# Patient Record
Sex: FEMALE | Race: ASIAN | Employment: OTHER | URBAN - METROPOLITAN AREA
[De-identification: names, ages, dates, MRNs, and addresses within clinical notes are randomized per-mention and may not be internally consistent; named-entity substitution may affect disease eponyms.]

---

## 2017-01-09 ENCOUNTER — RESULTS ONLY (OUTPATIENT)
Dept: OTHER | Facility: CLINIC | Age: 42
End: 2017-01-09

## 2017-01-09 DIAGNOSIS — Z76.82 ORGAN TRANSPLANT CANDIDATE: ICD-10-CM

## 2017-01-09 PROCEDURE — 86833 HLA CLASS II HIGH DEFIN QUAL: CPT | Performed by: TRANSPLANT SURGERY

## 2017-01-09 PROCEDURE — 86832 HLA CLASS I HIGH DEFIN QUAL: CPT | Performed by: TRANSPLANT SURGERY

## 2017-01-11 LAB — PRA SINGLE ANTIGEN IGG ANTIBODY: NORMAL

## 2017-01-13 LAB
SA1 CELL: NORMAL
SA1 COMMENTS: NORMAL
SA1 HI RISK ABY: NORMAL
SA1 MOD RISK ABY: NORMAL
SA1 TEST METHOD: NORMAL
SA2 CELL: NORMAL
SA2 COMMENTS: NORMAL
SA2 HI RISK ABY UA: NORMAL
SA2 MOD RISK ABY: NORMAL
SA2 TEST METHOD: NORMAL

## 2017-04-03 ENCOUNTER — RESULTS ONLY (OUTPATIENT)
Dept: OTHER | Facility: CLINIC | Age: 42
End: 2017-04-03

## 2017-04-03 DIAGNOSIS — Z76.82 ORGAN TRANSPLANT CANDIDATE: ICD-10-CM

## 2017-04-03 PROCEDURE — 86832 HLA CLASS I HIGH DEFIN QUAL: CPT | Performed by: TRANSPLANT SURGERY

## 2017-04-03 PROCEDURE — 86833 HLA CLASS II HIGH DEFIN QUAL: CPT | Performed by: TRANSPLANT SURGERY

## 2017-04-06 LAB — PRA SINGLE ANTIGEN IGG ANTIBODY: NORMAL

## 2017-06-05 ENCOUNTER — RESULTS ONLY (OUTPATIENT)
Dept: OTHER | Facility: CLINIC | Age: 42
End: 2017-06-05

## 2017-06-05 DIAGNOSIS — Z76.82 ORGAN TRANSPLANT CANDIDATE: ICD-10-CM

## 2017-06-05 PROCEDURE — 86832 HLA CLASS I HIGH DEFIN QUAL: CPT | Performed by: TRANSPLANT SURGERY

## 2017-06-05 PROCEDURE — 86833 HLA CLASS II HIGH DEFIN QUAL: CPT | Performed by: TRANSPLANT SURGERY

## 2017-06-09 LAB — PRA SINGLE ANTIGEN IGG ANTIBODY: NORMAL

## 2017-06-13 ENCOUNTER — TELEPHONE (OUTPATIENT)
Dept: TRANSPLANT | Facility: CLINIC | Age: 42
End: 2017-06-13

## 2017-06-13 NOTE — TELEPHONE ENCOUNTER
Coordinator called pt, using  services, and left msg stating her status was changed to inactive on kidney wait list d/t insurance reasons.  Explained she will not be eligible for kidney offers while inactive, but will still accumulate wait time. Left pt her PFR's contact information. Coordinator contact information also provided.    UNOS updated, immunology & PFR notified, change in status letter routed to admin team to send out.

## 2017-06-14 ENCOUNTER — TEAM CONFERENCE (OUTPATIENT)
Dept: TRANSPLANT | Facility: CLINIC | Age: 42
End: 2017-06-14

## 2017-06-14 NOTE — TELEPHONE ENCOUNTER
Team Conference:      Attendees:Dr. Roberts, Dr. Lopez, Dr. Jacob, Dr. Mcnamara, CHASE Allan PA-C, Coordinator, , Dietician, Pharmacy    Discussion and Outcome: Patient status changed to inactive approved due to insufficient insurance.

## 2017-07-14 ENCOUNTER — TELEPHONE (OUTPATIENT)
Dept: TRANSPLANT | Facility: CLINIC | Age: 42
End: 2017-07-14

## 2017-07-14 NOTE — TELEPHONE ENCOUNTER
Attempted to contact patient at new phone number provided to us by dialysis center; phone rang and then hung up - no VM.

## 2017-07-19 ENCOUNTER — TEAM CONFERENCE (OUTPATIENT)
Dept: TRANSPLANT | Facility: CLINIC | Age: 42
End: 2017-07-19

## 2017-07-19 NOTE — TELEPHONE ENCOUNTER
Team Conference:      Attendees:Dr. Jacob, Dr. Lopez, Donal, Dr. Mccall, Coordinator, , Dietician, Pharmacy    Discussion and Outcome: Patient status changed to active approved. Insurance issues resolved.

## 2017-07-24 ENCOUNTER — RESULTS ONLY (OUTPATIENT)
Dept: OTHER | Facility: CLINIC | Age: 42
End: 2017-07-24

## 2017-07-24 DIAGNOSIS — Z76.82 ORGAN TRANSPLANT CANDIDATE: ICD-10-CM

## 2017-07-24 PROCEDURE — 86833 HLA CLASS II HIGH DEFIN QUAL: CPT | Performed by: TRANSPLANT SURGERY

## 2017-07-24 PROCEDURE — 86832 HLA CLASS I HIGH DEFIN QUAL: CPT | Performed by: TRANSPLANT SURGERY

## 2017-07-28 LAB — PRA SINGLE ANTIGEN IGG ANTIBODY: NORMAL

## 2017-08-30 DIAGNOSIS — N18.6 END STAGE RENAL DISEASE (H): ICD-10-CM

## 2017-08-30 DIAGNOSIS — Z76.82 ORGAN TRANSPLANT CANDIDATE: Primary | ICD-10-CM

## 2017-09-18 ENCOUNTER — TELEPHONE (OUTPATIENT)
Dept: TRANSPLANT | Facility: CLINIC | Age: 42
End: 2017-09-18

## 2017-09-18 NOTE — TELEPHONE ENCOUNTER
Call from dialysis, requesting order for mammogram. KULWANT Bloom will help pt schedule mammogram once receive order.    (Order routed to admin team to send out)

## 2017-09-18 NOTE — LETTER
PHYSICIAN ORDERS      DATE & TIME ISSUED: 2017 9:23 AM  PATIENT NAME: Carlton Larkin   : 1975     [facility] MR# [if applicable]: [id number]     DIAGNOSIS:  ESRD  ICD-10 CODE: N 18.6    Please complete mammogram. Ok to schedule once you turn 42 years old.     Please forward results to your kidney transplant coordinator at number listed below.    Any questions please call: Jocelyn Brooks [phone]    Please fax these results to [phone].    .

## 2017-09-26 NOTE — TELEPHONE ENCOUNTER
The person at this phone number is not Carlton and doesn't know who Carlton is.  Sending 'trying to reach you letter'.

## 2017-10-02 ENCOUNTER — RESULTS ONLY (OUTPATIENT)
Dept: OTHER | Facility: CLINIC | Age: 42
End: 2017-10-02

## 2017-10-02 DIAGNOSIS — Z76.82 ORGAN TRANSPLANT CANDIDATE: ICD-10-CM

## 2017-10-02 DIAGNOSIS — N18.6 END STAGE RENAL DISEASE (H): ICD-10-CM

## 2017-10-02 PROCEDURE — 86832 HLA CLASS I HIGH DEFIN QUAL: CPT | Performed by: TRANSPLANT SURGERY

## 2017-10-02 PROCEDURE — 86833 HLA CLASS II HIGH DEFIN QUAL: CPT | Performed by: TRANSPLANT SURGERY

## 2017-10-04 LAB — PRA SINGLE ANTIGEN IGG ANTIBODY: NORMAL

## 2017-10-17 ENCOUNTER — TELEPHONE (OUTPATIENT)
Dept: TRANSPLANT | Facility: CLINIC | Age: 42
End: 2017-10-17

## 2017-10-17 NOTE — LETTER
October 25, 2017    Carlton Larkin  [address] [address]      Dear Ms. Larkin,    Enclosed you will find a copy of your transplant waitlist appointment schedule and a map to our location.    If you are unable to come in for your appointments for any reason, please contact Liza at [phone].      Sincerely,       [facility]    CC: KULWANT Lyle, GILDA                                        [facility]  [address] [place] [address]  [id number]    WAITLIST CLINIC APPOINTMENTS    Patient   Carlton Larkin        MR#:    [id number]  :  Liza    [phone]  Coordinator:  Jocelyn RODRIGUEZ    [phone]  LPN:    Radha LINDSEY    [phone]  Location:   Transplant Center  Dates:   November 21, 2017    This is your schedule, please follow dates and times.  You will receive reminder phone calls for other tests, but please follow this schedule only!  If you have any questions about dates and times, please call us on number listed above.  Thank you, Transplant Clinic.     Day/Date:   Tuesday, November 21, 2017  Time Location Activity   12:30 p.m. [facility]  Imaging and Lab Testing  1st floor Blood tests: CBC    1:00 p.m. [facility]  Transplant Services  3rd floor; Clinic 3B Appointment with RAFFAELE Milton,   Transplant Nephrologist   2:00 p.m. [facility]  Transplant Services  3rd floor; Clinic 3A; Consult Room Appointment with either Aranza Lucio,  Transplant

## 2017-10-17 NOTE — TELEPHONE ENCOUNTER
I received a call from this patient's dialysis unit giving me an updated phone number for her and when I called with Patricia  she didn't answer and doesn't have a voicemail set up.  I will try her again tomorrow.

## 2017-10-25 NOTE — TELEPHONE ENCOUNTER
Spoke to  at dialysis center and scheduled her waitlist appointments for 11/21.  I sent a copy of schedule to patient's home address and faxed it to dialysis center as well.

## 2017-11-17 ENCOUNTER — TRANSFERRED RECORDS (OUTPATIENT)
Dept: HEALTH INFORMATION MANAGEMENT | Facility: CLINIC | Age: 42
End: 2017-11-17

## 2017-11-21 ENCOUNTER — ALLIED HEALTH/NURSE VISIT (OUTPATIENT)
Dept: TRANSPLANT | Facility: CLINIC | Age: 42
End: 2017-11-21
Attending: PHYSICIAN ASSISTANT
Payer: MEDICARE

## 2017-11-21 ENCOUNTER — OFFICE VISIT (OUTPATIENT)
Dept: NEPHROLOGY | Facility: CLINIC | Age: 42
End: 2017-11-21
Attending: PHYSICIAN ASSISTANT
Payer: MEDICARE

## 2017-11-21 VITALS
OXYGEN SATURATION: 100 % | HEART RATE: 79 BPM | SYSTOLIC BLOOD PRESSURE: 157 MMHG | TEMPERATURE: 98 F | DIASTOLIC BLOOD PRESSURE: 98 MMHG | BODY MASS INDEX: 22.13 KG/M2 | WEIGHT: 112.7 LBS | HEIGHT: 60 IN

## 2017-11-21 DIAGNOSIS — N18.6 END STAGE KIDNEY DISEASE (H): Primary | ICD-10-CM

## 2017-11-21 DIAGNOSIS — Z76.82 AWAITING ORGAN TRANSPLANT STATUS: Primary | ICD-10-CM

## 2017-11-21 DIAGNOSIS — N18.6 END STAGE RENAL DISEASE (H): ICD-10-CM

## 2017-11-21 DIAGNOSIS — Z76.82 ORGAN TRANSPLANT CANDIDATE: ICD-10-CM

## 2017-11-21 LAB
ERYTHROCYTE [DISTWIDTH] IN BLOOD BY AUTOMATED COUNT: 14.4 % (ref 10–15)
HCT VFR BLD AUTO: 33.9 % (ref 35–47)
HGB BLD-MCNC: 10.7 G/DL (ref 11.7–15.7)
MCH RBC QN AUTO: 30 PG (ref 26.5–33)
MCHC RBC AUTO-ENTMCNC: 31.6 G/DL (ref 31.5–36.5)
MCV RBC AUTO: 95 FL (ref 78–100)
PLATELET # BLD AUTO: 221 10E9/L (ref 150–450)
RBC # BLD AUTO: 3.57 10E12/L (ref 3.8–5.2)
WBC # BLD AUTO: 5 10E9/L (ref 4–11)

## 2017-11-21 PROCEDURE — 85027 COMPLETE CBC AUTOMATED: CPT | Performed by: INTERNAL MEDICINE

## 2017-11-21 PROCEDURE — 36415 COLL VENOUS BLD VENIPUNCTURE: CPT | Performed by: INTERNAL MEDICINE

## 2017-11-21 PROCEDURE — 99212 OFFICE O/P EST SF 10 MIN: CPT | Mod: ZF

## 2017-11-21 ASSESSMENT — PAIN SCALES - GENERAL: PAINLEVEL: NO PAIN (0)

## 2017-11-21 NOTE — MR AVS SNAPSHOT
"              After Visit Summary   2017    Carlton Larkin    MRN: [id number]           Patient Information     Date Of Birth          1975        Visit Information        Provider Department      2017 12:30 PM Felecia Allan PA-C; [place] [employer] [facility] Nephrology        Today's Diagnoses     End stage kidney disease (H)    -  1       Follow-ups after your visit        Who to contact     If you have questions or need follow up information about today's clinic visit or your schedule please contact [facility] NEPHROLOGY directly at [phone].  Normal or non-critical lab and imaging results will be communicated to you by PopSealhart, letter or phone within 4 business days after the clinic has received the results. If you do not hear from us within 7 days, please contact the clinic through Eventpigt or phone. If you have a critical or abnormal lab result, we will notify you by phone as soon as possible.  Submit refill requests through [employer] or call your pharmacy and they will forward the refill request to us. Please allow 3 business days for your refill to be completed.          Additional Information About Your Visit        MyChart Information     [employer] lets you send messages to your doctor, view your test results, renew your prescriptions, schedule appointments and more. To sign up, go to [email or web address] . Click on \"Log in\" on the left side of the screen, which will take you to the Welcome page. Then click on \"Sign up Now\" on the right side of the page.     You will be asked to enter the access code listed below, as well as some personal information. Please follow the directions to create your username and password.     Your access code is: [email or web address]  Expires: 2018  6:30 AM     Your access code will  in 90 days. If you need help or a new code, please call your [place] clinic or [phone].        Care EveryWhere ID     This is your Care EveryWhere ID. This " could be used by other organizations to access your [place] medical records  [id number]        Your Vitals Were     Pulse Temperature Height Pulse Oximetry BMI (Body Mass Index)       79 98  F (36.7  C) (Oral) 1.524 m (5') 100% 22.01 kg/m2        Blood Pressure from Last 3 Encounters:   11/21/17 (!) 157/98   11/29/16 129/85   09/22/15 124/86    Weight from Last 3 Encounters:   11/21/17 51.1 kg (112 lb 11.2 oz)   11/29/16 48.2 kg (106 lb 4.2 oz)   09/22/15 49.9 kg (110 lb)              Today, you had the following     No orders found for display       Primary Care Provider    None Specified       No primary provider on file.        Equal Access to Services     CASSANDRA MENDOZA : Fernando Regalado, wajuan fontaineqadaha, qaybta kaalmada nancy, angle dan . So [facility] [phone].    ATENCIÓN: Si habla español, tiene a billings disposición servicios gratuitos de asistencia lingüística. Llame al [phone].    We comply with applicable federal civil rights laws and [place] laws. We do not discriminate on the basis of race, color, national origin, age, disability, sex, sexual orientation, or gender identity.            Thank you!     Thank you for choosing [facility] NEPHROLOGY  for your care. Our goal is always to provide you with excellent care. Hearing back from our patients is one way we can continue to improve our services. Please take a few minutes to complete the written survey that you may receive in the mail after your visit with us. Thank you!             Your Updated Medication List - Protect others around you: Learn how to safely use, store and throw away your medicines at www.disposemymeds.org.          This list is accurate as of: 11/21/17 11:59 PM.  Always use your most recent med list.                   Brand Name Dispense Instructions for use Diagnosis    AMLODIPINE BESYLATE PO      Take 5 mg by mouth daily        LOSARTAN POTASSIUM PO      Take 100 mg by mouth daily         TUMS 500 MG chewable tablet   Generic drug:  calcium carbonate      Take 2 chew tab by mouth 3 times daily.

## 2017-11-21 NOTE — LETTER
2017       RE: Carlton Larkin  [address]  [address]  [place] [address]     Dear Colleague,    Thank you for referring your patient, Carlton Larkin, to the [facility] NEPHROLOGY at [facility]. Please see a copy of my visit note below.    Assessment and Plan:  1. Kidney transplant wait list evaluation - Ms. Larkin is a good candidate overall. She should remain active on the wait list pending further breast imaging as below.  2. ESKD from unknown etiology - doing well on dialysis since 2012 but would benefit from a kidney transplant. She has no potential living donors.  3. Cardiac risk - she had cardiac risk assessment with a normal DSE in 2016 and a coronary angiogram in  that did not demonstrate significant CAD. She is asymptomatic with exertion and should have repeat cardiac risk assessment in 2018, per cardiology recommendations, or sooner as needed.  4. Heavy menstrual bleeding requiring blood transfusion 2016 - gynecology records are being obtained. She reports that this was a one time event. Symptoms resolved and have not recurred.  5. Health maintenance - 2016 pap smear up to date. Mammogram this past Friday was abnormal with 3 circumscribed masses in the left upper outer quadrant. Additional imaging recommended but not yet scheduled. These records will need to be obtained once imaging is complete.      Discussed the risks and benefits of a transplant, including the risk of surgery and immunosuppression medications.    KDPI: We discussed approximate remaining wait time and how that is influenced by issues such as blood type and sensitization (PRA) and access to a living donor. I contrasted potential waiting time for living vs  donor kidneys from  normal (0-85%) or higher (%) kidney donor profile index (KDPI) donors and their associated outcomes. I would not recommend Ms. Larkin to consider kidneys from high KDPI donors. The reason for this  decision is best summarized as: improved long term graft survival.    Patient s overall re-evaluation may require further discussion in the Transplant Program s multidisciplinary selection committee for a final recommendation on the patient s suitability for transplant.     Reason for Visit:  Carlton Larkin is a 42-year-old female with ESKD from unknown etiology, who presents for kidney transplant wait list evaluation.     Last Evaluation Clinic Visit Date:  August 2015        Wait List Date: 1/2/2012  Current phase/status: Waitlist: Active as of 7/14/2017  Transplant coordinator: Jocelyn Brooks Transplant Office phone number [phone]       HPI: Visit conducted with the assistance of a Patricia . Ms. Larkin is a 42-year-old female with ESKD from unknown etiology on dialysis since January 2012 who presents for wait list follow up. Since she was seen in August 2015 she has been doing well. No recent admits. She did have a blood transfusion in 2016 in the setting of heavy menstrual bleeding, which has since resolved and not recurred. She reports having gynecology follow up but records are not currently available. She continues to dialyze Mondays, Wednesdays, and Fridays via a LUE AVF without difficulty. She still urinates several times per day without dysuria, hematuria, or trouble emptying her bladder. Overall, she has been doing well. She notes that her energy level and appetite have improved. No nausea, vomiting, or diarrhea. She doesn't do anything in particular for exercise but notes she can walk at least 10 blocks without chest pain, SOB, or claudication symptoms with exertion. No fevers, sweats, chills, or recent illness.         Kidney Disease Hx       Kidney Disease Dx: unknown       Biopsy Proven: No        On Dialysis: Yes, Date initiated: January 2012 and Dialysis Type: [facility];       Primary Nephrologist: Dr. Margareth Treadwell          Uremic Symptoms: Fatigue: No; Cold: No; Nausea: No; Poor Appetite:  No; Metallic Taste: No; Edema: No;          Potential Donor(s): No          Cardiac history: possible MI secondary to coronary artery spasm in 2012       Last cardiac risk assessment: 2016       Last stress test/coronary angiogram: DSE negative 2016, coronary angiogram  without significant disease        Exertional symptoms: none       Recent cardiac events: none     Pertinent issues:  Blood transfusion: Yes;   Jehovah s Witness: No  Pregnancies: Yes  Previous transplant: No  Urine output: Yes  Bladder dysfunction: No  Claudication: No  Previous amputation: No  Cancer: No  Recurrent infection: No  Chronic anticoagulation: No      ROS:  A comprehensive review of systems was obtained and negative, except as noted in the HPI or PMH.     PMH:  Medical records were obtained and reviewed.  Past Medical History:   Diagnosis Date     Anemia in chronic kidney disease      End stage kidney disease (H)      HELLP syndrome      Hepatitis B      HTN (hypertension)      MI (myocardial infarction)      Proteinuria      Secondary hyperparathyroidism (H)      Thrombocytopaenia         PSH:  Past Surgical History:   Procedure Laterality Date      SECTION  2012     CREATE FISTULA ARTERIOVENOUS UPPER EXTREMITY          Personal Hx:  Social History     Social History     Marital status:      Spouse name: N/A     Number of children: N/A     Years of education: N/A     Occupational History     Not on file.     Social History Main Topics     Smoking status: Never Smoker     Smokeless tobacco: Never Used     Alcohol use No     Drug use: No     Sexual activity: Not on file     Other Topics Concern     Not on file     Social History Narrative        Allergies:  Allergies   Allergen Reactions     Heparin Flush      Vancomycin         Medications:  Prior to Admission medications    Medication Sig Start Date End Date Taking? Authorizing Provider   calcium carbonate (TUMS) 500 MG chewable  tablet Take 2 chew tab by mouth 3 times daily.   Yes Reported, Patient   LOSARTAN POTASSIUM PO Take 100 mg by mouth daily    Reported, Patient   AMLODIPINE BESYLATE PO Take 5 mg by mouth daily    Reported, Patient        Vitals:  BP (!) 157/98  Pulse 79  Temp 98  F (36.7  C) (Oral)  Ht 1.524 m (5')  Wt 51.1 kg (112 lb 11.2 oz)  SpO2 100%  BMI 22.01 kg/m2     Exam:  GENERAL APPEARANCE: alert and no distress  HENT: mouth without ulcers or lesions. Fair dentition  LYMPHATICS: no cervical or supraclavicular nodes  RESP: lungs clear to auscultation - no rales, rhonchi or wheezes  CV: regular rhythm, normal rate, no rub, no murmur  EDEMA: no LE edema bilaterally  ABDOMEN: soft, nondistended, nontender  MS: extremities normal - no gross deformities noted, no evidence of inflammation in joints, no muscle tenderness  SKIN: no rash  Femoral pulses 2+ equal bilaterally        Again, thank you for allowing me to participate in the care of your patient.      Sincerely,    Felecia Allan PA-C

## 2017-11-21 NOTE — MR AVS SNAPSHOT
"              After Visit Summary   2017    Carlton Larkin    MRN: [id number]           Patient Information     Date Of Birth          1975        Visit Information        Provider Department      2017 2:00 PM Khadijah Rowell MSW; [place] [employer] [facility] Solid Organ Transplant        Today's Diagnoses     Awaiting organ transplant status    -  1       Follow-ups after your visit        Who to contact     If you have questions or need follow up information about today's clinic visit or your schedule please contact [facility] SOLID ORGAN TRANSPLANT directly at [phone].  Normal or non-critical lab and imaging results will be communicated to you by [email or web address].comhart, letter or phone within 4 business days after the clinic has received the results. If you do not hear from us within 7 days, please contact the clinic through SystemsNett or phone. If you have a critical or abnormal lab result, we will notify you by phone as soon as possible.  Submit refill requests through [employer] or call your pharmacy and they will forward the refill request to us. Please allow 3 business days for your refill to be completed.          Additional Information About Your Visit        MyChart Information     [employer] lets you send messages to your doctor, view your test results, renew your prescriptions, schedule appointments and more. To sign up, go to [email or web address] . Click on \"Log in\" on the left side of the screen, which will take you to the Welcome page. Then click on \"Sign up Now\" on the right side of the page.     You will be asked to enter the access code listed below, as well as some personal information. Please follow the directions to create your username and password.     Your access code is: [email or web address]  Expires: 2018  6:30 AM     Your access code will  in 90 days. If you need help or a new code, please call your [place] clinic or [phone].        Care EveryWhere ID     This is your Care " EveryWhere ID. This could be used by other organizations to access your [place] medical records  [id number]         Blood Pressure from Last 3 Encounters:   11/21/17 (!) 157/98   11/29/16 129/85   09/22/15 124/86    Weight from Last 3 Encounters:   11/21/17 51.1 kg (112 lb 11.2 oz)   11/29/16 48.2 kg (106 lb 4.2 oz)   09/22/15 49.9 kg (110 lb)              Today, you had the following     No orders found for display       Primary Care Provider Office Phone # Fax #    Marianne Gupta [phone]       [facility] [address]  [place] [address]        Equal Access to Services     CASSANDRA MENDOZA : Hadii aad ku hadasho Soomaali, waaxda luqadaha, qaybta kaalmada adeegyada, angle hernandezin emmett dan . So [facility] [phone].    ATENCIÓN: Si habla español, tiene a billings disposición servicios gratuitos de asistencia lingüística. Llame al [phone].    We comply with applicable federal civil rights laws and [place] laws. We do not discriminate on the basis of race, color, national origin, age, disability, sex, sexual orientation, or gender identity.            Thank you!     Thank you for choosing [facility] SOLID ORGAN TRANSPLANT  for your care. Our goal is always to provide you with excellent care. Hearing back from our patients is one way we can continue to improve our services. Please take a few minutes to complete the written survey that you may receive in the mail after your visit with us. Thank you!             Your Updated Medication List - Protect others around you: Learn how to safely use, store and throw away your medicines at www.disposemymeds.org.          This list is accurate as of: 11/21/17  3:03 PM.  Always use your most recent med list.                   Brand Name Dispense Instructions for use Diagnosis    AMLODIPINE BESYLATE PO      Take 5 mg by mouth daily        LOSARTAN POTASSIUM PO      Take 100 mg by mouth daily        TUMS 500 MG  chewable tablet   Generic drug:  calcium carbonate      Take 2 chew tab by mouth 3 times daily.

## 2017-11-21 NOTE — PROGRESS NOTES
Patient Name: Carlton Larkin  : 1975  Age: 42 year old  MRN: [id number]  Date of Initial Social Work Evaluation: 2013    Patient on kidney transplant wait list.  Saw today to update psychosocial assessment along with Patricia interpretor Chi.      Presenting Information   Living Situation: Patient resides in a home with her  Irene and daughter (5 years old) in [place], [place] which is 3 hours away. Patient reported her 20 year old son does not live with them anymore but lives in [place].  If not local, plans for short term stay: Patient reported she has aunts and uncles who live in [place] she has already talked to about staying with and she can stay there as long as she needs.   Previous Functional Status: Ambulatory and independent with ADL's.   Cultural/Language/Spiritual Considerations: Patient is from [place] and speaks Patricia.    Support System  Primary Support Person  Jaw  Other support:  Son, family (brother, sister, extended family)  Plan for support in immediate post-transplant period: Son, aunts/uncles    Health Care Directive  Decision Maker: Self  Alternate Decision Maker:   Health Care Directive: Declined completing    Mental Health/Coping:   History of Mental Health: Patient denied any history of anxiety, depression, or other mental health concerns.   History of Chemical Health: Patient denied any history of tobacco, alcohol, or substance use.   Current status: Patient denied any current anxiety, depression or other mental health concerns. Patient denied any current tobacco, alcohol, or substance use.  Coping: Patient appears to be coping well.   Services Needed/Recommended: None identified at this time.     Financial   Income: Patient does not work and does not have any income. Patient's  works full time.   Impact of transplant on income: None identified at this time  Insurance and medication coverage: Patient currently has Medicare A & B. There was no  secondary listed in patient's chart. Patient informed this writer she has a secondary insurance through her 's employer ([employer]). This writer sent an in-basket message to transplant financial counselors with updated insurance information.   Financial concerns: None identified at this time  Resources needed: None identified at this time    Assessment and recommendations and plan:  Reviewed transplant education (Medicare, rehabilitation, donor issues, community/financial resources, and psych/family adjustment) as well as psychosocial risks of transplant. Patient seemed to process information well via interpretor. Patient reported when she did not fully understand information and asked for clarification. Appeared informed, motivated, and able to follow post transplant requirements. Behavior was appropriate during interview. Has adequate income and insurance coverage. Adequate social support. No major contraindications noted for transplant. At this time, patient appears to understand the risks and benefits of transplant.     Khadijah Rowell, [facility]    Kidney/Pancreas/Auto Islet Transplant Programs

## 2017-11-21 NOTE — PROGRESS NOTES
Assessment and Plan:  1. Kidney transplant wait list evaluation - Ms. Larkin is a good candidate overall. She should remain active on the wait list pending further breast imaging as below.  2. ESKD from unknown etiology - doing well on dialysis since 2012 but would benefit from a kidney transplant. She has no potential living donors.  3. Cardiac risk - she had cardiac risk assessment with a normal DSE in 2016 and a coronary angiogram in  that did not demonstrate significant CAD. She is asymptomatic with exertion and should have repeat cardiac risk assessment in 2018, per cardiology recommendations, or sooner as needed.  4. Heavy menstrual bleeding requiring blood transfusion 2016 - gynecology records are being obtained. She reports that this was a one time event. Symptoms resolved and have not recurred.  5. Health maintenance - 2016 pap smear up to date. Mammogram this past Friday was abnormal with 3 circumscribed masses in the left upper outer quadrant. Additional imaging recommended but not yet scheduled. These records will need to be obtained once imaging is complete.      Discussed the risks and benefits of a transplant, including the risk of surgery and immunosuppression medications.    KDPI: We discussed approximate remaining wait time and how that is influenced by issues such as blood type and sensitization (PRA) and access to a living donor. I contrasted potential waiting time for living vs  donor kidneys from  normal (0-85%) or higher (%) kidney donor profile index (KDPI) donors and their associated outcomes. I would not recommend Ms. Larkin to consider kidneys from high KDPI donors. The reason for this decision is best summarized as: improved long term graft survival.    Patient s overall re-evaluation may require further discussion in the Transplant Program s multidisciplinary selection committee for a final recommendation on the patient s suitability for transplant.      Reason for Visit:  Carlton Larkin is a 42-year-old female with ESKD from unknown etiology, who presents for kidney transplant wait list evaluation.     Last Evaluation Clinic Visit Date:  August 2015        Wait List Date: 1/2/2012  Current phase/status: Waitlist: Active as of 7/14/2017  Transplant coordinator: Joc[facility] Office phone number [phone]       HPI: Visit conducted with the assistance of a Patricia . Ms. Larkin is a 42-year-old female with ESKD from unknown etiology on dialysis since January 2012 who presents for wait list follow up. Since she was seen in August 2015 she has been doing well. No recent admits. She did have a blood transfusion in 2016 in the setting of heavy menstrual bleeding, which has since resolved and not recurred. She reports having gynecology follow up but records are not currently available. She continues to dialyze Mondays, Wednesdays, and Fridays via a LUE AVF without difficulty. She still urinates several times per day without dysuria, hematuria, or trouble emptying her bladder. Overall, she has been doing well. She notes that her energy level and appetite have improved. No nausea, vomiting, or diarrhea. She doesn't do anything in particular for exercise but notes she can walk at least 10 blocks without chest pain, SOB, or claudication symptoms with exertion. No fevers, sweats, chills, or recent illness.         Kidney Disease Hx       Kidney Disease Dx: unknown       Biopsy Proven: No        On Dialysis: Yes, Date initiated: January 2012 and Dialysis Type: [facility];       Primary Nephrologist: Dr. Margareth Treadwell          Uremic Symptoms: Fatigue: No; Cold: No; Nausea: No; Poor Appetite: No; Metallic Taste: No; Edema: No;          Potential Donor(s): No          Cardiac history: possible MI secondary to coronary artery spasm in August 2012       Last cardiac risk assessment: November 2016       Last stress test/coronary angiogram: DSE negative November  2016, coronary angiogram  without significant disease        Exertional symptoms: none       Recent cardiac events: none     Pertinent issues:  Blood transfusion: Yes; 2016  Jehovah s Witness: No  Pregnancies: Yes  Previous transplant: No  Urine output: Yes  Bladder dysfunction: No  Claudication: No  Previous amputation: No  Cancer: No  Recurrent infection: No  Chronic anticoagulation: No      ROS:  A comprehensive review of systems was obtained and negative, except as noted in the HPI or PMH.     PMH:  Medical records were obtained and reviewed.  Past Medical History:   Diagnosis Date     Anemia in chronic kidney disease      End stage kidney disease (H)      HELLP syndrome      Hepatitis B      HTN (hypertension)      MI (myocardial infarction)      Proteinuria      Secondary hyperparathyroidism (H)      Thrombocytopaenia         PSH:  Past Surgical History:   Procedure Laterality Date      SECTION  2012     CREATE FISTULA ARTERIOVENOUS UPPER EXTREMITY          Personal Hx:  Social History     Social History     Marital status:      Spouse name: N/A     Number of children: N/A     Years of education: N/A     Occupational History     Not on file.     Social History Main Topics     Smoking status: Never Smoker     Smokeless tobacco: Never Used     Alcohol use No     Drug use: No     Sexual activity: Not on file     Other Topics Concern     Not on file     Social History Narrative        Allergies:  Allergies   Allergen Reactions     Heparin Flush      Vancomycin         Medications:  Prior to Admission medications    Medication Sig Start Date End Date Taking? Authorizing Provider   calcium carbonate (TUMS) 500 MG chewable tablet Take 2 chew tab by mouth 3 times daily.   Yes Reported, Patient   LOSARTAN POTASSIUM PO Take 100 mg by mouth daily    Reported, Patient   AMLODIPINE BESYLATE PO Take 5 mg by mouth daily    Reported, Patient        Vitals:  BP (!) 157/98  Pulse 79  Temp 98  F (36.7   C) (Oral)  Ht 1.524 m (5')  Wt 51.1 kg (112 lb 11.2 oz)  SpO2 100%  BMI 22.01 kg/m2     Exam:  GENERAL APPEARANCE: alert and no distress  HENT: mouth without ulcers or lesions. Fair dentition  LYMPHATICS: no cervical or supraclavicular nodes  RESP: lungs clear to auscultation - no rales, rhonchi or wheezes  CV: regular rhythm, normal rate, no rub, no murmur  EDEMA: no LE edema bilaterally  ABDOMEN: soft, nondistended, nontender  MS: extremities normal - no gross deformities noted, no evidence of inflammation in joints, no muscle tenderness  SKIN: no rash  Femoral pulses 2+ equal bilaterally

## 2017-11-21 NOTE — NURSING NOTE
Chief Complaint   Patient presents with     RECHECK     Wait List   Pt roomed, vitals, meds, and allergies reviewed with pt. Pt ready for provider.  Nam Soriano, CMA

## 2017-11-27 ENCOUNTER — TELEPHONE (OUTPATIENT)
Dept: TRANSPLANT | Facility: CLINIC | Age: 42
End: 2017-11-27

## 2017-11-27 NOTE — TELEPHONE ENCOUNTER
Coordinator called Dialysis Unit requesting information if F/U Diagnostic Breast Ultrasound ordered/scheduled at [facility] as noted in 11/17/17.  Staff stated no further orders noted. Dr. Jacob from [facility] listed as ordering physician so unsure if patient's PCP at [facility] aware of results.  Dialysis staff will contact local PCP with result and return call to Coordinator if PCP will order further follow up tests.  Contact information given.

## 2017-11-28 ENCOUNTER — TELEPHONE (OUTPATIENT)
Dept: TRANSPLANT | Facility: CLINIC | Age: 42
End: 2017-11-28

## 2017-11-28 NOTE — TELEPHONE ENCOUNTER
Monserrat RN from patient's dialysis center called and asked who was going to inform patient about her abnormal mammogram and the need for her to have an US. Dr. Jacob was the ordering provider for mammogram; new orders for breast US and possible new mammogram have been faxed to dialysis center.   Because the Michel was the ordering provider in this case, nobody has discussed results with patient to date. Coordinator will call patient at dialysis tomorrow and discuss next steps required.

## 2017-11-29 ENCOUNTER — MEDICAL CORRESPONDENCE (OUTPATIENT)
Dept: TRANSPLANT | Facility: CLINIC | Age: 42
End: 2017-11-29

## 2017-11-29 ENCOUNTER — TELEPHONE (OUTPATIENT)
Dept: TRANSPLANT | Facility: CLINIC | Age: 42
End: 2017-11-29

## 2017-11-29 NOTE — TELEPHONE ENCOUNTER
Coordinator called pt using  services at dialysis. Discussed mammogram results and that a breast u/s is recommended. Monserrat from dialysis will help pt set up this test. Pt states she does not have a PCP and understands if further testing is needed after breast u/s that she would need to come to our center to be followed. Pt told  she understood and has no further questions.

## 2017-12-14 ENCOUNTER — MEDICAL CORRESPONDENCE (OUTPATIENT)
Dept: TRANSPLANT | Facility: CLINIC | Age: 42
End: 2017-12-14

## 2017-12-15 ENCOUNTER — TRANSFERRED RECORDS (OUTPATIENT)
Dept: HEALTH INFORMATION MANAGEMENT | Facility: CLINIC | Age: 42
End: 2017-12-15

## 2018-01-08 ENCOUNTER — RESULTS ONLY (OUTPATIENT)
Dept: OTHER | Facility: CLINIC | Age: 43
End: 2018-01-08

## 2018-01-08 DIAGNOSIS — N18.6 END STAGE RENAL DISEASE (H): ICD-10-CM

## 2018-01-08 DIAGNOSIS — Z76.82 ORGAN TRANSPLANT CANDIDATE: ICD-10-CM

## 2018-01-11 LAB — PRA SINGLE ANTIGEN IGG ANTIBODY: NORMAL

## 2018-04-02 ENCOUNTER — RESULTS ONLY (OUTPATIENT)
Dept: OTHER | Facility: CLINIC | Age: 43
End: 2018-04-02

## 2018-04-02 DIAGNOSIS — N18.6 END STAGE RENAL DISEASE (H): ICD-10-CM

## 2018-04-02 DIAGNOSIS — Z76.82 ORGAN TRANSPLANT CANDIDATE: ICD-10-CM

## 2018-04-04 LAB — PRA SINGLE ANTIGEN IGG ANTIBODY: NORMAL

## 2018-04-05 LAB
SA1 CELL: NORMAL
SA1 COMMENTS: NORMAL
SA1 HI RISK ABY: NORMAL
SA1 MOD RISK ABY: NORMAL
SA1 TEST METHOD: NORMAL
SA2 CELL: NORMAL
SA2 COMMENTS: NORMAL
SA2 HI RISK ABY UA: NORMAL
SA2 MOD RISK ABY: NORMAL
SA2 TEST METHOD: NORMAL
UNOS CPRA: 33

## 2018-07-02 ENCOUNTER — RESULTS ONLY (OUTPATIENT)
Dept: OTHER | Facility: CLINIC | Age: 43
End: 2018-07-02

## 2018-07-02 DIAGNOSIS — Z76.82 ORGAN TRANSPLANT CANDIDATE: ICD-10-CM

## 2018-07-02 DIAGNOSIS — N18.6 END STAGE RENAL DISEASE (H): ICD-10-CM

## 2018-07-05 ENCOUNTER — TELEPHONE (OUTPATIENT)
Dept: TRANSPLANT | Facility: CLINIC | Age: 43
End: 2018-07-05

## 2018-07-05 DIAGNOSIS — Z76.82 AWAITING ORGAN TRANSPLANT: Primary | ICD-10-CM

## 2018-07-05 DIAGNOSIS — N18.6 ESRD (END STAGE RENAL DISEASE) (H): ICD-10-CM

## 2018-07-05 LAB — PRA SINGLE ANTIGEN IGG ANTIBODY: NORMAL

## 2018-07-05 NOTE — TELEPHONE ENCOUNTER
Spoke with patient via  services and let her know that she is the back up on a LDKT scheduled for 7/19 and that a final crossmatch will be drawn at dialysis on Monday 7/9/2018.  Patient verbalized understanding and has no further questions at this time.

## 2018-07-09 ENCOUNTER — TELEPHONE (OUTPATIENT)
Dept: TRANSPLANT | Facility: CLINIC | Age: 43
End: 2018-07-09

## 2018-07-09 NOTE — TELEPHONE ENCOUNTER
Coordinator called pt, using  services, stating she is no longer backup for potential LKDT chain on 7/19/18. Reviewed pt is active on the kidney transplant wait list.  Pt verbalizes understanding and has no further questions for coordinator at this time.

## 2018-07-16 LAB — HLA FINAL CROSSMATCH RECIPIENT: NORMAL

## 2018-07-31 ENCOUNTER — DOCUMENTATION ONLY (OUTPATIENT)
Dept: TRANSPLANT | Facility: CLINIC | Age: 43
End: 2018-07-31

## 2018-07-31 DIAGNOSIS — Z76.82 AWAITING ORGAN TRANSPLANT: Primary | ICD-10-CM

## 2018-08-02 NOTE — PROGRESS NOTES
PATHOLOGY HLA CROSSMATCH CONSULTATION: DONOR/RECIPIENT  VIRTUAL CROSSMATCH - Kidney  Consultation Date: 2018  Consultation Requested by: Dr. Cameron  Regarding: Compatibility of  donor organ UNOS #[id number] from OPO/Hospital: [facility]  with Carlton Trae    Findings: Regarding a virtual crossmatch between Lei Trae and  donor listed above (match ID [id number]):  The most recent (18) and 11 additional patient serum/sera  were analyzed.  The patient has no antibodies listed with HLA specificity against the donor organ.      Record Review Indicates: I personally reviewed the most recent serum, the historic peak sera, and all other sera with solid-phase HLA Single Antigen test results:  The patient has no HLA antibodies against the donor organ.     The results of this virtual XM are:   -most recent serum: compatible   -peak #1: compatible  -peak #2: compatible    Disclaimer: Clinical judgement must take into account other factors, such as non-HLA antibodies not detected in the assay. The VXM gives probabilities only.  The probability does not account for the potential for auto-antibodies that may be present in the patient's serum.  These autoantibodies may render the physical crossmatch falsely positive, and would be detected by an autologous crossmatch.  When possible, confirm findings with prospective allogeneic and autologous flow crossmatches before going to transplant as clinically indicated.     Salas Wooten, PhD,[facility]  Medical Director, Immunology/Histocompatibility Laboratory  Pager [phone]

## 2018-08-16 ENCOUNTER — DOCUMENTATION ONLY (OUTPATIENT)
Dept: TRANSPLANT | Facility: CLINIC | Age: 43
End: 2018-08-16

## 2018-08-16 DIAGNOSIS — Z76.82 AWAITING ORGAN TRANSPLANT: Primary | ICD-10-CM

## 2018-08-16 NOTE — PROGRESS NOTES
PATHOLOGY HLA CROSSMATCH CONSULTATION: DONOR/RECIPIENT VIRTUAL CROSSMATCH - Kidney  Consultation Date: 2018  Consultation Requested by: Dr. Cameron  Regarding: Compatibility of  donor organ UNOS #[id number] from OPO/Hospital: [facility]/[place], [place] with patient Carlton Larkin       Findings: Regarding a virtual crossmatch between Carlton Trae and  donor listed above (match ID [id number]):  The most recent and peak patient sera were analyzed.  The patient has 1 donor-specific antibody(ies)  (DSA) as listed in table below. No other antibodies listed with specificity against donor organ.      ANTIBODY MOST RECENT SERUM (mfi) 7.2.18 Peak Serum #1 (mfi)  4.317     DR13  - 1109       Record Review Indicates: I personally reviewed the most recent serum and the  peak serum/sera.  In addition, I analyzed 10  more sera:  The patient has antibodies against the donor organ.   Based on historical data from this hospital's histocompatibility lab, using the sum mfi of the patient's DSA with specificity against this donor organ, the probability of a positive B cell crossmatch is  8% for the peak serum.  DSA to C-locus antigens were not considered in deriving the probability of positive crossmatch because DSA to C-locus antigens rarely contribute to a positive lymphocyte crossmatch test.    The results of this virtual XM are:   -most recent serum: Compatible  -peak #1:  Likely compatible      Disclaimer: Clinical judgement must take into account other factors, such as non-HLA antibodies not detected in the assay.   The VXM gives probabilities only.  The probability does not account for the potential for auto-antibodies that may be present in the patient's serum.  These autoantibodies may render the physical crossmatch falsely positive, and would be detected by an autologous crossmatch.  When possible, confirm findings with a prospective allogeneic and autologous flow crossmatches before going to transplant.    Glenda  Carmine PARSON  Medical Director, Immunology/Histocompatibility Laboratory

## 2018-09-27 DIAGNOSIS — I10 HYPERTENSION: ICD-10-CM

## 2018-09-27 DIAGNOSIS — N18.6 ESRD (END STAGE RENAL DISEASE) (H): ICD-10-CM

## 2018-09-27 DIAGNOSIS — Z76.82 ORGAN TRANSPLANT CANDIDATE: Primary | ICD-10-CM

## 2018-09-27 DIAGNOSIS — Z01.810 PRE-OPERATIVE CARDIOVASCULAR EXAMINATION: ICD-10-CM

## 2018-10-01 ENCOUNTER — RESULTS ONLY (OUTPATIENT)
Dept: OTHER | Facility: CLINIC | Age: 43
End: 2018-10-01

## 2018-10-01 DIAGNOSIS — Z76.82 ORGAN TRANSPLANT CANDIDATE: ICD-10-CM

## 2018-10-01 DIAGNOSIS — I10 HYPERTENSION: ICD-10-CM

## 2018-10-01 DIAGNOSIS — Z01.810 PRE-OPERATIVE CARDIOVASCULAR EXAMINATION: ICD-10-CM

## 2018-10-01 DIAGNOSIS — N18.6 ESRD (END STAGE RENAL DISEASE) (H): ICD-10-CM

## 2018-10-03 LAB — PRA SINGLE ANTIGEN IGG ANTIBODY: NORMAL

## 2018-11-08 ENCOUNTER — OFFICE VISIT (OUTPATIENT)
Dept: CARDIOLOGY | Facility: CLINIC | Age: 43
End: 2018-11-08
Attending: INTERNAL MEDICINE
Payer: MEDICARE

## 2018-11-08 ENCOUNTER — HOSPITAL ENCOUNTER (OUTPATIENT)
Dept: CARDIOLOGY | Facility: CLINIC | Age: 43
Discharge: HOME | End: 2018-11-08
Attending: INTERNAL MEDICINE | Admitting: INTERNAL MEDICINE
Payer: MEDICARE

## 2018-11-08 ENCOUNTER — ALLIED HEALTH/NURSE VISIT (OUTPATIENT)
Dept: TRANSPLANT | Facility: CLINIC | Age: 43
End: 2018-11-08
Attending: INTERNAL MEDICINE
Payer: MEDICARE

## 2018-11-08 ENCOUNTER — OFFICE VISIT (OUTPATIENT)
Dept: NEPHROLOGY | Facility: CLINIC | Age: 43
End: 2018-11-08
Attending: INTERNAL MEDICINE
Payer: MEDICARE

## 2018-11-08 VITALS
HEIGHT: 64 IN | OXYGEN SATURATION: 100 % | WEIGHT: 116.3 LBS | DIASTOLIC BLOOD PRESSURE: 107 MMHG | SYSTOLIC BLOOD PRESSURE: 198 MMHG | BODY MASS INDEX: 19.85 KG/M2 | HEART RATE: 75 BPM

## 2018-11-08 VITALS
DIASTOLIC BLOOD PRESSURE: 100 MMHG | RESPIRATION RATE: 16 BRPM | WEIGHT: 116.1 LBS | HEART RATE: 63 BPM | BODY MASS INDEX: 20.57 KG/M2 | SYSTOLIC BLOOD PRESSURE: 169 MMHG | HEIGHT: 63 IN

## 2018-11-08 DIAGNOSIS — Z76.82 ORGAN TRANSPLANT CANDIDATE: ICD-10-CM

## 2018-11-08 DIAGNOSIS — N18.6 ESRD (END STAGE RENAL DISEASE) (H): Primary | ICD-10-CM

## 2018-11-08 DIAGNOSIS — Z01.810 PRE-OPERATIVE CARDIOVASCULAR EXAMINATION: ICD-10-CM

## 2018-11-08 DIAGNOSIS — I10 ESSENTIAL HYPERTENSION: Primary | ICD-10-CM

## 2018-11-08 DIAGNOSIS — I15.0 RENOVASCULAR HYPERTENSION: ICD-10-CM

## 2018-11-08 DIAGNOSIS — N18.6 ESRD (END STAGE RENAL DISEASE) (H): ICD-10-CM

## 2018-11-08 DIAGNOSIS — I10 HYPERTENSION: ICD-10-CM

## 2018-11-08 DIAGNOSIS — Z76.82 AWAITING ORGAN TRANSPLANT STATUS: Primary | ICD-10-CM

## 2018-11-08 PROCEDURE — 93016 CV STRESS TEST SUPVJ ONLY: CPT | Performed by: INTERNAL MEDICINE

## 2018-11-08 PROCEDURE — 25000128 H RX IP 250 OP 636: Performed by: INTERNAL MEDICINE

## 2018-11-08 PROCEDURE — G0463 HOSPITAL OUTPT CLINIC VISIT: HCPCS | Mod: 25,ZF

## 2018-11-08 PROCEDURE — G0463 HOSPITAL OUTPT CLINIC VISIT: HCPCS | Mod: 25,27

## 2018-11-08 PROCEDURE — 25500064 ZZH RX 255 OP 636: Performed by: INTERNAL MEDICINE

## 2018-11-08 PROCEDURE — 93321 DOPPLER ECHO F-UP/LMTD STD: CPT | Mod: 26 | Performed by: INTERNAL MEDICINE

## 2018-11-08 PROCEDURE — 93018 CV STRESS TEST I&R ONLY: CPT | Performed by: INTERNAL MEDICINE

## 2018-11-08 PROCEDURE — 36415 COLL VENOUS BLD VENIPUNCTURE: CPT | Performed by: SURGERY

## 2018-11-08 PROCEDURE — 93005 ELECTROCARDIOGRAM TRACING: CPT | Mod: ZF

## 2018-11-08 PROCEDURE — 93010 ELECTROCARDIOGRAM REPORT: CPT | Mod: ZP | Performed by: INTERNAL MEDICINE

## 2018-11-08 PROCEDURE — 93350 STRESS TTE ONLY: CPT | Mod: 26 | Performed by: INTERNAL MEDICINE

## 2018-11-08 PROCEDURE — 25000125 ZZHC RX 250: Performed by: INTERNAL MEDICINE

## 2018-11-08 PROCEDURE — 93325 DOPPLER ECHO COLOR FLOW MAPG: CPT | Mod: 26 | Performed by: INTERNAL MEDICINE

## 2018-11-08 PROCEDURE — 99214 OFFICE O/P EST MOD 30 MIN: CPT | Mod: ZP | Performed by: NURSE PRACTITIONER

## 2018-11-08 PROCEDURE — 93350 STRESS TTE ONLY: CPT | Mod: TC

## 2018-11-08 RX ORDER — LOSARTAN POTASSIUM 100 MG/1
100 TABLET ORAL EVERY MORNING
Status: ON HOLD | COMMUNITY
Start: 2018-08-24 | End: 2020-01-27

## 2018-11-08 RX ORDER — METOPROLOL TARTRATE 1 MG/ML
1-30 INJECTION, SOLUTION INTRAVENOUS
Status: DISPENSED | OUTPATIENT
Start: 2018-11-08 | End: 2018-11-08

## 2018-11-08 RX ORDER — AMLODIPINE BESYLATE 10 MG/1
10 TABLET ORAL EVERY MORNING
Status: ON HOLD | COMMUNITY
End: 2020-11-19

## 2018-11-08 RX ORDER — AMLODIPINE BESYLATE 10 MG/1
10 TABLET ORAL DAILY
Qty: 90 TABLET | Refills: 3 | Status: SHIPPED | OUTPATIENT
Start: 2018-11-08 | End: 2018-11-08 | Stop reason: ALTCHOICE

## 2018-11-08 RX ORDER — CALCIUM CARBONATE 500 MG/1
2 TABLET, CHEWABLE ORAL 3 TIMES DAILY
Status: ON HOLD | COMMUNITY
Start: 2018-06-15 | End: 2020-11-19

## 2018-11-08 RX ORDER — METOPROLOL TARTRATE 100 MG
200 TABLET ORAL 2 TIMES DAILY
COMMUNITY
Start: 2018-10-23 | End: 2018-11-08

## 2018-11-08 RX ORDER — SODIUM CHLORIDE 9 MG/ML
INJECTION, SOLUTION INTRAVENOUS CONTINUOUS
Status: ACTIVE | OUTPATIENT
Start: 2018-11-08 | End: 2018-11-08

## 2018-11-08 RX ORDER — DOBUTAMINE HYDROCHLORIDE 200 MG/100ML
10-50 INJECTION INTRAVENOUS CONTINUOUS
Status: ACTIVE | OUTPATIENT
Start: 2018-11-08 | End: 2018-11-08

## 2018-11-08 RX ADMIN — DOBUTAMINE IN DEXTROSE 10 MCG/KG/MIN: 200 INJECTION, SOLUTION INTRAVENOUS at 10:38

## 2018-11-08 RX ADMIN — METOPROLOL TARTRATE 5 MG: 1 INJECTION, SOLUTION INTRAVENOUS at 10:45

## 2018-11-08 RX ADMIN — PERFLUTREN 8 ML: 6.52 INJECTION, SUSPENSION INTRAVENOUS at 10:50

## 2018-11-08 RX ADMIN — ATROPINE SULFATE 0.4 MG: 0.4 INJECTION, SOLUTION INTRAMUSCULAR; INTRAVENOUS; SUBCUTANEOUS at 10:41

## 2018-11-08 ASSESSMENT — PAIN SCALES - GENERAL
PAINLEVEL: NO PAIN (0)
PAINLEVEL: NO PAIN (0)

## 2018-11-08 NOTE — MR AVS SNAPSHOT
After Visit Summary   11/8/2018    Carlton Larkin    MRN: [id number]           Patient Information     Date Of Birth          1975        Visit Information        Provider Department      11/8/2018 8:45 AM Daron Lowe APRN CNP; [facility]        Today's Diagnoses     Essential hypertension    -  1    Pre-operative cardiovascular examination          Care Instructions    Patient Instructions:    It was a pleasure to see you in the cardiology clinic today.    If you have any questions you can reach our nurse triage line at [phone].  Press Option #1 for the [facility] [place], then press Option #3 for nursing or Option #1 for scheduling. We also encourage the use of [email or web address] to communicate with your HealthCare Provider    Note new medications: resume Norvasc (amlodipine) at 10mg by mouth daily   Stop the following medications: none    The results from today include: EKG is unchanged from prior  Please follow up with Cardiology as needed. Follow-up with you kidney doctor for blood pressure.    Control your risk of coronary artery disease with these four lifestyle changes:  - Eating a heart healthy diet by following the American Heart Association Recommendations: Reduce saturated fat and trans fat to 5-6 percent of daily calories and minimizing the amount of trans fat you eat by limiting your intake of red meat and dairy products made with whole milk. It also means choosing skim milk, low-fat or fat-free dairy products, limiting fried food, and cooking with healthy oils such as vegetable oil. A healthy diet should include emphasis on fruits, vegetables, whole grains, poultry, fish and nuts, and limiting sugary foods and beverages. We recommend following the DASH (Dietary Approaches to Stop Hypertension) or Mediterranean Diet.  - Regular Exercise: Just 40 minutes of aerobic exercise of moderate to vigorous intensity done 3-4 times  per week is enough to lower both cholesterol and high blood pressure. Brisk walking, swimming, bicycling or a dance class are examples.  - Avoiding Tobacco Smoking: Smoking compounds the risk from other risk factors for heart disease including high cholesterol, high blood pressure, and diabetes. Smokers can lower cholesterol, blood pressure, and protect their arteries by quitting. Ask to learn more about quitting smoking.  - Losing Weight: Being overweight or obese raises your risk of high cholesterol, high blood pressure, and diabetes which are all risk factors for heart disease. Losing excess weight can improve cholesterol levels, blood pressure, and reduce incidence of diabetes and potentially reverse these disease processes.     Get help if you experience any of these heart attack warning signs: Although some heart attacks are sudden and intense, most start slowly, with mild pain or discomfort. Pay attention to your body -- and call 911 if you feel:  - Chest discomfort: Most heart attacks involve discomfort in the center of the chest that lasts more than a few minutes, or that goes away and comes back. It can feel like uncomfortable pressure, squeezing, fullness or pain.  - Discomfort in other areas of the upper body: Symptoms can include pain or discomfort in one or both arms, the back, neck, jaw or stomach.   - Shortness of breath with or without chest discomfort   - Other signs may include breaking out in a cold sweat, nausea or lightheadedness      Sincerely,    Daron Lowe CNP            Follow-ups after your visit        Your next 10 appointments already scheduled     Nov 08, 2018 10:00 AM CST   Ech Dobutamine Stress Test with [id number]   [facility], [place],  Echocardiography ([facility])    [address] [facility] [address] [phone]           1.  Please bring or wear a comfortable two-piece outfit and walking shoes. 2.  Stop eating 3 hours before the  test. You may drink water or juice. 3.  Stop all caffeine 12 hours before the test. This includes coffee, tea, soda pop, chocolate and certain medicines (such as Anacin and Excederin). Also avoid decaf coffee and tea, as these contain small amounts of caffeine. 4.  No alcohol, smoking or use of other tobacco products for 12 hours before the test. 5.  Refer to your provider instructions to see if you need to stop any medications (such as beta-blockers or nitrates) for this test. 6.  For patients with diabetes: -   If you take insulin, call your diabetes care team. Ask if you should take a   dose the morning of your test. -   If you take diabetes medicine by mouth, don't take it on the morning of your test. Bring it with you to take after the test.  (If you have questions, call your diabetes care team) 7.  When you arrive, please tell us if: -   You have diabetes. -   You have taken Viagra, Cialis or Levitra in the past 48 hours. 8.  For any questions that cannot be answered, please contact the ordering physician 9.  Please do not wear perfumes or scented lotions on the day of your exam.            Nov 08, 2018 12:00 PM CST   Lab with  LAB   [facility] ([facility])    [address] [place] [address] [phone]            Nov 08, 2018 12:45 PM CST   SOT SOCIAL WORK EVAL with CHEN Presley   [facility] Solid Organ Transplant ([facility])    [address] [place] [address]  [address] [place] [id number] [phone]            Nov 08, 2018  3:45 PM CST   (Arrive by 3:30 PM)   RETURN WAIT LIST with NOEL Goff CNP   [facility] Nephrology ([facility])    [address]  [address]  [facility] [address] [phone]              Who to contact     If you have questions or need follow up information about today's clinic visit or your schedule please contact [facility]  "directly at [phone].  Normal or non-critical lab and imaging results will be communicated to you by MyChart, letter or phone within 4 business days after the clinic has received the results. If you do not hear from us within 7 days, please contact the clinic through [employer]ank[employer] or phone. If you have a critical or abnormal lab result, we will notify you by phone as soon as possible.  Submit refill requests through Transerv or call your pharmacy and they will forward the refill request to us. Please allow 3 business days for your refill to be completed.          Additional Information About Your Visit        F[employer]ubankhar[employer]xtox Information     Transerv lets you send messages to your doctor, view your test results, renew your prescriptions, schedule appointments and more. To sign up, go to [email or web address] . Click on \"Log in\" on the left side of the screen, which will take you to the Welcome page. Then click on \"Sign up Now\" on the right side of the page.     You will be asked to enter the access code listed below, as well as some personal information. Please follow the directions to create your username and password.     Your access code is: [email or web address]  Expires: 2019  9:36 AM     Your access code will  in 90 days. If you need help or a new code, please call your [facility] clinic or [phone].        Care EveryWhere ID     This is your Care EveryWhere ID. This could be used by other organizations to access your [facility] medical records  [id number]        Your Vitals Were     Pulse Height Last Period Pulse Oximetry BMI (Body Mass Index)       75 1.626 m (5' 4\") (LMP Unknown) 100% 19.96 kg/m2        Blood Pressure from Last 3 Encounters:   18 (!) 198/107   17 (!) 157/98   16 129/85    Weight from Last 3 Encounters:   18 52.8 kg (116 lb 4.8 oz)   17 51.1 kg (112 lb 11.2 oz)   16 48.2 kg (106 lb 4.2 oz)              We Performed the Following     EKG 12-lead, tracing only (Same " Day)          Today's Medication Changes          These changes are accurate as of 11/8/18  9:36 AM.  If you have any questions, ask your nurse or doctor.               These medicines have changed or have updated prescriptions.        Dose/Directions    amLODIPine 10 MG tablet   Commonly known as:  NORVASC   This may have changed:    - medication strength  - how much to take   Used for:  Essential hypertension   Changed by:  Daron Lowe APRN CNP        Dose:  10 mg   Take 1 tablet (10 mg) by mouth daily   Quantity:  90 tablet   Refills:  3            Where to get your medicines      These medications were sent to [facility] - [place], [place] - [address] [place] [address]     Phone:  [phone]     amLODIPine 10 MG tablet                Primary Care Provider    Irene Beltran       No address on file        Equal Access to Services     CASSANDRA MENDOZA : Fernando argueta Sociera, waaxda luqadaha, qaybta kaalmada adeegyada, angle dan . So [facility] [phone].    ATENCIÓN: Si habla español, tiene a billings disposición servicios gratuitos de asistencia lingüística. Llame al [phone].    We comply with applicable federal civil rights laws and [place] laws. We do not discriminate on the basis of race, color, national origin, age, disability, sex, sexual orientation, or gender identity.            Thank you!     Thank you for choosing [facility]  for your care. Our goal is always to provide you with excellent care. Hearing back from our patients is one way we can continue to improve our services. Please take a few minutes to complete the written survey that you may receive in the mail after your visit with us. Thank you!             Your Updated Medication List - Protect others around you: Learn how to safely use, store and throw away your medicines at www.disposemymeds.org.          This list is accurate as of  11/8/18  9:36 AM.  Always use your most recent med list.                   Brand Name Dispense Instructions for use Diagnosis    amLODIPine 10 MG tablet    NORVASC    90 tablet    Take 1 tablet (10 mg) by mouth daily    Essential hypertension       LOSARTAN POTASSIUM PO      Take 100 mg by mouth daily        TUMS 500 MG chewable tablet   Generic drug:  calcium carbonate      Take 2 chew tab by mouth 3 times daily.

## 2018-11-08 NOTE — PATIENT INSTRUCTIONS
Patient Instructions:    It was a pleasure to see you in the cardiology clinic today.    If you have any questions you can reach our nurse triage line at [phone].  Press Option #1 for the [facility] [place], then press Option #3 for nursing or Option #1 for scheduling. We also encourage the use of Cyprotex to communicate with your HealthCare Provider    Note new medications: resume Norvasc (amlodipine) at 10mg by mouth daily   Stop the following medications: none    The results from today include: EKG is unchanged from prior  Please follow up with Cardiology as needed. Follow-up with you kidney doctor for blood pressure.    Control your risk of coronary artery disease with these four lifestyle changes:  - Eating a heart healthy diet by following the American Heart Association Recommendations: Reduce saturated fat and trans fat to 5-6 percent of daily calories and minimizing the amount of trans fat you eat by limiting your intake of red meat and dairy products made with whole milk. It also means choosing skim milk, low-fat or fat-free dairy products, limiting fried food, and cooking with healthy oils such as vegetable oil. A healthy diet should include emphasis on fruits, vegetables, whole grains, poultry, fish and nuts, and limiting sugary foods and beverages. We recommend following the DASH (Dietary Approaches to Stop Hypertension) or Mediterranean Diet.  - Regular Exercise: Just 40 minutes of aerobic exercise of moderate to vigorous intensity done 3-4 times per week is enough to lower both cholesterol and high blood pressure. Brisk walking, swimming, bicycling or a dance class are examples.  - Avoiding Tobacco Smoking: Smoking compounds the risk from other risk factors for heart disease including high cholesterol, high blood pressure, and diabetes. Smokers can lower cholesterol, blood pressure, and protect their arteries by quitting. Ask to learn more about quitting smoking.  - Losing  Weight: Being overweight or obese raises your risk of high cholesterol, high blood pressure, and diabetes which are all risk factors for heart disease. Losing excess weight can improve cholesterol levels, blood pressure, and reduce incidence of diabetes and potentially reverse these disease processes.     Get help if you experience any of these heart attack warning signs: Although some heart attacks are sudden and intense, most start slowly, with mild pain or discomfort. Pay attention to your body -- and call 911 if you feel:  - Chest discomfort: Most heart attacks involve discomfort in the center of the chest that lasts more than a few minutes, or that goes away and comes back. It can feel like uncomfortable pressure, squeezing, fullness or pain.  - Discomfort in other areas of the upper body: Symptoms can include pain or discomfort in one or both arms, the back, neck, jaw or stomach.   - Shortness of breath with or without chest discomfort   - Other signs may include breaking out in a cold sweat, nausea or lightheadedness      Sincerely,    Daron Lowe, CNP

## 2018-11-08 NOTE — PROGRESS NOTES
Chief Complaint:   Chief Complaint   Patient presents with     Follow Up For     return       HPI:  Carlton Larkin is a 42 year old female with a history of CKD5, ESRD, AV fistula, on HD, kidney transplant listed since , eclampsia with HELLP syndrome, vasospastic MI 2012, and hepatitis B. She is here today for her annual cardiology evaluation to remain listed for kidney transplant. She denies chest pain, SOB, dizziness, lightheadedness, edema, and recent illness. She has no family history of heart disease and is a non-smoker. Her BP was extremely elevated 198/107      PAST MEDICAL HISTORY:  Past Medical History:   Diagnosis Date     Anemia in chronic kidney disease      End stage kidney disease (H)      HELLP syndrome      Hepatitis B      HTN (hypertension)      MI (myocardial infarction)      Proteinuria      Secondary hyperparathyroidism (H)      Thrombocytopaenia        CURRENT MEDICATIONS:  Current Outpatient Prescriptions   Medication Sig Dispense Refill     calcium carbonate (TUMS) 500 MG chewable tablet Take 2 chew tab by mouth 3 times daily.       LOSARTAN POTASSIUM PO Take 100 mg by mouth daily       AMLODIPINE BESYLATE PO Take 5 mg by mouth daily         PAST SURGICAL HISTORY:  Past Surgical History:   Procedure Laterality Date      SECTION  2012     CREATE FISTULA ARTERIOVENOUS UPPER EXTREMITY         ALLERGIES     Allergies   Allergen Reactions     Heparin Flush      Vancomycin        FAMILY HISTORY:  No family history on file.    SOCIAL HISTORY:  Social History     Social History     Marital status:      Spouse name: N/A     Number of children: N/A     Years of education: N/A     Social History Main Topics     Smoking status: Never Smoker     Smokeless tobacco: Never Used     Alcohol use No     Drug use: No     Sexual activity: Not on file     Other Topics Concern     Not on file     Social History Narrative       ROS:    ROS:all other systems negative, other than the symptoms  "noted above in the HPI.    EXAM:  BP (!) 205/113 (BP Location: Right arm, Patient Position: Chair, Cuff Size: Child)  Pulse 75  Ht 1.626 m (5' 4\")  Wt 52.8 kg (116 lb 4.8 oz)  LMP  (LMP Unknown)  SpO2 100%  BMI 19.96 kg/m2  GEN:patient is in no apparent distress.    HEENT: NC/AT.  Sclerae white, no incertus  Neck: No adenopathy.Carotids brisk bilaterally .  No jugular venous distension.   Heart:S1S2, mumur associated with AV fistula bruit, no rubs, clicks, or gallops. No peripheral edema. Peripheral pulses 2+ in all 4 extremities  Lungs: Lungs clear to ausculation bilaterally.  No ronchi, wheezes, rales.  Abdomen: Soft, nontender, nondistended.  Extremities: LUE AV fistula bruit & thrill+ with radiation of the bruit to the L upper chest/neck.  Neurologic: Awake and alert, normal speech, gait and affect  Skin: No petechiae, purpura or rash noted    Labs:  LIPID RESULTS:  Lab Results   Component Value Date    CHOL 146 06/20/2013    HDL 41 (L) 06/20/2013    LDL 79 06/20/2013    TRIG 132 06/20/2013    CHOLHDLRATIO 3.6 06/20/2013       LIVER ENZYME RESULTS:  Lab Results   Component Value Date    AST 23 06/20/2013    ALT 21 06/20/2013       CBC RESULTS:  Lab Results   Component Value Date    WBC 5.0 11/21/2017    RBC 3.57 (L) 11/21/2017    HGB 10.7 (L) 11/21/2017    HCT 33.9 (L) 11/21/2017    MCV 95 11/21/2017    MCH 30.0 11/21/2017    MCHC 31.6 11/21/2017    RDW 14.4 11/21/2017     11/21/2017       BMP RESULTS:  Lab Results   Component Value Date     06/20/2013    POTASSIUM 4.1 06/20/2013    CHLORIDE 98 06/20/2013    CO2 32 06/20/2013    ANIONGAP 11 06/20/2013    GLC 79 06/20/2013    BUN 21 06/20/2013    CR 6.23 (H) 06/20/2013    GFRESTIMATED 8 (L) 06/20/2013    GFRESTBLACK 9 (L) 06/20/2013    HOLLIS 7.4 (L) 06/20/2013        A1C RESULTS:  No results found for: A1C    INR RESULTS:  Lab Results   Component Value Date    INR 0.94 06/20/2013       Procedures/Diagnostics:  EKG 11/8/2018: Normal sinus rhythm " at 69 bpm with normal axis and intervals, anteroseptal Q waves.  EKG is unchanged when compared to 2012    Dobutamine ECHO stress test planned for today    11/29/2016 Dobutamine ECHO Stress Test  Interpretation Summary  Negative low risk dobutamine stress echocardiogram.  No wall motion abnormalities at rest and with infusion of dobutamine.  Normal LV size and function. The LVEF is 55-60% at rest and increases  appropriately with dobutamine infusion to approximately 70-75%.  Normal blood pressure response to dobutamine infusion.  No ECG evidence of ischemia at rest and with infusion of dobutamine.  No chest pain at rest and with infusion of dobutamine.  No significant valvular disease noted on routine screening color flow Doppler  and pulsed Doppler examination except for mild AI (ERO 0.06 cm2, Rvol 17 mL.)  The ascending aortic segment is normal.    Assessment and Plan:   1. ESRD on HD kidney transplant listed - The patient is feeling well. Denies chest pain, SOB, dizziness, or syncope. She is supposed to have a dobutamine stress ECHO today, but this may be rescheduled due to her HTN. RCRI 11% for perioperative MACE.  Assuming her stress test is within acceptable limits patient is medically acceptable for transplant. Will see her back as needed if she remains on the transplant list.      2. Hypertension - /107 today, the patient states she stopped taking her amlodipine about 2 months ago because she had good blood pressure while taking this medication. She denies discontinuing this medication due to side effects. She states her normal BP is ~ 160 systolic. She states she is still taking her losartan. Increased her amlodipine to 10 mg daily and instructed the patient to restart this medication.     Ria Prince  11/08/18  9:10 AM    Patient seen and examined. Denies CP, dyspnea, HA, visual changes. Lungs CTA, RRR without murmur.  She self-discontinued amlodipine because she believes her elevated BP is  from the food she eats. Discussed etiology of her HTN and will resume amlodipine at 10mg daily.  For dobutamine echo today--if normal she may be listed for kidney transplant.    NOEL Potter CNP  11/08/18  10:26 AM    Addendum on 11/13/18 @ 2:02 PM    Dobutamine echocardiogram November 8, 2018:  Interpretation Summary  Abnormal stress echo. There is distal septal hypokinesis that worsens with  dobutamine.  Target heart rate was achieved. Heart rate and blood pressure response to  dobutamine were normal. Normal LV function at rest. With stress, the left  ventricular ejection fraction increased from 55-60% to greater than 65% and  the left ventricular size decreased appropriately.  No subjective symptoms to suggest ischemia.  There was no ECG evidence of ischemia.  The aortic root and visualized ascending aorta are normal.    Prior echocardiograms have reported septal and anterior septal hypokinesis; cardiac catheterization in 2012 demonstrated small vessel diagonal branch versus subtotal occlusions as well as 20% proximal LAD.  No concerning anterior wall abnormalities on stress test now.  Case reviewed with Dr. Smith at this point no reason to proceed with further cardiovascular workup.  She may continue to be listed for kidney transplant.      NOEL Potter CNP  11/13/18  2:02 PM      CC  Patient Care Team:  Irene Beltran as PCP - General  [facility] MD (Nephrology)  Jocelyn Brooks RN as Registered Nurse  Radha Phelan LPN as HAYLIE MONZON

## 2018-11-08 NOTE — MR AVS SNAPSHOT
"              After Visit Summary   2018    Carlton Larkin    MRN: [id number]           Patient Information     Date Of Birth          1975        Visit Information        Provider Department      2018 12:45 PM Amanda Winston LICSW; [place] [employer] Transplant        Today's Diagnoses     Awaiting organ transplant status    -  1       Follow-ups after your visit        Future tests that were ordered for you today     Open Future Orders        Priority Expected Expires Ordered    Echo  stress test with definity Routine 2018            Who to contact     If you have questions or need follow up information about today's clinic visit or your schedule please contact [facility] directly at [phone].  Normal or non-critical lab and imaging results will be communicated to you by Tilsonhart, letter or phone within 4 business days after the clinic has received the results. If you do not hear from us within 7 days, please contact the clinic through Tilsonhart or phone. If you have a critical or abnormal lab result, we will notify you by phone as soon as possible.  Submit refill requests through FreakOut or call your pharmacy and they will forward the refill request to us. Please allow 3 business days for your refill to be completed.          Additional Information About Your Visit        TilsonharInfochimps Information     FreakOut lets you send messages to your doctor, view your test results, renew your prescriptions, schedule appointments and more. To sign up, go to [email or web address] . Click on \"Log in\" on the left side of the screen, which will take you to the Welcome page. Then click on \"Sign up Now\" on the right side of the page.     You will be asked to enter the access code listed below, as well as some personal information. Please follow the directions to create your username and password.     Your access code is: [email or web address]  Expires: " 2019  9:36 AM     Your access code will  in 90 days. If you need help or a new code, please call your [facility] clinic or [phone].        Care EveryWhere ID     This is your Care EveryWhere ID. This could be used by other organizations to access your [facility] medical records  [id number]        Your Vitals Were     Last Period                   (LMP Unknown)            Blood Pressure from Last 3 Encounters:   18 (!) 169/100   18 (!) 198/107   17 (!) 157/98    Weight from Last 3 Encounters:   18 52.7 kg (116 lb 1.6 oz)   18 52.8 kg (116 lb 4.8 oz)   17 51.1 kg (112 lb 11.2 oz)              Today, you had the following     No orders found for display         Today's Medication Changes          These changes are accurate as of 18 11:59 PM.  If you have any questions, ask your nurse or doctor.               These medicines have changed or have updated prescriptions.        Dose/Directions    amLODIPine 10 MG tablet   Commonly known as:  NORVASC   This may have changed:  Another medication with the same name was removed. Continue taking this medication, and follow the directions you see here.   Changed by:  Daron Lowe APRN CNP        Dose:  10 mg   Take 10 mg by mouth daily   Refills:  0       calcium carbonate 500 MG chewable tablet   Commonly known as:  TUMS   This may have changed:  Another medication with the same name was removed. Continue taking this medication, and follow the directions you see here.   Changed by:  Pasha Yoon APRN CNP        Dose:  1500 mg   Take 1,500 mg by mouth 3 times daily   Refills:  0       losartan 100 MG tablet   Commonly known as:  COZAAR   This may have changed:  Another medication with the same name was removed. Continue taking this medication, and follow the directions you see here.   Changed by:  Pasha Yoon APRN CNP        Dose:  100 mg   Take 100 mg by mouth daily   Refills:  0                 Primary Care Provider    Irene Beltran       No address on file        Equal Access to Services     CASSANDRA MENDOZA : Hadii cristiana alvarez kendall Sociera, waadrianada luqadaha, qamikhailta kajamesnicolette ashleyadriannenicolette, angle caceres davontecarolyne williamsonbartolonirav kovacs. So [facility] [phone].    ATENCIÓN: Si habla español, tiene a billings disposición servicios gratuitos de asistencia lingüística. Llame al [phone].    We comply with applicable federal civil rights laws and [place] laws. We do not discriminate on the basis of race, color, national origin, age, disability, sex, sexual orientation, or gender identity.            Thank you!     Thank you for choosing [facility]  for your care. Our goal is always to provide you with excellent care. Hearing back from our patients is one way we can continue to improve our services. Please take a few minutes to complete the written survey that you may receive in the mail after your visit with us. Thank you!             Your Updated Medication List - Protect others around you: Learn how to safely use, store and throw away your medicines at www.disposemymeds.org.          This list is accurate as of 11/8/18 11:59 PM.  Always use your most recent med list.                   Brand Name Dispense Instructions for use Diagnosis    amLODIPine 10 MG tablet    NORVASC     Take 10 mg by mouth daily        calcium carbonate 500 MG chewable tablet    TUMS     Take 1,500 mg by mouth 3 times daily        losartan 100 MG tablet    COZAAR     Take 100 mg by mouth daily

## 2018-11-08 NOTE — NURSING NOTE
"Chief Complaint   Patient presents with     RECHECK     Kidney waitlist clearance       BP (!) 169/100 (BP Location: Right arm, Patient Position: Sitting, Cuff Size: Adult Small)  Pulse 63  Resp 16  Ht 1.596 m (5' 2.84\")  Wt 52.7 kg (116 lb 1.6 oz)  LMP  (LMP Unknown)  BMI 20.67 kg/m2    Lilly Cannon [facility]  11/8/2018 3:40 PM      "

## 2018-11-08 NOTE — PROGRESS NOTES
Assessment and Plan:  1. Kidney transplant wait list evaluation - Ms. Larkin is a good candidate overall. She should be active on the wait list pending completion of stress test and dental cleaning.   2. ESKD from unknown etiology (no kidney biopsy) -  from unknown etiology on dialysis since 2012,  3. Cardiac risk - no history of cardiac disease or events. ECHO in  showed EF 60-65%. DSE in 2016 unremarkable. Stress test held today due to hypertension. Will need to have this completed in the near future.   4. Hypertension - not controlled on Losartan. Amlodipine restarted today by cardiology.   5. Health maintenance - 2016 pap smear up to date. Due for mammogram this month. Needs dental cleaning, very poor dentition.      Discussed the risks and benefits of a transplant, including the risk of surgery and immunosuppression medications.    KDPI: We discussed approximate remaining wait time and how that is influenced by issues such as blood type and sensitization (PRA) and access to a living donor. I contrasted potential waiting time for living vs  donor kidneys from  normal (0-85%) or higher (%) kidney donor profile index (KDPI) donors and their associated outcomes. I would not recommend Ms. Larkin to consider kidneys from high KDPI donors. The reason for this decision is best summarized as: improved long term graft survival.     Patient s overall re-evaluation may require further discussion in the Transplant Program s multidisciplinary selection committee for a final recommendation on the patient s suitability for transplant.     Reason for Visit:  Carlton aLrkin is a 42 year old female with ESKD from unknown etiology (no kidney biopsy), who presents for Kidney transplant wait list evaluation.     Last Evaluation Clinic Visit Date:  2017 ([place])        Wait List Date: 2012  Current phase/status: Waitlist: Active as of 2017  Transplant coordinator: Jocelyn Brooks Transplant Office  phone number [phone]     Previous Medical Issues:  Abnormal mammogram - follow up US completed and benign.      HPI: Visit conducted with the assistance of a Patricia . Ms. Larkin is a 42-year-old female that presents for wait list evaluation with PM of ESKD from unknown etiology on hemodialysis since January 2012, vasospastic MI 8/2012, and hepatitis B.          Interim events/issues/events  She denies any blood transfusions, admits, or infections in the last year. She lives with her daughter and  in an apartment. She is not working or on disability. She stays active with house [employer]/grocery shopping. Reportedly enjoys going for 30 minute walks in her local park without chest pain, shortness of breath or claudication symptoms.     ESKD  She reports he AVF is working well. She makes urine every day without dysuria, hematuria, trouble emptying her bladder or starting her stream. BP elevated today (198/107), reportedly stopped taking her Amlodipine 2 months ago, that was discussed with her nephrologist. This was restarted today by cardiology. Normally her SBPs are in the 160 range. She remains on Losartan as well. No new donors at this time. She complains of some fatigue, but denies nausea/vomting or poor appetite.      Cardiac   8/2015 -  ECHO, EF 60-65%  11/2016- DSE unremarkable   11/2018- stress test held today due to hypertension.         Kidney Disease Hx       Kidney Disease Dx: unclear etiology        Biopsy Proven: No        On Dialysis: Yes, Date initiated: 1/2012 and Dialysis Type: [facility];       Primary Nephrologist: Dr. Treadwell           Uremic Symptoms: Fatigue: Yes; Nausea: No; Poor Appetite: No;          Potential Donor(s): No          Cardiac history:       Last cardiac risk assessment: today        Last stress test/coronary angiogram: 11/2016       Exertional symptoms: none        Recent cardiac events: none      Pertinent issues:   Blood transfusion: No  Yu oneill  "Witness: No  Pregnancies: No  Previous transplant: No  Urine output: Yes  Bladder dysfunction: No  Claudication: No  Previous amputation: No  Cancer: No  Recurrent infection: No  Chronic anticoagulation: No      ROS:  A comprehensive review of systems was obtained and negative, except as noted in the HPI or PMH.     PMH:  Medical records were obtained and reviewed.  Past Medical History:   Diagnosis Date     Anemia in chronic kidney disease      End stage kidney disease (H)      HELLP syndrome      Hepatitis B      HTN (hypertension)      MI (myocardial infarction) (H)      Proteinuria      Secondary hyperparathyroidism (H)      Thrombocytopaenia         PSH:  Past Surgical History:   Procedure Laterality Date      SECTION  2012     CREATE FISTULA ARTERIOVENOUS UPPER EXTREMITY          Personal Hx:  Social History     Social History     Marital status:      Spouse name: N/A     Number of children: N/A     Years of education: N/A     Occupational History     Not on file.     Social History Main Topics     Smoking status: Never Smoker     Smokeless tobacco: Never Used     Alcohol use No     Drug use: No     Sexual activity: Not on file     Other Topics Concern     Not on file     Social History Narrative        Allergies:  Allergies   Allergen Reactions     Heparin Flush      Vancomycin         Medications:  Prior to Admission medications    Medication Sig Start Date End Date Taking? Authorizing Provider   amLODIPine (NORVASC) 10 MG tablet Take 1 tablet (10 mg) by mouth daily 18   Daron Lowe APRN CNP   calcium carbonate (TUMS) 500 MG chewable tablet Take 2 chew tab by mouth 3 times daily.    Reported, Patient   LOSARTAN POTASSIUM PO Take 100 mg by mouth daily    Reported, Patient        Vitals:  BP (!) 169/100 (BP Location: Right arm, Patient Position: Sitting, Cuff Size: Adult Small)  Pulse 63  Resp 16  Ht 1.596 m (5' 2.84\")  Wt 52.7 kg (116 lb 1.6 oz)  LMP  (LMP Unknown) "  BMI 20.67 kg/m2     Exam:  GENERAL APPEARANCE: alert and no distress  HENT: mouth without ulcers or lesions  LYMPHATICS: no cervical or supraclavicular nodes  RESP: lungs clear to auscultation - no rales, rhonchi or wheezes  CV: regular rhythm, normal rate, no rub, no murmur  FEMORAL PULSES: 2+equal bilaterally.   EDEMA: no LE edema bilaterally  ABDOMEN: soft, nondistended, nontender, bowel sounds normal  MS: extremities normal - no gross deformities noted, no evidence of inflammation in joints, no muscle tenderness  SKIN: no rash     Results:  Orders Only on 11/08/2018   Component Date Value Ref Range Status     SA1 Test Method 11/08/2018 SA FCS   Final     SA1 Cell 11/08/2018 Class I   Final     SA1 Hi Risk Gricelda 11/08/2018 None   Final     SA1 Mod Risk Gricelda 11/08/2018 B:44 45   Final     SA1 Comments 11/08/2018    Final                    Value: Test performed by modified procedure. Serum heat inactivated and tested   by a modified ([place]) protocol including fetal calf serum addition.   High-risk, mfi >3,000. Mod-risk, mfi 500-3,000.       2 Test Method 11/08/2018 SA FCS   Final     SA2 Cell 11/08/2018 Class II   Final     SA2 Hi Risk Gricelda 11/08/2018 None   Final     SA2 Mod Risk Gricelda 11/08/2018 None   Final     SA2 Comments 11/08/2018    Final                    Value: Test performed by modified procedure. Serum heat inactivated and tested   by a modified ([place]) protocol including fetal calf serum addition.   High-risk, mfi >3,000. Mod-risk, mfi 500-3,000.       Protocol Cutoff 11/08/2018 Plan A, 500 mfi/cumulative    Final     UNOS cPRA 11/08/2018 33   Final     Unacceptable Antigen 11/08/2018 A:33 B:37 44 45    Final

## 2018-11-08 NOTE — LETTER
2018      RE: Carlton Larkin  [address]  [address]  [place] [facility] [facility] [address]       Dear Colleague,    Thank you for the opportunity to participate in the care of your patient, Carlton Larkin, at the [facility]. Please see a copy of my visit note below.    Chief Complaint:   Chief Complaint   Patient presents with     Follow Up For     return       HPI:  Carlton Larkin is a 42 year old female with a history of CKD5, ESRD, AV fistula, on HD, kidney transplant listed since , eclampsia with HELLP syndrome, vasospastic MI 2012, and hepatitis B. She is here today for her annual cardiology evaluation to remain listed for kidney transplant. She denies chest pain, SOB, dizziness, lightheadedness, edema, and recent illness. She has no family history of heart disease and is a non-smoker. Her BP was extremely elevated 198/107      PAST MEDICAL HISTORY:  Past Medical History:   Diagnosis Date     Anemia in chronic kidney disease      End stage kidney disease (H)      HELLP syndrome      Hepatitis B      HTN (hypertension)      MI (myocardial infarction)      Proteinuria      Secondary hyperparathyroidism (H)      Thrombocytopaenia        CURRENT MEDICATIONS:  Current Outpatient Prescriptions   Medication Sig Dispense Refill     calcium carbonate (TUMS) 500 MG chewable tablet Take 2 chew tab by mouth 3 times daily.       LOSARTAN POTASSIUM PO Take 100 mg by mouth daily       AMLODIPINE BESYLATE PO Take 5 mg by mouth daily         PAST SURGICAL HISTORY:  Past Surgical History:   Procedure Laterality Date      SECTION  2012     CREATE FISTULA ARTERIOVENOUS UPPER EXTREMITY         ALLERGIES     Allergies   Allergen Reactions     Heparin Flush      Vancomycin        FAMILY HISTORY:  No family history on file.    SOCIAL HISTORY:  Social History     Social History     Marital status:      Spouse name: N/A     Number of children: N/A     Years of education: N/A  "    Social History Main Topics     Smoking status: Never Smoker     Smokeless tobacco: Never Used     Alcohol use No     Drug use: No     Sexual activity: Not on file     Other Topics Concern     Not on file     Social History Narrative       ROS:    ROS:all other systems negative, other than the symptoms noted above in the HPI.    EXAM:  BP (!) 205/113 (BP Location: Right arm, Patient Position: Chair, Cuff Size: Child)  Pulse 75  Ht 1.626 m (5' 4\")  Wt 52.8 kg (116 lb 4.8 oz)  LMP  (LMP Unknown)  SpO2 100%  BMI 19.96 kg/m2  GEN:patient is in no apparent distress.    HEENT: NC/AT.  Sclerae white, no incertus  Neck: No adenopathy.Carotids brisk bilaterally .  No jugular venous distension.   Heart:S1S2, mumur associated with AV fistula bruit, no rubs, clicks, or gallops. No peripheral edema. Peripheral pulses 2+ in all 4 extremities  Lungs: Lungs clear to ausculation bilaterally.  No ronchi, wheezes, rales.  Abdomen: Soft, nontender, nondistended.  Extremities: LUE AV fistula bruit & thrill+ with radiation of the bruit to the L upper chest/neck.  Neurologic: Awake and alert, normal speech, gait and affect  Skin: No petechiae, purpura or rash noted    Labs:  LIPID RESULTS:  Lab Results   Component Value Date    CHOL 146 06/20/2013    HDL 41 (L) 06/20/2013    LDL 79 06/20/2013    TRIG 132 06/20/2013    CHOLHDLRATIO 3.6 06/20/2013       LIVER ENZYME RESULTS:  Lab Results   Component Value Date    AST 23 06/20/2013    ALT 21 06/20/2013       CBC RESULTS:  Lab Results   Component Value Date    WBC 5.0 11/21/2017    RBC 3.57 (L) 11/21/2017    HGB 10.7 (L) 11/21/2017    HCT 33.9 (L) 11/21/2017    MCV 95 11/21/2017    MCH 30.0 11/21/2017    MCHC 31.6 11/21/2017    RDW 14.4 11/21/2017     11/21/2017       BMP RESULTS:  Lab Results   Component Value Date     06/20/2013    POTASSIUM 4.1 06/20/2013    CHLORIDE 98 06/20/2013    CO2 32 06/20/2013    ANIONGAP 11 06/20/2013    GLC 79 06/20/2013    BUN 21 06/20/2013 "    CR 6.23 (H) 06/20/2013    GFRESTIMATED 8 (L) 06/20/2013    GFRESTBLACK 9 (L) 06/20/2013    HOLLIS 7.4 (L) 06/20/2013        A1C RESULTS:  No results found for: A1C    INR RESULTS:  Lab Results   Component Value Date    INR 0.94 06/20/2013       Procedures/Diagnostics:  EKG 11/8/2018: Normal sinus rhythm at 69 bpm with normal axis and intervals, anteroseptal Q waves.  EKG is unchanged when compared to 2012    Dobutamine ECHO stress test planned for today    11/29/2016 Dobutamine ECHO Stress Test  Interpretation Summary  Negative low risk dobutamine stress echocardiogram.  No wall motion abnormalities at rest and with infusion of dobutamine.  Normal LV size and function. The LVEF is 55-60% at rest and increases  appropriately with dobutamine infusion to approximately 70-75%.  Normal blood pressure response to dobutamine infusion.  No ECG evidence of ischemia at rest and with infusion of dobutamine.  No chest pain at rest and with infusion of dobutamine.  No significant valvular disease noted on routine screening color flow Doppler  and pulsed Doppler examination except for mild AI (ERO 0.06 cm2, Rvol 17 mL.)  The ascending aortic segment is normal.    Assessment and Plan:   1. ESRD on HD kidney transplant listed - The patient is feeling well. Denies chest pain, SOB, dizziness, or syncope. She is supposed to have a dobutamine stress ECHO today, but this may be rescheduled due to her HTN. RCRI 11% for perioperative MACE.  Assuming her stress test is within acceptable limits patient is medically acceptable for transplant. Will see her back as needed if she remains on the transplant list.      2. Hypertension - /107 today, the patient states she stopped taking her amlodipine about 2 months ago because she had good blood pressure while taking this medication. She denies discontinuing this medication due to side effects. She states her normal BP is ~ 160 systolic. She states she is still taking her losartan. Increased  her amlodipine to 10 mg daily and instructed the patient to restart this medication.     Ria Prince  11/08/18  9:10 AM    Patient seen and examined. Denies CP, dyspnea, HA, visual changes. Lungs CTA, RRR without murmur.  She self-discontinued amlodipine because she believes her elevated BP is from the food she eats. Discussed etiology of her HTN and will resume amlodipine at 10mg daily.  For dobutamine echo today--if normal she may be listed for kidney transplant.    NOEL Potter CNP  11/08/18  10:26 AM      CC  Patient Care Team:  Irene Beltran as PCP - General  [facility], Margareth Akhtar MD as MD (Nephrology)  Jocelyn Brooks RN as Registered Nurse  Radha Phelan LPN as HAYLIE MONZON      Please do not hesitate to contact me if you have any questions/concerns.     Sincerely,     NOEL oPtter CNP

## 2018-11-08 NOTE — MR AVS SNAPSHOT
"              After Visit Summary   2018    Carlton Larkin    MRN: [id number]           Patient Information     Date Of Birth          1975        Visit Information        Provider Department      2018 3:45 PM Pasha Yoon APRN CNP; [place] [facility] Nephrology        Today's Diagnoses     ESRD (end stage renal disease) (H)    -  1    Renovascular hypertension        Pre-operative cardiovascular examination        Organ transplant candidate           Follow-ups after your visit        Who to contact     If you have questions or need follow up information about today's clinic visit or your schedule please contact [facility] NEPHROLOGY directly at [phone].  Normal or non-critical lab and imaging results will be communicated to you by Aginovahart, letter or phone within 4 business days after the clinic has received the results. If you do not hear from us within 7 days, please contact the clinic through Aginovahart or phone. If you have a critical or abnormal lab result, we will notify you by phone as soon as possible.  Submit refill requests through Qubit or call your pharmacy and they will forward the refill request to us. Please allow 3 business days for your refill to be completed.          Additional Information About Your Visit        MyChart Information     Qubit lets you send messages to your doctor, view your test results, renew your prescriptions, schedule appointments and more. To sign up, go to [email or web address] . Click on \"Log in\" on the left side of the screen, which will take you to the Welcome page. Then click on \"Sign up Now\" on the right side of the page.     You will be asked to enter the access code listed below, as well as some personal information. Please follow the directions to create your username and password.     Your access code is: [email or web address]  Expires: 2019  9:36 AM     Your access code will  in 90 days. If you need help or a new code, " "please call your [place] clinic or [phone].        Care EveryWhere ID     This is your Care EveryWhere ID. This could be used by other organizations to access your [place] medical records  [id number]        Your Vitals Were     Pulse Respirations Height Last Period BMI (Body Mass Index)       63 16 1.596 m (5' 2.84\") (LMP Unknown) 20.67 kg/m2        Blood Pressure from Last 3 Encounters:   11/08/18 (!) 169/100   11/08/18 (!) 198/107   11/21/17 (!) 157/98    Weight from Last 3 Encounters:   11/08/18 52.7 kg (116 lb 1.6 oz)   11/08/18 52.8 kg (116 lb 4.8 oz)   11/21/17 51.1 kg (112 lb 11.2 oz)              Today, you had the following     No orders found for display         Today's Medication Changes          These changes are accurate as of 11/8/18 11:59 PM.  If you have any questions, ask your nurse or doctor.               These medicines have changed or have updated prescriptions.        Dose/Directions    amLODIPine 10 MG tablet   Commonly known as:  NORVASC   This may have changed:  Another medication with the same name was removed. Continue taking this medication, and follow the directions you see here.   Changed by:  Daron Lowe APRN CNP        Dose:  10 mg   Take 10 mg by mouth daily   Refills:  0       calcium carbonate 500 MG chewable tablet   Commonly known as:  TUMS   This may have changed:  Another medication with the same name was removed. Continue taking this medication, and follow the directions you see here.   Changed by:  Pasha Yoon APRN CNP        Dose:  1500 mg   Take 1,500 mg by mouth 3 times daily   Refills:  0       losartan 100 MG tablet   Commonly known as:  COZAAR   This may have changed:  Another medication with the same name was removed. Continue taking this medication, and follow the directions you see here.   Changed by:  Pasha Yoon APRN CNP        Dose:  100 mg   Take 100 mg by mouth daily   Refills:  0                Primary Care Provider    " Irene Beltran       No address on file        Equal Access to Services     CASSANDRA MENDOZA : Hadii aad ku hadbethanyjulita Tyreeali, waadrianada lindsayjamiha, chelita nicholasjamesnicolette ashleyardiannenicolette, waxsantana davidin hayaacarolyne marshchuyita tidwellnirav dan . So [facility] [phone].    ATENCIÓN: Si habla español, tiene a billings disposición servicios gratuitos de asistencia lingüística. Llame al [phone].    We comply with applicable federal civil rights laws and [place] laws. We do not discriminate on the basis of race, color, national origin, age, disability, sex, sexual orientation, or gender identity.            Thank you!     Thank you for choosing [facility] NEPHROLOG[facility]  for your care. Our goal is always to provide you with excellent care. Hearing back from our patients is one way we can continue to improve our services. Please take a few minutes to complete the written survey that you may receive in the mail after your visit with us. Thank you!             Your Updated Medication List - Protect others around you: Learn how to safely use, store and throw away your medicines at www.disposemymeds.org.          This list is accurate as of 11/8/18 11:59 PM.  Always use your most recent med list.                   Brand Name Dispense Instructions for use Diagnosis    amLODIPine 10 MG tablet    NORVASC     Take 10 mg by mouth daily        calcium carbonate 500 MG chewable tablet    TUMS     Take 1,500 mg by mouth 3 times daily        losartan 100 MG tablet    COZAAR     Take 100 mg by mouth daily

## 2018-11-08 NOTE — PROGRESS NOTES
Psychosocial Assessment: Kidney Transplant Waitlist Evaluation    Patient Name: Carlton Larkin (FYI: Pt recently changed her name to Paolo Larkin. This writer sent a copy of pt's updated 's license and Medicare card with her new name to Dede Ferguson in [facility] to make the updates)  : 1975  Age: 42 year old  MRN: [id number]  Date of Initial Social Work Evaluation: 2013, last SW assessment on 2017    Patient on kidney transplant wait list. Saw pt, along with Patricia , today to update psychosocial assessment.     Presenting Information   Living Situation: Pt lives in an apartment with her , Jaw, and 6-year-old daughter in [place], [place]. Pt has a 21-year-old son who does not live with them, but he lives in [place], too.   If not local, plans for short term stay:  Pt has an aunt and uncle who live in [place]. She plans to stay with them post-transplant and is able to stay for a couple months, if needed.   Previous Functional Status: Pt is independent with ADLs. Pt goes to dialysis at [facility] and drives herself to/from dialysis.   Cultural/Language/Spiritual Considerations: Pt is Patricia-speaking.     Support System  Primary Support Person . Pt's  drove with pt to [facility], but was not present.  Other support:  Pt's son, pt's aunt and uncle.   Plan for support in immediate post-transplant period: Pt plans to stay at her aunt and uncle's house in [place] post-transplant. She said that she is able to stay there for a couple of months if needed. Since her  will need to continue working and take care of their daughter, pt's son will come up from [place] to help assist pt at her aunt and uncle's post transplant.     Health Care Directive  Decision Maker: Patient   Alternate Decision Maker:  per [facility] policy.   Health Care Directive: None filed. Pt declined completing during last SW assessment.     Mental Health/Coping:   History of Mental  "Health: Pt denied any mental health concerns or history of concerns. Pt stated, \"I just feel happy.\"  History of Chemical Health: Pt denied alcohol/drug use.   Current status: Pt has no mental health or substance use concerns.   Coping: Pt appears to be coping well, but reported being anxious to get a transplant as she has been listed for a long time (6+ years).   Services Needed/Recommended: None at this time.     Financial   Income: Pt does not work and is not on disability. She is reliant on her 's income.   Impact of transplant on income: None identified at this time.   Insurance and medication coverage: Pt has Medicare A&B, as well as BCBS/[employer]. Pt was not showing active insurance at time of assessment, so contacted [facility] and provided them with pt's current insurance information.   Financial concerns: None identified.   Resources needed: None at this time.     Assessment and recommendations and plan:  Reviewed transplant education (Medicare, rehabilitation, donor issues, community/financial resources, and psych/family adjustment) as well as psychosocial risks of transplant. Provided patient with a copy of post-transplant informational sheet that includes information on potential costs of medications, Medicare ESRD, post-transplant lodging, etc. Patient seemed to process information well. Appeared well informed, motivated, and able to follow post transplant requirements. Behavior was appropriate during interview. Has adequate income and insurance coverage. Adequate social support. No major contraindications noted for transplant. At this time, patient appears to understand the risks and benefits of transplant.     Amanda Winston, CHEN, [facility]  - Coverage for Khadijah Sorin   Outpatient Kidney/Pancreas/Auto Islet Transplant   Ph. [phone]  [email or web address]     NO LETTER  "

## 2018-11-08 NOTE — NURSING NOTE
Vitals completed successfully and medication reconciled. EKG done. Gladys Simmons MA  Chief Complaint   Patient presents with     Follow Up For     return

## 2018-11-10 LAB — INTERPRETATION ECG - MUSE: NORMAL

## 2018-11-12 LAB
PROTOCOL CUTOFF: NORMAL
SA1 CELL: NORMAL
SA1 COMMENTS: NORMAL
SA1 HI RISK ABY: NORMAL
SA1 MOD RISK ABY: NORMAL
SA1 TEST METHOD: NORMAL
SA2 CELL: NORMAL
SA2 COMMENTS: NORMAL
SA2 HI RISK ABY UA: NORMAL
SA2 MOD RISK ABY: NORMAL
SA2 TEST METHOD: NORMAL
UNACCEPTABLE ANTIGEN: NORMAL
UNOS CPRA: 33

## 2018-11-19 ENCOUNTER — TRANSFERRED RECORDS (OUTPATIENT)
Dept: HEALTH INFORMATION MANAGEMENT | Facility: CLINIC | Age: 43
End: 2018-11-19

## 2018-11-20 ENCOUNTER — DOCUMENTATION ONLY (OUTPATIENT)
Dept: TRANSPLANT | Facility: CLINIC | Age: 43
End: 2018-11-20

## 2019-01-07 DIAGNOSIS — Z76.82 ORGAN TRANSPLANT CANDIDATE: ICD-10-CM

## 2019-01-07 DIAGNOSIS — I10 HYPERTENSION: ICD-10-CM

## 2019-01-07 DIAGNOSIS — N18.6 ESRD (END STAGE RENAL DISEASE) (H): ICD-10-CM

## 2019-01-07 DIAGNOSIS — Z01.810 PRE-OPERATIVE CARDIOVASCULAR EXAMINATION: ICD-10-CM

## 2019-01-10 LAB
PROTOCOL CUTOFF: NORMAL
SA1 CELL: NORMAL
SA1 COMMENTS: NORMAL
SA1 HI RISK ABY: NORMAL
SA1 MOD RISK ABY: NORMAL
SA1 TEST METHOD: NORMAL
SA2 CELL: NORMAL
SA2 COMMENTS: NORMAL
SA2 HI RISK ABY UA: NORMAL
SA2 MOD RISK ABY: NORMAL
SA2 TEST METHOD: NORMAL
UNACCEPTABLE ANTIGEN: NORMAL
UNOS CPRA: 39

## 2019-04-01 DIAGNOSIS — I10 HYPERTENSION: ICD-10-CM

## 2019-04-01 DIAGNOSIS — Z01.810 PRE-OPERATIVE CARDIOVASCULAR EXAMINATION: ICD-10-CM

## 2019-04-01 DIAGNOSIS — Z76.82 ORGAN TRANSPLANT CANDIDATE: ICD-10-CM

## 2019-04-01 DIAGNOSIS — N18.6 ESRD (END STAGE RENAL DISEASE) (H): ICD-10-CM

## 2019-04-22 ENCOUNTER — HOSPITAL ENCOUNTER (OUTPATIENT)
Facility: CLINIC | Age: 44
Setting detail: OBSERVATION
LOS: 1 days | Discharge: HOME | End: 2019-04-24
Attending: SURGERY | Admitting: SURGERY
Payer: MEDICARE

## 2019-04-22 ENCOUNTER — ORGAN (OUTPATIENT)
Dept: TRANSPLANT | Facility: CLINIC | Age: 44
End: 2019-04-22

## 2019-04-22 ENCOUNTER — DOCUMENTATION ONLY (OUTPATIENT)
Dept: TRANSPLANT | Facility: CLINIC | Age: 44
End: 2019-04-22

## 2019-04-22 ENCOUNTER — APPOINTMENT (OUTPATIENT)
Dept: GENERAL RADIOLOGY | Facility: CLINIC | Age: 44
End: 2019-04-22
Attending: SURGERY
Payer: MEDICARE

## 2019-04-22 ENCOUNTER — RESULTS ONLY (OUTPATIENT)
Dept: OTHER | Facility: CLINIC | Age: 44
End: 2019-04-22

## 2019-04-22 DIAGNOSIS — Z76.82 AWAITING ORGAN TRANSPLANT: Primary | ICD-10-CM

## 2019-04-22 PROBLEM — Z94.0 KIDNEY TRANSPLANT RECIPIENT: Status: ACTIVE | Noted: 2019-04-22

## 2019-04-22 LAB
ALBUMIN SERPL-MCNC: 3.6 G/DL (ref 3.4–5)
ALP SERPL-CCNC: 65 U/L (ref 40–150)
ALT SERPL W P-5'-P-CCNC: 21 U/L (ref 0–50)
ANION GAP SERPL CALCULATED.3IONS-SCNC: 8 MMOL/L (ref 3–14)
APTT PPP: 33 SEC (ref 22–37)
AST SERPL W P-5'-P-CCNC: 30 U/L (ref 0–45)
BASOPHILS # BLD AUTO: 0.1 10E9/L (ref 0–0.2)
BASOPHILS NFR BLD AUTO: 1.3 %
BILIRUB SERPL-MCNC: 0.9 MG/DL (ref 0.2–1.3)
BUN SERPL-MCNC: 39 MG/DL (ref 7–30)
CALCIUM SERPL-MCNC: 8.6 MG/DL (ref 8.5–10.1)
CHLORIDE SERPL-SCNC: 97 MMOL/L (ref 94–109)
CHOLEST SERPL-MCNC: 218 MG/DL
CO2 SERPL-SCNC: 32 MMOL/L (ref 20–32)
CREAT SERPL-MCNC: 7.11 MG/DL (ref 0.52–1.04)
DIFFERENTIAL METHOD BLD: ABNORMAL
EOSINOPHIL # BLD AUTO: 0.4 10E9/L (ref 0–0.7)
EOSINOPHIL NFR BLD AUTO: 9.3 %
ERYTHROCYTE [DISTWIDTH] IN BLOOD BY AUTOMATED COUNT: 15.8 % (ref 10–15)
GFR SERPL CREATININE-BSD FRML MDRD: 7 ML/MIN/{1.73_M2}
GLUCOSE SERPL-MCNC: 90 MG/DL (ref 70–99)
HCT VFR BLD AUTO: 32.3 % (ref 35–47)
HDLC SERPL-MCNC: 59 MG/DL
HGB BLD-MCNC: 10.1 G/DL (ref 11.7–15.7)
IMM GRANULOCYTES # BLD: 0 10E9/L (ref 0–0.4)
IMM GRANULOCYTES NFR BLD: 0.2 %
INR PPP: 0.92 (ref 0.86–1.14)
LDLC SERPL CALC-MCNC: 138 MG/DL
LYMPHOCYTES # BLD AUTO: 1.2 10E9/L (ref 0.8–5.3)
LYMPHOCYTES NFR BLD AUTO: 26.2 %
MCH RBC QN AUTO: 29.7 PG (ref 26.5–33)
MCHC RBC AUTO-ENTMCNC: 31.3 G/DL (ref 31.5–36.5)
MCV RBC AUTO: 95 FL (ref 78–100)
MONOCYTES # BLD AUTO: 0.5 10E9/L (ref 0–1.3)
MONOCYTES NFR BLD AUTO: 10 %
NEUTROPHILS # BLD AUTO: 2.4 10E9/L (ref 1.6–8.3)
NEUTROPHILS NFR BLD AUTO: 53 %
NONHDLC SERPL-MCNC: 159 MG/DL
NRBC # BLD AUTO: 0 10*3/UL
NRBC BLD AUTO-RTO: 0 /100
PLATELET # BLD AUTO: 246 10E9/L (ref 150–450)
POTASSIUM SERPL-SCNC: 3.7 MMOL/L (ref 3.4–5.3)
PROT SERPL-MCNC: 7.8 G/DL (ref 6.8–8.8)
RBC # BLD AUTO: 3.4 10E12/L (ref 3.8–5.2)
SODIUM SERPL-SCNC: 137 MMOL/L (ref 133–144)
TRIGL SERPL-MCNC: 104 MG/DL
WBC # BLD AUTO: 4.5 10E9/L (ref 4–11)

## 2019-04-22 PROCEDURE — 86850 RBC ANTIBODY SCREEN: CPT | Performed by: STUDENT IN AN ORGANIZED HEALTH CARE EDUCATION/TRAINING PROGRAM

## 2019-04-22 PROCEDURE — 12000001 ZZH R&B MED SURG/OB UMMC

## 2019-04-22 PROCEDURE — 86705 HEP B CORE ANTIBODY IGM: CPT | Performed by: STUDENT IN AN ORGANIZED HEALTH CARE EDUCATION/TRAINING PROGRAM

## 2019-04-22 PROCEDURE — 86665 EPSTEIN-BARR CAPSID VCA: CPT | Performed by: STUDENT IN AN ORGANIZED HEALTH CARE EDUCATION/TRAINING PROGRAM

## 2019-04-22 PROCEDURE — 86803 HEPATITIS C AB TEST: CPT | Performed by: STUDENT IN AN ORGANIZED HEALTH CARE EDUCATION/TRAINING PROGRAM

## 2019-04-22 PROCEDURE — 85730 THROMBOPLASTIN TIME PARTIAL: CPT | Performed by: STUDENT IN AN ORGANIZED HEALTH CARE EDUCATION/TRAINING PROGRAM

## 2019-04-22 PROCEDURE — 84156 ASSAY OF PROTEIN URINE: CPT | Performed by: STUDENT IN AN ORGANIZED HEALTH CARE EDUCATION/TRAINING PROGRAM

## 2019-04-22 PROCEDURE — 87389 HIV-1 AG W/HIV-1&-2 AB AG IA: CPT | Performed by: STUDENT IN AN ORGANIZED HEALTH CARE EDUCATION/TRAINING PROGRAM

## 2019-04-22 PROCEDURE — 86901 BLOOD TYPING SEROLOGIC RH(D): CPT | Performed by: STUDENT IN AN ORGANIZED HEALTH CARE EDUCATION/TRAINING PROGRAM

## 2019-04-22 PROCEDURE — 71046 X-RAY EXAM CHEST 2 VIEWS: CPT

## 2019-04-22 PROCEDURE — 93005 ELECTROCARDIOGRAM TRACING: CPT

## 2019-04-22 PROCEDURE — G0499 HEPB SCREEN HIGH RISK INDIV: HCPCS | Performed by: STUDENT IN AN ORGANIZED HEALTH CARE EDUCATION/TRAINING PROGRAM

## 2019-04-22 PROCEDURE — 86905 BLOOD TYPING RBC ANTIGENS: CPT | Performed by: STUDENT IN AN ORGANIZED HEALTH CARE EDUCATION/TRAINING PROGRAM

## 2019-04-22 PROCEDURE — 86645 CMV ANTIBODY IGM: CPT | Performed by: STUDENT IN AN ORGANIZED HEALTH CARE EDUCATION/TRAINING PROGRAM

## 2019-04-22 PROCEDURE — 84702 CHORIONIC GONADOTROPIN TEST: CPT | Performed by: STUDENT IN AN ORGANIZED HEALTH CARE EDUCATION/TRAINING PROGRAM

## 2019-04-22 PROCEDURE — 85610 PROTHROMBIN TIME: CPT | Performed by: STUDENT IN AN ORGANIZED HEALTH CARE EDUCATION/TRAINING PROGRAM

## 2019-04-22 PROCEDURE — 81001 URINALYSIS AUTO W/SCOPE: CPT | Performed by: STUDENT IN AN ORGANIZED HEALTH CARE EDUCATION/TRAINING PROGRAM

## 2019-04-22 PROCEDURE — 86900 BLOOD TYPING SEROLOGIC ABO: CPT | Performed by: STUDENT IN AN ORGANIZED HEALTH CARE EDUCATION/TRAINING PROGRAM

## 2019-04-22 PROCEDURE — 93010 ELECTROCARDIOGRAM REPORT: CPT | Performed by: INTERNAL MEDICINE

## 2019-04-22 PROCEDURE — 86644 CMV ANTIBODY: CPT | Performed by: STUDENT IN AN ORGANIZED HEALTH CARE EDUCATION/TRAINING PROGRAM

## 2019-04-22 PROCEDURE — 36415 COLL VENOUS BLD VENIPUNCTURE: CPT | Performed by: STUDENT IN AN ORGANIZED HEALTH CARE EDUCATION/TRAINING PROGRAM

## 2019-04-22 PROCEDURE — 80053 COMPREHEN METABOLIC PANEL: CPT | Performed by: STUDENT IN AN ORGANIZED HEALTH CARE EDUCATION/TRAINING PROGRAM

## 2019-04-22 PROCEDURE — 86902 BLOOD TYPE ANTIGEN DONOR EA: CPT | Performed by: STUDENT IN AN ORGANIZED HEALTH CARE EDUCATION/TRAINING PROGRAM

## 2019-04-22 PROCEDURE — 80061 LIPID PANEL: CPT | Performed by: STUDENT IN AN ORGANIZED HEALTH CARE EDUCATION/TRAINING PROGRAM

## 2019-04-22 PROCEDURE — 85025 COMPLETE CBC W/AUTO DIFF WBC: CPT | Performed by: STUDENT IN AN ORGANIZED HEALTH CARE EDUCATION/TRAINING PROGRAM

## 2019-04-22 PROCEDURE — 83036 HEMOGLOBIN GLYCOSYLATED A1C: CPT | Performed by: STUDENT IN AN ORGANIZED HEALTH CARE EDUCATION/TRAINING PROGRAM

## 2019-04-22 PROCEDURE — 86665 EPSTEIN-BARR CAPSID VCA: CPT | Mod: 91 | Performed by: STUDENT IN AN ORGANIZED HEALTH CARE EDUCATION/TRAINING PROGRAM

## 2019-04-22 RX ORDER — CEFUROXIME SODIUM 1.5 G/16ML
1.5 INJECTION, POWDER, FOR SOLUTION INTRAVENOUS ONCE
Status: DISCONTINUED | OUTPATIENT
Start: 2019-04-22 | End: 2019-04-23 | Stop reason: HOSPADM

## 2019-04-22 ASSESSMENT — MIFFLIN-ST. JEOR: SCORE: 1181.16

## 2019-04-22 NOTE — TELEPHONE ENCOUNTER
TRANSPLANT OR REPORT    Organ: Kidneys  Laterality (if known): Enbloc  Organ Location: Local    UNOS ID: [id number]  Donor OR Time: 0400  Expected/Actual Cross Clamp Time: 0600  Expected Organ Arrival Time: 0900    Surgeon: Joanna  Time in OR: 0900  Time in 3C (N/A for LI): 0800    Recipient Details  Admission ETA: 2200  Unit: 7B  Isolation: No  Latex Allergy: No  : Patricia  Diagnosis: ESKD    Liver Transplants  Bypass: No   Hemodialysis: No    Kidney/Panc Transplants  XM Status (Need to wait for XM?): No-will be back by then    Liver or KP/PA Recipients:  Can Vessels be Banked: N/A      Transplant Coordinator Contact Info: Kaylee       Vessel Bank Information  Transplant hospitals must not store a donor s extra vessels if the donor has tested positive for any of the following:   - HIV by antibody, antigen, or nucleic acid test (LILLIE)   - Hepatitis B surface antigen (HBsAg)   - Hepatitis B (HBV) by LILLIE   - Hepatitis C (HCV) by antibody or LILLIE     Extra vessels from donors that do not test positive for HIV, HBV, or HCV as above may be stored

## 2019-04-22 NOTE — PROGRESS NOTES
PATHOLOGY HLA CROSSMATCH CONSULTATION: DONOR/RECIPIENT  VIRTUAL CROSSMATCH - Kidney  Consultation Date: 2019  Consultation Requested by: Dr. Hyatt  Regarding: Compatibility of  donor organ UNOS #[id number] from OPO: [place]  with Paolo Larkin      Findings: Regarding a virtual crossmatch between Paolo Larkin and  donor listed above (match ID [id number]):  The most recent (4..19) and 10 additional patient serum/sera  were analyzed.  The patient has no antibodies listed with HLA specificity against the donor organ.      Record Review Indicates: I personally reviewed the most recent serum, the historic peak sera, and all other sera with solid-phase HLA Single Antigen test results:  The patient has no HLA antibodies against the donor organ.     The results of this virtual XM are:   -most recent serum: compatible   -peak #1: compatible  -peak #2: compatible    Disclaimer: Clinical judgement must take into account other factors, such as non-HLA antibodies not detected in the assay. The VXM gives probabilities only.  The probability does not account for the potential for auto-antibodies that may be present in the patient's serum.  These autoantibodies may render the physical crossmatch falsely positive, and would be detected by an autologous crossmatch.  When possible, confirm findings with prospective allogeneic and autologous flow crossmatches before going to transplant as clinically indicated.     Glenda Lou      Medical Director, Immunology/Histocompatibility Laboratory

## 2019-04-22 NOTE — LETTER
Transition Communication Hand-off for Care Transitions to Next Level of Care Provider    Name: Paolo Vincent Trae  : 1975  MRN #: [id number]  Primary Care Provider: Irene Beltran     Primary Clinic: No address on file     Reason for Hospitalization:  end stage kidney disease  Kidney transplant recipient  Kidney transplant candidate  Admit Date/Time: 2019  9:28 PM  Discharge Date: 19  Payor Source: Payor: MEDICARE / Plan: MEDICARE / Product Type: Medicare /              Reason for Communication Hand-off Referral: Other KT canceled    Discharge Plan: Pt to have allergy testing as she had a sever hypotesion episode after Rocurium for induction       Concern for non-adherence with plan of care:   Y/N N  Discharge Needs Assessment:        Follow-up specialty is recommended: Yes    Follow-up plan:    Future Appointments   Date Time Provider Department Center   2019  9:00 AM [place] [facility]   2019  3:00 PM [place] [facility]   2019  9:00 AM [place] [facility]   2019  3:00 PM [place] [facility]   2019  9:00 AM [place] [facility]   2019  3:00 PM [place] [facility]   2019  9:00 AM [place] [facility]   2019  3:00 PM [place] [facility]   2019  9:00 AM [place] [facility]   2019  3:00 PM [place] [facility]       Any outstanding tests or procedures:  Pt had hemodialysis  and then discharged            [facility] Recommendations:      Dede Wang    AVS/Discharge Summary is the source of truth; this is a helpful guide for improved communication of patient story

## 2019-04-23 ENCOUNTER — ANESTHESIA (OUTPATIENT)
Dept: SURGERY | Facility: CLINIC | Age: 44
End: 2019-04-23
Payer: MEDICARE

## 2019-04-23 ENCOUNTER — ANESTHESIA EVENT (OUTPATIENT)
Dept: SURGERY | Facility: CLINIC | Age: 44
End: 2019-04-23
Payer: MEDICARE

## 2019-04-23 ENCOUNTER — DOCUMENTATION ONLY (OUTPATIENT)
Dept: TRANSPLANT | Facility: CLINIC | Age: 44
End: 2019-04-23

## 2019-04-23 ENCOUNTER — TELEPHONE (OUTPATIENT)
Dept: TRANSPLANT | Facility: CLINIC | Age: 44
End: 2019-04-23

## 2019-04-23 ENCOUNTER — APPOINTMENT (OUTPATIENT)
Dept: GENERAL RADIOLOGY | Facility: CLINIC | Age: 44
End: 2019-04-23
Attending: SURGERY
Payer: MEDICARE

## 2019-04-23 DIAGNOSIS — I10 HYPERTENSION: Primary | ICD-10-CM

## 2019-04-23 DIAGNOSIS — N18.6 ESRD (END STAGE RENAL DISEASE) (H): ICD-10-CM

## 2019-04-23 DIAGNOSIS — Z76.82 ORGAN TRANSPLANT CANDIDATE: ICD-10-CM

## 2019-04-23 LAB
ABO + RH BLD: NORMAL
ABO + RH BLD: NORMAL
ALBUMIN UR-MCNC: 30 MG/DL
APPEARANCE UR: CLEAR
B-HCG SERPL-ACNC: 2 IU/L (ref 0–5)
BACTERIA #/AREA URNS HPF: ABNORMAL /HPF
BASE DEFICIT BLDA-SCNC: 0.2 MMOL/L
BASE DEFICIT BLDA-SCNC: 2.1 MMOL/L
BASE DEFICIT BLDA-SCNC: 2.6 MMOL/L
BILIRUB UR QL STRIP: NEGATIVE
BLD GP AB SCN SERPL QL: NORMAL
BLD PROD TYP BPU: NORMAL
BLD PROD TYP BPU: NORMAL
BLD UNIT ID BPU: 0
BLD UNIT ID BPU: 0
BLOOD BANK CMNT PATIENT-IMP: NORMAL
BLOOD BANK CMNT PATIENT-IMP: NORMAL
BLOOD PRODUCT CODE: NORMAL
BLOOD PRODUCT CODE: NORMAL
BPU ID: NORMAL
BPU ID: NORMAL
CA-I BLD-MCNC: 3.7 MG/DL (ref 4.4–5.2)
CA-I BLD-MCNC: 4.1 MG/DL (ref 4.4–5.2)
CA-I BLD-MCNC: 4.3 MG/DL (ref 4.4–5.2)
CMV IGG SERPL QL IA: >8 AI (ref 0–0.8)
CMV IGM SERPL QL IA: <0.2 AI (ref 0–0.8)
COLOR UR AUTO: ABNORMAL
CREAT UR-MCNC: 13 MG/DL
EBV VCA IGG SER QL IA: 1 AI (ref 0–0.8)
EBV VCA IGM SER QL IA: <0.2 AI (ref 0–0.8)
GLUCOSE BLD-MCNC: 120 MG/DL (ref 70–99)
GLUCOSE BLD-MCNC: 129 MG/DL (ref 70–99)
GLUCOSE BLD-MCNC: 144 MG/DL (ref 70–99)
GLUCOSE BLDC GLUCOMTR-MCNC: 79 MG/DL (ref 70–99)
GLUCOSE UR STRIP-MCNC: 30 MG/DL
HBA1C MFR BLD: 4.4 % (ref 0–5.6)
HBV CORE IGM SERPL QL IA: NONREACTIVE
HBV SURFACE AG SERPL QL IA: NONREACTIVE
HCO3 BLD-SCNC: 23 MMOL/L (ref 21–28)
HCO3 BLD-SCNC: 25 MMOL/L (ref 21–28)
HCO3 BLD-SCNC: 26 MMOL/L (ref 21–28)
HCV AB SERPL QL IA: NONREACTIVE
HGB BLD-MCNC: 11.4 G/DL (ref 11.7–15.7)
HGB BLD-MCNC: 11.6 G/DL (ref 11.7–15.7)
HGB BLD-MCNC: 12.4 G/DL (ref 11.7–15.7)
HGB UR QL STRIP: ABNORMAL
HIV 1+2 AB+HIV1 P24 AG SERPL QL IA: NONREACTIVE
INTERPRETATION ECG - MUSE: NORMAL
KETONES UR STRIP-MCNC: NEGATIVE MG/DL
LACTATE BLD-SCNC: 2.3 MMOL/L (ref 0.7–2)
LACTATE BLD-SCNC: 2.7 MMOL/L (ref 0.7–2)
LEUKOCYTE ESTERASE UR QL STRIP: ABNORMAL
NITRATE UR QL: NEGATIVE
O2/TOTAL GAS SETTING VFR VENT: 100 %
PCO2 BLD: 39 MM HG (ref 35–45)
PCO2 BLD: 40 MM HG (ref 35–45)
PCO2 BLD: 55 MM HG (ref 35–45)
PH BLD: 7.27 PH (ref 7.35–7.45)
PH BLD: 7.36 PH (ref 7.35–7.45)
PH BLD: 7.4 PH (ref 7.35–7.45)
PH UR STRIP: 8.5 PH (ref 5–7)
PO2 BLD: 150 MM HG (ref 80–105)
PO2 BLD: 244 MM HG (ref 80–105)
PO2 BLD: 72 MM HG (ref 80–105)
POTASSIUM BLD-SCNC: 3 MMOL/L (ref 3.4–5.3)
POTASSIUM BLD-SCNC: 3.1 MMOL/L (ref 3.4–5.3)
POTASSIUM BLD-SCNC: 3.1 MMOL/L (ref 3.4–5.3)
PROT UR-MCNC: 0.87 G/L
PROT/CREAT 24H UR: 6.67 G/G CR (ref 0–0.2)
RADIOLOGIST FLAGS: ABNORMAL
RBC #/AREA URNS AUTO: <1 /HPF (ref 0–2)
SODIUM BLD-SCNC: 139 MMOL/L (ref 133–144)
SOURCE: ABNORMAL
SP GR UR STRIP: 1 (ref 1–1.03)
SPECIMEN EXP DATE BLD: NORMAL
SQUAMOUS #/AREA URNS AUTO: <1 /HPF (ref 0–1)
TRANSFUSION STATUS PATIENT QL: NORMAL
UROBILINOGEN UR STRIP-MCNC: NORMAL MG/DL (ref 0–2)
WBC #/AREA URNS AUTO: 3 /HPF (ref 0–5)

## 2019-04-23 PROCEDURE — 25000132 ZZH RX MED GY IP 250 OP 250 PS 637: Performed by: STUDENT IN AN ORGANIZED HEALTH CARE EDUCATION/TRAINING PROGRAM

## 2019-04-23 PROCEDURE — A9270 NON-COVERED ITEM OR SERVICE: HCPCS | Mod: GY | Performed by: NURSE ANESTHETIST, CERTIFIED REGISTERED

## 2019-04-23 PROCEDURE — 36000064 ZZH SURGERY LEVEL 4 EA 15 ADDTL MIN - UMMC: Performed by: SURGERY

## 2019-04-23 PROCEDURE — 99203 OFFICE O/P NEW LOW 30 MIN: CPT | Performed by: PHYSICIAN ASSISTANT

## 2019-04-23 PROCEDURE — 25000125 ZZHC RX 250: Performed by: NURSE ANESTHETIST, CERTIFIED REGISTERED

## 2019-04-23 PROCEDURE — 40000170 ZZH STATISTIC PRE-PROCEDURE ASSESSMENT II: Performed by: SURGERY

## 2019-04-23 PROCEDURE — 40000141 ZZH STATISTIC PERIPHERAL IV START W/O US GUIDANCE

## 2019-04-23 PROCEDURE — 84295 ASSAY OF SERUM SODIUM: CPT | Mod: 91 | Performed by: ANESTHESIOLOGY

## 2019-04-23 PROCEDURE — 83605 ASSAY OF LACTIC ACID: CPT | Mod: 91 | Performed by: ANESTHESIOLOGY

## 2019-04-23 PROCEDURE — P9041 ALBUMIN (HUMAN),5%, 50ML: HCPCS | Performed by: NURSE ANESTHETIST, CERTIFIED REGISTERED

## 2019-04-23 PROCEDURE — P9016 RBC LEUKOCYTES REDUCED: HCPCS | Performed by: STUDENT IN AN ORGANIZED HEALTH CARE EDUCATION/TRAINING PROGRAM

## 2019-04-23 PROCEDURE — A9270 NON-COVERED ITEM OR SERVICE: HCPCS | Performed by: STUDENT IN AN ORGANIZED HEALTH CARE EDUCATION/TRAINING PROGRAM

## 2019-04-23 PROCEDURE — 25800030 ZZH RX IP 258 OP 636: Performed by: NURSE ANESTHETIST, CERTIFIED REGISTERED

## 2019-04-23 PROCEDURE — G0378 HOSPITAL OBSERVATION PER HR: HCPCS

## 2019-04-23 PROCEDURE — 93010 ELECTROCARDIOGRAM REPORT: CPT | Performed by: INTERNAL MEDICINE

## 2019-04-23 PROCEDURE — A9270 NON-COVERED ITEM OR SERVICE: HCPCS | Performed by: ANESTHESIOLOGY

## 2019-04-23 PROCEDURE — 40000985 XR CHEST PORT 1 VW

## 2019-04-23 PROCEDURE — 36000062 ZZH SURGERY LEVEL 4 1ST 30 MIN - UMMC: Performed by: SURGERY

## 2019-04-23 PROCEDURE — 25800030 ZZH RX IP 258 OP 636: Performed by: STUDENT IN AN ORGANIZED HEALTH CARE EDUCATION/TRAINING PROGRAM

## 2019-04-23 PROCEDURE — 37000008 ZZH ANESTHESIA TECHNICAL FEE, 1ST 30 MIN: Performed by: SURGERY

## 2019-04-23 PROCEDURE — 84132 ASSAY OF SERUM POTASSIUM: CPT | Performed by: ANESTHESIOLOGY

## 2019-04-23 PROCEDURE — 25000128 H RX IP 250 OP 636: Performed by: STUDENT IN AN ORGANIZED HEALTH CARE EDUCATION/TRAINING PROGRAM

## 2019-04-23 PROCEDURE — 25000132 ZZH RX MED GY IP 250 OP 250 PS 637: Performed by: ANESTHESIOLOGY

## 2019-04-23 PROCEDURE — 82947 ASSAY GLUCOSE BLOOD QUANT: CPT | Mod: 91 | Performed by: ANESTHESIOLOGY

## 2019-04-23 PROCEDURE — 82330 ASSAY OF CALCIUM: CPT | Performed by: ANESTHESIOLOGY

## 2019-04-23 PROCEDURE — 25000132 ZZH RX MED GY IP 250 OP 250 PS 637: Mod: GY | Performed by: NURSE ANESTHETIST, CERTIFIED REGISTERED

## 2019-04-23 PROCEDURE — 37000009 ZZH ANESTHESIA TECHNICAL FEE, EACH ADDTL 15 MIN: Performed by: SURGERY

## 2019-04-23 PROCEDURE — 82803 BLOOD GASES ANY COMBINATION: CPT | Performed by: ANESTHESIOLOGY

## 2019-04-23 PROCEDURE — 40000014 ZZH STATISTIC ARTERIAL MONITORING DAILY

## 2019-04-23 PROCEDURE — 25000128 H RX IP 250 OP 636: Performed by: NURSE ANESTHETIST, CERTIFIED REGISTERED

## 2019-04-23 PROCEDURE — 93005 ELECTROCARDIOGRAM TRACING: CPT

## 2019-04-23 PROCEDURE — 83520 IMMUNOASSAY QUANT NOS NONAB: CPT | Performed by: SURGERY

## 2019-04-23 PROCEDURE — 00000146 ZZHCL STATISTIC GLUCOSE BY METER IP

## 2019-04-23 PROCEDURE — 40000275 ZZH STATISTIC RCP TIME EA 10 MIN

## 2019-04-23 RX ORDER — AMLODIPINE BESYLATE 10 MG/1
10 TABLET ORAL DAILY
Status: DISCONTINUED | OUTPATIENT
Start: 2019-04-23 | End: 2019-04-23

## 2019-04-23 RX ORDER — SODIUM CHLORIDE 9 MG/ML
INJECTION, SOLUTION INTRAVENOUS CONTINUOUS
Status: DISCONTINUED | OUTPATIENT
Start: 2019-04-23 | End: 2019-04-23 | Stop reason: HOSPADM

## 2019-04-23 RX ORDER — PROPOFOL 10 MG/ML
INJECTION, EMULSION INTRAVENOUS PRN
Status: DISCONTINUED | OUTPATIENT
Start: 2019-04-23 | End: 2019-04-23

## 2019-04-23 RX ORDER — ONDANSETRON 2 MG/ML
INJECTION INTRAMUSCULAR; INTRAVENOUS PRN
Status: DISCONTINUED | OUTPATIENT
Start: 2019-04-23 | End: 2019-04-23

## 2019-04-23 RX ORDER — HYDRALAZINE HYDROCHLORIDE 25 MG/1
25 TABLET, FILM COATED ORAL 2 TIMES DAILY
COMMUNITY
End: 2019-05-30

## 2019-04-23 RX ORDER — FENTANYL CITRATE 50 UG/ML
25-50 INJECTION, SOLUTION INTRAMUSCULAR; INTRAVENOUS
Status: DISCONTINUED | OUTPATIENT
Start: 2019-04-23 | End: 2019-04-23 | Stop reason: HOSPADM

## 2019-04-23 RX ORDER — ONDANSETRON 2 MG/ML
4 INJECTION INTRAMUSCULAR; INTRAVENOUS EVERY 30 MIN PRN
Status: DISCONTINUED | OUTPATIENT
Start: 2019-04-23 | End: 2019-04-23 | Stop reason: HOSPADM

## 2019-04-23 RX ORDER — ALBUTEROL SULFATE 90 UG/1
AEROSOL, METERED RESPIRATORY (INHALATION) PRN
Status: DISCONTINUED | OUTPATIENT
Start: 2019-04-23 | End: 2019-04-23

## 2019-04-23 RX ORDER — HYDROMORPHONE HYDROCHLORIDE 1 MG/ML
.3-.5 INJECTION, SOLUTION INTRAMUSCULAR; INTRAVENOUS; SUBCUTANEOUS EVERY 5 MIN PRN
Status: DISCONTINUED | OUTPATIENT
Start: 2019-04-23 | End: 2019-04-23 | Stop reason: HOSPADM

## 2019-04-23 RX ORDER — EPHEDRINE SULFATE 50 MG/ML
INJECTION, SOLUTION INTRAVENOUS PRN
Status: DISCONTINUED | OUTPATIENT
Start: 2019-04-23 | End: 2019-04-23

## 2019-04-23 RX ORDER — LIDOCAINE HYDROCHLORIDE 20 MG/ML
INJECTION, SOLUTION INFILTRATION; PERINEURAL PRN
Status: DISCONTINUED | OUTPATIENT
Start: 2019-04-23 | End: 2019-04-23

## 2019-04-23 RX ORDER — FENTANYL CITRATE 50 UG/ML
INJECTION, SOLUTION INTRAMUSCULAR; INTRAVENOUS PRN
Status: DISCONTINUED | OUTPATIENT
Start: 2019-04-23 | End: 2019-04-23

## 2019-04-23 RX ORDER — LIDOCAINE 40 MG/G
CREAM TOPICAL
Status: DISCONTINUED | OUTPATIENT
Start: 2019-04-23 | End: 2019-04-24 | Stop reason: HOSPADM

## 2019-04-23 RX ORDER — MEDROXYPROGESTERONE ACETATE 10 MG
10 TABLET ORAL 2 TIMES DAILY
Status: DISCONTINUED | OUTPATIENT
Start: 2019-04-23 | End: 2019-04-24 | Stop reason: HOSPADM

## 2019-04-23 RX ORDER — ALBUMIN, HUMAN INJ 5% 5 %
SOLUTION INTRAVENOUS CONTINUOUS PRN
Status: DISCONTINUED | OUTPATIENT
Start: 2019-04-23 | End: 2019-04-23

## 2019-04-23 RX ORDER — NALOXONE HYDROCHLORIDE 0.4 MG/ML
.1-.4 INJECTION, SOLUTION INTRAMUSCULAR; INTRAVENOUS; SUBCUTANEOUS
Status: ACTIVE | OUTPATIENT
Start: 2019-04-23 | End: 2019-04-24

## 2019-04-23 RX ORDER — MEDROXYPROGESTERONE ACETATE 10 MG
10 TABLET ORAL 2 TIMES DAILY
COMMUNITY
End: 2019-06-06

## 2019-04-23 RX ORDER — CALCIUM CARBONATE 500 MG/1
1500 TABLET, CHEWABLE ORAL 3 TIMES DAILY
Status: DISCONTINUED | OUTPATIENT
Start: 2019-04-23 | End: 2019-04-24 | Stop reason: HOSPADM

## 2019-04-23 RX ORDER — DIPHENHYDRAMINE HYDROCHLORIDE 50 MG/ML
INJECTION INTRAMUSCULAR; INTRAVENOUS PRN
Status: DISCONTINUED | OUTPATIENT
Start: 2019-04-23 | End: 2019-04-23

## 2019-04-23 RX ORDER — SODIUM CHLORIDE, SODIUM GLUCONATE, SODIUM ACETATE, POTASSIUM CHLORIDE AND MAGNESIUM CHLORIDE 526; 502; 368; 37; 30 MG/100ML; MG/100ML; MG/100ML; MG/100ML; MG/100ML
INJECTION, SOLUTION INTRAVENOUS CONTINUOUS PRN
Status: DISCONTINUED | OUTPATIENT
Start: 2019-04-23 | End: 2019-04-23

## 2019-04-23 RX ORDER — SODIUM CHLORIDE, SODIUM LACTATE, POTASSIUM CHLORIDE, CALCIUM CHLORIDE 600; 310; 30; 20 MG/100ML; MG/100ML; MG/100ML; MG/100ML
INJECTION, SOLUTION INTRAVENOUS CONTINUOUS
Status: DISCONTINUED | OUTPATIENT
Start: 2019-04-23 | End: 2019-04-23 | Stop reason: HOSPADM

## 2019-04-23 RX ORDER — AMLODIPINE BESYLATE 10 MG/1
10 TABLET ORAL
Status: DISCONTINUED | OUTPATIENT
Start: 2019-04-23 | End: 2019-04-24

## 2019-04-23 RX ORDER — CALCIUM CHLORIDE 100 MG/ML
INJECTION INTRAVENOUS; INTRAVENTRICULAR PRN
Status: DISCONTINUED | OUTPATIENT
Start: 2019-04-23 | End: 2019-04-23

## 2019-04-23 RX ORDER — NALOXONE HYDROCHLORIDE 0.4 MG/ML
.1-.4 INJECTION, SOLUTION INTRAMUSCULAR; INTRAVENOUS; SUBCUTANEOUS
Status: DISCONTINUED | OUTPATIENT
Start: 2019-04-23 | End: 2019-04-24 | Stop reason: HOSPADM

## 2019-04-23 RX ORDER — ONDANSETRON 4 MG/1
4 TABLET, ORALLY DISINTEGRATING ORAL EVERY 30 MIN PRN
Status: DISCONTINUED | OUTPATIENT
Start: 2019-04-23 | End: 2019-04-23 | Stop reason: HOSPADM

## 2019-04-23 RX ADMIN — POLYETHYLENE GLYCOL 400 AND PROPYLENE GLYCOL 3 DROP: 4; 3 SOLUTION/ DROPS OPHTHALMIC at 10:55

## 2019-04-23 RX ADMIN — NOREPINEPHRINE BITARTRATE 12.8 MCG: 1 INJECTION INTRAVENOUS at 12:20

## 2019-04-23 RX ADMIN — EPINEPHRINE 20 MCG: 1 INJECTION PARENTERAL at 11:33

## 2019-04-23 RX ADMIN — NOREPINEPHRINE BITARTRATE 12.8 MCG: 1 INJECTION INTRAVENOUS at 11:29

## 2019-04-23 RX ADMIN — PHENYLEPHRINE HYDROCHLORIDE 300 MCG: 10 INJECTION, SOLUTION INTRAMUSCULAR; INTRAVENOUS; SUBCUTANEOUS at 11:24

## 2019-04-23 RX ADMIN — NOREPINEPHRINE BITARTRATE 12.8 MCG: 1 INJECTION INTRAVENOUS at 11:30

## 2019-04-23 RX ADMIN — SODIUM CHLORIDE 500 MG: 9 INJECTION, SOLUTION INTRAVENOUS at 11:37

## 2019-04-23 RX ADMIN — NOREPINEPHRINE BITARTRATE 12.8 MCG: 1 INJECTION INTRAVENOUS at 11:41

## 2019-04-23 RX ADMIN — VASOPRESSIN 2 UNITS: 20 INJECTION, SOLUTION INTRAMUSCULAR; SUBCUTANEOUS at 11:24

## 2019-04-23 RX ADMIN — EPHEDRINE SULFATE 10 MG: 50 INJECTION, SOLUTION INTRAVENOUS at 11:12

## 2019-04-23 RX ADMIN — NOREPINEPHRINE BITARTRATE 12.8 MCG: 1 INJECTION INTRAVENOUS at 11:32

## 2019-04-23 RX ADMIN — PHENYLEPHRINE HYDROCHLORIDE 200 MCG: 10 INJECTION, SOLUTION INTRAMUSCULAR; INTRAVENOUS; SUBCUTANEOUS at 11:12

## 2019-04-23 RX ADMIN — FENTANYL CITRATE 50 MCG: 50 INJECTION, SOLUTION INTRAMUSCULAR; INTRAVENOUS at 11:07

## 2019-04-23 RX ADMIN — EPINEPHRINE 10 MCG: 1 INJECTION PARENTERAL at 11:30

## 2019-04-23 RX ADMIN — VASOPRESSIN 2 UNITS: 20 INJECTION, SOLUTION INTRAMUSCULAR; SUBCUTANEOUS at 11:30

## 2019-04-23 RX ADMIN — PHENYLEPHRINE HYDROCHLORIDE 200 MCG: 10 INJECTION, SOLUTION INTRAMUSCULAR; INTRAVENOUS; SUBCUTANEOUS at 11:22

## 2019-04-23 RX ADMIN — FENTANYL CITRATE 50 MCG: 50 INJECTION, SOLUTION INTRAMUSCULAR; INTRAVENOUS at 11:03

## 2019-04-23 RX ADMIN — ALBUMIN HUMAN: 0.05 INJECTION, SOLUTION INTRAVENOUS at 11:32

## 2019-04-23 RX ADMIN — AMLODIPINE BESYLATE 10 MG: 10 TABLET ORAL at 10:26

## 2019-04-23 RX ADMIN — ONDANSETRON 4 MG: 2 INJECTION INTRAMUSCULAR; INTRAVENOUS at 10:54

## 2019-04-23 RX ADMIN — ALBUTEROL SULFATE 6 PUFF: 90 AEROSOL, METERED RESPIRATORY (INHALATION) at 12:00

## 2019-04-23 RX ADMIN — VASOPRESSIN 2 UNITS: 20 INJECTION, SOLUTION INTRAMUSCULAR; SUBCUTANEOUS at 12:25

## 2019-04-23 RX ADMIN — EPINEPHRINE 10 MCG: 1 INJECTION PARENTERAL at 11:32

## 2019-04-23 RX ADMIN — NOREPINEPHRINE BITARTRATE 12.8 MCG: 1 INJECTION INTRAVENOUS at 11:40

## 2019-04-23 RX ADMIN — LIDOCAINE HYDROCHLORIDE 100 MG: 20 INJECTION, SOLUTION INFILTRATION; PERINEURAL at 10:53

## 2019-04-23 RX ADMIN — PHENYLEPHRINE HYDROCHLORIDE 100 MCG: 10 INJECTION, SOLUTION INTRAMUSCULAR; INTRAVENOUS; SUBCUTANEOUS at 11:57

## 2019-04-23 RX ADMIN — MEDROXYPROGESTERONE ACETATE 10 MG: 10 TABLET ORAL at 19:36

## 2019-04-23 RX ADMIN — FAMOTIDINE 20 MG: 10 INJECTION, SOLUTION INTRAVENOUS at 11:40

## 2019-04-23 RX ADMIN — VASOPRESSIN 1 UNITS: 20 INJECTION, SOLUTION INTRAMUSCULAR; SUBCUTANEOUS at 11:22

## 2019-04-23 RX ADMIN — EPINEPHRINE 10 MCG: 1 INJECTION PARENTERAL at 11:37

## 2019-04-23 RX ADMIN — ROCURONIUM BROMIDE 60 MG: 10 INJECTION INTRAVENOUS at 10:54

## 2019-04-23 RX ADMIN — NOREPINEPHRINE BITARTRATE 6.4 MCG: 1 INJECTION INTRAVENOUS at 11:42

## 2019-04-23 RX ADMIN — PROPOFOL 110 MG: 10 INJECTION, EMULSION INTRAVENOUS at 10:54

## 2019-04-23 RX ADMIN — NOREPINEPHRINE BITARTRATE 12.8 MCG: 1 INJECTION INTRAVENOUS at 11:37

## 2019-04-23 RX ADMIN — SODIUM CHLORIDE, SODIUM GLUCONATE, SODIUM ACETATE, POTASSIUM CHLORIDE AND MAGNESIUM CHLORIDE: 526; 502; 368; 37; 30 INJECTION, SOLUTION INTRAVENOUS at 11:05

## 2019-04-23 RX ADMIN — SUGAMMADEX 150 MG: 100 INJECTION, SOLUTION INTRAVENOUS at 12:32

## 2019-04-23 RX ADMIN — NOREPINEPHRINE BITARTRATE 12.8 MCG: 1 INJECTION INTRAVENOUS at 11:35

## 2019-04-23 RX ADMIN — SODIUM CHLORIDE, SODIUM GLUCONATE, SODIUM ACETATE, POTASSIUM CHLORIDE AND MAGNESIUM CHLORIDE: 526; 502; 368; 37; 30 INJECTION, SOLUTION INTRAVENOUS at 10:35

## 2019-04-23 RX ADMIN — CALCIUM CARBONATE (ANTACID) CHEW TAB 500 MG 1500 MG: 500 CHEW TAB at 19:38

## 2019-04-23 RX ADMIN — NOREPINEPHRINE BITARTRATE 12.8 MCG: 1 INJECTION INTRAVENOUS at 11:33

## 2019-04-23 RX ADMIN — DIPHENHYDRAMINE HYDROCHLORIDE 50 MG: 50 INJECTION, SOLUTION INTRAMUSCULAR; INTRAVENOUS at 11:32

## 2019-04-23 RX ADMIN — MIDAZOLAM 2 MG: 1 INJECTION INTRAMUSCULAR; INTRAVENOUS at 11:42

## 2019-04-23 RX ADMIN — CALCIUM CHLORIDE 600 MG: 100 INJECTION, SOLUTION INTRAVENOUS at 12:04

## 2019-04-23 RX ADMIN — PHENYLEPHRINE HYDROCHLORIDE 200 MCG: 10 INJECTION, SOLUTION INTRAMUSCULAR; INTRAVENOUS; SUBCUTANEOUS at 11:19

## 2019-04-23 ASSESSMENT — ACTIVITIES OF DAILY LIVING (ADL)
ADLS_ACUITY_SCORE: 12
ADLS_ACUITY_SCORE: 10
ADLS_ACUITY_SCORE: 10

## 2019-04-23 NOTE — PLAN OF CARE
[id number]:  Patricia-speaking,  used at start of shift to complete admission profile, pre-op checklist, and discuss shift routine with patient and son.  Patient denied any questions.  Belongings with patient in room.  Patient states she will send home medications with family member, encouraged her to send valuables (purse/wallet) home with family as well.  Declined security.      Son at bedside throughout the night, supportive of patient.  Up ad clarice in room.  Denies pain.  Voided once so far tonight, sent to lab.  Last BM yesterday.  PIV placed in right lower arm, saline locked.  AV fistula in left upper arm.  NPO throughout the night.    Patient plans to take second pre-op shower around 6:30 am.  She will go to pre-op this morning around 7 am, for OR start time at 9 am.     services contacted [facility] overnight to request an  for pre-op, with no success as all agencies have declined so far.    4/23/2019 6:31 AM  Patient taking second pre-op shower/shampoo now.

## 2019-04-23 NOTE — OR NURSING
Re: donor organ in room.  Organ verification done by Dr Marshall and LIT Alarcon RN. After decision to abort case, Dr Ramon took organ out of room @ 1229. I was told [facility] was contacted.

## 2019-04-23 NOTE — OR NURSING
After induction and before final positioning anesthesia and transplant surgeons assessed and evaluated patient. Dr Marshall cancelled/aborted case as patient was hemodynamically unstable.

## 2019-04-23 NOTE — PROVIDER NOTIFICATION
Dr. Oliver notified of elevated lactic this afternoon at 1227 with no recheck. Placed order for lactic recheck at midnight. Will continue to monitor.

## 2019-04-23 NOTE — PLAN OF CARE
UAL in room.  iPad  #[id number] used for surgical consent by chaplain EB visit and writer interactions.  Voided at 0800.  PIV SL, LUE AV fistula with thrill and bruit.  Denies pain and nausea.  Has been NPO since 2300.  Report called to latoya Schultz transport to pre-op via cart at 0830.  All belongings sent with pt to pre-op.  Family directed to OR waiting lounge.

## 2019-04-23 NOTE — PROGRESS NOTES
"Transplant Surgery  Inpatient Daily Progress Note  2019    Assessment & Plan: 43 year old female with end-stage kidney disease who has been on dialysis since 2012 listed for kidney transplant on 2012 presented for  donor kidney transplant. PMH significant for HTN, vasospastic MI () and hepatitis B. Transplant cancelled due to severe hypotension post induction. Concern for allergic reaction to rocuronium. Patient to be admitted for observation.       Cardiorespiratory: Hypotension suspect due to allergic reaction to rocuronium. Trypase sent. Hold PTA blood pressure medications. Monitor.  Stable on 2L NC.   Hematology: Anemia of chronic kidney disease. Hgb 11.6  Access: AVF  GI/Nutrition: DUSTIN  Fluid/Electrolytes: ESRD on HD. HD MWF. Renal consulted. Stop MIV  Endocrine: No issues  : No soni  Infectious disease: afebrile, no leukocytosis  Prophylaxis: PCDs  Activity: Up with assist if HDS  Disposition: Transfer to     Medical Decision Making: Medium  Subsequent visit 20947 (moderate level decision making)    MERVAT/Fellow/Resident Provider: Serina Hill PA-C    Faculty: Talisha Marshall MD    __________________________________________________________________  Transplant History: Admitted 2019 for Kidney transplant, cancelled due to severe hypotension.    Interval History: History is obtained from the patient  Overnight events: Denies SOB, chest pain or chest pressure. +Fatigue.     ROS:   A 10-point review of systems was negative except as noted above.    Meds:    amLODIPine  10 mg Oral Daily     [Auto Hold] sodium chloride (PF)  3 mL Intracatheter Q8H       Physical Exam:     Admit Weight: 53.1 kg (117 lb 1.6 oz)    Current vitals:   /76   Pulse 73   Temp 97.4  F (36.3  C) (Temporal)   Resp 16   Ht 1.626 m (5' 4\")   Wt 53.1 kg (117 lb 1.6 oz)   SpO2 99%   BMI 20.10 kg/m          Vital sign ranges:    Temp:  [96.6  F (35.9  C)-98.6  F (37  C)] 97.4  F (36.3 " " C)  Pulse:  [73-88] 73  Heart Rate:  [77-95] 84  Resp:  [15-20] 16  BP: (105-190)/() 115/76  MAP:  [81 mmHg-90 mmHg] 87 mmHg  Arterial Line BP: (106-122)/(64-72) 118/66  SpO2:  [97 %-100 %] 99 %  Patient Vitals for the past 24 hrs:   BP Temp Temp src Pulse Heart Rate Resp SpO2 Height Weight   04/23/19 1515 115/76 -- -- -- 84 16 99 % -- --   04/23/19 1500 124/71 -- -- -- 83 17 97 % -- --   04/23/19 1445 -- -- -- -- 82 20 100 % -- --   04/23/19 1430 114/76 97.4  F (36.3  C) Temporal -- 80 18 98 % -- --   04/23/19 1415 -- -- -- -- 79 18 99 % -- --   04/23/19 1400 106/75 98.6  F (37  C) Temporal -- 80 18 99 % -- --   04/23/19 1345 -- -- -- -- 79 20 98 % -- --   04/23/19 1330 -- 96.8  F (36  C) Temporal -- 77 20 100 % -- --   04/23/19 1315 105/70 -- -- 73 79 18 99 % -- --   04/23/19 1300 -- 96.6  F (35.9  C) Temporal 78 -- 20 99 % -- --   04/23/19 0848 (!) 177/102 98.5  F (36.9  C) Oral 88 -- 18 100 % -- --   04/23/19 0645 (!) 159/94 98  F (36.7  C) Oral -- 91 15 100 % -- --   04/23/19 0015 152/89 97.8  F (36.6  C) Oral 85 -- 18 100 % -- --   04/22/19 2140 (!) 190/105 97.2  F (36.2  C) Oral -- 95 16 100 % -- --   04/22/19 2100 -- -- -- -- -- -- -- 1.626 m (5' 4\") 53.1 kg (117 lb 1.6 oz)     General Appearance: slightly sedated   Skin: normal, dry  Heart: NSR, well perfused  Lungs: BCTA, NLB on 2L NC   Abdomen: soft, non distended.  : no soni   Extremities: edema: absent.  Neurologic: awake, oriented.   RIJ, PIV  AVF    Data:   CMP  Recent Labs   Lab 04/23/19  1227 04/23/19  1158  04/22/19  2235    139   < > 137   POTASSIUM 3.1* 3.0*   < > 3.7   CHLORIDE  --   --   --  97   CO2  --   --   --  32   * 129*   < > 90   BUN  --   --   --  39*   CR  --   --   --  7.11*   GFRESTIMATED  --   --   --  7*   GFRESTBLACK  --   --   --  8*   HOLLIS  --   --   --  8.6   ICAW 4.3* 3.7*   < >  --    ALBUMIN  --   --   --  3.6   BILITOTAL  --   --   --  0.9   ALKPHOS  --   --   --  65   AST  --   --   --  30   ALT  --  "  --   --  21    < > = values in this interval not displayed.     CBC  Recent Labs   Lab 04/23/19  1227 04/23/19  1158  04/22/19  2235   HGB 11.6* 11.4*   < > 10.1*   WBC  --   --   --  4.5   PLT  --   --   --  246   A1C  --   --   --  4.4    < > = values in this interval not displayed.     COAGS  Recent Labs   Lab 04/22/19  2235   INR 0.92   PTT 33      Urinalysis  Recent Labs   Lab Test 04/22/19  2340 06/20/13  0834   COLOR Straw Straw   APPEARANCE Clear Clear   URINEGLC 30* 70*   URINEBILI Negative Negative   URINEKETONE Negative Negative   SG 1.004 1.005   UBLD Trace* Negative   URINEPH 8.5* 8.5*   PROTEIN 30* 30*   NITRITE Negative Negative   LEUKEST Trace* Moderate*   RBCU <1 <1   WBCU 3 15*   UTPG 6.67*  --

## 2019-04-23 NOTE — Clinical Note
To be active patient will need follow up with cardiology as well as allergy to eval for possible rocuronium allergy.  Needs blood test Tryptase to look for allergic reaction.

## 2019-04-23 NOTE — TELEPHONE ENCOUNTER
Coordinator attempted to call both pt and  using  services but their voice mails are not set up.     Coordinator called dialysis and asked pt to call coordinator next time she is at dialysis to review her status was changed to inactive on the wait list. Pt will need following items for active status on the [facility] d/t her reaction to anesthesia today:    1. Cardiology & DSE  2. Allergist: eval for possible rocuronium allergy  3. Lab: tryptase to look for allergic reaction    Contact information provided to dialysis center.

## 2019-04-23 NOTE — PLAN OF CARE
Writer assumed care of pt at 2145. Alondra speaking,  on site as of 2300.     BP high 190/105. Pt UAL. NPO. Pre-op shower completed. X-ray completed. Labs drawn. Urine sample needed. Continue with POC.

## 2019-04-23 NOTE — ANESTHESIA POSTPROCEDURE EVALUATION
Anesthesia POST Procedure Evaluation    Patient: Paolo Vincent Trae   MRN:     [id number] Gender:   female   Age:    43 year old :      1975        Preoperative Diagnosis: end stage kidney disease   Procedure(s):   donor kidney transplant procedure aborted  [facility]   Postop Comments: No value filed.       Anesthesia Type:  General  No value filed.    Reportable Event: YES     PAIN: Uncomplicated   Sign Out status: Comfortable, Well controlled pain     PONV: No PONV   Sign Out status:  No Nausea or Vomiting     Neuro/Psych: Uneventful perioperative course   Sign Out Status: Preoperative baseline; Age appropriate mentation     Airway/Resp.: Uneventful perioperative course   Sign Out Status: Non labored breathing, age appropriate RR; Resp. Status within EXPECTED Parameters     CV: Reportable or Non-Standard event     Events: Refractory HypOtension     Interventions: Fluid resuscitation; Pressors; Inotropes   Sign Out status: Appropriate BP and perfusion indices; Appropriate HR/Rhythm     Disposition:   Sign Out in:  PACU  Disposition:  Floor  Recovery Course: Uneventful  Follow-Up: Not required     Comments/Narrative:  Patient experienced severe hypotension approximately 15 minutes post induction. Event concerning for possible allergic reaction (possibly rocuronium). Serum trypase sent in or. Patient treated as if she was having allergic reaction. Patient stabilized in OR, case had to be canceled secondary to donor kidney being denervated and pressors would cause graft rejection. Patient extubated in OR. Patient moving all extremities. Serum trypase pending would consider using alternate paralytic in interim. Patient responsive talking and moving extremities in pacu.            Last Anesthesia Record Vitals:  CRNA VITALS  2019 1218 - 2019 1318      2019             NIBP:  102/63    Ht Rate:  77          Last PACU Vitals:  Vitals Value Taken Time   /76 2019  3:10 PM   Temp 36.3   C (97.4  F) 4/23/2019  2:30 PM   Pulse 86 4/23/2019  3:10 PM   Resp 17 4/23/2019  3:00 PM   SpO2 99 % 4/23/2019  3:13 PM   Temp src Available 4/23/2019  1:00 PM   NIBP 102/63 4/23/2019  1:05 PM   Pulse     SpO2     Resp     Temp     Ht Rate 77 4/23/2019  1:05 PM   Temp 2     Vitals shown include unvalidated device data.      Electronically Signed By: Vincent Magana MD, April 23, 2019, 3:14 PM

## 2019-04-23 NOTE — OR NURSING
Dr. Magana of anesthesia notified that patient meets PACU discharge criteria. Per Dr. Magana patient can proceed to the floor and he will place sign out when able.

## 2019-04-23 NOTE — ANESTHESIA PROCEDURE NOTES
Central Line Procedure Note  Staff:     Anesthesiologist:  Vincent Magana MD  Location: In OR after induction  Procedure Start/Stop Times:     patient identified, IV checked, site marked, risks and benefits discussed, informed consent, monitors and equipment checked, pre-op evaluation and at physician/surgeon's request      Correct Patient: Yes      Correct Position: Yes      Correct Site: Yes      Correct Procedure: Yes      Correct Laterality:  Yes    Site Marked:  Yes  Line Placement:     Procedure:  Central Line    Insertion laterality:  Right    Insertion site:  Internal Jugular    Position:  Trendelenburg      Maximal Sterile Barriers: All elements of maximal sterile barrier technique followed      (Maximal sterile barriers include:   Sterile gown, Sterile Gloves, Mask, Cap, Whole body draped, hand hygiene and acceptable skin prep).Skin Prep: Chloraprep         Injection Technique:  Ultrasound guided    Sterile Ultrasound Technique:  Sterile probe cover and Sterile gel    Vein evaluated via U/S for patency/adequacy of catheter insertion and is adequate.  Using realtime U/S imaging the vein was punctured, and needle was observed entering vein on U/S      A permanent image is NOT entered into the patient's record.      Local skin infiltration:  None    Catheter size:  7 Fr, 3 lumen, 20 cm    Catheter length at skin (cm):  15    Cath secured with: suture      Dressing:  Tegaderm and Biopatch    Complications:  None obvious    Blood aspirated all lumens: Yes      All Lumens Flushed: Yes      Verification method:  X-ray

## 2019-04-23 NOTE — PROGRESS NOTES
SPIRITUAL HEALTH SERVICES  [facility] [address]   PRE-SURGERY VISIT    Had pre-surgery visit with Paolo.  Provided spiritual support and prayer.     Jakub Collazo  Chaplain Resident  Pager [phone]

## 2019-04-23 NOTE — ANESTHESIA CARE TRANSFER NOTE
Patient: Paolo Larkin    Procedure(s):   donor kidney transplant procedure aborted  [facility]    Diagnosis: end stage kidney disease  Diagnosis Additional Information: No value filed.    Anesthesia Type:   No value filed.     Note:  Airway :Nasal Cannula  Patient transferred to:PACU  Comments: Pt resting comfortably, arounsable, VSS. Handoff Report: Identifed the Patient, Identified the Reponsible Provider, Reviewed the pertinent medical history, Discussed the surgical course, Reviewed Intra-OP anesthesia mangement and issues during anesthesia, Set expectations for post-procedure period and Allowed opportunity for questions and acknowledgement of understanding      Vitals: (Last set prior to Anesthesia Care Transfer)    CRNA VITALS  2019 1218 - 2019 1305      2019             Resp Rate (observed):  16                Electronically Signed By: NOEL Crowder CRNA  2019  1:05 PM

## 2019-04-23 NOTE — ANESTHESIA PROCEDURE NOTES
Arterial Line Procedure Note  Staff:     Anesthesiologist:  Vincent Magana MD  Location: In OR After Induction  Procedure Start/Stop Times:     patient identified, IV checked, site marked, risks and benefits discussed, informed consent, monitors and equipment checked, pre-op evaluation and at physician/surgeon's request      Correct Patient: Yes      Correct Position: Yes      Correct Site: Yes      Correct Procedure: Yes      Correct Laterality:  Yes    Site Marked:  Yes  Line Placement:     Procedure:  Arterial Line    Insertion Site:  Brachial    Insertion laterality:  Right    Skin Prep: Chloraprep      Patient Prep: patient draped, mask, sterile gloves, hat and hand hygiene      Local skin infiltration:  None    Ultrasound Guided?: Yes      Artery evaluated via ultrasound confirming patency.   Using realtime imaging, the artery was punctured and the needle was observed entering the artery.      A permanent image is NOT entered into the patient's record.      Catheter size:  20 gauge, 12 cm    Cath secured with: anchor securement device      Dressing:  Tegaderm    Complications:  None obvious    Arterial waveform: Yes      IBP within 10% of NIBP: Yes

## 2019-04-23 NOTE — ANESTHESIA PREPROCEDURE EVALUATION
Anesthesia Pre-Procedure Evaluation    Patient: Paolo Vincent Trae   MRN:     [id number] Gender:   female   Age:    43 year old :      1975        Preoperative Diagnosis: end stage kidney disease   Procedure(s):  Kidney Transplant  [facility]     Past Medical History:   Diagnosis Date     Anemia in chronic kidney disease      End stage kidney disease (H)      HELLP syndrome      Hepatitis B      HTN (hypertension)      MI (myocardial infarction) (H)      Proteinuria      Secondary hyperparathyroidism (H)      Thrombocytopaenia       Past Surgical History:   Procedure Laterality Date      SECTION  2012     CREATE FISTULA ARTERIOVENOUS UPPER EXTREMITY            Anesthesia Evaluation     .             ROS/MED HX    ENT/Pulmonary:       Neurologic:       Cardiovascular:     (+) hypertension----. : . . . :. .       METS/Exercise Tolerance:     Hematologic:         Musculoskeletal:         GI/Hepatic:         Renal/Genitourinary:     (+) chronic renal disease, type: ESRD, Pt requires dialysis, type: Hemodialysis,       Endo:         Psychiatric:         Infectious Disease:         Malignancy:         Other:                         PHYSICAL EXAM:   Mental Status/Neuro:    Airway: Facies: Feasible  Mallampati: Not Assessed  Mouth/Opening: Not Assessed  TM distance: Not Assessed  Neck ROM: Not Assessed   Respiratory:    CV:    Comments:      Dental:  Dental Comments: Poor dentition                Lab Results   Component Value Date    WBC 4.5 2019    HGB 10.1 (L) 2019    HCT 32.3 (L) 2019     2019     2019    POTASSIUM 3.7 2019    CHLORIDE 97 2019    CO2 32 2019    BUN 39 (H) 2019    CR 7.11 (H) 2019    GLC 90 2019    HOLLIS 8.6 2019    ALBUMIN 3.6 2019    PROTTOTAL 7.8 2019    ALT 21 2019    AST 30 2019    ALKPHOS 65 2019    BILITOTAL 0.9 2019    PTT 33 2019    INR 0.92 2019     "HCGS Negative 06/20/2013       Preop Vitals  BP Readings from Last 3 Encounters:   04/23/19 (!) 177/102   11/08/18 (!) 169/100   11/08/18 (!) 198/107    Pulse Readings from Last 3 Encounters:   04/23/19 88   11/08/18 63   11/08/18 75      Resp Readings from Last 3 Encounters:   04/23/19 18   11/08/18 16   06/20/13 16    SpO2 Readings from Last 3 Encounters:   04/23/19 100%   11/08/18 100%   11/21/17 100%      Temp Readings from Last 1 Encounters:   04/23/19 36.9  C (98.5  F) (Oral)    Ht Readings from Last 1 Encounters:   04/22/19 1.626 m (5' 4\")      Wt Readings from Last 1 Encounters:   04/22/19 53.1 kg (117 lb 1.6 oz)    Estimated body mass index is 20.1 kg/m  as calculated from the following:    Height as of this encounter: 1.626 m (5' 4\").    Weight as of this encounter: 53.1 kg (117 lb 1.6 oz).     LDA:  Peripheral IV 04/23/19 Right;Anterior Upper forearm (Active)   Site Assessment WDL 4/23/2019  8:00 AM   Line Status Saline locked 4/23/2019  8:00 AM   Phlebitis Scale 0-->no symptoms 4/23/2019  8:00 AM   Infiltration Scale 0 4/23/2019  8:00 AM   Extravasation? No 4/23/2019  3:00 AM   Number of days: 0       ETT (adult) (Active)   Number of days: 0       Hemodialysis Vascular Access Arteriovenous fistula Left Forearm (Active)   Site Assessment Thrill present;Bruit present 4/23/2019  8:00 AM   Number of days:             Assessment:   ASA SCORE: 3 emergent     NPO Status: > 6 hours since completed [employer]   Documentation: H&P complete; Consents complete   Proceeding: Proceed without further delay     Plan:   Anes. Type:  General   Pre-Induction: Midazolam IV; Acetaminophen PO   Induction:  IV (Standard)   Airway: Oral ETT   Access/Monitoring: PIV     Advanced Access: CVL by anesthesia   Maintenance: Balanced   Emergence: Procedure Site   Logistics: Same Day Surgery     Postop Pain/Sedation Strategy:  Standard-Options: Opioids PRN     PONV Management:  Adult Risk Factors: Female, Non-Smoker, Postop " Opioids  Prevention: Ondansetron     CONSENT:  in Room   Plan and risks discussed with: Patient   Blood Products: Consented (ALL Blood Products)                         Vincent Magana MD

## 2019-04-23 NOTE — CONSULTS
Cardiology Consult  April 23, 2019    Paolo Larkin  MRN# [id number]            Reason for consult: Hypotension       Requesting physician: Dr. Vincent Ramon       Level of consult: One-time consult to assist in determining a diagnosis and to recommend an appropriate treatment plan              Assessment and Impression:   Carlton Larkin is a 43 year old female with a history of CKD5, ESRD on HD since 2012 (av fistula), kidney transplant listed since 2012, eclampsia with HELLP syndrome, vasospastic MI 8/2012 (coronary angiogram with clean cores per chart review), and hepatitis B who was admitted for planned kidney transplant. At induction of anesthesia, patient became significantly hypotensive. Her hypotension responded well to minimal pressor support and she was weaned off pressors in the PACU without ongoing needs. Cardiology is consulted for hypotension with induction of anesthesia and concern for cardiac etiology. Our suspicion is highest for hypotension due to induction of anesthesia and less for cardiac event.     In review of her cardiac history, she does not have a significant history of heart failure or coronary artery disease. She had a solitary event in 2012 when she presented with STEMI and was found to have clean coronary arteries; this was due to presumed vasospasm and she is treated with amlodipine. She has had septal hypokinesis called intermittently on echos dating back to 2013 which is also seen on her last DSE which was aborted due to this, but otherwise she has preserved LV EF and normal LV augmentation to dobutamine infusion without WMA or EKG changes nor anginal symptoms.     Interval: No acute events overnight. Significant leukocytosis on AM labs (WBC 27.7) Hemodynamically stable. Lactic acid elevated, likely needing dialysis. No tachycardia or hypoxia to suggest PE.     Plan:    -There is no recording of arrhythmia or malignant heart block to suggest she had a cardiac event. There is no ongoing  hypotension and tachycardia with hypoxia to suggest PE.      -Suspect her hypotension at induction of anesthesia was likely a reaction to anesthesia itself, as she may have had skin mottling or urticaria peresent at the time per reports (no longer present). Possible aspiration as well, given new opacity on CXR and leukocytosis on lab work which is new.    -From our perspective, patient is at an appropriate level of risk to continue with transplant listing and surgery without further invasive cardiology workup     -If a repeat stress test is desired, a repeat DSE or NM lexiscan seems adequate. This can be done outpatient.     -Continue to manage her BP with hydralazine 25 mg BID and Lozartan 100 mg daily. If needed, can increase frequency or dose of hydralazine for better BP control.     -Continue noravasc 10 mg daily for vasospasm and BP control               Chief Complaint:   Hypotension, resolved            History of Present Illness:   Carlton Larkin is a 43 year old female with a history of CKD5, ESRD on HD since 2012 (av fistula), kidney transplant listed since 2012, eclampsia with HELLP syndrome, vasospastic MI 8/2012 (coronary angiogram with clean cores per chart review), and hepatitis B who was admitted for planned kidney transplant that was unfortunately aborted when she had hypotension with induction of anesthesia.     HPI limited due to patient sedated in PACU and no .           Past Medical History:   I have reviewed this patient's past medical history          Past Surgical History:   I have reviewed this patient's past surgical history and commented on sigificant events within the HPI          Social History:   This patient has no significant social history          Family History:   This patient has no significant family history          Allergies:     Allergies   Allergen Reactions     Blood Transfusion Related (Informational Only)      Patient has a history of a clinically significant antibody  against RBC antigens.  A delay in compatible RBCs may occur.     Heparin Flush      Vancomycin              Medications:     Current Facility-Administered Medications   Medication     amLODIPine (NORVASC) tablet 10 mg     fentaNYL (PF) (SUBLIMAZE) injection 25-50 mcg     HYDROmorphone (PF) (DILAUDID) injection 0.3-0.5 mg     lactated ringers infusion     [Auto Hold] lidocaine (LMX4) cream     [Auto Hold] lidocaine 1 % 0.1-1 mL     naloxone (NARCAN) injection 0.1-0.4 mg     ondansetron (ZOFRAN-ODT) ODT tab 4 mg    Or     ondansetron (ZOFRAN) injection 4 mg     prochlorperazine (COMPAZINE) injection 10 mg     [Auto Hold] sodium chloride (PF) 0.9% PF flush 3 mL     [Auto Hold] sodium chloride (PF) 0.9% PF flush 3 mL             Review of Systems:    ROS: 10 point ROS neg other than the symptoms noted above in the HPI.          Physical Exam:   Vitals were reviewed  Temp: 97.4  F (36.3  C) Temp src: Temporal BP: 121/85 Pulse: 93 Heart Rate: 91 Resp: 20 SpO2: 96 % O2 Device: Nasal cannula Oxygen Delivery: 2 LPM  Neck:   Supple, symmetrical, trachea midline, no adenopathy, thyroid symmetric, not enlarged and no tenderness, skin normal     Lungs:   CTAB, no increased WOB     Cardiovascular:   Normal apical impulse, regular rate and rhythm, normal S1 and S2, no S3 or S4, and no murmur noted     Abdomen:   No scars, normal bowel sounds, soft, non-distended, non-tender, no masses palpated, no hepatosplenomegally             Data:   All laboratory data reviewed     EKG results:        DSE 11/8/18  Abnormal stress echo. There is distal septal hypokinesis that worsens with  dobutamine.  Target heart rate was achieved. Heart rate and blood pressure response to  dobutamine were normal. Normal LV function at rest. With stress, the left  ventricular ejection fraction increased from 55-60% to greater than 65% and  the left ventricular size decreased appropriately.  No subjective symptoms to suggest ischemia.  There was no ECG  evidence of ischemia.  The aortic root and visualized ascending aorta are normal.    TTE 2013  Interpretation Summary  Normal LV systolic function. The LVEF is in khris 55% range. Septal wall   hypokinesis is present. Global right ventricular function is normal. Mild   aortic insufficiency is present. Pulmonary artery systolic pressure is   normal. The inferior vena cava  is normal. No pericardial effusion is present.    Left Ventricle  Left ventricular size is normal.  Left ventricular wall thickness is normal.  Septal wall hypokinesis is present.     Patient discussed with Dr. Smith who agrees with above assessment and recommendations.     RAFFAELE Parrish  Cardiology  Pager: [phone]

## 2019-04-23 NOTE — PHARMACY-ADMISSION MEDICATION HISTORY
Admission medication history interview status for the 4/22/2019 admission is complete. See Epic admission navigator for allergy information, pharmacy, prior to admission medications and immunization status.     Medication history interview sources:  Patient interview using Patricia ipad , [facility], and dispense report from EMR.    Changes made to PTA medication list (reason)  Added:   -hydralazine 25 mg PO BID  -medroxyprogesterone 10 mg PO BID  Deleted: none  Changed: none    Additional medication history information:  -Patient was a moderately reliable historian.  -Confirmed with [facility] that they filled medroxyprogesterone 10mg PO BID on 3/28/19 #30 tablets for 15 day supply. Patient should technically be out of it now. Patient seemed not as familiar with this medication as the others on her list. Unclear if patient is still taking.      Prior to Admission medications    Medication Sig Last Dose Taking? Auth Provider   amLODIPine (NORVASC) 10 MG tablet Take 10 mg by mouth daily 4/22/2019 at 0600 Yes Reported, Patient   calcium carbonate (TUMS) 500 MG chewable tablet Take 1,500 mg by mouth 3 times daily 4/22/2019 at 1200 Yes Reported, Patient   hydrALAZINE (APRESOLINE) 25 MG tablet Take 25 mg by mouth 2 times daily 4/22/2019 at 0600 Yes Unknown, Entered By History   losartan (COZAAR) 100 MG tablet Take 100 mg by mouth daily 4/22/2019 at 0600 Yes Reported, Patient   medroxyPROGESTERone (PROVERA) 10 MG tablet Take 10 mg by mouth 2 times daily  Unknown  Unknown, Entered By History         Medication history completed by: Carmencita Pedro, PGY-1 Pharmacy Resident

## 2019-04-23 NOTE — OR NURSING
Dr. Magana of anesthesia reviewed chest xray stating it looked okay and central line placement is okay and good to use.

## 2019-04-23 NOTE — H&P
[facility] [place]    Transplant Surgery  History and Physical    Paolo Larkin  : 1975  MRN # [id number]    ADMIT DATE: 2019    PCP: Irene Beltran    CHIEF COMPLAINT: Pre-op Kidney    HPI: Paolo Larkin is a 43 year old female with end-stage kidney disease who has been on dialysis since 2012 listed for kidney transplant on 2012 presents today for  donor kidney transplant.  She does make urine every day and states that she has not had any dysuria or hematuria. States that there have been no recent changes to her health and she is otherwise been doing well.  She denies any nausea, chest pain, shortness breath, fever, chills, abdominal pain, or changes to her bowel habits.    History was obtained with assistance of a Patricia     ROS:   CONSTITUTIONAL: Denies fever, chills, fatigue, or weight fluctuations  HEAD: Denies headache.  EENT: Denies vision changes, hearing changes, dysphagia, or sore throat.   NECK: Denies lymphadenopathy.   CV:Denies chest pain, palpitations, or shortness of breath.   PULMONARY:Denies shortness of breath, cough, or previous TB exposure.   GI:Denies nausea, vomiting, diarrhea, and abdominal pain. Bowel movements are regular.   :Denies urinary alterations, dysuria, urinary frequency, hematuria, and abnormal drainage.   EXT:Denies lower extremity edema.   SKIN:Denies abnormal rashes or lesions.   MUSCULOSKELETAL:Denies upper or lower extremity weakness and pain.   NEUROLOGIC:Denies lightheadedness, dizziness, seizures, or upper or lower extremity paresthesia.   HEMATOLOGICAL:No abnormal bruising or bleeding.   PSYCHIATRIC:Denies any mood alterations.     PMH:  Past Medical History:   Diagnosis Date     Anemia in chronic kidney disease      End stage kidney disease (H)      HELLP syndrome      Hepatitis B      HTN (hypertension)      MI (myocardial infarction) (H)      Proteinuria      Secondary hyperparathyroidism  (H)      Thrombocytopaenia        PSH:  Past Surgical History:   Procedure Laterality Date      SECTION  2012     CREATE FISTULA ARTERIOVENOUS UPPER EXTREMITY         MEDICATIONS:  Prior to Admission Medications   Prescriptions Last Dose Informant Patient Reported? Taking?   amLODIPine (NORVASC) 10 MG tablet   Yes No   Sig: Take 10 mg by mouth daily   calcium carbonate (TUMS) 500 MG chewable tablet   Yes No   Sig: Take 1,500 mg by mouth 3 times daily   losartan (COZAAR) 100 MG tablet   Yes No   Sig: Take 100 mg by mouth daily      Facility-Administered Medications: None        ALLERGIES:     Allergies   Allergen Reactions     Heparin Flush      Vancomycin        FAMILY HISTORY:  No family history on file.    SOCIAL HISTORY:  Social History     Socioeconomic History     Marital status:      Spouse name: Not on file     Number of children: Not on file     Years of education: Not on file     Highest education level: Not on file   Occupational History     Not on file   Social Needs     Financial resource strain: Not on file     Food insecurity:     Worry: Not on file     Inability: Not on file     Transportation needs:     Medical: Not on file     Non-medical: Not on file   Tobacco Use     Smoking status: Never Smoker     Smokeless tobacco: Never Used   Substance and Sexual Activity     Alcohol use: No     Drug use: No     Sexual activity: Not on file   Lifestyle     Physical activity:     Days per week: Not on file     Minutes per session: Not on file     Stress: Not on file   Relationships     Social connections:     Talks on phone: Not on file     Gets together: Not on file     Attends [employer] service: Not on file     Active member of club or organization: Not on file     Attends meetings of clubs or organizations: Not on file     Relationship status: Not on file     Intimate partner violence:     Fear of current or ex partner: Not on file     Emotionally abused: Not on file     Physically  "abused: Not on file     Forced sexual activity: Not on file   Other Topics Concern     Parent/sibling w/ CABG, MI or angioplasty before 65F 55M? Not Asked   Social History Narrative     Not on file       PHYSICAL EXAM:  Blood pressure (!) 190/105, temperature 97.2  F (36.2  C), temperature source Oral, resp. rate 16, height 1.626 m (5' 4\"), weight 53.1 kg (117 lb 1.6 oz), SpO2 100 %.  GENERAL: Appears alert and oriented x3, laying in bed with son at bedside  HEENT: Eye symmetrical and free of discharge bilaterally. Mucous membranes moist and without lesions.  NECK: Supple and without lymphadenopathy.  CV: RRR, S1S2 present without murmur, rub, or gallop.   RESPIRATORY: Respirations regular, even, and unlabored. Lungs CTA throughout.   GI: Soft and non distended with normoactive bowel sounds present in all quadrants. No tenderness, rebound, guarding. No organomegaly.   EXTREMITIES: No peripheral edema. 2+ bilateral pedal pulses.   NEUROLOGIC: Alert and orientated x 3. CN II-XII grossly intact. No focal deficits.   MUSCULOSKELETAL: No joint swelling or tenderness.   SKIN: No jaundice. No rashes or lesions.     LABS:  Results for orders placed or performed during the hospital encounter of 04/22/19 (from the past 24 hour(s))   EKG 12-lead, tracing only   Result Value Ref Range    Interpretation ECG Click View Image link to view waveform and result    CBC with platelets differential   Result Value Ref Range    WBC 4.5 4.0 - 11.0 10e9/L    RBC Count 3.40 (L) 3.8 - 5.2 10e12/L    Hemoglobin 10.1 (L) 11.7 - 15.7 g/dL    Hematocrit 32.3 (L) 35.0 - 47.0 %    MCV 95 78 - 100 fl    MCH 29.7 26.5 - 33.0 pg    MCHC 31.3 (L) 31.5 - 36.5 g/dL    RDW 15.8 (H) 10.0 - 15.0 %    Platelet Count 246 150 - 450 10e9/L    Diff Method Automated Method     % Neutrophils 53.0 %    % Lymphocytes 26.2 %    % Monocytes 10.0 %    % Eosinophils 9.3 %    % Basophils 1.3 %    % Immature Granulocytes 0.2 %    Nucleated RBCs 0 0 /100    Absolute " Neutrophil 2.4 1.6 - 8.3 10e9/L    Absolute Lymphocytes 1.2 0.8 - 5.3 10e9/L    Absolute Monocytes 0.5 0.0 - 1.3 10e9/L    Absolute Eosinophils 0.4 0.0 - 0.7 10e9/L    Absolute Basophils 0.1 0.0 - 0.2 10e9/L    Abs Immature Granulocytes 0.0 0 - 0.4 10e9/L    Absolute Nucleated RBC 0.0    INR   Result Value Ref Range    INR 0.92 0.86 - 1.14   Partial thromboplastin time   Result Value Ref Range    PTT 33 22 - 37 sec   Comprehensive metabolic panel   Result Value Ref Range    Sodium 137 133 - 144 mmol/L    Potassium 3.7 3.4 - 5.3 mmol/L    Chloride 97 94 - 109 mmol/L    Carbon Dioxide 32 20 - 32 mmol/L    Anion Gap 8 3 - 14 mmol/L    Glucose 90 70 - 99 mg/dL    Urea Nitrogen 39 (H) 7 - 30 mg/dL    Creatinine 7.11 (H) 0.52 - 1.04 mg/dL    GFR Estimate 7 (L) >60 mL/min/[1.73_m2]    GFR Estimate If Black 8 (L) >60 mL/min/[1.73_m2]    Calcium 8.6 8.5 - 10.1 mg/dL    Bilirubin Total 0.9 0.2 - 1.3 mg/dL    Albumin 3.6 3.4 - 5.0 g/dL    Protein Total 7.8 6.8 - 8.8 g/dL    Alkaline Phosphatase 65 40 - 150 U/L    ALT 21 0 - 50 U/L    AST 30 0 - 45 U/L   Lipid Profile   Result Value Ref Range    Cholesterol 218 (H) <200 mg/dL    Triglycerides 104 <150 mg/dL    HDL Cholesterol 59 >49 mg/dL    LDL Cholesterol Calculated 138 (H) <100 mg/dL    Non HDL Cholesterol 159 (H) <130 mg/dL   ABO/Rh type and screen   Result Value Ref Range    ABO PENDING     Antibody Screen PENDING     Test Valid Only At          [facility]    Specimen Expires 2019    XR Chest 2 Views    Impression    Impression:   No acute airspace opacities.     IMAGING:  CXR: Reviewed     ASSESSMENT & PLAN:  Paolo Larkin is a 43 year old female with history of end-stage kidney disease who presents today for  donor kidney transplant.  No contraindications to proceed transplant as planned.     - Admit to   - Preop orders placed  - Prior to admission medications reviewed    FEN: NPO  LINES: PIV  DISPO:  Admit  post op  CODE STATUS:  Full    Kevan Pat MD  Surgery, PGY1

## 2019-04-23 NOTE — PLAN OF CARE
Goal: Remains normotensive - MET; pt just arrived from PACU. Will continue to monitor on frequent VS and CAPNO

## 2019-04-23 NOTE — OR NURSING
Writer contacted [facility], Dr Magana and informed him of increased BP. He would like pt to have 10 mg of amlodipine PO. Medication not available in pyxis, will wait for OR pharmacy to deliver

## 2019-04-24 ENCOUNTER — DOCUMENTATION ONLY (OUTPATIENT)
Dept: TRANSPLANT | Facility: CLINIC | Age: 44
End: 2019-04-24

## 2019-04-24 ENCOUNTER — TEAM CONFERENCE (OUTPATIENT)
Dept: TRANSPLANT | Facility: CLINIC | Age: 44
End: 2019-04-24

## 2019-04-24 VITALS
SYSTOLIC BLOOD PRESSURE: 146 MMHG | DIASTOLIC BLOOD PRESSURE: 90 MMHG | TEMPERATURE: 98.5 F | BODY MASS INDEX: 20.29 KG/M2 | RESPIRATION RATE: 16 BRPM | WEIGHT: 118.83 LBS | OXYGEN SATURATION: 99 % | HEIGHT: 64 IN | HEART RATE: 92 BPM

## 2019-04-24 LAB
ANION GAP SERPL CALCULATED.3IONS-SCNC: 14 MMOL/L (ref 3–14)
BASOPHILS # BLD AUTO: 0 10E9/L (ref 0–0.2)
BASOPHILS NFR BLD AUTO: 0.1 %
BUN SERPL-MCNC: 69 MG/DL (ref 7–30)
CALCIUM SERPL-MCNC: 8.1 MG/DL (ref 8.5–10.1)
CHLORIDE SERPL-SCNC: 97 MMOL/L (ref 94–109)
CO2 SERPL-SCNC: 24 MMOL/L (ref 20–32)
CREAT SERPL-MCNC: 9.72 MG/DL (ref 0.52–1.04)
DIFFERENTIAL METHOD BLD: ABNORMAL
EOSINOPHIL # BLD AUTO: 0 10E9/L (ref 0–0.7)
EOSINOPHIL NFR BLD AUTO: 0 %
ERYTHROCYTE [DISTWIDTH] IN BLOOD BY AUTOMATED COUNT: 15.3 % (ref 10–15)
GFR SERPL CREATININE-BSD FRML MDRD: 4 ML/MIN/{1.73_M2}
GLUCOSE SERPL-MCNC: 127 MG/DL (ref 70–99)
HCT VFR BLD AUTO: 29.9 % (ref 35–47)
HGB BLD-MCNC: 9.4 G/DL (ref 11.7–15.7)
IMM GRANULOCYTES # BLD: 0.3 10E9/L (ref 0–0.4)
IMM GRANULOCYTES NFR BLD: 1.2 %
INTERPRETATION ECG - MUSE: NORMAL
LACTATE BLD-SCNC: 2.5 MMOL/L (ref 0.7–2)
LYMPHOCYTES # BLD AUTO: 0.7 10E9/L (ref 0.8–5.3)
LYMPHOCYTES NFR BLD AUTO: 2.3 %
MAGNESIUM SERPL-MCNC: 2.4 MG/DL (ref 1.6–2.3)
MCH RBC QN AUTO: 29.8 PG (ref 26.5–33)
MCHC RBC AUTO-ENTMCNC: 31.4 G/DL (ref 31.5–36.5)
MCV RBC AUTO: 95 FL (ref 78–100)
MONOCYTES # BLD AUTO: 0.6 10E9/L (ref 0–1.3)
MONOCYTES NFR BLD AUTO: 2 %
NEUTROPHILS # BLD AUTO: 26.1 10E9/L (ref 1.6–8.3)
NEUTROPHILS NFR BLD AUTO: 94.4 %
NRBC # BLD AUTO: 0 10*3/UL
NRBC BLD AUTO-RTO: 0 /100
PHOSPHATE SERPL-MCNC: 4 MG/DL (ref 2.5–4.5)
PLATELET # BLD AUTO: 222 10E9/L (ref 150–450)
POTASSIUM SERPL-SCNC: 5.2 MMOL/L (ref 3.4–5.3)
POTASSIUM SERPL-SCNC: 5.2 MMOL/L (ref 3.4–5.3)
RBC # BLD AUTO: 3.15 10E12/L (ref 3.8–5.2)
SA1 CELL: NORMAL
SA1 COMMENTS: NORMAL
SA1 HI RISK ABY: NORMAL
SA1 MOD RISK ABY: NORMAL
SA1 TEST METHOD: NORMAL
SA2 CELL: NORMAL
SA2 COMMENTS: NORMAL
SA2 HI RISK ABY UA: NORMAL
SA2 MOD RISK ABY: NORMAL
SA2 TEST METHOD: NORMAL
SODIUM SERPL-SCNC: 135 MMOL/L (ref 133–144)
WBC # BLD AUTO: 27.7 10E9/L (ref 4–11)

## 2019-04-24 PROCEDURE — 25000132 ZZH RX MED GY IP 250 OP 250 PS 637: Performed by: STUDENT IN AN ORGANIZED HEALTH CARE EDUCATION/TRAINING PROGRAM

## 2019-04-24 PROCEDURE — 80048 BASIC METABOLIC PNL TOTAL CA: CPT | Performed by: STUDENT IN AN ORGANIZED HEALTH CARE EDUCATION/TRAINING PROGRAM

## 2019-04-24 PROCEDURE — 93010 ELECTROCARDIOGRAM REPORT: CPT | Performed by: INTERNAL MEDICINE

## 2019-04-24 PROCEDURE — 90937 HEMODIALYSIS REPEATED EVAL: CPT

## 2019-04-24 PROCEDURE — 84100 ASSAY OF PHOSPHORUS: CPT | Performed by: STUDENT IN AN ORGANIZED HEALTH CARE EDUCATION/TRAINING PROGRAM

## 2019-04-24 PROCEDURE — 36592 COLLECT BLOOD FROM PICC: CPT | Performed by: STUDENT IN AN ORGANIZED HEALTH CARE EDUCATION/TRAINING PROGRAM

## 2019-04-24 PROCEDURE — 85025 COMPLETE CBC W/AUTO DIFF WBC: CPT | Performed by: STUDENT IN AN ORGANIZED HEALTH CARE EDUCATION/TRAINING PROGRAM

## 2019-04-24 PROCEDURE — 83735 ASSAY OF MAGNESIUM: CPT | Performed by: STUDENT IN AN ORGANIZED HEALTH CARE EDUCATION/TRAINING PROGRAM

## 2019-04-24 PROCEDURE — 84132 ASSAY OF SERUM POTASSIUM: CPT | Performed by: STUDENT IN AN ORGANIZED HEALTH CARE EDUCATION/TRAINING PROGRAM

## 2019-04-24 PROCEDURE — G0378 HOSPITAL OBSERVATION PER HR: HCPCS

## 2019-04-24 PROCEDURE — 25000132 ZZH RX MED GY IP 250 OP 250 PS 637: Performed by: PHYSICIAN ASSISTANT

## 2019-04-24 PROCEDURE — A9270 NON-COVERED ITEM OR SERVICE: HCPCS | Performed by: STUDENT IN AN ORGANIZED HEALTH CARE EDUCATION/TRAINING PROGRAM

## 2019-04-24 PROCEDURE — 93005 ELECTROCARDIOGRAM TRACING: CPT

## 2019-04-24 PROCEDURE — 25000128 H RX IP 250 OP 636: Performed by: INTERNAL MEDICINE

## 2019-04-24 PROCEDURE — G0257 UNSCHED DIALYSIS ESRD PT HOS: HCPCS

## 2019-04-24 PROCEDURE — 83605 ASSAY OF LACTIC ACID: CPT | Performed by: STUDENT IN AN ORGANIZED HEALTH CARE EDUCATION/TRAINING PROGRAM

## 2019-04-24 PROCEDURE — A9270 NON-COVERED ITEM OR SERVICE: HCPCS | Performed by: PHYSICIAN ASSISTANT

## 2019-04-24 RX ORDER — IBUPROFEN 200 MG
200 TABLET ORAL EVERY 6 HOURS PRN
Status: DISCONTINUED | OUTPATIENT
Start: 2019-04-24 | End: 2019-04-24 | Stop reason: HOSPADM

## 2019-04-24 RX ORDER — AMLODIPINE BESYLATE 10 MG/1
10 TABLET ORAL DAILY
Status: DISCONTINUED | OUTPATIENT
Start: 2019-04-24 | End: 2019-04-24 | Stop reason: HOSPADM

## 2019-04-24 RX ADMIN — CALCIUM CARBONATE (ANTACID) CHEW TAB 500 MG 1500 MG: 500 CHEW TAB at 08:16

## 2019-04-24 RX ADMIN — IBUPROFEN 200 MG: 200 TABLET, FILM COATED ORAL at 16:01

## 2019-04-24 RX ADMIN — Medication: at 11:34

## 2019-04-24 RX ADMIN — SODIUM CHLORIDE 250 ML: 9 INJECTION, SOLUTION INTRAVENOUS at 11:33

## 2019-04-24 RX ADMIN — MEDROXYPROGESTERONE ACETATE 10 MG: 10 TABLET ORAL at 08:16

## 2019-04-24 RX ADMIN — IBUPROFEN 200 MG: 200 TABLET, FILM COATED ORAL at 10:01

## 2019-04-24 RX ADMIN — Medication: at 13:00

## 2019-04-24 RX ADMIN — SODIUM CHLORIDE 300 ML: 9 INJECTION, SOLUTION INTRAVENOUS at 11:33

## 2019-04-24 ASSESSMENT — MIFFLIN-ST. JEOR
SCORE: 1198.85
SCORE: 1189

## 2019-04-24 ASSESSMENT — PAIN DESCRIPTION - DESCRIPTORS
DESCRIPTORS: CRAMPING
DESCRIPTORS: CRAMPING

## 2019-04-24 NOTE — PLAN OF CARE
[id number]  VS - A&O, VSS, RA, afebrile - lana speaking, understands very little english   Labs - K+ and lactic acid rechecked during shift, K+ = 5.2, lactic acid = 2.5 - team notified and no new orders received   Pain/Nausea - denies  Diet - regular diet  LDA - RIJ CDI SL, 2 X PIVs CDI, SL. L arm fistula wdl   Respiratory - LS clear, equal bilat   GI/ - voids, pt states had BM during shift   Skin - intact   Mobility - SBA, steady gait   Plan - Cardio consulted due to hypotension occurence in OR, plan to continue to monitor and follow poc

## 2019-04-24 NOTE — PLAN OF CARE
VS:  Afebrile, HR 80s, 136/84, O2 sats 99% with RA.  Capnography in place until prior to leaving unit for hemodialysis: IPI 8-10, EtCO2 37.  M/S tele discontinued.    LABS:  Creatinine 9.72, K+ 5.2, lactic 2.5, WBC 27.7 - PA for Kidney service aware.    NEURO:  Alert, oriented x4.  Blue  phone placed at bedside and  services contacted for later this afternoon.  PAIN:  Pt reports having menstrual cramps this AM, provided with heat packs for comfort and started on po ibuprofen - dose given x1.  DIET:  Regular  GI:  Endorsed passing flatus, no BM thus far today.  :  Reported voiding x1 this AM, not saved.  Currently having menses, provided with hygiene supplies.  SKIN:  No deficits noted.  ACTIVITY:  Initially pt provided with SBA for in room activity; gait steady and pt has been up ad clarice rest of morning.  Sitting in chair for short time.  LINE:  PIV x2 in R-forearm saline locked, flushed and patent.  R-TLC internal jugular saline locked.  Both lines to be removed after HD in preparation for discharge.  EDUCATION:  Pt will need  for review of discharge paperwork and instructions.  PLAN: Resume plan of care after return from hemodialysis.  Update Kidney team prn any change in pt status or other concerns.  To discharge to home later today.      Update:    Pt returned from hemodialysis at change of shift.  Bedside report given to next shift RN.  Spoke to pt via  phone:  Family should be here at ~1800, will come to room to review discharge instructions.  Pt ordered meal to be delivered soon.  Continue current plan of care until discharge.

## 2019-04-24 NOTE — PROGRESS NOTES
Pt has orders completed for discharge to home this afternoon after hemodialysis run.  Call placed to request Patricia  for 2 hours this afternoon/early evening to facilitate discharge process and review discharge paperwork.  I.S. staff stated this language is difficult to find  for but she is working on it and will call back when  is secured.  Will utilize iPad or blue phone in meantime.

## 2019-04-24 NOTE — PROGRESS NOTES
HEMODIALYSIS TREATMENT NOTE    Date: 4/24/2019  Time: 2:35 PM    Data:  Pre Wt:  54.9 kg   Desired Wt: 53.89 kg   Post Wt:  53.9 kg  Weight gain: -1.0  kg   Weight change: 1.0  kg  Ultrafiltration - Post Run Net Total Removed (mL): 1000 mL  Ultrafiltration - Post Run Net Total Gain (mL): 0 mL  Vascular Access Status: Yes, secured and visible  Dialyzer Rinse: Streaked, Light  Total Blood Volume Processed: 77.5 L  Total Dialysis (Treatment) Time:  3 hrs    Lab:   NO    Assessment:  Positive bruit and thrill on LUE AVF. Pre run K:5.2/ Ca:8.1. Edema on face and eyelids and Rt hand (+)     Interventions:  Initiated HD via LUE AVF with 15 G fistula needles. Reached BFR to 450 ml/mins. Kept K 2 Ca 3 bath on. Stable V/S and tolerable for 1 kg off. Finished HD with rinse back and cannulated site held for 5 mins.    Plan:    Next run per renal team.

## 2019-04-24 NOTE — PLAN OF CARE
"/85   Pulse 91   Temp 98.1  F (36.7  C) (Oral)   Resp 19   Ht 1.626 m (5' 4\")   Wt 53.1 kg (117 lb 1.6 oz)   SpO2 98%   BMI 20.10 kg/m       VS: stable on room air. Pt on 2L when arriving from PACU. CAPNO in place with numbers WNL.   BG: n/a.   LABS: see chart.   Pain: denied.  Nausea: denied.   Diet: regular but did not eat tonight.   LDA: PIV x 2 saline locked and Right internal jugular saline locked. Left fistula.   GI/: voiding and had menses start this afternoon. No BM.   Skin: intact.   Mobility: up SBA.   Tests/procedures: 12 lead EKG done and cardiology consulted but did not come today. TELE on and sinus rhythm with BBB.   Plan: cardiology consult tomorrow and continue POC.     All care explained and questions answered. Will continue to monitor and notify team of changes.     "

## 2019-04-24 NOTE — TELEPHONE ENCOUNTER
TEAM CONFERENCE:     ATTENDEES: Cecil Marcelino Riad, Keys, Schumaker, Schenk, Coordinators, Social Work, Pharmacy, Finance, and Dietary    DISCUSSION: Pt had allergic reaction to anesthesia in OR    OUTCOME: Pt's status changed to inactive

## 2019-04-24 NOTE — DISCHARGE SUMMARY
[facility], [place]    Discharge Summary  Solid Organ Transplant    Date of Admission:  2019  Date of Discharge:  2019  Discharging Provider: Serina Hill PA-C, PA-C    Discharge Diagnoses   Active Problems:    Kidney transplant candidate    Hypotension, secondary to allergic reaction      History of Present Illness   Paolo Larkin is a 43 year old female with end-stage kidney disease who has been on dialysis since 2012 listed for kidney transplant on 2012 presented for  donor kidney transplant.  PMH significant for HTN, vasospastic MI () and hepatitis B.       Hospital Course   Paolo Larkin was admitted on 2019.  The following problems were addressed during her hospitalization:    Transplant cancelled due to severe hypotension post induction. Concern for allergic reaction to rocuronium. Patient stayed overnight for observation. Vitals stable. Patient was discharged on PTA antihypertensive medications. Patient to follow up with Allergist and Cardiologist. She will be inactive on the kidney list until these evaluations completed.     Serina Hill PA-C      Pending Results   These results will be followed up by Transplant pre coordinator  Unresulted Labs Ordered in the Past 30 Days of this Admission     Date and Time Order Name Status Description    2019 1145 Tryptase In process           Code Status   Full Code    Primary Care Physician   Irene Beltran    General Appearance: NAD   Skin: normal, dry  Heart: RRR, well perfused  Lungs: BCTA, NLB on RA   Abdomen: soft, non distended.  : no soni   Extremities: edema: absent.  Neurologic: awake, alert, oriented. CN crossly intact. No focal deficits.   AVF    Time Spent on this Encounter   I, Serina Hill PA-C, personally saw the patient today and spent greater than 30 minutes discharging this patient.    Discharge Disposition   Discharged to home  Condition at discharge:  Stable    Consultations This Hospital Stay   VASCULAR ACCESS CARE ADULT IP CONSULT  MEDICATION HISTORY IP PHARMACY CONSULT  CARDIOLOGY GENERAL ADULT IP CONSULT    Discharge Orders      Reason for your hospital stay    Transplant cancelled due to severe hypotension  Hypotension on induction, possible allergic reaction. Trypase lab in process.     Activity    Your activity upon discharge: Resume previous activities, increase as tolerated. Exercise daily     When to contact your care team    Follow up with Transplant Center with any changes in health.     Adult [facility] Follow-up and recommended labs and tests    Follow up with Cardiology. Evaluation for clearance kidney transplant. Schedule next available appointment. (this was already requested).   Follow up with Allergist. Evaluation for allergy to Rocuronium, given severe hypotension with induction. Schedule next available appointment. (this was already requested).     Appointments on [place] and/or [facility] (with [facility] or [facility] provider or service). Call [phone] if you haven't heard regarding these appointments within 7 days of discharge.     Diet    Follow this diet upon discharge: Resume previous diet.     Discharge Medications   Current Discharge Medication List      CONTINUE these medications which have NOT CHANGED    Details   amLODIPine (NORVASC) 10 MG tablet Take 10 mg by mouth daily      calcium carbonate (TUMS) 500 MG chewable tablet Take 1,500 mg by mouth 3 times daily      hydrALAZINE (APRESOLINE) 25 MG tablet Take 25 mg by mouth 2 times daily      losartan (COZAAR) 100 MG tablet Take 100 mg by mouth daily      medroxyPROGESTERone (PROVERA) 10 MG tablet Take 10 mg by mouth 2 times daily            Allergies   Allergies   Allergen Reactions     Blood Transfusion Related (Informational Only)      Patient has a history of a clinically significant antibody against RBC antigens.  A delay in compatible RBCs may occur.     Heparin Flush       Vancomycin      Data   Most Recent 3 CBC's:  Recent Labs   Lab Test 04/24/19  0629 04/23/19  1227 04/23/19  1158  04/22/19 2235 11/21/17  1150   WBC 27.7*  --   --   --  4.5 5.0   HGB 9.4* 11.6* 11.4*   < > 10.1* 10.7*   MCV 95  --   --   --  95 95     --   --   --  246 221    < > = values in this interval not displayed.      Most Recent 3 BMP's:  Recent Labs   Lab Test 04/24/19  0629 04/24/19  0101 04/23/19  1227 04/23/19  1158  04/22/19 2235 06/20/13  0708     --  139 139   < > 137 142   POTASSIUM 5.2 5.2 3.1* 3.0*   < > 3.7 4.1   CHLORIDE 97  --   --   --   --  97 98   CO2 24  --   --   --   --  32 32   BUN 69*  --   --   --   --  39* 21   CR 9.72*  --   --   --   --  7.11* 6.23*   ANIONGAP 14  --   --   --   --  8 11   HOLLIS 8.1*  --   --   --   --  8.6 7.4*   *  --  120* 129*   < > 90 79    < > = values in this interval not displayed.     Most Recent 2 LFT's:  Recent Labs   Lab Test 04/22/19 2235 06/20/13  0708   AST 30 23   ALT 21 21   ALKPHOS 65 81   BILITOTAL 0.9 0.3     Most Recent INR's and Anticoagulation Dosing History:  Anticoagulation Dose History     Recent Dosing and Labs Latest Ref Rng & Units 6/20/2013 4/22/2019    INR 0.86 - 1.14 0.94 0.92        Most Recent 3 Troponin's:No lab results found.  Most Recent Cholesterol Panel:  Recent Labs   Lab Test 04/22/19 2235   CHOL 218*   *   HDL 59   TRIG 104     Most Recent 6 Bacteria Isolates From Any Culture (See EPIC Reports for Culture Details):No lab results found.  Most Recent TSH, T4 and A1c Labs:  Recent Labs   Lab Test 04/22/19 2235   A1C 4.4

## 2019-04-24 NOTE — PROGRESS NOTES
Observation:    Goal: remains normotensive - goal met, /84 with Am VS check.  Ordered to re-start home amlodipine, med held as pt was scheduled for hemodialysis run this shift.

## 2019-04-24 NOTE — CONSULTS
[facility], [place]  Transplant Nephrology Consult  Date of Admission:  4/22/2019  Today's Date: 04/24/2019  Requesting physician: Talisha Marshall MD    Assessment & Plan    # ESRD - patient is on in-center dialysis with [facility] in [place], [place].  She is admitted for DDKT pediatric en bloc kidneys which was cancelled due to hypotension with anesthesia.  The patient normally dialyzes on MWF and she last dialyzed Monday.   - Will plan for HD today    # Hypertension: Controlled; Goal BP: < 130/80   - Volume status: Euvolemic   - Changes: No    - Serum tryptase level in process to evaluate for mast cell activation    # Anemia in Chronic Renal Disease: Hgb: Stable   - Iron studies: Not checked recently    # Mineral Bone Disorder:   - Secondary renal hyperparathyroidism; PTH level is: Not checked recently  - Vitamin D; level is: Not checked recently  - Calcium; level is: Low  - Phosphorus; level is: Normal   - Patient has mildly low calcium level while hypotensive.   - Consider checking lipase to evaluate for pancreatitis   - Consider checking PTH to evaluate for lack of production vs. resistance    # Electrolytes:   - Potassium; level: Normal  - Magnesium; level: Normal  - Bicarbonate; level: Normal  - Sodium; level: Normal    # Transplant History: patient with surgery planned yesterday which was cancelled for hypotension. This is now resolved.    Recommendations were communicated to the primary team verbally.    Jesse Shields MD  Pager: [phone]    REASON FOR CONSULT   ESRD    History of Present Illness   Paolo Larkin is a 43 year old female with ESKD secondary to unknown etiology that is on hemodialysis that presented for DDKT (pediatric en-bloc) that was cancelled intraoperatively due to profound hypotension after induction anesthesia.  The patient was previously evaluated for kidney transplant 7/2017 and was last seen in clinic 11/2018.  Her medical history is additionally  significant for vasospastic MI 2012 and hepatitis B.  The patient denies issues with dialysis including hypotension.  She does have a history of hypertension and is on amlodipine and losartan.  The patient was evaluated by Cardiology and hypotension thought to be due to anesthesia related allergy that is now resolved.  The patient does note some abdominal pain today that is improving.  No incision was made.  Per anesthesia records, there is concern for possible allergic reaction (?rocuronium).  Serum tryptase in process.    Review of Systems    The 10 point Review of Systems is negative other than noted in the HPI or here.     Past Medical History    I have reviewed this patient's medical history and updated it with pertinent information if needed.   Past Medical History:   Diagnosis Date     Anemia in chronic kidney disease      End stage kidney disease (H)      HELLP syndrome      Hepatitis B      HTN (hypertension)      MI (myocardial infarction) (H)      Proteinuria      Secondary hyperparathyroidism (H)      Thrombocytopaenia        Past Surgical History   I have reviewed this patient's surgical history and updated it with pertinent information if needed.  Past Surgical History:   Procedure Laterality Date      SECTION  2012     CREATE FISTULA ARTERIOVENOUS UPPER EXTREMITY         Social History   I have reviewed this patient's social history and updated it with pertinent information if needed. Paolo Vincent Trae  reports that she has never smoked. She has never used smokeless tobacco. She reports that she does not drink alcohol or use drugs.    Family History   I have reviewed this patient's family history and updated it with pertinent information if needed.   History reviewed. No pertinent family history.    Prior to Admission Medications   Prior to Admission Medications   Prescriptions Last Dose Informant Patient Reported? Taking?   amLODIPine (NORVASC) 10 MG tablet 2019 at 0600  Yes Yes  "  Sig: Take 10 mg by mouth daily   calcium carbonate (TUMS) 500 MG chewable tablet 2019 at 1200  Yes Yes   Sig: Take 1,500 mg by mouth 3 times daily   hydrALAZINE (APRESOLINE) 25 MG tablet 2019 at 0600  Yes Yes   Sig: Take 25 mg by mouth 2 times daily   losartan (COZAAR) 100 MG tablet 2019 at 0600  Yes Yes   Sig: Take 100 mg by mouth daily   medroxyPROGESTERone (PROVERA) 10 MG tablet Unknown  Yes No   Sig: Take 10 mg by mouth 2 times daily       Facility-Administered Medications: None     Allergies   Allergies   Allergen Reactions     Blood Transfusion Related (Informational Only)      Patient has a history of a clinically significant antibody against RBC antigens.  A delay in compatible RBCs may occur.     Heparin Flush      Vancomycin        Physical Exam   Temp  Av.9  F (36.6  C)  Min: 96.6  F (35.9  C)  Max: 99.3  F (37.4  C)  Arterial Line BP  Min: 106/64  Max: 122/70  Arterial Line MAP (mmHg)  Av.7 mmHg  Min: 81 mmHg  Max: 90 mmHg      Pulse  Av.9  Min: 73  Max: 93 Resp  Av.7  Min: 14  Max: 20  SpO2  Av.8 %  Min: 96 %  Max: 100 %    CVP (mmHg): 4 mmHgBP 133/85 (BP Location: Right arm)   Pulse 91   Temp 99.3  F (37.4  C) (Oral)   Resp 14   Ht 1.626 m (5' 4\")   Wt 53.1 kg (117 lb 1.6 oz)   SpO2 98%   BMI 20.10 kg/m      Admit Weight: 53.1 kg (117 lb 1.6 oz)     GENERAL APPEARANCE: alert and no distress  HENT: mouth without ulcers or lesions  LYMPHATICS: no cervical or supraclavicular nodes  RESP: lungs clear to auscultation - no rales, rhonchi or wheezes  CV: regular rhythm, normal rate, no rub, no murmur  EDEMA: no LE edema bilaterally  ABDOMEN: soft, nondistended, nontender, bowel sounds normal  MS: extremities normal - no gross deformities noted, no evidence of inflammation in joints, no muscle tenderness  SKIN: no rash    Data   CMP  Recent Labs   Lab 19  0101 19  1227 19  1158 19  1149 19  3795   NA  --  139 139 139 137   POTASSIUM " 5.2 3.1* 3.0* 3.1* 3.7   CHLORIDE  --   --   --   --  97   CO2  --   --   --   --  32   ANIONGAP  --   --   --   --  8   GLC  --  120* 129* 144* 90   BUN  --   --   --   --  39*   CR  --   --   --   --  7.11*   GFRESTIMATED  --   --   --   --  7*   GFRESTBLACK  --   --   --   --  8*   HOLLIS  --   --   --   --  8.6   PROTTOTAL  --   --   --   --  7.8   ALBUMIN  --   --   --   --  3.6   BILITOTAL  --   --   --   --  0.9   ALKPHOS  --   --   --   --  65   AST  --   --   --   --  30   ALT  --   --   --   --  21     CBC  Recent Labs   Lab 04/24/19  0629 04/23/19  1227 04/23/19  1158 04/23/19  1149 04/22/19  2235   HGB 9.4* 11.6* 11.4* 12.4 10.1*   WBC 27.7*  --   --   --  4.5   RBC 3.15*  --   --   --  3.40*   HCT 29.9*  --   --   --  32.3*   MCV 95  --   --   --  95   MCH 29.8  --   --   --  29.7   MCHC 31.4*  --   --   --  31.3*   RDW 15.3*  --   --   --  15.8*     --   --   --  246     INR  Recent Labs   Lab 04/22/19  2235   INR 0.92   PTT 33     ABG  Recent Labs   Lab 04/23/19  1227 04/23/19  1158 04/23/19  1149   PH 7.40 7.36 7.27*   PCO2 39 40 55*   PO2 244* 150* 72*   HCO3 25 23 26   O2PER 100.0 100.0 100.0      Urine Studies  Recent Labs   Lab Test 04/22/19  2340 06/20/13  0834   COLOR Straw Straw   APPEARANCE Clear Clear   URINEGLC 30* 70*   URINEBILI Negative Negative   URINEKETONE Negative Negative   SG 1.004 1.005   UBLD Trace* Negative   URINEPH 8.5* 8.5*   PROTEIN 30* 30*   NITRITE Negative Negative   LEUKEST Trace* Moderate*   RBCU <1 <1   WBCU 3 15*     Recent Labs   Lab Test 04/22/19  2340   UTPG 6.67*     PTH  No lab results found.  Iron Studies  No lab results found.    IMAGING:  All imaging studies reviewed by me.

## 2019-04-24 NOTE — PROVIDER NOTIFICATION
Notified team that Lactic acid 2.5, K+ 5.2. No new orders given at this time, team thinks pt will be needing HD. Will continue to monitor

## 2019-04-25 LAB
CELL TYPE ALLO: NORMAL
CELL TYPE AUTO: NORMAL
CHANNELSHIFTALLOB1: -58
CHANNELSHIFTALLOB2: -54
CHANNELSHIFTALLOT1: -65
CHANNELSHIFTALLOT2: -67
CHANNELSHIFTAUTOB1: -33
CHANNELSHIFTAUTOB2: -32
CHANNELSHIFTAUTOT1: -45
CHANNELSHIFTAUTOT2: -46
COMMENT ALLOB2: NORMAL
CROSSMATCHDATEALLO: NORMAL
CROSSMATCHDATEAUTO: NORMAL
DONOR ALLO: NORMAL
DONOR AUTO: NORMAL
DONORCELLDATE ALLO: NORMAL
DONORCELLDATE AUTO: NORMAL
INTERPRETATION ECG - MUSE: NORMAL
POS CUT OFF ALLO B: >101
POS CUT OFF ALLO T: >67
POS CUT OFF AUTO B: >101
POS CUT OFF AUTO T: >67
PROTOCOL CUTOFF: NORMAL
RESULT ALLO B1: NORMAL
RESULT ALLO B2: NORMAL
RESULT ALLO T1: NORMAL
RESULT ALLO T2: NORMAL
RESULT AUTO B1: NORMAL
RESULT AUTO B2: NORMAL
RESULT AUTO T1: NORMAL
RESULT AUTO T2: NORMAL
SA1 CELL: NORMAL
SA1 COMMENTS: NORMAL
SA1 HI RISK ABY: NORMAL
SA1 MOD RISK ABY: NORMAL
SA1 TEST METHOD: NORMAL
SA2 CELL: NORMAL
SA2 COMMENTS: NORMAL
SA2 HI RISK ABY UA: NORMAL
SA2 MOD RISK ABY: NORMAL
SA2 TEST METHOD: NORMAL
SERUM DATE ALLO B1: NORMAL
SERUM DATE ALLO B2: NORMAL
SERUM DATE ALLO T1: NORMAL
SERUM DATE ALLO T2: NORMAL
SERUM DATE AUTO B1: NORMAL
SERUM DATE AUTO B2: NORMAL
SERUM DATE AUTO T1: NORMAL
SERUM DATE AUTO T2: NORMAL
TESTMETHODALLO: NORMAL
TESTMETHODAUTO: NORMAL
TREATMENT ALLO B1: NORMAL
TREATMENT ALLO B2: NORMAL
TREATMENT ALLO T1: NORMAL
TREATMENT ALLO T2: NORMAL
TREATMENT AUTO B1: NORMAL
TREATMENT AUTO B2: NORMAL
TREATMENT AUTO T1: NORMAL
TREATMENT AUTO T2: NORMAL
UNACCEPTABLE ANTIGEN: NORMAL
UNOS CPRA: 39

## 2019-04-25 NOTE — PLAN OF CARE
"/90 (BP Location: Right arm)   Pulse 92   Temp 98.5  F (36.9  C) (Oral)   Resp 16   Ht 1.626 m (5' 4\")   Wt 53.9 kg (118 lb 13.3 oz)   SpO2 99%   BMI 20.40 kg/m       VS: slightly hypertensive; OVSS on room air.   BG: n/a.   LABS: see chart.   Pain: denied.  Nausea: denied.   Diet: regular with fair appetite.   LDA: internal jugular and PIV x 2 (all removed for discharge).   GI/: voiding and had a BM this morning but none this shift.   Skin: intact.   Mobility: up independently in room.   Education: discharge papers reviewed with patient using  phone. Pt asked multiple times whether she had questions regarding her discharge or home instructions. Pt declined; pt taken to lobby via wheel chair with belongings and paperwork. Pt informed to leave internal jugular dressing in place for 24 hours.     All care explained and questions answered. Will continue to monitor and notify team of changes.     "

## 2019-04-30 LAB — TRYPTASE SERPL-MCNC: 188 UG/L

## 2019-05-01 DIAGNOSIS — N18.6 ESRD (END STAGE RENAL DISEASE) (H): Primary | ICD-10-CM

## 2019-05-01 DIAGNOSIS — Z76.82 AWAITING ORGAN TRANSPLANT: ICD-10-CM

## 2019-05-29 NOTE — PROGRESS NOTES
"[facility]   Lorna LOZA Warrenclaudine, APRN CNP  05/30/2019      Chief Complaint:   Establish Care and Recheck Medication       History of Present Illness:   Paolo Larkin is a 43 year old female with a history of end stage renal disease on dialysis, hepatitis B, and hypertension who presents alone for evaluation of anesthesia reaction follow-up. Due to language barrier, an  was present during the history-taking and subsequent discussion (and for part of the physical exam) with this patient.     Anesthesia:  She was recently admitted for a kidney transplant and was hypertensive at start of case. Shortly after induction, she became hypotensive with MAPs in 50s, requiring 3 pressors to stabilize. The procedure was stopped due to this reaction. It was suspected she had experienced a reaction to rocuronium. She was referred here to establish care for work-up for possible anesthesia reaction.  She has previously had 3 surgeries, per chart review. She believes these were all done with general anesthesia as she was completely \"put under\" for her previous surgeries. She denies having had any previous reactions to anesthesia. She cannot remember exactly where she had those done. Most recently she had a hysteroscopy and D&C through [facility] on 4/9/19, for which she had general anesthesia (see [facility] anesthesia note from 4/9/19).     Nephrology:  Her nephrologist increased her Hydralazine yesterday to 50 mg three times per day. Her blood pressure in clinic today is high, but she notes she has not yet taken her medication today. She is still taking Losartan 100 mg and Amlodipine 10 mg. She denies any chest pain, vision changes, or headaches. She is still doing dialysis three days a week MWF at [facility]. She endorses her blood pressures fluctuating with dialysis. She thinks the lowest her systolic blood pressure gets is 145-155. She does not have any symptoms " "associated with high or low blood pressure. She states that she was diagnosed with kidney disease when she was pregnant. Per nephrology notes through [facility], goal dry weight is 51 kg.    Other concerns discussed:  1. Cardiology appt this morning to evaluate for associated cardiac etiology for reaction during induction  2. No problems with AV fistula      Review of Systems:   Pertinent items are noted in HPI, remainder of complete ROS is negative.      Active Medications:      amLODIPine (NORVASC) 10 MG tablet, Take 10 mg by mouth daily, Disp: , Rfl:      calcium carbonate (TUMS) 500 MG chewable tablet, Take 1,500 mg by mouth 3 times daily, Disp: , Rfl:      hydrALAZINE (APRESOLINE) 25 MG tablet, Take 25 mg by mouth 2 times daily, Disp: , Rfl:      losartan (COZAAR) 100 MG tablet, Take 100 mg by mouth daily, Disp: , Rfl:      medroxyPROGESTERone (PROVERA) 10 MG tablet, Take 10 mg by mouth 2 times daily , Disp: , Rfl:       Allergies:   Blood transfusion related (informational only); Heparin flush; and Vancomycin      Past Medical History:  Anemia in chronic kidney disease   ESRD  HELLP syndrome  Hepatitis B  Hypertension   Myocardial infarction   Proteinuria   Secondary hyperparathyroidism  Thrombocytopenia     Past Surgical History:   section - 4/3/2012  Create fistula arteriovenous upper extremity    Family History:   History reviewed. No pertinent family history.       Social History:   The patient is  with two children (23 year old son, 7 year old daughter), a nonsmoker, and does not consume alcohol. She was born in [facility] and later moved to the [place].      Physical Exam:   BP (!) 197/110 (BP Location: Right arm, Patient Position: Sitting, Cuff Size: Adult Small)   Pulse 86   Temp 98.7  F (37.1  C) (Oral)   Ht 1.626 m (5' 4\")   Wt 53.5 kg (117 lb 14.4 oz)   SpO2 100%   BMI 20.24 kg/m     Constitutional: Alert, oriented, pleasant, no acute distress  Head: Normocephalic, " atraumatic  Eyes: Extra-ocular movements intact, pupils equally round and reactive bilaterally, no scleral icterus  ENT: Oropharynx clear, moist mucus membranes, dental caries present  Neck: Supple, no lymphadenopathy, no thyromegaly  Cardiovascular: Regular rate and rhythm, no murmurs, rubs or gallops, peripheral pulses full/symmetric, AV fistula with palpable thrill and bruit  Respiratory: Good air movement bilaterally, lungs clear, no wheezes/rales/rhonchi  GI: Abdomen soft, bowel sounds present, nondistended, nontender, no organomegaly or masses, no rebound/guarding  Musculoskeletal: No edema, normal muscle tone, normal gait  Neurologic: Alert and oriented, cranial nerves grossly intact   Psychiatric: normal mentation, affect and mood        Assessment and Plan:  1. Hypertension secondary to other renal disorders  Managed by nephrology. Hydralazine dose increased yesterday to 50 mg TID. She has not yet taken her anti-hypertensives today. Encouraged her to take these once she goes home. Dialysis MWF in [place], [place].    2. Allergy history, anesthetic  Concern for possible reaction to anesthesia before her planned transplant surgery. She will follow-up with an allergist to evaluate whether this was a true allergy, as well as refer to anesthesia team to review her case for possible medications to avoid for future surgeries. She is currently on the kidney transplant list but will need evaluation completed prior to any future surgeries.  - ALLERGY/ASTHMA ADULT REFERRAL    3. Dental caries  I advised her to see a dentist. She will schedule a future appointment.  - DENTAL REFERRAL       Follow-up: as needed for any changes or concerns, otherwise in 3-6 months for follow-up health maintenance        Scribe Disclosure:  We, Melania Gardner and Andra Julian, are serving as scribes to document services personally performed by NOEL Allen CNP at this visit, based upon the provider's statements to us. All  documentation has been reviewed by the aforementioned provider prior to being entered into the official medical record.     Portions of this medical record were completed by a scribe. UPON MY REVIEW AND AUTHENTICATION BY ELECTRONIC SIGNATURE, this confirms (a) I performed the applicable clinical services, and (b) the record is accurate.     NOEL Allen CNP    .

## 2019-05-30 ENCOUNTER — PRE VISIT (OUTPATIENT)
Dept: SURGERY | Facility: CLINIC | Age: 44
End: 2019-05-30

## 2019-05-30 ENCOUNTER — TELEPHONE (OUTPATIENT)
Dept: CARDIOLOGY | Facility: CLINIC | Age: 44
End: 2019-05-30

## 2019-05-30 ENCOUNTER — OFFICE VISIT (OUTPATIENT)
Dept: INTERNAL MEDICINE | Facility: CLINIC | Age: 44
End: 2019-05-30
Payer: MEDICARE

## 2019-05-30 ENCOUNTER — OFFICE VISIT (OUTPATIENT)
Dept: CARDIOLOGY | Facility: CLINIC | Age: 44
End: 2019-05-30
Attending: NURSE PRACTITIONER
Payer: MEDICARE

## 2019-05-30 VITALS
TEMPERATURE: 98.7 F | SYSTOLIC BLOOD PRESSURE: 197 MMHG | DIASTOLIC BLOOD PRESSURE: 110 MMHG | OXYGEN SATURATION: 100 % | WEIGHT: 117.9 LBS | HEIGHT: 64 IN | BODY MASS INDEX: 20.13 KG/M2 | HEART RATE: 86 BPM

## 2019-05-30 VITALS
WEIGHT: 118.5 LBS | SYSTOLIC BLOOD PRESSURE: 192 MMHG | BODY MASS INDEX: 20.23 KG/M2 | OXYGEN SATURATION: 99 % | HEART RATE: 91 BPM | DIASTOLIC BLOOD PRESSURE: 105 MMHG | HEIGHT: 64 IN

## 2019-05-30 DIAGNOSIS — K02.9 DENTAL CARIES: ICD-10-CM

## 2019-05-30 DIAGNOSIS — N18.6 ESRD (END STAGE RENAL DISEASE) (H): ICD-10-CM

## 2019-05-30 DIAGNOSIS — I25.10 CORONARY ARTERY DISEASE INVOLVING NATIVE CORONARY ARTERY OF NATIVE HEART WITHOUT ANGINA PECTORIS: Primary | ICD-10-CM

## 2019-05-30 DIAGNOSIS — Z76.82 ORGAN TRANSPLANT CANDIDATE: ICD-10-CM

## 2019-05-30 DIAGNOSIS — I10 HYPERTENSION: ICD-10-CM

## 2019-05-30 DIAGNOSIS — R94.39 ABNORMAL CARDIOVASCULAR STRESS TEST: ICD-10-CM

## 2019-05-30 DIAGNOSIS — I15.1 HYPERTENSION SECONDARY TO OTHER RENAL DISORDERS: Primary | ICD-10-CM

## 2019-05-30 DIAGNOSIS — Z88.4 ALLERGY HISTORY, ANESTHETIC: ICD-10-CM

## 2019-05-30 PROCEDURE — 99214 OFFICE O/P EST MOD 30 MIN: CPT | Mod: ZP | Performed by: NURSE PRACTITIONER

## 2019-05-30 PROCEDURE — 36415 COLL VENOUS BLD VENIPUNCTURE: CPT | Performed by: INTERNAL MEDICINE

## 2019-05-30 PROCEDURE — 83520 IMMUNOASSAY QUANT NOS NONAB: CPT | Performed by: INTERNAL MEDICINE

## 2019-05-30 PROCEDURE — G0463 HOSPITAL OUTPT CLINIC VISIT: HCPCS | Mod: ZF

## 2019-05-30 RX ORDER — SODIUM CHLORIDE 9 MG/ML
INJECTION, SOLUTION INTRAVENOUS CONTINUOUS
Status: CANCELLED | OUTPATIENT
Start: 2019-05-30

## 2019-05-30 RX ORDER — ASPIRIN 81 MG/1
81 TABLET ORAL DAILY
Status: CANCELLED | OUTPATIENT
Start: 2019-05-30

## 2019-05-30 RX ORDER — POTASSIUM CHLORIDE 1500 MG/1
40 TABLET, EXTENDED RELEASE ORAL
Status: CANCELLED | OUTPATIENT
Start: 2019-05-30

## 2019-05-30 RX ORDER — HYDRALAZINE HYDROCHLORIDE 25 MG/1
50 TABLET, FILM COATED ORAL 3 TIMES DAILY
Status: ON HOLD | COMMUNITY
Start: 2019-05-30 | End: 2020-11-19

## 2019-05-30 RX ORDER — POTASSIUM CHLORIDE 1500 MG/1
20 TABLET, EXTENDED RELEASE ORAL
Status: CANCELLED | OUTPATIENT
Start: 2019-05-30

## 2019-05-30 ASSESSMENT — MIFFLIN-ST. JEOR
SCORE: 1187.51
SCORE: 1184.79

## 2019-05-30 ASSESSMENT — PAIN SCALES - GENERAL
PAINLEVEL: NO PAIN (0)
PAINLEVEL: NO PAIN (0)

## 2019-05-30 NOTE — LETTER
June 4, 2019      TO: Paolo Vincent Trae  [address] [address]         Dear Paolo,    You have been scheduled for a coronary angiogram on Tuesday, June 18.  Please arrive at the [facility] Waiting Room at 12:00 noon.  [facility], [address] [address]     Please do not eat or drink anything after midnight. You should take all your morning medications as prescribed with sips of water. You will need to take a baby aspirin the morning of the procedure.      When you arrive you will be escorted back to the pre procedure area called 2A. Here they will insert an IV, draw labs, and obtain a short medical history. Please bring an updated list of your current medications.    A physician will come and talk with you about the procedure and obtain consent.    A nurse from the Cardiac Catheterization Lab will then escort you to the procedure area. You will be receiving sedation during the procedure so you will need someone to drive you to and from the hospital.    After the procedure you will recover for a short period (2 - 6 hours) on 6B. You will be discharged with instructions for post procedure care.  However, depending on what the angiogram shows you may have to have stents placed and this might require an overnight stay. We ask that you bring a small bag of necessities for your comfort if you would need to stay overnight. DO NOT BRING ANY VALUABLES!  Please do not hesitate to call me if you have any questions or concerns.    Sincerely,      Carline Peng RN  Nurse Coordinator for cardiology  [phone]

## 2019-05-30 NOTE — LETTER
2019      RE: Paolo Larkin  [address]  [place] [address]       Dear Colleague,    Thank you for the opportunity to participate in the care of your patient, Paolo Larkin, at the [facility]. Please see a copy of my visit note below.        Chief Complaint:   Chief Complaint   Patient presents with     Follow Up     Waitlist        HPI: Carlton Larkin is a 42 year old female with a history of CKD5, ESRD, AV fistula, on HD, kidney transplant listed since , eclampsia with HELLP syndrome, vasospastic MI 2012, and hepatitis B. She returns to clinic today for follow-up after becoming hypotensive during anesthesia induction for kidney transplant.  Etiology thought potentially to be an allergic reaction to rocuronium.  She has been sent back to cardiology for reevaluation to rule out a potential cardiac etiology.  She has history of an abnormal dobutamine echo in 2018 with worsening septal hypokinesis.  Her last cardiac catheterization in  demonstrated small vessel diagonal branch versus subtotal occlusions as well as 20% proximal LAD.  Prior echocardiograms have demonstrated septal wall abnormalities therefore at her initial evaluation in 2018 it was not recommended that we pursue further work-up.  She tells me on dialysis she has issues with hyper and hypotension.  Her blood pressure remains poorly controlled and her medications were adjusted yesterday by her nephrologist.        Past Medical History:  Past Medical History:   Diagnosis Date     Anemia in chronic kidney disease      End stage kidney disease (H)      HELLP syndrome      Hepatitis B      HTN (hypertension)      MI (myocardial infarction) (H)      Proteinuria      Secondary hyperparathyroidism (H)      Thrombocytopaenia        Past Surgical History:  Past Surgical History:   Procedure Laterality Date      SECTION  2012     CREATE FISTULA  "ARTERIOVENOUS UPPER EXTREMITY         Social History:  Social History     Tobacco Use     Smoking status: Never Smoker     Smokeless tobacco: Never Used   Substance Use Topics     Alcohol use: No       Family History:  No family history on file.    Medications:  Current Outpatient Medications   Medication Sig     amLODIPine (NORVASC) 10 MG tablet Take 10 mg by mouth daily     calcium carbonate (TUMS) 500 MG chewable tablet Take 1,500 mg by mouth 3 times daily     hydrALAZINE (APRESOLINE) 25 MG tablet Take 25 mg by mouth 2 times daily     losartan (COZAAR) 100 MG tablet Take 100 mg by mouth daily     medroxyPROGESTERone (PROVERA) 10 MG tablet Take 10 mg by mouth 2 times daily      No current facility-administered medications for this visit.        Review of Systems:  As per HPI otherwise all other systems are negative.  She denies any chest pain or discomfort, exertional breathlessness, syncope, lightheadedness, dizziness, orthopnea, PND.    Physical Exam:   BP (!) 192/105 (BP Location: Right arm, Patient Position: Chair, Cuff Size: Adult Small)   Pulse 91   Ht 1.626 m (5' 4\")   Wt 53.8 kg (118 lb 8 oz)   SpO2 99%   BMI 20.34 kg/m     GEN:patient is in no apparent distress.    HEENT: NC/AT.  Sclerae white, no incertus  Neck: No adenopathy.Carotids brisk bilaterally without bruits.  No jugular venous distension.   Heart:RRR. Normal S1, S2. No murmur, rub, click, or gallop.   Lungs: Lungs clear to ausculation bilaterally.  No ronchi, wheezes, rales.  Abdomen: Soft, nontender, nondistended.  Extremities: No clubbing, cyanosis, or edema.  The pulses are 2+ at the bilateral radial, DP, and PT  Neurologic: Awake and alert, normal speech, gait and affect  Skin: No petechiae, purpura or rash noted    Labs:  LIPID RESULTS:  Lab Results   Component Value Date    CHOL 218 (H) 04/22/2019    HDL 59 04/22/2019     (H) 04/22/2019    TRIG 104 04/22/2019    CHOLHDLRATIO 3.6 06/20/2013    NHDL 159 (H) 04/22/2019 "       LIVER ENZYME RESULTS:  Lab Results   Component Value Date    AST 30 04/22/2019    ALT 21 04/22/2019       CBC RESULTS:  Lab Results   Component Value Date    WBC 27.7 (H) 04/24/2019    RBC 3.15 (L) 04/24/2019    HGB 9.4 (L) 04/24/2019    HCT 29.9 (L) 04/24/2019    MCV 95 04/24/2019    MCH 29.8 04/24/2019    MCHC 31.4 (L) 04/24/2019    RDW 15.3 (H) 04/24/2019     04/24/2019       BMP RESULTS:  Lab Results   Component Value Date     04/24/2019    POTASSIUM 5.2 04/24/2019    CHLORIDE 97 04/24/2019    CO2 24 04/24/2019    ANIONGAP 14 04/24/2019     (H) 04/24/2019    BUN 69 (H) 04/24/2019    CR 9.72 (H) 04/24/2019    GFRESTIMATED 4 (L) 04/24/2019    GFRESTBLACK 5 (L) 04/24/2019    HOLLIS 8.1 (L) 04/24/2019        A1C RESULTS:  Lab Results   Component Value Date    A1C 4.4 04/22/2019       INR RESULTS:  Lab Results   Component Value Date    INR 0.92 04/22/2019    INR 0.94 06/20/2013       Diagnostics:  EKG April 24, 2019: Normal sinus rhythm with septal Q waves (unchanged from prior)    Dobutamine echocardiogram November 8, 2018:  Interpretation Summary  Abnormal stress echo. There is distal septal hypokinesis that worsens with  dobutamine.  Target heart rate was achieved. Heart rate and blood pressure response to  dobutamine were normal. Normal LV function at rest. With stress, the left  ventricular ejection fraction increased from 55-60% to greater than 65% and  the left ventricular size decreased appropriately.  No subjective symptoms to suggest ischemia.  There was no ECG evidence of ischemia.  The aortic root and visualized ascending aorta are normal.    Coronary angiography August 31, 2012:      Assessment and Plan:   1.  Preoperative Cardiovascular Evaluation:    Revised Cardiovascular Risk Index: 3 (15% risk of MACE)      She is at elevated cardiovascular risk in the perioperative setting.  She is scheduled for a dobutamine echo today however I have canceled this as I do not think it is can  add much value.  She has a history of prior equivocal dobutamine echo with septal wall abnormalities.  I think this time given her coronary anatomy from 2012 and issues with anesthesia in April we should do a definitive cardiovascular evaluation with coronary angiography.  I discussed this with the patient and she is agreeable to proceeding.    2.  Hypertension:  blood pressure remains poorly controlled.  Her hydralazine was adjusted yesterday by nephrology.  I will defer ongoing management of her blood pressure to this provider.    3.  ESRD on hemodialysis: She reports issues with hyper and hypotension on dialysis.      NOEL Potter CNP  05/30/19  7:19 AM        CC  Patient Care Team:  Irene Beltran as PCP - General  [facility], Margareth Akhtar MD as MD (Nephrology)  Jocelyn Brooks, KULWANT as Registered Nurse  Radha Phelan LPN as JENNI SHI

## 2019-05-30 NOTE — TELEPHONE ENCOUNTER
FUTURE VISIT INFORMATION      SURGERY INFORMATION:    Date: 19    Location:  Heart Cardiac cath lab    Surgeon:  Buster Rankin    Anesthesia Type:  Conscious Sedation    RECORDS REQUESTED FROM:       Primary Care Provider: Lorna Clancy APRN [facility]    Pertinent Medical History: Hypertension, hypotension, coronary artery disease, kidney trasplant    Most recent EKG+ Tracin19    Most recent Cardiac Stress Test: 18    Most recent Coronary Angiogram: 19

## 2019-05-30 NOTE — NURSING NOTE
Chief Complaint   Patient presents with     Follow Up     Waitlist      Vitals were taken and medications were reconciled.  Lucy Morales RMA  6:59 AM

## 2019-05-30 NOTE — PATIENT INSTRUCTIONS
[facility] Medication Refill Request Information:  * Please contact your pharmacy regarding ANY request for medication refills.  ** [facility] Prescription Fax = [phone]  * Please allow 3 business days for routine medication refills.  * Please allow 5 business days for controlled substance medication refills.     [facility] Test Result notification information:  *You will be notified with in 7-10 days of your appointment day regarding the results of your test.  If you are on MyChart you will be notified as soon as the provider has reviewed the results and signed off on them.    [facility]: [phone]     [facility] [phone]  [facility] [phone]

## 2019-05-30 NOTE — NURSING NOTE
Chief Complaint   Patient presents with     Establish Care     pt here to establish care     Recheck Medication     pt here to discuss medications, states someone told her she is allergic to medication       Ursula Marcus CMA at 8:06 AM on 5/30/2019.

## 2019-05-30 NOTE — PATIENT INSTRUCTIONS
Patient Instructions:    It was a pleasure to see you in the cardiology clinic today.    If you have any questions you can reach our nurse triage line at [phone].  Press Option #1 for the [facility], then press Option #3 for nursing or Option #1 for scheduling. We also encourage the use of [facility] to communicate with your HealthCare Provider    Note new medications: none  Stop the following medications: no change    One of the Cardiology nurses will call you to schedule the heart catheterization.    Control your risk of coronary artery disease with these four lifestyle changes:  - Eating a heart healthy diet by following the American Heart Association Recommendations: Reduce saturated fat and trans fat to 5-6 percent of daily calories and minimizing the amount of trans fat you eat by limiting your intake of red meat and dairy products made with whole milk. It also means choosing skim milk, low-fat or fat-free dairy products, limiting fried food, and cooking with healthy oils such as vegetable oil. A healthy diet should include emphasis on fruits, vegetables, whole grains, poultry, fish and nuts, and limiting sugary foods and beverages. We recommend following the DASH (Dietary Approaches to Stop Hypertension) or Mediterranean Diet.  - Regular Exercise: Just 40 minutes of aerobic exercise of moderate to vigorous intensity done 3-4 times per week is enough to lower both cholesterol and high blood pressure. Brisk walking, swimming, bicycling or a dance class are examples.  - Avoiding Tobacco Smoking: Smoking compounds the risk from other risk factors for heart disease including high cholesterol, high blood pressure, and diabetes. Smokers can lower cholesterol, blood pressure, and protect their arteries by quitting. Ask to learn more about quitting smoking.  - Losing Weight: Being overweight or obese raises your risk of high cholesterol, high blood pressure, and diabetes which are all  risk factors for heart disease. Losing excess weight can improve cholesterol levels, blood pressure, and reduce incidence of diabetes and potentially reverse these disease processes.     Get help if you experience any of these heart attack warning signs: Although some heart attacks are sudden and intense, most start slowly, with mild pain or discomfort. Pay attention to your body -- and call 911 if you feel:  - Chest discomfort: Most heart attacks involve discomfort in the center of the chest that lasts more than a few minutes, or that goes away and comes back. It can feel like uncomfortable pressure, squeezing, fullness or pain.  - Discomfort in other areas of the upper body: Symptoms can include pain or discomfort in one or both arms, the back, neck, jaw or stomach.   - Shortness of breath with or without chest discomfort   - Other signs may include breaking out in a cold sweat, nausea or lightheadedness      Sincerely,    Daron Lowe, CNP

## 2019-05-30 NOTE — PROGRESS NOTES
Chief Complaint:   Chief Complaint   Patient presents with     Follow Up     Waitlist        HPI: Carlton Larkin is a 42 year old female with a history of CKD5, ESRD, AV fistula, on HD, kidney transplant listed since , eclampsia with HELLP syndrome, vasospastic MI 2012, and hepatitis B. She returns to clinic today for follow-up after becoming hypotensive during anesthesia induction for kidney transplant.  Etiology thought potentially to be an allergic reaction to rocuronium.  She has been sent back to cardiology for reevaluation to rule out a potential cardiac etiology.  She has history of an abnormal dobutamine echo in 2018 with worsening septal hypokinesis.  Her last cardiac catheterization in  demonstrated small vessel diagonal branch versus subtotal occlusions as well as 20% proximal LAD.  Prior echocardiograms have demonstrated septal wall abnormalities therefore at her initial evaluation in 2018 it was not recommended that we pursue further work-up.  She tells me on dialysis she has issues with hyper and hypotension.  Her blood pressure remains poorly controlled and her medications were adjusted yesterday by her nephrologist.        Past Medical History:  Past Medical History:   Diagnosis Date     Anemia in chronic kidney disease      End stage kidney disease (H)      HELLP syndrome      Hepatitis B      HTN (hypertension)      MI (myocardial infarction) (H)      Proteinuria      Secondary hyperparathyroidism (H)      Thrombocytopaenia        Past Surgical History:  Past Surgical History:   Procedure Laterality Date      SECTION  2012     CREATE FISTULA ARTERIOVENOUS UPPER EXTREMITY         Social History:  Social History     Tobacco Use     Smoking status: Never Smoker     Smokeless tobacco: Never Used   Substance Use Topics     Alcohol use: No       Family History:  No family history on file.    Medications:  Current Outpatient Medications   Medication Sig      "amLODIPine (NORVASC) 10 MG tablet Take 10 mg by mouth daily     calcium carbonate (TUMS) 500 MG chewable tablet Take 1,500 mg by mouth 3 times daily     hydrALAZINE (APRESOLINE) 25 MG tablet Take 25 mg by mouth 2 times daily     losartan (COZAAR) 100 MG tablet Take 100 mg by mouth daily     medroxyPROGESTERone (PROVERA) 10 MG tablet Take 10 mg by mouth 2 times daily      No current facility-administered medications for this visit.        Review of Systems:  As per HPI otherwise all other systems are negative.  She denies any chest pain or discomfort, exertional breathlessness, syncope, lightheadedness, dizziness, orthopnea, PND.    Physical Exam:   BP (!) 192/105 (BP Location: Right arm, Patient Position: Chair, Cuff Size: Adult Small)   Pulse 91   Ht 1.626 m (5' 4\")   Wt 53.8 kg (118 lb 8 oz)   SpO2 99%   BMI 20.34 kg/m    GEN:patient is in no apparent distress.    HEENT: NC/AT.  Sclerae white, no incertus  Neck: No adenopathy.Carotids brisk bilaterally without bruits.  No jugular venous distension.   Heart:RRR. Normal S1, S2. No murmur, rub, click, or gallop.   Lungs: Lungs clear to ausculation bilaterally.  No ronchi, wheezes, rales.  Abdomen: Soft, nontender, nondistended.  Extremities: No clubbing, cyanosis, or edema.  The pulses are 2+ at the bilateral radial, DP, and PT  Neurologic: Awake and alert, normal speech, gait and affect  Skin: No petechiae, purpura or rash noted    Labs:  LIPID RESULTS:  Lab Results   Component Value Date    CHOL 218 (H) 04/22/2019    HDL 59 04/22/2019     (H) 04/22/2019    TRIG 104 04/22/2019    CHOLHDLRATIO 3.6 06/20/2013    NHDL 159 (H) 04/22/2019       LIVER ENZYME RESULTS:  Lab Results   Component Value Date    AST 30 04/22/2019    ALT 21 04/22/2019       CBC RESULTS:  Lab Results   Component Value Date    WBC 27.7 (H) 04/24/2019    RBC 3.15 (L) 04/24/2019    HGB 9.4 (L) 04/24/2019    HCT 29.9 (L) 04/24/2019    MCV 95 04/24/2019    MCH 29.8 04/24/2019    MCHC 31.4 " (L) 04/24/2019    RDW 15.3 (H) 04/24/2019     04/24/2019       BMP RESULTS:  Lab Results   Component Value Date     04/24/2019    POTASSIUM 5.2 04/24/2019    CHLORIDE 97 04/24/2019    CO2 24 04/24/2019    ANIONGAP 14 04/24/2019     (H) 04/24/2019    BUN 69 (H) 04/24/2019    CR 9.72 (H) 04/24/2019    GFRESTIMATED 4 (L) 04/24/2019    GFRESTBLACK 5 (L) 04/24/2019    HOLLIS 8.1 (L) 04/24/2019        A1C RESULTS:  Lab Results   Component Value Date    A1C 4.4 04/22/2019       INR RESULTS:  Lab Results   Component Value Date    INR 0.92 04/22/2019    INR 0.94 06/20/2013       Diagnostics:  EKG April 24, 2019: Normal sinus rhythm with septal Q waves (unchanged from prior)    Dobutamine echocardiogram November 8, 2018:  Interpretation Summary  Abnormal stress echo. There is distal septal hypokinesis that worsens with  dobutamine.  Target heart rate was achieved. Heart rate and blood pressure response to  dobutamine were normal. Normal LV function at rest. With stress, the left  ventricular ejection fraction increased from 55-60% to greater than 65% and  the left ventricular size decreased appropriately.  No subjective symptoms to suggest ischemia.  There was no ECG evidence of ischemia.  The aortic root and visualized ascending aorta are normal.    Coronary angiography August 31, 2012:      Assessment and Plan:   1.  Preoperative Cardiovascular Evaluation:    Revised Cardiovascular Risk Index: 3 (15% risk of MACE)      She is at elevated cardiovascular risk in the perioperative setting.  She is scheduled for a dobutamine echo today however I have canceled this as I do not think it is can add much value.  She has a history of prior equivocal dobutamine echo with septal wall abnormalities.  I think this time given her coronary anatomy from 2012 and issues with anesthesia in April we should do a definitive cardiovascular evaluation with coronary angiography.  I discussed this with the patient and she is  agreeable to proceeding.    2.  Hypertension:  blood pressure remains poorly controlled.  Her hydralazine was adjusted yesterday by nephrology.  I will defer ongoing management of her blood pressure to this provider.    3.  ESRD on hemodialysis: She reports issues with hyper and hypotension on dialysis.      NOEL Potter CNP  05/30/19  7:19 AM    Addendum on 08/08/19 @ 6:57 AM    Coronary angiogram June 16, 2019:  Procedures     CV CORONARY ANGIOGRAM   Indications   Coronary artery disease involving native coronary artery of native heart without angina pectoris [I25.10 (ICD-10-CM)]   Abnormal cardiovascular stress test [R94.39 (ICD-10-CM)]   Comments/Patient Narrative   Carlton Larkin is a 42 year old female with a history of CKD5, ESRD, AV fistula, on HD, kidney transplant listed since 2012, eclampsia with HELLP syndrome, vasospastic MI 8/2012.   Pre-procedure Diagnosis   abnormal stress test       Conclusion   Normal Coronaries       Plan     Follow bedrest per protocol    Continued medical management and lifestyle modifications for cardiovascular risk factor optimizations.     Given the findings of her angiogram she may be listed for kidney transplant surgery.  Unless her significant change in her medical status she will not require cardiac reevaluation for 2 years.    NOEL Potter CNP  08/08/19  6:57 AM      CC  Patient Care Team:  Irene Beltran as PCP - General  Linato, Margareth Akhtar MD as MD (Nephrology)  Jocelyn Brooks RN as Registered Nurse  Radha Phelan LPN as JENNI SHI

## 2019-05-31 LAB — TRYPTASE SERPL-MCNC: 14.2 UG/L

## 2019-06-04 NOTE — TELEPHONE ENCOUNTER
You have been scheduled for a coronary angiogram on Tuesday, June 18.  Please arrive at the [facility] Waiting Room at 12:00.   [facility], [place]  [address] [place] [address]     Please do not eat or drink anything after midnight. You should take all your morning medications as prescribed with sips of water. You will need to take a baby aspirin the morning of the procedure      When you arrive you will be escorted back to the pre procedure area called 2A. Here they will insert an IV, draw labs, and obtain a short medical history. Please bring an updated list of your current medications.    A physician will come and talk with you about the procedure and obtain consent.    A nurse from the Cardiac Catheterization Lab will then escort you to the procedure area. You will be receiving sedation during the procedure so you will need someone to drive you to and from the hospital.    After the procedure you will recover for a short period (2 - 6 hours) on 6B. You will be discharged with instructions for post procedure care.  However, depending on what the angiogram shows you may have to have stents placed and this might require an overnight stay. We ask that you bring a small bag of necessities for your comfort if you would need to stay overnight. DO NOT BRING ANY VALUABLES!

## 2019-06-06 ENCOUNTER — OFFICE VISIT (OUTPATIENT)
Dept: SURGERY | Facility: CLINIC | Age: 44
End: 2019-06-06
Attending: NURSE PRACTITIONER
Payer: MEDICARE

## 2019-06-06 ENCOUNTER — HOSPITAL ENCOUNTER (EMERGENCY)
Facility: CLINIC | Age: 44
Discharge: HOME | End: 2019-06-06
Attending: EMERGENCY MEDICINE | Admitting: EMERGENCY MEDICINE
Payer: MEDICARE

## 2019-06-06 ENCOUNTER — ANESTHESIA EVENT (OUTPATIENT)
Dept: SURGERY | Facility: CLINIC | Age: 44
End: 2019-06-06

## 2019-06-06 VITALS
TEMPERATURE: 98.3 F | SYSTOLIC BLOOD PRESSURE: 195 MMHG | BODY MASS INDEX: 19.63 KG/M2 | WEIGHT: 115 LBS | DIASTOLIC BLOOD PRESSURE: 122 MMHG | HEART RATE: 90 BPM | HEIGHT: 64 IN | OXYGEN SATURATION: 99 %

## 2019-06-06 VITALS
HEART RATE: 83 BPM | SYSTOLIC BLOOD PRESSURE: 189 MMHG | WEIGHT: 115.8 LBS | DIASTOLIC BLOOD PRESSURE: 103 MMHG | OXYGEN SATURATION: 100 % | BODY MASS INDEX: 19.77 KG/M2 | RESPIRATION RATE: 16 BRPM | HEIGHT: 64 IN | TEMPERATURE: 98.6 F

## 2019-06-06 DIAGNOSIS — I10 ASYMPTOMATIC HYPERTENSION: ICD-10-CM

## 2019-06-06 DIAGNOSIS — Z01.818 PREOP EXAMINATION: Primary | ICD-10-CM

## 2019-06-06 PROCEDURE — 25000132 ZZH RX MED GY IP 250 OP 250 PS 637: Mod: GY | Performed by: EMERGENCY MEDICINE

## 2019-06-06 PROCEDURE — 99283 EMERGENCY DEPT VISIT LOW MDM: CPT | Performed by: EMERGENCY MEDICINE

## 2019-06-06 PROCEDURE — A9270 NON-COVERED ITEM OR SERVICE: HCPCS | Mod: GY | Performed by: EMERGENCY MEDICINE

## 2019-06-06 PROCEDURE — 99284 EMERGENCY DEPT VISIT MOD MDM: CPT | Mod: Z6 | Performed by: EMERGENCY MEDICINE

## 2019-06-06 RX ORDER — LOSARTAN POTASSIUM 100 MG/1
100 TABLET ORAL DAILY
Status: COMPLETED | OUTPATIENT
Start: 2019-06-06 | End: 2019-06-06

## 2019-06-06 RX ORDER — AMLODIPINE BESYLATE 5 MG/1
10 TABLET ORAL ONCE
Status: COMPLETED | OUTPATIENT
Start: 2019-06-06 | End: 2019-06-06

## 2019-06-06 RX ORDER — LOSARTAN POTASSIUM 100 MG/1
100 TABLET ORAL DAILY
Qty: 30 TABLET | Refills: 0 | Status: SHIPPED | OUTPATIENT
Start: 2019-06-06 | End: 2019-12-19

## 2019-06-06 RX ORDER — HYDRALAZINE HYDROCHLORIDE 25 MG/1
25 TABLET, FILM COATED ORAL ONCE
Status: COMPLETED | OUTPATIENT
Start: 2019-06-06 | End: 2019-06-06

## 2019-06-06 RX ORDER — AMLODIPINE BESYLATE 10 MG/1
10 TABLET ORAL DAILY
Qty: 30 TABLET | Refills: 0 | Status: SHIPPED | OUTPATIENT
Start: 2019-06-06 | End: 2019-12-19

## 2019-06-06 RX ORDER — HYDRALAZINE HYDROCHLORIDE 20 MG/ML
25 INJECTION INTRAMUSCULAR; INTRAVENOUS ONCE
Status: DISCONTINUED | OUTPATIENT
Start: 2019-06-06 | End: 2019-06-06

## 2019-06-06 RX ORDER — ACETAMINOPHEN 325 MG/1
325-650 TABLET ORAL EVERY 4 HOURS PRN
COMMUNITY
End: 2022-03-22

## 2019-06-06 RX ADMIN — HYDRALAZINE HYDROCHLORIDE 25 MG: 25 TABLET ORAL at 14:24

## 2019-06-06 RX ADMIN — AMLODIPINE BESYLATE 10 MG: 5 TABLET ORAL at 13:56

## 2019-06-06 RX ADMIN — LOSARTAN POTASSIUM 100 MG: 100 TABLET, FILM COATED ORAL at 14:24

## 2019-06-06 ASSESSMENT — MIFFLIN-ST. JEOR
SCORE: 1175.27
SCORE: 1171.64

## 2019-06-06 ASSESSMENT — ENCOUNTER SYMPTOMS
SHORTNESS OF BREATH: 0
HEADACHES: 0
LIGHT-HEADEDNESS: 0

## 2019-06-06 ASSESSMENT — PAIN SCALES - GENERAL: PAINLEVEL: NO PAIN (0)

## 2019-06-06 NOTE — ED PROVIDER NOTES
[place] EMERGENCY DEPARTMENT ([facility])  [address]   History     Chief Complaint   Patient presents with     Hypertension     The history is provided by medical records and the patient. The history is limited by a language barrier. A  was used (jina Gomez ).     Paolo Larkin is a 43 year old female who presents from [facility] for evaluation of elevated blood pressures. She has a history of hypertension and kidney failure on dialysis MWF x past 7 years via AV fistula on a kidney transplant wait list. She was at clinic today for a pre-op evaluation for a coronary angiogram and was noted to have elevated blood pressure of 200/110.  She is asymptomatic and said that she had not taken her amlodipine as she ran out yesterday. She also hasn't been taking the losartan for a month. She needs refills for both amlodipine and losartan.  She has been taking the calcium carbonate and hydralazine, does not need refills on these. No changes in vision, dizziness, chest pain, headache.  She has follow-up with her primary care provider in [facility] next week.  She last dialyzed yesterday. Angiogram has been rescheduled for 19.    I have reviewed the Medications, Allergies, Past Medical and Surgical History, and Social History in the [facility] system.  PAST MEDICAL HISTORY:   Past Medical History:   Diagnosis Date     Anemia in chronic kidney disease      End stage kidney disease (H)      HELLP syndrome      Hepatitis B      HTN (hypertension)      MI (myocardial infarction) (H)      Proteinuria      Secondary hyperparathyroidism (H)      Thrombocytopaenia        PAST SURGICAL HISTORY:   Past Surgical History:   Procedure Laterality Date      SECTION  2012     CREATE FISTULA ARTERIOVENOUS UPPER EXTREMITY       DILATION AND CURETTAGE, HYSTEROSCOPY WITH ULTRASOUND GUIDANCE  2019       FAMILY HISTORY:   Family History   Problem Relation Age of Onset  "    Kidney Disease No family hx of      Heart Disease No family hx of        SOCIAL HISTORY:   Social History     Tobacco Use     Smoking status: Never Smoker     Smokeless tobacco: Never Used   Substance Use Topics     Alcohol use: No       Patient's Medications   New Prescriptions    AMLODIPINE (NORVASC) 10 MG TABLET    Take 1 tablet (10 mg) by mouth daily    LOSARTAN (COZAAR) 100 MG TABLET    Take 1 tablet (100 mg) by mouth daily   Previous Medications    ACETAMINOPHEN (TYLENOL) 325 MG TABLET    Take 325-650 mg by mouth as needed for mild pain    AMLODIPINE (NORVASC) 10 MG TABLET    Take 10 mg by mouth every morning     CALCIUM CARBONATE (TUMS) 500 MG CHEWABLE TABLET    Take 1,500 mg by mouth 3 times daily    HYDRALAZINE (APRESOLINE) 50 MG TABLET    Take 25 mg by mouth 3 times daily     LOSARTAN (COZAAR) 100 MG TABLET    Take 100 mg by mouth daily    Modified Medications    No medications on file   Discontinued Medications    No medications on file          Allergies   Allergen Reactions     Blood Transfusion Related (Informational Only)      Patient has a history of a clinically significant antibody against RBC antigens.  A delay in compatible RBCs may occur.     Heparin Flush      Vancomycin         Review of Systems   Eyes: Negative for visual disturbance.   Respiratory: Negative for shortness of breath.    Cardiovascular: Negative for chest pain.   Neurological: Negative for light-headedness and headaches.   All other systems reviewed and are negative.      Physical Exam   BP: (!) 180/104  Pulse: 79  Heart Rate: 83  Temp: 98.6  F (37  C)  Resp: 17  Height: 162.6 cm (5' 4\")  Weight: 52.5 kg (115 lb 12.8 oz)  SpO2: 100 %      Physical Exam   Constitutional: She is oriented to person, place, and time. She appears well-developed and well-nourished. No distress.   HENT:   Head: Normocephalic and atraumatic.   Eyes: No scleral icterus.   Neck: Neck supple.   Cardiovascular: Normal rate, regular rhythm and normal " heart sounds.   Pulmonary/Chest: Effort normal and breath sounds normal. No respiratory distress. She has no wheezes.   Neurological: She is alert and oriented to person, place, and time. No cranial nerve deficit.   Skin: Skin is warm and dry.   Psychiatric: She has a normal mood and affect. Her behavior is normal.   Nursing note and vitals reviewed.      ED Course        Procedures        Medications   amLODIPine (NORVASC) tablet 10 mg (10 mg Oral Given 6/6/19 1356)   hydrALAZINE (APRESOLINE) tablet 25 mg (25 mg Oral Given 6/6/19 1424)   losartan (COZAAR) tablet 100 mg (100 mg Oral Given 6/6/19 1424)            Labs Ordered and Resulted from Time of ED Arrival Up to the Time of Departure from the ED - No data to display         Assessments & Plan (with Medical Decision Making)  43-year-old female with end-stage kidney disease on dialysis has been compliant with runs. Plan was for her to get an upcoming coronary angiogram however she needed medical clearance prior to this. She was over in the clinic to have this done when she was noted to be hypertensive and has been noncompliant with her antihypertensive regimen.  She is on losartan, hydralazine, and Norvasc.  We have given her oral meds here. She has no concerning symptoms, no chest pain, no headache, no abdominal pain, no blurred vision. I will refill her medications and emphasized the importance of being compliant on these and to follow her dialysis schedule. She does not seem fluid overloaded. She is responding to her oral antihypertensives. Goal is not to normalize her blood pressure as I do not know what she has chronically been running and certainly is likely autoregulated in response to the hypertension.  Patient is eager to be discharged home.  Continue the current plan going forward in terms of your angiogram and dialysis.   This part of the document was transcribed by Justine Cordoba Medical Scribe.      I have reviewed the nursing notes.    I have  reviewed the findings, diagnosis, plan and need for follow up with the patient.       Medication List      Modified    * amLODIPine 10 MG tablet  Commonly known as:  NORVASC  What changed:  Another medication with the same name was added. Make sure you understand how and when to take each.     * amLODIPine 10 MG tablet  Commonly known as:  NORVASC  10 mg, Oral, DAILY  What changed:  You were already taking a medication with the same name, and this prescription was added. Make sure you understand how and when to take each.     * losartan 100 MG tablet  Commonly known as:  COZAAR  What changed:  Another medication with the same name was added. Make sure you understand how and when to take each.     * losartan 100 MG tablet  Commonly known as:  COZAAR  100 mg, Oral, DAILY  What changed:  You were already taking a medication with the same name, and this prescription was added. Make sure you understand how and when to take each.         * This list has 4 medication(s) that are the same as other medications prescribed for you. Read the directions carefully, and ask your doctor or other care provider to review them with you.                Final diagnoses:   Asymptomatic hypertension     I, Justine Cordoba, am serving as a trained medical scribe to document services personally performed by Estiven Wright MD based on the provider's statements to me on June 6, 2019.  This document has been checked and approved by the attending provider.    Estiven SINGH MD, was physically present and have reviewed and verified the accuracy of this note documented by Justine Cordoba medical scribe.      6/6/2019   [facility], [place], EMERGENCY DEPARTMENT     Estiven Wright MD  06/06/19 0064

## 2019-06-06 NOTE — ED AVS SNAPSHOT
[facility], [place], Emergency Department  [address] [address]  Phone:  [phone]                                    Paolo Larkin   MRN: [id number]    Department:  [facility], Emergency Department   Date of Visit:  6/6/2019           After Visit Summary Signature Page    I have received my discharge instructions, and my questions have been answered. I have discussed any challenges I see with this plan with the nurse or doctor.    ..........................................................................................................................................  Patient/Patient Representative Signature      ..........................................................................................................................................  Patient Representative Print Name and Relationship to Patient    ..................................................               ................................................  Date                                   Time    ..........................................................................................................................................  Reviewed by Signature/Title    ...................................................              ..............................................  Date                                               Time          22EPIC Rev 08/18

## 2019-06-06 NOTE — ANESTHESIA PREPROCEDURE EVALUATION
Anesthesia Pre-Procedure Evaluation    Patient: Paolo Vincent Trae   MRN:     [id number] Gender:   female   Age:    43 year old :      1975        Preoperative Diagnosis: * No surgery found *        Past Medical History:   Diagnosis Date     Anemia in chronic kidney disease      End stage kidney disease (H)      HELLP syndrome      Hepatitis B      HTN (hypertension)      MI (myocardial infarction) (H)      Proteinuria      Secondary hyperparathyroidism (H)      Thrombocytopaenia       Past Surgical History:   Procedure Laterality Date      SECTION  2012     CREATE FISTULA ARTERIOVENOUS UPPER EXTREMITY       DILATION AND CURETTAGE, HYSTEROSCOPY WITH ULTRASOUND GUIDANCE  2019          Anesthesia Evaluation     . Pt has had prior anesthetic. Type: General    History of anesthetic complications    kidney transplant aborted 2019 due to drop in BP 15 min after induction.  Thought to be allergic reaction to rocuronium.      ROS/MED HX    ENT/Pulmonary:     (+)EDIS risk factors hypertension, , . .    Neurologic:  - neg neurologic ROS     Cardiovascular: Comment: Vasospastic - ST elevation MI 2012    (+) hypertension--CAD, -past MI,-. : . . . :. . Previous cardiac testing Echodate:2018results:date: results:ECG reviewed date:2019 results: date: results:          METS/Exercise Tolerance:  4 - Raking leaves, gardening   Hematologic:  - neg hematologic  ROS       Musculoskeletal:  - neg musculoskeletal ROS       GI/Hepatic:     (+) hepatitis type B, liver disease,       Renal/Genitourinary:     (+) chronic renal disease, type: ESRD, Pt requires dialysis, type: Hemodialysis, Pt has no history of transplant,       Endo:  - neg endo ROS       Psychiatric:  - neg psychiatric ROS       Infectious Disease:  - neg infectious disease ROS       Malignancy:      - no malignancy   Other:    (+) no H/O Chronic Pain,                       PHYSICAL EXAM:   Mental Status/Neuro: A/A/O; Age Appropriate  "  Airway: Facies: Feasible  Mallampati: I  Mouth/Opening: Full  TM distance: > 6 cm  Neck ROM: Full   Respiratory: Auscultation: CTAB     Resp. Rate: Normal     Resp. Effort: Normal      CV: Rhythm: Regular  Rate: Age appropriate  Heart: Normal Sounds   Comments:      Dental: Normal                  Lab Results   Component Value Date    WBC 27.7 (H) 04/24/2019    HGB 9.4 (L) 04/24/2019    HCT 29.9 (L) 04/24/2019     04/24/2019     04/24/2019    POTASSIUM 5.2 04/24/2019    CHLORIDE 97 04/24/2019    CO2 24 04/24/2019    BUN 69 (H) 04/24/2019    CR 9.72 (H) 04/24/2019     (H) 04/24/2019    HOLLIS 8.1 (L) 04/24/2019    PHOS 4.0 04/24/2019    MAG 2.4 (H) 04/24/2019    ALBUMIN 3.6 04/22/2019    PROTTOTAL 7.8 04/22/2019    ALT 21 04/22/2019    AST 30 04/22/2019    ALKPHOS 65 04/22/2019    BILITOTAL 0.9 04/22/2019    PTT 33 04/22/2019    INR 0.92 04/22/2019    HCGS Negative 06/20/2013       Preop Vitals  BP Readings from Last 3 Encounters:   05/30/19 (!) 197/110   05/30/19 (!) 192/105   04/24/19 146/90    Pulse Readings from Last 3 Encounters:   05/30/19 86   05/30/19 91   04/24/19 92      Resp Readings from Last 3 Encounters:   04/24/19 16   11/08/18 16   06/20/13 16    SpO2 Readings from Last 3 Encounters:   05/30/19 100%   05/30/19 99%   04/24/19 99%      Temp Readings from Last 1 Encounters:   05/30/19 98.7  F (37.1  C) (Oral)    Ht Readings from Last 1 Encounters:   05/30/19 1.626 m (5' 4\")      Wt Readings from Last 1 Encounters:   05/30/19 53.5 kg (117 lb 14.4 oz)    Estimated body mass index is 20.24 kg/m  as calculated from the following:    Height as of 5/30/19: 1.626 m (5' 4\").    Weight as of 5/30/19: 53.5 kg (117 lb 14.4 oz).     LDA:  Hemodialysis Vascular Access Arteriovenous fistula Left Forearm (Active)   Site Assessment WDL;Bruit present;Thrill present 4/24/2019  4:00 PM   Cannulation Needle Size 15 4/24/2019  2:15 PM   Dressing Intervention New dressing 4/24/2019  2:15 PM   Dressing " Status Clean, dry, intact 4/24/2019  2:15 PM   Hand Off Report Yes 4/24/2019  2:15 PM   Number of days:             JZG FV AN PLAN NO PONV RULE         PAC Discussion and Assessment    ASA Classification: 3  Case is suitable for: [place]  Anesthetic techniques and relevant risks discussed: PAC Recommendations anesthetic techniques: concious sedation.  Invasive monitoring and risk discussed:   Types:   Possibility and Risk of blood transfusion discussed:   NPO instructions given:   Additional anesthetic preparation and risks discussed:   Needs early admission to pre-op area:   Other:     PAC Resident/NP Anesthesia Assessment:  Paolo Larkin is a 43 year old female scheduled for coronary angiogram on 6/18/2019 by Dr. Tilley in treatment of CAD without angina, abnormal stress test.  PAC referral for risk assessment and optimization for anesthesia with comorbid conditions of hypertension, h/o vasospastic MI (2008), ESRD on HD (m/w/f), hepatitis B, and recent abort of kidney transplant surgery (4/23/2019) due to drop in BP after induction:    Pre-operative considerations:  1.  Cardiac:  Functional status- METS 4.  Low risk surgery with 0.9% (RCRI #) risk of major adverse cardiac event.   2.  Pulm:  Airway feasible.  EDIS risk: low  3.  GI:  Risk of PONV score = 2.  If > 2, anti-emetic intervention recommended.    VTE risk: 0.26%    #Cardiac - Denies CP, SOB, MARIN, palpitations.  Severe hypertension in clinic today.  Reports that she has been out of losartan for over one month, out of amlodipine recently, and only taking half of her hydralazine dose. Decision was made to have patient go to ED for further treatment and evaluation.  Pt accompanied by Ruth Pang CMA as well as her cousin and .  ED notified.     History of vasospastic MI 2008 with ST elevation.  Abnormal stress echo 11/8/2018 but cardiac catheterization in 2012 demonstrated small vessel diagonal branch versus subtotal occlusions as well as  20% proximal LAD.  No concerning anterior wall abnormalities so no further cardiac work up pursued.  Pt had cardiology visit 5/30/2019 and decision made to pursue coronary angiogram.    Pt BP readings today in clinic:  207/104  194/153  195/122  218/162 (manual)  209/164 (manual)  All measurements taken in right arm as pt has Left arm AV fistula      #Renal - HD m/w/f.  Kidney transplant waitlist.  Recent, 4/23/2019, transplant was aborted due to hypotension after induction.             Patient discussed with Dr. Walker.     **For further details of assessment, testing, and physical exam please see H and P completed on same date.          Sayra Correa PA-C, [facility]        Mid-Level Provider/Resident:   Date:   Time:     Attending Anesthesiologist Anesthesia Assessment:        Anesthesiologist:   Date:   Time:   Pass/Fail:   Disposition:     PAC Pharmacist Assessment:        Pharmacist:   Date:   Time:        Sayra Correa PA-C

## 2019-06-06 NOTE — DISCHARGE INSTRUCTIONS
It is very important that you take your medications as prescribed to control your blood pressure  Go to dialysis as scheduled

## 2019-06-07 NOTE — H&P
Pre-Operative H & P     CC:  Preoperative exam to assess for increased cardiopulmonary risk while undergoing surgery and anesthesia.    Date of Encounter: 6/6/2019  Primary Care Physician:  Lorna Clancy  Associated Diagnosis: CAD without angina, abnormal stress test    HPI  Paolo Larkin is a 43 year old female who presents for pre-operative H & P in preparation for coronary angiogram with Dr. Tilley on 6/18/2019 at [facility] under concious sedation.    This is a 43 year old female patient with hypertension, ESRD on hemodialysis since 2012, history of vasospastic ST elevation MI (2008), and hepatitis B.  Etiology of kidney disease is unknown (no biopsy) and she has been on the kidney transplant list since 2012.  She recently was taken to OR for transplant, 4/23/2019, but had a hypotensive episode 15 minutes after induction and it is thought she had an allergic reaction to rocuronium. Surgery was aborted. She was sent to cardiology for evaluation as she has a history of an abnormal stress echo and the above procedure is planned.    Attending anesthesiologist note from that event: Patient experienced severe hypotension approximately 15 minutes post induction. Event concerning for possible allergic reaction (possibly rocuronium). Serum trypase sent in or. Patient treated as if she was having allergic reaction. Patient stabilized in OR, case had to be canceled secondary to donor kidney being denervated and pressors would cause graft rejection. Patient extubated in OR. Patient moving all extremities. Serum trypase pending would consider using alternate paralytic in interim. Patient responsive talking and moving extremities in pacu.     Pt has upcoming appointment with allergy and immunology July 2019.      History is obtained from the patient and electronic medical record.    Past Medical History  Past Medical History:   Diagnosis Date     Anemia in chronic  kidney disease      End stage kidney disease (H)      HELLP syndrome      Hepatitis B      HTN (hypertension)      MI (myocardial infarction) (H)      Proteinuria      Secondary hyperparathyroidism (H)      Thrombocytopaenia        Past Surgical History  Past Surgical History:   Procedure Laterality Date      SECTION  2012     CREATE FISTULA ARTERIOVENOUS UPPER EXTREMITY       DILATION AND CURETTAGE, HYSTEROSCOPY WITH ULTRASOUND GUIDANCE  2019       Hx of Blood transfusions/reactions: none     Hx of abnormal bleeding or anti-platelet use: none    Menstrual history: Patient's last menstrual period was 2019.:     Steroid use in the last year: none    Personal or FH with difficulty with Anesthesia:  none    Prior to Admission Medications  Current Outpatient Medications   Medication Sig Dispense Refill     acetaminophen (TYLENOL) 325 MG tablet Take 325-650 mg by mouth as needed for mild pain       amLODIPine (NORVASC) 10 MG tablet Take 10 mg by mouth every morning        calcium carbonate (TUMS) 500 MG chewable tablet Take 1,500 mg by mouth 3 times daily       hydrALAZINE (APRESOLINE) 50 MG tablet Take 25 mg by mouth 3 times daily        amLODIPine (NORVASC) 10 MG tablet Take 1 tablet (10 mg) by mouth daily 30 tablet 0     losartan (COZAAR) 100 MG tablet Take 1 tablet (100 mg) by mouth daily 30 tablet 0     losartan (COZAAR) 100 MG tablet Take 100 mg by mouth daily          Allergies  Allergies   Allergen Reactions     Blood Transfusion Related (Informational Only)      Patient has a history of a clinically significant antibody against RBC antigens.  A delay in compatible RBCs may occur.     Heparin Flush      Vancomycin        Social History  Social History     Socioeconomic History     Marital status:      Spouse name: Not on file     Number of children: Not on file     Years of education: Not on file     Highest education level: Not on file   Occupational History     Not on file    Social Needs     Financial resource strain: Not on file     Food insecurity:     Worry: Not on file     Inability: Not on file     Transportation needs:     Medical: Not on file     Non-medical: Not on file   Tobacco Use     Smoking status: Never Smoker     Smokeless tobacco: Never Used   Substance and Sexual Activity     Alcohol use: No     Drug use: No     Sexual activity: Not on file   Lifestyle     Physical activity:     Days per week: Not on file     Minutes per session: Not on file     Stress: Not on file   Relationships     Social connections:     Talks on phone: Not on file     Gets together: Not on file     Attends Zoroastrian service: Not on file     Active member of club or organization: Not on file     Attends meetings of clubs or organizations: Not on file     Relationship status: Not on file     Intimate partner violence:     Fear of current or ex partner: Not on file     Emotionally abused: Not on file     Physically abused: Not on file     Forced sexual activity: Not on file   Other Topics Concern     Parent/sibling w/ CABG, MI or angioplasty before 65F 55M? Not Asked   Social History Narrative    2 children, ages 23--son and 7--daughter.    .       Family History  Family History   Problem Relation Age of Onset     Kidney Disease No family hx of      Heart Disease No family hx of        Anesthesia Evaluation     . Pt has had prior anesthetic. Type: General    History of anesthetic complications    kidney transplant aborted 4/23/2019 due to drop in BP 15 min after induction.  Thought to be allergic reaction to rocuronium.      ROS/MED HX  The complete review of systems is negative other than noted in the HPI or here.   ENT/Pulmonary:     (+)EDIS risk factors hypertension, , . .    Neurologic:  - neg neurologic ROS     Cardiovascular: Comment: Vasospastic - ST elevation MI 8/2012    (+) hypertension--CAD, -past MI,-. : . . . :. . Previous cardiac testing Echodate:11/8/2018results:date:  "results:ECG reviewed date:4/22/2019 results: date: results:          METS/Exercise Tolerance:  4 - Raking leaves, gardening   Hematologic:  - neg hematologic  ROS       Musculoskeletal:  - neg musculoskeletal ROS       GI/Hepatic:     (+) hepatitis type B, liver disease,       Renal/Genitourinary:     (+) chronic renal disease, type: ESRD, Pt requires dialysis, type: Hemodialysis, Pt has no history of transplant,       Endo:  - neg endo ROS       Psychiatric:  - neg psychiatric ROS       Infectious Disease:  - neg infectious disease ROS       Malignancy:      - no malignancy   Other:    (+) no H/O Chronic Pain,         Temp: 98.3  F (36.8  C) Temp src: Oral BP: (!) 195/122 Pulse: 90     SpO2: 99 %         115 lbs 0 oz  5' 4\"   Body mass index is 19.74 kg/m .       Physical Exam  Constitutional: Awake, alert, cooperative, no apparent distress, and appears stated age.  Eyes: Pupils equal, round and reactive to light, extra ocular muscles intact, sclera clear, conjunctiva normal.  HENT: Normocephalic, oral pharynx with moist mucus membranes, good dentition. No goiter appreciated.   Respiratory: Clear to auscultation bilaterally, no crackles or wheezing.  Cardiovascular: Regular rate and rhythm, normal S1 and S2, and no murmur noted.  Carotids +2, no bruits. No edema. Palpable pulses to radial  DP and PT arteries.   GI: Normal bowel sounds, soft, non-distended, non-tender, no masses palpated, no hepatosplenomegaly.  Lymph/Hematologic: No cervical lymphadenopathy and no supraclavicular lymphadenopathy.  Genitourinary:  deferred  Skin: Warm and dry.    Musculoskeletal: Full ROM of neck. There is no redness, warmth, or swelling of the joints. Gross motor strength is normal.    Neurologic: Awake, alert, oriented to name, place and time. Cranial nerves II-XII are grossly intact. Gait is normal.   Neuropsychiatric: Calm, cooperative. Normal affect.     Labs: (personally reviewed)  Lab Results   Component Value Date    WBC " 27.7 04/24/2019     Lab Results   Component Value Date    RBC 3.15 04/24/2019     Lab Results   Component Value Date    HGB 9.4 04/24/2019     Lab Results   Component Value Date    HCT 29.9 04/24/2019     Lab Results   Component Value Date    MCV 95 04/24/2019     Lab Results   Component Value Date    MCH 29.8 04/24/2019     Lab Results   Component Value Date    MCHC 31.4 04/24/2019     Lab Results   Component Value Date    RDW 15.3 04/24/2019     Lab Results   Component Value Date     04/24/2019     Last Comprehensive Metabolic Panel:  Sodium   Date Value Ref Range Status   04/24/2019 135 133 - 144 mmol/L Final     Potassium   Date Value Ref Range Status   04/24/2019 5.2 3.4 - 5.3 mmol/L Final     Chloride   Date Value Ref Range Status   04/24/2019 97 94 - 109 mmol/L Final     Carbon Dioxide   Date Value Ref Range Status   04/24/2019 24 20 - 32 mmol/L Final     Anion Gap   Date Value Ref Range Status   04/24/2019 14 3 - 14 mmol/L Final     Glucose   Date Value Ref Range Status   04/24/2019 127 (H) 70 - 99 mg/dL Final     Urea Nitrogen   Date Value Ref Range Status   04/24/2019 69 (H) 7 - 30 mg/dL Final     Creatinine   Date Value Ref Range Status   04/24/2019 9.72 (H) 0.52 - 1.04 mg/dL Final     GFR Estimate   Date Value Ref Range Status   04/24/2019 4 (L) >60 mL/min/[1.73_m2] Final     Comment:     Non  GFR Calc  Starting 12/18/2018, serum creatinine based estimated GFR (eGFR) will be   calculated using the Chronic Kidney Disease Epidemiology Collaboration   (CKD-EPI) equation.       Calcium   Date Value Ref Range Status   04/24/2019 8.1 (L) 8.5 - 10.1 mg/dL Final     Bilirubin Total   Date Value Ref Range Status   04/22/2019 0.9 0.2 - 1.3 mg/dL Final     Alkaline Phosphatase   Date Value Ref Range Status   04/22/2019 65 40 - 150 U/L Final     ALT   Date Value Ref Range Status   04/22/2019 21 0 - 50 U/L Final     AST   Date Value Ref Range Status   04/22/2019 30 0 - 45 U/L Final            EKG: Personally reviewed but formal cardiology read pendin2019  Sinus rhythm  Low voltage QRS  Septal infarct (cited on or before 2013)  Abnormal ECG  When compared with ECG of 2019 16:24, (unconfirmed)  No significant change was found      Stress test: 2018  Interpretation Summary  Abnormal stress echo. There is distal septal hypokinesis that worsens with  dobutamine.  Target heart rate was achieved. Heart rate and blood pressure response to  dobutamine were normal. Normal LV function at rest. With stress, the left  ventricular ejection fraction increased from 55-60% to greater than 65% and  the left ventricular size decreased appropriately.  No subjective symptoms to suggest ischemia.  There was no ECG evidence of ischemia.  The aortic root and visualized ascending aorta are normal.      Outside records reviewed from: care everywhere    ASSESSMENT and PLAN  Paw Trae is a 43 year old female scheduled for coronary angiogram on 2019 by Dr. Tilley in treatment of CAD without angina, abnormal stress test.  PAC referral for risk assessment and optimization for anesthesia with comorbid conditions of hypertension, h/o vasospastic MI (), ESRD on HD (m/w/), hepatitis B, and recent aborted kidney transplant surgery (2019) due to drop in BP after induction:    Pre-operative considerations:  1.  Cardiac:  Functional status- METS 4.  Low risk surgery with 0.9% (RCRI #) risk of major adverse cardiac event.   2.  Pulm:  Airway feasible.  EDIS risk: low  3.  GI:  Risk of PONV score = 2.  If > 2, anti-emetic intervention recommended.    VTE risk: 0.26%    #Cardiac - Denies CP, SOB, MARIN, palpitations.  Severe hypertension in clinic today.  Reports that she has been out of losartan for over one month, out of amlodipine recently, and only taking half of her hydralazine dose. Decision was made to have patient go to ED for further treatment and evaluation.  Pt accompanied by  Ruth Pang CMA as well as her cousin and .  ED notified.    Pt BP readings today in clinic:  207/104  194/153  195/122  218/162 (manual)  209/164 (manual)  All measurements taken in right arm as pt has Left arm AV fistula     History of vasospastic MI 2008 with ST elevation.  Abnormal stress echo 11/8/2018 but cardiac catheterization in 2012 demonstrated small vessel diagonal branch versus subtotal occlusions as well as 20% proximal LAD.  No concerning anterior wall abnormalities so no further cardiac work up pursued.    Pt had cardiology visit 5/30/2019 and decision made to pursue coronary angiogram.      #Renal - ESRD on HD m/w/f.  Kidney transplant waitlist.  Recent, 4/23/2019, transplant was aborted due to hypotension after induction.  Attending anesthesiologist note from that event: Patient experienced severe hypotension approximately 15 minutes post induction. Event concerning for possible allergic reaction (possibly rocuronium). Serum trypase sent in or. Patient treated as if she was having allergic reaction. Patient stabilized in OR, case had to be canceled secondary to donor kidney being denervated and pressors would cause graft rejection. Patient extubated in OR. Patient moving all extremities. Serum trypase pending would consider using alternate paralytic in interim. Patient responsive talking and moving extremities in pacu.           Patient was discussed with Dr Walker.    Sayra Correa PA-C  Preoperative Assessment Center  [facility]  Clinic and Surgery Center  Phone: [phone]  Fax: [phone]

## 2019-06-12 ENCOUNTER — TELEPHONE (OUTPATIENT)
Dept: UROLOGY | Facility: CLINIC | Age: 44
End: 2019-06-12

## 2019-06-12 NOTE — TELEPHONE ENCOUNTER
Spoke with charge nurse at dialysis who reports patient is very compliant with her dialysis runs. Discussed her recent ED visit d/t not taking her meds as prescribed and/or reducing her prescribed dosage. Writer will report discussion with coordinator and place orders for SW to reach out for telephone consult using .

## 2019-06-13 DIAGNOSIS — Z76.82 ORGAN TRANSPLANT CANDIDATE: ICD-10-CM

## 2019-06-13 DIAGNOSIS — N18.6 ESRD (END STAGE RENAL DISEASE) (H): ICD-10-CM

## 2019-06-18 ENCOUNTER — HOSPITAL ENCOUNTER (OUTPATIENT)
Facility: CLINIC | Age: 44
Discharge: HOME | End: 2019-06-18
Attending: INTERNAL MEDICINE | Admitting: INTERNAL MEDICINE
Payer: MEDICARE

## 2019-06-18 ENCOUNTER — APPOINTMENT (OUTPATIENT)
Dept: MEDSURG UNIT | Facility: CLINIC | Age: 44
End: 2019-06-18
Payer: MEDICARE

## 2019-06-18 ENCOUNTER — APPOINTMENT (OUTPATIENT)
Dept: LAB | Facility: CLINIC | Age: 44
End: 2019-06-18
Attending: INTERNAL MEDICINE
Payer: MEDICARE

## 2019-06-18 VITALS
OXYGEN SATURATION: 99 % | HEART RATE: 83 BPM | DIASTOLIC BLOOD PRESSURE: 91 MMHG | TEMPERATURE: 98.2 F | SYSTOLIC BLOOD PRESSURE: 141 MMHG | RESPIRATION RATE: 16 BRPM

## 2019-06-18 DIAGNOSIS — I25.10 CORONARY ARTERY DISEASE INVOLVING NATIVE CORONARY ARTERY OF NATIVE HEART WITHOUT ANGINA PECTORIS: ICD-10-CM

## 2019-06-18 DIAGNOSIS — R94.39 ABNORMAL CARDIOVASCULAR STRESS TEST: ICD-10-CM

## 2019-06-18 DIAGNOSIS — Z98.890 S/P CORONARY ANGIOGRAM: Primary | ICD-10-CM

## 2019-06-18 LAB
ANION GAP SERPL CALCULATED.3IONS-SCNC: 12 MMOL/L (ref 3–14)
B-HCG SERPL-ACNC: 2 IU/L (ref 0–5)
BUN SERPL-MCNC: 50 MG/DL (ref 7–30)
CALCIUM SERPL-MCNC: 9.4 MG/DL (ref 8.5–10.1)
CHLORIDE SERPL-SCNC: 97 MMOL/L (ref 94–109)
CO2 SERPL-SCNC: 28 MMOL/L (ref 20–32)
CREAT SERPL-MCNC: 9.99 MG/DL (ref 0.52–1.04)
ERYTHROCYTE [DISTWIDTH] IN BLOOD BY AUTOMATED COUNT: 15.1 % (ref 10–15)
GFR SERPL CREATININE-BSD FRML MDRD: 4 ML/MIN/{1.73_M2}
GLUCOSE SERPL-MCNC: 84 MG/DL (ref 70–99)
HCT VFR BLD AUTO: 41.5 % (ref 35–47)
HGB BLD-MCNC: 12.4 G/DL (ref 11.7–15.7)
MCH RBC QN AUTO: 28.1 PG (ref 26.5–33)
MCHC RBC AUTO-ENTMCNC: 29.9 G/DL (ref 31.5–36.5)
MCV RBC AUTO: 94 FL (ref 78–100)
PLATELET # BLD AUTO: 242 10E9/L (ref 150–450)
POTASSIUM SERPL-SCNC: 4.8 MMOL/L (ref 3.4–5.3)
RBC # BLD AUTO: 4.41 10E12/L (ref 3.8–5.2)
SODIUM SERPL-SCNC: 136 MMOL/L (ref 133–144)
WBC # BLD AUTO: 5.4 10E9/L (ref 4–11)

## 2019-06-18 PROCEDURE — 80048 BASIC METABOLIC PNL TOTAL CA: CPT | Performed by: NURSE PRACTITIONER

## 2019-06-18 PROCEDURE — 36415 COLL VENOUS BLD VENIPUNCTURE: CPT | Performed by: NURSE PRACTITIONER

## 2019-06-18 PROCEDURE — 25000125 ZZHC RX 250: Performed by: INTERNAL MEDICINE

## 2019-06-18 PROCEDURE — 85027 COMPLETE CBC AUTOMATED: CPT | Performed by: NURSE PRACTITIONER

## 2019-06-18 PROCEDURE — 27210794 ZZH OR GENERAL SUPPLY STERILE: Performed by: INTERNAL MEDICINE

## 2019-06-18 PROCEDURE — 40000172 ZZH STATISTIC PROCEDURE PREP ONLY

## 2019-06-18 PROCEDURE — 99152 MOD SED SAME PHYS/QHP 5/>YRS: CPT | Performed by: INTERNAL MEDICINE

## 2019-06-18 PROCEDURE — 40000141 ZZH STATISTIC PERIPHERAL IV START W/O US GUIDANCE

## 2019-06-18 PROCEDURE — 25000128 H RX IP 250 OP 636: Performed by: INTERNAL MEDICINE

## 2019-06-18 PROCEDURE — 93005 ELECTROCARDIOGRAM TRACING: CPT

## 2019-06-18 PROCEDURE — 25000132 ZZH RX MED GY IP 250 OP 250 PS 637: Mod: GY | Performed by: PHYSICIAN ASSISTANT

## 2019-06-18 PROCEDURE — 93452 LEFT HRT CATH W/VENTRCLGRPHY: CPT | Mod: 26 | Performed by: INTERNAL MEDICINE

## 2019-06-18 PROCEDURE — 40000065 ZZH STATISTIC EKG NON-CHARGEABLE

## 2019-06-18 PROCEDURE — 84702 CHORIONIC GONADOTROPIN TEST: CPT | Performed by: NURSE PRACTITIONER

## 2019-06-18 PROCEDURE — A9270 NON-COVERED ITEM OR SERVICE: HCPCS | Mod: GY | Performed by: PHYSICIAN ASSISTANT

## 2019-06-18 PROCEDURE — 93458 L HRT ARTERY/VENTRICLE ANGIO: CPT | Performed by: INTERNAL MEDICINE

## 2019-06-18 PROCEDURE — 93010 ELECTROCARDIOGRAM REPORT: CPT | Performed by: INTERNAL MEDICINE

## 2019-06-18 PROCEDURE — C1894 INTRO/SHEATH, NON-LASER: HCPCS | Performed by: INTERNAL MEDICINE

## 2019-06-18 RX ORDER — DOBUTAMINE HYDROCHLORIDE 200 MG/100ML
2-20 INJECTION INTRAVENOUS CONTINUOUS PRN
Status: DISCONTINUED | OUTPATIENT
Start: 2019-06-18 | End: 2019-06-18 | Stop reason: HOSPADM

## 2019-06-18 RX ORDER — ARGATROBAN 1 MG/ML
150 INJECTION, SOLUTION INTRAVENOUS
Status: DISCONTINUED | OUTPATIENT
Start: 2019-06-18 | End: 2019-06-18 | Stop reason: HOSPADM

## 2019-06-18 RX ORDER — DOPAMINE HYDROCHLORIDE 160 MG/100ML
2-20 INJECTION, SOLUTION INTRAVENOUS CONTINUOUS PRN
Status: DISCONTINUED | OUTPATIENT
Start: 2019-06-18 | End: 2019-06-18 | Stop reason: HOSPADM

## 2019-06-18 RX ORDER — ASPIRIN 325 MG
325 TABLET ORAL ONCE
Status: COMPLETED | OUTPATIENT
Start: 2019-06-18 | End: 2019-06-18

## 2019-06-18 RX ORDER — POTASSIUM CHLORIDE 750 MG/1
20 TABLET, EXTENDED RELEASE ORAL
Status: DISCONTINUED | OUTPATIENT
Start: 2019-06-18 | End: 2019-06-18 | Stop reason: HOSPADM

## 2019-06-18 RX ORDER — ONDANSETRON 2 MG/ML
INJECTION INTRAMUSCULAR; INTRAVENOUS
Status: DISCONTINUED | OUTPATIENT
Start: 2019-06-18 | End: 2019-06-18 | Stop reason: HOSPADM

## 2019-06-18 RX ORDER — FENTANYL CITRATE 50 UG/ML
50 INJECTION, SOLUTION INTRAMUSCULAR; INTRAVENOUS
Status: DISCONTINUED | OUTPATIENT
Start: 2019-06-18 | End: 2019-06-18 | Stop reason: HOSPADM

## 2019-06-18 RX ORDER — ASPIRIN 81 MG/1
81 TABLET ORAL DAILY
Status: DISCONTINUED | OUTPATIENT
Start: 2019-06-18 | End: 2019-06-18 | Stop reason: DRUGHIGH

## 2019-06-18 RX ORDER — FENTANYL CITRATE 50 UG/ML
25 INJECTION, SOLUTION INTRAMUSCULAR; INTRAVENOUS EVERY 5 MIN PRN
Status: DISCONTINUED | OUTPATIENT
Start: 2019-06-18 | End: 2019-06-18 | Stop reason: HOSPADM

## 2019-06-18 RX ORDER — FENTANYL CITRATE 50 UG/ML
25-50 INJECTION, SOLUTION INTRAMUSCULAR; INTRAVENOUS
Status: DISCONTINUED | OUTPATIENT
Start: 2019-06-18 | End: 2019-06-18 | Stop reason: HOSPADM

## 2019-06-18 RX ORDER — POTASSIUM CHLORIDE 750 MG/1
40 TABLET, EXTENDED RELEASE ORAL
Status: DISCONTINUED | OUTPATIENT
Start: 2019-06-18 | End: 2019-06-18 | Stop reason: HOSPADM

## 2019-06-18 RX ORDER — LIDOCAINE 40 MG/G
CREAM TOPICAL
Status: DISCONTINUED | OUTPATIENT
Start: 2019-06-18 | End: 2019-06-18 | Stop reason: HOSPADM

## 2019-06-18 RX ORDER — ATROPINE SULFATE 0.1 MG/ML
0.5 INJECTION INTRAVENOUS EVERY 5 MIN PRN
Status: DISCONTINUED | OUTPATIENT
Start: 2019-06-18 | End: 2019-06-18 | Stop reason: HOSPADM

## 2019-06-18 RX ORDER — NOREPINEPHRINE BITARTRATE 0.06 MG/ML
.03-.4 INJECTION, SOLUTION INTRAVENOUS CONTINUOUS PRN
Status: DISCONTINUED | OUTPATIENT
Start: 2019-06-18 | End: 2019-06-18 | Stop reason: HOSPADM

## 2019-06-18 RX ORDER — NALOXONE HYDROCHLORIDE 0.4 MG/ML
.1-.4 INJECTION, SOLUTION INTRAMUSCULAR; INTRAVENOUS; SUBCUTANEOUS
Status: DISCONTINUED | OUTPATIENT
Start: 2019-06-18 | End: 2019-06-18 | Stop reason: HOSPADM

## 2019-06-18 RX ORDER — EPTIFIBATIDE 2 MG/ML
180 INJECTION, SOLUTION INTRAVENOUS EVERY 10 MIN PRN
Status: DISCONTINUED | OUTPATIENT
Start: 2019-06-18 | End: 2019-06-18 | Stop reason: HOSPADM

## 2019-06-18 RX ORDER — FENTANYL CITRATE 50 UG/ML
INJECTION, SOLUTION INTRAMUSCULAR; INTRAVENOUS
Status: DISCONTINUED | OUTPATIENT
Start: 2019-06-18 | End: 2019-06-18 | Stop reason: HOSPADM

## 2019-06-18 RX ORDER — NITROGLYCERIN 20 MG/100ML
.07-2 INJECTION INTRAVENOUS CONTINUOUS PRN
Status: DISCONTINUED | OUTPATIENT
Start: 2019-06-18 | End: 2019-06-18 | Stop reason: HOSPADM

## 2019-06-18 RX ORDER — IOPAMIDOL 755 MG/ML
INJECTION, SOLUTION INTRAVASCULAR
Status: DISCONTINUED | OUTPATIENT
Start: 2019-06-18 | End: 2019-06-18 | Stop reason: HOSPADM

## 2019-06-18 RX ORDER — FLUMAZENIL 0.1 MG/ML
0.2 INJECTION, SOLUTION INTRAVENOUS
Status: DISCONTINUED | OUTPATIENT
Start: 2019-06-18 | End: 2019-06-18 | Stop reason: HOSPADM

## 2019-06-18 RX ORDER — NALOXONE HYDROCHLORIDE 0.4 MG/ML
.2-.4 INJECTION, SOLUTION INTRAMUSCULAR; INTRAVENOUS; SUBCUTANEOUS
Status: DISCONTINUED | OUTPATIENT
Start: 2019-06-18 | End: 2019-06-18 | Stop reason: HOSPADM

## 2019-06-18 RX ORDER — ARGATROBAN 1 MG/ML
350 INJECTION, SOLUTION INTRAVENOUS
Status: DISCONTINUED | OUTPATIENT
Start: 2019-06-18 | End: 2019-06-18 | Stop reason: HOSPADM

## 2019-06-18 RX ORDER — SODIUM CHLORIDE 9 MG/ML
INJECTION, SOLUTION INTRAVENOUS CONTINUOUS
Status: DISCONTINUED | OUTPATIENT
Start: 2019-06-18 | End: 2019-06-18 | Stop reason: HOSPADM

## 2019-06-18 RX ADMIN — ASPIRIN 325 MG ORAL TABLET 325 MG: 325 PILL ORAL at 14:22

## 2019-06-18 NOTE — PROGRESS NOTES
[facility]   Interventional Cardiology        Consenting/Education for Coronary Angiogram      Patient understands during the coronary angiogram a fine tube (catheter) is put into the artery in the groin/arm. The tube is carefully passed into each coronary artery. A series of pictures are taken using x-rays and a contrast medium (x-ray dye). The contrast medium is injected to look for any blockages or narrowing in the arteries of the heart.  At the end of the procedure the artery may be closed with a special plug, Angio Seal, to stop the bleeding.     Patient also understands risks and complications of the procedure which include, but are not limited to bruising/swelling around the incision site, infection, bleeding, allergic reaction to local anesthetic, air embolism, arterial puncture, stroke, heart attack.  Patient also understands this procedure requires moderate sedation.     Patient verbalized understanding of risks and benefits of the coronary angiogram and has elected to proceed with the procedure.         Jeffrey Boone PA-C  [facility] Cardiology Team

## 2019-06-19 LAB — INTERPRETATION ECG - MUSE: NORMAL

## 2019-07-08 ENCOUNTER — TELEPHONE (OUTPATIENT)
Dept: TRANSPLANT | Facility: CLINIC | Age: 44
End: 2019-07-08

## 2019-07-08 NOTE — TELEPHONE ENCOUNTER
Received a call from dialysis wondering if coordinator could set-up dialysis locally on 7/15/19 when pt is here for appointments. Explained this needs to be arranged through dialysis. Dialysis argued this appointment needs to be moved to a non-dialysis day. Reviewed this appointment was made on 5/30/19 and schedulers were aware of dialysis days as they are listed in pt's chart. Dialysis hung up.

## 2019-07-15 ENCOUNTER — ALLIED HEALTH/NURSE VISIT (OUTPATIENT)
Dept: TRANSPLANT | Facility: CLINIC | Age: 44
End: 2019-07-15
Attending: INTERNAL MEDICINE
Payer: MEDICARE

## 2019-07-15 ENCOUNTER — OFFICE VISIT (OUTPATIENT)
Dept: ALLERGY | Facility: CLINIC | Age: 44
End: 2019-07-15
Attending: NURSE PRACTITIONER
Payer: MEDICARE

## 2019-07-15 VITALS
SYSTOLIC BLOOD PRESSURE: 153 MMHG | OXYGEN SATURATION: 99 % | DIASTOLIC BLOOD PRESSURE: 98 MMHG | RESPIRATION RATE: 16 BRPM | HEART RATE: 85 BPM

## 2019-07-15 DIAGNOSIS — Z76.82 AWAITING ORGAN TRANSPLANT: Primary | ICD-10-CM

## 2019-07-15 DIAGNOSIS — Z88.9 HISTORY OF DRUG ALLERGY: Primary | ICD-10-CM

## 2019-07-15 ASSESSMENT — PAIN SCALES - GENERAL: PAINLEVEL: NO PAIN (0)

## 2019-07-15 NOTE — PROGRESS NOTES
"Patient Name: Paolo Larkin  : 1975  Age: 43 year old  MRN: [id number]  Date of Initial Social Work Evaluation: 2013, most recent transplant social work visit on 2018    Patient on transplant wait list.  Saw today to update psychosocial assessment along with Patricia interpretor.       Presenting Information   Living Situation: Patient lives in an apartment with her , Jaw, and 6-year-old daughter in [place], [place]. Patient has a 21-year-old son who does not live with them, but he also lives in [place].  If not local, plans for short term stay:  Patient has an aunt and uncle who live in [place]. She plans to stay with them post-transplant.   Previous Functional Status: Patient is independent with ADLs.   Cultural/Language/Spiritual Considerations: Patient is Patricia-speaking.      Support System  Primary Support Person .   Other support:  Son, aunt and uncle.   Plan for support in immediate post-transplant period: Patient plans to stay at her aunt and uncle's house in [place] post-transplant and her aunt and uncle will care for her. Her  will need to continue working and care for their daughter.     Health Care Directive  Decision Maker: Patient   Alternate Decision Maker:  per NO policy.   Health Care Directive: Declined completing     Mental Health/Coping:   History of Mental Health: Denied. Patient stated, \"I just feel happy.\"  History of Chemical Health: Denied   Current status: Patient denied any current mental health or substance use concerns.   Coping: Pt appears to be coping well, but reported being anxious to get a transplant as she has been listed for a long time (7 years).   Services Needed/Recommended: None at this time.      Financial   Income: Patient does not work and is not on disability. She is reliant on her 's income.   Impact of transplant on income: None identified at this time.   Insurance and medication coverage: Patient has Medicare A&B, " as well as BCBS/[employer]. Patient reported her  pays her Medicare Part B premiums, which are about $410/quarter, per patient.   Financial concerns: None identified.   Resources needed: None at this time.      Assessment and recommendations and plan:  Patient has been on dialysis since 2012. She reported she continues to follow her physician's recommendations, takes her medications as prescribed, and attends her appointments as scheduled. Patient reported she received a call for a transplant in April but the transplant surgery did not go through. Reviewed transplant education (Medicare, rehabilitation, donor issues, community/financial resources, and psych/family adjustment) as well as psychosocial risks of transplant. Patient seemed to process information well via interpretor. Appeared well informed, motivated, and able to follow post transplant requirements. Behavior was appropriate during interview. Has adequate income and insurance coverage. Adequate social support. No major contraindications noted for transplant. At this time, patient appears to understand the risks and benefits of transplant.     Khadijah Rowell, [facility]    Kidney/Pancreas/Auto Islet Transplant Programs

## 2019-07-15 NOTE — LETTER
7/15/2019         RE: Paolo Larkin  [address]  [place] [address]        Dear Colleague,    Thank you for referring your patient, Paolo Larkin, to the [facility]. Please see a copy of my visit note below.    Reason for Visit  Paolo Larkin is a 43 year old female who is referred by Lorna Clancy for drug reaction.      Allergy HPI  In April prior to her kidney transplant she had a significant drop in BP.  She states since then she has been itchy.  She denies any other drug allergies (however some are  Listed in her chart).  In  had  delivery and no reaction.    She denies taking allergy meds.     The patient was seen and examined by Mk Farrell MD   Current Outpatient Medications   Medication     acetaminophen (TYLENOL) 325 MG tablet     amLODIPine (NORVASC) 10 MG tablet     amLODIPine (NORVASC) 10 MG tablet     calcium carbonate (TUMS) 500 MG chewable tablet     hydrALAZINE (APRESOLINE) 50 MG tablet     losartan (COZAAR) 100 MG tablet     losartan (COZAAR) 100 MG tablet     No current facility-administered medications for this visit.      Allergies   Allergen Reactions     Blood Transfusion Related (Informational Only)      Patient has a history of a clinically significant antibody against RBC antigens.  A delay in compatible RBCs may occur.     Heparin Flush      Vancomycin      Social History     Socioeconomic History     Marital status:      Spouse name: Not on file     Number of children: Not on file     Years of education: Not on file     Highest education level: Not on file   Occupational History     Not on file   Social Needs     Financial resource strain: Not on file     Food insecurity:     Worry: Not on file     Inability: Not on file     Transportation needs:     Medical: Not on file     Non-medical: Not on file   Tobacco Use     Smoking status: Never Smoker     Smokeless tobacco: Never Used   Substance and Sexual Activity     Alcohol  use: No     Drug use: No     Sexual activity: Not on file   Lifestyle     Physical activity:     Days per week: Not on file     Minutes per session: Not on file     Stress: Not on file   Relationships     Social connections:     Talks on phone: Not on file     Gets together: Not on file     Attends Lutheran service: Not on file     Active member of club or organization: Not on file     Attends meetings of clubs or organizations: Not on file     Relationship status: Not on file     Intimate partner violence:     Fear of current or ex partner: Not on file     Emotionally abused: Not on file     Physically abused: Not on file     Forced sexual activity: Not on file   Other Topics Concern     Parent/sibling w/ CABG, MI or angioplasty before 65F 55M? Not Asked   Social History Narrative    2 children, ages 23--son and 7--daughter.    .     Past Medical History:   Diagnosis Date     Anemia in chronic kidney disease      End stage kidney disease (H)      HELLP syndrome      Hepatitis B      HTN (hypertension)      MI (myocardial infarction) (H)      Proteinuria      Secondary hyperparathyroidism (H)      Thrombocytopaenia      Past Surgical History:   Procedure Laterality Date      SECTION  2012     CREATE FISTULA ARTERIOVENOUS UPPER EXTREMITY       CV CORONARY ANGIOGRAM N/A 2019    Procedure: CV CORONARY ANGIOGRAM;  Surgeon: Sabas Tilley MD;  Location:  HEART CARDIAC CATH LAB     DILATION AND CURETTAGE, HYSTEROSCOPY WITH ULTRASOUND GUIDANCE  2019     Family History   Problem Relation Age of Onset     Kidney Disease No family hx of      Heart Disease No family hx of          ROS   A complete ROS was otherwise negative except as noted in the HPI and the end of the note.  BP (!) 153/98 (BP Location: Right arm, Patient Position: Chair, Cuff Size: Adult Regular)   Pulse 85   Resp 16   SpO2 99%   Exam:   GENERAL APPEARANCE: Well developed, well nourished, alert, and in no apparent  distress.  EYES: PERRL, EOMI, conjunctiva clear non-injected  HENT:  no discharge.  MOUTH: Oral mucosa is moist  RESP: no cough or increased work of breathing.   SKIN: No rashes noted  NEURO: Speech normal, normal strength and tone, normal gait and stance  PSYCH: Normal mentation, orientation to person, place, and time.  Results:      Assessment and plan: Paolo had a severe hypotensive episode on April 23rd.  Looking at the anesthesia notes it looks like the hypotension was a little after 11 am.  Before that time she received Porpofol, Rocuronium, Ondansatron, Lidocaine, Fentanyl (may have been after the reaction started) and then polyethylene (?glycol?).  Anesthesia suspects it to be Rocuronium.  We will plan testing to these materials.             Again, thank you for allowing me to participate in the care of your patient.        Sincerely,        Mk Farrell MD

## 2019-07-15 NOTE — PROGRESS NOTES
Reason for Visit  Paolo Larkin is a 43 year old female who is referred by Lorna Clancy for drug reaction.      Allergy HPI  In April prior to her kidney transplant she had a significant drop in BP.  She states since then she has been itchy.  She denies any other drug allergies (however some are  Listed in her chart).  In  had  delivery and no reaction.    She denies taking allergy meds.     The patient was seen and examined by Mk Farrell MD   Current Outpatient Medications   Medication     acetaminophen (TYLENOL) 325 MG tablet     amLODIPine (NORVASC) 10 MG tablet     amLODIPine (NORVASC) 10 MG tablet     calcium carbonate (TUMS) 500 MG chewable tablet     hydrALAZINE (APRESOLINE) 50 MG tablet     losartan (COZAAR) 100 MG tablet     losartan (COZAAR) 100 MG tablet     No current facility-administered medications for this visit.      Allergies   Allergen Reactions     Blood Transfusion Related (Informational Only)      Patient has a history of a clinically significant antibody against RBC antigens.  A delay in compatible RBCs may occur.     Heparin Flush      Vancomycin      Social History     Socioeconomic History     Marital status:      Spouse name: Not on file     Number of children: Not on file     Years of education: Not on file     Highest education level: Not on file   Occupational History     Not on file   Social Needs     Financial resource strain: Not on file     Food insecurity:     Worry: Not on file     Inability: Not on file     Transportation needs:     Medical: Not on file     Non-medical: Not on file   Tobacco Use     Smoking status: Never Smoker     Smokeless tobacco: Never Used   Substance and Sexual Activity     Alcohol use: No     Drug use: No     Sexual activity: Not on file   Lifestyle     Physical activity:     Days per week: Not on file     Minutes per session: Not on file     Stress: Not on file   Relationships     Social connections:     Talks on  phone: Not on file     Gets together: Not on file     Attends Evangelical service: Not on file     Active member of club or organization: Not on file     Attends meetings of clubs or organizations: Not on file     Relationship status: Not on file     Intimate partner violence:     Fear of current or ex partner: Not on file     Emotionally abused: Not on file     Physically abused: Not on file     Forced sexual activity: Not on file   Other Topics Concern     Parent/sibling w/ CABG, MI or angioplasty before 65F 55M? Not Asked   Social History Narrative    2 children, ages 23--son and 7--daughter.    .     Past Medical History:   Diagnosis Date     Anemia in chronic kidney disease      End stage kidney disease (H)      HELLP syndrome      Hepatitis B      HTN (hypertension)      MI (myocardial infarction) (H)      Proteinuria      Secondary hyperparathyroidism (H)      Thrombocytopaenia      Past Surgical History:   Procedure Laterality Date      SECTION  2012     CREATE FISTULA ARTERIOVENOUS UPPER EXTREMITY       CV CORONARY ANGIOGRAM N/A 2019    Procedure: CV CORONARY ANGIOGRAM;  Surgeon: Sabas Tilley MD;  Location:  HEART CARDIAC CATH LAB     DILATION AND CURETTAGE, HYSTEROSCOPY WITH ULTRASOUND GUIDANCE  2019     Family History   Problem Relation Age of Onset     Kidney Disease No family hx of      Heart Disease No family hx of          ROS   A complete ROS was otherwise negative except as noted in the HPI and the end of the note.  BP (!) 153/98 (BP Location: Right arm, Patient Position: Chair, Cuff Size: Adult Regular)   Pulse 85   Resp 16   SpO2 99%   Exam:   GENERAL APPEARANCE: Well developed, well nourished, alert, and in no apparent distress.  EYES: PERRL, EOMI, conjunctiva clear non-injected  HENT:  no discharge.  MOUTH: Oral mucosa is moist  RESP: no cough or increased work of breathing.   SKIN: No rashes noted  NEURO: Speech normal, normal strength and tone, normal  gait and stance  PSYCH: Normal mentation, orientation to person, place, and time.  Results:      Assessment and plan: Paolo had a severe hypotensive episode on April 23rd.  Looking at the anesthesia notes it looks like the hypotension was a little after 11 am.  Before that time she received Porpofol, Rocuronium, Ondansatron, Lidocaine, Fentanyl (may have been after the reaction started) and then polyethylene (?glycol?).  Anesthesia suspects it to be Rocuronium.  We will plan testing to these materials.

## 2019-07-23 ENCOUNTER — TELEPHONE (OUTPATIENT)
Dept: DERMATOLOGY | Facility: CLINIC | Age: 44
End: 2019-07-23

## 2019-07-23 NOTE — TELEPHONE ENCOUNTER
----- Message from Pierre Palmer MD sent at 7/20/2019 10:53 AM CDT -----  Regarding: FW: Drug testing  Can you make an appointment for this patient    Yes, Jason    I am very interested in these patients and will be glad to take them over.    Kind regards    Pierre    ----- Message -----  From: Mk Bradley MD  Sent: 7/15/2019  12:32 PM  To: Pierre Palmer MD  Subject: Drug testing                                     Hi Pierre,  I have never had a strong interest in drug testing.  Do you have an interest in seeing these patients?  If not that is fine.      Here is an example of a patient I saw today.  Normally I would get the concentrations of these meds that are non-irritating done by pharmacy and then do skin tests.  Otherwise I am fine sending her to you. Rigo Boone had a severe hypotensive episode on April 23rd.  Looking at the anesthesia notes it looks like the hypotension was a little after 11 am.  Before that time she received Porpofol, Rocuronium, Ondansatron, Lidocaine, Fentanyl (may have been after the reaction started) and then polyethylene (?glycol?).  Anesthesia suspects it to be Rocuronium.  We will plan testing to these materials.

## 2019-07-23 NOTE — TELEPHONE ENCOUNTER
Sent message to clinic coordinators to schedule patient with  for allergy consult.     Yaz Saunders RN

## 2019-07-31 ENCOUNTER — TELEPHONE (OUTPATIENT)
Dept: TRANSPLANT | Facility: CLINIC | Age: 44
End: 2019-07-31

## 2019-07-31 NOTE — TELEPHONE ENCOUNTER
Coordinator spoke with KULWANT Bloom from dialysis. Reviewed outstanding items needed for active status consideration:  1. Cardiology f/u from angio  2. Further allergy testing. (see 7/23/19 note for details)

## 2019-08-06 NOTE — TELEPHONE ENCOUNTER
FUTURE VISIT INFORMATION      FUTURE VISIT INFORMATION:    Date: 8/13/2019    Time: 7:00am     Location: [facility]  REFERRAL INFORMATION:    Referring provider:  Dr. Bradley    Referring providers clinic:  [facility]    Reason for visit/diagnosis:  Allergy consult    RECORDS REQUESTED FROM:       Clinic name Comments Records Status Imaging Status   [facility] 7/15/2019 - Office Visit - Dr. Mk Barrios    [facility] 5/30/2019 - Office Visit - Lorna Clancy CNP [facility]    [facility] 4/23/2019 - Anesthesia Record, Surgery - Dr. Vincent Magana Epic

## 2019-08-07 ENCOUNTER — TELEPHONE (OUTPATIENT)
Dept: TRANSPLANT | Facility: CLINIC | Age: 44
End: 2019-08-07

## 2019-08-07 NOTE — TELEPHONE ENCOUNTER
Coordinator called dialysis to update them pt is scheduled with allergist on 8/13/19. Still waiting on cardiology to schedule angio f/u.

## 2019-08-13 ENCOUNTER — PRE VISIT (OUTPATIENT)
Dept: ALLERGY | Facility: CLINIC | Age: 44
End: 2019-08-13

## 2019-08-19 ENCOUNTER — TELEPHONE (OUTPATIENT)
Dept: TRANSPLANT | Facility: CLINIC | Age: 44
End: 2019-08-19

## 2019-08-19 NOTE — TELEPHONE ENCOUNTER
Coordinator called dialysis and spoke with Merle. Pt no showed to her allergist appoint on 8/13/19. Melre states pt was waiting for a mailing and didn't know where to go for her appointment so she didn't go. Reviewed with Merle where allergy clinic is located and that their scheduling department should be contacting pt to reschedule.

## 2019-08-30 NOTE — PROGRESS NOTES
Cardiology Clinic Note  September 4, 2019      HPI:  Paolo Larkin is a 43 year old with a history of eclampsia with HELLP syndrome and ESRD on HD listed for renal transplant since 2012. Her cardiac history is notable for vasospastic MI in 2012. Her cardiac catheterization at the time demonstrated small vessel diagonal branch subtotal occlusion as well as 20% proximal LAD. Her echocardiogram demonstrated preserved EF with septal wall hypokinesis. She underwent dobutamine stress echo in November 2018 for pre-transplant evaluation and was found to have worsening septal hypokinesis with dobutamine infusion, similar to prior resting echo therefore no further evaluation was recommended. She was set to undergo renal transplant 4/2019 but became hypotensive following induction of anesthesia and the surgery was aborted. She was referred back to cardiology clinic for revaluation and coronary angiogram was recommended. Cardiac cath was performed 6/16/19 demonstrating normal coronary arteries. She presents to clinic for angiogram follow-up and ongoing transplant evaluation.     Today the patient reports feeling well. She does not follow a formal exercise program, but remains active taking walks with her children, cooking and cleaning around the house. She denies chest pain, shortness of breath, orthopnea, PND, leg swelling, weight gain, palpitations, lightheadedness or syncope. Her BP has been well controlled at dialysis. She states her BP is always high when she comes to clinic. She is compliant with her medications including amlodipine 10 mg, losartan 100 and hydralazine 25 mg TID. She denies significant side effects. She is accompanied to clinic today by a professional Roberta .     Past medical history:  Past Medical History:   Diagnosis Date     Anemia in chronic kidney disease      End stage kidney disease (H)      HELLP syndrome      Hepatitis B      HTN (hypertension)      MI (myocardial infarction) (H) 2012      Proteinuria      Secondary hyperparathyroidism (H)      Thrombocytopaenia          Medications:    Current Outpatient Medications on File Prior to Visit:  acetaminophen (TYLENOL) 325 MG tablet Take 325-650 mg by mouth as needed for mild pain   amLODIPine (NORVASC) 10 MG tablet Take 1 tablet (10 mg) by mouth daily   amLODIPine (NORVASC) 10 MG tablet Take 10 mg by mouth every morning    calcium carbonate (TUMS) 500 MG chewable tablet Take 1,500 mg by mouth 3 times daily   hydrALAZINE (APRESOLINE) 50 MG tablet Take 25 mg by mouth 3 times daily    losartan (COZAAR) 100 MG tablet Take 1 tablet (100 mg) by mouth daily   losartan (COZAAR) 100 MG tablet Take 100 mg by mouth daily      No current facility-administered medications on file prior to visit.     Allergies:      Allergies   Allergen Reactions     Blood Transfusion Related (Informational Only)      Patient has a history of a clinically significant antibody against RBC antigens.  A delay in compatible RBCs may occur.     Heparin Flush      Vancomycin        Family and social history:    Family History   Problem Relation Age of Onset     Kidney Disease No family hx of      Heart Disease No family hx of        Social History     Socioeconomic History     Marital status:      Spouse name: Not on file     Number of children: Not on file     Years of education: Not on file     Highest education level: Not on file   Occupational History     Not on file   Social Needs     Financial resource strain: Not on file     Food insecurity:     Worry: Not on file     Inability: Not on file     Transportation needs:     Medical: Not on file     Non-medical: Not on file   Tobacco Use     Smoking status: Never Smoker     Smokeless tobacco: Never Used   Substance and Sexual Activity     Alcohol use: No     Drug use: No     Sexual activity: Not on file   Lifestyle     Physical activity:     Days per week: Not on file     Minutes per session: Not on file     Stress: Not on file  "  Relationships     Social connections:     Talks on phone: Not on file     Gets together: Not on file     Attends Orthodoxy service: Not on file     Active member of club or organization: Not on file     Attends meetings of clubs or organizations: Not on file     Relationship status: Not on file     Intimate partner violence:     Fear of current or ex partner: Not on file     Emotionally abused: Not on file     Physically abused: Not on file     Forced sexual activity: Not on file   Other Topics Concern     Parent/sibling w/ CABG, MI or angioplasty before 65F 55M? Not Asked   Social History Narrative    2 children, ages 23--son and 7--daughter.    .     Review of Systems:  Skin: No skin rash or ulcers.  Eyes: No red eye.  Ears/Nose/Throat: No ear discharge, nasal congestion, sore throat or dysphagia.  Respiratory: No cough or hemoptysis.  Cardiovascular: See HPI.    Gastrointestinal: No abdominal pain, nausea, vomiting, hematemesis or melena.  Genitourinary: No increased frequency or urgency of urine. No dysuria or hematuria.  Musculoskeletal: No polyarthralgia or myalgias.  Neurologic: No headaches, seizure or focal weakness.  Psychiatric: No hallucinations.  Hematologic/Lymphatic/Immunologic: No bleeding tendency.  Endocrine: No heat or cold intolerance, abnormal facial hair or alopecia.    Vital signs:  BP (!) 182/109 (BP Location: Right arm, Patient Position: Chair, Cuff Size: Adult Regular)   Pulse 96   Ht 1.626 m (5' 4\")   Wt 58.1 kg (128 lb)   SpO2 98%   BMI 21.97 kg/m     Wt Readings from Last 2 Encounters:   06/06/19 52.5 kg (115 lb 12.8 oz)   06/06/19 52.2 kg (115 lb)     Physical Exam:  Gen: NAD.    HEENT: No conjunctival pallor or scleral icterus, MMM. Clear oropharynx.    Neck: No JVD. No thyroid enlargement or cervical adenopathy.    Chest: Clear to auscultation bilaterally.    CV: Normal first and second heart sounds. 2/6 systolic murmur LUSB.  Abdomen: Soft, non-tender, non-distended, " BS+.  Ext: No edema. Warm and well perfused with normal capillary refill.    Skin: No skin rash or ulcers.  Neuro: alert, oriented and appropriately conversant.    Psych: Normal affect and speech.    Labs:  Last Basic Metabolic Panel:  Lab Results   Component Value Date     06/18/2019      Lab Results   Component Value Date    POTASSIUM 4.8 06/18/2019     Lab Results   Component Value Date    CHLORIDE 97 06/18/2019     Lab Results   Component Value Date    HOLLIS 9.4 06/18/2019     Lab Results   Component Value Date    CO2 28 06/18/2019     Lab Results   Component Value Date    BUN 50 06/18/2019     Lab Results   Component Value Date    CR 9.99 06/18/2019     Lab Results   Component Value Date    GLC 84 06/18/2019       Lab Results   Component Value Date    WBC 5.4 06/18/2019     Lab Results   Component Value Date    RBC 4.41 06/18/2019     Lab Results   Component Value Date    HGB 12.4 06/18/2019     Lab Results   Component Value Date    HCT 41.5 06/18/2019     Lab Results   Component Value Date    MCV 94 06/18/2019     Lab Results   Component Value Date    MCH 28.1 06/18/2019     Lab Results   Component Value Date    MCHC 29.9 06/18/2019     Lab Results   Component Value Date    RDW 15.1 06/18/2019     Lab Results   Component Value Date     06/18/2019       Lab Results   Component Value Date    INR 0.92 04/22/2019    INR 0.94 06/20/2013     Diagnostics:    Coronary angiogram 6/2019:  Coronary angiogram June 16, 2019:  Procedures      CV CORONARY ANGIOGRAM   Indications   Coronary artery disease involving native coronary artery of native heart without angina pectoris [I25.10 (ICD-10-CM)]   Abnormal cardiovascular stress test [R94.39 (ICD-10-CM)]   Comments/Patient Narrative        Carlton Larkin is a 42 year old female with a history of CKD5, ESRD, AV fistula, on HD, kidney transplant listed since 2012, eclampsia with HELLP syndrome, vasospastic MI 8/2012.   Pre-procedure Diagnosis   abnormal stress test          Conclusion        Normal Coronaries          Plan     Follow bedrest per protocol    Continued medical management and lifestyle modifications for cardiovascular risk factor optimizations.       Assessment and Plan:  Paolo Larkin is a 43 year old with a history of eclampsia with HELLP syndrome, ESRD on HD listed for renal transplant since 2012 and vasospastic MI in 2012 and ischemic cardiomyopathy with preserved EF and septal wall hypokinesis who presents for ongoing renal transplant evaluation. She recently underwent coronary angiogram June 2019 for transplant evaluation and was found to have normal coronay arteries without significant stenosis. Today she reports feeling well and remains asymptomatic from a cardiac standpoint. Her RCRI is 3 (high risk surgery, MI, ESRD) corresponding with a 5.4% risk of perioperative MACE. With recent coronary angiogram, no further testing is needed prior to transplant. Proceed with renal transplant at an acceptable perioperative risk. If she does not receive a transplant within 2 years time, return to cardiology clinic for repeat evaluation. If future stress testing is needed would recommend nuclear MPI rather than dobutamine stress echo due to resting WMA.     Follow-up: RTC in 2 years if still on the transplant list.       NOEL Kraus, CNP  Cardiology Nurse Practitioner  [facility]  Clinic: [phone]  Hospital: [phone]

## 2019-09-04 ENCOUNTER — OFFICE VISIT (OUTPATIENT)
Dept: CARDIOLOGY | Facility: CLINIC | Age: 44
End: 2019-09-04
Attending: NURSE PRACTITIONER
Payer: MEDICARE

## 2019-09-04 ENCOUNTER — TELEPHONE (OUTPATIENT)
Dept: TRANSPLANT | Facility: CLINIC | Age: 44
End: 2019-09-04

## 2019-09-04 VITALS
WEIGHT: 128 LBS | HEIGHT: 64 IN | OXYGEN SATURATION: 98 % | SYSTOLIC BLOOD PRESSURE: 182 MMHG | HEART RATE: 96 BPM | BODY MASS INDEX: 21.85 KG/M2 | DIASTOLIC BLOOD PRESSURE: 109 MMHG

## 2019-09-04 DIAGNOSIS — Z01.810 PRE-OPERATIVE CARDIOVASCULAR EXAMINATION: Primary | ICD-10-CM

## 2019-09-04 DIAGNOSIS — I10 ESSENTIAL HYPERTENSION: ICD-10-CM

## 2019-09-04 PROCEDURE — 99214 OFFICE O/P EST MOD 30 MIN: CPT | Mod: ZP | Performed by: NURSE PRACTITIONER

## 2019-09-04 PROCEDURE — G0463 HOSPITAL OUTPT CLINIC VISIT: HCPCS | Mod: ZF

## 2019-09-04 ASSESSMENT — PAIN SCALES - GENERAL: PAINLEVEL: NO PAIN (0)

## 2019-09-04 ASSESSMENT — MIFFLIN-ST. JEOR: SCORE: 1230.6

## 2019-09-04 NOTE — NURSING NOTE
Chief Complaint   Patient presents with     Follow Up      present for s/p CORS 06/2019     Vitals were taken and medications were reconciled.    Sonia Shaw  12:00 PM

## 2019-09-04 NOTE — LETTER
9/4/2019      RE: Paolo Larkin  [address]  [place] [address]       Dear Colleague,    Thank you for the opportunity to participate in the care of your patient, Paolo Larkin, at the [facility]. Please see a copy of my visit note below.    Cardiology Clinic Note  September 4, 2019      HPI:  Paolo Larkin is a 43 year old with a history of eclampsia with HELLP syndrome and ESRD on HD listed for renal transplant since 2012. Her cardiac history is notable for vasospastic MI in 2012. Her cardiac catheterization at the time demonstrated small vessel diagonal branch subtotal occlusion as well as 20% proximal LAD.  Her echocardiogram demonstrated preserved EF with septal wall hypokinesis. She underwent dobutamine stress echo in November 2018 for pre-transplant evaluation and was found to have worsening septal hypokinesis with dobutamine infusion, similar to prior resting echo therefore no further evaluation was recommended. She was set to undergo renal transplant 4/2019 but became hypotensive following induction of anesthesia and the surgery was aborted. She was referred back to cardiology clinic for revaluation and coronary angiogram was recommended. Cardiac cath was performed 6/16/19 demonstrating normal coronary arteries. She presents to clinic for angiogram follow-up and ongoing transplant evaluation.     Today the patient reports feeling well. She does not follow a formal exercise program, but remains active taking walks with her children, cooking and cleaning around the house. She denies chest pain, shortness of breath, orthopnea, PND, leg swelling, weight gain, palpitations, lightheadedness or syncope. Her BP has been well controlled at dialysis. She states her BP is always high when she comes to clinic. She is compliant with her medications including amlodipine 10 mg, losartan 100 and hydralazine 25 mg TID. She denies significant side  effects. She is accompanied to clinic today by a professional Roberta .     Past medical history:  Past Medical History:   Diagnosis Date     Anemia in chronic kidney disease      End stage kidney disease (H)      HELLP syndrome      Hepatitis B      HTN (hypertension)      MI (myocardial infarction) (H) 2012     Proteinuria      Secondary hyperparathyroidism (H)      Thrombocytopaenia          Medications:    Current Outpatient Medications on File Prior to Visit:  acetaminophen (TYLENOL) 325 MG tablet Take 325-650 mg by mouth as needed for mild pain   amLODIPine (NORVASC) 10 MG tablet Take 1 tablet (10 mg) by mouth daily   amLODIPine (NORVASC) 10 MG tablet Take 10 mg by mouth every morning    calcium carbonate (TUMS) 500 MG chewable tablet Take 1,500 mg by mouth 3 times daily   hydrALAZINE (APRESOLINE) 50 MG tablet Take 25 mg by mouth 3 times daily    losartan (COZAAR) 100 MG tablet Take 1 tablet (100 mg) by mouth daily   losartan (COZAAR) 100 MG tablet Take 100 mg by mouth daily      No current facility-administered medications on file prior to visit.     Allergies:      Allergies   Allergen Reactions     Blood Transfusion Related (Informational Only)      Patient has a history of a clinically significant antibody against RBC antigens.  A delay in compatible RBCs may occur.     Heparin Flush      Vancomycin        Family and social history:    Family History   Problem Relation Age of Onset     Kidney Disease No family hx of      Heart Disease No family hx of        Social History     Socioeconomic History     Marital status:      Spouse name: Not on file     Number of children: Not on file     Years of education: Not on file     Highest education level: Not on file   Occupational History     Not on file   Social Needs     Financial resource strain: Not on file     Food insecurity:     Worry: Not on file     Inability: Not on file     Transportation needs:     Medical: Not on file     Non-medical:  "Not on file   Tobacco Use     Smoking status: Never Smoker     Smokeless tobacco: Never Used   Substance and Sexual Activity     Alcohol use: No     Drug use: No     Sexual activity: Not on file   Lifestyle     Physical activity:     Days per week: Not on file     Minutes per session: Not on file     Stress: Not on file   Relationships     Social connections:     Talks on phone: Not on file     Gets together: Not on file     Attends Yazidism service: Not on file     Active member of club or organization: Not on file     Attends meetings of clubs or organizations: Not on file     Relationship status: Not on file     Intimate partner violence:     Fear of current or ex partner: Not on file     Emotionally abused: Not on file     Physically abused: Not on file     Forced sexual activity: Not on file   Other Topics Concern     Parent/sibling w/ CABG, MI or angioplasty before 65F 55M? Not Asked   Social History Narrative    2 children, ages 23--son and 7--daughter.    .     Review of Systems:  Skin: No skin rash or ulcers.  Eyes: No red eye.  Ears/Nose/Throat: No ear discharge, nasal congestion, sore throat or dysphagia.  Respiratory: No cough or hemoptysis.  Cardiovascular: See HPI.    Gastrointestinal: No abdominal pain, nausea, vomiting, hematemesis or melena.  Genitourinary: No increased frequency or urgency of urine. No dysuria or hematuria.  Musculoskeletal: No polyarthralgia or myalgias.  Neurologic: No headaches, seizure or focal weakness.  Psychiatric: No hallucinations.  Hematologic/Lymphatic/Immunologic: No bleeding tendency.  Endocrine: No heat or cold intolerance, abnormal facial hair or alopecia.    Vital signs:  BP (!) 182/109 (BP Location: Right arm, Patient Position: Chair, Cuff Size: Adult Regular)   Pulse 96   Ht 1.626 m (5' 4\")   Wt 58.1 kg (128 lb)   SpO2 98%   BMI 21.97 kg/m      Wt Readings from Last 2 Encounters:   06/06/19 52.5 kg (115 lb 12.8 oz)   06/06/19 52.2 kg (115 lb) "     Physical Exam:  Gen: NAD.    HEENT: No conjunctival pallor or scleral icterus, MMM. Clear oropharynx.    Neck: No JVD. No thyroid enlargement or cervical adenopathy.    Chest: Clear to auscultation bilaterally.    CV: Normal first and second heart sounds. 2/6 systolic murmur LUSB.  Abdomen: Soft, non-tender, non-distended, BS+.  Ext: No edema. Warm and well perfused with normal capillary refill.    Skin: No skin rash or ulcers.  Neuro: alert, oriented and appropriately conversant.    Psych: Normal affect and speech.    Labs:  Last Basic Metabolic Panel:  Lab Results   Component Value Date     06/18/2019      Lab Results   Component Value Date    POTASSIUM 4.8 06/18/2019     Lab Results   Component Value Date    CHLORIDE 97 06/18/2019     Lab Results   Component Value Date    HOLLIS 9.4 06/18/2019     Lab Results   Component Value Date    CO2 28 06/18/2019     Lab Results   Component Value Date    BUN 50 06/18/2019     Lab Results   Component Value Date    CR 9.99 06/18/2019     Lab Results   Component Value Date    GLC 84 06/18/2019       Lab Results   Component Value Date    WBC 5.4 06/18/2019     Lab Results   Component Value Date    RBC 4.41 06/18/2019     Lab Results   Component Value Date    HGB 12.4 06/18/2019     Lab Results   Component Value Date    HCT 41.5 06/18/2019     Lab Results   Component Value Date    MCV 94 06/18/2019     Lab Results   Component Value Date    MCH 28.1 06/18/2019     Lab Results   Component Value Date    MCHC 29.9 06/18/2019     Lab Results   Component Value Date    RDW 15.1 06/18/2019     Lab Results   Component Value Date     06/18/2019       Lab Results   Component Value Date    INR 0.92 04/22/2019    INR 0.94 06/20/2013     Diagnostics:    Coronary angiogram 6/2019:  Coronary angiogram June 16, 2019:  Procedures      CV CORONARY ANGIOGRAM   Indications   Coronary artery disease involving native coronary artery of native heart without angina pectoris [I25.10  (ICD-10-CM)]   Abnormal cardiovascular stress test [R94.39 (ICD-10-CM)]   Comments/Patient Narrative        Carlton Larkin is a 42 year old female with a history of CKD5, ESRD, AV fistula, on HD, kidney transplant listed since 2012, eclampsia with HELLP syndrome, vasospastic MI 8/2012.   Pre-procedure Diagnosis   abnormal stress test         Conclusion        Normal Coronaries          Plan     Follow bedrest per protocol    Continued medical management and lifestyle modifications for cardiovascular risk factor optimizations.       Assessment and Plan:  Paolo Larkin is a 43 year old with a history of eclampsia with HELLP syndrome, ESRD on HD listed for renal transplant since 2012 and vasospastic MI in 2012 and ischemic cardiomyopathy with preserved EF and septal wall hypokinesis who presents for ongoing renal transplant evaluation. She recently underwent coronary angiogram June 2019 for transplant evaluation and was found to have normal coronay arteries without significant stenosis. Today she reports feeling well and remains asymptomatic from a cardiac standpoint. Her RCRI is 3 (high risk surgery, MI, ESRD) corresponding with a 5.4% risk of perioperative MACE. With recent coronary angiogram, no further testing is needed prior to transplant. Proceed with renal transplant at an acceptable perioperative risk. If she does not receive a transplant within 2 years time, return to cardiology clinic for repeat evaluation. If future stress testing is needed would recommend nuclear MPI rather than dobutamine stress echo due to resting WMA.     Follow-up: RTC in 2 years if still on the transplant list.       NOEL Kraus, CNP  Cardiology Nurse Practitioner  [facility]  Clinic: [phone]  Hospital: [phone]

## 2019-09-04 NOTE — TELEPHONE ENCOUNTER
Coordinator called allergy clinic to make an appointment for pt as no appointments have been made yet. Appointment made for 10/1/19 at 0900. Coordinator called dialysis (spoke with Sayra) to give them appointment information. Coordinator called pt using  services. Gave pt allergist appointment information. Pt verbalized understanding and has no further questions at this time. Encouraged pt to call back with any questions. Contact number provided.

## 2019-09-04 NOTE — PATIENT INSTRUCTIONS
You were seen today in the Cardiovascular Clinic at the [facility].    Cardiology provider you saw during your visit Linda Mathis NP.    1. Your angiogram shows normal heart arteries.  2. No further heart testing prior to transplant.  3. Ongoing managing of BP per your kidney doctor.  4. Follow-up in 1 year if still on the transplant list.     Questions and [phone].   First press #1 for the University and then press #3 for Medical Questions to reach the Cardiology triage nurse.     On Call Cardiologist for after hours or on weekends: [phone], press option #4 and ask to speak to the on-call Cardiologist.

## 2019-10-01 ENCOUNTER — OFFICE VISIT (OUTPATIENT)
Dept: ALLERGY | Facility: CLINIC | Age: 44
End: 2019-10-01
Payer: MEDICARE

## 2019-10-01 DIAGNOSIS — T88.6XXA ANAPHYLACTIC REACTION DUE TO ADVERSE EFFECT OF CORRECT DRUG OR MEDICAMENT PROPERLY ADMINISTERED, INITIAL ENCOUNTER: Primary | ICD-10-CM

## 2019-10-01 RX ORDER — PANCURONIUM BROMIDE 1 MG/ML
INJECTION, SOLUTION INTRAVENOUS
Qty: 0.6 ML | Refills: 0 | Status: ON HOLD | OUTPATIENT
Start: 2019-10-08 | End: 2020-01-24

## 2019-10-01 RX ORDER — ATRACURIUM BESYLATE 10 MG/ML
1 INJECTION, SOLUTION INTRAVENOUS ONCE
Qty: 1 VIAL | Refills: 0 | Status: SHIPPED | OUTPATIENT
Start: 2019-10-08 | End: 2019-10-07

## 2019-10-01 RX ORDER — SUCCINYLCHOLINE CHLORIDE 100 MG/5ML
1 SYRINGE (ML) INTRAVENOUS ONCE
Qty: 1 SYRINGE | Refills: 0 | Status: ON HOLD | OUTPATIENT
Start: 2019-10-08 | End: 2020-01-24

## 2019-10-01 RX ORDER — ONDANSETRON 2 MG/ML
4 INJECTION INTRAMUSCULAR; INTRAVENOUS ONCE
Status: DISCONTINUED | OUTPATIENT
Start: 2019-10-08 | End: 2020-01-24

## 2019-10-01 RX ORDER — CEFUROXIME SODIUM 1.5 G/16ML
INJECTION, POWDER, FOR SOLUTION INTRAVENOUS
Qty: 1 EACH | Refills: 0 | Status: ON HOLD | OUTPATIENT
Start: 2019-10-01 | End: 2020-01-24

## 2019-10-01 RX ORDER — FENTANYL CITRATE 50 UG/ML
1 INJECTION, SOLUTION INTRAMUSCULAR; INTRAVENOUS ONCE
Status: DISCONTINUED | OUTPATIENT
Start: 2019-10-08 | End: 2020-01-24

## 2019-10-01 RX ORDER — VECURONIUM BROMIDE 1 MG/ML
0.1 INJECTION, POWDER, LYOPHILIZED, FOR SOLUTION INTRAVENOUS ONCE
Qty: 5.81 ML | Refills: 0 | Status: ON HOLD | OUTPATIENT
Start: 2019-10-08 | End: 2020-01-24

## 2019-10-01 ASSESSMENT — PAIN SCALES - GENERAL: PAINLEVEL: NO PAIN (0)

## 2019-10-01 NOTE — PROGRESS NOTES
[facility] Dermatology Note      Dermatology Problem List:  1. Suspected anaphylactic reaction to intraoperative medications    Encounter Date: Oct 1, 2019    CC:   Chief Complaint   Patient presents with     Allergy Consult     Paolo is here today for possible medication reaction from her kidney transplant process - possible anethesia          History of Present Illness:  Ms. Paolo Vincent Trae is a 43 year old female who presents for evaluation of suspected anaphylactic reaction to intraoperative medications. She has a history of HELLP complicated by MI and ESRD on the wait list for renal transplant since 2012. She was schduled for transplant in April 2019 but developed hypotension at about 11:10 AM after receiving various medications, including lidocaine (10:53), polyethylene glycol ophthalmic solution (10:53), propofol (10:54), rocuronium (10:54), ondansetron (10:54), and fentanyl (11:05). She my have also received cefuroxime preoperatively, although exact time unknown. She had a serum tryptase level of 188 shortly afterwards. Given her intraoperative hypotension, her renal transplant was cancelled for suspected anaphylactic reaction. Her renal transplant is now awaiting allergy evaluation.    2 weeks prior patient had a similar operation for hysteroscopy without problems.     Past Medical History:   Patient Active Problem List   Diagnosis     End stage kidney disease (H)     Anemia in chronic kidney disease     HTN (hypertension)     Secondary hyperparathyroidism (H)     Kidney transplant recipient     Kidney transplant candidate     Hypotension     Coronary artery disease involving native coronary artery of native heart without angina pectoris     Abnormal cardiovascular stress test     Status post coronary angiogram     Past Medical History:   Diagnosis Date     Anemia in chronic kidney disease      End stage kidney disease (H)      HELLP syndrome      Hepatitis B      HTN (hypertension)      MI  (myocardial infarction) (H)      Proteinuria      Secondary hyperparathyroidism (H)      Thrombocytopaenia      Past Surgical History:   Procedure Laterality Date      SECTION  2012     CREATE FISTULA ARTERIOVENOUS UPPER EXTREMITY       CV CORONARY ANGIOGRAM N/A 2019    Procedure: CV CORONARY ANGIOGRAM;  Surgeon: Sabas Tilley MD;  Location:  [facility]ATH LAB     DILATION AND CURETTAGE, HYSTEROSCOPY WITH ULTRASOUND GUIDANCE  2019       Social History:  Patient reports that she has never smoked. She has never used smokeless tobacco. She reports that she does not drink alcohol or use drugs.    Family History:  Family History   Problem Relation Age of Onset     Kidney Disease No family hx of      Heart Disease No family hx of        Medications:  Current Outpatient Medications   Medication Sig Dispense Refill     acetaminophen (TYLENOL) 325 MG tablet Take 325-650 mg by mouth as needed for mild pain       amLODIPine (NORVASC) 10 MG tablet Take 1 tablet (10 mg) by mouth daily 30 tablet 0     amLODIPine (NORVASC) 10 MG tablet Take 10 mg by mouth every morning        calcium carbonate (TUMS) 500 MG chewable tablet Take 1,500 mg by mouth 3 times daily       hydrALAZINE (APRESOLINE) 50 MG tablet Take 25 mg by mouth 3 times daily        losartan (COZAAR) 100 MG tablet Take 1 tablet (100 mg) by mouth daily 30 tablet 0     losartan (COZAAR) 100 MG tablet Take 100 mg by mouth daily           Allergies   Allergen Reactions     Blood Transfusion Related (Informational Only)      Patient has a history of a clinically significant antibody against RBC antigens.  A delay in compatible RBCs may occur.     Heparin Flush      Vancomycin          Review of Systems:  -Allergy/Immunology: No history of nickel or other skin allergy. No history of asthma or hay fever., Const: Denies fevers, chills or changes in weight.   -Constitutional: Otherwise feeling well today, in usual state of health.  -HEENT:  Patient denies nonhealing oral sores.  -Skin: As above in HPI. No additional skin concerns.    Physical exam:  Vitals: There were no vitals taken for this visit.  GEN: This is a well developed, well-nourished female in no acute distress, in a pleasant mood.    SKIN: Focused examination of the head and neck was performed.  -Phan skin type: IV  -No lesions of concern on areas examined.     Impression/Plan:  >> Suspected anaphylactic reaction to intraoperative medications with drop of blood pressure and increase of Tryptase    Will schedule for allergy testing in one week to fentanyl, propofol, ondansetron, lidocaine, cefuroxime, rocuronium, atracurium, vecuronium, pancuronium, and succinylcholine.    Polyethylene glycol is irrelevant since this was an ophthalmic solution.      CC Mk Bradley MD  [facility]  [address] [phone] on close of this encounter.  Follow-up in 1 week, earlier for new or changing lesions.       Dr. Palmer staffed the patient.    Staff Involved:  Resident(Clifford)/Staff    Staff Physician Comments:  I saw and evaluated the patient with the resident and I agree with the assessment and plan as documented in the resident's note.    Pierre Palmer MD  Professor  Head of Dermato-Allergy Division  Department of Dermatology  [facility]  I spent a total of 38 minutes face to face with Paolo Larkin during today s office visit. Over 50% of this time was spent counseling the patient and/or coordinating care. Please see Assessment and Plan for details. This included study of the anesthesiology charts from the incidence in April 2019

## 2019-10-01 NOTE — LETTER
10/1/2019         RE: Paolo Larkin  [address]  [place] [address]        Dear Colleague,    Thank you for referring your patient, Paolo Larkin, to the [facility]. Please see a copy of my visit note below.    [facility] Dermatology Note      Dermatology Problem List:  1. Suspected anaphylactic reaction to intraoperative medications    Encounter Date: Oct 1, 2019    CC:   Chief Complaint   Patient presents with     Allergy Consult     Paolo is here today for possible medication reaction from her kidney transplant process - possible anethesia          History of Present Illness:  Ms. Paolo Larkin is a 43 year old female who presents for evaluation of suspected anaphylactic reaction to intraoperative medications. She has a history of HELLP complicated by MI and ESRD on the wait list for renal transplant since 2012. She was schduled for transplant in April 2019 but developed hypotension at about 11:10 AM after receiving various medications, including lidocaine (10:53), polyethylene glycol ophthalmic solution (10:53), propofol (10:54), rocuronium (10:54), ondansetron (10:54), and fentanyl (11:05). She my have also received cefuroxime preoperatively, although exact time unknown. She had a serum tryptase level of 188 shortly afterwards. Given her intraoperative hypotension, her renal transplant was cancelled for suspected anaphylactic reaction. Her renal transplant is now awaiting allergy evaluation.    2 weeks prior patient had a similar operation for hysteroscopy without problems.     Past Medical History:   Patient Active Problem List   Diagnosis     End stage kidney disease (H)     Anemia in chronic kidney disease     HTN (hypertension)     Secondary hyperparathyroidism (H)     Kidney transplant recipient     Kidney transplant candidate     Hypotension     Coronary artery disease involving native coronary artery of native heart without angina pectoris     Abnormal  cardiovascular stress test     Status post coronary angiogram     Past Medical History:   Diagnosis Date     Anemia in chronic kidney disease      End stage kidney disease (H)      HELLP syndrome      Hepatitis B      HTN (hypertension)      MI (myocardial infarction) (H)      Proteinuria      Secondary hyperparathyroidism (H)      Thrombocytopaenia      Past Surgical History:   Procedure Laterality Date      SECTION  2012     CREATE FISTULA ARTERIOVENOUS UPPER EXTREMITY       CV CORONARY ANGIOGRAM N/A 2019    Procedure: CV CORONARY ANGIOGRAM;  Surgeon: Sabas Tilley MD;  Location:  [facility] LAB     DILATION AND CURETTAGE, HYSTEROSCOPY WITH ULTRASOUND GUIDANCE  2019       Social History:  Patient reports that she has never smoked. She has never used smokeless tobacco. She reports that she does not drink alcohol or use drugs.    Family History:  Family History   Problem Relation Age of Onset     Kidney Disease No family hx of      Heart Disease No family hx of        Medications:  Current Outpatient Medications   Medication Sig Dispense Refill     acetaminophen (TYLENOL) 325 MG tablet Take 325-650 mg by mouth as needed for mild pain       amLODIPine (NORVASC) 10 MG tablet Take 1 tablet (10 mg) by mouth daily 30 tablet 0     amLODIPine (NORVASC) 10 MG tablet Take 10 mg by mouth every morning        calcium carbonate (TUMS) 500 MG chewable tablet Take 1,500 mg by mouth 3 times daily       hydrALAZINE (APRESOLINE) 50 MG tablet Take 25 mg by mouth 3 times daily        losartan (COZAAR) 100 MG tablet Take 1 tablet (100 mg) by mouth daily 30 tablet 0     losartan (COZAAR) 100 MG tablet Take 100 mg by mouth daily           Allergies   Allergen Reactions     Blood Transfusion Related (Informational Only)      Patient has a history of a clinically significant antibody against RBC antigens.  A delay in compatible RBCs may occur.     Heparin Flush      Vancomycin          Review of  Systems:  -Allergy/Immunology: No history of nickel or other skin allergy. No history of asthma or hay fever., Const: Denies fevers, chills or changes in weight.   -Constitutional: Otherwise feeling well today, in usual state of health.  -HEENT: Patient denies nonhealing oral sores.  -Skin: As above in HPI. No additional skin concerns.    Physical exam:  Vitals: There were no vitals taken for this visit.  GEN: This is a well developed, well-nourished female in no acute distress, in a pleasant mood.    SKIN: Focused examination of the head and neck was performed.  -Phan skin type: IV  -No lesions of concern on areas examined.     Impression/Plan:  >> Suspected anaphylactic reaction to intraoperative medications with drop of blood pressure and increase of Tryptase    Will schedule for allergy testing in one week to fentanyl, propofol, ondansetron, lidocaine, cefuroxime, rocuronium, atracurium, vecuronium, pancuronium, and succinylcholine.    Polyethylene glycol is irrelevant since this was an ophthalmic solution.      CC Mk Bradley MD  [facility]  [address] [place] [address] on close of this encounter.  Follow-up in 1 week, earlier for new or changing lesions.       Dr. Palmer staffed the patient.    Staff Involved:  Resident(Clifford)/Staff    Staff Physician Comments:  I saw and evaluated the patient with the resident and I agree with the assessment and plan as documented in the resident's note.    Pierre Palmer MD  Professor  Head of Dermato-Allergy Division  Department of Dermatology  [facility]  I spent a total of 38 minutes face to face with Paolo Larkin during today s office visit. Over 50% of this time was spent counseling the patient and/or coordinating care. Please see Assessment and Plan for details. This included study of the anesthesiology charts from the incidence in April 2019        Again, thank you for  allowing me to participate in the care of your patient.        Sincerely,        Pierre Palmer MD

## 2019-10-02 ENCOUNTER — TELEPHONE (OUTPATIENT)
Dept: TRANSPLANT | Facility: CLINIC | Age: 44
End: 2019-10-02

## 2019-10-02 DIAGNOSIS — N18.6 ESRD (END STAGE RENAL DISEASE) (H): ICD-10-CM

## 2019-10-02 DIAGNOSIS — Z76.82 ORGAN TRANSPLANT CANDIDATE: ICD-10-CM

## 2019-10-02 NOTE — TELEPHONE ENCOUNTER
Coordinator called pt, using  services. Pt scheduled for additional allergy testing 10/8/19. Reviewed pt will need to be cleared by allergist for active status on the kidney list. Coordinator to order additional items for active status consideration:    1. neph  2. SW (pt last seen July 2019, but would like to meet with our SW again)  3. Updated PRA    Reviewed pt is due to schedule:  4. Mammogram: due 11/19/19  5. Dental: pt states she will scheduled and has not completed in the last year    Coordinator called and spoke with KULWANT Bloom at pt's dialysis center. Updated Merle on pt's plan of care. Dialysis will help pt schedule mammo and dental.

## 2019-10-04 ENCOUNTER — TELEPHONE (OUTPATIENT)
Dept: ALLERGY | Facility: CLINIC | Age: 44
End: 2019-10-04

## 2019-10-04 ENCOUNTER — TELEPHONE (OUTPATIENT)
Dept: TRANSPLANT | Facility: CLINIC | Age: 44
End: 2019-10-04

## 2019-10-04 NOTE — TELEPHONE ENCOUNTER
DRUGS/SUBSTANCES      Prick Tests         Substance/ Allergen Concentration Result (15min) Remarks   + Histamine Hydrochloride (ALK) 0.1 mg/ml     - NaCl 0.9%     1 Cefturoxime Sodium 75 mg/ml     2 Fentanyl 100 mcg/2ml     3 Lidocane HCl 10mg/ml, 1%     4 Ondansterone HCl 4mg/2ml     5 Pancuronium Bromide 2 mg/ml     6 Propofol 100mg/ 10 ml     7 Rocuronium Bromide 10mg/ml     8 Succinylcholine Chloride 50mg/ml     9 Vecuronium Bromide 4mg/ml               Intradermal Tests   immed immed delayed delayed      Substance Conc 1st dil 2nd dil   days  days remarks   + Histamine Hydrochloride (ALK) 0.1 mg/ml        - NaCl 0.9%        1 Cefturoxime Sodium 1:10        2 Fentanyl 1:10        3 Lidocane HCl 1:10        4 Ondansterone HCl 1:10        5 Pancuronium Bromide 1:10        6 Propofol 1:10        7 Rocuronium Bromide 1:200        8 Succinylcholine Chloride 1:500        9 Vecuronium Bromide 1:10                    V A Patch Tests   as is  as is 1:2 paraffin 1:2 paraffin       Substance Conc  days  days  days  days remarks    1 Cefturoxime Sodium 75 mg/ml         2 Fentanyl 100 mcg/2ml         3 Lidocane HCl 10mg/ml         4 Ondansterone HCl 4mg/2ml         5 Pancuronium Bromide 2mg/ml         6 Propofol 100mg/10 ml         7 Rocuronium Bromide 10mg/ml         8 Succinylcholine Chloride 50 mg/ml         9 Vecuronium Bromide 4mg/ml

## 2019-10-07 RX ORDER — ATRACURIUM BESYLATE 10 MG/ML
1 INJECTION, SOLUTION INTRAVENOUS ONCE
Qty: 1 VIAL | Refills: 0 | Status: SHIPPED | OUTPATIENT
Start: 2019-10-08 | End: 2019-10-07 | Stop reason: ALTCHOICE

## 2019-10-07 RX ORDER — SUCCINYLCHOLINE CHLORIDE 20 MG/ML
INJECTION INTRAMUSCULAR; INTRAVENOUS
Qty: 10 ML | Refills: 0 | Status: ON HOLD | OUTPATIENT
Start: 2019-10-07 | End: 2020-01-24

## 2019-10-07 RX ORDER — VECURONIUM BROMIDE 1 MG/ML
INJECTION, POWDER, LYOPHILIZED, FOR SOLUTION INTRAVENOUS
Qty: 5 ML | Refills: 0 | Status: ON HOLD | OUTPATIENT
Start: 2019-10-07 | End: 2020-01-24

## 2019-10-07 RX ORDER — LIDOCAINE HYDROCHLORIDE 10 MG/ML
5 INJECTION, SOLUTION EPIDURAL; INFILTRATION; INTRACAUDAL; PERINEURAL ONCE
Status: DISCONTINUED | OUTPATIENT
Start: 2019-10-07 | End: 2020-01-24

## 2019-10-07 RX ORDER — ROCURONIUM BROMIDE 10 MG/ML
2 INJECTION, SOLUTION INTRAVENOUS ONCE
Qty: 2 ML | Refills: 0 | Status: SHIPPED | OUTPATIENT
Start: 2019-10-07 | End: 2019-10-08

## 2019-10-08 ENCOUNTER — OFFICE VISIT (OUTPATIENT)
Dept: ALLERGY | Facility: CLINIC | Age: 44
End: 2019-10-08
Payer: MEDICARE

## 2019-10-08 DIAGNOSIS — T88.6XXA ANAPHYLACTIC REACTION DUE TO ADVERSE EFFECT OF CORRECT DRUG OR MEDICAMENT PROPERLY ADMINISTERED, INITIAL ENCOUNTER: ICD-10-CM

## 2019-10-08 RX ORDER — ROCURONIUM BROMIDE 10 MG/ML
2 INJECTION, SOLUTION INTRAVENOUS ONCE
Qty: 2 ML | Refills: 0 | Status: SHIPPED | OUTPATIENT
Start: 2019-10-08 | End: 2019-10-08

## 2019-10-08 RX ORDER — CEFTAZIDIME 1 G/1
INJECTION, POWDER, FOR SOLUTION INTRAMUSCULAR; INTRAVENOUS
Qty: 1 EACH | Refills: 0 | Status: ON HOLD | OUTPATIENT
Start: 2019-10-08 | End: 2020-01-24

## 2019-10-08 RX ORDER — PIPERACILLIN SODIUM, TAZOBACTAM SODIUM 3; .375 G/15ML; G/15ML
1 INJECTION, POWDER, LYOPHILIZED, FOR SOLUTION INTRAVENOUS ONCE
Status: COMPLETED | OUTPATIENT
Start: 2019-10-08 | End: 2019-11-19

## 2019-10-08 RX ORDER — CEFTRIAXONE SODIUM 1 G
250 VIAL (EA) INJECTION ONCE
Status: COMPLETED | OUTPATIENT
Start: 2019-10-08 | End: 2019-11-19

## 2019-10-08 RX ORDER — ROCURONIUM BROMIDE 10 MG/ML
2 INJECTION, SOLUTION INTRAVENOUS ONCE
Qty: 2 ML | Refills: 0 | Status: SHIPPED | OUTPATIENT
Start: 2019-10-08 | End: 2019-11-19

## 2019-10-08 RX ORDER — CEFAZOLIN SODIUM 1 G
500 VIAL (EA) INJECTION ONCE
Status: COMPLETED | OUTPATIENT
Start: 2019-10-08 | End: 2019-11-19

## 2019-10-08 RX ORDER — AMPICILLIN 1 G/1
1 INJECTION, POWDER, FOR SOLUTION INTRAMUSCULAR; INTRAVENOUS ONCE
Status: COMPLETED | OUTPATIENT
Start: 2019-10-08 | End: 2019-11-19

## 2019-10-08 RX ORDER — PENICILLIN G POTASSIUM 5000000 [IU]/1
5000000 INJECTION, POWDER, FOR SOLUTION INTRAMUSCULAR; INTRAVENOUS ONCE
Status: COMPLETED | OUTPATIENT
Start: 2019-10-08 | End: 2019-11-19

## 2019-10-08 RX ORDER — MEROPENEM 500 MG/1
1 INJECTION, POWDER, FOR SOLUTION INTRAVENOUS ONCE
Status: COMPLETED | OUTPATIENT
Start: 2019-10-08 | End: 2019-11-19

## 2019-10-08 NOTE — PATIENT INSTRUCTIONS
Impression/Plan:  >> Suspected anaphylactic reaction to intraoperative medications with drop of blood pressure and increase of Tryptase    Will schedule for allergy testing in one week to fentanyl, propofol, ondansetron, lidocaine, cefuroxime, rocuronium, atracurium, vecuronium, pancuronium, and succinylcholine.    Polyethylene glycol is irrelevant since this was an ophthalmic solution.    DRUGS/SUBSTANCES      Prick Tests         Substance/ Allergen Concentration Result (15min) Remarks   + Histamine Hydrochloride (ALK) 0.1 mg/ml ++    - NaCl 0.9% -    1 Cefturoxime Sodium 75 mg/ml (+)    2 Fentanyl 100 mcg/2ml -    3 Lidocane HCl 10mg/ml, 1% -    4 Ondansterone HCl 4mg/2ml -               6 Propofol 100mg/ 10 ml (+)    7 Rocuronium Bromide 10mg/ml (+)    8 Succinylcholine Chloride 50mg/ml -    9 Vecuronium Bromide 4mg/ml -              Intradermal Tests   immed immed delayed delayed      Substance Conc 1st dil 2nd dil   days  days remarks   + Histamine Hydrochloride (ALK) 0.1 mg/ml 10mm P/E       - NaCl 0.9% 2mm       1 Cefturoxime Sodium 1:10 6mmP       2 Fentanyl 1:10 -       3 Lidocane HCl 1:10 -       4 Ondansterone HCl 1:10 -                         6 Propofol 1:10 -       7 Rocuronium Bromide 1:200 3mmP       8 Succinylcholine Chloride 1:500 -       9 Vecuronium Bromide 1:10 -                 Conclusions: based on intradermal tests some indications for allergy to Cefuroxime, but the intradermal test to Rocuronium as well slightly positive. However, no signs for reaction to Vecuronium    Plan prick and intradermal tests with Ceftriaxone, Cefazolin, Ceftazidime, Penicillin G, Ampicillin and Meropenem. Repeat the Rocuronium

## 2019-10-08 NOTE — PROGRESS NOTES
[facility] Dermatology Note      Dermatology Problem List:  1. Suspected anaphylactic reaction to intraoperative medications    Encounter Date: Oct 8, 2019    CC:   No chief complaint on file.        History of Present Illness:  Ms. Paolo Larkin is a 43 year old female who presents for evaluation of suspected anaphylactic reaction to intraoperative medications. She was last seen on 10/1/2019 when she was evaluated by Dr. Palmer who recommended the patient have allergy testing. She returns today to     She has a history of HELLP complicated by MI and ESRD on the wait list for renal transplant since 2012. She was schduled for transplant in April 2019 but developed hypotension at about 11:10 AM after receiving various medications, including lidocaine (10:53), polyethylene glycol ophthalmic solution (10:53), propofol (10:54), rocuronium (10:54), ondansetron (10:54), and fentanyl (11:05). She my have also received cefuroxime preoperatively, although exact time unknown. She had a serum tryptase level of 188 shortly afterwards. Given her intraoperative hypotension, her renal transplant was cancelled for suspected anaphylactic reaction. Her renal transplant is now awaiting allergy evaluation. 2 weeks prior patient had a similar operation for hysteroscopy without problems.     Polyethylene glycol is irrelevant since this was an ophthalmic solution.    Long time ago patient got an antibiotic (2012) and patient passed out then. Unknown which antibiotic.    Past Medical History:   Patient Active Problem List   Diagnosis     End stage kidney disease (H)     Anemia in chronic kidney disease     HTN (hypertension)     Secondary hyperparathyroidism (H)     Kidney transplant recipient     Kidney transplant candidate     Hypotension     Coronary artery disease involving native coronary artery of native heart without angina pectoris     Abnormal cardiovascular stress test     Status post coronary angiogram      Past Medical History:   Diagnosis Date     Anemia in chronic kidney disease      End stage kidney disease (H)      HELLP syndrome      Hepatitis B      HTN (hypertension)      MI (myocardial infarction) (H)      Proteinuria      Secondary hyperparathyroidism (H)      Thrombocytopaenia      Past Surgical History:   Procedure Laterality Date      SECTION  2012     CREATE FISTULA ARTERIOVENOUS UPPER EXTREMITY       CV CORONARY ANGIOGRAM N/A 2019    Procedure: CV CORONARY ANGIOGRAM;  Surgeon: Sabas Tilley MD;  Location:  [facility]ARDI[facility] CATH LAB     DILATION AND CURETTAGE, HYSTEROSCOPY WITH ULTRASOUND GUIDANCE  2019       Social History:  Patient reports that she has never smoked. She has never used smokeless tobacco. She reports that she does not drink alcohol or use drugs.    Family History:  Family History   Problem Relation Age of Onset     Kidney Disease No family hx of      Heart Disease No family hx of        Medications:  Current Outpatient Medications   Medication Sig Dispense Refill     acetaminophen (TYLENOL) 325 MG tablet Take 325-650 mg by mouth as needed for mild pain       amLODIPine (NORVASC) 10 MG tablet Take 1 tablet (10 mg) by mouth daily 30 tablet 0     amLODIPine (NORVASC) 10 MG tablet Take 10 mg by mouth every morning        calcium carbonate (TUMS) 500 MG chewable tablet Take 1,500 mg by mouth 3 times daily       cefuroxime (ZINACEF) 1.5 G vial For allergy tests in allergy clinic only 1 each 0     hydrALAZINE (APRESOLINE) 50 MG tablet Take 25 mg by mouth 3 times daily        losartan (COZAAR) 100 MG tablet Take 1 tablet (100 mg) by mouth daily 30 tablet 0     losartan (COZAAR) 100 MG tablet Take 100 mg by mouth daily        pancuronium (PAVULON) 1 MG/ML injection For allergy tests in allergy clinic only 0.6 mL 0     propofol 100 MG/10ML EMUL injection 10 mLs (100 mg) by Other route once for 1 dose 10 mL 0     rocuronium (ZEMURON) 50 MG/5ML SOLN 2 mLs (20 mg) by  Other route once for 1 dose 2 mL 0     rocuronium 10 MG/ML injection Inject 0.58 mLs (5.8 mg) into the vein once for 1 dose 0.58 mL 0     succinylcholine (ANECTINE) 20 MG/ML injection For allergy testing 10 mL 0     Succinylcholine Chloride 100 MG/5ML SOSY Inject 1 ampule as directed once for 1 dose For allergy tests in allergy clinic only 1 Syringe 0     vecuronium (NORCURON) 10 MG injection For allergy testing 5 mL 0     vecuronium bolus NOT from drip (NORCURON) 1 mg/mL injection Inject 5.81 mLs (5.81 mg) into the vein once for 1 dose 5.81 mL 0        Allergies   Allergen Reactions     Blood Transfusion Related (Informational Only)      Patient has a history of a clinically significant antibody against RBC antigens.  A delay in compatible RBCs may occur.     Heparin Flush      Vancomycin          Review of Systems:  -Allergy/Immunology: No history of nickel or other skin allergy. No history of asthma or hay fever., Const: Denies fevers, chills or changes in weight.   -Constitutional: Otherwise feeling well today, in usual state of health.  -HEENT: Patient denies nonhealing oral sores.  -Skin: As above in HPI. No additional skin concerns.    Physical exam:  Vitals: There were no vitals taken for this visit.  GEN: This is a well developed, well-nourished female in no acute distress, in a pleasant mood.    SKIN: Focused examination of the head and neck was performed.  -Phan skin type: IV  -No lesions of concern on areas examined.       DRUGS/SUBSTANCES (10/09/2019)      Prick Tests         Substance/ Allergen Concentration Result (15min) Remarks   + Histamine Hydrochloride (ALK) 0.1 mg/ml ++    - NaCl 0.9% -    1 Cefturoxime Sodium 75 mg/ml (+)    2 Fentanyl 100 mcg/2ml -    3 Lidocane HCl 10mg/ml, 1% -    4 Ondansterone HCl 4mg/2ml -               6 Propofol 100mg/ 10 ml (+)    7 Rocuronium Bromide 10mg/ml (+)    8 Succinylcholine Chloride 50mg/ml -    9 Vecuronium Bromide 4mg/ml -              Intradermal  Tests   immed immed delayed delayed      Substance Conc 1st dil 2nd dil   days  days remarks   + Histamine Hydrochloride (ALK) 0.1 mg/ml 10mm P/E       - NaCl 0.9% 2mm       1 Cefturoxime Sodium 1:10 6mmP       2 Fentanyl 1:10 -       3 Lidocane HCl 1:10 -       4 Ondansterone HCl 1:10 -                         6 Propofol 1:10 -       7 Rocuronium Bromide 1:200 3mmP       8 Succinylcholine Chloride 1:500 -       9 Vecuronium Bromide 1:10 -                 Conclusions: based on intradermal tests some indications for allergy to Cefuroxime, but the intradermal test to Rocuronium as well slightly positive. However, no signs for reaction to Vecuronium    Plan prick and intradermal tests with Ceftriaxone, Cefazolin, Ceftazidime, Penicillin G, Ampicillin and Meropenem. Repeat the Rocuronium      Diagnosis:  >> Suspected anaphylactic reaction to intraoperative medications with drop of blood pressure and increase of Tryptase    Reaction with wheel to the cephalosporine Cefuroxime --> test other Cephalosporine and Penicillin antibiotics    Slight reaction with some wheal to Rocuronium, but not Vecuronium (use Vecuronium in future)      CC Mk Bradley MD  [facility]  [address]  [place] [address] on close of this encounter.  Follow-up in 1 week, earlier for new or changing lesions.

## 2019-10-08 NOTE — LETTER
10/8/2019         RE: Paolo Larkin  [address]  [place] [address]        Dear Colleague,    Thank you for referring your patient, Paolo Larkin, to the [facility] ALLER[facility]. Please see a copy of my visit note below.    [facility] Dermatology Note      Dermatology Problem List:  1. Suspected anaphylactic reaction to intraoperative medications    Encounter Date: Oct 8, 2019    CC:   No chief complaint on file.        History of Present Illness:  Ms. Paolo Larkin is a 43 year old female who presents for evaluation of suspected anaphylactic reaction to intraoperative medications. She was last seen on 10/1/2019 when she was evaluated by Dr. Palmer who recommended the patient have allergy testing. She returns today to     She has a history of HELLP complicated by MI and ESRD on the wait list for renal transplant since 2012. She was schduled for transplant in April 2019 but developed hypotension at about 11:10 AM after receiving various medications, including lidocaine (10:53), polyethylene glycol ophthalmic solution (10:53), propofol (10:54), rocuronium (10:54), ondansetron (10:54), and fentanyl (11:05). She my have also received cefuroxime preoperatively, although exact time unknown. She had a serum tryptase level of 188 shortly afterwards. Given her intraoperative hypotension, her renal transplant was cancelled for suspected anaphylactic reaction. Her renal transplant is now awaiting allergy evaluation. 2 weeks prior patient had a similar operation for hysteroscopy without problems.     Polyethylene glycol is irrelevant since this was an ophthalmic solution.    Long time ago patient got an antibiotic (2012) and patient passed out then. Unknown which antibiotic.    Past Medical History:   Patient Active Problem List   Diagnosis     End stage kidney disease (H)     Anemia in chronic kidney disease     HTN (hypertension)     Secondary hyperparathyroidism (H)     Kidney transplant  recipient     Kidney transplant candidate     Hypotension     Coronary artery disease involving native coronary artery of native heart without angina pectoris     Abnormal cardiovascular stress test     Status post coronary angiogram     Past Medical History:   Diagnosis Date     Anemia in chronic kidney disease      End stage kidney disease (H)      HELLP syndrome      Hepatitis B      HTN (hypertension)      MI (myocardial infarction) (H)      Proteinuria      Secondary hyperparathyroidism (H)      Thrombocytopaenia      Past Surgical History:   Procedure Laterality Date      SECTION  2012     CREATE FISTULA ARTERIOVENOUS UPPER EXTREMITY       CV CORONARY ANGIOGRAM N/A 2019    Procedure: CV CORONARY ANGIOGRAM;  Surgeon: Sabas Tilley MD;  Location: [facility] CARDIAC CATH LAB     DILATION AND CURETTAGE, HYSTEROSCOPY WITH ULTRASOUND GUIDANCE  2019       Social History:  Patient reports that she has never smoked. She has never used smokeless tobacco. She reports that she does not drink alcohol or use drugs.    Family History:  Family History   Problem Relation Age of Onset     Kidney Disease No family hx of      Heart Disease No family hx of        Medications:  Current Outpatient Medications   Medication Sig Dispense Refill     acetaminophen (TYLENOL) 325 MG tablet Take 325-650 mg by mouth as needed for mild pain       amLODIPine (NORVASC) 10 MG tablet Take 1 tablet (10 mg) by mouth daily 30 tablet 0     amLODIPine (NORVASC) 10 MG tablet Take 10 mg by mouth every morning        calcium carbonate (TUMS) 500 MG chewable tablet Take 1,500 mg by mouth 3 times daily       cefuroxime (ZINACEF) 1.5 G vial For allergy tests in allergy clinic only 1 each 0     hydrALAZINE (APRESOLINE) 50 MG tablet Take 25 mg by mouth 3 times daily        losartan (COZAAR) 100 MG tablet Take 1 tablet (100 mg) by mouth daily 30 tablet 0     losartan (COZAAR) 100 MG tablet Take 100 mg by mouth daily         pancuronium (PAVULON) 1 MG/ML injection For allergy tests in allergy clinic only 0.6 mL 0     propofol 100 MG/10ML EMUL injection 10 mLs (100 mg) by Other route once for 1 dose 10 mL 0     rocuronium (ZEMURON) 50 MG/5ML SOLN 2 mLs (20 mg) by Other route once for 1 dose 2 mL 0     rocuronium 10 MG/ML injection Inject 0.58 mLs (5.8 mg) into the vein once for 1 dose 0.58 mL 0     succinylcholine (ANECTINE) 20 MG/ML injection For allergy testing 10 mL 0     Succinylcholine Chloride 100 MG/5ML SOSY Inject 1 ampule as directed once for 1 dose For allergy tests in allergy clinic only 1 Syringe 0     vecuronium (NORCURON) 10 MG injection For allergy testing 5 mL 0     vecuronium bolus NOT from drip (NORCURON) 1 mg/mL injection Inject 5.81 mLs (5.81 mg) into the vein once for 1 dose 5.81 mL 0        Allergies   Allergen Reactions     Blood Transfusion Related (Informational Only)      Patient has a history of a clinically significant antibody against RBC antigens.  A delay in compatible RBCs may occur.     Heparin Flush      Vancomycin          Review of Systems:  -Allergy/Immunology: No history of nickel or other skin allergy. No history of asthma or hay fever., Const: Denies fevers, chills or changes in weight.   -Constitutional: Otherwise feeling well today, in usual state of health.  -HEENT: Patient denies nonhealing oral sores.  -Skin: As above in HPI. No additional skin concerns.    Physical exam:  Vitals: There were no vitals taken for this visit.  GEN: This is a well developed, well-nourished female in no acute distress, in a pleasant mood.    SKIN: Focused examination of the head and neck was performed.  -Phan skin type: IV  -No lesions of concern on areas examined.       DRUGS/SUBSTANCES (10/09/2019)      Prick Tests         Substance/ Allergen Concentration Result (15min) Remarks   + Histamine Hydrochloride (ALK) 0.1 mg/ml ++    - NaCl 0.9% -    1 Cefturoxime Sodium 75 mg/ml (+)    2 Fentanyl 100 mcg/2ml -     3 Lidocane HCl 10mg/ml, 1% -    4 Ondansterone HCl 4mg/2ml -               6 Propofol 100mg/ 10 ml (+)    7 Rocuronium Bromide 10mg/ml (+)    8 Succinylcholine Chloride 50mg/ml -    9 Vecuronium Bromide 4mg/ml -              Intradermal Tests   immed immed delayed delayed      Substance Conc 1st dil 2nd dil   days  days remarks   + Histamine Hydrochloride (ALK) 0.1 mg/ml 10mm P/E       - NaCl 0.9% 2mm       1 Cefturoxime Sodium 1:10 6mmP       2 Fentanyl 1:10 -       3 Lidocane HCl 1:10 -       4 Ondansterone HCl 1:10 -                         6 Propofol 1:10 -       7 Rocuronium Bromide 1:200 3mmP       8 Succinylcholine Chloride 1:500 -       9 Vecuronium Bromide 1:10 -                 Conclusions: based on intradermal tests some indications for allergy to Cefuroxime, but the intradermal test to Rocuronium as well slightly positive. However, no signs for reaction to Vecuronium    Plan prick and intradermal tests with Ceftriaxone, Cefazolin, Ceftazidime, Penicillin G, Ampicillin and Meropenem. Repeat the Rocuronium      Diagnosis:  >> Suspected anaphylactic reaction to intraoperative medications with drop of blood pressure and increase of Tryptase    Reaction with wheel to the cephalosporine Cefuroxime --> test other Cephalosporine and Penicillin antibiotics    Slight reaction with some wheal to Rocuronium, but not Vecuronium (use Vecuronium in future)      CC Mk Bradley MD  [facility]  [address]  [place] [phone] on close of this encounter.  Follow-up in 1 week, earlier for new or changing lesions.             1) Drug Administration Record  Prior to injection, verified patient identity using patient's name and date of birth.  Due to injection administration, patient instructed to remain in clinic for 15 minutes  afterwards, and to report any adverse reaction to me immediately.  Drug Name: Cefuroxime 1.5g/vial  Dose: 1.5ml  Route administered: Patch, prick,  ID  NDC #: 6774-9387-44  Amount of waste(mL):18.5ml  Reason for waste: Single use vial  LOT #: [id number]  SITE: Arm  : [employer]  EXPIRATION DATE: 1/1/22    2) Drug Administration Record  Prior to injection, verified patient identity using patient's name and date of birth.  Due to injection administration, patient instructed to remain in clinic for 15 minutes  afterwards, and to report any adverse reaction to me immediately.  Drug Name: Fentanyl Citrate 100 mcg  Dose: 1.5ml  Route administered: Patch, prick, ID  NDC #: 5681-4214-85  Amount of waste(mL):1.5 ml  Reason for waste: Single use vial  LOT #: [id number]  SITE: Arm  : [employer]  EXPIRATION DATE: 1/1/21    3) Drug Administration Record  Prior to injection, verified patient identity using patient's name and date of birth.  Due to injection administration, patient instructed to remain in clinic for 15 minutes  afterwards, and to report any adverse reaction to me immediately.  Drug Name: Xylocaine 1%  Dose: 1.5ml  Route administered: Patch, prick, ID  NDC #: 55577-816-15  Amount of waste(mL):3.5ml  Reason for waste: Single use vial  LOT #: [id number]  SITE: [facility]  : [employer]  EXPIRATION DATE: 1/1/23    4) Drug Administration Record  Prior to injection, verified patient identity using patient's name and date of birth.  Due to injection administration, patient instructed to remain in clinic for 15 minutes  afterwards, and to report any adverse reaction to me immediately.  Drug Name: Ondansteron 2ml  Dose: 1.5ml  Route administered: Patch, prick, ID  NDC #: 23436-884-52  Amount of waste(mL):0.5  Reason for waste: Single use vial  LOT #: [id number]  SITE: Arm  : [employer]  EXPIRATION DATE: 10/1/2020    6) Drug Administration Record  Prior to injection, verified patient identity using patient's name and date of birth.  Due to injection administration, patient instructed to remain in clinic for 15 minutes  afterwards,  and to report any adverse reaction to me immediately.  Drug Name: Propfol 100 mg/ 10 ml  Dose: 1.5ml  Route administered: Patch, prick, ID  NDC #: 63323-269-10  Amount of waste(mL):8.5ml  Reason for waste: Single use vial  LOT #: [id number]  SITE: Arm  : [employer]  EXPIRATION DATE: 12/1/2020    7) Drug Administration Record  Prior to injection, verified patient identity using patient's name and date of birth.  Due to injection administration, patient instructed to remain in clinic for 15 minutes  afterwards, and to report any adverse reaction to me immediately.  Drug Name: Rocuronium Bromide  Dose: 1.5ml  Route administered: Patch, prick, ID  NDC #: 06112-946-16  Amount of waste(mL):50 mg/ 5 ml  Reason for waste: Single use vial  LOT #:   SITE: Arm  : [employer]  EXPIRATION DATE: 5/1/21    8) Drug Administration Record  Prior to injection, verified patient identity using patient's name and date of birth.  Due to injection administration, patient instructed to remain in clinic for 15 minutes  afterwards, and to report any adverse reaction to me immediately.  Drug Name: Anectine 200 mg / 10 ml  Dose: 1.5ml  Route administered: Patch, prick, ID  NDC #: 7997-2619-41  Amount of waste(mL):8.5ml  Reason for waste: Single use vial  LOT #: [id number]  SITE: Arm  : [employer]  EXPIRATION DATE: 12/1/20    9) Drug Administration Record  Prior to injection, verified patient identity using patient's name and date of birth.  Due to injection administration, patient instructed to remain in clinic for 15 minutes  afterwards, and to report any adverse reaction to me immediately.  Drug Name: Vecuronium Bromide 10mg  Dose: 1.5ml  Route administered: Patch, prick, ID  NDC #: 07555-739-81  Amount of waste(mL):1.5ml  Reason for waste: Single use vial  LOT #: [id number]  SITE: Arm  : [employer]  EXPIRATION DATE: 11/1/2020      Again, thank you for allowing me to participate in the care of your  patient.        Sincerely,        Pierre Palmer MD

## 2019-10-08 NOTE — PROGRESS NOTES
1) Drug Administration Record  Prior to injection, verified patient identity using patient's name and date of birth.  Due to injection administration, patient instructed to remain in clinic for 15 minutes  afterwards, and to report any adverse reaction to me immediately.  Drug Name: Cefuroxime 1.5g/vial  Dose: 1.5ml  Route administered: Patch, prick, ID  NDC #: 0993-8211-93  Amount of waste(mL):18.5ml  Reason for waste: Single use vial  LOT #: [id number]  SITE: Arm  : [employer]  EXPIRATION DATE: 1/1/22    2) Drug Administration Record  Prior to injection, verified patient identity using patient's name and date of birth.  Due to injection administration, patient instructed to remain in clinic for 15 minutes  afterwards, and to report any adverse reaction to me immediately.  Drug Name: Fentanyl Citrate 100 mcg  Dose: 1.5ml  Route administered: Patch, prick, ID  NDC #: 6717-7173-71  Amount of waste(mL):1.5 ml  Reason for waste: Single use vial  LOT #: [id number]  SITE: Arm  : [employer]  EXPIRATION DATE: 1/1/21    3) Drug Administration Record  Prior to injection, verified patient identity using patient's name and date of birth.  Due to injection administration, patient instructed to remain in clinic for 15 minutes  afterwards, and to report any adverse reaction to me immediately.  Drug Name: Xylocaine 1%  Dose: 1.5ml  Route administered: Patch, prick, ID  NDC #: 13174-388-98  Amount of waste(mL):3.5ml  Reason for waste: Single use vial  LOT #: [id number]  SITE: Arm  : [employer]  EXPIRATION DATE: 1/1/23    4) Drug Administration Record  Prior to injection, verified patient identity using patient's name and date of birth.  Due to injection administration, patient instructed to remain in clinic for 15 minutes  afterwards, and to report any adverse reaction to me immediately.  Drug Name: Ondansteron 2ml  Dose: 1.5ml  Route administered: Patch, prick, ID  NDC #: 38245-691-79  Amount of  waste(mL):0.5  Reason for waste: Single use vial  LOT #: [id number]  SITE: Arm  : [employer]  EXPIRATION DATE: 10/1/2020    6) Drug Administration Record  Prior to injection, verified patient identity using patient's name and date of birth.  Due to injection administration, patient instructed to remain in clinic for 15 minutes  afterwards, and to report any adverse reaction to me immediately.  Drug Name: Propfol 100 mg/ 10 ml  Dose: 1.5ml  Route administered: Patch, prick, ID  NDC #: 63323-269-10  Amount of waste(mL):8.5ml  Reason for waste: Single use vial  LOT #: [id number]  SITE: Arm  : [employer]  EXPIRATION DATE: 12/1/2020    7) Drug Administration Record  Prior to injection, verified patient identity using patient's name and date of birth.  Due to injection administration, patient instructed to remain in clinic for 15 minutes  afterwards, and to report any adverse reaction to me immediately.  Drug Name: Rocuronium Bromide  Dose: 1.5ml  Route administered: Patch, prick, ID  NDC #: 34351-652-11  Amount of waste(mL):50 mg/ 5 ml  Reason for waste: Single use vial  LOT #:   SITE: Arm  : [employer]  EXPIRATION DATE: 5/1/21    8) Drug Administration Record  Prior to injection, verified patient identity using patient's name and date of birth.  Due to injection administration, patient instructed to remain in clinic for 15 minutes  afterwards, and to report any adverse reaction to me immediately.  Drug Name: Anectine 200 mg / 10 ml  Dose: 1.5ml  Route administered: Patch, prick, ID  NDC #: 9633-7161-14  Amount of waste(mL):8.5ml  Reason for waste: Single use vial  LOT #: [id number]  SITE: Arm  : [employer]  EXPIRATION DATE: 12/1/20    9) Drug Administration Record  Prior to injection, verified patient identity using patient's name and date of birth.  Due to injection administration, patient instructed to remain in clinic for 15 minutes  afterwards, and to report any  adverse reaction to me immediately.  Drug Name: Vecuronium Bromide 10mg  Dose: 1.5ml  Route administered: Patch, prick, ID  NDC #: 90580-332-80  Amount of waste(mL):1.5ml  Reason for waste: Single use vial  LOT #: [id number]  SITE: [employer]  : [employer]  EXPIRATION DATE: 11/1/2020

## 2019-10-09 ENCOUNTER — TELEPHONE (OUTPATIENT)
Dept: TRANSPLANT | Facility: CLINIC | Age: 44
End: 2019-10-09

## 2019-10-09 NOTE — TELEPHONE ENCOUNTER
Coordinator called dialysis to update them on pt's upcoming allergy and return waitlist appointments. Dialysis is aware they need to help pt arrange mammo next month and that pt needs updated dental.

## 2019-11-18 ENCOUNTER — TELEPHONE (OUTPATIENT)
Dept: TRANSPLANT | Facility: CLINIC | Age: 44
End: 2019-11-18

## 2019-11-18 ENCOUNTER — TELEPHONE (OUTPATIENT)
Dept: NEPHROLOGY | Facility: CLINIC | Age: 44
End: 2019-11-18

## 2019-11-18 NOTE — TELEPHONE ENCOUNTER
Called dialysis and was told the charge nurse, Erika, was out to lunch and to call back in 15-30 minutes. Spoke with Erika at dialysis who doesn't know anything about the dental exam but patient is scheduled for Mammo on 11/20/19 at [facility].

## 2019-11-18 NOTE — TELEPHONE ENCOUNTER
Patient contacted and reminded of upcoming appointment.  Patient confirmed they will be attending.  Patient instructed to bring updated medications list to appointment.Mackenzie Villareal/WILDER  November 18, 2019 11:40 AM

## 2019-11-19 ENCOUNTER — DOCUMENTATION ONLY (OUTPATIENT)
Dept: TRANSPLANT | Facility: CLINIC | Age: 44
End: 2019-11-19

## 2019-11-19 ENCOUNTER — ALLIED HEALTH/NURSE VISIT (OUTPATIENT)
Dept: TRANSPLANT | Facility: CLINIC | Age: 44
End: 2019-11-19
Attending: PHYSICIAN ASSISTANT
Payer: MEDICARE

## 2019-11-19 ENCOUNTER — OFFICE VISIT (OUTPATIENT)
Dept: NEPHROLOGY | Facility: CLINIC | Age: 44
End: 2019-11-19
Attending: PHYSICIAN ASSISTANT
Payer: MEDICARE

## 2019-11-19 ENCOUNTER — OFFICE VISIT (OUTPATIENT)
Dept: ALLERGY | Facility: CLINIC | Age: 44
End: 2019-11-19
Payer: MEDICARE

## 2019-11-19 ENCOUNTER — TELEPHONE (OUTPATIENT)
Dept: ALLERGY | Facility: CLINIC | Age: 44
End: 2019-11-19

## 2019-11-19 VITALS
DIASTOLIC BLOOD PRESSURE: 91 MMHG | BODY MASS INDEX: 21.63 KG/M2 | SYSTOLIC BLOOD PRESSURE: 149 MMHG | TEMPERATURE: 98 F | OXYGEN SATURATION: 99 % | WEIGHT: 126 LBS | HEART RATE: 96 BPM

## 2019-11-19 DIAGNOSIS — Z76.82 ORGAN TRANSPLANT CANDIDATE: ICD-10-CM

## 2019-11-19 DIAGNOSIS — N18.6 ESRD (END STAGE RENAL DISEASE) (H): ICD-10-CM

## 2019-11-19 DIAGNOSIS — T88.6XXA ANAPHYLACTIC REACTION DUE TO ADVERSE EFFECT OF CORRECT DRUG OR MEDICAMENT PROPERLY ADMINISTERED, INITIAL ENCOUNTER: Primary | ICD-10-CM

## 2019-11-19 DIAGNOSIS — Z88.9 DRUG ALLERGY: ICD-10-CM

## 2019-11-19 PROCEDURE — G0463 HOSPITAL OUTPT CLINIC VISIT: HCPCS | Mod: ZF

## 2019-11-19 PROCEDURE — 36415 COLL VENOUS BLD VENIPUNCTURE: CPT | Performed by: INTERNAL MEDICINE

## 2019-11-19 RX ORDER — ROCURONIUM BROMIDE 10 MG/ML
2 INJECTION, SOLUTION INTRAVENOUS ONCE
Qty: 2 ML | Refills: 0 | Status: ON HOLD | OUTPATIENT
Start: 2019-11-19 | End: 2020-01-24

## 2019-11-19 RX ADMIN — AMPICILLIN 1 G: 1 INJECTION, POWDER, FOR SOLUTION INTRAMUSCULAR; INTRAVENOUS at 14:32

## 2019-11-19 RX ADMIN — Medication 250 MG: at 14:36

## 2019-11-19 RX ADMIN — PIPERACILLIN SODIUM, TAZOBACTAM SODIUM 1 MG: 3; .375 INJECTION, POWDER, LYOPHILIZED, FOR SOLUTION INTRAVENOUS at 14:38

## 2019-11-19 RX ADMIN — PENICILLIN G POTASSIUM 5000000 UNITS: 5000000 INJECTION, POWDER, FOR SOLUTION INTRAMUSCULAR; INTRAVENOUS at 14:38

## 2019-11-19 RX ADMIN — Medication 500 MG: at 14:32

## 2019-11-19 RX ADMIN — MEROPENEM 1 MG: 500 INJECTION, POWDER, FOR SOLUTION INTRAVENOUS at 14:07

## 2019-11-19 ASSESSMENT — PAIN SCALES - GENERAL
PAINLEVEL: NO PAIN (0)
PAINLEVEL: NO PAIN (0)

## 2019-11-19 NOTE — PROGRESS NOTES
Kidney Transplant Waitlist - Qualified: 1/2/2012    Paolo Larkin attended the kidney transplant return wait list clinic today. A transplant nurse coordinator visit was completed.    Content reviewed:    Living Donation and how to access that program    Paired exchange    Waiting list issues (right to decline without penalty, high PHS risk donors, what to expect when called with an offer)    Hospital experience,  length of stay , need to stay locally post-discharge (2-4 weeks)                        Post-transplant routines, frequency of lab work, and clinic visits    Post-transplant immunosuppression medications including the need for total medication adherence    Role of the Transplant Coordinator including the transition to post-transplant coordinator at time of transplant    How to access the [facility] website including the pre-transplant module     The patient was provided with the following document: While Waiting for Your Transplant pamphlet    She was provided Jocelyn Brooks's business card and instructed to call with additional questions or concerns.      Summary    Action/Plan and Team Concerns: Pt to be discussed at kidney selection meeting on 11/27/19 (waiting on allergist's note from today). RAFFAELE Milton would like to present pt to determine needed items for pt for active status:    1. Dental  2. Potential renal ultrasound d/t pt being on dialysis since 2012  3. Have team review if further pre anesthesia clinic consult is needed  4. Coordinator to call dialysis to check on pt's compliance  5. Mammogram: scheduled for tomorrow per pt  6. Review allergist's note from 11/19/19     Coordinator reviewed above items with pt and will call pt after next week's meeting. Pt states she is nervous to see a dentist but will make an appoint and call coordinator back with appoint details. Pt also states she is compliant and takes her medication as prescribed.

## 2019-11-19 NOTE — PATIENT INSTRUCTIONS
Diagnosis:  >> Anaphylactic intraoperative reaction with drop of blood pressure and increase of Tryptase    Positive in skin tests to Cephalosporins (Cefazolin, Ceftazidime)    In skin tests no clear reaction to Penicillins and Aminopenicillins    No reaction to Rocuronium/Vecuronium, Fentanyl, Ondansetron, Propofol and Lidocaine    >> based on History itching to Vancomycin in 2012 (patient says that she fainted)?    Proposition:  I would avoid during the next operation certainly the Cephalosporins.   Not absolutely sure about the Penicillins/Aminopenicillins. We could do before the operation an oral provocation test (on IV) in our allergy clinic to Amoxicillin, just to be certain (however, if operation has to be done fast, then I just would avoid Penicillins and Cephalosporins)    During next operation, keep in exact record medications given to patient and be always ready to treat anaphylactic type reaction. Take tryptase again if reaction occurs and refer patient back to allergy clinic.    Could test in a later phase as well Vancomycin in skin tests    --> patient will meet surgeons this afternoon and surgeons can call me on  to discuss this case.

## 2019-11-19 NOTE — PROGRESS NOTES
Transplant Social Work Services        Data: Per transplant coordinator notes, patient requested to see socia work again (most recent transplant social work visit is 7/15/2019). Patient met with patient along with Patricia interpretor. Inquired if patient had any further questions after last visit. Patient stated she didn't. Informed patient coordinator note reported patient had requested to se social work. Patient reported she is unsure why that is and she doesn't have any further questions or concerns at this time. Confirmed patient continues to stay with her aunt and uncle who live in [place] and reviewed post transplant requirements.   Intervention: Face to face visit  Assessment:  Behavior was appropriate during interview. Has adequate income and insurance coverage. Adequate social support. No major contraindications noted for transplant. At this time, patient appears to understand the risks and benefits of transplant.   Education provided by SW: N/A  Plan: CLAUDIA to follow as needed      MALIHA Khalil    Kidney/Pancreas/Auto Islet Transplant Programs

## 2019-11-19 NOTE — NURSING NOTE
Chief Complaint   Patient presents with     RECHECK     Wait list     Blood pressure (!) 149/91, pulse 96, temperature 98  F (36.7  C), temperature source Oral, weight 57.2 kg (126 lb), SpO2 99 %, not currently breastfeeding.    Mackenzie Villareal/WILDER  November 19, 2019 2:48 PM

## 2019-11-19 NOTE — Clinical Note
2019       RE: Paolo Larkin  [address]  [place] [facility] [address]     Dear Colleague,    Thank you for referring your patient, Paolo Larkin, to the [facility]. Please see a copy of my visit note below.    Assessment and Plan:  #Kidney Transplant Wait List Evaluation: Patient is a good candidate overall. Patient should {Wait List Status:268801} on the wait list.    # ESKD from unknown etiology: doing OK on dialysis since 2012, but would likely benefit from a kidney transplant.    # Cardiac Risk: as part of further evaluation of possible allergic reaction to anesthetic during induction for DDKT in 2019, she underwent a coronary angiogram in ***that showed normal coronary arteries. She is ***with exertion.    # : since the event in 2019, she has had cardiac, derm, and allergy/immunology evaluation. ***. PAC clinic visit not completed in ***due to hypertension ***.    # Poor dentition: she is due for dental evaluation.     # Compliance: required an ER visit in ***following hypertension noted during PAC visit and mention of non-compliance with antihypertensives. Patient reports complete compliance with medications and dialysis, and this should be verified by her local providers.    # Renal imaging: she has now been on dialysis for 7+ years. Will update renal US to look for acquired cystic disease/RCC.    # Health Maintenance: Mammogram: scheduled for tomorrow and PAP: Up to date     Discussed the risks and benefits of a transplant, including the risk of surgery and immunosuppression medications.    KDPI: We discussed approximate remaining wait time and how that is influenced by issues such as blood type and sensitization (PRA) and access to a living donor. I contrasted potential waiting time for living vs  donor kidneys from  normal (0-85%) or higher (%) kidney donor profile index (KDPI) donors and their  associated outcomes. I {Would:927832} recommend Ms. Larkin to consider kidneys from high KDPI donors. The reason for this decision is best summarized as: {KDPI REASON:794964639}.    Patient s overall re-evaluation may require further discussion in the Transplant Program s multidisciplinary selection committee for a final recommendation on the patient s suitability for transplant.     Reason for Visit:  Paolo Larkin is a 44-year-old female with ESKD from unknown etiology, who presents for kidney transplant wait list evaluation.     Date of Initial Transplant Evaluation:  June 2013        Current Transplant Phase: Waitlist: Inactive  Official UNOS Listing Date: 1/2/2012  Blood Type: O        cPRA: 39       Date of cPRA: April 2019  Transplant Coordinator: Jocelyn Brooks Transplant Office phone number [phone]    History of Present Illness:   Patricia  present during today's visit.     Ms. 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         Interim Events:        - Admitted April 2019 for possible DDKT. Transplant cancelled due to severe hypotension post-induction with concerns for allergic reaction to rocuronium vs cardiac etiology. Further outpatient evaluation has included a normal coronary angiogram in May 2019. PAC clinic in June 2019, but was sent to ER for further evaluation of hypertension (had been out of some antihypertensives and taking others incorrectly). In the ER, BP meds re-started and re-filled and she was discharged home. Allergy/immunology and derm testing revealed slight reaction to rocuronium on skin patch testing, but not with vecuronium. Follow up this morning ***.         Kidney Disease: ***       Kidney Disease Dx: {FV Renal Tx Renal failure Cause CAPITALIZED:688390}        On Dialysis: {UMP YES NO NEPH:754471122}       Primary Nephrologist: ***          Cardiac/Vascular Disease History:       Known CAD: {FV Renal Tx Known CAD w/ Asessments:176576}       Known PAD/Claudication Symptoms: {YES WITH WILD CARD/NO:79924220}       Known Heart Failure: {Cardiac Risk Factors:296295}       Arrhythmia:  {YES WITH WILD CARD/NO:31576185}       Pulmonary Hypertension: {YES WITH WILD CARD/NO:10587527}        Valvular Disease: {YES WITH WILD CARD/NO:38005881}       Other: {Other Risk Factors:199200}       New Cardiac/Vascular Events: {YES WITH WILD CARD/NO:05368345}         Functional Capacity/Frailty:        She is not physically active, but is able to do normal day-to-day activities without chest pain, SOB, or claudication.     Fatigue/Decreased Energy: [x] No [] Yes    Chest Pain or SOB with Exertion: [x] No [] Yes    Significant Weight Change: [x] No [] Yes    Nausea, Vomiting or Diarrhea: [x] No [] Yes    Fever, Sweats or Chills:  [x] No [] Yes    Leg Swelling [x] No [] Yes        Other Pertinent Transplant Surgical Issues:  Recent Blood Transfusion: {YES WITH WILD CARD/NO:68990006}  Previous Abdominal Transplant: {YES WITH WILD CARD/NO:74451864}  Bladder Dysfunction: {YES WITH WILD CARD/NO:57416366}  Chronic/Recurrent Infections: {YES WITH WILD CARD/NO:28670609}  Chronic Anticoagulation: {YES WITH WILD CARD/NO:79422835}  Jehovah s Witness: {YES WITH WILD CARD/NO:56825314}    Review Of Systems:  {P TX SUMAN:231650957}     Active Problem List:   Patient Active Problem List   Diagnosis     End stage kidney disease (H)     Anemia in chronic kidney disease     HTN (hypertension)     Secondary hyperparathyroidism (H)     Kidney transplant recipient     Kidney transplant candidate     Hypotension     Coronary artery disease involving native coronary artery of native heart without angina pectoris     Abnormal cardiovascular stress test     Status post coronary angiogram       Personal History:  Social History     Socioeconomic History     Marital status:      Spouse name: Not  on file     Number of children: Not on file     Years of education: Not on file     Highest education level: Not on file   Occupational History     Not on file   Social Needs     Financial resource strain: Not on file     Food insecurity:     Worry: Not on file     Inability: Not on file     Transportation needs:     Medical: Not on file     Non-medical: Not on file   Tobacco Use     Smoking status: Never Smoker     Smokeless tobacco: Never Used   Substance and Sexual Activity     Alcohol use: No     Drug use: No     Sexual activity: Not on file   Lifestyle     Physical activity:     Days per week: Not on file     Minutes per session: Not on file     Stress: Not on file   Relationships     Social connections:     Talks on phone: Not on file     Gets together: Not on file     Attends Cheondoism service: Not on file     Active member of club or organization: Not on file     Attends meetings of clubs or organizations: Not on file     Relationship status: Not on file     Intimate partner violence:     Fear of current or ex partner: Not on file     Emotionally abused: Not on file     Physically abused: Not on file     Forced sexual activity: Not on file   Other Topics Concern     Parent/sibling w/ CABG, MI or angioplasty before 65F 55M? Not Asked   Social History Narrative    2 children, ages 23--son and 7--daughter.    .        Allergies:  Allergies   Allergen Reactions     Blood Transfusion Related (Informational Only)      Patient has a history of a clinically significant antibody against RBC antigens.  A delay in compatible RBCs may occur.     Heparin Flush      Vancomycin         Medications:  Current Outpatient Medications   Medication Sig     acetaminophen (TYLENOL) 325 MG tablet Take 325-650 mg by mouth as needed for mild pain     amLODIPine (NORVASC) 10 MG tablet Take 1 tablet (10 mg) by mouth daily     amLODIPine (NORVASC) 10 MG tablet Take 10 mg by mouth every morning      calcium carbonate (TUMS) 500 MG  "chewable tablet Take 1,500 mg by mouth 3 times daily     cefTAZidime (FORTAZ) 1 GM vial For Allergy Testing in Allergy Clinic Only     cefuroxime (ZINACEF) 1.5 G vial For allergy tests in allergy clinic only     hydrALAZINE (APRESOLINE) 50 MG tablet Take 25 mg by mouth 3 times daily      losartan (COZAAR) 100 MG tablet Take 1 tablet (100 mg) by mouth daily     losartan (COZAAR) 100 MG tablet Take 100 mg by mouth daily      pancuronium (PAVULON) 1 MG/ML injection For allergy tests in allergy clinic only     succinylcholine (ANECTINE) 20 MG/ML injection For allergy testing     vecuronium (NORCURON) 10 MG injection For allergy testing     Current Facility-Administered Medications   Medication     ampicillin (OMNIPEN) 1 g vial INTRADERMAL     ceFAZolin (ANCEF) injection 500 mg     cefTRIAXone (ROCEPHIN) injection 250 mg     fentaNYL (PF) (SUBLIMAZE) injection 58 mcg     lidocaine (PF) (XYLOCAINE) 1 % injection 5 mL     lidocaine 1 % 1 mL     meropenem (MERREM) 500 mg vial INTRADERMAL     ondansetron (ZOFRAN) injection 4 mg     penicillin g potassium [id number] unit vial INTRADERMAL     piperacillin-tazobactam (ZOSYN) 3.375 g vial INTRADERMAL        Vitals:  There were no vitals taken for this visit.     Exam:  {EXAM FAST TX:[id number]::\"GENERAL APPEARANCE: alert and no distress\",\"HENT: mouth without ulcers or lesions\",\"LYMPHATICS: no cervical or supraclavicular nodes\",\"RESP: lungs clear to auscultation - no rales, rhonchi or wheezes\",\"CV: regular rhythm, normal rate, no rub, no murmur\",\"EDEMA: no LE edema bilaterally\",\"ABDOMEN: soft, nondistended, nontender, bowel sounds normal\",\"MS: extremities normal - no gross deformities noted, no evidence of inflammation in joints, no muscle tenderness\",\"SKIN: no rash\"}        Again, thank you for allowing me to participate in the care of your patient.      Sincerely,    Felecia Allan PA-C    "

## 2019-11-19 NOTE — PROGRESS NOTES
Assessment and Plan:  #Kidney Transplant Wait List Evaluation: Patient is a good candidate overall. Patient should remain inactive on the wait list pending the following and committee review.    # ESKD from unknown etiology: doing OK on dialysis since 2012, but would likely benefit from a kidney transplant.    # Cardiac Risk: as part of further evaluation of possible allergic reaction to anesthetic during induction for DDKT in 2019, she underwent a coronary angiogram in May 2019 that showed normal coronary arteries and risk assessment in 2019. She is asymptomatic with exertion.    # Possible anaphylaxis: since the event in 2019, she has had cardiac, derm, and allergy/immunology evaluation. PAC clinic visit not completed in due to hypertension and patient was sent to ER. Will have committee review final allergy/immunology and dermatology evaluations.     # Poor dentition: she is due for dental evaluation.     # Compliance: required an ER visit following hypertension noted during PAC visit and mention of non-compliance with antihypertensives. Patient reports complete compliance with medications and dialysis, and this should be verified by her local providers.    # Renal imaging: she has now been on dialysis for 7+ years. Will update renal US to look for acquired cystic disease/RCC.    # Health Maintenance: Mammogram: scheduled for tomorrow and PAP: Up to date     Discussed the risks and benefits of a transplant, including the risk of surgery and immunosuppression medications.    KDPI: We discussed approximate remaining wait time and how that is influenced by issues such as blood type and sensitization (PRA) and access to a living donor. I contrasted potential waiting time for living vs  donor kidneys from  normal (0-85%) or higher (%) kidney donor profile index (KDPI) donors and their associated outcomes. I would not recommend Ms. Larkin to consider kidneys from high KDPI  donors. The reason for this decision is best summarized as: improved long term graft survival.    Patient s overall re-evaluation may require further discussion in the Transplant Program s multidisciplinary selection committee for a final recommendation on the patient s suitability for transplant.     Reason for Visit:  Paolo Larkin is a 44-year-old female with ESKD from unknown etiology, who presents for kidney transplant wait list evaluation.     Date of Initial Transplant Evaluation:  June 2013        Current Transplant Phase: Waitlist: Inactive  Official UNOS Listing Date: 1/2/2012  Blood Type: O        cPRA: 39       Date of cPRA: April 2019  Transplant Coordinator: Jocelyn Brooks Transplant Office phone number [phone]    History of Present Illness:   Patricia  present during today's visit.     Ms. 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         Interim Events:        - Admitted April 2019 for possible DDKT. Transplant cancelled due to severe hypotension post-induction with concerns for allergic reaction to rocuronium vs cardiac etiology. Further outpatient evaluation has included a normal coronary angiogram in May 2019. PAC clinic in June 2019, but was sent to ER for further evaluation of hypertension (had been out of some antihypertensives and taking others incorrectly). In the ER, BP meds re-started and re-filled and she was discharged home. Has also had allergy/immunology and derm testing and has derm follow up today.         Kidney Disease: Dialysis is going well. No new issues.       Kidney Disease Dx: Unknown etiology        On Dialysis: Yes, Date initiated: January 2012 and Dialysis Type: [facility];         Cardiac/Vascular Disease History:       Known CAD: No       Known PAD/Claudication Symptoms: No       Known Heart Failure: No       Arrhythmia:  No        Pulmonary Hypertension: No        Valvular Disease: No       Other: None       New Cardiac/Vascular Events: No         Functional Capacity/Frailty:        She is not physically active, but is able to do normal day-to-day activities without chest pain, SOB, or claudication.     Fatigue/Decreased Energy: [x] No [] Yes    Chest Pain or SOB with Exertion: [x] No [] Yes    Significant Weight Change: [x] No [] Yes    Nausea, Vomiting or Diarrhea: [x] No [] Yes    Fever, Sweats or Chills:  [x] No [] Yes    Leg Swelling [x] No [] Yes        Other Pertinent Transplant Surgical Issues:  Recent Blood Transfusion: No  Previous Abdominal Transplant: No  Bladder Dysfunction: No  Chronic/Recurrent Infections: No  Chronic Anticoagulation: No  Jehovah s Witness: No    Review Of Systems:  A comprehensive review of systems was obtained and negative, except as noted in the HPI or PMH.     Active Problem List:   Patient Active Problem List   Diagnosis     End stage kidney disease (H)     Anemia in chronic kidney disease     HTN (hypertension)     Secondary hyperparathyroidism (H)     Kidney transplant recipient     Kidney transplant candidate     Hypotension     Coronary artery disease involving native coronary artery of native heart without angina pectoris     Abnormal cardiovascular stress test     Status post coronary angiogram       Personal History:  Social History     Socioeconomic History     Marital status:      Spouse name: Not on file     Number of children: Not on file     Years of education: Not on file     Highest education level: Not on file   Occupational History     Not on file   Social Needs     Financial resource strain: Not on file     Food insecurity:     Worry: Not on file     Inability: Not on file     Transportation needs:     Medical: Not on file     Non-medical: Not on file   Tobacco Use     Smoking status: Never Smoker     Smokeless tobacco: Never Used   Substance and Sexual Activity     Alcohol use: No      Drug use: No     Sexual activity: Not on file   Lifestyle     Physical activity:     Days per week: Not on file     Minutes per session: Not on file     Stress: Not on file   Relationships     Social connections:     Talks on phone: Not on file     Gets together: Not on file     Attends Mandaen service: Not on file     Active member of club or organization: Not on file     Attends meetings of clubs or organizations: Not on file     Relationship status: Not on file     Intimate partner violence:     Fear of current or ex partner: Not on file     Emotionally abused: Not on file     Physically abused: Not on file     Forced sexual activity: Not on file   Other Topics Concern     Parent/sibling w/ CABG, MI or angioplasty before 65F 55M? Not Asked   Social History Narrative    2 children, ages 23--son and 7--daughter.    .        Allergies:  Allergies   Allergen Reactions     Blood Transfusion Related (Informational Only)      Patient has a history of a clinically significant antibody against RBC antigens.  A delay in compatible RBCs may occur.     Heparin Flush      Vancomycin         Medications:  Current Outpatient Medications   Medication Sig     acetaminophen (TYLENOL) 325 MG tablet Take 325-650 mg by mouth as needed for mild pain     amLODIPine (NORVASC) 10 MG tablet Take 1 tablet (10 mg) by mouth daily     amLODIPine (NORVASC) 10 MG tablet Take 10 mg by mouth every morning      calcium carbonate (TUMS) 500 MG chewable tablet Take 1,500 mg by mouth 3 times daily     cefTAZidime (FORTAZ) 1 GM vial For Allergy Testing in Allergy Clinic Only     cefuroxime (ZINACEF) 1.5 G vial For allergy tests in allergy clinic only     hydrALAZINE (APRESOLINE) 50 MG tablet Take 25 mg by mouth 3 times daily      losartan (COZAAR) 100 MG tablet Take 1 tablet (100 mg) by mouth daily     losartan (COZAAR) 100 MG tablet Take 100 mg by mouth daily      pancuronium (PAVULON) 1 MG/ML injection For allergy tests in allergy  clinic only     succinylcholine (ANECTINE) 20 MG/ML injection For allergy testing     vecuronium (NORCURON) 10 MG injection For allergy testing     Current Facility-Administered Medications   Medication     ampicillin (OMNIPEN) 1 g vial INTRADERMAL     ceFAZolin (ANCEF) injection 500 mg     cefTRIAXone (ROCEPHIN) injection 250 mg     fentaNYL (PF) (SUBLIMAZE) injection 58 mcg     lidocaine (PF) (XYLOCAINE) 1 % injection 5 mL     lidocaine 1 % 1 mL     meropenem (MERREM) 500 mg vial INTRADERMAL     ondansetron (ZOFRAN) injection 4 mg     penicillin g potassium [id number] unit vial INTRADERMAL     piperacillin-tazobactam (ZOSYN) 3.375 g vial INTRADERMAL        Vitals:  BP (!) 149/91 (BP Location: Right arm, Patient Position: Chair, Cuff Size: Adult Regular)   Pulse 96   Temp 98  F (36.7  C) (Oral)   Wt 57.2 kg (126 lb)   SpO2 99%   BMI 21.63 kg/m       Exam:  GENERAL APPEARANCE: alert and no distress  HENT: mouth without ulcers or lesions. Poor dentition  LYMPHATICS: no cervical or supraclavicular nodes  RESP: lungs clear to auscultation - no rales, rhonchi or wheezes  CV: regular rhythm, normal rate, no rub, no murmur  EDEMA: no LE edema bilaterally  ABDOMEN: soft, nondistended, nontender  MS: extremities normal - no gross deformities noted, no evidence of inflammation in joints, no muscle tenderness  SKIN: no rash

## 2019-11-19 NOTE — LETTER
11/19/2019         RE: Paolo Larkin  [address]  [place] [address]        Dear Colleague,    Thank you for referring your patient, Paolo Larkin, to the [facility]. Please see a copy of my visit note below.    [facility] Dermatology Note      Dermatology Problem List:  1. Suspected anaphylactic reaction to intraoperative medications    Encounter Date: Nov 19, 2019    CC:   No chief complaint on file.        History of Present Illness:  Ms. Paolo Larkin is a 44 year old female who presents for evaluation of suspected anaphylactic reaction to intraoperative medications. She was last seen on 10/1/2019 when she was evaluated by Dr. Palmer who recommended the patient have allergy testing. She returns today to     She has a history of HELLP complicated by MI and ESRD on the wait list for renal transplant since 2012. She was schduled for transplant in April 2019 but developed hypotension at about 11:10 AM after receiving various medications, including lidocaine (10:53), polyethylene glycol ophthalmic solution (10:53), propofol (10:54), rocuronium (10:54), ondansetron (10:54), and fentanyl (11:05). She my have also received cefuroxime preoperatively, although exact time unknown. She had a serum tryptase level of 188 shortly afterwards. Given her intraoperative hypotension, her renal transplant was cancelled for suspected anaphylactic reaction. Her renal transplant is now awaiting allergy evaluation. 2 weeks prior patient had a similar operation for hysteroscopy without problems.     Polyethylene glycol is irrelevant since this was an ophthalmic solution.    Long time ago patient got an antibiotic (2012) and patient passed out then. Unknown which antibiotic, but records from [facility] in 2012 show Vancomycin was listed.    ----------------  Follow up 11/19/2019:    Patient returns to clinic today for continued drug testing. No additional concerns discussed.      Past  Medical History:   Patient Active Problem List   Diagnosis     End stage kidney disease (H)     Anemia in chronic kidney disease     HTN (hypertension)     Secondary hyperparathyroidism (H)     Kidney transplant recipient     Kidney transplant candidate     Hypotension     Coronary artery disease involving native coronary artery of native heart without angina pectoris     Abnormal cardiovascular stress test     Status post coronary angiogram     Past Medical History:   Diagnosis Date     Anemia in chronic kidney disease      End stage kidney disease (H)      HELLP syndrome      Hepatitis B      HTN (hypertension)      MI (myocardial infarction) (H)      Proteinuria      Secondary hyperparathyroidism (H)      Thrombocytopaenia      Past Surgical History:   Procedure Laterality Date      SECTION  2012     CREATE FISTULA ARTERIOVENOUS UPPER EXTREMITY       CV CORONARY ANGIOGRAM N/A 2019    Procedure: CV CORONARY ANGIOGRAM;  Surgeon: Sabas Tilley MD;  Location:  [facility] CARDI[facility] CATH LAB     DILATION AND CURETTAGE, HYSTEROSCOPY WITH ULTRASOUND GUIDANCE  2019       Social History:  Patient reports that she has never smoked. She has never used smokeless tobacco. She reports that she does not drink alcohol or use drugs.    Family History:  Family History   Problem Relation Age of Onset     Kidney Disease No family hx of      Heart Disease No family hx of        Medications:  Current Outpatient Medications   Medication Sig Dispense Refill     acetaminophen (TYLENOL) 325 MG tablet Take 325-650 mg by mouth as needed for mild pain       amLODIPine (NORVASC) 10 MG tablet Take 1 tablet (10 mg) by mouth daily 30 tablet 0     amLODIPine (NORVASC) 10 MG tablet Take 10 mg by mouth every morning        calcium carbonate (TUMS) 500 MG chewable tablet Take 1,500 mg by mouth 3 times daily       cefTAZidime (FORTAZ) 1 GM vial For Allergy Testing in Allergy Clinic Only 1 each 0     cefuroxime (ZINACEF) 1.5  G vial For allergy tests in allergy clinic only 1 each 0     hydrALAZINE (APRESOLINE) 50 MG tablet Take 25 mg by mouth 3 times daily        losartan (COZAAR) 100 MG tablet Take 1 tablet (100 mg) by mouth daily 30 tablet 0     losartan (COZAAR) 100 MG tablet Take 100 mg by mouth daily        pancuronium (PAVULON) 1 MG/ML injection For allergy tests in allergy clinic only 0.6 mL 0     rocuronium (ZEMURON) 50 MG/5ML SOLN 2 mLs (20 mg) by Other route once for 1 dose 2 mL 0     rocuronium 10 MG/ML injection Inject 0.58 mLs (5.8 mg) into the vein once for 1 dose 0.58 mL 0     succinylcholine (ANECTINE) 20 MG/ML injection For allergy testing 10 mL 0     vecuronium (NORCURON) 10 MG injection For allergy testing 5 mL 0        Allergies   Allergen Reactions     Blood Transfusion Related (Informational Only)      Patient has a history of a clinically significant antibody against RBC antigens.  A delay in compatible RBCs may occur.     Heparin Flush      Vancomycin          Review of Systems:  -Allergy/Immunology: No history of nickel or other skin allergy. No history of asthma or hay fever., Const: Denies fevers, chills or changes in weight.   -Constitutional: Otherwise feeling well today, in usual state of health.  -HEENT: Patient denies nonhealing oral sores.  -Skin: As above in HPI. No additional skin concerns.    Physical exam:  Vitals: There were no vitals taken for this visit.  GEN: This is a well developed, well-nourished female in no acute distress, in a pleasant mood.    SKIN: Focused examination of the head and neck was performed.  -Phan skin type: IV  -No lesions of concern on areas examined.       DRUGS/SUBSTANCES (10/09/2019)      Prick Tests         Substance/ Allergen Concentration Result (15min) Remarks   + Histamine Hydrochloride (ALK) 0.1 mg/ml ++    - NaCl 0.9% -    1 Cefturoxime Sodium 75 mg/ml (+)    2 Fentanyl 100 mcg/2ml -    3 Lidocane HCl 10mg/ml, 1% -    4 Ondansterone HCl 4mg/2ml -                6 Propofol 100mg/ 10 ml (+)    7 Rocuronium Bromide 10mg/ml (+)    8 Succinylcholine Chloride 50mg/ml -    9 Vecuronium Bromide 4mg/ml -              Intradermal Tests   immed immed delayed delayed      Substance Conc 1st dil 2nd dil   days  days remarks   + Histamine Hydrochloride (ALK) 0.1 mg/ml 10mm P/E       - NaCl 0.9% 2mm       1 Cefturoxime Sodium 1:10 6mmP       2 Fentanyl 1:10 -       3 Lidocane HCl 1:10 -       4 Ondansterone HCl 1:10 -                         6 Propofol 1:10 -       7 Rocuronium Bromide 1:200 3mmP       8 Succinylcholine Chloride 1:500 -       9 Vecuronium Bromide 1:10 -                 Conclusions: based on intradermal tests some indications for allergy to Cefuroxime, but the intradermal test to Rocuronium as well slightly positive. However, no signs for reaction to Vecuronium      DRUG ALLERGY TEST SERIES 11/19/19        Prick Tests         Substance/ Allergen Conc Result (20 min) Remarks    Histamine Hydrochloride (ALK) 0.1 mg/ml ++     NaCl 0.9% -    1 Cefazolin[1] 100 mg/ml -    2 Ceftriaxone* [2] 100 mg/ml -    3 Ceftazidime[3] 100 mg/ml -    4 Benzylpenicillin (Penicillin G) 1 [place] IU/ml (600 mg) -    5 Ampicillin 100mg/ml -    6 Piperacillin-Tazobactam 33.75 mg/ml -    7 Meropenem (Carbapenem) 100 mg/ml (+)    8 Rocuronium Bromide 10 mg/ml +        Intraderrmal Testing (Placed 11/19/2019)    No Substance Conc.  Readings (15min) Evaluation   1 NaCl  0.9% -    2 Histamine  0.1mg / ml ++ 10 mm papule, 25 mm erytehma        Intradermal Tests   immed  delay delay      Substance Conc 1st dil   days  days remarks   1 Cefazolin[1] 1:5 7mm E/P +/++      2 Ceftriaxone* [2] 1:5 3mmEP (+)      3 Ceftazidime[3] 1:5 5 mmE/P +      4 Benzylpenicillin (Penicillin G) 1:100 0 -      5 Ampicillin 1:10 0 -      6 Piperacillin-Tazobactam 1:10 0 -      7 Meropenem (Carbapenem) 1:100 0 -      8 Rocuronium Bromide 1:200 0 -            Diagnosis:    >> Anaphylactic intraoperative reaction with drop of  blood pressure and increase of Tryptase    Positive in skin tests to Cephalosporins (Cefazolin, Ceftazidime)    In skin tests NO reaction to Penicillins, Aminopenicillins, Piperacillin and Meropenem    No reaction to Rocuronium/Vecuronium, Fentanyl, Ondansetron, Propofol and Lidocaine    >> based on History itching to Vancomycin in 2012 (patient says that she fainted)?    Proposition:      Avoid during the next operation certainly the Cephalosporins.     Not absolutely sure about the Penicillins/Aminopenicillins. We could do before the operation an oral provocation test (with iv access) in our allergy clinic to Amoxicillin, just to be certain (however, if operation has to be done fast, then I just would avoid Penicillins and Cephalosporins)    During next operation, keep in exact record medications given to patient and be always ready to treat anaphylactic type reaction. Take tryptase again if reaction occurs and refer patient back to allergy clinic.    Could test in a later phase as well Vancomycin in skin tests    --> patient will meet surgeons this afternoon and surgeons can call me on  to discuss this case.      CC Mk Bradley MD  [facility]  [address] [place] [address] on close of this encounter.    Follow-up after discussing transplant surgery with nephrology.    Scribe Disclosure  I, Martha Todd, am serving as a scribe to document services personally performed by Dr. Pierre Palmer MD, based on data collection and the provider's statements to me.    I spent a total of 40 minutes face to face with Paolo Larkin during today s office visit. Over 50% of this time was spent counseling the patient and/or coordinating care.  Please see Assessment and Plan for details.  This excludes any time spent performing prick and intradermal tests            1) Drug Administration Record  Prior to injection, verified patient identity using patient's name and date of  birth.  Due to injection administration, patient instructed to remain in clinic for 15 minutes  afterwards, and to report any adverse reaction to me immediately.  Drug Name: Cefazolin 500 mg  Dose: 1.5ml  Route administered: Patch, prick, ID  NDC #: 22798-348-10  Amount of waste(mL):3.5ml  Reason for waste: Single use vial  LOT #: [id number]  SITE: Arms  : [employer] [facility]  EXPIRATION DATE: 01/2021    2) Drug Administration Record  Prior to injection, verified patient identity using patient's name and date of birth.  Due to injection administration, patient instructed to remain in clinic for 15 minutes  afterwards, and to report any adverse reaction to me immediately.  Drug Name: Ceftriaxone 250 mg  Dose: 1.5ml  Route administered: Patch, prick, ID  NDC #: 1823-6130-46  Amount of waste(mL):1.0ml  Reason for waste: Single use vial  LOT #: [id number]  SITE: [facility]  : [employer]  EXPIRATION DATE: 09/2021     3) Drug Administration Record  Prior to injection, verified patient identity using patient's name and date of birth.  Due to injection administration, patient instructed to remain in clinic for 15 minutes  afterwards, and to report any adverse reaction to me immediately.  Drug Name: Ceftazidime 1 G  Dose: 1.5ml  Route administered: Patch, prick, ID  NDC #: 03267-484-28  Amount of waste(mL):8.5ml  Reason for waste: Single use vial  LOT #: [id number]  SITE: Arms  : [facility]  EXPIRATION DATE: 1/2020    4) Drug Administration Record  Prior to injection, verified patient identity using patient's name and date of birth.  Due to injection administration, patient instructed to remain in clinic for 15 minutes  afterwards, and to report any adverse reaction to me immediately.  Drug Name: Penicillin G 5 million Units  Dose: 1.5ml  Route administered: Patch, prick, ID  NDC #: 4818-4442-82  Amount of waste(mL):3.5ml  Reason for waste: Single use vial  LOT #: [id number]  SITE: Arms  : [facility]  [facility]  EXPIRATION DATE: 12/2021    5) Drug Administration Record  Prior to injection, verified patient identity using patient's name and date of birth.  Due to injection administration, patient instructed to remain in clinic for 15 minutes  afterwards, and to report any adverse reaction to me immediately.  Drug Name: Ampicillin 1 G  Dose: 1.5ml  Route administered: Patch, prick, ID  NDC #: 3664-8146-92  Amount of waste(mL):8.5ml  Reason for waste: Single use vial  LOT #: [id number]  SITE: [employer]  : [facility]  EXPIRATION DATE: 02/2021    6) Drug Administration Record  Prior to injection, verified patient identity using patient's name and date of birth.  Due to injection administration, patient instructed to remain in clinic for 15 minutes  afterwards, and to report any adverse reaction to me immediately.  Drug Name: Piperacillin and Tazobactam 3.375 G  Dose: 1.5ml  Route administered: Patch, prick, ID  NDC #: 30421-897-73  Amount of waste(mL):8.5ml  Reason for waste: Single use vial  LOT #: [id number]  SITE: [employer]  : [facility]  EXPIRATION DATE: 07/2020    7) Drug Administration Record  Prior to injection, verified patient identity using patient's name and date of birth.  Due to injection administration, patient instructed to remain in clinic for 15 minutes  afterwards, and to report any adverse reaction to me immediately.  Drug Name: Meropenem 500 mg  Dose: 1.5ml  Route administered: Patch, prick, ID  NDC #: 39979-897-67  Amount of waste(mL):3.5ml  Reason for waste: Single use vial  LOT #: [id number]  SITE: [employer]  : [employer]  EXPIRATION DATE: 11/2021    8) Drug Administration Record  Prior to injection, verified patient identity using patient's name and date of birth.  Due to injection administration, patient instructed to remain in clinic for 15 minutes  afterwards, and to report any adverse reaction to me immediately.  Drug Name: Rocuronium Bromide 10mg/ml  Dose: 1.5ml  Route  administered: Patch, prick, ID  NDC #: [address]-0  Amount of waste(mL):3.5ml  Reason for waste: Single use vial  LOT #: [id number]  SITE: [facility]  : [employer]  EXPIRATION DATE: 7/2021      Again, thank you for allowing me to participate in the care of your patient.        Sincerely,        Pierre Palmer MD

## 2019-11-19 NOTE — PROGRESS NOTES
[facility] Dermatology Note      Dermatology Problem List:  1. Suspected anaphylactic reaction to intraoperative medications    Encounter Date: Nov 19, 2019    CC:   No chief complaint on file.        History of Present Illness:  Ms. Paolo Larkin is a 44 year old female who presents for evaluation of suspected anaphylactic reaction to intraoperative medications. She was last seen on 10/1/2019 when she was evaluated by Dr. Palmer who recommended the patient have allergy testing. She returns today to     She has a history of HELLP complicated by MI and ESRD on the wait list for renal transplant since 2012. She was schduled for transplant in April 2019 but developed hypotension at about 11:10 AM after receiving various medications, including lidocaine (10:53), polyethylene glycol ophthalmic solution (10:53), propofol (10:54), rocuronium (10:54), ondansetron (10:54), and fentanyl (11:05). She my have also received cefuroxime preoperatively, although exact time unknown. She had a serum tryptase level of 188 shortly afterwards. Given her intraoperative hypotension, her renal transplant was cancelled for suspected anaphylactic reaction. Her renal transplant is now awaiting allergy evaluation. 2 weeks prior patient had a similar operation for hysteroscopy without problems.     Polyethylene glycol is irrelevant since this was an ophthalmic solution.    Long time ago patient got an antibiotic (2012) and patient passed out then. Unknown which antibiotic, but records from [facility] in 2012 show Vancomycin was listed.    ----------------  Follow up 11/19/2019:    Patient returns to clinic today for continued drug testing. No additional concerns discussed.      Past Medical History:   Patient Active Problem List   Diagnosis     End stage kidney disease (H)     Anemia in chronic kidney disease     HTN (hypertension)     Secondary hyperparathyroidism (H)     Kidney transplant recipient     Kidney  transplant candidate     Hypotension     Coronary artery disease involving native coronary artery of native heart without angina pectoris     Abnormal cardiovascular stress test     Status post coronary angiogram     Past Medical History:   Diagnosis Date     Anemia in chronic kidney disease      End stage kidney disease (H)      HELLP syndrome      Hepatitis B      HTN (hypertension)      MI (myocardial infarction) (H)      Proteinuria      Secondary hyperparathyroidism (H)      Thrombocytopaenia      Past Surgical History:   Procedure Laterality Date      SECTION  2012     CREATE FISTULA ARTERIOVENOUS UPPER EXTREMITY       CV CORONARY ANGIOGRAM N/A 2019    Procedure: CV CORONARY ANGIOGRAM;  Surgeon: Sabas Tilley MD;  Location: [facility] CARDIAC CATH LAB     DILATION AND CURETTAGE, HYSTEROSCOPY WITH ULTRASOUND GUIDANCE  2019       Social History:  Patient reports that she has never smoked. She has never used smokeless tobacco. She reports that she does not drink alcohol or use drugs.    Family History:  Family History   Problem Relation Age of Onset     Kidney Disease No family hx of      Heart Disease No family hx of        Medications:  Current Outpatient Medications   Medication Sig Dispense Refill     acetaminophen (TYLENOL) 325 MG tablet Take 325-650 mg by mouth as needed for mild pain       amLODIPine (NORVASC) 10 MG tablet Take 1 tablet (10 mg) by mouth daily 30 tablet 0     amLODIPine (NORVASC) 10 MG tablet Take 10 mg by mouth every morning        calcium carbonate (TUMS) 500 MG chewable tablet Take 1,500 mg by mouth 3 times daily       cefTAZidime (FORTAZ) 1 GM vial For Allergy Testing in Allergy Clinic Only 1 each 0     cefuroxime (ZINACEF) 1.5 G vial For allergy tests in allergy clinic only 1 each 0     hydrALAZINE (APRESOLINE) 50 MG tablet Take 25 mg by mouth 3 times daily        losartan (COZAAR) 100 MG tablet Take 1 tablet (100 mg) by mouth daily 30 tablet 0      losartan (COZAAR) 100 MG tablet Take 100 mg by mouth daily        pancuronium (PAVULON) 1 MG/ML injection For allergy tests in allergy clinic only 0.6 mL 0     rocuronium (ZEMURON) 50 MG/5ML SOLN 2 mLs (20 mg) by Other route once for 1 dose 2 mL 0     rocuronium 10 MG/ML injection Inject 0.58 mLs (5.8 mg) into the vein once for 1 dose 0.58 mL 0     succinylcholine (ANECTINE) 20 MG/ML injection For allergy testing 10 mL 0     vecuronium (NORCURON) 10 MG injection For allergy testing 5 mL 0        Allergies   Allergen Reactions     Blood Transfusion Related (Informational Only)      Patient has a history of a clinically significant antibody against RBC antigens.  A delay in compatible RBCs may occur.     Heparin Flush      Vancomycin          Review of Systems:  -Allergy/Immunology: No history of nickel or other skin allergy. No history of asthma or hay fever., Const: Denies fevers, chills or changes in weight.   -Constitutional: Otherwise feeling well today, in usual state of health.  -HEENT: Patient denies nonhealing oral sores.  -Skin: As above in HPI. No additional skin concerns.    Physical exam:  Vitals: There were no vitals taken for this visit.  GEN: This is a well developed, well-nourished female in no acute distress, in a pleasant mood.    SKIN: Focused examination of the head and neck was performed.  -Phan skin type: IV  -No lesions of concern on areas examined.       DRUGS/SUBSTANCES (10/09/2019)      Prick Tests         Substance/ Allergen Concentration Result (15min) Remarks   + Histamine Hydrochloride (ALK) 0.1 mg/ml ++    - NaCl 0.9% -    1 Cefturoxime Sodium 75 mg/ml (+)    2 Fentanyl 100 mcg/2ml -    3 Lidocane HCl 10mg/ml, 1% -    4 Ondansterone HCl 4mg/2ml -               6 Propofol 100mg/ 10 ml (+)    7 Rocuronium Bromide 10mg/ml (+)    8 Succinylcholine Chloride 50mg/ml -    9 Vecuronium Bromide 4mg/ml -              Intradermal Tests   immed immed delayed delayed      Substance Conc 1st  dil 2nd dil   days  days remarks   + Histamine Hydrochloride (ALK) 0.1 mg/ml 10mm P/E       - NaCl 0.9% 2mm       1 Cefturoxime Sodium 1:10 6mmP       2 Fentanyl 1:10 -       3 Lidocane HCl 1:10 -       4 Ondansterone HCl 1:10 -                         6 Propofol 1:10 -       7 Rocuronium Bromide 1:200 3mmP       8 Succinylcholine Chloride 1:500 -       9 Vecuronium Bromide 1:10 -                 Conclusions: based on intradermal tests some indications for allergy to Cefuroxime, but the intradermal test to Rocuronium as well slightly positive. However, no signs for reaction to Vecuronium      DRUG ALLERGY TEST SERIES 11/19/19        Prick Tests         Substance/ Allergen Conc Result (20 min) Remarks    Histamine Hydrochloride (ALK) 0.1 mg/ml ++     NaCl 0.9% -    1 Cefazolin[1] 100 mg/ml -    2 Ceftriaxone* [2] 100 mg/ml -    3 Ceftazidime[3] 100 mg/ml -    4 Benzylpenicillin (Penicillin G) 1 [place] IU/ml (600 mg) -    5 Ampicillin 100mg/ml -    6 Piperacillin-Tazobactam 33.75 mg/ml -    7 Meropenem (Carbapenem) 100 mg/ml (+)    8 Rocuronium Bromide 10 mg/ml +        Intraderrmal Testing (Placed 11/19/2019)    No Substance Conc.  Readings (15min) Evaluation   1 NaCl  0.9% -    2 Histamine  0.1mg / ml ++ 10 mm papule, 25 mm erytehma        Intradermal Tests   immed  delay delay      Substance Conc 1st dil   days  days remarks   1 Cefazolin[1] 1:5 7mm E/P +/++      2 Ceftriaxone* [2] 1:5 3mmEP (+)      3 Ceftazidime[3] 1:5 5 mmE/P +      4 Benzylpenicillin (Penicillin G) 1:100 0 -      5 Ampicillin 1:10 0 -      6 Piperacillin-Tazobactam 1:10 0 -      7 Meropenem (Carbapenem) 1:100 0 -      8 Rocuronium Bromide 1:200 0 -            Diagnosis:    >> Anaphylactic intraoperative reaction with drop of blood pressure and increase of Tryptase    Positive in skin tests to Cephalosporins (Cefazolin, Ceftazidime)    In skin tests NO reaction to Penicillins, Aminopenicillins, Piperacillin and Meropenem    No reaction to  Rocuronium/Vecuronium, Fentanyl, Ondansetron, Propofol and Lidocaine    >> based on History itching to Vancomycin in 2012 (patient says that she fainted)?    Proposition:      Avoid during the next operation certainly the Cephalosporins.     Not absolutely sure about the Penicillins/Aminopenicillins. We could do before the operation an oral provocation test (with iv access) in our allergy clinic to Amoxicillin, just to be certain (however, if operation has to be done fast, then I just would avoid Penicillins and Cephalosporins)    During next operation, keep in exact record medications given to patient and be always ready to treat anaphylactic type reaction. Take tryptase again if reaction occurs and refer patient back to allergy clinic.    Could test in a later phase as well Vancomycin in skin tests    --> patient will meet surgeons this afternoon and surgeons can call me on  to discuss this case.      CC Mk Bradley MD  [facility]  [address] [place] [address] on close of this encounter.    Follow-up after discussing transplant surgery with nephrology.    Scribe Disclosure  I, Martha Todd, am serving as a scribe to document services personally performed by Dr. Pierre Palmer MD, based on data collection and the provider's statements to me.    I spent a total of 40 minutes face to face with Paolo Larkin during today s office visit. Over 50% of this time was spent counseling the patient and/or coordinating care.  Please see Assessment and Plan for details.  This excludes any time spent performing prick and intradermal tests

## 2019-11-19 NOTE — TELEPHONE ENCOUNTER
DRUG ALLERGY TEST SERIES         Prick Tests         Substance/ Allergen Conc Result (20 min) Remarks    Histamine Hydrochloride (ALK) 0.1 mg/ml      NaCl 0.9%      Cefazolin[1] 100 mg/ml      Ceftriaxone* [2] 100 mg/ml      Ceftazidime[3] 100 mg/ml      Benzylpenicillin (Penicillin G) 1 Gilliam IU/ml (600 mg)      Ampicillin 100mg/ml      Piperacillin-Tazobactam 22.5 mg/ml      Meropenem (Carbapenem) 10 mg/ml      Rocuronium Bromide 10 mg/ml        Intradermal Tests   immed immed delay delay      Substance Conc 1st dil  2nd dil  days  days remarks    Histamine Hydrochloride (ALK) 0.1 mg/ml         NaCl 0.9%         Cefazolin[1] 1:5         Ceftriaxone* [2] 1:5         Ceftazidime[3] 1:5         Benzylpenicillin (Penicillin G) 1:100         Ampicillin 1:10         Piperacillin-Tazobactam 1:10         Meropenem (Carbapenem) 1:10         Rocuronium Bromide 1:200        * Only with limited indications      Patch Tests  as is as is 1:2 1:2     As Is  Vas Substance Conc days days days days remarks     Cefazolin[1] 100 mg/ml          Ceftriaxone* [2] 100 mg/ml          Ceftazidime[3] 100 mg/ml          Benzylpenicillin (Penicillin G) 1 Rafael IU/ml (600 mg)          Ampicillin 100 mg/ml          Piperacillin- Tazobactam 22.5 mg/ml          Meropenem (Carbapenem) 10 mg/ml          Rocuronium Bromide 10 mg/ml        *  Methoxyimino-group (crossreactive IgE)  [1]  Cefalexin/Cefazolin-group        [2]    Cefotaxim-group     [3]     Cefuroxim-group    .drugresults

## 2019-11-19 NOTE — PROGRESS NOTES
1) Drug Administration Record  Prior to injection, verified patient identity using patient's name and date of birth.  Due to injection administration, patient instructed to remain in clinic for 15 minutes  afterwards, and to report any adverse reaction to me immediately.  Drug Name: Cefazolin 500 mg  Dose: 1.5ml  Route administered: Patch, prick, ID  NDC #: 57182-325-63  Amount of waste(mL):3.5ml  Reason for waste: Single use vial  LOT #: [id number]  SITE: [email or web address]  : [facility]  EXPIRATION DATE: 01/2021    2) Drug Administration Record  Prior to injection, verified patient identity using patient's name and date of birth.  Due to injection administration, patient instructed to remain in clinic for 15 minutes  afterwards, and to report any adverse reaction to me immediately.  Drug Name: Ceftriaxone 250 mg  Dose: 1.5ml  Route administered: Patch, prick, ID  NDC #: 4905-0282-09  Amount of waste(mL):1.0ml  Reason for waste: Single use vial  LOT #: [id number]  SITE: [facility] [[email or web address]  : [facility]  EXPIRATION DATE: 09/2021     3) Drug Administration Record  Prior to injection, verified patient identity using patient's name and date of birth.  Due to injection administration, patient instructed to remain in clinic for 15 minutes  afterwards, and to report any adverse reaction to me immediately.  Drug Name: Ceftazidime 1 G  Dose: 1.5ml  Route administered: Patch, prick, ID  NDC #: 03034-517-04  Amount of waste(mL):8.5ml  Reason for waste: Single use vial  LOT #: [id number]  SITE: [facility] [[email or web address]  : [facility]  EXPIRATION DATE: 1/2020    4) Drug Administration Record  Prior to injection, verified patient identity using patient's name and date of birth.  Due to injection administration, patient instructed to remain in clinic for 15 minutes  afterwards, and to report any adverse reaction to me immediately.  Drug Name: Penicillin G 5 million Units  Dose: 1.5ml  Route administered: Patch, prick, ID  NDC #: 2448-1386-07  Amount  of waste(mL):3.5ml  Reason for waste: Single use vial  LOT #: [id number]  SITE: [facility]  : [facility]  EXPIRATION DATE: 12/2021    5) Drug Administration Record  Prior to injection, verified patient identity using patient's name and date of birth.  Due to injection administration, patient instructed to remain in clinic for 15 minutes  afterwards, and to report any adverse reaction to me immediately.  Drug Name: Ampicillin 1 G  Dose: 1.5ml  Route administered: Patch, prick, ID  NDC #: [id number]  Amount of waste(mL):8.5ml  Reason for waste: Single use vial  LOT #: [id number]  SITE: [facility]  : [facility]  EXPIRATION DATE: 02/2021    6) Drug Administration Record  Prior to injection, verified patient identity using patient's name and date of birth.  Due to injection administration, patient instructed to remain in clinic for 15 minutes  afterwards, and to report any adverse reaction to me immediately.  Drug Name: Piperacillin and Tazobactam 3.375 G  Dose: 1.5ml  Route administered: Patch, prick, ID  NDC #: 23278-114-00  Amount of waste(mL):8.5ml  Reason for waste: Single use vial  LOT #: [id number]  SITE: [facility]  : [facility]  EXPIRATION DATE: 07/2020    7) Drug Administration Record  Prior to injection, verified patient identity using patient's name and date of birth.  Due to injection administration, patient instructed to remain in clinic for 15 minutes  afterwards, and to report any adverse reaction to me immediately.  Drug Name: Meropenem 500 mg  Dose: 1.5ml  Route administered: Patch, prick, ID  NDC #: 87601-764-14  Amount of waste(mL):3.5ml  Reason for waste: Single use vial  LOT #: [id number]  SITE: [facility]  : [employer]  EXPIRATION DATE: 11/2021    8) Drug Administration Record  Prior to injection, verified patient identity using patient's name and date of birth.  Due to injection administration, patient instructed to remain in clinic for 15 minutes  afterwards, and to  report any adverse reaction to me immediately.  Drug Name: Rocuronium Bromide 10mg/ml  Dose: 1.5ml  Route administered: Patch, prick, ID  NDC #: [address]-5  Amount of waste(mL):3.5ml  Reason for waste: Single use vial  LOT #: [id number]  SITE: [facility]  : [facility]  EXPIRATION DATE: 7/2021

## 2019-11-19 NOTE — LETTER
2019       RE: Paolo Vincent Trae  [address]  [place] [address]     Assessment and Plan:  #Kidney Transplant Wait List Evaluation: Patient is a good candidate overall. Patient should remain inactive on the wait list pending the following and committee review.    # ESKD from unknown etiology: doing OK on dialysis since 2012, but would likely benefit from a kidney transplant.    # Cardiac Risk: as part of further evaluation of possible allergic reaction to anesthetic during induction for DDKT in 2019, she underwent a coronary angiogram in May 2019 that showed normal coronary arteries and risk assessment in 2019. She is asymptomatic with exertion.    # Possible anaphylaxis: since the event in 2019, she has had cardiac, derm, and allergy/immunology evaluation. PAC clinic visit not completed in due to hypertension and patient was sent to ER. Will have committee review final allergy/immunology and dermatology evaluations.     # Poor dentition: she is due for dental evaluation.     # Compliance: required an ER visit following hypertension noted during PAC visit and mention of non-compliance with antihypertensives. Patient reports complete compliance with medications and dialysis, and this should be verified by her local providers.    # Renal imaging: she has now been on dialysis for 7+ years. Will update renal US to look for acquired cystic disease/RCC.    # Health Maintenance: Mammogram: scheduled for tomorrow and PAP: Up to date     Discussed the risks and benefits of a transplant, including the risk of surgery and immunosuppression medications.    KDPI: We discussed approximate remaining wait time and how that is influenced by issues such as blood type and sensitization (PRA) and access to a living donor. I contrasted potential waiting time for living vs  donor kidneys from  normal (0-85%) or higher (%) kidney donor profile index (KDPI) donors and their  associated outcomes. I would not recommend Ms. Larkin to consider kidneys from high KDPI donors. The reason for this decision is best summarized as: improved long term graft survival.    Patient s overall re-evaluation may require further discussion in the Transplant Program s multidisciplinary selection committee for a final recommendation on the patient s suitability for transplant.     Reason for Visit:  Paolo Larkin is a 44-year-old female with ESKD from unknown etiology, who presents for kidney transplant wait list evaluation.     Date of Initial Transplant Evaluation:  June 2013        Current Transplant Phase: Waitlist: Inactive  Official UNOS Listing Date: 1/2/2012  Blood Type: O        cPRA: 39       Date of cPRA: April 2019  Transplant Coordinator: Jocelyn Brooks Transplant Office phone number [phone]    History of Present Illness:   Patricia  present during today's visit.     Ms. 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         Interim Events:        - Admitted April 2019 for possible DDKT. Transplant cancelled due to severe hypotension post-induction with concerns for allergic reaction to rocuronium vs cardiac etiology. Further outpatient evaluation has included a normal coronary angiogram in May 2019. PAC clinic in June 2019, but was sent to ER for further evaluation of hypertension (had been out of some antihypertensives and taking others incorrectly). In the ER, BP meds re-started and re-filled and she was discharged home. Has also had allergy/immunology and derm testing and has derm follow up today.         Kidney Disease: Dialysis is going well. No new issues.       Kidney Disease Dx: Unknown etiology        On Dialysis: Yes, Date initiated: January 2012 and Dialysis Type: [facility];         Cardiac/Vascular Disease History:       Known CAD: No       Known  PAD/Claudication Symptoms: No       Known Heart Failure: No       Arrhythmia:  No       Pulmonary Hypertension: No        Valvular Disease: No       Other: None       New Cardiac/Vascular Events: No         Functional Capacity/Frailty:        She is not physically active, but is able to do normal day-to-day activities without chest pain, SOB, or claudication.     Fatigue/Decreased Energy: [x] No [] Yes    Chest Pain or SOB with Exertion: [x] No [] Yes    Significant Weight Change: [x] No [] Yes    Nausea, Vomiting or Diarrhea: [x] No [] Yes    Fever, Sweats or Chills:  [x] No [] Yes    Leg Swelling [x] No [] Yes        Other Pertinent Transplant Surgical Issues:  Recent Blood Transfusion: No  Previous Abdominal Transplant: No  Bladder Dysfunction: No  Chronic/Recurrent Infections: No  Chronic Anticoagulation: No  Jehovah s Witness: No    Review Of Systems:  A comprehensive review of systems was obtained and negative, except as noted in the HPI or PMH.     Active Problem List:   Patient Active Problem List   Diagnosis     End stage kidney disease (H)     Anemia in chronic kidney disease     HTN (hypertension)     Secondary hyperparathyroidism (H)     Kidney transplant recipient     Kidney transplant candidate     Hypotension     Coronary artery disease involving native coronary artery of native heart without angina pectoris     Abnormal cardiovascular stress test     Status post coronary angiogram       Personal History:  Social History     Socioeconomic History     Marital status:      Spouse name: Not on file     Number of children: Not on file     Years of education: Not on file     Highest education level: Not on file   Occupational History     Not on file   Social Needs     Financial resource strain: Not on file     Food insecurity:     Worry: Not on file     Inability: Not on file     Transportation needs:     Medical: Not on file     Non-medical: Not on file   Tobacco Use     Smoking status: Never  Smoker     Smokeless tobacco: Never Used   Substance and Sexual Activity     Alcohol use: No     Drug use: No     Sexual activity: Not on file   Lifestyle     Physical activity:     Days per week: Not on file     Minutes per session: Not on file     Stress: Not on file   Relationships     Social connections:     Talks on phone: Not on file     Gets together: Not on file     Attends Advent service: Not on file     Active member of club or organization: Not on file     Attends meetings of clubs or organizations: Not on file     Relationship status: Not on file     Intimate partner violence:     Fear of current or ex partner: Not on file     Emotionally abused: Not on file     Physically abused: Not on file     Forced sexual activity: Not on file   Other Topics Concern     Parent/sibling w/ CABG, MI or angioplasty before 65F 55M? Not Asked   Social History Narrative    2 children, ages 23--son and 7--daughter.    .        Allergies:  Allergies   Allergen Reactions     Blood Transfusion Related (Informational Only)      Patient has a history of a clinically significant antibody against RBC antigens.  A delay in compatible RBCs may occur.     Heparin Flush      Vancomycin         Medications:  Current Outpatient Medications   Medication Sig     acetaminophen (TYLENOL) 325 MG tablet Take 325-650 mg by mouth as needed for mild pain     amLODIPine (NORVASC) 10 MG tablet Take 1 tablet (10 mg) by mouth daily     amLODIPine (NORVASC) 10 MG tablet Take 10 mg by mouth every morning      calcium carbonate (TUMS) 500 MG chewable tablet Take 1,500 mg by mouth 3 times daily     cefTAZidime (FORTAZ) 1 GM vial For Allergy Testing in Allergy Clinic Only     cefuroxime (ZINACEF) 1.5 G vial For allergy tests in allergy clinic only     hydrALAZINE (APRESOLINE) 50 MG tablet Take 25 mg by mouth 3 times daily      losartan (COZAAR) 100 MG tablet Take 1 tablet (100 mg) by mouth daily     losartan (COZAAR) 100 MG tablet Take 100 mg  by mouth daily      pancuronium (PAVULON) 1 MG/ML injection For allergy tests in allergy clinic only     succinylcholine (ANECTINE) 20 MG/ML injection For allergy testing     vecuronium (NORCURON) 10 MG injection For allergy testing     Current Facility-Administered Medications   Medication     ampicillin (OMNIPEN) 1 g vial INTRADERMAL     ceFAZolin (ANCEF) injection 500 mg     cefTRIAXone (ROCEPHIN) injection 250 mg     fentaNYL (PF) (SUBLIMAZE) injection 58 mcg     lidocaine (PF) (XYLOCAINE) 1 % injection 5 mL     lidocaine 1 % 1 mL     meropenem (MERREM) 500 mg vial INTRADERMAL     ondansetron (ZOFRAN) injection 4 mg     penicillin g potassium [id number] unit vial INTRADERMAL     piperacillin-tazobactam (ZOSYN) 3.375 g vial INTRADERMAL        Vitals:  BP (!) 149/91 (BP Location: Right arm, Patient Position: Chair, Cuff Size: Adult Regular)   Pulse 96   Temp 98  F (36.7  C) (Oral)   Wt 57.2 kg (126 lb)   SpO2 99%   BMI 21.63 kg/m       Exam:  GENERAL APPEARANCE: alert and no distress  HENT: mouth without ulcers or lesions. Poor dentition  LYMPHATICS: no cervical or supraclavicular nodes  RESP: lungs clear to auscultation - no rales, rhonchi or wheezes  CV: regular rhythm, normal rate, no rub, no murmur  EDEMA: no LE edema bilaterally  ABDOMEN: soft, nondistended, nontender  MS: extremities normal - no gross deformities noted, no evidence of inflammation in joints, no muscle tenderness  SKIN: no rash              Felecia Allan PA-C

## 2019-11-19 NOTE — NURSING NOTE
Allergy Rooming Note    Paolo Larkin's goals for this visit include:   Chief Complaint   Patient presents with     Allergy Testing Followup     Paolo is here for allergy drug testing      Arline Graham LPN

## 2019-11-20 ENCOUNTER — TRANSFERRED RECORDS (OUTPATIENT)
Dept: HEALTH INFORMATION MANAGEMENT | Facility: CLINIC | Age: 44
End: 2019-11-20

## 2019-12-04 ENCOUNTER — TELEPHONE (OUTPATIENT)
Dept: TRANSPLANT | Facility: CLINIC | Age: 44
End: 2019-12-04

## 2019-12-04 ENCOUNTER — COMMITTEE REVIEW (OUTPATIENT)
Dept: TRANSPLANT | Facility: CLINIC | Age: 44
End: 2019-12-04

## 2019-12-04 NOTE — COMMITTEE REVIEW
Abdominal Committee Review Note     Evaluation Date: 6/20/2013  Committee Review Date: 12/4/2019    Organ being evaluated for: Kidney    Transplant Phase: Waitlist  Transplant Status: Inactive    Transplant Coordinator: Jocelyn Brooks  Transplant Surgeon:       Referring Physician: Marianne Gupta    Primary Diagnosis: Other, Specify - HELLP SYNDROME  Secondary Diagnosis:     Committee Review Members:  Nephrology Pasha Yoon, APRN CNP, Emile Lopez MD   Nurse Rachel Lester, RN, Daniela Seth, RN   Nutrition Lesly Clemente,    Pharmacy Daron Bowens, [facility]    - Clinical [place]hadijah Rowell, [facility] Ivanna Kline, MSW   Transplant Felecia Allan PA-C, Susi Otero, KULWANT, Lou Mario, RN, Jocelyn Brooks, KULWANT, Apple Bowen, NP, Paulina Davison, RN, Kika Gamble, RN   Transplant Surgery Talisha Marshall MD, MD       Transplant Eligibility:     Committee Review Decision: Remain Inactive    Relative Contraindications:     Absolute Contraindications:     Committee Chair Talisha Marshall MD verbally attested to the committee's decision.    Committee Discussion Details: RAFFAELE Milton presented pt to selection committee. Team reviewed Dr. Palmer's notes (11/19/19 & 10/8/2019). Team feels pt now needs to:    1.  see PACU team for OR plan    2. Renal ultrasound d/t 7 year dialysis h/o and to look for acquired cystic disease/RCC    3. Dental: pt needs to schedule but will not hold up activation status    4. Find out how many pregnancies pt has had. Wondering if pt had HELLP with each pregnancy. Dr. Lopez would like panel send out for possible underlying TMA depending upon pt's response.     Coordinator called and spoke w/ pt using  services. Pt has had 2 pregnancies and only had HELLP w/ her 2nd pregnancy. Pt had no issues with first pregnancy and carried to term. Per Dr. Lopez, pt does NOT need to complete further testing for  possible TMA.

## 2019-12-04 NOTE — TELEPHONE ENCOUNTER
Coordinator called dialysis to review kidney selection meeting from today. See note dated 12/4/19 for details. Dialysis states they spoke w/ pt today and gave her different dental clinic options and will f/u with her to make sure she makes an appointments.    Coordinator called pt using  services. Coordinator review kidney selection meeting details from today. Pt is aware our schedulers will be contacting her to schedule PACU and renal US. Pt is working on scheduling dental appointment and will notify coordinator once scheduled.    Pt states she has had two pregnancies. Her first pregnancy she had no complications and did not have pre-eclampsia. Her second pregnancy in 2010 she had pre-eclampsia. Coordinator will notify nephrologist to determine if TMA testing is needed. Pt verbalizes understanding and has no further questions at this time.    12/9/19: Coordinator called pt, using  services. Pt does not need to complete any testing for possible TMA since pt had a normal first pregnancy and carried to term per Dr. Lopez. Pt is aware and has no additional questions at this time.

## 2019-12-04 NOTE — TELEPHONE ENCOUNTER
Coordinator spoke to dialysis. Dialysis reports pt being very compliant and always shows up to runs and takes medications as prescribed. There has been a few incidents were with her language barrier they've needed to re-educate pt on medication changes.    Pt is on our selection agenda and coordinator will call dialysis after meeting. Dialysis center is aware pt needs to schedule an appoint w/ a dentist and needs to complete this prior to activation consideration.

## 2019-12-05 DIAGNOSIS — Z76.82 AWAITING ORGAN TRANSPLANT: ICD-10-CM

## 2019-12-05 DIAGNOSIS — N18.6 ESRD (END STAGE RENAL DISEASE) (H): Primary | ICD-10-CM

## 2019-12-10 ENCOUNTER — TELEPHONE (OUTPATIENT)
Dept: TRANSPLANT | Facility: CLINIC | Age: 44
End: 2019-12-10

## 2019-12-11 NOTE — TELEPHONE ENCOUNTER
FUTURE VISIT INFORMATION      SURGERY INFORMATION:    unknown date - wait list, kidney transplant,  or  or , per magali @ transplant    RECORDS REQUESTED FROM:       Primary Care Provider: Lorna Clancy APRN [facility]    Pertinent Medical History: hypertension, hypotension, CAD    Most recent EKG+ Tracin19    Most recent ECHO: 8/18/15    Most recent Cardiac Stress Test: 18    Most recent Coronary Angiogram: 19

## 2019-12-19 ENCOUNTER — PRE VISIT (OUTPATIENT)
Dept: SURGERY | Facility: CLINIC | Age: 44
End: 2019-12-19

## 2019-12-19 ENCOUNTER — ANESTHESIA EVENT (OUTPATIENT)
Dept: SURGERY | Facility: CLINIC | Age: 44
End: 2019-12-19

## 2019-12-19 ENCOUNTER — OFFICE VISIT (OUTPATIENT)
Dept: SURGERY | Facility: CLINIC | Age: 44
End: 2019-12-19
Attending: PHYSICIAN ASSISTANT
Payer: MEDICARE

## 2019-12-19 ENCOUNTER — ANCILLARY PROCEDURE (OUTPATIENT)
Dept: ULTRASOUND IMAGING | Facility: CLINIC | Age: 44
End: 2019-12-19
Attending: PHYSICIAN ASSISTANT
Payer: MEDICARE

## 2019-12-19 VITALS
WEIGHT: 126 LBS | HEIGHT: 63 IN | DIASTOLIC BLOOD PRESSURE: 86 MMHG | RESPIRATION RATE: 16 BRPM | BODY MASS INDEX: 22.32 KG/M2 | SYSTOLIC BLOOD PRESSURE: 148 MMHG | HEART RATE: 110 BPM | OXYGEN SATURATION: 100 %

## 2019-12-19 DIAGNOSIS — Z01.818 PREOP EXAMINATION: Primary | ICD-10-CM

## 2019-12-19 DIAGNOSIS — N18.6 ESRD (END STAGE RENAL DISEASE) (H): ICD-10-CM

## 2019-12-19 DIAGNOSIS — Z76.82 AWAITING ORGAN TRANSPLANT: ICD-10-CM

## 2019-12-19 RX ORDER — SEVELAMER CARBONATE 800 MG/1
1600 TABLET, FILM COATED ORAL
Status: ON HOLD | COMMUNITY
Start: 2019-11-04 | End: 2020-11-19

## 2019-12-19 ASSESSMENT — LIFESTYLE VARIABLES: TOBACCO_USE: 0

## 2019-12-19 ASSESSMENT — MIFFLIN-ST. JEOR: SCORE: 1197.4

## 2019-12-19 ASSESSMENT — PAIN SCALES - GENERAL: PAINLEVEL: NO PAIN (0)

## 2019-12-19 NOTE — H&P
Pre-Operative H & P     CC:  Preoperative exam to assess for increased cardiopulmonary risk while undergoing surgery and anesthesia.    Date of Encounter: 12/19/2019  Primary Care Physician:  Lorna Clancy  Associated Diagnosis: ESRD    MITCHELL Larkin is a 44 year old female who presents for pre-operative H & P in preparation for kidney transplant with TBD on TBD at [facility].  Visit was completed with the aid of an  via iPad.    This is a 43 year old female patient with hypertension, ESRD on hemodialysis since 2012, history of vasospastic ST elevation MI (2008), and HELLP.  Etiology of kidney disease is unknown (no biopsy) and she has been on the kidney transplant list since 2012.  She was taken to OR for transplant, 4/23/2019, but had a hypotensive episode 15 minutes after induction and it is thought she had an allergic reaction to rocuronium. Surgery was aborted. She was sent to cardiology for evaluation as she has a history of an abnormal stress echo.  Recent coronary angiogram 6/18/2019 revealed normal coronary arteries.     Attending anesthesiologist note from that event: Patient experienced severe hypotension approximately 15 minutes post induction. Event concerning for possible allergic reaction (possibly rocuronium). Serum trypase sent in or. Patient treated as if she was having allergic reaction. Patient stabilized in OR, case had to be canceled secondary to donor kidney being denervated and pressors would cause graft rejection. Patient extubated in OR. Patient moving all extremities. Serum trypase pending would consider using alternate paralytic in interim. Patient responsive talking and moving extremities in pacu.    Pt has seen and been evaluated by allergy/derm, Dr. Carter.  Please refer to his notes in chart review.     History is obtained from the patient.     Past Medical History  Past Medical History:   Diagnosis Date     Anemia  in chronic kidney disease      End stage kidney disease (H)      HELLP syndrome      Hepatitis B      HTN (hypertension)      MI (myocardial infarction) (H)      Proteinuria      Secondary hyperparathyroidism (H)      Thrombocytopaenia        Past Surgical History  Past Surgical History:   Procedure Laterality Date      SECTION  2012     CREATE FISTULA ARTERIOVENOUS UPPER EXTREMITY       CV CORONARY ANGIOGRAM N/A 2019    Procedure: CV CORONARY ANGIOGRAM;  Surgeon: Sabas Tilley MD;  Location:  [facility]ARD[facility]ATH LAB     DILATION AND CURETTAGE, HYSTEROSCOPY WITH ULTRASOUND GUIDANCE  2019       Hx of Blood transfusions/reactions: yes, with RBC antibodies     Hx of abnormal bleeding or anti-platelet use: none    Menstrual history: No LMP recorded. (Menstrual status: Amenorrhea).:     Steroid use in the last year: none    Personal or FH with difficulty with Anesthesia:  Personal history of hypotension after induction 2019.  Elevated tryptase.  Subsequent workup and evaluation with allergy/derm.      Prior to Admission Medications  Current Outpatient Medications   Medication Sig Dispense Refill     acetaminophen (TYLENOL) 325 MG tablet Take 325-650 mg by mouth as needed for mild pain       amLODIPine (NORVASC) 10 MG tablet Take 10 mg by mouth every morning        calcium carbonate (TUMS) 500 MG chewable tablet Take 1,500 mg by mouth 3 times daily       hydrALAZINE (APRESOLINE) 50 MG tablet Take 25 mg by mouth 3 times daily        losartan (COZAAR) 100 MG tablet Take 100 mg by mouth every morning        sevelamer (RENVELA) 800 MG tablet Take 1,600 mg by mouth       cefTAZidime (FORTAZ) 1 GM vial For Allergy Testing in Allergy Clinic Only 1 each 0     cefuroxime (ZINACEF) 1.5 G vial For allergy tests in allergy clinic only 1 each 0     pancuronium (PAVULON) 1 MG/ML injection For allergy tests in allergy clinic only 0.6 mL 0     succinylcholine (ANECTINE) 20 MG/ML injection For allergy  testing 10 mL 0     vecuronium (NORCURON) 10 MG injection For allergy testing (Patient not taking: Reported on 11/19/2019) 5 mL 0       Allergies  Allergies   Allergen Reactions     Blood Transfusion Related (Informational Only)      Patient has a history of a clinically significant antibody against RBC antigens.  A delay in compatible RBCs may occur.     Cephalosporins Anaphylaxis     Per [facility] allergist report      Cefuroxime Other (See Comments)     Reaction with wheel per allergist at [facility] - plans to test other Cephalosporine and Penicillin antibiotics     Suspected anaphylactic reaction - hypotension and increase of Tryptase intraop      Heparin Flush      Penicillin G Unknown     Avoid if at all possible from [facility] allergist report - ? Allergy      Rocuronium Other (See Comments)     Slightly positive intradermal test at [facility]    Suspected anaphylactic reaction - hypotension and increase of Tryptase intraop      Vancomycin        Social History  Social History     Socioeconomic History     Marital status:      Spouse name: Not on file     Number of children: Not on file     Years of education: Not on file     Highest education level: Not on file   Occupational History     Not on file   Social Needs     Financial resource strain: Not on file     Food insecurity:     Worry: Not on file     Inability: Not on file     Transportation needs:     Medical: Not on file     Non-medical: Not on file   Tobacco Use     Smoking status: Never Smoker     Smokeless tobacco: Never Used   Substance and Sexual Activity     Alcohol use: No     Drug use: No     Sexual activity: Not on file   Lifestyle     Physical activity:     Days per week: Not on file     Minutes per session: Not on file     Stress: Not on file   Relationships     Social connections:     Talks on phone: Not on file     Gets together: Not on file     Attends [employer] service: Not on file     Active member of club or organization: Not on file      Attends meetings of clubs or organizations: Not on file     Relationship status: Not on file     Intimate partner violence:     Fear of current or ex partner: Not on file     Emotionally abused: Not on file     Physically abused: Not on file     Forced sexual activity: Not on file   Other Topics Concern     Parent/sibling w/ CABG, MI or angioplasty before 65F 55M? Not Asked   Social History Narrative    2 children, ages 23--son and 7--daughter.    .       Family History  Family History   Problem Relation Age of Onset     Kidney Disease No family hx of      Heart Disease No family hx of            Anesthesia Evaluation     . Pt has had prior anesthetic.     History of anesthetic complications    hypotensive after induction 4/2019, procedure aborted      ROS/MED HX  The complete review of systems is negative other than noted in the HPI or here.   ENT/Pulmonary:     (+)EDIS risk factors hypertension, , . .   (-) tobacco use and asthma   Neurologic:  - neg neurologic ROS     Cardiovascular:     (+) hypertension--CAD, -past MI,-. : . . . :. . Previous cardiac testing date:results:date: results:ECG reviewed date:6/18/2019 results:Cath date: 6/18/2019 results:          METS/Exercise Tolerance:  >4 METS   Hematologic:     (+) History of Transfusion previous transfusion reaction - RBC antibodies, -      Musculoskeletal:  - neg musculoskeletal ROS       GI/Hepatic:     (+) hepatitis type B,       Renal/Genitourinary:     (+) chronic renal disease, type: ESRD, Pt requires dialysis, type: Hemodialysis, Pt has no history of transplant,       Endo:  - neg endo ROS       Psychiatric:  - neg psychiatric ROS       Infectious Disease:  - neg infectious disease ROS       Malignancy:      - no malignancy   Other:    - neg other ROS         PHYSICAL EXAM:   Mental Status/Neuro: A/A/O; Age Appropriate   Airway: Facies: Feasible  Mallampati: I  Mouth/Opening: Full  TM distance: > 6 cm  Neck ROM: Full   Respiratory: Auscultation:  "CTAB     Resp. Rate: Normal     Resp. Effort: Normal      CV: Rhythm: Regular  Rate: Age appropriate  Heart: soft systolic murmur  Edema: None   Comments:            BP: (!) 148/86 Pulse: 110   Resp: 16 SpO2: 100 %         126 lbs 0 oz  5' 2.795\"   Body mass index is 22.47 kg/m .       Physical Exam  Constitutional: Awake, alert, cooperative, no apparent distress, and appears stated age.  Eyes: Pupils equal, round and reactive to light, extra ocular muscles intact, sclera clear, conjunctiva normal.  HENT: Normocephalic, oral pharynx with moist mucus membranes, poor dentition. No goiter appreciated.   Respiratory: Clear to auscultation bilaterally, no crackles or wheezing.  Cardiovascular: Regular rate and rhythm, normal S1 and S2, and soft systolic murmur noted.  Carotids +2, no bruits. No edema. Palpable pulses to radial  DP and PT arteries.   GI: Normal bowel sounds, soft, non-distended, non-tender  Lymph/Hematologic: No cervical lymphadenopathy and no supraclavicular lymphadenopathy.  Genitourinary:  deferred  Skin: Warm and dry.  No rashes at anticipated surgical site.   Musculoskeletal: Full ROM of neck. There is no redness, warmth, or swelling of the joints. Gross motor strength is normal.    Neurologic: Awake, alert, oriented to name, place and time. Cranial nerves II-XII are grossly intact. Gait is normal.   Neuropsychiatric: Calm, cooperative. Normal affect.     Labs: (personally reviewed)  12/18/2019:  Hemoglobin 11.5 - 15.8 g/dL 10.0Low     12/4/2019:  BUN 6 - 22 mg/dL 44High     Creatinine 0.60 - 1.10 mg/dL 11.09High     BUN/Creatinine Ratio 10.0 - 25.0  4.0Low     Albumin 3.5 - 5.2 g/dL 3.8    Calcium 8.5 - 10.5 mg/dL 9.3    Phosphorus 2.3 - 4.7 mg/dL 5.3High     Sodium 136 - 145 meq/L 143    Potassium 3.5 - 5.1 meq/L 3.8    Chloride 98 - 109 meq/L 100    CO2 20 - 29 meq/L 26    Anion Gap with K 6 - 20 meq/L 21High     Corrected Calcium  mg/dL 9.5    Age  Years 44    eGFR Non- >=60 " "mL/min/1.73m2 4Low     eGFR  >=60 mL/min/1.73m2 5Low       EKG: Personally reviewed but formal cardiology read pendin2019  SR with frequent PVC  Possible LAE    Coronary Angiogram: 2019:  Normal coronary arteries      Outside records reviewed from: care everywhere    ASSESSMENT and PLAN  Paolo Larkin is a 44 year old female scheduled for kidney transplant on TBD by TBD in treatment of ESRD.  PAC referral for risk assessment and optimization for anesthesia with comorbid conditions of hypertension, hypotension after anesthesia induction 2019 with elevated tryptase, h/o vasospastic MI, h/o HELLP:    Pre-operative considerations:  1.  Cardiac:  Functional status- METS >4.  Pt does not exercise routinely but does all her own housework and denies cardiopulmonary symptoms.  High risk surgery with 11% (RCRI 3) risk of major adverse cardiac event.  -denies CP, SOB, MARIN, palpitations  -history of vasospastic MI 2012  -hypertension will hold losartan DOS, will take amlodipine and hydralazine DOS  -coronary angiogram 2019: normal coronary arteries  -EKG 2019: SR with frequent PVC, possible LAE  -seen by cardiology 2019 without further workup needed: \"With recent coronary angiogram, no further testing is needed prior to transplant. Proceed with renal transplant at an acceptable perioperative risk. If she does not receive a transplant within 2 years time, return to cardiology clinic for repeat evaluation. If future stress testing is needed would recommend nuclear MPI rather than dobutamine stress echo due to resting WMA.\"   -VTE risk:  0.26%    2.  Pulm:  Airway feasible.  EDIS risk: low  -never smoker  -denies pulmonary history/symptoms    3.  GI:  Risk of PONV score = 3.  If > 2, anti-emetic intervention recommended.    4.  Renal:  -ESRD of unknown etiology, HD since .  -transplant waitlist    5.  taken to OR for transplant in 2019, aborted due to hypotension after induction with " "elevated tryptase. Subsequent allergy testing revealed allergy to cephalosporin. See Dr. Palmer's note from 11/19/2019.    \"Anaphylactic intraoperative reaction with drop of blood pressure and increase of Tryptase    Positive in skin tests to Cephalosporins (Cefazolin, Ceftazidime)    In skin tests NO reaction to Penicillins, Aminopenicillins, Piperacillin and Meropenem    No reaction to Rocuronium/Vecuronium, Fentanyl, Ondansetron, Propofol and Lidocaine     >> based on History itching to Vancomycin in 2012 (patient says that she fainted)?  Proposition:    Avoid during the next operation certainly the Cephalosporins.     Not absolutely sure about the Penicillins/Aminopenicillins. We could do before the operation an oral provocation test (with iv access) in our allergy clinic to Amoxicillin, just to be certain (however, if operation has to be done fast, then I just would avoid Penicillins and Cephalosporins)  During next operation, keep in exact record medications given to patient and be always ready to treat anaphylactic type reaction. Take tryptase again if reaction occurs and refer patient back to allergy clinic.\"    Patient was discussed with Dr Sparks.    Sayra Correa PA-C  Preoperative Assessment Center  [facility]  Phone: [phone]  Fax: [phone]  "

## 2019-12-19 NOTE — ANESTHESIA PREPROCEDURE EVALUATION
Anesthesia Pre-Procedure Evaluation    Patient: Paolo Vincent Trae   MRN:     [id number] Gender:   female   Age:    44 year old :      1975        Preoperative Diagnosis: * No surgery found *        Past Medical History:   Diagnosis Date     Anemia in chronic kidney disease      End stage kidney disease (H)      HELLP syndrome      Hepatitis B      HTN (hypertension)      MI (myocardial infarction) (H)      Proteinuria      Secondary hyperparathyroidism (H)      Thrombocytopaenia       Past Surgical History:   Procedure Laterality Date      SECTION  2012     CREATE FISTULA ARTERIOVENOUS UPPER EXTREMITY       CV CORONARY ANGIOGRAM N/A 2019    Procedure: CV CORONARY ANGIOGRAM;  Surgeon: Sabas Tilley MD;  Location:  HEART CARDIAC CATH LAB     DILATION AND CURETTAGE, HYSTEROSCOPY WITH ULTRASOUND GUIDANCE  2019          Anesthesia Evaluation     . Pt has had prior anesthetic.     History of anesthetic complications    hypotensive after induction 2019, procedure aborted      ROS/MED HX    ENT/Pulmonary:     (+)EDIS risk factors hypertension, , . .   (-) tobacco use and asthma   Neurologic:  - neg neurologic ROS     Cardiovascular:     (+) hypertension--CAD, -past MI,-. : . . . :. . Previous cardiac testing date:results:date: results:ECG reviewed date:2019 results:Cath date: 2019 results:          METS/Exercise Tolerance:  >4 METS   Hematologic:     (+) History of Transfusion previous transfusion reaction - RBC antibodies, -      Musculoskeletal:  - neg musculoskeletal ROS       GI/Hepatic:     (+) hepatitis type B,       Renal/Genitourinary:     (+) chronic renal disease, type: ESRD, Pt requires dialysis, type: Hemodialysis, Pt has no history of transplant,       Endo:  - neg endo ROS       Psychiatric:  - neg psychiatric ROS       Infectious Disease:  - neg infectious disease ROS       Malignancy:      - no malignancy   Other:    - neg other ROS                      PHYSICAL EXAM:   Mental Status/Neuro: A/A/O; Age Appropriate   Airway: Facies: Feasible  Mallampati: I  Mouth/Opening: Full  TM distance: > 6 cm  Neck ROM: Full   Respiratory: Auscultation: CTAB     Resp. Rate: Normal     Resp. Effort: Normal      CV: Rhythm: Regular  Rate: Age appropriate  Heart: Normal Sounds  Edema: None   Comments:                      LABS:  CBC:   Lab Results   Component Value Date    WBC 5.4 06/18/2019    WBC 27.7 (H) 04/24/2019    HGB 12.4 06/18/2019    HGB 9.4 (L) 04/24/2019    HCT 41.5 06/18/2019    HCT 29.9 (L) 04/24/2019     06/18/2019     04/24/2019     BMP:   Lab Results   Component Value Date     06/18/2019     04/24/2019    POTASSIUM 4.8 06/18/2019    POTASSIUM 5.2 04/24/2019    CHLORIDE 97 06/18/2019    CHLORIDE 97 04/24/2019    CO2 28 06/18/2019    CO2 24 04/24/2019    BUN 50 (H) 06/18/2019    BUN 69 (H) 04/24/2019    CR 9.99 (H) 06/18/2019    CR 9.72 (H) 04/24/2019    GLC 84 06/18/2019     (H) 04/24/2019     COAGS:   Lab Results   Component Value Date    PTT 33 04/22/2019    INR 0.92 04/22/2019     POC:   Lab Results   Component Value Date    BGM 79 04/23/2019    HCGS Negative 06/20/2013     OTHER:   Lab Results   Component Value Date    PH 7.40 04/23/2019    LACT 2.5 (H) 04/24/2019    A1C 4.4 04/22/2019    HOLLIS 9.4 06/18/2019    PHOS 4.0 04/24/2019    MAG 2.4 (H) 04/24/2019    ALBUMIN 3.6 04/22/2019    PROTTOTAL 7.8 04/22/2019    ALT 21 04/22/2019    AST 30 04/22/2019    ALKPHOS 65 04/22/2019    BILITOTAL 0.9 04/22/2019        Preop Vitals    BP Readings from Last 3 Encounters:   11/19/19 (!) 149/91   09/04/19 (!) 182/109   07/15/19 (!) 153/98    Pulse Readings from Last 3 Encounters:   11/19/19 96   09/04/19 96   07/15/19 85      Resp Readings from Last 3 Encounters:   07/15/19 16   06/18/19 16   06/06/19 16    SpO2 Readings from Last 3 Encounters:   11/19/19 99%   09/04/19 98%   07/15/19 99%      Temp Readings from Last 1 Encounters:   11/19/19  "98  F (36.7  C) (Oral)    Ht Readings from Last 1 Encounters:   09/04/19 1.626 m (5' 4\")      Wt Readings from Last 1 Encounters:   11/19/19 57.2 kg (126 lb)    Estimated body mass index is 21.63 kg/m  as calculated from the following:    Height as of 9/4/19: 1.626 m (5' 4\").    Weight as of 11/19/19: 57.2 kg (126 lb).     LDA:  Peripheral IV 06/18/19 Right;Anterior Upper forearm (Active)   Site Assessment WDL 6/18/2019  5:10 PM   Line Status Saline locked 6/18/2019  5:10 PM   Phlebitis Scale 0-->no symptoms 6/18/2019  5:10 PM   Infiltration Scale 0 6/18/2019  5:10 PM   Infiltration Site Treatment Method  None 6/18/2019  5:10 PM   Number of days: 184       Hemodialysis Vascular Access Arteriovenous fistula Left Forearm (Active)   Site Assessment WDL;Bruit present;Thrill present 4/24/2019  4:00 PM   Cannulation Needle Size 15 4/24/2019  2:15 PM   Dressing Intervention New dressing 4/24/2019  2:15 PM   Dressing Status Clean, dry, intact 4/24/2019  2:15 PM   Hand Off Report Yes 4/24/2019  2:15 PM   Number of days:        Right Groin Interventional Procedure Access (Active)   Site Assessment [facility] 6/18/2019  5:40 PM   Hemostasis management Unchanged 6/18/2019  5:40 PM   Femoral Bruit not present 6/18/2019  5:40 PM   CMS Right Extremity WDL 6/18/2019  5:40 PM   Dorsalis Pulse - Right Leg Normal 6/18/2019  5:40 PM   Posterior Tibial Pulse - Right Leg Normal 6/18/2019  5:40 PM   Number of days: 184        JZG FV AN PLAN NO PONV RULE       PAC Discussion and Assessment    ASA Classification: 3  Case is suitable for:   Anesthetic techniques and relevant risks discussed:   Invasive monitoring and risk discussed: No  Types:   Possibility and Risk of blood transfusion discussed:   NPO instructions given:   Additional anesthetic preparation and risks discussed:   Needs early admission to pre-op area:   Other:     PAC Resident/NP Anesthesia Assessment:  Paolo Larkin is a 44 year old female scheduled for kidney transplant on TBD by TBD " "in treatment of ESRD.  PAC referral for risk assessment and optimization for anesthesia with comorbid conditions of hypertension, hypotension after anesthesia induction 4/2019 with elevated tryptase, h/o MI, h/o HELLP:    Pre-operative considerations:  1.  Cardiac:  Functional status- METS >4.  Pt does not exercise routinely but does all her own housework and denies cardiopulmonary symptoms.  High risk surgery with 11% (RCRI 3) risk of major adverse cardiac event.  -denies CP, SOB, MARIN, palpitations  -history of vasospastic MI 8/2012  -hypertension will hold losartan DOS, will take amlodipine and hydralazine DOS  -coronary angiogram 6/18/2019: normal coronary arteries  -EKG 6/18/2019: SR with frequent PVC, possible LAE  -seen by cardiology 9/4/2019 without further workup needed: \"With recent coronary angiogram, no further testing is needed prior to transplant. Proceed with renal transplant at an acceptable perioperative risk. If she does not receive a transplant within 2 years time, return to cardiology clinic for repeat evaluation. If future stress testing is needed would recommend nuclear MPI rather than dobutamine stress echo due to resting WMA.\"   -VTE risk:  0.26%    2.  Pulm:  Airway feasible.  EDIS risk: low  -never smoker  -denies pulmonary history/symptoms    3.  GI:  Risk of PONV score = 3.  If > 2, anti-emetic intervention recommended.    4.  Renal:  -ESRD of unknown etiology, HD since 2012.  -transplant waitlist    5.  taken to OR for transplant in 4/2019, aborted due to hypotension after induction with elevated tryptase. Subsequent allergy testing revealed allergy to cephalosporin. See Dr. Palmer's note from 11/19/2019.  \"Anaphylactic intraoperative reaction with drop of blood pressure and increase of Tryptase    Positive in skin tests to Cephalosporins (Cefazolin, Ceftazidime)    In skin tests NO reaction to Penicillins, Aminopenicillins, Piperacillin and Meropenem    No reaction to " "Rocuronium/Vecuronium, Fentanyl, Ondansetron, Propofol and Lidocaine     >> based on History itching to Vancomycin in 2012 (patient says that she fainted)?  Proposition:    Avoid during the next operation certainly the Cephalosporins.     Not absolutely sure about the Penicillins/Aminopenicillins. We could do before the operation an oral provocation test (with iv access) in our allergy clinic to Amoxicillin, just to be certain (however, if operation has to be done fast, then I just would avoid Penicillins and Cephalosporins)  During next operation, keep in exact record medications given to patient and be always ready to treat anaphylactic type reaction. Take tryptase again if reaction occurs and refer patient back to allergy clinic.\"        **For further details of assessment, testing, and physical exam please see H and P completed on same date.          Sayra Correa PA-C, [facility]      Reviewed and Signed by PAC Mid-Level Provider/Resident  Mid-Level Provider/Resident: Sayra Correa  Date: 12/19/2019  Time:     Attending Anesthesiologist Anesthesia Assessment:        Anesthesiologist:   Date:   Time:   Pass/Fail:   Disposition:     PAC Pharmacist Assessment:        Pharmacist:   Date:   Time:    Sayra Correa PA-C  "

## 2020-01-06 ENCOUNTER — TELEPHONE (OUTPATIENT)
Dept: TRANSPLANT | Facility: CLINIC | Age: 45
End: 2020-01-06

## 2020-01-06 NOTE — TELEPHONE ENCOUNTER
Informed Merle that patient would be going to selection committee this week and Jocelyn Brooks would let them know the results of that meeting. Merle also mentioned that thru an  patient refuses to see dentist due to not having dental insurance and the cost that would be involved; patient acknowledges she has bad teeth. Merle will press the issue and see if Paw wants to remain on the wait list knowing she needs to see dentist and potentially get treatments.

## 2020-01-08 ENCOUNTER — DOCUMENTATION ONLY (OUTPATIENT)
Dept: TRANSPLANT | Facility: CLINIC | Age: 45
End: 2020-01-08

## 2020-01-08 ENCOUNTER — COMMITTEE REVIEW (OUTPATIENT)
Dept: TRANSPLANT | Facility: CLINIC | Age: 45
End: 2020-01-08

## 2020-01-08 ENCOUNTER — TELEPHONE (OUTPATIENT)
Dept: TRANSPLANT | Facility: CLINIC | Age: 45
End: 2020-01-08

## 2020-01-08 NOTE — COMMITTEE REVIEW
Abdominal Committee Review Note     Evaluation Date: 6/20/2013  Committee Review Date: 1/8/2020    Organ being evaluated for: Kidney    Transplant Phase: Waitlist  Transplant Status: Inactive    Transplant Coordinator: Jocelyn Brooks  Transplant Surgeon:       Referring Physician: Marianne Gupta    Primary Diagnosis: Other, Specify - HELLP SYNDROME  Secondary Diagnosis:     Committee Review Members:  Genaro Pearson, RD   Nephrology Jeffrey Desai MD, Pasha Yoon, APRN CNP, Emile Lopez MD   Pharmacy Daron Bowens, [facility]    - Clinical Khadijah Rowell, [facility], Ivanna Kline, [facility]   Transplant Felecia Allan PA-C, Carmencita French, RN, Lou Mario, RN, Jocelyn Brooks, KULWANT, Paulina Davison, RN, Kika Gamble, RN, Odette Leblanc, RN   Transplant Surgery Talisha Marshall MD, MD       Transplant Eligibility:     Committee Review Decision: Make Active    Relative Contraindications:     Absolute Contraindications:     Committee Chair Talisha Marshall MD verbally attested to the committee's decision.    Committee Discussion Details: Dr. Marshall reviewed PACU's consult note dated 12/19/19. Team review allergist's notes as well. Dr. Marshall believes pt is now ok to be active on the kidney transplant waitlist as there is an OR plan in place. Pt still needs to see the dentist but is ok to be active at this time.

## 2020-01-08 NOTE — TELEPHONE ENCOUNTER
Coordinator called pt, using  services, to let her know her status was changed to active on kidney wait list.  Reviewed general components of inpt stay and post-transplant routines including frequent appointments here as an outpt x1-2 weeks post-transplant. Instructed pt to call with any questions. Contact number provided.    Coordinator reviewed dental is needed but is not going to hold up active status. Reviewed if this is not completed pt's status could be changed to inactive in the future. Pt states she will call the dentist in her neighborhood and make an appointment right away.    Coordinator spoke with Merle at dialysis to review pt's status was changed to active. Reviewed dental is needed. Merle states she met with pt and PA this past Monday who reviewed this with her as well.    UNOS updated, immunology & PFR notified, change in status letter routed to admin team to send out.

## 2020-01-23 ENCOUNTER — RESULTS ONLY (OUTPATIENT)
Dept: OTHER | Facility: CLINIC | Age: 45
End: 2020-01-23

## 2020-01-23 ENCOUNTER — DOCUMENTATION ONLY (OUTPATIENT)
Dept: TRANSPLANT | Facility: CLINIC | Age: 45
End: 2020-01-23

## 2020-01-23 ENCOUNTER — ORGAN (OUTPATIENT)
Dept: TRANSPLANT | Facility: CLINIC | Age: 45
End: 2020-01-23

## 2020-01-23 ENCOUNTER — ANESTHESIA EVENT (OUTPATIENT)
Dept: SURGERY | Facility: CLINIC | Age: 45
DRG: 682 | End: 2020-01-23
Payer: MEDICARE

## 2020-01-23 ENCOUNTER — APPOINTMENT (OUTPATIENT)
Dept: GENERAL RADIOLOGY | Facility: CLINIC | Age: 45
DRG: 682 | End: 2020-01-23
Attending: TRANSPLANT SURGERY
Payer: MEDICARE

## 2020-01-23 ENCOUNTER — HOSPITAL ENCOUNTER (INPATIENT)
Facility: CLINIC | Age: 45
LOS: 3 days | Discharge: HOME | DRG: 682 | End: 2020-01-27
Attending: TRANSPLANT SURGERY | Admitting: TRANSPLANT SURGERY
Payer: MEDICARE

## 2020-01-23 DIAGNOSIS — Z76.82 AWAITING ORGAN TRANSPLANT: Primary | ICD-10-CM

## 2020-01-23 DIAGNOSIS — I15.0 RENOVASCULAR HYPERTENSION: ICD-10-CM

## 2020-01-23 DIAGNOSIS — Z76.82 KIDNEY TRANSPLANT CANDIDATE: Primary | ICD-10-CM

## 2020-01-23 LAB
ALBUMIN UR-MCNC: 30 MG/DL
APPEARANCE UR: ABNORMAL
APTT PPP: 35 SEC (ref 22–37)
BASOPHILS # BLD AUTO: 0.1 10E9/L (ref 0–0.2)
BASOPHILS NFR BLD AUTO: 1.6 %
BILIRUB UR QL STRIP: NEGATIVE
COLOR UR AUTO: ABNORMAL
DIFFERENTIAL METHOD BLD: ABNORMAL
EOSINOPHIL # BLD AUTO: 0.7 10E9/L (ref 0–0.7)
EOSINOPHIL NFR BLD AUTO: 11.1 %
ERYTHROCYTE [DISTWIDTH] IN BLOOD BY AUTOMATED COUNT: 14.2 % (ref 10–15)
GLUCOSE UR STRIP-MCNC: 30 MG/DL
HCT VFR BLD AUTO: 32.4 % (ref 35–47)
HGB BLD-MCNC: 10.2 G/DL (ref 11.7–15.7)
HGB UR QL STRIP: ABNORMAL
IMM GRANULOCYTES # BLD: 0 10E9/L (ref 0–0.4)
IMM GRANULOCYTES NFR BLD: 0.3 %
INR PPP: 0.89 (ref 0.86–1.14)
KETONES UR STRIP-MCNC: NEGATIVE MG/DL
LEUKOCYTE ESTERASE UR QL STRIP: ABNORMAL
LYMPHOCYTES # BLD AUTO: 1.6 10E9/L (ref 0.8–5.3)
LYMPHOCYTES NFR BLD AUTO: 26 %
MCH RBC QN AUTO: 29.9 PG (ref 26.5–33)
MCHC RBC AUTO-ENTMCNC: 31.5 G/DL (ref 31.5–36.5)
MCV RBC AUTO: 95 FL (ref 78–100)
MONOCYTES # BLD AUTO: 0.7 10E9/L (ref 0–1.3)
MONOCYTES NFR BLD AUTO: 10.8 %
NEUTROPHILS # BLD AUTO: 3.1 10E9/L (ref 1.6–8.3)
NEUTROPHILS NFR BLD AUTO: 50.2 %
NITRATE UR QL: NEGATIVE
NRBC # BLD AUTO: 0 10*3/UL
NRBC BLD AUTO-RTO: 0 /100
PH UR STRIP: 8 PH (ref 5–7)
PLATELET # BLD AUTO: 267 10E9/L (ref 150–450)
RBC # BLD AUTO: 3.41 10E12/L (ref 3.8–5.2)
RBC #/AREA URNS AUTO: 2 /HPF (ref 0–2)
SOURCE: ABNORMAL
SP GR UR STRIP: 1 (ref 1–1.03)
SQUAMOUS #/AREA URNS AUTO: 17 /HPF (ref 0–1)
UROBILINOGEN UR STRIP-MCNC: NORMAL MG/DL (ref 0–2)
WBC # BLD AUTO: 6.1 10E9/L (ref 4–11)
WBC #/AREA URNS AUTO: 48 /HPF (ref 0–5)

## 2020-01-23 PROCEDURE — 86803 HEPATITIS C AB TEST: CPT | Performed by: STUDENT IN AN ORGANIZED HEALTH CARE EDUCATION/TRAINING PROGRAM

## 2020-01-23 PROCEDURE — 85610 PROTHROMBIN TIME: CPT | Performed by: STUDENT IN AN ORGANIZED HEALTH CARE EDUCATION/TRAINING PROGRAM

## 2020-01-23 PROCEDURE — 80061 LIPID PANEL: CPT | Performed by: STUDENT IN AN ORGANIZED HEALTH CARE EDUCATION/TRAINING PROGRAM

## 2020-01-23 PROCEDURE — G0499 HEPB SCREEN HIGH RISK INDIV: HCPCS | Performed by: STUDENT IN AN ORGANIZED HEALTH CARE EDUCATION/TRAINING PROGRAM

## 2020-01-23 PROCEDURE — 85730 THROMBOPLASTIN TIME PARTIAL: CPT | Performed by: STUDENT IN AN ORGANIZED HEALTH CARE EDUCATION/TRAINING PROGRAM

## 2020-01-23 PROCEDURE — 86645 CMV ANTIBODY IGM: CPT | Performed by: STUDENT IN AN ORGANIZED HEALTH CARE EDUCATION/TRAINING PROGRAM

## 2020-01-23 PROCEDURE — 86644 CMV ANTIBODY: CPT | Performed by: STUDENT IN AN ORGANIZED HEALTH CARE EDUCATION/TRAINING PROGRAM

## 2020-01-23 PROCEDURE — 84702 CHORIONIC GONADOTROPIN TEST: CPT | Performed by: STUDENT IN AN ORGANIZED HEALTH CARE EDUCATION/TRAINING PROGRAM

## 2020-01-23 PROCEDURE — 84156 ASSAY OF PROTEIN URINE: CPT | Performed by: STUDENT IN AN ORGANIZED HEALTH CARE EDUCATION/TRAINING PROGRAM

## 2020-01-23 PROCEDURE — 87389 HIV-1 AG W/HIV-1&-2 AB AG IA: CPT | Performed by: STUDENT IN AN ORGANIZED HEALTH CARE EDUCATION/TRAINING PROGRAM

## 2020-01-23 PROCEDURE — 86922 COMPATIBILITY TEST ANTIGLOB: CPT | Performed by: STUDENT IN AN ORGANIZED HEALTH CARE EDUCATION/TRAINING PROGRAM

## 2020-01-23 PROCEDURE — 86665 EPSTEIN-BARR CAPSID VCA: CPT | Performed by: STUDENT IN AN ORGANIZED HEALTH CARE EDUCATION/TRAINING PROGRAM

## 2020-01-23 PROCEDURE — 86900 BLOOD TYPING SEROLOGIC ABO: CPT | Performed by: STUDENT IN AN ORGANIZED HEALTH CARE EDUCATION/TRAINING PROGRAM

## 2020-01-23 PROCEDURE — 86850 RBC ANTIBODY SCREEN: CPT | Performed by: STUDENT IN AN ORGANIZED HEALTH CARE EDUCATION/TRAINING PROGRAM

## 2020-01-23 PROCEDURE — 80053 COMPREHEN METABOLIC PANEL: CPT | Performed by: STUDENT IN AN ORGANIZED HEALTH CARE EDUCATION/TRAINING PROGRAM

## 2020-01-23 PROCEDURE — 85025 COMPLETE CBC W/AUTO DIFF WBC: CPT | Performed by: STUDENT IN AN ORGANIZED HEALTH CARE EDUCATION/TRAINING PROGRAM

## 2020-01-23 PROCEDURE — 81001 URINALYSIS AUTO W/SCOPE: CPT | Performed by: STUDENT IN AN ORGANIZED HEALTH CARE EDUCATION/TRAINING PROGRAM

## 2020-01-23 PROCEDURE — 71046 X-RAY EXAM CHEST 2 VIEWS: CPT

## 2020-01-23 PROCEDURE — 83036 HEMOGLOBIN GLYCOSYLATED A1C: CPT | Performed by: STUDENT IN AN ORGANIZED HEALTH CARE EDUCATION/TRAINING PROGRAM

## 2020-01-23 PROCEDURE — 36415 COLL VENOUS BLD VENIPUNCTURE: CPT | Performed by: STUDENT IN AN ORGANIZED HEALTH CARE EDUCATION/TRAINING PROGRAM

## 2020-01-23 PROCEDURE — 93010 ELECTROCARDIOGRAM REPORT: CPT | Mod: 59 | Performed by: INTERNAL MEDICINE

## 2020-01-23 PROCEDURE — 40000141 ZZH STATISTIC PERIPHERAL IV START W/O US GUIDANCE

## 2020-01-23 PROCEDURE — 86705 HEP B CORE ANTIBODY IGM: CPT | Performed by: STUDENT IN AN ORGANIZED HEALTH CARE EDUCATION/TRAINING PROGRAM

## 2020-01-23 PROCEDURE — 86902 BLOOD TYPE ANTIGEN DONOR EA: CPT | Performed by: STUDENT IN AN ORGANIZED HEALTH CARE EDUCATION/TRAINING PROGRAM

## 2020-01-23 PROCEDURE — 86901 BLOOD TYPING SEROLOGIC RH(D): CPT | Performed by: STUDENT IN AN ORGANIZED HEALTH CARE EDUCATION/TRAINING PROGRAM

## 2020-01-23 RX ORDER — LIDOCAINE 40 MG/G
CREAM TOPICAL
Status: DISCONTINUED | OUTPATIENT
Start: 2020-01-23 | End: 2020-01-23

## 2020-01-23 RX ORDER — LIDOCAINE 40 MG/G
CREAM TOPICAL
Status: DISCONTINUED | OUTPATIENT
Start: 2020-01-23 | End: 2020-01-27 | Stop reason: HOSPADM

## 2020-01-23 RX ORDER — LEVOFLOXACIN 5 MG/ML
500 INJECTION, SOLUTION INTRAVENOUS ONCE
Status: DISCONTINUED | OUTPATIENT
Start: 2020-01-24 | End: 2020-01-24 | Stop reason: HOSPADM

## 2020-01-23 NOTE — PROGRESS NOTES
PATHOLOGY HLA CROSSMATCH CONSULTATION: DONOR/RECIPIENT  VIRTUAL CROSSMATCH - Kidney  Consultation Date: 2020  Consultation Requested by: Dr. Cameron    Regarding: Compatibility of  donor organ UNOS #[id number] from OPO: MNOP  with Paolo Larkin    Findings: Regarding a virtual crossmatch between Paolo Larkin and  donor listed above (match ID [id number]):  The most recent (19) and 10 additional patient serum/sera  were analyzed.  The patient has no antibodies listed with HLA specificity against the donor organ.      Record Review Indicates: I personally reviewed the most recent serum, the historic peak sera, and all other sera with solid-phase HLA Single Antigen test results:  The patient has no HLA antibodies against the donor organ.     The results of this virtual XM are:   -most recent serum: compatible   -peak #1: compatible  -peak #2: compatible    Disclaimer: Clinical judgement must take into account other factors, such as non-HLA antibodies not detected in the assay. The VXM gives probabilities only.  The probability does not account for the potential for auto-antibodies that may be present in the patient's serum.  These autoantibodies may render the physical crossmatch falsely positive, and would be detected by an autologous crossmatch.  When possible, confirm findings with prospective allogeneic and autologous flow crossmatches before going to transplant as clinically indicated.     Glenda Lou MD  Medical Director, Immunology/Histocompatibility Laboratory

## 2020-01-23 NOTE — LETTER
To Whom It May Concern,      Paolo Larkin is currently a patient in my care. This letter is to excuse her , Irene Iqbal, from any work-related absences from 01/24/2020 until 02/09/2020. It is absolutely necessary that he be available to care for his wife during this period.         Regards,            Damien Gutierrez MD, MPP

## 2020-01-23 NOTE — TELEPHONE ENCOUNTER
TRANSPLANT OR REPORT    Organ: Kidney  Laterality (if known): TBD  Organ Location: SD    UNOS ID: [id number]  Donor OR Time: 1/24/2020 at 0001  Expected/Actual Cross Clamp Time: 0200  Expected Organ Arrival Time: 0700    Surgeon: Roxie  Time in OR: 0700  Time in 3C (N/A for LI): 0600    Recipient Details  Admission ETA: 2200  Unit: 7A  Isolation: No  Latex Allergy: No  : Patricia  Diagnosis: ESRD    Liver Transplants  Bypass: N/A  Hemodialysis: N/A  ~ Medicine Renal Staff: N/A  ~ CRRT Resource Nurse: N/A  (Telephone Number for CRRT [phone])    Kidney/Panc Transplants  XM Status (Need to wait for XM?): Yes    Liver or KP/PA Recipients:  Can Vessels be Banked: N/A      Transplant Coordinator Contact Info: Livia Isidro2      Vessel Bank Information  Transplant hospitals must not store a donor s extra vessels if the donor has tested positive for any of the following:   - HIV by antibody, antigen, or nucleic acid test (LILLIE)   - Hepatitis B surface antigen (HBsAg)   - Hepatitis B (HBV) by LILLIE   - Hepatitis C (HCV) by antibody or LILLIE     Extra vessels from donors that do not test positive for HIV, HBV, or HCV as above may be stored

## 2020-01-23 NOTE — TELEPHONE ENCOUNTER
Donors with risk factors or behaviors that increase their chance of having an infection are called a [facility] Increased Risk donors.     This donor meets increased risk guidelines due to:  care home, IVDA, and newly diagnosed STD within the previous 12 months     Even without the  increased risk label; infection or cancer can be transmitted from donors through transplant, but it is rare.  The risk of getting a donor transmitted infection from this donor is low, but higher than a donor without this label, so that is why we discuss this with you.  Donors undergo extensive testing for infections like HIV, Hepatitis B or C.   The test results for HIV and hepatitis in this donor were negative. Even with negative test results, there is still a small chance (less than 1%) that this donor could have an infection that could be transmitted with the organ.    Our doctor has reviewed the information about this donor. She/he recommends that you consider this offer. In his/her opinion, the potential benefits of accepting the offer outweigh the risks of getting an infection from this donor.   Everyone has a different level of how much risk they are willing to accept for themselves.  The decision to accept this organ is yours.  If you decide NOT to accept the organ, you will not lose your place on the waiting list.  (Offer 15 min to think about decision, call a family member, etc.)

## 2020-01-24 ENCOUNTER — APPOINTMENT (OUTPATIENT)
Dept: GENERAL RADIOLOGY | Facility: CLINIC | Age: 45
DRG: 682 | End: 2020-01-24
Attending: TRANSPLANT SURGERY
Payer: MEDICARE

## 2020-01-24 ENCOUNTER — DOCUMENTATION ONLY (OUTPATIENT)
Dept: TRANSPLANT | Facility: CLINIC | Age: 45
End: 2020-01-24

## 2020-01-24 ENCOUNTER — ANESTHESIA (OUTPATIENT)
Dept: SURGERY | Facility: CLINIC | Age: 45
DRG: 682 | End: 2020-01-24
Payer: MEDICARE

## 2020-01-24 ENCOUNTER — SURGERY (OUTPATIENT)
Age: 45
End: 2020-01-24
Payer: MEDICARE

## 2020-01-24 PROBLEM — I95.9 HYPOTENSION: Status: ACTIVE | Noted: 2019-04-24

## 2020-01-24 PROBLEM — Z76.82 KIDNEY TRANSPLANT CANDIDATE: Status: ACTIVE | Noted: 2019-04-23

## 2020-01-24 LAB
ABO + RH BLD: NORMAL
ABO + RH BLD: NORMAL
ALBUMIN SERPL-MCNC: 2.8 G/DL (ref 3.4–5)
ALBUMIN SERPL-MCNC: 3.6 G/DL (ref 3.4–5)
ALP SERPL-CCNC: 125 U/L (ref 40–150)
ALP SERPL-CCNC: 139 U/L (ref 40–150)
ALT SERPL W P-5'-P-CCNC: 18 U/L (ref 0–50)
ALT SERPL W P-5'-P-CCNC: 20 U/L (ref 0–50)
ANION GAP SERPL CALCULATED.3IONS-SCNC: 13 MMOL/L (ref 3–14)
ANION GAP SERPL CALCULATED.3IONS-SCNC: 14 MMOL/L (ref 3–14)
ANION GAP SERPL CALCULATED.3IONS-SCNC: 7 MMOL/L (ref 3–14)
ANION GAP SERPL CALCULATED.3IONS-SCNC: 7 MMOL/L (ref 3–14)
AST SERPL W P-5'-P-CCNC: 16 U/L (ref 0–45)
AST SERPL W P-5'-P-CCNC: 22 U/L (ref 0–45)
B-HCG SERPL-ACNC: 2 IU/L (ref 0–5)
BASE DEFICIT BLDA-SCNC: 0.1 MMOL/L
BASE EXCESS BLDA CALC-SCNC: 3 MMOL/L
BASE EXCESS BLDA CALC-SCNC: 3.9 MMOL/L
BASE EXCESS BLDA CALC-SCNC: 4.8 MMOL/L
BASOPHILS # BLD AUTO: 0 10E9/L (ref 0–0.2)
BASOPHILS NFR BLD AUTO: 0.1 %
BILIRUB DIRECT SERPL-MCNC: <0.1 MG/DL (ref 0–0.2)
BILIRUB SERPL-MCNC: 0.3 MG/DL (ref 0.2–1.3)
BILIRUB SERPL-MCNC: 0.3 MG/DL (ref 0.2–1.3)
BLD GP AB SCN SERPL QL: NORMAL
BLD PROD TYP BPU: NORMAL
BLOOD BANK CMNT PATIENT-IMP: NORMAL
BUN SERPL-MCNC: 10 MG/DL (ref 7–30)
BUN SERPL-MCNC: 20 MG/DL (ref 7–30)
BUN SERPL-MCNC: 24 MG/DL (ref 7–30)
BUN SERPL-MCNC: 8 MG/DL (ref 7–30)
CA-I BLD-MCNC: 4.3 MG/DL (ref 4.4–5.2)
CALCIUM SERPL-MCNC: 7.7 MG/DL (ref 8.5–10.1)
CALCIUM SERPL-MCNC: 8.3 MG/DL (ref 8.5–10.1)
CALCIUM SERPL-MCNC: 9.1 MG/DL (ref 8.5–10.1)
CALCIUM SERPL-MCNC: 9.2 MG/DL (ref 8.5–10.1)
CHLORIDE SERPL-SCNC: 100 MMOL/L (ref 94–109)
CHLORIDE SERPL-SCNC: 100 MMOL/L (ref 94–109)
CHLORIDE SERPL-SCNC: 101 MMOL/L (ref 94–109)
CHLORIDE SERPL-SCNC: 97 MMOL/L (ref 94–109)
CHOLEST SERPL-MCNC: 190 MG/DL
CMV IGG SERPL QL IA: >8 AI (ref 0–0.8)
CMV IGM SERPL QL IA: <0.2 AI (ref 0–0.8)
CO2 SERPL-SCNC: 24 MMOL/L (ref 20–32)
CO2 SERPL-SCNC: 29 MMOL/L (ref 20–32)
CO2 SERPL-SCNC: 31 MMOL/L (ref 20–32)
CO2 SERPL-SCNC: 32 MMOL/L (ref 20–32)
CREAT SERPL-MCNC: 5.07 MG/DL (ref 0.52–1.04)
CREAT SERPL-MCNC: 5.43 MG/DL (ref 0.52–1.04)
CREAT SERPL-MCNC: 8.53 MG/DL (ref 0.52–1.04)
CREAT SERPL-MCNC: 9.88 MG/DL (ref 0.52–1.04)
DIFFERENTIAL METHOD BLD: ABNORMAL
EBV VCA IGG SER QL IA: 1.8 AI (ref 0–0.8)
EBV VCA IGM SER QL IA: <0.2 AI (ref 0–0.8)
EOSINOPHIL # BLD AUTO: 0 10E9/L (ref 0–0.7)
EOSINOPHIL NFR BLD AUTO: 0.1 %
ERYTHROCYTE [DISTWIDTH] IN BLOOD BY AUTOMATED COUNT: 14.1 % (ref 10–15)
ERYTHROCYTE [DISTWIDTH] IN BLOOD BY AUTOMATED COUNT: 14.1 % (ref 10–15)
GFR SERPL CREATININE-BSD FRML MDRD: 10 ML/MIN/{1.73_M2}
GFR SERPL CREATININE-BSD FRML MDRD: 4 ML/MIN/{1.73_M2}
GFR SERPL CREATININE-BSD FRML MDRD: 5 ML/MIN/{1.73_M2}
GFR SERPL CREATININE-BSD FRML MDRD: 9 ML/MIN/{1.73_M2}
GLUCOSE BLD-MCNC: 152 MG/DL (ref 70–99)
GLUCOSE BLDC GLUCOMTR-MCNC: 140 MG/DL (ref 70–99)
GLUCOSE BLDC GLUCOMTR-MCNC: 159 MG/DL (ref 70–99)
GLUCOSE BLDC GLUCOMTR-MCNC: 163 MG/DL (ref 70–99)
GLUCOSE BLDC GLUCOMTR-MCNC: 171 MG/DL (ref 70–99)
GLUCOSE BLDC GLUCOMTR-MCNC: 63 MG/DL (ref 70–99)
GLUCOSE BLDC GLUCOMTR-MCNC: 86 MG/DL (ref 70–99)
GLUCOSE SERPL-MCNC: 161 MG/DL (ref 70–99)
GLUCOSE SERPL-MCNC: 189 MG/DL (ref 70–99)
GLUCOSE SERPL-MCNC: 84 MG/DL (ref 70–99)
GLUCOSE SERPL-MCNC: 91 MG/DL (ref 70–99)
HBA1C MFR BLD: NORMAL % (ref 0–5.6)
HBV CORE IGM SERPL QL IA: NONREACTIVE
HBV SURFACE AG SERPL QL IA: NONREACTIVE
HCO3 BLD-SCNC: 23 MMOL/L (ref 21–28)
HCO3 BLD-SCNC: 24 MMOL/L (ref 21–28)
HCO3 BLD-SCNC: 30 MMOL/L (ref 21–28)
HCO3 BLD-SCNC: 31 MMOL/L (ref 21–28)
HCT VFR BLD AUTO: 31.9 % (ref 35–47)
HCT VFR BLD AUTO: 33.2 % (ref 35–47)
HCV AB SERPL QL IA: NONREACTIVE
HDLC SERPL-MCNC: 48 MG/DL
HGB BLD-MCNC: 10.6 G/DL (ref 11.7–15.7)
HGB BLD-MCNC: 10.7 G/DL (ref 11.7–15.7)
HGB BLD-MCNC: 11.7 G/DL (ref 11.7–15.7)
HIV 1+2 AB+HIV1 P24 AG SERPL QL IA: NONREACTIVE
IMM GRANULOCYTES # BLD: 0.2 10E9/L (ref 0–0.4)
IMM GRANULOCYTES NFR BLD: 0.6 %
INR PPP: 1.05 (ref 0.86–1.14)
INTERPRETATION ECG - MUSE: NORMAL
LACTATE BLD-SCNC: 6.1 MMOL/L (ref 0.7–2)
LACTATE BLD-SCNC: 7 MMOL/L (ref 0.7–2)
LDLC SERPL CALC-MCNC: 108 MG/DL
LYMPHOCYTES # BLD AUTO: 0.7 10E9/L (ref 0.8–5.3)
LYMPHOCYTES NFR BLD AUTO: 3.1 %
MAGNESIUM SERPL-MCNC: 1.7 MG/DL (ref 1.6–2.3)
MCH RBC QN AUTO: 29.9 PG (ref 26.5–33)
MCH RBC QN AUTO: 30.5 PG (ref 26.5–33)
MCHC RBC AUTO-ENTMCNC: 32.2 G/DL (ref 31.5–36.5)
MCHC RBC AUTO-ENTMCNC: 33.2 G/DL (ref 31.5–36.5)
MCV RBC AUTO: 92 FL (ref 78–100)
MCV RBC AUTO: 93 FL (ref 78–100)
MONOCYTES # BLD AUTO: 1.1 10E9/L (ref 0–1.3)
MONOCYTES NFR BLD AUTO: 4.7 %
NEUTROPHILS # BLD AUTO: 21.5 10E9/L (ref 1.6–8.3)
NEUTROPHILS NFR BLD AUTO: 91.4 %
NONHDLC SERPL-MCNC: 141 MG/DL
NRBC # BLD AUTO: 0 10*3/UL
NRBC BLD AUTO-RTO: 0 /100
NUM BPU REQUESTED: 2
O2/TOTAL GAS SETTING VFR VENT: 40 %
O2/TOTAL GAS SETTING VFR VENT: 45 %
O2/TOTAL GAS SETTING VFR VENT: 50 %
O2/TOTAL GAS SETTING VFR VENT: ABNORMAL %
PCO2 BLD: 26 MM HG (ref 35–45)
PCO2 BLD: 32 MM HG (ref 35–45)
PCO2 BLD: 50 MM HG (ref 35–45)
PCO2 BLD: 53 MM HG (ref 35–45)
PH BLD: 7.37 PH (ref 7.35–7.45)
PH BLD: 7.4 PH (ref 7.35–7.45)
PH BLD: 7.47 PH (ref 7.35–7.45)
PH BLD: 7.58 PH (ref 7.35–7.45)
PHOSPHATE SERPL-MCNC: 2.4 MG/DL (ref 2.5–4.5)
PLATELET # BLD AUTO: 311 10E9/L (ref 150–450)
PLATELET # BLD AUTO: 356 10E9/L (ref 150–450)
PO2 BLD: 189 MM HG (ref 80–105)
PO2 BLD: 209 MM HG (ref 80–105)
PO2 BLD: 213 MM HG (ref 80–105)
PO2 BLD: 355 MM HG (ref 80–105)
POTASSIUM BLD-SCNC: 3.2 MMOL/L (ref 3.4–5.3)
POTASSIUM SERPL-SCNC: 2.6 MMOL/L (ref 3.4–5.3)
POTASSIUM SERPL-SCNC: 2.7 MMOL/L (ref 3.4–5.3)
POTASSIUM SERPL-SCNC: 2.8 MMOL/L (ref 3.4–5.3)
POTASSIUM SERPL-SCNC: 3.2 MMOL/L (ref 3.4–5.3)
POTASSIUM SERPL-SCNC: 3.4 MMOL/L (ref 3.4–5.3)
PROT SERPL-MCNC: 6.3 G/DL (ref 6.8–8.8)
PROT SERPL-MCNC: 7.6 G/DL (ref 6.8–8.8)
PROT UR-MCNC: 0.72 G/L
PROT/CREAT 24H UR: 5.08 G/G CR (ref 0–0.2)
RBC # BLD AUTO: 3.48 10E12/L (ref 3.8–5.2)
RBC # BLD AUTO: 3.58 10E12/L (ref 3.8–5.2)
SODIUM BLD-SCNC: 136 MMOL/L (ref 133–144)
SODIUM SERPL-SCNC: 136 MMOL/L (ref 133–144)
SODIUM SERPL-SCNC: 137 MMOL/L (ref 133–144)
SODIUM SERPL-SCNC: 138 MMOL/L (ref 133–144)
SODIUM SERPL-SCNC: 142 MMOL/L (ref 133–144)
SPECIMEN EXP DATE BLD: NORMAL
TRIGL SERPL-MCNC: 166 MG/DL
TROPONIN I SERPL-MCNC: 0.16 UG/L (ref 0–0.04)
WBC # BLD AUTO: 21.6 10E9/L (ref 4–11)
WBC # BLD AUTO: 23.5 10E9/L (ref 4–11)

## 2020-01-24 PROCEDURE — 84100 ASSAY OF PHOSPHORUS: CPT | Performed by: SURGERY

## 2020-01-24 PROCEDURE — 36415 COLL VENOUS BLD VENIPUNCTURE: CPT | Performed by: INTERNAL MEDICINE

## 2020-01-24 PROCEDURE — 82947 ASSAY GLUCOSE BLOOD QUANT: CPT | Performed by: TRANSPLANT SURGERY

## 2020-01-24 PROCEDURE — 82803 BLOOD GASES ANY COMBINATION: CPT | Performed by: TRANSPLANT SURGERY

## 2020-01-24 PROCEDURE — 37000008 ZZH ANESTHESIA TECHNICAL FEE, 1ST 30 MIN: Performed by: TRANSPLANT SURGERY

## 2020-01-24 PROCEDURE — 94002 VENT MGMT INPAT INIT DAY: CPT

## 2020-01-24 PROCEDURE — 37000009 ZZH ANESTHESIA TECHNICAL FEE, EACH ADDTL 15 MIN: Performed by: TRANSPLANT SURGERY

## 2020-01-24 PROCEDURE — 93010 ELECTROCARDIOGRAM REPORT: CPT | Performed by: INTERNAL MEDICINE

## 2020-01-24 PROCEDURE — 85610 PROTHROMBIN TIME: CPT | Performed by: TRANSPLANT SURGERY

## 2020-01-24 PROCEDURE — 5A1D70Z PERFORMANCE OF URINARY FILTRATION, INTERMITTENT, LESS THAN 6 HOURS PER DAY: ICD-10-PCS | Performed by: INTERNAL MEDICINE

## 2020-01-24 PROCEDURE — 40000986 XR CHEST PORT 1 VW

## 2020-01-24 PROCEDURE — 25000128 H RX IP 250 OP 636: Performed by: STUDENT IN AN ORGANIZED HEALTH CARE EDUCATION/TRAINING PROGRAM

## 2020-01-24 PROCEDURE — 40000196 ZZH STATISTIC RAPCV CVP MONITORING

## 2020-01-24 PROCEDURE — 25800025 ZZH RX 258

## 2020-01-24 PROCEDURE — 84484 ASSAY OF TROPONIN QUANT: CPT | Performed by: TRANSPLANT SURGERY

## 2020-01-24 PROCEDURE — 00000146 ZZHCL STATISTIC GLUCOSE BY METER IP

## 2020-01-24 PROCEDURE — 25000125 ZZHC RX 250: Performed by: NURSE ANESTHETIST, CERTIFIED REGISTERED

## 2020-01-24 PROCEDURE — 71000016 ZZH RECOVERY PHASE 1 LEVEL 3 FIRST HR: Performed by: TRANSPLANT SURGERY

## 2020-01-24 PROCEDURE — 40000985 XR CHEST PORT 1 VW

## 2020-01-24 PROCEDURE — 93005 ELECTROCARDIOGRAM TRACING: CPT

## 2020-01-24 PROCEDURE — 20000004 ZZH R&B ICU UMMC

## 2020-01-24 PROCEDURE — 99221 1ST HOSP IP/OBS SF/LOW 40: CPT | Mod: GC | Performed by: SURGERY

## 2020-01-24 PROCEDURE — 25000128 H RX IP 250 OP 636: Performed by: NURSE ANESTHETIST, CERTIFIED REGISTERED

## 2020-01-24 PROCEDURE — 40000281 ZZH STATISTIC TRANSPORT TIME EA 15 MIN

## 2020-01-24 PROCEDURE — 25000132 ZZH RX MED GY IP 250 OP 250 PS 637: Mod: GY | Performed by: NURSE ANESTHETIST, CERTIFIED REGISTERED

## 2020-01-24 PROCEDURE — 0W9900Z DRAINAGE OF RIGHT PLEURAL CAVITY WITH DRAINAGE DEVICE, OPEN APPROACH: ICD-10-PCS | Performed by: TRANSPLANT SURGERY

## 2020-01-24 PROCEDURE — 25000565 ZZH ISOFLURANE, EA 15 MIN: Performed by: TRANSPLANT SURGERY

## 2020-01-24 PROCEDURE — 40000275 ZZH STATISTIC RCP TIME EA 10 MIN

## 2020-01-24 PROCEDURE — 25000125 ZZHC RX 250: Performed by: TRANSPLANT SURGERY

## 2020-01-24 PROCEDURE — 36000064 ZZH SURGERY LEVEL 4 EA 15 ADDTL MIN - UMMC: Performed by: TRANSPLANT SURGERY

## 2020-01-24 PROCEDURE — 40000014 ZZH STATISTIC ARTERIAL MONITORING DAILY

## 2020-01-24 PROCEDURE — 82803 BLOOD GASES ANY COMBINATION: CPT | Performed by: SURGERY

## 2020-01-24 PROCEDURE — 82803 BLOOD GASES ANY COMBINATION: CPT | Performed by: PHYSICIAN ASSISTANT

## 2020-01-24 PROCEDURE — 25000128 H RX IP 250 OP 636: Performed by: TRANSPLANT SURGERY

## 2020-01-24 PROCEDURE — 90937 HEMODIALYSIS REPEATED EVAL: CPT

## 2020-01-24 PROCEDURE — 40000170 ZZH STATISTIC PRE-PROCEDURE ASSESSMENT II: Performed by: TRANSPLANT SURGERY

## 2020-01-24 PROCEDURE — 25800030 ZZH RX IP 258 OP 636: Performed by: SURGERY

## 2020-01-24 PROCEDURE — 27210794 ZZH OR GENERAL SUPPLY STERILE: Performed by: TRANSPLANT SURGERY

## 2020-01-24 PROCEDURE — 83735 ASSAY OF MAGNESIUM: CPT | Performed by: SURGERY

## 2020-01-24 PROCEDURE — 83605 ASSAY OF LACTIC ACID: CPT | Performed by: TRANSPLANT SURGERY

## 2020-01-24 PROCEDURE — 3E033XZ INTRODUCTION OF VASOPRESSOR INTO PERIPHERAL VEIN, PERCUTANEOUS APPROACH: ICD-10-PCS | Performed by: TRANSPLANT SURGERY

## 2020-01-24 PROCEDURE — 25000131 ZZH RX MED GY IP 250 OP 636 PS 637: Mod: GY | Performed by: STUDENT IN AN ORGANIZED HEALTH CARE EDUCATION/TRAINING PROGRAM

## 2020-01-24 PROCEDURE — 25800030 ZZH RX IP 258 OP 636: Performed by: TRANSPLANT SURGERY

## 2020-01-24 PROCEDURE — 80076 HEPATIC FUNCTION PANEL: CPT | Performed by: TRANSPLANT SURGERY

## 2020-01-24 PROCEDURE — 85027 COMPLETE CBC AUTOMATED: CPT | Performed by: TRANSPLANT SURGERY

## 2020-01-24 PROCEDURE — 80048 BASIC METABOLIC PNL TOTAL CA: CPT | Performed by: INTERNAL MEDICINE

## 2020-01-24 PROCEDURE — 84295 ASSAY OF SERUM SODIUM: CPT | Performed by: TRANSPLANT SURGERY

## 2020-01-24 PROCEDURE — 80048 BASIC METABOLIC PNL TOTAL CA: CPT | Performed by: SURGERY

## 2020-01-24 PROCEDURE — 83036 HEMOGLOBIN GLYCOSYLATED A1C: CPT | Performed by: TRANSPLANT SURGERY

## 2020-01-24 PROCEDURE — 25000128 H RX IP 250 OP 636: Performed by: ANESTHESIOLOGY

## 2020-01-24 PROCEDURE — 83605 ASSAY OF LACTIC ACID: CPT | Performed by: STUDENT IN AN ORGANIZED HEALTH CARE EDUCATION/TRAINING PROGRAM

## 2020-01-24 PROCEDURE — 83605 ASSAY OF LACTIC ACID: CPT | Performed by: PHYSICIAN ASSISTANT

## 2020-01-24 PROCEDURE — 84132 ASSAY OF SERUM POTASSIUM: CPT | Performed by: SURGERY

## 2020-01-24 PROCEDURE — 0W9930Z DRAINAGE OF RIGHT PLEURAL CAVITY WITH DRAINAGE DEVICE, PERCUTANEOUS APPROACH: ICD-10-PCS | Performed by: ANESTHESIOLOGY

## 2020-01-24 PROCEDURE — 25800030 ZZH RX IP 258 OP 636: Performed by: INTERNAL MEDICINE

## 2020-01-24 PROCEDURE — 82330 ASSAY OF CALCIUM: CPT | Performed by: TRANSPLANT SURGERY

## 2020-01-24 PROCEDURE — 87081 CULTURE SCREEN ONLY: CPT | Performed by: PHYSICIAN ASSISTANT

## 2020-01-24 PROCEDURE — 25800030 ZZH RX IP 258 OP 636: Performed by: NURSE ANESTHETIST, CERTIFIED REGISTERED

## 2020-01-24 PROCEDURE — 80048 BASIC METABOLIC PNL TOTAL CA: CPT | Performed by: TRANSPLANT SURGERY

## 2020-01-24 PROCEDURE — 85025 COMPLETE CBC W/AUTO DIFF WBC: CPT | Performed by: SURGERY

## 2020-01-24 PROCEDURE — 25800030 ZZH RX IP 258 OP 636: Performed by: STUDENT IN AN ORGANIZED HEALTH CARE EDUCATION/TRAINING PROGRAM

## 2020-01-24 PROCEDURE — 84132 ASSAY OF SERUM POTASSIUM: CPT | Performed by: TRANSPLANT SURGERY

## 2020-01-24 PROCEDURE — 36000062 ZZH SURGERY LEVEL 4 1ST 30 MIN - UMMC: Performed by: TRANSPLANT SURGERY

## 2020-01-24 PROCEDURE — A9270 NON-COVERED ITEM OR SERVICE: HCPCS | Mod: GY | Performed by: NURSE ANESTHETIST, CERTIFIED REGISTERED

## 2020-01-24 RX ORDER — MIDAZOLAM (PF) 1 MG/ML IN 0.9 % SODIUM CHLORIDE INTRAVENOUS SOLUTION
1-8 CONTINUOUS
Status: DISCONTINUED | OUTPATIENT
Start: 2020-01-24 | End: 2020-01-24 | Stop reason: HOSPADM

## 2020-01-24 RX ORDER — ALBUTEROL SULFATE 90 UG/1
AEROSOL, METERED RESPIRATORY (INHALATION) PRN
Status: DISCONTINUED | OUTPATIENT
Start: 2020-01-24 | End: 2020-01-24

## 2020-01-24 RX ORDER — NALOXONE HYDROCHLORIDE 0.4 MG/ML
.1-.4 INJECTION, SOLUTION INTRAMUSCULAR; INTRAVENOUS; SUBCUTANEOUS
Status: DISCONTINUED | OUTPATIENT
Start: 2020-01-24 | End: 2020-01-24

## 2020-01-24 RX ORDER — POTASSIUM CHLORIDE 7.45 MG/ML
10 INJECTION INTRAVENOUS ONCE
Status: COMPLETED | OUTPATIENT
Start: 2020-01-24 | End: 2020-01-24

## 2020-01-24 RX ORDER — ONDANSETRON 4 MG/1
4 TABLET, ORALLY DISINTEGRATING ORAL EVERY 6 HOURS PRN
Status: DISCONTINUED | OUTPATIENT
Start: 2020-01-24 | End: 2020-01-27 | Stop reason: HOSPADM

## 2020-01-24 RX ORDER — LIDOCAINE HYDROCHLORIDE 20 MG/ML
INJECTION, SOLUTION INFILTRATION; PERINEURAL PRN
Status: DISCONTINUED | OUTPATIENT
Start: 2020-01-24 | End: 2020-01-24

## 2020-01-24 RX ORDER — MIDAZOLAM (PF) 1 MG/ML IN 0.9 % SODIUM CHLORIDE INTRAVENOUS SOLUTION
1-8 CONTINUOUS
Status: DISCONTINUED | OUTPATIENT
Start: 2020-01-24 | End: 2020-01-25

## 2020-01-24 RX ORDER — FENTANYL CITRATE 50 UG/ML
INJECTION, SOLUTION INTRAMUSCULAR; INTRAVENOUS PRN
Status: DISCONTINUED | OUTPATIENT
Start: 2020-01-24 | End: 2020-01-24

## 2020-01-24 RX ORDER — SODIUM CHLORIDE, SODIUM GLUCONATE, SODIUM ACETATE, POTASSIUM CHLORIDE AND MAGNESIUM CHLORIDE 526; 502; 368; 37; 30 MG/100ML; MG/100ML; MG/100ML; MG/100ML; MG/100ML
INJECTION, SOLUTION INTRAVENOUS CONTINUOUS PRN
Status: DISCONTINUED | OUTPATIENT
Start: 2020-01-24 | End: 2020-01-24

## 2020-01-24 RX ORDER — ONDANSETRON 2 MG/ML
4 INJECTION INTRAMUSCULAR; INTRAVENOUS EVERY 30 MIN PRN
Status: DISCONTINUED | OUTPATIENT
Start: 2020-01-24 | End: 2020-01-24 | Stop reason: HOSPADM

## 2020-01-24 RX ORDER — PROPOFOL 10 MG/ML
INJECTION, EMULSION INTRAVENOUS PRN
Status: DISCONTINUED | OUTPATIENT
Start: 2020-01-24 | End: 2020-01-24

## 2020-01-24 RX ORDER — DIPHENHYDRAMINE HYDROCHLORIDE 50 MG/ML
INJECTION INTRAMUSCULAR; INTRAVENOUS PRN
Status: DISCONTINUED | OUTPATIENT
Start: 2020-01-24 | End: 2020-01-24

## 2020-01-24 RX ORDER — DEXTROSE MONOHYDRATE 25 G/50ML
25-50 INJECTION, SOLUTION INTRAVENOUS
Status: DISCONTINUED | OUTPATIENT
Start: 2020-01-24 | End: 2020-01-27 | Stop reason: HOSPADM

## 2020-01-24 RX ORDER — DEXTROSE MONOHYDRATE 25 G/50ML
INJECTION, SOLUTION INTRAVENOUS
Status: COMPLETED
Start: 2020-01-24 | End: 2020-01-24

## 2020-01-24 RX ORDER — ONDANSETRON 2 MG/ML
4 INJECTION INTRAMUSCULAR; INTRAVENOUS EVERY 6 HOURS PRN
Status: DISCONTINUED | OUTPATIENT
Start: 2020-01-24 | End: 2020-01-24

## 2020-01-24 RX ORDER — NALOXONE HYDROCHLORIDE 0.4 MG/ML
.1-.4 INJECTION, SOLUTION INTRAMUSCULAR; INTRAVENOUS; SUBCUTANEOUS
Status: DISCONTINUED | OUTPATIENT
Start: 2020-01-24 | End: 2020-01-27 | Stop reason: HOSPADM

## 2020-01-24 RX ORDER — FENTANYL CITRATE 50 UG/ML
25-50 INJECTION, SOLUTION INTRAMUSCULAR; INTRAVENOUS
Status: DISCONTINUED | OUTPATIENT
Start: 2020-01-24 | End: 2020-01-24 | Stop reason: HOSPADM

## 2020-01-24 RX ORDER — NICOTINE POLACRILEX 4 MG
15-30 LOZENGE BUCCAL
Status: DISCONTINUED | OUTPATIENT
Start: 2020-01-24 | End: 2020-01-24

## 2020-01-24 RX ORDER — ONDANSETRON 4 MG/1
4 TABLET, ORALLY DISINTEGRATING ORAL EVERY 6 HOURS PRN
Status: DISCONTINUED | OUTPATIENT
Start: 2020-01-24 | End: 2020-01-24

## 2020-01-24 RX ORDER — DEXTROSE MONOHYDRATE 25 G/50ML
25-50 INJECTION, SOLUTION INTRAVENOUS
Status: DISCONTINUED | OUTPATIENT
Start: 2020-01-24 | End: 2020-01-24

## 2020-01-24 RX ORDER — NICOTINE POLACRILEX 4 MG
15-30 LOZENGE BUCCAL
Status: DISCONTINUED | OUTPATIENT
Start: 2020-01-24 | End: 2020-01-27 | Stop reason: HOSPADM

## 2020-01-24 RX ORDER — ONDANSETRON 2 MG/ML
4 INJECTION INTRAMUSCULAR; INTRAVENOUS EVERY 6 HOURS PRN
Status: DISCONTINUED | OUTPATIENT
Start: 2020-01-24 | End: 2020-01-27 | Stop reason: HOSPADM

## 2020-01-24 RX ORDER — ONDANSETRON 4 MG/1
4 TABLET, ORALLY DISINTEGRATING ORAL EVERY 30 MIN PRN
Status: DISCONTINUED | OUTPATIENT
Start: 2020-01-24 | End: 2020-01-24 | Stop reason: HOSPADM

## 2020-01-24 RX ORDER — DEXAMETHASONE SODIUM PHOSPHATE 10 MG/ML
INJECTION, SOLUTION INTRAMUSCULAR; INTRAVENOUS PRN
Status: DISCONTINUED | OUTPATIENT
Start: 2020-01-24 | End: 2020-01-24

## 2020-01-24 RX ORDER — CARDIOPLEG/ORGAN PRESERV NO.1 9-198-2-1
BOTTLE PERFUSION PRN
Status: DISCONTINUED | OUTPATIENT
Start: 2020-01-24 | End: 2020-01-24 | Stop reason: HOSPADM

## 2020-01-24 RX ADMIN — EPINEPHRINE 30 MCG: 1 INJECTION PARENTERAL at 16:00

## 2020-01-24 RX ADMIN — MIDAZOLAM 1 MG: 1 INJECTION INTRAMUSCULAR; INTRAVENOUS at 18:01

## 2020-01-24 RX ADMIN — SODIUM CHLORIDE, POTASSIUM CHLORIDE, SODIUM LACTATE AND CALCIUM CHLORIDE 500 ML: 600; 310; 30; 20 INJECTION, SOLUTION INTRAVENOUS at 22:18

## 2020-01-24 RX ADMIN — NOREPINEPHRINE BITARTRATE 12.8 MCG: 1 INJECTION INTRAVENOUS at 16:05

## 2020-01-24 RX ADMIN — EPINEPHRINE 30 MCG: 1 INJECTION PARENTERAL at 16:09

## 2020-01-24 RX ADMIN — FENTANYL CITRATE 50 MCG: 50 INJECTION, SOLUTION INTRAMUSCULAR; INTRAVENOUS at 16:43

## 2020-01-24 RX ADMIN — Medication 1 MG/HR: at 17:59

## 2020-01-24 RX ADMIN — DEXTROSE MONOHYDRATE 25 ML: 25 INJECTION, SOLUTION INTRAVENOUS at 13:19

## 2020-01-24 RX ADMIN — DEXTROSE AND SODIUM CHLORIDE: 5; 900 INJECTION, SOLUTION INTRAVENOUS at 19:05

## 2020-01-24 RX ADMIN — EPINEPHRINE 0.03 MCG/KG/MIN: 1 INJECTION PARENTERAL at 16:28

## 2020-01-24 RX ADMIN — MIDAZOLAM 1 MG: 1 INJECTION INTRAMUSCULAR; INTRAVENOUS at 17:12

## 2020-01-24 RX ADMIN — LIDOCAINE HYDROCHLORIDE 100 MG: 20 INJECTION, SOLUTION INFILTRATION; PERINEURAL at 15:23

## 2020-01-24 RX ADMIN — SODIUM CHLORIDE 300 ML: 9 INJECTION, SOLUTION INTRAVENOUS at 10:09

## 2020-01-24 RX ADMIN — FENTANYL CITRATE 50 MCG: 50 INJECTION, SOLUTION INTRAMUSCULAR; INTRAVENOUS at 17:16

## 2020-01-24 RX ADMIN — SODIUM CHLORIDE, SODIUM GLUCONATE, SODIUM ACETATE, POTASSIUM CHLORIDE AND MAGNESIUM CHLORIDE: 526; 502; 368; 37; 30 INJECTION, SOLUTION INTRAVENOUS at 15:13

## 2020-01-24 RX ADMIN — MIDAZOLAM 1 MG: 1 INJECTION INTRAMUSCULAR; INTRAVENOUS at 16:37

## 2020-01-24 RX ADMIN — Medication 25 MCG/HR: at 22:28

## 2020-01-24 RX ADMIN — POTASSIUM CHLORIDE 10 MEQ: 7.46 INJECTION, SOLUTION INTRAVENOUS at 22:08

## 2020-01-24 RX ADMIN — SODIUM CHLORIDE 250 ML: 9 INJECTION, SOLUTION INTRAVENOUS at 10:09

## 2020-01-24 RX ADMIN — EPINEPHRINE 20 MCG: 1 INJECTION PARENTERAL at 16:02

## 2020-01-24 RX ADMIN — FENTANYL CITRATE 50 MCG: 50 INJECTION, SOLUTION INTRAMUSCULAR; INTRAVENOUS at 18:20

## 2020-01-24 RX ADMIN — Medication 1 MG/HR: at 19:28

## 2020-01-24 RX ADMIN — PHENYLEPHRINE HYDROCHLORIDE 300 MCG: 10 INJECTION INTRAVENOUS at 16:04

## 2020-01-24 RX ADMIN — CISATRACURIUM BESYLATE 10 MG: 2 INJECTION INTRAVENOUS at 15:23

## 2020-01-24 RX ADMIN — FENTANYL CITRATE 50 MCG: 50 INJECTION, SOLUTION INTRAMUSCULAR; INTRAVENOUS at 20:25

## 2020-01-24 RX ADMIN — EPINEPHRINE 30 MCG: 1 INJECTION PARENTERAL at 16:15

## 2020-01-24 RX ADMIN — MIDAZOLAM 1 MG: 1 INJECTION INTRAMUSCULAR; INTRAVENOUS at 16:05

## 2020-01-24 RX ADMIN — EPINEPHRINE 30 MCG: 1 INJECTION PARENTERAL at 15:54

## 2020-01-24 RX ADMIN — MIDAZOLAM 1 MG: 1 INJECTION INTRAMUSCULAR; INTRAVENOUS at 15:12

## 2020-01-24 RX ADMIN — FENTANYL CITRATE 50 MCG: 50 INJECTION, SOLUTION INTRAMUSCULAR; INTRAVENOUS at 20:06

## 2020-01-24 RX ADMIN — CISATRACURIUM BESYLATE 4 MG: 2 INJECTION INTRAVENOUS at 15:54

## 2020-01-24 RX ADMIN — PHENYLEPHRINE HYDROCHLORIDE 300 MCG: 10 INJECTION INTRAVENOUS at 15:53

## 2020-01-24 RX ADMIN — Medication 1000 ML: at 15:30

## 2020-01-24 RX ADMIN — DEXTROSE 50 % IN WATER (D50W) INTRAVENOUS SYRINGE 25 ML: at 13:19

## 2020-01-24 RX ADMIN — PHENYLEPHRINE HYDROCHLORIDE 200 MCG: 10 INJECTION INTRAVENOUS at 16:07

## 2020-01-24 RX ADMIN — INSULIN ASPART 1 UNITS: 100 INJECTION, SOLUTION INTRAVENOUS; SUBCUTANEOUS at 23:03

## 2020-01-24 RX ADMIN — DIPHENHYDRAMINE HYDROCHLORIDE 50 MG: 50 INJECTION, SOLUTION INTRAMUSCULAR; INTRAVENOUS at 16:04

## 2020-01-24 RX ADMIN — ALBUTEROL SULFATE 6 PUFF: 90 AEROSOL, METERED RESPIRATORY (INHALATION) at 16:31

## 2020-01-24 RX ADMIN — FENTANYL CITRATE 50 MCG: 50 INJECTION, SOLUTION INTRAMUSCULAR; INTRAVENOUS at 19:33

## 2020-01-24 RX ADMIN — FENTANYL CITRATE 50 MCG: 50 INJECTION, SOLUTION INTRAMUSCULAR; INTRAVENOUS at 15:23

## 2020-01-24 RX ADMIN — DEXAMETHASONE SODIUM PHOSPHATE 10 MG: 10 INJECTION, SOLUTION INTRAMUSCULAR; INTRAVENOUS at 16:10

## 2020-01-24 RX ADMIN — PROPOFOL 100 MG: 10 INJECTION, EMULSION INTRAVENOUS at 15:23

## 2020-01-24 RX ADMIN — FENTANYL CITRATE 50 MCG: 50 INJECTION, SOLUTION INTRAMUSCULAR; INTRAVENOUS at 18:37

## 2020-01-24 ASSESSMENT — LIFESTYLE VARIABLES: TOBACCO_USE: 0

## 2020-01-24 NOTE — PROGRESS NOTES
"HEMODIALYSIS TREATMENT NOTE    Date: 1/24/2020  Time: 2:25 PM    Data:  Pre Wt: 56.7 kg (125 lb)   Desired Wt: 56.7 kg   Post Wt: 56.7 kg (125 lb)  Weight change: 0 kg  Ultrafiltration - Post Run Net Total Removed (mL): 0 mL  Vascular Access Status: Fistula  Dialyzer Rinse: Clear  Total Blood Volume Processed: 65.3 L   Total Dialysis (Treatment) Time: 2.5    Dialysate Bath: K 3, Ca 2.25  Heparin: None    Lab:   No    Interventions/Assessment:  2.5 hour hemodialysis treatment completed for clearance prior to DDKT later today. No fluid removed. Patient dialyzed per E AVF on 3K2.25 dialysate @  & . No meds given during treatment. Patient slept much of treatment; denied pain. Tolerated treatment well.   Hemostasis achieved after needles removed w/ 5\" pressure to sites. (+) bruit/thrill. Site dressing CDI.  Handoff given to 6B RNAmanda.     Plan:    HD per renal team; DDKT later today.  "

## 2020-01-24 NOTE — PROGRESS NOTES
I discussed an organ offer with Ms. Paolo Larkin. The donor is a CDC high risk donor. The nature of the known risk behaviors was disclosed. LILLIE testing is negative for HCV, HIV and other tests were reviewed. The risk of potential donor transmitted infection due to hi-risk status was discussed in detail, estimated overall as low, but higher than a donor without this designation. Ms. Paolo Larkin verbalized an understanding of this risk and wishes to proceed with transplantation with this donor.  Mary Anne Carey MD  Transplant Fellow  Pager# [phone]

## 2020-01-24 NOTE — ANESTHESIA PREPROCEDURE EVALUATION
Anesthesia Pre-Procedure Evaluation    Patient: Paolo Vincent Trae   MRN:     [id number] Gender:   female   Age:    44 year old :      1975        Preoperative Diagnosis: * No surgery found *        Past Medical History:   Diagnosis Date     Anemia in chronic kidney disease      End stage kidney disease (H)      HELLP syndrome      Hepatitis B      HTN (hypertension)      MI (myocardial infarction) (H)      Proteinuria      Secondary hyperparathyroidism (H)      Thrombocytopaenia       Past Surgical History:   Procedure Laterality Date      SECTION  2012     CREATE FISTULA ARTERIOVENOUS UPPER EXTREMITY       CV CORONARY ANGIOGRAM N/A 2019    Procedure: CV CORONARY ANGIOGRAM;  Surgeon: Sabas Tilley MD;  Location:  HEART CARDIAC CATH LAB     DILATION AND CURETTAGE, HYSTEROSCOPY WITH ULTRASOUND GUIDANCE  2019          Anesthesia Evaluation     . Pt has had prior anesthetic.     History of anesthetic complications    hypotensive after induction 2019, procedure aborted      ROS/MED HX    ENT/Pulmonary:     (+)EDIS risk factors hypertension, , . .   (-) tobacco use and asthma   Neurologic:  - neg neurologic ROS     Cardiovascular:     (+) hypertension--CAD, -past MI,-. : . . . :. . Previous cardiac testing date:results:date: results:ECG reviewed date:2019 results:Cath date: 2019 results:          METS/Exercise Tolerance:  >4 METS   Hematologic:     (+) History of Transfusion previous transfusion reaction - RBC antibodies, -      Musculoskeletal:  - neg musculoskeletal ROS       GI/Hepatic:     (+) hepatitis type B,       Renal/Genitourinary:     (+) chronic renal disease, type: ESRD, Pt requires dialysis, type: Hemodialysis, Pt has no history of transplant,       Endo:  - neg endo ROS       Psychiatric:  - neg psychiatric ROS       Infectious Disease:  - neg infectious disease ROS       Malignancy:      - no malignancy   Other:    - neg other ROS                      PHYSICAL EXAM:   Mental Status/Neuro: A/A/O; Age Appropriate   Airway: Facies: Feasible  Mallampati: II  Mouth/Opening: Full  TM distance: > 6 cm  Neck ROM: Full   Respiratory: Auscultation: CTAB     Resp. Rate: Normal     Resp. Effort: Normal      CV: Rhythm: Regular  Rate: Age appropriate  Heart: Normal Sounds  Edema: None   Comments:                      LABS:  CBC:   Lab Results   Component Value Date    WBC 6.1 01/23/2020    WBC 5.4 06/18/2019    HGB 10.2 (L) 01/23/2020    HGB 12.4 06/18/2019    HCT 32.4 (L) 01/23/2020    HCT 41.5 06/18/2019     01/23/2020     06/18/2019     BMP:   Lab Results   Component Value Date     01/24/2020     01/23/2020    POTASSIUM 3.4 01/24/2020    POTASSIUM 3.2 (L) 01/23/2020    CHLORIDE 100 01/24/2020    CHLORIDE 97 01/23/2020    CO2 32 01/24/2020    CO2 31 01/23/2020    BUN 24 01/24/2020    BUN 20 01/23/2020    CR 9.88 (H) 01/24/2020    CR 8.53 (H) 01/23/2020    GLC 84 01/24/2020    GLC 91 01/23/2020     COAGS:   Lab Results   Component Value Date    PTT 35 01/23/2020    INR 0.89 01/23/2020     POC:   Lab Results   Component Value Date    BGM 86 01/24/2020    HCGS Negative 06/20/2013     OTHER:   Lab Results   Component Value Date    PH 7.40 04/23/2019    LACT 2.5 (H) 04/24/2019    A1C Canceled, Test credited 01/23/2020    HOLLIS 9.2 01/24/2020    PHOS 4.0 04/24/2019    MAG 2.4 (H) 04/24/2019    ALBUMIN 3.6 01/23/2020    PROTTOTAL 7.6 01/23/2020    ALT 20 01/23/2020    AST 16 01/23/2020    ALKPHOS 139 01/23/2020    BILITOTAL 0.3 01/23/2020        Preop Vitals    BP Readings from Last 3 Encounters:   01/24/20 (!) 147/91   12/19/19 (!) 148/86   11/19/19 (!) 149/91    Pulse Readings from Last 3 Encounters:   01/24/20 85   12/19/19 110   11/19/19 96      Resp Readings from Last 3 Encounters:   01/24/20 18   12/19/19 16   07/15/19 16    SpO2 Readings from Last 3 Encounters:   01/24/20 100%   12/19/19 100%   11/19/19 99%      Temp Readings from Last 1 Encounters:  "  01/24/20 37  C (98.6  F) (Oral)    Ht Readings from Last 1 Encounters:   12/19/19 1.595 m (5' 2.8\")      Wt Readings from Last 1 Encounters:   01/24/20 56.7 kg (125 lb)    Estimated body mass index is 22.29 kg/m  as calculated from the following:    Height as of 12/19/19: 1.595 m (5' 2.8\").    Weight as of an earlier encounter on 1/24/20: 56.7 kg (125 lb).     LDA:  Peripheral IV 01/23/20 Right;Anterior Lower forearm (Active)   Site Assessment WDL 1/24/2020 12:00 PM   Line Status Saline locked 1/24/2020 12:00 PM   Phlebitis Scale 0-->no symptoms 1/24/2020 12:00 PM   Infiltration Scale 0 1/24/2020 12:00 PM   Extravasation? No 1/24/2020  4:00 AM   Number of days: 1       Hemodialysis Vascular Access Arteriovenous fistula Left Forearm (Active)   Site Assessment WDL;Bruit present;Thrill present 4/24/2019  4:00 PM   Cannulation Needle Size 15 4/24/2019  2:15 PM   Dressing Intervention New dressing 4/24/2019  2:15 PM   Dressing Status Clean, dry, intact 4/24/2019  2:15 PM   Hand Off Report Yes 4/24/2019  2:15 PM   Number of days:        ETT (Active)   Number of days: 0        Assessment:   ASA SCORE: 4    H&P: History and physical reviewed and following examination; no interval change.   Smoking Status:  Non-Smoker/Unknown   NPO Status: NPO Appropriate     Plan:   Anes. Type:  General   Pre-Medication: None   Induction:  IV (Standard)   Airway: ETT; Oral   Access/Monitoring: PIV; 2nd PIV; A-Line; FloTrac; CVL   Maintenance: Balanced     Postop Plan:   Postop Pain: Opioids  Postop Sedation/Airway: Not planned  Disposition: Inpatient/Admit     PONV Management:   Adult Risk Factors: Female, Non-Smoker, Postop Opioids   Prevention: Ondansetron     CONSENT: Direct conversation   Plan and risks discussed with: Patient   Blood Products: Consented (ALL Blood Products)       Comments for Plan/Consent:  44F with ESRD on iHD here for renal transplant.  ASA 4 (dialysis dependent)  Plan: GETA, art line, CVC, PIVx2                PAC " "Discussion and Assessment    ASA Classification: 3  Case is suitable for:   Anesthetic techniques and relevant risks discussed:   Invasive monitoring and risk discussed: No  Types:   Possibility and Risk of blood transfusion discussed:   NPO instructions given:   Additional anesthetic preparation and risks discussed:   Needs early admission to pre-op area:   Other:     PAC Resident/NP Anesthesia Assessment:  Paolo Larkin is a 44 year old female scheduled for kidney transplant on TBD by TBD in treatment of ESRD.  PAC referral for risk assessment and optimization for anesthesia with comorbid conditions of hypertension, hypotension after anesthesia induction 4/2019 with elevated tryptase, h/o MI, h/o HELLP:    Pre-operative considerations:  1.  Cardiac:  Functional status- METS >4.  Pt does not exercise routinely but does all her own housework and denies cardiopulmonary symptoms.  High risk surgery with 11% (RCRI 3) risk of major adverse cardiac event.  -denies CP, SOB, MARIN, palpitations  -history of vasospastic MI 8/2012  -hypertension will hold losartan DOS, will take amlodipine and hydralazine DOS  -coronary angiogram 6/18/2019: normal coronary arteries  -EKG 6/18/2019: SR with frequent PVC, possible LAE  -seen by cardiology 9/4/2019 without further workup needed: \"With recent coronary angiogram, no further testing is needed prior to transplant. Proceed with renal transplant at an acceptable perioperative risk. If she does not receive a transplant within 2 years time, return to cardiology clinic for repeat evaluation. If future stress testing is needed would recommend nuclear MPI rather than dobutamine stress echo due to resting WMA.\"   -VTE risk:  0.26%    2.  Pulm:  Airway feasible.  EDIS risk: low  -never smoker  -denies pulmonary history/symptoms    3.  GI:  Risk of PONV score = 3.  If > 2, anti-emetic intervention recommended.    4.  Renal:  -ESRD of unknown etiology, HD since 2012.  -transplant waitlist    5.  " "taken to OR for transplant in 4/2019, aborted due to hypotension after induction with elevated tryptase. Subsequent allergy testing revealed allergy to cephalosporin. See Dr. Palmer's note from 11/19/2019.  \"Anaphylactic intraoperative reaction with drop of blood pressure and increase of Tryptase    Positive in skin tests to Cephalosporins (Cefazolin, Ceftazidime)    In skin tests NO reaction to Penicillins, Aminopenicillins, Piperacillin and Meropenem    No reaction to Rocuronium/Vecuronium, Fentanyl, Ondansetron, Propofol and Lidocaine     >> based on History itching to Vancomycin in 2012 (patient says that she fainted)?  Proposition:    Avoid during the next operation certainly the Cephalosporins.     Not absolutely sure about the Penicillins/Aminopenicillins. We could do before the operation an oral provocation test (with iv access) in our allergy clinic to Amoxicillin, just to be certain (however, if operation has to be done fast, then I just would avoid Penicillins and Cephalosporins)  During next operation, keep in exact record medications given to patient and be always ready to treat anaphylactic type reaction. Take tryptase again if reaction occurs and refer patient back to allergy clinic.\"        **For further details of assessment, testing, and physical exam please see H and P completed on same date.          Sayra Correa PA-C, [facility]      Reviewed and Signed by PAC Mid-Level Provider/Resident  Mid-Level Provider/Resident: Sayra Correa  Date: 12/19/2019  Time:     Attending Anesthesiologist Anesthesia Assessment:        Anesthesiologist:   Date:   Time:   Pass/Fail:   Disposition:     PAC Pharmacist Assessment:        Pharmacist:   Date:   Time:    Jennifer Ceballos MD  "

## 2020-01-24 NOTE — H&P
[facility], [place]    History and Physical  Solid Organ Transplant     Date of Admission:  2020    Assessment & Plan   Paolo Larkin is a 44 year old female with a past medical history significant for end stage kidney disease from unknown etiology, on HD. Other past medical history includes HTN, STEMI , Hep B, HELLP syndrome.  Patient was notified as an acceptable  donor organ became available and presented for further pre-operative work-up.  Patient was informed of the risks and benefits regarding  donor organ transplantation, and has elected to proceed.    The patient is on dialysis.  Modality: HD  Dialysis days: Monday, Wednesday, Friday.  Last dialysis run: Wednesday, 2020  The patient is making small amounts of urine prior to transplantation.    H/o blood transfusion: yes, +RBC ab  Not on chronic anticoagulation      Plan:  -[place] to outpatient for pre-op work-up  -Pre-op labs, including BMP, CBC, coag panel, viral serologies  -EKG, CXR  -NPO  -PIV  -Operating surgeon to obtain formal consent    Irene Lozada MD  PGY-1 Surgery     Code Status   Full Code    Primary Care Physician   Lorna Clancy    Chief Complaint   Pre-op kidney transplant    History obtained through Patricia .    History of Present Illness   Paolo Larkin is a 44 year old female with PMHx significant for HTN, ESRD on HD since , STEMI in , Hep B and HELLP syndrone, who presents to [facility] pre-kidney transplant The patient has been on the kidney transplant list since , and was previously taken to the OR for transplant in 2019, but at that time had a hypotensive episode at time of induction, ultimately thought to be related to allergy to rocuronium. Surgery was aborted and she was brought to the ICU. Complete workup with Derm and Allergist showing only allergy to cephalosporins, but would recommend avoiding penicillins as well. She has since undergone  complete pre op workup, including cardiology evaluation with a history of an abnormal stress echo, and underwent coronary angio which was normal.     Today, the patient states she has been in her usual state of health. She is very excited. She denies any recent illnesses or hospitalizations. No sick contacts. Denies any fevers, chills, nausea, vomiting, cp, sob, urinary changes, bowel changes or other concerns.  Patient was notified as an acceptable  donor organ became available and presented for further pre-operative work-up.  Patient was informed of the risks and benefits regarding  donor organ transplantation, and has elected to proceed.    The patient is on dialysis.  Modality: HD  Dialysis days: Monday, Wednesday, Friday.  Last dialysis run: Wednesday, 2020  The patient is making small amounts of urine prior to transplantation.    H/o blood transfusion: yes, +RBC ab  Not on chronic anticoagulation    Past Medical History    I have reviewed this patient's medical history and updated it with pertinent information if needed.   Past Medical History:   Diagnosis Date     Anemia in chronic kidney disease      End stage kidney disease (H)      HELLP syndrome      Hepatitis B      HTN (hypertension)      MI (myocardial infarction) (H)      Proteinuria      Secondary hyperparathyroidism (H)      Thrombocytopaenia        Past Surgical History   I have reviewed this patient's surgical history and updated it with pertinent information if needed.  Past Surgical History:   Procedure Laterality Date      SECTION  2012     CREATE FISTULA ARTERIOVENOUS UPPER EXTREMITY       CV CORONARY ANGIOGRAM N/A 2019    Procedure: CV CORONARY ANGIOGRAM;  Surgeon: Sabas Tilley MD;  Location:  HEART CARDIAC CATH LAB     DILATION AND CURETTAGE, HYSTEROSCOPY WITH ULTRASOUND GUIDANCE  2019       Prior to Admission Medications   Prior to Admission Medications   Prescriptions Last Dose  Informant Patient Reported? Taking?   Propofol 50 MG/5ML PRSY   No No   Si ampule by Other route once for 1 dose   Succinylcholine Chloride 100 MG/5ML SOSY   No No   Sig: Inject 1 ampule as directed once for 1 dose For allergy tests in allergy clinic only   acetaminophen (TYLENOL) 325 MG tablet Unknown at Unknown time Self Yes No   Sig: Take 325-650 mg by mouth as needed for mild pain   amLODIPine (NORVASC) 10 MG tablet 2020 at Unknown time Self Yes Yes   Sig: Take 10 mg by mouth every morning    calcium carbonate (TUMS) 500 MG chewable tablet  Self Yes No   Sig: Take 1,500 mg by mouth 3 times daily   cefTAZidime (FORTAZ) 1 GM vial Unknown at Unknown time  No No   Sig: For Allergy Testing in Allergy Clinic Only   cefuroxime (ZINACEF) 1.5 G vial Unknown at Unknown time  No No   Sig: For allergy tests in allergy clinic only   hydrALAZINE (APRESOLINE) 50 MG tablet 2020 at Unknown time Self Yes Yes   Sig: Take 25 mg by mouth 3 times daily    losartan (COZAAR) 100 MG tablet  Self Yes No   Sig: Take 100 mg by mouth every morning    pancuronium (PAVULON) 1 MG/ML injection Unknown at Unknown time  No No   Sig: For allergy tests in allergy clinic only   propofol 100 MG/10ML EMUL injection   No No   Sig: 10 mLs (100 mg) by Other route once for 1 dose   rocuronium (ZEMURON) 50 MG/5ML SOLN   No No   Si mLs (20 mg) by Other route once for 1 dose   rocuronium 10 MG/ML injection   No No   Sig: Inject 0.58 mLs (5.8 mg) into the vein once for 1 dose   sevelamer (RENVELA) 800 MG tablet 2020 at Unknown time  Yes Yes   Sig: Take 1,600 mg by mouth   succinylcholine (ANECTINE) 20 MG/ML injection Unknown at Unknown time  No No   Sig: For allergy testing   vecuronium (NORCURON) 10 MG injection Unknown at Unknown time  No No   Sig: For allergy testing   Patient not taking: Reported on 2019   vecuronium bolus NOT from drip (NORCURON) 1 mg/mL injection   No No   Sig: Inject 5.81 mLs (5.81 mg) into the vein once  for 1 dose      Facility-Administered Medications Last Administration Doses Remaining   fentaNYL (PF) (SUBLIMAZE) injection 58 mcg None recorded 1   lidocaine (PF) (XYLOCAINE) 1 % injection 5 mL None recorded 1   lidocaine 1 % 1 mL None recorded 1   ondansetron (ZOFRAN) injection 4 mg None recorded 1        Allergies   Allergies   Allergen Reactions     Blood Transfusion Related (Informational Only)      Patient has a history of a clinically significant antibody against RBC antigens.  A delay in compatible RBCs may occur.     Cephalosporins Anaphylaxis     Per [facility] allergist report      Cefuroxime Other (See Comments)     Reaction with wheel per allergist at [facility] - plans to test other Cephalosporine and Penicillin antibiotics     Suspected anaphylactic reaction - hypotension and increase of Tryptase intraop      Heparin Flush      Penicillin G Unknown     Avoid if at all possible from [facility] allergist report - ? Allergy      Rocuronium Other (See Comments)     Slightly positive intradermal test at [facility]    Suspected anaphylactic reaction - hypotension and increase of Tryptase intraop      Vancomycin        Social History   I have reviewed this patient's social history and updated it with pertinent information if needed. Paolo Vincent Trae  reports that she has never smoked. She has never used smokeless tobacco. She reports that she does not drink alcohol or use drugs.    Family History   I have reviewed this patient's family history and updated it with pertinent information if needed.   Family History   Problem Relation Age of Onset     Kidney Disease No family hx of      Heart Disease No family hx of        Review of Systems   The 10 point Review of Systems is negative other than noted in the HPI or here.     Physical Exam   Temp: 98.1  F (36.7  C) Temp src: Oral BP: (!) 154/99   Heart Rate: 116 Resp: 16 SpO2: 99 % O2 Device: None (Room air)    Vital Signs with Ranges  Temp:  [98.1  F (36.7  C)] 98.1  F (36.7   C)  Heart Rate:  [116] 116  Resp:  [16] 16  BP: (154)/(99) 154/99  SpO2:  [99 %] 99 %  126 lbs 9.6 oz    Constitutional: Awake, alert, pleasant, NAD  HEENT: EOMI, no scleral icterus  Respiratory: Nonlabored breathing on room air, trachea midline, CTABL  Cardiovascular: RRR, systolic murmur  GI: abdomen soft, nondistended, nontender  Lymph/Hematologic: no cervical lymphanenopathy  Skin: no rash  Extremities: extremities warm and well perfused, moving all extremities spontaneously, no LE edema  Neurologic: AOx3, CN II-XII grossly intact, no focal deficits  Neuropsychiatric: appropriate mood and affect    Data   Results Pending.   Attestation:  Patient has been seen and evaluated by me.   Vital signs, labs, medications and orders were reviewed.   When obtained, diagnostic images were reviewed by me and interpreted as above.    The care plan was discussed with the multidisciplinary team and I agree with the findings and plan in this note, with any differences recorded in blue.    .

## 2020-01-24 NOTE — PROGRESS NOTES
Media Information      Document Information     Photograph   Dialysis Consent   01/24/2020 8:43 AM   Attached To:   Hospital Encounter on 1/23/20   Source Information     Orlin Marks MD  [facility] [id number]

## 2020-01-24 NOTE — PHARMACY-ADMISSION MEDICATION HISTORY
Admission medication history interview status for the 1/23/2020 admission is complete. See Epic admission navigator for allergy information, pharmacy, prior to admission medications and immunization status.     Medication history interview sources:  Patient, medication bottles, and dispense report.    Changes made to PTA medication list (reason)  Added: None  Deleted: Ceftazidime, cefuroxime, fentanyl, lidocaine, zofran, pancuronium, propofol, rocuronium, succinylcholine, and vecuronium (these were used for allergy testing and confirmed with patient she is not taking any of these).  Changed: None    Additional medication history information (including reliability of information, actions taken by pharmacist):    Patient was pleasant and brought all medications that she takes at home with her.     Per chart review, patient had a history of suspected anaphylactic reaction during induction for kidney transplant in 4/2019, which ultimately led to cancellation of the surgery. Allergy/immunology and dermatology evaluation completed outpatient and dermatologist recommended to avoid cephalosporins and penicillins. Please refer to progress note by Dr. Palmer on 11/19/2019 for details.    Prior to Admission medications    Medication Sig Last Dose Taking? Auth Provider   acetaminophen (TYLENOL) 325 MG tablet Take 325-650 mg by mouth as needed for mild pain Past Week at Unknown time Yes Reported, Patient   amLODIPine (NORVASC) 10 MG tablet Take 10 mg by mouth every morning  1/23/2020 at AM Yes Reported, Patient   calcium carbonate (TUMS) 500 MG chewable tablet Take 2 chew tab by mouth 3 times daily as needed for heartburn Past Week at Unknown time Yes Reported, Patient   hydrALAZINE (APRESOLINE) 25 MG tablet Take 50 mg by mouth 3 times daily  1/23/2020 at PM Yes Lorna Clancy APRN CNP   losartan (COZAAR) 100 MG tablet Take 100 mg by mouth every morning  1/23/2020 at AM Yes Reported, Patient   sevelamer (RENVELA) 800  MG tablet Take 1,600 mg by mouth 3 times daily (with meals)  1/23/2020 at PM Yes Reported, Patient     Medication history completed by:   Paola Cordoba  PharmD Candidate 2020 January 24, 2020    ==============================  Agree with medication history and note written by Paola Cordoba, PharmD Candidate  Eva GainesD, BCPS  Pager [phone]

## 2020-01-24 NOTE — CONSULTS
[facility], [place]  Transplant Nephrology Consult  Date of Admission:  1/23/2020  Today's Date: 01/24/2020  Requesting physician: Armando Faustin MD    Assessment & Plan    45 yo woman with ESKD due to unknown etiology admitted for DDKT.    # ESKD on HD - Etiology unknown - no bx  [facility], MWF  Dr. Luna  Access: LUE AVF  EDW: 56 kg  --  Currently, 0.5 kg above EDW, will plan no UF with HD run prior to OR for DDKT today.     # Immunosuppression: Plan for Thymo, Pred, MMF and Tac.     # Cardiac Risk: No symptomatic evidence of CAD, neg Cath in 5/2019.     # Possible anaphylaxis: April 2019 at time of previous attempted DDKT. She has had cardiac, derm, and allergy/immunology evaluation.     # Anemia of CKD,  Mineral Bone Disorder Will follow up Chronic Labs as outpatient. Hgb > 10 at this time.    Recommendations were communicated to the primary team verbally.    Seen and discussed with Dr. Giselle Marks MD  Pager: [phone]    REASON FOR CONSULT   ESKD, plan for DDKT    History of Present Illness   Paolo Larkin is a 44 year old female with ESKD of unknown etiology admitted for DDKT.   She has been on HD ~ 7 years. She reports regular adherence to HD and states no issues while running HD as an outpatient.   At this time, she feels well. No recent illness, sick contacts, fevers, chills, night sweats. She states she can walk up stairs without chest pain or SOB. No problems with appetite or taking PO. No diarrhea or constipation. She makes a very small amount of urine, denies any dysuria.  Of note, was admitted 4/2019 for DDKT, but had an anaphylactic like reaction during induction in the OR, and thus procedure was aborted - possible Rocuronium allergy.     Review of Systems    The 10 point Review of Systems is negative other than noted in the HPI or here.     Past Medical History    I have reviewed this patient's medical history and updated it  with pertinent information if needed.   Past Medical History:   Diagnosis Date     Anemia in chronic kidney disease      End stage kidney disease (H)      HELLP syndrome      Hepatitis B      HTN (hypertension)      MI (myocardial infarction) (H)      Proteinuria      Secondary hyperparathyroidism (H)      Thrombocytopaenia        Past Surgical History   I have reviewed this patient's surgical history and updated it with pertinent information if needed.  Past Surgical History:   Procedure Laterality Date      SECTION  2012     CREATE FISTULA ARTERIOVENOUS UPPER EXTREMITY       CV CORONARY ANGIOGRAM N/A 2019    Procedure: CV CORONARY ANGIOGRAM;  Surgeon: Sabas Tilley MD;  Location:  HEART CARDIAC CATH LAB     DILATION AND CURETTAGE, HYSTEROSCOPY WITH ULTRASOUND GUIDANCE  2019       Family History   I have reviewed this patient's family history and updated it with pertinent information if needed.   Family History   Problem Relation Age of Onset     Kidney Disease No family hx of      Heart Disease No family hx of        Social History   I have reviewed this patient's social history and updated it with pertinent information if needed. Paolo Larkin  reports that she has never smoked. She has never used smokeless tobacco. She reports that she does not drink alcohol or use drugs.    Allergies   Allergies   Allergen Reactions     Blood Transfusion Related (Informational Only)      Patient has a history of a clinically significant antibody against RBC antigens.  A delay in compatible RBCs may occur.     Cephalosporins Anaphylaxis     Per [facility] allergist report      Cefuroxime Other (See Comments)     Reaction with wheel per allergist at [facility] - plans to test other Cephalosporine and Penicillin antibiotics     Suspected anaphylactic reaction - hypotension and increase of Tryptase intraop      Heparin Flush      Penicillin G Unknown     Avoid if at all possible from [facility] allergist report  - ? Allergy      Rocuronium Other (See Comments)     Slightly positive intradermal test at [facility]    Suspected anaphylactic reaction - hypotension and increase of Tryptase intraop      Vancomycin      Prior to Admission Medications     anti-thymocyte globulin  2 mg/kg Intravenous Once     levofloxacin  500 mg Intravenous Once     methylPREDNISolone  500 mg Intravenous Once     mycophenolate mofetil  1,000 mg Intravenous Once     sodium chloride (PF)  3 mL Intracatheter Q8H         Physical Exam   Temp  Av.8  F (36.6  C)  Min: 97.5  F (36.4  C)  Max: 98.1  F (36.7  C)      No data recorded Resp  Av  Min: 16  Max: 18  SpO2  Av %  Min: 99 %  Max: 99 %     /80 (BP Location: Right arm)   Temp 97.5  F (36.4  C) (Oral)   Resp 18   Wt 56.7 kg (125 lb)   SpO2 99%   BMI 22.29 kg/m      Admit Weight: 57.4 kg (126 lb 9.6 oz)     GENERAL APPEARANCE: alert and no distress  HENT: mouth without ulcers or lesions  LYMPHATICS: no cervical nodes  RESP: lungs clear to auscultation - no wheezes  CV: regular rhythm, normal rate  EDEMA: no LE edema bilaterally  ABDOMEN: soft, nondistended, nontender, bowel sounds normal  MS: extremities normal - no gross deformities noted  SKIN: no rash  NEURO: AOX3, neg asterixis  ACCESS: LUE AVF with good thrill    Data   CMP  Recent Labs   Lab 20  2259      POTASSIUM 3.2*   CHLORIDE 97   CO2 31   ANIONGAP 14   GLC 91   BUN 20   CR 8.53*   GFRESTIMATED 5*   GFRESTBLACK 6*   HOLLIS 9.1   PROTTOTAL 7.6   ALBUMIN 3.6   BILITOTAL 0.3   ALKPHOS 139   AST 16   ALT 20     CBC  Recent Labs   Lab 20  2259   HGB 10.2*   WBC 6.1   RBC 3.41*   HCT 32.4*   MCV 95   MCH 29.9   MCHC 31.5   RDW 14.2        INR  Recent Labs   Lab 20  2259   INR 0.89   PTT 35     ABGNo lab results found in last 7 days.   Urine Studies  Recent Labs   Lab Test 20  2300 19  2340 13  0834   COLOR Light Yellow Straw Straw   APPEARANCE Slightly Cloudy Clear Clear    URINEGLC 30* 30* 70*   URINEBILI Negative Negative Negative   URINEKETONE Negative Negative Negative   SG 1.003 1.004 1.005   UBLD Trace* Trace* Negative   URINEPH 8.0* 8.5* 8.5*   PROTEIN 30* 30* 30*   NITRITE Negative Negative Negative   LEUKEST Large* Trace* Moderate*   RBCU 2 <1 <1   WBCU 48* 3 15*     Recent Labs   Lab Test 01/23/20  2300 04/22/19  2340   UTPG 5.08* 6.67*     IMAGING:  All imaging studies reviewed by me.   Attestation:  This patient has been seen and evaluated by me, Jeffrey Deasi MD.  I have reviewed the note and agree with plan of care as documented by the fellow.

## 2020-01-24 NOTE — PLAN OF CARE
Neuro: A&Ox4. Patricia speaking.   Cardiac: No tele. HR 90-110s on pulse ox. VSS. Afebrile.   Respiratory: Sating >97% on RA. No SOB.   GI/: Pt making minimal UOP, on HD. No BM.   Diet/appetite: NPO  Activity:  Up independently.   Pain: At acceptable level on current regimen.   Skin: No new deficits noted.  LDA's: PIV x1    Plan: Pre-op checklist complete. OR time around 12-1pm.  scheduled for 10 this am. Continue with POC. Notify primary team with changes.

## 2020-01-25 ENCOUNTER — APPOINTMENT (OUTPATIENT)
Dept: CARDIOLOGY | Facility: CLINIC | Age: 45
DRG: 682 | End: 2020-01-25
Attending: TRANSPLANT SURGERY
Payer: MEDICARE

## 2020-01-25 ENCOUNTER — APPOINTMENT (OUTPATIENT)
Dept: GENERAL RADIOLOGY | Facility: CLINIC | Age: 45
DRG: 682 | End: 2020-01-25
Attending: TRANSPLANT SURGERY
Payer: MEDICARE

## 2020-01-25 ENCOUNTER — APPOINTMENT (OUTPATIENT)
Dept: CT IMAGING | Facility: CLINIC | Age: 45
DRG: 682 | End: 2020-01-25
Attending: TRANSPLANT SURGERY
Payer: MEDICARE

## 2020-01-25 LAB
ANION GAP SERPL CALCULATED.3IONS-SCNC: 5 MMOL/L (ref 3–14)
BASE EXCESS BLDA CALC-SCNC: 2.9 MMOL/L
BUN SERPL-MCNC: 13 MG/DL (ref 7–30)
CALCIUM SERPL-MCNC: 8.1 MG/DL (ref 8.5–10.1)
CHLORIDE SERPL-SCNC: 102 MMOL/L (ref 94–109)
CO2 SERPL-SCNC: 29 MMOL/L (ref 20–32)
CREAT SERPL-MCNC: 5.84 MG/DL (ref 0.52–1.04)
GFR SERPL CREATININE-BSD FRML MDRD: 8 ML/MIN/{1.73_M2}
GLUCOSE BLDC GLUCOMTR-MCNC: 100 MG/DL (ref 70–99)
GLUCOSE BLDC GLUCOMTR-MCNC: 112 MG/DL (ref 70–99)
GLUCOSE BLDC GLUCOMTR-MCNC: 114 MG/DL (ref 70–99)
GLUCOSE BLDC GLUCOMTR-MCNC: 127 MG/DL (ref 70–99)
GLUCOSE BLDC GLUCOMTR-MCNC: 132 MG/DL (ref 70–99)
GLUCOSE BLDC GLUCOMTR-MCNC: 139 MG/DL (ref 70–99)
GLUCOSE BLDC GLUCOMTR-MCNC: 86 MG/DL (ref 70–99)
GLUCOSE BLDC GLUCOMTR-MCNC: 89 MG/DL (ref 70–99)
GLUCOSE SERPL-MCNC: 153 MG/DL (ref 70–99)
HBA1C MFR BLD: NORMAL % (ref 0–5.6)
HCO3 BLD-SCNC: 27 MMOL/L (ref 21–28)
HGB BLD-MCNC: 9.5 G/DL (ref 11.7–15.7)
HGB BLD-MCNC: 9.9 G/DL (ref 11.7–15.7)
INTERPRETATION ECG - MUSE: NORMAL
LACTATE BLD-SCNC: 1.2 MMOL/L (ref 0.7–2)
LACTATE BLD-SCNC: 1.9 MMOL/L (ref 0.7–2)
MAGNESIUM SERPL-MCNC: 1.6 MG/DL (ref 1.6–2.3)
O2/TOTAL GAS SETTING VFR VENT: 40 %
OXYHGB MFR BLD: 99 % (ref 92–100)
PCO2 BLD: 37 MM HG (ref 35–45)
PH BLD: 7.47 PH (ref 7.35–7.45)
PHOSPHATE SERPL-MCNC: 2.3 MG/DL (ref 2.5–4.5)
PO2 BLD: 176 MM HG (ref 80–105)
POTASSIUM SERPL-SCNC: 3.7 MMOL/L (ref 3.4–5.3)
POTASSIUM SERPL-SCNC: 4.1 MMOL/L (ref 3.4–5.3)
SODIUM SERPL-SCNC: 136 MMOL/L (ref 133–144)
TROPONIN I SERPL-MCNC: 0.11 UG/L (ref 0–0.04)

## 2020-01-25 PROCEDURE — 00000146 ZZHCL STATISTIC GLUCOSE BY METER IP

## 2020-01-25 PROCEDURE — 99291 CRITICAL CARE FIRST HOUR: CPT | Performed by: PHYSICIAN ASSISTANT

## 2020-01-25 PROCEDURE — 85018 HEMOGLOBIN: CPT | Performed by: TRANSPLANT SURGERY

## 2020-01-25 PROCEDURE — 25000128 H RX IP 250 OP 636: Performed by: TRANSPLANT SURGERY

## 2020-01-25 PROCEDURE — 85018 HEMOGLOBIN: CPT | Performed by: SURGERY

## 2020-01-25 PROCEDURE — 83605 ASSAY OF LACTIC ACID: CPT | Performed by: STUDENT IN AN ORGANIZED HEALTH CARE EDUCATION/TRAINING PROGRAM

## 2020-01-25 PROCEDURE — 93306 TTE W/DOPPLER COMPLETE: CPT

## 2020-01-25 PROCEDURE — 12000026 ZZH R&B TRANSPLANT

## 2020-01-25 PROCEDURE — 84100 ASSAY OF PHOSPHORUS: CPT | Performed by: SURGERY

## 2020-01-25 PROCEDURE — 84132 ASSAY OF SERUM POTASSIUM: CPT | Performed by: TRANSPLANT SURGERY

## 2020-01-25 PROCEDURE — 83605 ASSAY OF LACTIC ACID: CPT | Performed by: PHYSICIAN ASSISTANT

## 2020-01-25 PROCEDURE — 40000275 ZZH STATISTIC RCP TIME EA 10 MIN

## 2020-01-25 PROCEDURE — 25800030 ZZH RX IP 258 OP 636: Performed by: STUDENT IN AN ORGANIZED HEALTH CARE EDUCATION/TRAINING PROGRAM

## 2020-01-25 PROCEDURE — 40000014 ZZH STATISTIC ARTERIAL MONITORING DAILY

## 2020-01-25 PROCEDURE — 84484 ASSAY OF TROPONIN QUANT: CPT | Performed by: STUDENT IN AN ORGANIZED HEALTH CARE EDUCATION/TRAINING PROGRAM

## 2020-01-25 PROCEDURE — 83735 ASSAY OF MAGNESIUM: CPT | Performed by: SURGERY

## 2020-01-25 PROCEDURE — 94003 VENT MGMT INPAT SUBQ DAY: CPT

## 2020-01-25 PROCEDURE — 74177 CT ABD & PELVIS W/CONTRAST: CPT

## 2020-01-25 PROCEDURE — 93306 TTE W/DOPPLER COMPLETE: CPT | Mod: 26 | Performed by: INTERNAL MEDICINE

## 2020-01-25 PROCEDURE — 84484 ASSAY OF TROPONIN QUANT: CPT | Performed by: SURGERY

## 2020-01-25 PROCEDURE — 25000128 H RX IP 250 OP 636: Performed by: PHYSICIAN ASSISTANT

## 2020-01-25 PROCEDURE — 80048 BASIC METABOLIC PNL TOTAL CA: CPT | Performed by: SURGERY

## 2020-01-25 PROCEDURE — 71045 X-RAY EXAM CHEST 1 VIEW: CPT

## 2020-01-25 PROCEDURE — 82805 BLOOD GASES W/O2 SATURATION: CPT | Performed by: PHYSICIAN ASSISTANT

## 2020-01-25 PROCEDURE — 25000132 ZZH RX MED GY IP 250 OP 250 PS 637: Mod: GY | Performed by: PHYSICIAN ASSISTANT

## 2020-01-25 RX ORDER — AMLODIPINE BESYLATE 10 MG/1
10 TABLET ORAL EVERY MORNING
Status: DISCONTINUED | OUTPATIENT
Start: 2020-01-25 | End: 2020-01-27 | Stop reason: HOSPADM

## 2020-01-25 RX ORDER — FENTANYL CITRATE 50 UG/ML
50-100 INJECTION, SOLUTION INTRAMUSCULAR; INTRAVENOUS
Status: DISCONTINUED | OUTPATIENT
Start: 2020-01-25 | End: 2020-01-25

## 2020-01-25 RX ORDER — LIDOCAINE 4 G/G
1 PATCH TOPICAL
Status: DISCONTINUED | OUTPATIENT
Start: 2020-01-25 | End: 2020-01-27 | Stop reason: HOSPADM

## 2020-01-25 RX ORDER — HYDROMORPHONE HYDROCHLORIDE 1 MG/ML
.3-.5 INJECTION, SOLUTION INTRAMUSCULAR; INTRAVENOUS; SUBCUTANEOUS
Status: DISCONTINUED | OUTPATIENT
Start: 2020-01-25 | End: 2020-01-27 | Stop reason: HOSPADM

## 2020-01-25 RX ORDER — OXYCODONE HYDROCHLORIDE 5 MG/1
5 TABLET ORAL EVERY 4 HOURS PRN
Status: DISCONTINUED | OUTPATIENT
Start: 2020-01-25 | End: 2020-01-27 | Stop reason: HOSPADM

## 2020-01-25 RX ORDER — ACETAMINOPHEN 325 MG/1
650 TABLET ORAL EVERY 4 HOURS PRN
Status: DISCONTINUED | OUTPATIENT
Start: 2020-01-25 | End: 2020-01-27 | Stop reason: HOSPADM

## 2020-01-25 RX ORDER — SODIUM CHLORIDE 9 MG/ML
INJECTION, SOLUTION INTRAVENOUS CONTINUOUS
Status: DISCONTINUED | OUTPATIENT
Start: 2020-01-25 | End: 2020-01-27 | Stop reason: HOSPADM

## 2020-01-25 RX ORDER — HYDRALAZINE HYDROCHLORIDE 25 MG/1
50 TABLET, FILM COATED ORAL 3 TIMES DAILY
Status: DISCONTINUED | OUTPATIENT
Start: 2020-01-25 | End: 2020-01-27 | Stop reason: HOSPADM

## 2020-01-25 RX ORDER — IOPAMIDOL 755 MG/ML
69 INJECTION, SOLUTION INTRAVASCULAR ONCE
Status: COMPLETED | OUTPATIENT
Start: 2020-01-25 | End: 2020-01-25

## 2020-01-25 RX ORDER — HEPARIN SODIUM 5000 [USP'U]/.5ML
5000 INJECTION, SOLUTION INTRAVENOUS; SUBCUTANEOUS EVERY 8 HOURS
Status: DISCONTINUED | OUTPATIENT
Start: 2020-01-25 | End: 2020-01-27 | Stop reason: HOSPADM

## 2020-01-25 RX ADMIN — HYDRALAZINE HYDROCHLORIDE 50 MG: 25 TABLET ORAL at 13:49

## 2020-01-25 RX ADMIN — OXYCODONE HYDROCHLORIDE 5 MG: 5 TABLET ORAL at 20:04

## 2020-01-25 RX ADMIN — HYDRALAZINE HYDROCHLORIDE 50 MG: 25 TABLET ORAL at 20:04

## 2020-01-25 RX ADMIN — IOPAMIDOL 69 ML: 755 INJECTION, SOLUTION INTRAVENOUS at 05:20

## 2020-01-25 RX ADMIN — AMLODIPINE BESYLATE 10 MG: 10 TABLET ORAL at 13:49

## 2020-01-25 RX ADMIN — HYDROMORPHONE HYDROCHLORIDE 0.3 MG: 1 INJECTION, SOLUTION INTRAMUSCULAR; INTRAVENOUS; SUBCUTANEOUS at 12:20

## 2020-01-25 RX ADMIN — HEPARIN SODIUM 5000 UNITS: 5000 INJECTION, SOLUTION INTRAVENOUS; SUBCUTANEOUS at 23:37

## 2020-01-25 RX ADMIN — OXYCODONE HYDROCHLORIDE 5 MG: 5 TABLET ORAL at 13:49

## 2020-01-25 RX ADMIN — HEPARIN SODIUM 5000 UNITS: 5000 INJECTION, SOLUTION INTRAVENOUS; SUBCUTANEOUS at 15:20

## 2020-01-25 RX ADMIN — SODIUM CHLORIDE, POTASSIUM CHLORIDE, SODIUM LACTATE AND CALCIUM CHLORIDE 500 ML: 600; 310; 30; 20 INJECTION, SOLUTION INTRAVENOUS at 01:39

## 2020-01-25 ASSESSMENT — ACTIVITIES OF DAILY LIVING (ADL)
ADLS_ACUITY_SCORE: 11
ADLS_ACUITY_SCORE: 11
ADLS_ACUITY_SCORE: 10
ADLS_ACUITY_SCORE: 10
ADLS_ACUITY_SCORE: 11
ADLS_ACUITY_SCORE: 11

## 2020-01-25 NOTE — PROGRESS NOTES
SURGICAL ICU PROGRESS NOTE  01/25/2020    Date of Service (when I saw the patient): 01/25/2020    ASSESSMENT: Paolo Larkin is a 43 yo F with PMHx of ESKD (on HD), HTN, STEMI 2008, Hep B, HELLP syndrome admitted to the SICU after aborted kidney transplant on 1/24/20. Patient had hypotension and desaturation shortly after central line placement. Concern for PTX. Chest tube placed intraoperatively and hemodynamics supported.     CHANGES and MAJOR THINGS TODAY:   Wean off sedation-- Versed gtt and Fentanyl   PST and extubate today   Remove soni (as pt make minimal uop)     Addendum: pt placed on PST support, with good VT and RR, extubated to NC. Will discontinue vent orders, continuous medications. Bedside swallow post extubation 1-2 hrs, if pass will allow diet. Possible transfer to floor later today if remains stable post extubation.     PLAN:    Neuro/ pain/ sedation:  # Acute post operative pain   # sedation   -Monitor neurological status. Notify the MD for any acute changes in exam.  - sedation: wean off Versed and  Fentanyl gtt     Pulmonary care:  # Apical PTX-- Right, s/p Chest tube   # Post operative vent support   - AM CXR reviewed. No PNTx  On imaging  - keep CT to suction -20, no air leak on examination   - Wean off sedation and plan for PST later today with goal of extubation   - Supplemental oxygen to keep saturation above 92 %.  - pulmonary toileting, IS/acapella      Cardiovascular:    # History of NSTEMI (2008)   # Renovascular HTN   # Acute Hypotension, now resolved    - Monitor hemodynamic status.   - resume PTA antihypertensives: Norvasc 10, Hydralazine 25,   - Hold Losartan 100   - ECHO done, formal and final read pending     GI:   -NPO except ice chips and medications.  - diet once extubated   -Bowel regimen      Fluids/ Electrolytes/ Nutrition:  # Hypokalemia    # ESRD  - TKO IVF given ESRD   - No indication for parenteral nutrition.  - diet post extubatreion      Renal/ Fluid Balance:    #  ESKD of unknown etiology   # Hemodialysis dependent   -Nephrology consulted, appreciate assistance   - MWF schedule at home, dialyzed am 01/24/20   - Very low UOP at baseline   - Will continue to monitor intake and output.  - Continue Moreno       Endocrine:    # No history of DM   - Sliding scale insulin, low   - no need need for insulin at this time       ID/ Antibiotics:  - No indication for antibiotics at this time.       Heme:     # no active issues   - Hemoglobin stable, no outwards signs of bleeding       MSK:    # weakness and deconditioning   - PT and OT consulted.       General Cares/Prophylaxis:    DVT Prophylaxis: Heparin subcutaneous, PCD's.   GI Prophylaxis: Not indicated  Restraints: Restraints for medical healing needed: NO      Lines/ tubes/ drains:  - R internal jugular central venous catheter   - R radial arterial line   -  Right chest tube   - Moreno   - PIV x2     Disposition:  - Surgical ICU  Vs possible transfer to floor later today     Patient seen, findings and plan discussed with surgical ICU staff, Dr. Carey     Time spent on this Encounter   Billing:  I spent 35 minutes bedside and on the inpatient unit today managing the critical care of Paolo Larkin in relation to the issues listed in this note.      Bony Rodrigues    ====================================  INTERVAL HISTORY:  Course reviewed. Events since OR noted and reviewed. No acute events overnight. This am, pt opens eye to voice, follows commands correctly and consistently  with use of .     OBJECTIVE:   1. VITAL SIGNS:   Temp:  [96.3  F (35.7  C)-99.5  F (37.5  C)] 98  F (36.7  C)  Pulse:  [18-96] 93  Heart Rate:  [] 90  Resp:  [12-20] 14  BP: (109-150)/(75-96) 109/77  Cuff Mean (mmHg):  [122] 122  MAP:  [76 mmHg-117 mmHg] 116 mmHg  Arterial Line BP: (106-159)/(56-90) 154/87  FiO2 (%):  [40 %-50 %] 45 %  SpO2:  [98 %-100 %] 100 %  Ventilation Mode: CMV/AC  (Continuous Mandatory Ventilation/ Assist Control)  FiO2 (%):  45 %  Rate Set (breaths/minute): 14 breaths/min  Tidal Volume Set (mL): 380 mL  PEEP (cm H2O): 5 cmH2O  Oxygen Concentration (%): 40 %  Resp: 14      2. INTAKE/ OUTPUT:   I/O last 3 completed shifts:  In: 931.01 [I.V.:131.01]  Out: 14 [Blood:2; Chest Tube:12]    3. PHYSICAL EXAMINATION:  General: laying in bed,  NAD  HEENT: pupils 4mm and equal.  ETT present and secured. Right internal jugular site without redness or warmth   Neuro: A&O,  HELMS   Pulm/Resp: Clear breath sounds bilaterally without rhonchi, crackles or wheeze, breathing non-labored. Right chest tube to -20 cm. No airleak noted  CV: RRR, S1, S2.   Abdomen: Soft, non-distended, non-tender, no rebound tenderness or guarding, no masses  : soni catheter in place, no urine noted   Incisions/Skin: skin warm and dry, no rashes note   MSK/Extremities:  no peripheral edema, moving all extremities, peripheral pulses intact, calves soft and compressible, extremities well perfused,pulses 2+     4. INVESTIGATIONS:   Arterial Blood Gases   Recent Labs   Lab 01/24/20 2145 01/24/20 1850 01/24/20  1639   PH 7.47*  7.58* 7.40 7.37   PCO2 32*  26* 50* 53*   PO2 209*  213* 189* 355*   HCO3 23  24 31* 30*     Complete Blood Count   Recent Labs   Lab 01/25/20 0408 01/24/20 2145 01/24/20 1850 01/24/20  1639 01/23/20  2259   WBC  --  21.6* 23.5*  --  6.1   HGB 9.5* 10.6* 10.7* 11.7 10.2*   PLT  --  311 356  --  267     Basic Metabolic Panel  Recent Labs   Lab 01/25/20 0408 01/24/20 2145 01/24/20 2000 01/24/20 1850 01/24/20  1639 01/24/20  0844    136  --  137 136 138   POTASSIUM 3.7 2.8* 2.7* 2.6* 3.2* 3.4   CHLORIDE 102 100  --  101  --  100   CO2 29 24  --  29  --  32   BUN 13 10  --  8  --  24   CR 5.84* 5.43*  --  5.07*  --  9.88*   * 189*  --  161* 152* 84     Liver Function Tests  Recent Labs   Lab 01/24/20  2145 01/23/20  2259   AST 22 16   ALT 18 20   ALKPHOS 125 139   BILITOTAL 0.3 0.3   ALBUMIN 2.8* 3.6   INR 1.05 0.89     Pancreatic  Enzymes  No lab results found in last 7 days.  Coagulation Profile  Recent Labs   Lab 01/24/20  2145 01/23/20  2259   INR 1.05 0.89   PTT  --  35         5. RADIOLOGY:   Recent Results (from the past 24 hour(s))   XR Chest Port 1 View    Narrative     Examination:  XR CHEST PORT 1 VW     Date:  1/24/2020 4:44 PM      Clinical Information: chest tube placement in OR     Additional Information: none    Comparison: 1/23/2020, 20 3:22 PM.    Findings:     A right-sided chest tube is present in the upper half of the  hemithorax and there may be a tiny apical pneumothorax. Left-sided  atelectasis is present and there is cardiomegaly. There is  interstitial prominence of the lungs suggesting mild pulmonary edema.  ET tube is in good position. The right IJ line has a tip projected in  the region of the right atrium. The upper abdominal bowel gas pattern  is normal.      Impression    Impression:    1. Right-sided chest tube and upper hemithorax with possible tiny  apical pneumothorax.  2. Mild pulmonary edema.  3. Presumed left lung atelectasis..    OCTAVIO ALVAREZ MD   XR Chest Port 1 View    Narrative    This is a preliminary resident report. Full report will follow.      Impression    IMPRESSION:   1. Stable support devices with endotracheal tube tip projecting over  the midthoracic trachea.  2. Decreased interstitial pulmonary edema.   CT Chest/Abdomen/Pelvis w Contrast    Narrative    This is a preliminary resident report. Full report will follow.      Impression    IMPRESSION:   1. No acute findings in the abdomen or pelvis to explain patient's  symptoms. No evidence of bowel ischemia.  2. Small right pneumothorax with chest tube in place. Linear nodular  and groundglass opacity in the right upper lobe may be sequelae of  needle thoracostomy on 1/24/2020.  3. Cholelithiasis without CT evidence of acute cholecystitis.  4. Trace free fluid in the pelvis.       =========================================

## 2020-01-25 NOTE — ANESTHESIA CARE TRANSFER NOTE
Patient: Paolo Larkin    Procedure(s):  BACKBENCH PREPARATION RIGHT  KIDNEY AND RIGHT CHEST TUBE PLACEMENT.  Insert chest tube    Diagnosis: End stage kidney disease (H) [N18.6]  Diagnosis Additional Information: No value filed.    Anesthesia Type:   General     Note:  Airway :ETT and Ventilator  Patient transferred to:PACU  Comments: Placed on vent,  Report to RN decreased epi 0.01 Handoff Report: Identifed the Patient, Identified the Reponsible Provider, Reviewed the pertinent medical history, Discussed the surgical course, Reviewed Intra-OP anesthesia mangement and issues during anesthesia, Set expectations for post-procedure period and Allowed opportunity for questions and acknowledgement of understanding      Vitals: (Last set prior to Anesthesia Care Transfer)    CRNA VITALS  1/24/2020 1758 - 1/24/2020 1844      1/24/2020             Resp Rate (observed):  14                Electronically Signed By: NOEL Singh CRNA  January 24, 2020  6:44 PM

## 2020-01-25 NOTE — PLAN OF CARE
PT 4A: Cancel- Patient working toward extubation this morning, will reschedule PT evaluation for when patient better able to participate in therapy.

## 2020-01-25 NOTE — ANESTHESIA PROCEDURE NOTES
Arterial Line Procedure Note  Staff:     Anesthesiologist:  Celestina Patel MD  Location: In OR After Induction  Procedure Start/Stop Times:     patient identified, IV checked, site marked, risks and benefits discussed, informed consent, monitors and equipment checked, pre-op evaluation and at physician/surgeon's request      Correct Patient: Yes      Correct Position: Yes      Correct Site: Yes      Correct Procedure: Yes      Correct Laterality:  Yes    Site Marked:  Yes  Line Placement:     Procedure:  Arterial Line    Insertion Site:  Radial    Insertion laterality:  Right    Skin Prep: Chloraprep      Patient Prep: patient draped, mask, sterile gloves, hat and hand hygiene      Local skin infiltration:  None    Ultrasound Guided?: No      Catheter size:  20 gauge, Quick cath    Cath secured with: suture      Dressing:  Tegaderm    Complications:  None obvious    Arterial waveform: Yes

## 2020-01-25 NOTE — PROGRESS NOTES
Transplant Surgery  Inpatient Daily Progress Note  01/25/2020    Assessment & Plan:Paolo Larkin is a 43 yo F with PMHx of ESKD (on HD), HTN, STEMI 2008, Hep B, HELLP syndrome s/p aborted kidney transplant on 1/24/20. Patient had hypotension and desaturation shortly after central line placement. Concern for PTX and/or anaphylactic reaction. Chest tube placed intraoperatively and hemodynamics supported. Patient has not been on pressors today.    Graft function: n/a  Immunosuppression management: n/a  Hematology: HgB stable  Cardiorespiratory: Stable. Had troponin elevation but no EKG changes. Echo obtained, read pending.    GI/Nutrition: NPO, bowel regimen   Endocrine: No hx of DM, low SSI  Fluid/Electrolytes: MIVF:TKO.  Was hypokalemic to 2.8 overnight, replaced. MWF dialysis schedule.  : no concerns  Infectious disease: no concerns  Prophylaxis: DVT, fall, GI  Disposition: 7A    Medical Decision Making: Medium  Subsequent visit 37496 (moderate level decision making)    MERVAT/Fellow/Resident Provider: Damien Gutierrez MD    Faculty: Armando Faustin M.D.  _________________________________________________________________  Transplant History: Admitted 1/23/2020 for kidney transplant, aborted.  N/A, Postoperative day:  n/a    Interval History: History is obtained from the electronic health record  Overnight events: No acute events overnight. This am, pt opens eye to voice, follows commands correctly and consistently  with use of .     ROS:   A 10-point review of systems was negative except as noted above.    Meds:    amLODIPine  10 mg Per Feeding Tube QAM     heparin ANTICOAGULANT  5,000 Units Subcutaneous Q8H     hydrALAZINE  50 mg Per Feeding Tube TID     insulin aspart  1-4 Units Subcutaneous Q4H     lidocaine  1 patch Transdermal Q24H     lidocaine   Transdermal Q8H     sodium chloride (PF)  3 mL Intravenous Q8H     sodium chloride (PF)  3 mL Intracatheter Q8H       Physical Exam:     Admit  Weight: 57.4 kg (126 lb 9.6 oz)    Current vitals:   BP (!) 164/87   Pulse 93   Temp 98.1  F (36.7  C) (Axillary)   Resp 10   Wt 59.6 kg (131 lb 4.8 oz)   SpO2 100%   BMI 23.41 kg/m      CVP (mmHg): 5 mmHg    Vital sign ranges:    Temp:  [96.3  F (35.7  C)-98.6  F (37  C)] 98.1  F (36.7  C)  Pulse:  [85-93] 93  Heart Rate:  [] 104  Resp:  [10-19] 10  BP: (109-164)/(75-91) 164/87  Cuff Mean (mmHg):  [122] 122  MAP:  [76 mmHg-121 mmHg] 121 mmHg  Arterial Line BP: (106-164)/(56-90) 164/90  FiO2 (%):  [40 %-50 %] 40 %  SpO2:  [99 %-100 %] 100 %  Patient Vitals for the past 24 hrs:   BP Temp Temp src Pulse Heart Rate Resp SpO2 Weight   01/25/20 1200 -- -- -- -- 104 10 100 % --   01/25/20 1100 -- -- -- -- 105 19 100 % --   01/25/20 1039 (!) 164/87 -- -- -- 106 -- -- --   01/25/20 1000 -- -- -- -- 99 13 100 % --   01/25/20 0900 -- -- -- -- 95 15 100 % --   01/25/20 0800 -- 98.1  F (36.7  C) Axillary -- 97 16 100 % --   01/25/20 0700 -- -- -- -- 81 -- 100 % --   01/25/20 0600 -- -- -- -- 90 -- 100 % --   01/25/20 0530 -- -- -- -- 92 -- 100 % --   01/25/20 0445 -- -- -- -- 94 -- 100 % --   01/25/20 0415 -- -- -- -- 93 -- 100 % --   01/25/20 0400 -- 98  F (36.7  C) Axillary -- 97 -- 100 % --   01/25/20 0345 -- -- -- -- 92 -- 100 % --   01/25/20 0330 -- -- -- -- 95 -- 100 % --   01/25/20 0315 -- -- -- -- 95 -- 100 % --   01/25/20 0300 -- -- -- -- 95 -- 100 % --   01/25/20 0245 -- -- -- -- 102 -- 100 % --   01/25/20 0230 -- -- -- -- 97 -- 100 % --   01/25/20 0215 -- -- -- -- 100 -- 100 % --   01/25/20 0200 -- -- -- -- 106 -- 100 % --   01/25/20 0145 -- -- -- -- 90 -- 100 % --   01/25/20 0130 -- -- -- -- 90 -- 100 % --   01/25/20 0100 -- -- -- -- 90 -- 100 % --   01/25/20 0045 -- -- -- -- 94 -- 100 % --   01/25/20 0015 -- -- -- -- 93 -- 100 % --   01/25/20 0000 -- 97.8  F (36.6  C) Axillary -- 95 14 100 % --   01/24/20 2345 -- -- -- -- 96 -- 100 % --   01/24/20 2330 -- -- -- -- 97 -- 100 % --   01/24/20 2315 -- -- --  -- 97 -- 100 % --   01/24/20 2300 -- -- -- -- 102 -- 100 % --   01/24/20 2245 -- -- -- -- 102 -- 100 % --   01/24/20 2230 -- -- -- -- 101 -- 100 % --   01/24/20 2215 -- -- -- -- 101 -- 100 % --   01/24/20 2200 -- -- -- -- 115 -- 100 % --   01/24/20 2145 -- -- -- -- 107 -- 100 % --   01/24/20 2130 -- -- -- -- 91 -- 100 % --   01/24/20 2115 -- -- -- -- 94 -- 100 % --   01/24/20 2100 109/77 98  F (36.7  C) Axillary -- 103 13 100 % 59.6 kg (131 lb 4.8 oz)   01/24/20 2000 123/83 -- -- 93 96 -- 100 % --   01/24/20 1945 -- -- -- -- 84 -- 100 % --   01/24/20 1930 137/86 98.1  F (36.7  C) Axillary 91 92 13 100 % --   01/24/20 1915 121/75 -- -- -- 93 16 100 % --   01/24/20 1900 121/79 97.6  F (36.4  C) Axillary 85 84 16 100 % --   01/24/20 1845 122/78 -- -- -- 88 12 100 % --   01/24/20 1832 131/84 96.3  F (35.7  C) Axillary 87 -- 12 100 % --   01/24/20 1454 (!) 147/91 98.6  F (37  C) Oral -- -- 18 100 % --   01/24/20 1311 (!) 147/91 98.6  F (37  C) Oral 85 -- 16 99 % --     General Appearance: in no apparent distress.   Skin: normal  Heart: regular rate and rhythm  Lungs: non-labored breathing. CT with minimal output.  Abdomen: The abdomen is flat, and  non-tender.  : soni is not present.  The genitalia is not edematous. Urine has no gross hematuria.   Extremities: edema: absent.   Neurologic: awake. Tremor absent..     Data:   CMP  Recent Labs   Lab 01/25/20  0807 01/25/20  0408 01/24/20  2145  01/24/20  1850 01/24/20  1639  01/23/20  2259   NA  --  136 136  --  137 136   < > 142   POTASSIUM 4.1 3.7 2.8*   < > 2.6* 3.2*   < > 3.2*   CHLORIDE  --  102 100  --  101  --    < > 97   CO2  --  29 24  --  29  --    < > 31   GLC  --  153* 189*  --  161* 152*   < > 91   BUN  --  13 10  --  8  --    < > 20   CR  --  5.84* 5.43*  --  5.07*  --    < > 8.53*   GFRESTIMATED  --  8* 9*  --  10*  --    < > 5*   GFRESTBLACK  --  10* 10*  --  11*  --    < > 6*   HOLLIS  --  8.1* 8.3*  --  7.7*  --    < > 9.1   ICAW  --   --   --   --   --   4.3*  --   --    MAG  --  1.6  --   --  1.7  --   --   --    PHOS  --  2.3*  --   --  2.4*  --   --   --    ALBUMIN  --   --  2.8*  --   --   --   --  3.6   BILITOTAL  --   --  0.3  --   --   --   --  0.3   ALKPHOS  --   --  125  --   --   --   --  139   AST  --   --  22  --   --   --   --  16   ALT  --   --  18  --   --   --   --  20    < > = values in this interval not displayed.     CBC  Recent Labs   Lab 01/25/20  0807 01/25/20  0408 01/24/20 2145 01/24/20  1850   HGB 9.9* 9.5* 10.6* 10.7*   WBC  --   --  21.6* 23.5*   PLT  --   --  311 356   A1C  --   --  Canceled, Test credited  --      COAGS  Recent Labs   Lab 01/24/20  2145 01/23/20  2259   INR 1.05 0.89   PTT  --  35      Urinalysis  Recent Labs   Lab Test 01/23/20  2300 04/22/19  2340   COLOR Light Yellow Straw   APPEARANCE Slightly Cloudy Clear   URINEGLC 30* 30*   URINEBILI Negative Negative   URINEKETONE Negative Negative   SG 1.003 1.004   UBLD Trace* Trace*   URINEPH 8.0* 8.5*   PROTEIN 30* 30*   NITRITE Negative Negative   LEUKEST Large* Trace*   RBCU 2 <1   WBCU 48* 3   UTPG 5.08* 6.67*     Virology:  CMV IgG Antibody   Date Value Ref Range Status   06/20/2013 5.20 U/mL Final     Comment:     Positive for anti-CMV IgG     EBV VCA IgG Antibody   Date Value Ref Range Status   06/20/2013 32.40 U/mL Final     Comment:     Positive, suggests immunologic exposure.     Hepatitis C Antibody   Date Value Ref Range Status   01/23/2020 Nonreactive NR^Nonreactive Final     Comment:     Assay performance characteristics have not been established for newborns,   infants, and children       Hep B Surface Gricelda   Date Value Ref Range Status   06/20/2013 50.3  Final     Comment:     Positive, Patient is considered to be immune to infection with hepatitis B   when   the value is greater than or equal to 12.0 mlU/mL.

## 2020-01-25 NOTE — H&P
SURGICAL ICU ADMISSION NOTE  1/24/2020    PRIMARY TEAM: Renal Transplant   PRIMARY PHYSICIAN: Dr. Faustin     REASON FOR CRITICAL CARE ADMISSION: Intubated, sedated   ADMITTING PHYSICIAN: Dr. 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 Unfortunately developed hypotension and desaturation after central line placement. Concern for pneumothorax and had CT placed on the right. Is on epinephrine.    PLAN:   Neuro/ pain/ sedation:  -Monitor neurological status. Notify the MD for any acute changes in exam.  -Fentanyl for pain.  -Versed for sedation.     Pulmonary care:  #Apical PTX  #R Chest tube   #Intubated    -Plan to remain intubated overnight   -STAT ABG   -CXR performed with CT and ETT in proper position   -AM CXR  -Supplemental oxygen to keep saturation above 92 %.     Cardiovascular:    #History of NSTEMI (2008)   #Renovascular HTN   #Acute Hypotension   -Monitor hemodynamic status.   -Hold PTA antihypertensives: Norvasc 10, Hydralazine 25, Losartan 100   -Epi for MAP>65, attempt to wean.   -STAT echo on arrival      GI care:   -NPO except ice chips and medications.  -Bowel regimen      Fluids/ Electrolytes/ Nutrition:  #Hypokalemia    #ESRD  -For IV fluid hydration, 60 ml/hr   -Repeat potassium STAT  -No indication for parenteral nutrition.  -Nutrition consulted. Appreciate recs  -Lactate STAT on arrival      Renal/ Fluid Balance:    #ESKD of unknown etiology   #Hemodialysis dependent   -Nephrology consult, appreciate assistance   -MWF schedule at home, dialyzed am 01/24/20   -Very low UOP at baseline   -Will continue to monitor intake and output.  -Continue Moreno      Endocrine:    #No history of DM   -Sliding scale insulin, low      ID/ Antibiotics:  -No  indication for antibiotics.      Heme:     -Hemoglobin stable, 10.7.     Prophylaxis:    -Mechanical prophylaxis for DVT.   -Will consider chemical DVT ppx in am      MSK:    -PT and OT consulted. Appreciate recs.     Lines/ tubes/ drains:  -R internal jugular central venous catheter   -R radial arterial line   -Moreno   -PIV x2     Disposition:  -Surgical ICU.     Patient seen, findings and plan discussed with surgical ICU staff, Dr. Matias Perez   Surgery Resident   [id number]    - - - - - - - - - - - - - - - - - - - - - - - - - - - - - - - - - - - - - - - - - - - - - - - - - - - - - - - - - - - - - - - - - - - - - - - -     HISTORY PRESENTING ILLNESS: Paolo Larkin is a 45 yo F with PMHx of ESKD of unknown etiology (on HD), renovascular HTN, STEMI , Hep B, HELLP syndrome, and aborted kidney transplant in 2019 secondary to anaphylaxis after induction of anesthesia who was planned for kidney transplant 20. Unfortunately developed hypotension and desaturation after central line placement. Concern for pneumothorax and had CT placed on the right. Is on epinephrine.      REVIEW OF SYSTEMS: 10 point ROS neg other than the symptoms noted above in the HPI.    PAST MEDICAL HISTORY:    has a past medical history of Anemia in chronic kidney disease, End stage kidney disease (H), HELLP syndrome, Hepatitis B, HTN (hypertension), MI (myocardial infarction) (H) (), Proteinuria, Secondary hyperparathyroidism (H), and Thrombocytopaenia.    SURGICAL HISTORY:    has a past surgical history that includes  section (2012); Create fistula arteriovenous upper extremity; Dilation and Curettage, Hysteroscopy, with Ultrasound Guidance (2019); and Coronary Angiogram (N/A, 2019).    SOCIAL HISTORY:    reports that she has never smoked. She has never used smokeless tobacco. She reports that she does not drink alcohol or use drugs.    ALLERGIES:      Allergies   Allergen Reactions     Blood  Transfusion Related (Informational Only)      Patient has a history of a clinically significant antibody against RBC antigens.  A delay in compatible RBCs may occur.     Cephalosporins Anaphylaxis     Per [facility] allergist report      Cefuroxime Other (See Comments)     Reaction with wheel per allergist at [facility] - plans to test other Cephalosporine and Penicillin antibiotics     Suspected anaphylactic reaction - hypotension and increase of Tryptase intraop      Heparin Flush      Penicillin G Unknown     Avoid if at all possible from [facility] allergist report - ? Allergy      Rocuronium Other (See Comments)     Slightly positive intradermal test at [facility]    Suspected anaphylactic reaction - hypotension and increase of Tryptase intraop      Vancomycin        MEDICATIONS:  No current facility-administered medications on file prior to encounter.   acetaminophen (TYLENOL) 325 MG tablet, Take 325-650 mg by mouth as needed for mild pain  amLODIPine (NORVASC) 10 MG tablet, Take 10 mg by mouth every morning   calcium carbonate (TUMS) 500 MG chewable tablet, Take 2 chew tab by mouth 3 times daily as needed for heartburn  hydrALAZINE (APRESOLINE) 25 MG tablet, Take 50 mg by mouth 3 times daily   losartan (COZAAR) 100 MG tablet, Take 100 mg by mouth every morning   sevelamer (RENVELA) 800 MG tablet, Take 1,600 mg by mouth 3 times daily (with meals)         PHYSICAL EXAMINATION:  Temp:  [96.3  F (35.7  C)-99.5  F (37.5  C)] 98.1  F (36.7  C)  Pulse:  [18-96] 91  Heart Rate:  [] 84  Resp:  [12-20] 13  BP: (121-154)/(75-99) 137/86  Cuff Mean (mmHg):  [122] 122  MAP:  [86 mmHg-112 mmHg] 86 mmHg  Arterial Line BP: (116-144)/(64-90) 118/64  FiO2 (%):  [40 %-50 %] 40 %  SpO2:  [98 %-100 %] 100 %    GEN: Intubated, sedated   CV: Non cyanotic    RESP: Intubated, saturating well on 50% FiO2  ABD: Soft, NT, ND. Does not withdraw to deep palpation.    EXT: WWP  SKIN: Normal  PSYCH: Cooperative      LABS: Reviewed.   Arterial Blood  Gases   Recent Labs   Lab 01/24/20  1850 01/24/20  1639   PH 7.40 7.37   PCO2 50* 53*   PO2 189* 355*   HCO3 31* 30*     Complete Blood Count   Recent Labs   Lab 01/24/20  1850 01/24/20  1639 01/23/20  2259   WBC 23.5*  --  6.1   HGB 10.7* 11.7 10.2*     --  267     Basic Metabolic Panel  Recent Labs   Lab 01/24/20  1850 01/24/20  1639 01/24/20  0844 01/23/20  2259    136 138 142   POTASSIUM 2.6* 3.2* 3.4 3.2*   CHLORIDE 101  --  100 97   CO2 29  --  32 31   BUN 8  --  24 20   CR 5.07*  --  9.88* 8.53*   * 152* 84 91     Liver Function Tests  Recent Labs   Lab 01/23/20 2259   AST 16   ALT 20   ALKPHOS 139   BILITOTAL 0.3   ALBUMIN 3.6   INR 0.89     Pancreatic Enzymes  No lab results found in last 7 days.  Coagulation Profile  Recent Labs   Lab 01/23/20 2259   INR 0.89   PTT 35     Lactate  Invalid input(s): LACTATE    IMAGING:  Recent Results (from the past 24 hour(s))   XR Chest 2 Views    Narrative    Exam:  Chest X-ray 1/23/2020 11:24 PM    History: preop    Comparison: 4/23/2019    Findings: PA and lateral views of the chest are obtained. Trachea is  midline. Stable cardiomegaly. Pulmonary vascular congestion. No  pleural effusion or pneumothorax. No focal pulmonary opacities. No  acute bony abnormalities. The upper abdomen is unremarkable.      Impression    Impression:   Stable cardiomegaly and pulmonary vascular congestion. No acute  airspace opacity.    I have personally reviewed the examination and initial interpretation  and I agree with the findings.    AMADOU PEÑA MD   XR Chest Port 1 View    Narrative     Examination:  XR CHEST PORT 1 VW     Date:  1/24/2020 4:44 PM      Clinical Information: chest tube placement in OR     Additional Information: none    Comparison: 1/23/2020, 20 3:22 PM.    Findings:     A right-sided chest tube is present in the upper half of the  hemithorax and there may be a tiny apical pneumothorax. Left-sided  atelectasis is present and there is  cardiomegaly. There is  interstitial prominence of the lungs suggesting mild pulmonary edema.  ET tube is in good position. The right IJ line has a tip projected in  the region of the right atrium. The upper abdominal bowel gas pattern  is normal.      Impression    Impression:    1. Right-sided chest tube and upper hemithorax with possible tiny  apical pneumothorax.  2. Mild pulmonary edema.  3. Presumed left lung atelectasis..    OCTAVIO ALVAREZ MD

## 2020-01-25 NOTE — TELEPHONE ENCOUNTER
Organ Offer Encounter Information    Organ Offer Information  Organ offer date & time:  1/23/2020  4:13 PM  Coordinator/Fellow/Attending name:  Esthela Cruz RN   Organ(s):   Organ UNOS ID Match Run ID Comment Organ Laterality   Kidney [id number]  [id number] MNOP       Recent infections?:  No    New medications?:  No Recent pregnancy?:  No   Angicoagulation medications?:  No Recent vaccinations?:  No   Recent blood transfusions?:  No Recent hospitalizations?:  No   Has your insurance changed in the last 6-12 months?:  Neg    Patient last dialyzed:  1/22/2020  4:25 PM  Dialysis type:  Hemo  Discussed organ offer with:  Patient  Patient/Caregiver name:  via Patricia  ID [id number]  Discussed risk category with Patient/Other:  [employer] Risk  Did not understand donor criteria, questions answered, verbalized understanding  Patient/Other asked to speak to a surgeon?:  No  Discussed program-specific outcomes:  Did not have questions regarding SRTR  Right to decline organ offer without penalty, Patient/Other:  Aware of option to decline without penalty  Organ offer decision status Patient/Other:  Accepted Offer  Organ disposition:  Case Cancelled - Recipient medical condition  Additional Comments:  1/23/2020 4:26 PM  Kidney: Local, DCD, Primary (per Taylor)  MD: Cherry (Roxie)  OPO Contact:  Taylor KEATINGADELINE Results: Tumer - Compatible, No DSA  Plan (XM, NPO, Donor OR):  Donor OR at midnight tonight, called patient to discuss offer.  Disclosed donor DCD and PHS increased risk status via Patricia , patient verbalized understanding and acceptance.  Admission instructions given.  Spoke to Klarissa at  Services to request a Patricia  be present at hospital admissions at 2200 to assist with admission process.  Also spoke to Rochelle at  Services to request for appointment at 0600 (2 hrs) for consent/pre-op.  Admissions: 1643 - Vincent, ETA 2200  Unit: [facility] Arely  notified, no beds available; spoke  to Cally on 6B who could take patient if 7A cannot get a bed by 2200; spoke to Gladys BRANDON, who will assign patient to either unit depending on availability; 1730 - Per Vincent, patient to go to 6B, report called to Cally  Update Provider Entering Orders: 1715 - TEODORA BARRERA [ Msg Id [id number] ] - Needs FXM drawn STAT on admission, provided updated  Immunology: 1657 - Quin notified of pending admission and XM   Book OR: 1723 - Elidia, booked for 0700  Vessel Storage Confirmation: N/A  Blood Bank: 1707 - Zyad notified - Need to call back to update with laterality  Research: 1722 - Research coordinator paged; not eligible d/t donor DCD status  TransNet/ABO Verification: TBD - Pending laterality  Add Organ: TBD - Pending laterality  Livia Cruz RN   Transplant Coordinator    January 23, 2020 5:20 PM  Received confirmation from Rochelle at  Services that a [employer]  would be available at 0630 tomorrow, still working on 2200 this evening.  Livia Cruz RN   Transplant Coordinator    January 23, 2020 5:50 PM  Per Golden at  Services, a Patricia  will be available for admission at 2200 this evening.  Livia Cruz RN   Transplant Coordinator    January 24, 2020 4:31 AM  FXM negative, updated Dr. Carey.  ARSENIO 27 min, reviewed anatomy with MD, awaiting pump numbers to accept kidney.  Recipient OR bumped back d/t another case coming in, updated Luna on 6B.  Livia Cruz RN   Transplant Coordinator    January 24, 2020 6:03 PM  Case cancelled d/t recipient medical condition.  Spoke to Pierre stewart [facility] (who reports discussing with AOC) to discuss reallocation.  Patient to be placed on inactive status per Dr. Del Cid.  Livia Cruz RN   Transplant Coordinator                  Attestation I have discussed all of the above with the Patient/Legal Guardian/Caregiver regarding this organ offer.:  Yes  Coordinator/Fellow/Attending name:  Esthela Cruz RN

## 2020-01-25 NOTE — PROGRESS NOTES
Dr. Patel ([facility]) notified of need for PACU orders, who will place when available.

## 2020-01-25 NOTE — PROGRESS NOTES
SICU Progress Note:     Re rising lactate: Discussed with ICU, transplant, and nephrology staff.   -likely not clearing given renal disease  -Will give another 500 ml LR over one hour   -CT CAP to rule out gut ischemia     Re elevated troponin:   -EKG largely unchanged   -Discussed with cardiology, planning Echo ASAP tonight   -Trend trop     Evelyn Perez   Surgery Resident   [id number]

## 2020-01-25 NOTE — PLAN OF CARE
Admitted/transferred from: PACU- POD #0  Reason for admission/transfer: Kidney transplant, aborted  2 RN skin assessment: completed by Tara RN and Brandi RN   Result of skin assessment and interventions/actions:no skin issues, old lower abdomen  incision scar, soni catheter intact, CT on the right lateral side, Right triple lumen internal jugular, ETT intact  Height, weight, drug calc weight: ht- 5'2, wt- 131.3 lbs, drug calc weight- 56.7kg  Patient belongings (see Flowsheet)  MDRO education added to care plan: None   ?

## 2020-01-25 NOTE — PROGRESS NOTES
B/P: 137/86, T: 98.1, P: 91, R: 13    Call from Lab for critical value for K+2.6, pt had hemodiaylsis today.  Notified JAVIER MOYER [ Msg Id [id number] ]  awaiting call back.     Addendum: will draw STAT K+ recheck and send to ICU 4A

## 2020-01-25 NOTE — BRIEF OP NOTE
[facility], [place]    Brief Operative Note    Pre-operative diagnosis: End stage kidney disease (H) [N18.6]  Post-operative diagnosis Same as pre-operative diagnosis    Procedure: Procedure(s):  BACKBENCH PREPARATION RIGHT  KIDNEY AND RIGHT CHEST TUBE PLACEMENT.  Insert chest tube  Surgeon: Surgeon(s) and Role:       * Maureen Del Cid MD      * Mary Anne Carey MD - Assisting     * Damien Gutierrez MD - Resident - Assisting  Anesthesia: General   Estimated blood loss: none  Drains: CT in right chest  Specimens: * No specimens in log *  Findings:   None.  Complications: see op note. Patient became profoundly hypotensive after induction, initially, the worry was that patient had a pneumothorax because of temporal relation of instability and internal jugular central line placement and lack of breath sounds on the right, needle thoracostomy was performed and chest tube was placed without resolution of instability, patient was started on pressors and is gradually improving.   Implants: * No implants in log *

## 2020-01-25 NOTE — ANESTHESIA POSTPROCEDURE EVALUATION
Anesthesia POST Procedure Evaluation    Patient: Paolo Vincent Trae   MRN:     [id number] Gender:   female   Age:    44 year old :      1975        Preoperative Diagnosis: End stage kidney disease (H) [N18.6]   Procedure(s):  BACKBENCH PREPARATION RIGHT  KIDNEY AND RIGHT CHEST TUBE PLACEMENT.  Insert chest tube   Postop Comments: No value filed.       Anesthesia Type:  Not documented  General    Reportable Event: YES     PAIN:        Airway/Resp.:   Sign Out Status: Airway Device present     Disposition:     Disosition:  ICU     Comments/Narrative:  44 years old female taken to the operating room for renal transplant. Uneventful induction and intubation. Subsequently a-line placed- no complications. Right IJV central line placed and as I was suturing the line in place the blood pressure dropped. At the same time a second dose of cisatracurium was also administered. Patient treated with epinephrine and continued to be hypotensive. Patient treated for possible pneumothorax with needle decompression and chest tube placement as she had coarse rhonchi. Patient vital signs did not improve. Pressors were continued. Central line check with aspiration of blood from all ports. Patient was given iv benadryl, and iv dexamethasone. Chest xray was done in the operating room to confirm line placement and check for pneumothorax. Patient transferred to ICU for further workup of allergic reaction.   I personally discussed the case with SICU attending Dr. Carey.            Last Anesthesia Record Vitals:  CRNA VITALS  2020 1758 - 2020 1858      2020             Pulse:  89    ART BP:  144/87    ART Mean:  110          Last PACU Vitals:  Vitals Value Taken Time   /83 2020  8:00 PM   Temp 36.7  C (98.1  F) 2020  7:30 PM   Pulse 93 2020  8:00 PM   Resp 13 2020  7:30 PM   SpO2 100 % 2020  8:08 PM   Temp src     NIBP     Pulse 89 2020  6:45 PM   SpO2     Resp     Temp     Ht Rate      Temp 2     Vitals shown include unvalidated device data.      Electronically Signed By: Celestina Patel MD, January 24, 2020, 8:39 PM

## 2020-01-25 NOTE — PROGRESS NOTES
Nephrology Progress Note  01/25/2020         Assessment & Recommendations:   Paolo Larkin is a 44 year old year old female with h/o ESRD on MWF HD, HTN, STEMI 2008, Hep B, prior HELLP syndrome, and aborted kidney tx 4/2019 due to anaphylaxis with induction anesthesia now with another aborted kidney tx due to hypotension/hypoxia, suspected from pneumothorax after central line placement.     # ESRD on MWD iHD via AVF  HD done pre-now aborted surgery on Friday. Plan for next HD Monday.   -outpt HD at [facility] with Dr. Luna    # volume status  EDW 56kg, currently 60kg. Normotensive and on room air after extubation. Minimize fluids as able.     # electrolytes, acid-base  No acute issues.    # anemia  Hgb at goal at 9.5. Can request records to continue prior JESSI dosing.     # MBD  Corrected calcium WNL, phos mildly low (not to degree needing replacement but CTM).       Recommendations were communicated to primary team via phone call.    Seen and discussed with Dr. Yepez.    Jeffrey Zimmerman MD   [phone]    Interval History :   Nursing and provider notes from last 24 hours reviewed.  In the last 24 hours Paolo Larkin's kidney tx was cancelled after hypotension and hypoxia, suspected from pneumothorax after central line placement. This AM she got extubated this AM but was unable to speak afterward.     Review of Systems:   Unable to answer question     Physical Exam:   I/O last 3 completed shifts:  In: 962.78 [I.V.:162.78]  Out: 22 [Blood:2; Chest Tube:20]   BP (!) 164/87   Pulse 93   Temp 98.4  F (36.9  C) (Axillary)   Resp 10   Wt 59.6 kg (131 lb 4.8 oz)   SpO2 99%   BMI 23.41 kg/m       GENERAL APPEARANCE: NAD  EYES:  no scleral icterus, pupils equal  HENT: mouth without ulcers or lesions  PULM: lungs clear on L, decreased sounds on R  CV: regular rhythm, normal rate, no rub     -edema - none in LEs  GI: soft, NTND  INTEGUMENT: no rash on extremities   NEURO:  Alert and  shaking head yes and no  Access: CORTEZ ANNA    Labs:   All labs reviewed by me  Electrolytes/Renal -   Recent Labs   Lab Test 01/25/20  0807 01/25/20  0408 01/24/20 2145 01/24/20 1850 04/24/19  0629   NA  --  136 136  --  137   < > 135   POTASSIUM 4.1 3.7 2.8*   < > 2.6*   < > 5.2   CHLORIDE  --  102 100  --  101   < > 97   CO2  --  29 24  --  29   < > 24   BUN  --  13 10  --  8   < > 69*   CR  --  5.84* 5.43*  --  5.07*   < > 9.72*   GLC  --  153* 189*  --  161*   < > 127*   HOLLIS  --  8.1* 8.3*  --  7.7*   < > 8.1*   MAG  --  1.6  --   --  1.7  --  2.4*   PHOS  --  2.3*  --   --  2.4*  --  4.0    < > = values in this interval not displayed.       CBC -   Recent Labs   Lab Test 01/25/20  0807 01/25/20  0408 01/24/20 2145 01/24/20 1850 01/23/20  2259   WBC  --   --  21.6* 23.5*  --  6.1   HGB 9.9* 9.5* 10.6* 10.7*   < > 10.2*   PLT  --   --  311 356  --  267    < > = values in this interval not displayed.       LFTs -   Recent Labs   Lab Test 01/24/20 2145 01/23/20 2259 04/22/19  2235   ALKPHOS 125 139 65   BILITOTAL 0.3 0.3 0.9   ALT 18 20 21   AST 22 16 30   PROTTOTAL 6.3* 7.6 7.8   ALBUMIN 2.8* 3.6 3.6       Iron Panel - No lab results found.      Imaging:  All imaging studies reviewed by me.     Current Medications:    amLODIPine  10 mg Per Feeding Tube QAM     heparin ANTICOAGULANT  5,000 Units Subcutaneous Q8H     hydrALAZINE  50 mg Per Feeding Tube TID     insulin aspart  1-4 Units Subcutaneous Q4H     lidocaine  1 patch Transdermal Q24H     lidocaine   Transdermal Q8H     sodium chloride (PF)  3 mL Intravenous Q8H     sodium chloride (PF)  3 mL Intracatheter Q8H       sodium chloride Stopped (01/25/20 0926)     Jeffrey Zimmerman MD

## 2020-01-25 NOTE — PLAN OF CARE
Major Shift Events:  Pt extubated, currently on room air. Passed bedside swallow, diet advanced. Anuric at baseline. Art line and CVC removed, PIV x2 in R arm. Pt declining to get out of bed.     Plan: Pt to transfer to  once bed available.     For vital signs and complete assessments, please see documentation flowsheets.

## 2020-01-25 NOTE — ANESTHESIA PROCEDURE NOTES
Central Line Procedure Note  Staff:     Anesthesiologist:  Celestina Patel MD  Location: In OR after induction  Procedure Start/Stop Times:     patient identified, IV checked, site marked, risks and benefits discussed, informed consent, monitors and equipment checked, pre-op evaluation and at physician/surgeon's request      Correct Patient: Yes      Correct Position: Yes      Correct Site: Yes      Correct Procedure: Yes      Correct Laterality:  Yes    Site Marked:  Yes  Line Placement:     Procedure:  Central Line    Insertion laterality:  Right    Insertion site:  Internal Jugular    Position:  Trendelenburg      Maximal Sterile Barriers: All elements of maximal sterile barrier technique followed      (Maximal sterile barriers include:   Sterile gown, Sterile Gloves, Mask, Cap, Whole body draped, hand hygiene and acceptable skin prep).Skin Prep: Chloraprep         Injection Technique:  Ultrasound guided    Sterile Ultrasound Technique:  Sterile probe cover and Sterile gel    Vein evaluated via U/S for patency/adequacy of catheter insertion and is adequate.  Using realtime U/S imaging the vein was punctured, and needle was observed entering vein on U/S      Permanent Image entered into patient's record      Local skin infiltration:  None    Catheter size:  12 Fr, 3 lumen, 20 cm    Cath secured with: suture      Dressing:  Tegaderm and Biopatch    Complications:  Other    Blood aspirated all lumens: Yes      All Lumens Flushed: Yes      Tip termination: right atrium      Verification method:  X-ray  Assessment/Narrative:      The patient became hypotensive Sys=40s immediately after the Central line placement with brief episode of sat O2= 80%s. Initial working diagnosis right pneumothorax, chest needle decompression attempted and chest tube place by surgical team. Patient continued to be prolonged hypotensive not responding to pressors including epinephrine.

## 2020-01-25 NOTE — PLAN OF CARE
Neuros: arouses to voice, versed at 4mg/hr, fentanyl at 50mcg/hr, sensation is intact on all extremities   Cardiac: sinus tachy, Maintaining MAPs >65  Respiratory: vent setting, Fio2- 45%, TV-380, RR-14, PEEP-5, lungs clear and diminished on the right lower base, CT is to -20 suction, 20cc serousang, no air leak noted   GI/: NPO, HD, anuric   Labs: lactic-7.0, 1L of LR given and recheck 1.9, potassium replaced recheck 3.7  Will continue to monitor & follow POC.

## 2020-01-26 ENCOUNTER — APPOINTMENT (OUTPATIENT)
Dept: PHYSICAL THERAPY | Facility: CLINIC | Age: 45
DRG: 682 | End: 2020-01-26
Attending: TRANSPLANT SURGERY
Payer: MEDICARE

## 2020-01-26 ENCOUNTER — APPOINTMENT (OUTPATIENT)
Dept: GENERAL RADIOLOGY | Facility: CLINIC | Age: 45
DRG: 682 | End: 2020-01-26
Attending: SURGERY
Payer: MEDICARE

## 2020-01-26 ENCOUNTER — APPOINTMENT (OUTPATIENT)
Dept: GENERAL RADIOLOGY | Facility: CLINIC | Age: 45
DRG: 682 | End: 2020-01-26
Attending: STUDENT IN AN ORGANIZED HEALTH CARE EDUCATION/TRAINING PROGRAM
Payer: MEDICARE

## 2020-01-26 ENCOUNTER — APPOINTMENT (OUTPATIENT)
Dept: GENERAL RADIOLOGY | Facility: CLINIC | Age: 45
DRG: 682 | End: 2020-01-26
Attending: TRANSPLANT SURGERY
Payer: MEDICARE

## 2020-01-26 LAB
ANION GAP SERPL CALCULATED.3IONS-SCNC: 8 MMOL/L (ref 3–14)
BACTERIA SPEC CULT: NORMAL
BUN SERPL-MCNC: 30 MG/DL (ref 7–30)
CALCIUM SERPL-MCNC: 8.3 MG/DL (ref 8.5–10.1)
CHLORIDE SERPL-SCNC: 103 MMOL/L (ref 94–109)
CO2 SERPL-SCNC: 26 MMOL/L (ref 20–32)
CREAT SERPL-MCNC: 8.74 MG/DL (ref 0.52–1.04)
ERYTHROCYTE [DISTWIDTH] IN BLOOD BY AUTOMATED COUNT: 14.7 % (ref 10–15)
GFR SERPL CREATININE-BSD FRML MDRD: 5 ML/MIN/{1.73_M2}
GLUCOSE BLDC GLUCOMTR-MCNC: 101 MG/DL (ref 70–99)
GLUCOSE BLDC GLUCOMTR-MCNC: 101 MG/DL (ref 70–99)
GLUCOSE BLDC GLUCOMTR-MCNC: 106 MG/DL (ref 70–99)
GLUCOSE BLDC GLUCOMTR-MCNC: 82 MG/DL (ref 70–99)
GLUCOSE BLDC GLUCOMTR-MCNC: 82 MG/DL (ref 70–99)
GLUCOSE BLDC GLUCOMTR-MCNC: 88 MG/DL (ref 70–99)
GLUCOSE BLDC GLUCOMTR-MCNC: 91 MG/DL (ref 70–99)
GLUCOSE SERPL-MCNC: 87 MG/DL (ref 70–99)
HCT VFR BLD AUTO: 28.4 % (ref 35–47)
HGB BLD-MCNC: 9 G/DL (ref 11.7–15.7)
Lab: NORMAL
MAGNESIUM SERPL-MCNC: 2 MG/DL (ref 1.6–2.3)
MCH RBC QN AUTO: 30.1 PG (ref 26.5–33)
MCHC RBC AUTO-ENTMCNC: 31.7 G/DL (ref 31.5–36.5)
MCV RBC AUTO: 95 FL (ref 78–100)
PHOSPHATE SERPL-MCNC: 4.3 MG/DL (ref 2.5–4.5)
PLATELET # BLD AUTO: 234 10E9/L (ref 150–450)
POTASSIUM SERPL-SCNC: 4.6 MMOL/L (ref 3.4–5.3)
RBC # BLD AUTO: 2.99 10E12/L (ref 3.8–5.2)
SODIUM SERPL-SCNC: 137 MMOL/L (ref 133–144)
SPECIMEN SOURCE: NORMAL
WBC # BLD AUTO: 15.2 10E9/L (ref 4–11)

## 2020-01-26 PROCEDURE — 84100 ASSAY OF PHOSPHORUS: CPT | Performed by: TRANSPLANT SURGERY

## 2020-01-26 PROCEDURE — 71046 X-RAY EXAM CHEST 2 VIEWS: CPT

## 2020-01-26 PROCEDURE — 97116 GAIT TRAINING THERAPY: CPT | Mod: GP

## 2020-01-26 PROCEDURE — 25000132 ZZH RX MED GY IP 250 OP 250 PS 637: Mod: GY | Performed by: TRANSPLANT SURGERY

## 2020-01-26 PROCEDURE — 36415 COLL VENOUS BLD VENIPUNCTURE: CPT | Performed by: TRANSPLANT SURGERY

## 2020-01-26 PROCEDURE — 85027 COMPLETE CBC AUTOMATED: CPT | Performed by: TRANSPLANT SURGERY

## 2020-01-26 PROCEDURE — 25000128 H RX IP 250 OP 636: Performed by: STUDENT IN AN ORGANIZED HEALTH CARE EDUCATION/TRAINING PROGRAM

## 2020-01-26 PROCEDURE — 71046 X-RAY EXAM CHEST 2 VIEWS: CPT | Mod: 77

## 2020-01-26 PROCEDURE — 97530 THERAPEUTIC ACTIVITIES: CPT | Mod: GP

## 2020-01-26 PROCEDURE — 97161 PT EVAL LOW COMPLEX 20 MIN: CPT | Mod: GP

## 2020-01-26 PROCEDURE — 12000026 ZZH R&B TRANSPLANT

## 2020-01-26 PROCEDURE — 25000128 H RX IP 250 OP 636: Performed by: PHYSICIAN ASSISTANT

## 2020-01-26 PROCEDURE — 83735 ASSAY OF MAGNESIUM: CPT | Performed by: TRANSPLANT SURGERY

## 2020-01-26 PROCEDURE — 00000146 ZZHCL STATISTIC GLUCOSE BY METER IP

## 2020-01-26 PROCEDURE — 25000132 ZZH RX MED GY IP 250 OP 250 PS 637: Mod: GY | Performed by: PHYSICIAN ASSISTANT

## 2020-01-26 PROCEDURE — 40000894 ZZH STATISTIC OT IP EVAL DEFER

## 2020-01-26 PROCEDURE — 25000132 ZZH RX MED GY IP 250 OP 250 PS 637: Mod: GY | Performed by: SURGERY

## 2020-01-26 PROCEDURE — 80048 BASIC METABOLIC PNL TOTAL CA: CPT | Performed by: TRANSPLANT SURGERY

## 2020-01-26 RX ORDER — SEVELAMER CARBONATE 800 MG/1
1600 TABLET, FILM COATED ORAL
Status: DISCONTINUED | OUTPATIENT
Start: 2020-01-26 | End: 2020-01-27 | Stop reason: HOSPADM

## 2020-01-26 RX ADMIN — HYDROMORPHONE HYDROCHLORIDE 0.5 MG: 1 INJECTION, SOLUTION INTRAMUSCULAR; INTRAVENOUS; SUBCUTANEOUS at 01:46

## 2020-01-26 RX ADMIN — AMLODIPINE BESYLATE 10 MG: 10 TABLET ORAL at 08:26

## 2020-01-26 RX ADMIN — BENZOCAINE AND MENTHOL 1 LOZENGE: 15; 3.6 LOZENGE ORAL at 23:43

## 2020-01-26 RX ADMIN — SEVELAMER CARBONATE 1600 MG: 800 TABLET, FILM COATED ORAL at 15:28

## 2020-01-26 RX ADMIN — ACETAMINOPHEN 650 MG: 325 TABLET, FILM COATED ORAL at 12:29

## 2020-01-26 RX ADMIN — OXYCODONE HYDROCHLORIDE 5 MG: 5 TABLET ORAL at 08:26

## 2020-01-26 RX ADMIN — ACETAMINOPHEN 650 MG: 325 TABLET, FILM COATED ORAL at 02:15

## 2020-01-26 RX ADMIN — BENZOCAINE AND MENTHOL 1 LOZENGE: 15; 3.6 LOZENGE ORAL at 20:06

## 2020-01-26 RX ADMIN — ACETAMINOPHEN 650 MG: 325 TABLET, FILM COATED ORAL at 08:26

## 2020-01-26 RX ADMIN — HYDRALAZINE HYDROCHLORIDE 50 MG: 25 TABLET ORAL at 08:27

## 2020-01-26 RX ADMIN — LIDOCAINE 1 PATCH: 560 PATCH PERCUTANEOUS; TOPICAL; TRANSDERMAL at 08:27

## 2020-01-26 RX ADMIN — HYDRALAZINE HYDROCHLORIDE 50 MG: 25 TABLET ORAL at 20:04

## 2020-01-26 RX ADMIN — ACETAMINOPHEN 650 MG: 325 TABLET, FILM COATED ORAL at 20:06

## 2020-01-26 RX ADMIN — HEPARIN SODIUM 5000 UNITS: 5000 INJECTION, SOLUTION INTRAVENOUS; SUBCUTANEOUS at 08:28

## 2020-01-26 RX ADMIN — ONDANSETRON 4 MG: 2 INJECTION INTRAMUSCULAR; INTRAVENOUS at 01:40

## 2020-01-26 RX ADMIN — OXYCODONE HYDROCHLORIDE 5 MG: 5 TABLET ORAL at 12:29

## 2020-01-26 RX ADMIN — HEPARIN SODIUM 5000 UNITS: 5000 INJECTION, SOLUTION INTRAVENOUS; SUBCUTANEOUS at 15:29

## 2020-01-26 ASSESSMENT — ACTIVITIES OF DAILY LIVING (ADL)
ADLS_ACUITY_SCORE: 10

## 2020-01-26 NOTE — PROVIDER NOTIFICATION
Paged Dr. Gutierrez regarding /71 to determine if hydralazine should be held. Provider requested to hold this dose.

## 2020-01-26 NOTE — PLAN OF CARE
2200 - 0730   Transferred from     /67 (BP Location: Right arm)   Pulse 93   Temp 98.4  F (36.9  C) (Oral)   Resp 16   Wt 59.6 kg (131 lb 4.8 oz)   SpO2 94%   BMI 23.41 kg/m      VS: Intermittently tachycardic, OVSS on RA  BG: q4h 100, 82  Pain/Nausea: IV dilaudid x 1 and tylenol x 1 for chest tube site pain. IV zofran x 1, good relief noted.  Diet: Dialysis  LDA: 2 R PIVs saline locked, L fistula WDL, chest tube to -20 suction 30 mL bloody output   GI: No BM  : Oliguric on hemodialysis  Skin: intact  Mobility: Assist x 1  Plan:  scheduled at 0800, continue to monitor

## 2020-01-26 NOTE — PLAN OF CARE
7A evaluation  Discharge Planner PT   Patient plan for discharge: home  Current status: pt completes bed mobility IND. Sit <> stand transfers and ambulation with SBA. Tolerates ~400' prior to rest break. Limited overall by pain. Declines stair performance this date 2/2 pain at CT site. VSS on RA.   Barriers to return to prior living situation: medical status  Recommendations for discharge: return to home with assist prn  Rationale for recommendations: pt mobilizing well. Will continue to follow while IP to assess stair mobility and progress activity tolerance.        Entered by: Jeffrey Puente 01/26/2020 9:09 AM

## 2020-01-26 NOTE — PLAN OF CARE
/71 (BP Location: Right arm)   Pulse 93   Temp 98.9  F (37.2  C) (Oral)   Resp 20   Wt 59.6 kg (131 lb 4.8 oz)   SpO2 94%   BMI 23.41 kg/m       0105-6905: Afebrile, BPs soft this afternoon (held scheduled hydralazine per Dr. Gutierrez), RR 20-24, OVSS on RA. Weak cough, encouraged splinting and IS. Patient reporting right thorax pain around chest tube site, PRN oxycodone and tylenol with adequate control. Patient deneis nausea. BG Q4H not requiring correction. Oliguric, on HD. Last BM 1/23. Dialysis diet with fair appetite. PIV SL. R CT removed at 1600 by Dr. Carey, dressing CDI. Follow up CXR at 2000. Plan for possible discharge tomorrow pending respiratory status. Will continue with POC, notify team with changes/concerns.

## 2020-01-26 NOTE — PROGRESS NOTES
Transplant Surgery  Inpatient Daily Progress Note  01/26/2020    Assessment & Plan:Paolo Larkin is a 43 yo F with PMHx of ESKD (on HD), HTN, STEMI 2008, Hep B, HELLP syndrome s/p aborted kidney transplant on 1/24/20. Patient had hypotension and desaturation shortly after central line placement. Concern for PTX and/or anaphylactic reaction. Chest tube placed intraoperatively and hemodynamics supported. Patient has not been on pressors today.    Graft function: n/a  Immunosuppression management: n/a  Hematology: HgB stable  Cardiorespiratory: Stable. CT placed on water seal this AM. CXR this afternoon, may pull CT if CXR benign.   GI/Nutrition: dialysis diet, bowel regimen   Endocrine: No hx of DM, low SSI  Fluid/Electrolytes: MIVF:TKO.  Electrolytes appropriate. MWF dialysis schedule.  : no concerns  Infectious disease: no concerns  Prophylaxis: DVT, fall, GI  Disposition: 7A, potential discharge tomorrow    Medical Decision Making: Medium  Subsequent visit 36349 (moderate level decision making)    MERVAT/Fellow/Resident Provider: Damien Gutierrez MD    Faculty: Armando Faustin M.D.  _________________________________________________________________  Transplant History: Admitted 1/23/2020 for kidney transplant, aborted.  N/A, Postoperative day:  n/a    Interval History: History is obtained from the patient with assistance of an .  Overnight events: No acute events overnight. Patient awake and responsive this am. Denies n/v. No bm.     ROS:   A 10-point review of systems was negative except as noted above.    Meds:    amLODIPine  10 mg Per Feeding Tube QAM     heparin ANTICOAGULANT  5,000 Units Subcutaneous Q8H     hydrALAZINE  50 mg Per Feeding Tube TID     insulin aspart  1-4 Units Subcutaneous Q4H     lidocaine  1 patch Transdermal Q24H     lidocaine   Transdermal Q8H     sodium chloride (PF)  3 mL Intravenous Q8H     sodium chloride (PF)  3 mL Intracatheter Q8H       Physical Exam:     Admit  Weight: 57.4 kg (126 lb 9.6 oz)    Current vitals:   /89 (BP Location: Right arm)   Pulse 93   Temp 99  F (37.2  C) (Oral)   Resp 24   Wt 59.6 kg (131 lb 4.8 oz)   SpO2 95%   BMI 23.41 kg/m      CVP (mmHg): 5 mmHg    Vital sign ranges:    Temp:  [97.8  F (36.6  C)-99  F (37.2  C)] 99  F (37.2  C)  Heart Rate:  [] 89  Resp:  [10-32] 24  BP: (104-150)/(67-92) 139/89  MAP:  [121 mmHg-129 mmHg] 129 mmHg  Arterial Line BP: (164-176)/(90-95) 176/95  SpO2:  [93 %-100 %] 95 %  Patient Vitals for the past 24 hrs:   BP Temp Temp src Heart Rate Resp SpO2   01/26/20 0830 139/89 99  F (37.2  C) Oral 89 24 95 %   01/26/20 0414 104/67 98.4  F (36.9  C) Oral 85 16 94 %   01/26/20 0152 (!) 150/92 -- -- 109 -- 97 %   01/25/20 2334 117/73 98.2  F (36.8  C) Oral 99 18 95 %   01/25/20 2207 127/80 97.9  F (36.6  C) Oral 103 28 96 %   01/25/20 2004 133/81 -- -- -- -- --   01/25/20 2000 -- 97.8  F (36.6  C) Oral 101 10 100 %   01/25/20 1945 -- -- -- 116 (!) 32 96 %   01/25/20 1930 -- -- -- 104 20 100 %   01/25/20 1915 -- -- -- 109 18 99 %   01/25/20 1900 -- -- -- 110 18 99 %   01/25/20 1800 -- -- -- 111 18 93 %   01/25/20 1700 -- -- -- 101 18 98 %   01/25/20 1600 -- -- -- 100 24 96 %   01/25/20 1545 -- -- -- 109 (!) 32 97 %   01/25/20 1530 (!) 148/91 -- -- 100 10 99 %   01/25/20 1500 -- 98.3  F (36.8  C) Axillary 99 11 96 %   01/25/20 1400 -- -- -- 104 10 100 %   01/25/20 1300 -- 98.4  F (36.9  C) Axillary 96 10 99 %   01/25/20 1200 -- -- -- 104 10 100 %     General Appearance: in no apparent distress.   Skin: normal  Heart: regular rate and rhythm  Lungs: non-labored breathing. CT with minimal output, serosanginous, on water seal. No air leak.  Abdomen: The abdomen is flat, and  non-tender.  : soni is not present.  The genitalia is not edematous. Urine has no gross hematuria.   Extremities: edema: absent.   Neurologic: awake. Tremor absent..     Data:   CMP  Recent Labs   Lab 01/26/20  0541 01/25/20  0807  01/25/20  0408 01/24/20  2145  01/24/20  1639  01/23/20  2259     --  136 136   < > 136   < > 142   POTASSIUM 4.6 4.1 3.7 2.8*   < > 3.2*   < > 3.2*   CHLORIDE 103  --  102 100   < >  --    < > 97   CO2 26  --  29 24   < >  --    < > 31   GLC 87  --  153* 189*   < > 152*   < > 91   BUN 30  --  13 10   < >  --    < > 20   CR 8.74*  --  5.84* 5.43*   < >  --    < > 8.53*   GFRESTIMATED 5*  --  8* 9*   < >  --    < > 5*   GFRESTBLACK 6*  --  10* 10*   < >  --    < > 6*   HOLLIS 8.3*  --  8.1* 8.3*   < >  --    < > 9.1   ICAW  --   --   --   --   --  4.3*  --   --    MAG 2.0  --  1.6  --    < >  --   --   --    PHOS 4.3  --  2.3*  --    < >  --   --   --    ALBUMIN  --   --   --  2.8*  --   --   --  3.6   BILITOTAL  --   --   --  0.3  --   --   --  0.3   ALKPHOS  --   --   --  125  --   --   --  139   AST  --   --   --  22  --   --   --  16   ALT  --   --   --  18  --   --   --  20    < > = values in this interval not displayed.     CBC  Recent Labs   Lab 01/26/20  1005 01/25/20  0807  01/24/20  2145   HGB 9.0* 9.9*   < > 10.6*   WBC 15.2*  --   --  21.6*     --   --  311   A1C  --   --   --  Canceled, Test credited    < > = values in this interval not displayed.     COAGS  Recent Labs   Lab 01/24/20  2145 01/23/20  2259   INR 1.05 0.89   PTT  --  35      Urinalysis  Recent Labs   Lab Test 01/23/20  2300 04/22/19  2340   COLOR Light Yellow Straw   APPEARANCE Slightly Cloudy Clear   URINEGLC 30* 30*   URINEBILI Negative Negative   URINEKETONE Negative Negative   SG 1.003 1.004   UBLD Trace* Trace*   URINEPH 8.0* 8.5*   PROTEIN 30* 30*   NITRITE Negative Negative   LEUKEST Large* Trace*   RBCU 2 <1   WBCU 48* 3   UTPG 5.08* 6.67*     Virology:  CMV IgG Antibody   Date Value Ref Range Status   06/20/2013 5.20 U/mL Final     Comment:     Positive for anti-CMV IgG     EBV VCA IgG Antibody   Date Value Ref Range Status   06/20/2013 32.40 U/mL Final     Comment:     Positive, suggests immunologic exposure.      Hepatitis C Antibody   Date Value Ref Range Status   01/23/2020 Nonreactive NR^Nonreactive Final     Comment:     Assay performance characteristics have not been established for newborns,   infants, and children       Hep B Surface Gricelda   Date Value Ref Range Status   06/20/2013 50.3  Final     Comment:     Positive, Patient is considered to be immune to infection with hepatitis B   when   the value is greater than or equal to 12.0 mlU/mL.

## 2020-01-26 NOTE — PROGRESS NOTES
01/26/20 0912   Quick Adds   Type of Visit Initial PT Evaluation       Present yes   Language Patricia   Living Environment   Lives With spouse;child(stephanie), dependent   Living Arrangements apartment   Home Accessibility stairs to enter home;no concerns   Number of Stairs, Main Entrance   (3 flights)   Stair Railings, Main Entrance railings on both sides of stairs   Transportation Anticipated car, drives self;family or friend will provide   Living Environment Comment Pt lives in a 3rd floor apartment with her spouse and children. reports IND at baseline, typically drives self as needed. no concerns of return to home   Self-Care   Usual Activity Tolerance good   Current Activity Tolerance moderate   Regular Exercise No   Equipment Currently Used at Home none   Activity/Exercise/Self-Care Comment currently limited by pain   Functional Level Prior   Ambulation 0-->independent   Transferring 0-->independent   Toileting 0-->independent   Bathing 0-->independent   Communication 0-->understands/communicates without difficulty   Swallowing 0-->swallows foods/liquids without difficulty   Cognition 0 - no cognition issues reported   Fall history within last six months no   Which of the above functional risks had a recent onset or change? none   Prior Functional Level Comment IND at baseline   General Information   Onset of Illness/Injury or Date of Surgery - Date 01/24/20   Referring Physician Monika Perez MD   Patient/Family Goals Statement improve pain   Pertinent History of Current Problem (include personal factors and/or comorbidities that impact the POC) Paolo Larkin is a 45 yo F with PMHx of ESKD of unknown etiology (on HD), renovascular HTN, STEMI 2008, Hep B, HELLP syndrome, and aborted kidney transplant in April 2019 secondary to anaphylaxis after induction of anesthesia who was planned for kidney transplant 1/24/20. Unfortunately developed hypotension and desaturation after central  line placement. Concern for pneumothorax and had CT placed on the right. Is on epinephrine   Precautions/Limitations fall precautions   Weight-Bearing Status - LUE full weight-bearing   Weight-Bearing Status - RUE full weight-bearing   Weight-Bearing Status - LLE full weight-bearing   Weight-Bearing Status - RLE full weight-bearing   General Info Comments activity: up ad clarice   Cognitive Status Examination   Orientation orientation to person, place and time   Level of Consciousness alert   Follows Commands and Answers Questions 100% of the time   Personal Safety and Judgment intact   Memory intact   Pain Assessment   Patient Currently in Pain Yes, see Vital Sign flowsheet  (at CT site)   Integumentary/Edema   Integumentary/Edema no deficits were identifed   Posture    Posture Forward head position   Range of Motion (ROM)   ROM Quick Adds No deficits were identified   Strength   Strength Comments BLE > 3+/5   Bed Mobility   Bed Mobility Comments IND   Transfer Skills   Transfer Comments IND    Gait   Gait Comments SBA x 30', limited by pain, demonstrates flexed posture and holds LUE at CT site   Balance   Balance Comments SBA   Sensory Examination   Sensory Perception Comments denies deficits   General Therapy Interventions   Planned Therapy Interventions balance training;home program guidelines;progressive activity/exercise;gait training   Clinical Impression   Criteria for Skilled Therapeutic Intervention yes, treatment indicated   PT Diagnosis impaired activity tolerance   Influenced by the following impairments pain   Functional limitations due to impairments below baseline tolerance of walking and stiars   Clinical Presentation Stable/Uncomplicated   Clinical Presentation Rationale pt presentation, clinical reasoning   Clinical Decision Making (Complexity) Low complexity   Therapy Frequency   (1-2 additional visits)   Predicted Duration of Therapy Intervention (days/wks) 3 days   Anticipated Discharge Disposition  "Home with Assist   Risk & Benefits of therapy have been explained Yes   Patient, Family & other staff in agreement with plan of care Yes   Clinical Impression Comments evaluation completed, treatment initiated   [facility] TM \"6 Clicks\"   2016, Trustees of [facility], under license to [employer].  All rights reserved.   6 Clicks Short Forms Basic Mobility Inpatient Short Form   [facility] AM-PAC  \"6 Clicks\" V.2 Basic Mobility Inpatient Short Form   1. Turning from your back to your side while in a flat bed without using bedrails? 4 - None   2. Moving from lying on your back to sitting on the side of a flat bed without using bedrails? 4 - None   3. Moving to and from a bed to a chair (including a wheelchair)? 4 - None   4. Standing up from a chair using your arms (e.g., wheelchair, or bedside chair)? 4 - None   5. To walk in hospital room? 3 - A Little   6. Climbing 3-5 steps with a railing? 3 - A Little   Basic Mobility Raw Score (Score out of 24.Lower scores equate to lower levels of function) 22   Total Evaluation Time   Total Evaluation Time (Minutes) 7     "

## 2020-01-26 NOTE — PROGRESS NOTES
Nephrology Progress Note  01/26/2020         Assessment & Recommendations:   Paolo Larkin is a 44 year old year old female with h/o ESRD on MWF HD, HTN, STEMI 2008, Hep B, prior HELLP syndrome, and aborted kidney tx 4/2019 due to anaphylaxis with induction anesthesia now with another aborted kidney tx due to hypotension/hypoxia, suspected from pneumothorax after central line placement.      # ESRD on MWD iHD via AVF  HD done Friday pre-now aborted DDKTx surgery. Plan for next HD Monday.   -outpt HD at [facility], [facility] with Dr. Luna  -HD ordered for Monday, 3.5hrs, K/Ca protocol, no heparin, UF to EDW 56kg     # volume status, blood pressure  EDW 56kg, mildly hypervolemic at this time. Normotensive to mildly hypertensive and on room air after extubation. Minimize fluids as able. Home amlodipine 10mg and hydral 50mg tid continued, losartan 100mg held (can restart if BP climbing).      # electrolytes, acid-base  No acute issues.     # anemia  Hgb at goal at 9s. Can request records to continue prior JESSI dosing.      # MBD  Corrected calcium WNL, phos WNL. Phos binders continued.        Recommendations were communicated to primary team via note.    Seen and discussed with Dr. Yepez.    Jeffrey Zimmerman MD   [phone]    Interval History :   Nursing and provider notes from last 24 hours reviewed.  In the last 24 hours Paolo Larkin having pain from chest tube but denies SOB or LE swelling.      used.     Review of Systems:   I reviewed the following systems:  GI: no nausea or vomiting or diarrhea.   Neuro:  no confusion  Constitutional:  no fever or chills  CV: no dyspnea or edema.  no chest pain aside from focal site pain    Physical Exam:   I/O last 3 completed shifts:  In: 872 [P.O.:120; I.V.:752]  Out: 55 [Chest Tube:55]   /71 (BP Location: Right arm)   Pulse 93   Temp 98.9  F (37.2  C) (Oral)   Resp 20   Wt 59.6 kg (131 lb 4.8 oz)   SpO2 94%   BMI 23.41 kg/m        GENERAL APPEARANCE: nad  EYES:  no scleral icterus, pupils equal  PULM: lungs clear  CV: regular rhythm, normal rate, no rub    -edema - none  GI: soft, NTND  Access: AVF    Labs:   All labs reviewed by me  Electrolytes/Renal -   Recent Labs   Lab Test 01/26/20  0541 01/25/20  0807 01/25/20  0408 01/24/20  2145  01/24/20  1850     --  136 136  --  137   POTASSIUM 4.6 4.1 3.7 2.8*   < > 2.6*   CHLORIDE 103  --  102 100  --  101   CO2 26  --  29 24  --  29   BUN 30  --  13 10  --  8   CR 8.74*  --  5.84* 5.43*  --  5.07*   GLC 87  --  153* 189*  --  161*   HOLLIS 8.3*  --  8.1* 8.3*  --  7.7*   MAG 2.0  --  1.6  --   --  1.7   PHOS 4.3  --  2.3*  --   --  2.4*    < > = values in this interval not displayed.       CBC -   Recent Labs   Lab Test 01/26/20  1005 01/25/20  0807 01/25/20  0408 01/24/20  2145 01/24/20  1850   WBC 15.2*  --   --  21.6* 23.5*   HGB 9.0* 9.9* 9.5* 10.6* 10.7*     --   --  311 356       LFTs -   Recent Labs   Lab Test 01/24/20  2145 01/23/20  2259 04/22/19  2235   ALKPHOS 125 139 65   BILITOTAL 0.3 0.3 0.9   ALT 18 20 21   AST 22 16 30   PROTTOTAL 6.3* 7.6 7.8   ALBUMIN 2.8* 3.6 3.6       Iron Panel - No lab results found.      Imaging:  All imaging studies reviewed by me.     Current Medications:    amLODIPine  10 mg Per Feeding Tube QAM     heparin ANTICOAGULANT  5,000 Units Subcutaneous Q8H     hydrALAZINE  50 mg Per Feeding Tube TID     insulin aspart  1-4 Units Subcutaneous Q4H     lidocaine  1 patch Transdermal Q24H     lidocaine   Transdermal Q8H     sevelamer  1,600 mg Oral TID w/meals     sodium chloride (PF)  3 mL Intravenous Q8H     sodium chloride (PF)  3 mL Intracatheter Q8H       sodium chloride Stopped (01/25/20 0926)     Jeffrey Zimmerman MD

## 2020-01-26 NOTE — PLAN OF CARE
Alert, Ox4, appropriately following commands, maintains MAPs >65, pain is managed with oxycodone, CT to -20 suction, minimal serousang output, voided x1 without saving, stable BGs, transferred care to . Cont with POC

## 2020-01-26 NOTE — PLAN OF CARE
Discharge Planner OT   Patient plan for discharge: home w/A  Current status: Per chart review and interdisciplinary collaboration, pt is up ambulating well, has family support at home, anticipate she will be able to return home. Pt has CT but no post op precautions. Pt does not have acute OT needs. PT will follow for ambulation and activity progression. OT will defer.   Barriers to return to prior living situation: defer to PT  Recommendations for discharge: defer to PT  Rationale for recommendations: defer to PT       Entered by: Dede Rangel 01/26/2020 10:57 AM     OT 7A DEFER

## 2020-01-27 ENCOUNTER — TELEPHONE (OUTPATIENT)
Dept: TRANSPLANT | Facility: CLINIC | Age: 45
End: 2020-01-27

## 2020-01-27 ENCOUNTER — APPOINTMENT (OUTPATIENT)
Dept: GENERAL RADIOLOGY | Facility: CLINIC | Age: 45
DRG: 682 | End: 2020-01-27
Attending: NURSE PRACTITIONER
Payer: MEDICARE

## 2020-01-27 ENCOUNTER — DOCUMENTATION ONLY (OUTPATIENT)
Dept: TRANSPLANT | Facility: CLINIC | Age: 45
End: 2020-01-27

## 2020-01-27 ENCOUNTER — APPOINTMENT (OUTPATIENT)
Dept: PHYSICAL THERAPY | Facility: CLINIC | Age: 45
DRG: 682 | End: 2020-01-27
Attending: TRANSPLANT SURGERY
Payer: MEDICARE

## 2020-01-27 VITALS
HEART RATE: 88 BPM | DIASTOLIC BLOOD PRESSURE: 93 MMHG | RESPIRATION RATE: 14 BRPM | WEIGHT: 131.61 LBS | TEMPERATURE: 98.2 F | SYSTOLIC BLOOD PRESSURE: 140 MMHG | BODY MASS INDEX: 23.47 KG/M2 | OXYGEN SATURATION: 99 %

## 2020-01-27 LAB
ANION GAP SERPL CALCULATED.3IONS-SCNC: 10 MMOL/L (ref 3–14)
BLD PROD TYP BPU: NORMAL
BLD PROD TYP BPU: NORMAL
BLD UNIT ID BPU: 0
BLD UNIT ID BPU: 0
BLOOD PRODUCT CODE: NORMAL
BLOOD PRODUCT CODE: NORMAL
BPU ID: NORMAL
BPU ID: NORMAL
BUN SERPL-MCNC: 42 MG/DL (ref 7–30)
CALCIUM SERPL-MCNC: 8.6 MG/DL (ref 8.5–10.1)
CELL TYPE ALLO: NORMAL
CELL TYPE AUTO: NORMAL
CHANNELSHIFTALLOB1: -35
CHANNELSHIFTALLOB2: -39
CHANNELSHIFTALLOT1: -28
CHANNELSHIFTALLOT2: -32
CHANNELSHIFTAUTOB1: -89
CHANNELSHIFTAUTOB2: -89
CHANNELSHIFTAUTOT1: -105
CHANNELSHIFTAUTOT2: -106
CHLORIDE SERPL-SCNC: 102 MMOL/L (ref 94–109)
CO2 SERPL-SCNC: 26 MMOL/L (ref 20–32)
COMMENT ALLOB2: NORMAL
CREAT SERPL-MCNC: 11.4 MG/DL (ref 0.52–1.04)
CROSSMATCHDATEALLO: NORMAL
CROSSMATCHDATEAUTO: NORMAL
DONOR ALLO: NORMAL
DONOR AUTO: NORMAL
DONORCELLDATE ALLO: NORMAL
DONORCELLDATE AUTO: NORMAL
GFR SERPL CREATININE-BSD FRML MDRD: 4 ML/MIN/{1.73_M2}
GLUCOSE BLDC GLUCOMTR-MCNC: 79 MG/DL (ref 70–99)
GLUCOSE BLDC GLUCOMTR-MCNC: 81 MG/DL (ref 70–99)
GLUCOSE SERPL-MCNC: 80 MG/DL (ref 70–99)
MAGNESIUM SERPL-MCNC: 2.4 MG/DL (ref 1.6–2.3)
PHOSPHATE SERPL-MCNC: 4.5 MG/DL (ref 2.5–4.5)
POS CUT OFF ALLO B: >93
POS CUT OFF ALLO T: >79
POS CUT OFF AUTO B: >93
POS CUT OFF AUTO T: >79
POTASSIUM SERPL-SCNC: 4.1 MMOL/L (ref 3.4–5.3)
PROTOCOL CUTOFF: NORMAL
RESULT ALLO B1: NORMAL
RESULT ALLO B2: NORMAL
RESULT ALLO T1: NORMAL
RESULT ALLO T2: NORMAL
RESULT AUTO B1: NORMAL
RESULT AUTO B2: NORMAL
RESULT AUTO T1: NORMAL
RESULT AUTO T2: NORMAL
SA1 CELL: NORMAL
SA1 COMMENTS: NORMAL
SA1 HI RISK ABY: NORMAL
SA1 MOD RISK ABY: NORMAL
SA1 TEST METHOD: NORMAL
SA2 CELL: NORMAL
SA2 COMMENTS: NORMAL
SA2 HI RISK ABY UA: NORMAL
SA2 MOD RISK ABY: NORMAL
SA2 TEST METHOD: NORMAL
SERUM DATE ALLO B1: NORMAL
SERUM DATE ALLO B2: NORMAL
SERUM DATE ALLO T1: NORMAL
SERUM DATE ALLO T2: NORMAL
SERUM DATE AUTO B1: NORMAL
SERUM DATE AUTO B2: NORMAL
SERUM DATE AUTO T1: NORMAL
SERUM DATE AUTO T2: NORMAL
SODIUM SERPL-SCNC: 137 MMOL/L (ref 133–144)
TESTMETHODALLO: NORMAL
TESTMETHODAUTO: NORMAL
TRANSFUSION STATUS PATIENT QL: NORMAL
TREATMENT ALLO B1: NORMAL
TREATMENT ALLO B2: NORMAL
TREATMENT ALLO T1: NORMAL
TREATMENT ALLO T2: NORMAL
TREATMENT AUTO B1: NORMAL
TREATMENT AUTO B2: NORMAL
TREATMENT AUTO T1: NORMAL
TREATMENT AUTO T2: NORMAL
UNACCEPTABLE ANTIGEN: NORMAL
UNOS CPRA: 39

## 2020-01-27 PROCEDURE — 00000146 ZZHCL STATISTIC GLUCOSE BY METER IP

## 2020-01-27 PROCEDURE — 25000128 H RX IP 250 OP 636: Performed by: PHYSICIAN ASSISTANT

## 2020-01-27 PROCEDURE — 25000132 ZZH RX MED GY IP 250 OP 250 PS 637: Mod: GY | Performed by: PHYSICIAN ASSISTANT

## 2020-01-27 PROCEDURE — 83735 ASSAY OF MAGNESIUM: CPT | Performed by: TRANSPLANT SURGERY

## 2020-01-27 PROCEDURE — 25000132 ZZH RX MED GY IP 250 OP 250 PS 637: Mod: GY | Performed by: SURGERY

## 2020-01-27 PROCEDURE — 97530 THERAPEUTIC ACTIVITIES: CPT | Mod: GP

## 2020-01-27 PROCEDURE — 36415 COLL VENOUS BLD VENIPUNCTURE: CPT | Performed by: TRANSPLANT SURGERY

## 2020-01-27 PROCEDURE — 97116 GAIT TRAINING THERAPY: CPT | Mod: GP

## 2020-01-27 PROCEDURE — 80048 BASIC METABOLIC PNL TOTAL CA: CPT | Performed by: TRANSPLANT SURGERY

## 2020-01-27 PROCEDURE — 84100 ASSAY OF PHOSPHORUS: CPT | Performed by: TRANSPLANT SURGERY

## 2020-01-27 PROCEDURE — 25000132 ZZH RX MED GY IP 250 OP 250 PS 637: Mod: GY | Performed by: TRANSPLANT SURGERY

## 2020-01-27 PROCEDURE — 25800030 ZZH RX IP 258 OP 636: Performed by: STUDENT IN AN ORGANIZED HEALTH CARE EDUCATION/TRAINING PROGRAM

## 2020-01-27 PROCEDURE — 25000128 H RX IP 250 OP 636: Performed by: STUDENT IN AN ORGANIZED HEALTH CARE EDUCATION/TRAINING PROGRAM

## 2020-01-27 PROCEDURE — 90937 HEMODIALYSIS REPEATED EVAL: CPT

## 2020-01-27 PROCEDURE — 71046 X-RAY EXAM CHEST 2 VIEWS: CPT

## 2020-01-27 RX ORDER — OXYCODONE HYDROCHLORIDE 5 MG/1
5 TABLET ORAL EVERY 6 HOURS PRN
Qty: 15 TABLET | Refills: 0 | Status: ON HOLD | OUTPATIENT
Start: 2020-01-27 | End: 2020-11-13

## 2020-01-27 RX ORDER — LOSARTAN POTASSIUM 100 MG/1
100 TABLET ORAL EVERY MORNING
Status: ON HOLD
Start: 2020-01-27 | End: 2020-11-19

## 2020-01-27 RX ORDER — ONDANSETRON 4 MG/1
4 TABLET, ORALLY DISINTEGRATING ORAL EVERY 6 HOURS PRN
Qty: 15 TABLET | Refills: 0 | Status: ON HOLD | OUTPATIENT
Start: 2020-01-27 | End: 2020-11-13

## 2020-01-27 RX ADMIN — SODIUM CHLORIDE 250 ML: 9 INJECTION, SOLUTION INTRAVENOUS at 13:09

## 2020-01-27 RX ADMIN — OXYCODONE HYDROCHLORIDE 5 MG: 5 TABLET ORAL at 07:01

## 2020-01-27 RX ADMIN — AMLODIPINE BESYLATE 10 MG: 10 TABLET ORAL at 09:43

## 2020-01-27 RX ADMIN — SODIUM CHLORIDE 300 ML: 9 INJECTION, SOLUTION INTRAVENOUS at 13:08

## 2020-01-27 RX ADMIN — Medication: at 13:09

## 2020-01-27 RX ADMIN — OXYCODONE HYDROCHLORIDE 5 MG: 5 TABLET ORAL at 17:36

## 2020-01-27 RX ADMIN — ONDANSETRON 4 MG: 4 TABLET, ORALLY DISINTEGRATING ORAL at 17:36

## 2020-01-27 RX ADMIN — HEPARIN SODIUM 5000 UNITS: 5000 INJECTION, SOLUTION INTRAVENOUS; SUBCUTANEOUS at 00:00

## 2020-01-27 RX ADMIN — ACETAMINOPHEN 650 MG: 325 TABLET, FILM COATED ORAL at 07:01

## 2020-01-27 RX ADMIN — SEVELAMER CARBONATE 1600 MG: 800 TABLET, FILM COATED ORAL at 09:42

## 2020-01-27 RX ADMIN — HEPARIN SODIUM 5000 UNITS: 5000 INJECTION, SOLUTION INTRAVENOUS; SUBCUTANEOUS at 09:42

## 2020-01-27 RX ADMIN — BENZOCAINE AND MENTHOL 1 LOZENGE: 15; 3.6 LOZENGE ORAL at 07:01

## 2020-01-27 ASSESSMENT — ACTIVITIES OF DAILY LIVING (ADL)
ADLS_ACUITY_SCORE: 10

## 2020-01-27 NOTE — PLAN OF CARE
/89   Pulse 90   Temp 98.2  F (36.8  C) (Oral)   Resp 16   Wt 59.7 kg (131 lb 9.8 oz)   SpO2 98%   BMI 23.47 kg/m       VS: stable on room air except slightly tachy.   BG: below 100; orders discontinued for BG checks.   LABS: see chart.   Pain/Nausea: denied both and declined lido patch.   Neuro: intact.  Diet: regular diet.   LDA: PIV x 1 saline locked.   GI/: oliguric on HD and last BM 1/24.   Skin: intact except for right chest tube site; covered.   Activity: independent/SBA.   Tests/Procedures: chest xray this morning d/t pt having increased pain at chest tube site at end of night shift. Pt declined pain all day. Chest xray shows slowly improving pneumo.   Plan: continue POC; pt at dialysis then will discharge this evening.     All care explained and questions answered. Will continue to monitor and notify team of changes.

## 2020-01-27 NOTE — PLAN OF CARE
[id number]    /74 (BP Location: Right arm)   Pulse 93   Temp 98.7  F (37.1  C) (Oral)   Resp 18   Wt 59.6 kg (131 lb 4.8 oz)   SpO2 93%   BMI 23.41 kg/m      Reason for admission: Aborted kidney tx due to hypotension/hypoxia, suspected from pneumothorax after central line placement  Activity: SBA OOB  Pain: c/o pain s/p chest tube removal to incision site. Given Tylenol with effect, declined Oxycodone. Also c/o sore throat, given PRN lozenge with effect  Neuro: AxOx4. Neuros intact. Patricia speaking only, utilized ipad for translation services.   Cardiac: WDL  Respiratory: LS clear. O2 sats WDL on RA. Encouraged IS. Weak non productive cough.   GI/: Abdomen soft, nt, nd. BSx4. LBM 1/23. Pt oliguric on HD. Denies n/v.   Diet: Dialysis diet  Lines: R PIVx2. Sites WDL, both SL.   Wounds: CT site dressing CDI.  Labs/imaging: CXR imaging showing stable tiny right apical pneumothorax, trace bilateral pleural effusions. Reviewed labs, notable for 15.2 WBC, 8.74 Creatinine.     Plan: HD, CXR, possible discharge home today.     Continue to monitor and follow POC

## 2020-01-27 NOTE — PROGRESS NOTES
HEMODIALYSIS TREATMENT NOTE    Date: 1/27/2020  Time: 5:02 PM    Data:  Pre Wt: 59.7 kg (131 lb 9.8 oz)   Desired Wt: 56 kg   Post Wt: 56 kg (123 lb 7.3 oz)  Weight change: 3.7 kg  Ultrafiltration - Post Run Net Total Removed (mL): 3700 mL  Vascular Access Status: Fistula  Dialyzer Rinse: Clear  Total Blood Volume Processed: 74.56 L   Total Dialysis (Treatment) Time: 3.5   Dialysate Bath: K 3, Ca 2.25  Heparin: None    Lab:   No    Interventions:  3.5 hours of HD with 3700 mls pulled with no complications . Hand off to PCN    Assessment:  ESRD patient in for regular HD run      Plan:    Per renal

## 2020-01-27 NOTE — PROGRESS NOTES
Nephrology Progress Note  01/27/2020         Assessment & Recommendations:   Paolo Larkin is a 44 year old year old female with h/o ESRD on MWF HD, HTN, STEMI 2008, Hep B, prior HELLP syndrome, and aborted kidney tx 4/2019 due to anaphylaxis with induction anesthesia now with another aborted kidney tx due to hypotension/hypoxia, suspected from pneumothorax after central line placement.      # ESRD: unknown etiology. Dialyzes MWF at [facility] in [facility] with Dr. Luna. EDW 56 kg. Access: AVF  - Dialysis per MWF schedule     # volume status: EDW 56 kg    # BP: 130's. Home meds amlodipine 10mg, hydral 50mg tid, losartan 100mg      # anemia: hgb 9.0  - Will start epogen 2000 units per HD     # MBD: Ca 8.6, phos 4.5, on sevelamer 2 tabs tid WM          Recommendations were communicated to primary team via note.      RAFFAELE RamosC  [phone]    Interval History :   Seen on dialysis, chest tube now removed. Stable run this far with 3-4 kg UF goal. Denies n/v, SOB, chills    Review of Systems:   4 point ROS neg other than as noted above.    Physical Exam:   I/O last 3 completed shifts:  In: 720 [P.O.:720]  Out: 40 [Chest Tube:40]   /83 (BP Location: Right arm)   Pulse 93   Temp 97.8  F (36.6  C) (Oral)   Resp 18   Wt 59.6 kg (131 lb 4.8 oz)   SpO2 94%   BMI 23.41 kg/m       GENERAL APPEARANCE: nad  EYES:  no scleral icterus, pupils equal  PULM: lungs clear  CV: regular rhythm, normal rate    -edema - none  GI: soft   Access: AVF    Labs:   All labs reviewed by me  Electrolytes/Renal -   Recent Labs   Lab Test 01/27/20  0639 01/26/20  0541 01/25/20  0807 01/25/20  0408    137  --  136   POTASSIUM 4.1 4.6 4.1 3.7   CHLORIDE 102 103  --  102   CO2 26 26  --  29   BUN 42* 30  --  13   CR 11.40* 8.74*  --  5.84*   GLC 80 87  --  153*   HOLLIS 8.6 8.3*  --  8.1*   MAG 2.4* 2.0  --  1.6   PHOS 4.5 4.3  --  2.3*       CBC -   Recent Labs   Lab Test 01/26/20  1005 01/25/20  0807 01/25/20  0404  01/24/20 2145 01/24/20  1850   WBC 15.2*  --   --  21.6* 23.5*   HGB 9.0* 9.9* 9.5* 10.6* 10.7*     --   --  311 356       LFTs -   Recent Labs   Lab Test 01/24/20 2145 01/23/20  2259 04/22/19  2235   ALKPHOS 125 139 65   BILITOTAL 0.3 0.3 0.9   ALT 18 20 21   AST 22 16 30   PROTTOTAL 6.3* 7.6 7.8   ALBUMIN 2.8* 3.6 3.6       Iron Panel - No lab results found.      Imaging:  All imaging studies reviewed by me.     Current Medications:    sodium chloride 0.9%  250 mL Intravenous Once in dialysis     sodium chloride 0.9%  300 mL Hemodialysis Machine Once     amLODIPine  10 mg Per Feeding Tube QAM     gelatin absorbable  1 each Topical During Hemodialysis (from stock)     heparin ANTICOAGULANT  5,000 Units Subcutaneous Q8H     hydrALAZINE  50 mg Per Feeding Tube TID     insulin aspart  1-4 Units Subcutaneous Q4H     lidocaine  1 patch Transdermal Q24H     lidocaine   Transdermal Q8H     - MEDICATION INSTRUCTIONS -   Does not apply Once     sevelamer  1,600 mg Oral TID w/meals     sodium chloride (PF)  3 mL Intravenous Q8H     sodium chloride (PF)  3 mL Intracatheter Q8H       sodium chloride Stopped (01/25/20 0926)     - MEDICATION INSTRUCTIONS -       RAFFAELE Ramos

## 2020-01-27 NOTE — PLAN OF CARE
7A  Discharge Planner PT   Patient plan for discharge: home  Current status: pt mobilizing well, completes bed mobility and functional transfers SBA. Ambulates > 500' SBA progressing to IND, reports below baseline speed. Completes x 12 stairs with 1 rail, steady on feet. VSS on RA with SpO2 > 95% throughout.   Barriers to return to prior living situation: pain, medical status  Recommendations for discharge: home with assist prn  Rationale for recommendations: pt will benefit from assist for heavy ADLs.        Entered by: Jeffrey Puente 01/27/2020 9:40 AM

## 2020-01-27 NOTE — PROGRESS NOTES
"Asked by RN to assess patient because she is crying and upset. Patient found in bed, holding right chest. On questioning, she told interpretor that she is having pain in chest, would like a \"shot\" for the pain, denies dyspnea. On exam, found to have subcutaneous emphysema posterior to right chest bandage. Ascultation difficult due to crying, but appears to have airflow throughout. Sat 93% RA. Also has rhinorrhea and cough productive of clear sputum. Fellow notified.    Vital signs:  Temp: 98.7  F (37.1  C) Temp src: Oral BP: 115/74   Heart Rate: 91 Resp: 18 SpO2: 93 % O2 Device: None (Room air)     -CXR now  -RN had just given oxycodone and tylenol. Lidoderm patch is due to be placed. No further medications at this time. Will assess effects of medications that were given.    Destiny Vicente NP         "

## 2020-01-27 NOTE — DISCHARGE SUMMARY
[facility], [place]    Discharge Summary  Transplant Surgery    Date of Admission:  2020  Date of Discharge:  2020  6:29 PM  Discharging Provider: Maria De Jesus Boone PA-C/Dr. Marcelino  Date of Service (when I saw the patient): 20    Discharge Diagnoses   Active Problems:    End stage kidney disease (H)    Kidney transplant candidate    Hypotension    Pre-kidney transplant, listed    Pneumothorax    Procedure/Surgery Information   Procedure: Procedure(s):  BACKBENCH PREPARATION RIGHT  KIDNEY AND RIGHT CHEST TUBE PLACEMENT.  Insert chest tube   Aborted kidney transplant   Surgeon(s): Surgeon(s) and Role:     * Armando Faustin MD - Primary     * Maureen Del Cid MD - Assisting     * Mary Anne Carey MD - Assisting     * Damien Gutierrez MD - Resident - Assisting         History of Present Illness   Paolo Larkin is a 43 yo F with PMHx of ESKD (on HD), HTN, STEMI 2008, Hep B, HELLP syndrome, who was admitted to undergo  donor kidney transplant on 20. Patient had hypotension and desaturation shortly after central line placement. Concern for pneumothorax and/or anaphylactic reaction. Chest tube placed intraoperatively and hemodynamics supported, kidney transplant was aborted.       Hospital Course   Pae Carlton Larkin was admitted after chest tube placement for concern of pneumothorax vs possible anaphylaxis. She was able to be extubated on POD 1. Her chest tube was able to be removed on POD 2, chest XR s/p removal showed a stable tiny right apical pneumothorax. She was kept one additional night for additional monitoring and repeat chest XR showed continued decrease in trace right apical pneumothorax. She underwent hemodialysis per her home schedule, and was discharged after dialysis.    Transplant coordinator: Jocelyn Brooks (pre-coordinator) [phone]    Post-operative pain control: Will be oxycodone on discharge.       Discharge Disposition    Discharged to home   Condition at discharge: Stable    Primary Care Physician   Lorna Clancy    Consultations This Hospital Stay   NEPHROLOGY KIDNEY/PANCREAS TRANSPLANT ADULT IP CONSULT  VASCULAR ACCESS CARE ADULT IP CONSULT  MEDICATION HISTORY IP PHARMACY CONSULT  NEPHROLOGY ESRD ADULT IP CONSULT  PHYSICAL THERAPY ADULT IP CONSULT  OCCUPATIONAL THERAPY ADULT IP CONSULT  NEPHROLOGY ICU IP CONSULT    Time Spent on this Encounter   I have spent less than 30 minutes on this discharge.    Discharge Orders   Discharge Medications   Discharge Medication List as of 1/27/2020  5:26 PM      START taking these medications    Details   ondansetron (ZOFRAN-ODT) 4 MG ODT tab Take 1 tablet (4 mg) by mouth every 6 hours as needed for nausea or vomiting, Disp-15 tablet, R-0, E-Prescribe      oxyCODONE (ROXICODONE) 5 MG tablet Take 1 tablet (5 mg) by mouth every 6 hours as needed for moderate to severe pain, Disp-15 tablet, R-0, E-Prescribe         CONTINUE these medications which have CHANGED    Details   losartan (COZAAR) 100 MG tablet Take 1 tablet (100 mg) by mouth every morning Resume taking once blood pressures start increasing again, No Print Out         CONTINUE these medications which have NOT CHANGED    Details   acetaminophen (TYLENOL) 325 MG tablet Take 325-650 mg by mouth as needed for mild pain, Historical      amLODIPine (NORVASC) 10 MG tablet Take 10 mg by mouth every morning , Historical      calcium carbonate (TUMS) 500 MG chewable tablet Take 2 chew tab by mouth 3 times daily as needed for heartburn, Historical      hydrALAZINE (APRESOLINE) 25 MG tablet Take 50 mg by mouth 3 times daily , Historical      sevelamer (RENVELA) 800 MG tablet Take 1,600 mg by mouth 3 times daily (with meals) , Historical                Reason for your hospital stay    Patient underwent an attempted kidney transplant on 1/24/20, but it was aborted due to an anaphylactic response to anesthesia.     Activity    Your activity upon  discharge: activity as tolerated     Adult [facility] Follow-up and recommended labs and tests    Resume dialysis per home schedule.  Resume all other cares as previously arranged.    Follow up with Apple Bowen NP in 2 weeks for suture removal at chest tube site.    Follow up with an allergist to discuss anaphylactic reaction, rule out mast cell disorder at next available appointment.    Will need to follow up to discuss re-listing for a kidney transplant after committee review.    Appointments on [place] and/or [facility] (with [facility] or [facility] provider or service). Call [phone] if you haven't heard regarding these appointments within 7 days of discharge.     Diet    Follow this diet upon discharge: Dialysis Diet     I have reviewed history, examined patient and discussed plan with the fellow/resident/MERVAT.    I concur with the findings in this note.    Time spent on discharge activities: 45 minutes.

## 2020-01-28 NOTE — PLAN OF CARE
Md's wrote for patient to discharge to home following HD. Patient tolerated HD well. Patient complaining of pain at her old chest tube site and headache and stomach ache after HD. Patient got oxycodone and zofran with relief. Went over discharge orders with the patient and an . Patient with no further questions,  went with patient to the discharge pharmacy, and patient discharged home with her .

## 2020-01-28 NOTE — OP NOTE
Transplant Surgery  Operative Note    Date:  1/24/20  Preop Dx:  End Stage Renal Disease  Postop Dx: End Stage Renal Disease, Pneumothorax  Procedure: Right chest thoracostomy tube placement  Surgeon: Maureen Del Cid M.D.  ASSISTANT:  TAMARA Carey MD fellow. JENNIFER Gutierrez resident.   Anesthesia: General  EBL: 1 ml    Specimen: none.  Complications: Patient became profoundly hypotensive after induction, initially, the worry was that patient had a pneumothorax because of temporal relation of instability and internal jugular central line placement and lack of breath sounds on the right, needle thoracostomy was performed and chest tube was placed without resolution of instability, patient was started on pressors and gradually improved. Due to this episode and ongoing hemodynamic instability, the decision was made to abort the planned kidney transplant.     Indication: The patient had an offer for a kidney transplant.  After discussing the risks and benefits of surgery and potential complications, the patient provided informed consent.     DETAILS OF PROCEDURE:  The patient was brought to the operating room, placed in a supine position.  Perioperative prophylactic IV antibiotics were given.  Anesthesia was adminisitered. While the right internal jugular central line was placed by anesthesia, patient became hemodynamically unstable and there were scant breath sounds on the right, anesthesiologist performed a needle thoracostomy. Surgical team was asked to place an emergency CT. So the right chest was prepped and draped and an incision was made anterior to the midaxillary line over the fifth intercostal space, a hemostat was used to dissect into the pleural space, a 24 Fr chest tube was placed into the chest. The tube was secured with O Ethibond. And the tube was dressed. All needle, sponge, and instrument counts were accurate.  The patient tolerated the procedure well without apparent complications and was trasfered to  the PACU intubated.      Attestation:  I was present for the entire procedure.    Maureen Del Cid MD

## 2020-01-28 NOTE — DISCHARGE SUMMARY
Dialysis Discharge Summary Brief    [facility]  Division of Nephrology  Nephrology Discharge Dialysis Orders  Ph: [phone]  Fax: [phone]    Paolo Larkin  MRN: [id number]  YOB: 1975    Devlinrowan CarrilloDarren  Primary Nephrologist: Dr. Luna    Date of Admission: 1/23/2020  Date of Discharge: 1/27/2020    Transplant was aborted due to patient becoming hemodynamically unstable with O2 desaturation s/p central line placement, required chest tube for possible pneumothorax. Patient was last dialyzed on Monday 1/27. I will fax you the transplant team's discharge summary once completed. Please page me at  with any questions. Thanks much. Yolanda Ruvalcaba PA-c    Discharge Diagnosis:    ICD-10-CM    1. Kidney transplant candidate Z76.82 ondansetron (ZOFRAN-ODT) 4 MG ODT tab     oxyCODONE (ROXICODONE) 5 MG tablet     Glucose by meter     Glucose by meter   2. Renovascular hypertension I15.0 losartan (COZAAR) 100 MG tablet          [x] Resume all previous dialysis orders with exception as noted below    New Orders (if not applicable put NA):  Estimated Dry Weight No change   Dialysis Duration    Dialysis Access    Antibiotics (dose per dialysis, end date)            Labs to be drawn at dialysis    Other major changes to dialysis prescription (e.g. Dialysate bath, heparin, blood flow rate, etc)      Medication changes (also fax the unit a copy of the discharge summary)              Name of physician completing this form: Yolanda Ruvalcaba PA-C

## 2020-01-28 NOTE — PLAN OF CARE
Physical Therapy Discharge Summary    Reason for therapy discharge:    Discharged to home.    Progress towards therapy goal(s). See goals on Care Plan in [facility] electronic health record for goal details.  Goals partially met.  Barriers to achieving goals:   discharge from facility.    Therapy recommendation(s):    No further therapy is recommended.

## 2020-01-31 ENCOUNTER — TELEPHONE (OUTPATIENT)
Dept: TRANSPLANT | Facility: CLINIC | Age: 45
End: 2020-01-31

## 2020-01-31 DIAGNOSIS — Z76.82 KIDNEY TRANSPLANT CANDIDATE: Primary | ICD-10-CM

## 2020-01-31 PROBLEM — J93.9 PNEUMOTHORAX: Status: ACTIVE | Noted: 2020-01-31

## 2020-01-31 NOTE — TELEPHONE ENCOUNTER
Returned Merle's call from dialysis center about when patient's next appts are scheduled for and who should take out her chest suture. Per Apple Bowen, patient can have suture taken out locally and the committee will decide what next steps and/or appts Paw needs going forward and she will be notified at a later date of the plan. Merle indicated she would relay this information to patient.

## 2020-02-05 ENCOUNTER — COMMITTEE REVIEW (OUTPATIENT)
Dept: TRANSPLANT | Facility: CLINIC | Age: 45
End: 2020-02-05

## 2020-02-05 ENCOUNTER — TELEPHONE (OUTPATIENT)
Dept: TRANSPLANT | Facility: CLINIC | Age: 45
End: 2020-02-05

## 2020-02-05 DIAGNOSIS — Z76.82 ORGAN TRANSPLANT CANDIDATE: ICD-10-CM

## 2020-02-05 DIAGNOSIS — N18.6 ESRD (END STAGE RENAL DISEASE) (H): ICD-10-CM

## 2020-02-05 NOTE — COMMITTEE REVIEW
Abdominal Patient Discussion Note Transplant Coordinator: Jocelyn Brooks  Transplant Surgeon:       Referring Physician: Marianne Gupta    Committee Review Members:  Nephrology Emile Lopez MD   Nurse Alexandria Pearson, CHRISTINA, Rachel Lester, KULWANT, Daniela Seth, RN   Pharmacist Daron Bowens, [facility]    - Clinical Khadijah Rowell, [facility]   Transplant Felecia Allan PA-C, Carmencita French, KULWANT, Lou Mario, KULWANT, Rebel Mccall MD, Jocelyn Brooks, KULWANT, Paulina Davison, RN, Kika Gamble, KULWANT, Dayne Marcelino MD, Sergio Jacob MD, Odette Leblanc RN       Additional Discussion Notes and Findings: Reviewed pt's recent aborted transplant notes. Per Dr. Marcelino pt needs to be seen by anesthesia to do a detailed review of last 2 aborted kidney transplant cases. Pt might need to be a spinal case depending upon what anesthesia says. Pt to be brought back to committee for re-discuss after appointment.

## 2020-02-05 NOTE — TELEPHONE ENCOUNTER
Coordinator called pt using  services. Updated pt on kidney selection meeting from today. Pt understands she needs to meet with anesthesia to review her last two aborted kidney transplant surgeries.     Coordinator called dialysis to update them on pt's plan.

## 2020-02-06 ENCOUNTER — TELEPHONE (OUTPATIENT)
Dept: TRANSPLANT | Facility: CLINIC | Age: 45
End: 2020-02-06

## 2020-02-06 NOTE — TELEPHONE ENCOUNTER
Spoke with the patient and confirmed PACU appointments on 2/13/2020.  Informed patient an itinerary can be accessed on [employer], and will be sent via mail.

## 2020-02-06 NOTE — TELEPHONE ENCOUNTER
FUTURE VISIT INFORMATION      SURGERY INFORMATION:    PAC Eval, referral Dr. Dayne Marcelino, Transplant Clinic, surgery date not determined yet, needs eval for anesthesia    RECORDS REQUESTED FROM:       Primary Care Provider: Lorna Clancy APRN [facility]    Pertinent Medical History: hypertension    Most recent EKG+ Tracin20    Most recent ECHO: 20    Most recent Cardiac Stress Test: 18    Most recent Coronary Angiogram: 19

## 2020-02-13 ENCOUNTER — ANESTHESIA EVENT (OUTPATIENT)
Dept: SURGERY | Facility: CLINIC | Age: 45
End: 2020-02-13

## 2020-02-13 ENCOUNTER — OFFICE VISIT (OUTPATIENT)
Dept: SURGERY | Facility: CLINIC | Age: 45
End: 2020-02-13
Attending: SURGERY
Payer: MEDICARE

## 2020-02-13 ENCOUNTER — PRE VISIT (OUTPATIENT)
Dept: SURGERY | Facility: CLINIC | Age: 45
End: 2020-02-13

## 2020-02-13 VITALS
HEIGHT: 63 IN | WEIGHT: 125 LBS | RESPIRATION RATE: 16 BRPM | HEART RATE: 92 BPM | BODY MASS INDEX: 22.15 KG/M2 | SYSTOLIC BLOOD PRESSURE: 176 MMHG | DIASTOLIC BLOOD PRESSURE: 111 MMHG | TEMPERATURE: 97.9 F

## 2020-02-13 DIAGNOSIS — Z01.818 PREOP EXAMINATION: Primary | ICD-10-CM

## 2020-02-13 ASSESSMENT — MIFFLIN-ST. JEOR: SCORE: 1194.44

## 2020-02-13 ASSESSMENT — LIFESTYLE VARIABLES: TOBACCO_USE: 0

## 2020-02-13 NOTE — CONSULTS
Anesthesia Consult Note      Reason for consult: hypotension after central line placement 1/24/2020    Date of Encounter: 2/13/2020  Referring Physician: Dr. Marcelino  Primary Care Physician:  Lorna Clancy  Paolo Larkin is a 44 year old female who presents for anesthesia consult in preparation for renal transplant.      This is a 44 year old female patient with hypertension, ESRD on hemodialysis since 2012, history of vasospastic ST elevation MI (2008), and HELLP.  Etiology of kidney disease is unknown (no biopsy) and she has been on the kidney transplant list since 2012.  She was taken to OR for transplant, 4/23/2019, but had a hypotensive episode 15 minutes after induction and it is thought she had an allergic reaction to rocuronium. Surgery was aborted. She was sent to cardiology for evaluation as she has a history of an abnormal stress echo.  Recent coronary angiogram 6/18/2019 revealed normal coronary arteries.     Attending anesthesiologist note from that event 4/23/2019: Patient experienced severe hypotension approximately 15 minutes post induction. Event concerning for possible allergic reaction (possibly rocuronium). Serum trypase sent in or. Patient treated as if she was having allergic reaction. Patient stabilized in OR, case had to be canceled secondary to donor kidney being denervated and pressors would cause graft rejection. Patient extubated in OR. Patient moving all extremities. Serum trypase pending would consider using alternate paralytic in interim. Patient responsive talking and moving extremities in pacu.     Pt has seen and been evaluated by allergy/derm, Dr. Carter. Please refer to his notes in chart review.  In brief, his recommendation was to avoid cephalosporins and penicillins.    Pt was taken to OR a second time 1/24/2020 and unfortunately had profound hypotension after central line placement. Surgery was again aborted.     Anesthesia note from that event  2020: 44 years old female taken to the operating room for renal transplant. Uneventful induction and intubation. Subsequently a-line placed- no complications. Right IJV central line placed and as I was suturing the line in place the blood pressure dropped. At the same time a second dose of cisatracurium was also administered. Patient treated with epinephrine and continued to be hypotensive. Patient treated for possible pneumothorax with needle decompression and chest tube placement as she had coarse rhonchi. Patient vital signs did not improve. Pressors were continued. Central line check with aspiration of blood from all ports. Patient was given iv benadryl, and iv dexamethasone. Chest xray was done in the operating room to confirm line placement and check for pneumothorax. Patient transferred to ICU for further workup of allergic reaction.     Today patient states she is feeling well, in her usual state of health.  She denies any new symptoms or complaints.  She is tearful during the visit and states she is frustrated due to the fact that they live far away and it is hard for her to make appointments.  She asks that we attempt to coordinate her visits for convenience.      History is obtained from the patient and chart review. Pt is Roberta.  She is accompanied by her  today and iPad  used for communication.    Past Medical History  Past Medical History:   Diagnosis Date     Anemia in chronic kidney disease      End stage kidney disease (H)      HELLP syndrome      Hepatitis B      HTN (hypertension)      MI (myocardial infarction) (H)      Proteinuria      Secondary hyperparathyroidism (H)      Thrombocytopaenia        Past Surgical History  Past Surgical History:   Procedure Laterality Date     BENCH KIDNEY Right 2020    Procedure: BACKBENCH PREPARATION RIGHT  KIDNEY AND RIGHT CHEST TUBE PLACEMENT.;  Surgeon: Armando Faustin MD;  Location: [facility] OR      SECTION   04/03/2012     CREATE FISTULA ARTERIOVENOUS UPPER EXTREMITY       CV CORONARY ANGIOGRAM N/A 6/18/2019    Procedure: CV CORONARY ANGIOGRAM;  Surgeon: Sabas Tilley MD;  Location:  [facility]     DILATION AND CURETTAGE, HYSTEROSCOPY WITH ULTRASOUND GUIDANCE  04/2019     INSERT CHEST TUBE  1/24/2020    Procedure: Insert chest tube;  Surgeon: Armando Faustin MD;  Location:  OR         Prior to Admission Medications  Current Outpatient Medications   Medication Sig Dispense Refill     acetaminophen (TYLENOL) 325 MG tablet Take 325-650 mg by mouth as needed for mild pain       amLODIPine (NORVASC) 10 MG tablet Take 10 mg by mouth every morning        calcium carbonate (TUMS) 500 MG chewable tablet Take 2 chew tab by mouth 3 times daily as needed for heartburn       hydrALAZINE (APRESOLINE) 25 MG tablet Take 50 mg by mouth 3 times daily        losartan (COZAAR) 100 MG tablet Take 1 tablet (100 mg) by mouth every morning Resume taking once blood pressures start increasing again       ondansetron (ZOFRAN-ODT) 4 MG ODT tab Take 1 tablet (4 mg) by mouth every 6 hours as needed for nausea or vomiting 15 tablet 0     oxyCODONE (ROXICODONE) 5 MG tablet Take 1 tablet (5 mg) by mouth every 6 hours as needed for moderate to severe pain 15 tablet 0     sevelamer (RENVELA) 800 MG tablet Take 1,600 mg by mouth 3 times daily (with meals)          Allergies  Allergies   Allergen Reactions     Blood Transfusion Related (Informational Only)      Patient has a history of a clinically significant antibody against RBC antigens.  A delay in compatible RBCs may occur.     Cephalosporins Anaphylaxis     Per [facility] allergist report      Cefuroxime Other (See Comments)     Reaction with wheel per allergist at [facility] - plans to test other Cephalosporine and Penicillin antibiotics     Suspected anaphylactic reaction - hypotension and increase of Tryptase intraop      Heparin Flush      Penicillin G Unknown     Avoid if at  all possible from [facility] allergist report - ? Allergy      Rocuronium Other (See Comments)     Slightly positive intradermal test at [facility]    Suspected anaphylactic reaction - hypotension and increase of Tryptase intraop      Vancomycin        Social History  Social History     Socioeconomic History     Marital status:      Spouse name: Not on file     Number of children: Not on file     Years of education: Not on file     Highest education level: Not on file   Occupational History     Not on file   Social Needs     Financial resource strain: Not on file     Food insecurity:     Worry: Not on file     Inability: Not on file     Transportation needs:     Medical: Not on file     Non-medical: Not on file   Tobacco Use     Smoking status: Never Smoker     Smokeless tobacco: Never Used   Substance and Sexual Activity     Alcohol use: No     Drug use: No     Sexual activity: Not on file   Lifestyle     Physical activity:     Days per week: Not on file     Minutes per session: Not on file     Stress: Not on file   Relationships     Social connections:     Talks on phone: Not on file     Gets together: Not on file     Attends Mu-ism service: Not on file     Active member of club or organization: Not on file     Attends meetings of clubs or organizations: Not on file     Relationship status: Not on file     Intimate partner violence:     Fear of current or ex partner: Not on file     Emotionally abused: Not on file     Physically abused: Not on file     Forced sexual activity: Not on file   Other Topics Concern     Parent/sibling w/ CABG, MI or angioplasty before 65F 55M? Not Asked   Social History Narrative    2 children, ages 23--son and 7--daughter.    .       Family History  Family History   Problem Relation Age of Onset     Kidney Disease No family hx of      Heart Disease No family hx of        Review of Systems  Anesthesia Evaluation     . Pt has had prior anesthetic.     History of anesthetic  complications    hypotensive after induction 4/2019, procedure aborted.  Also had hypotension after central line placement 1/24/20.  transplant aborted      ROS/MED HX  The complete review of systems is negative other than noted in the HPI or here.   ENT/Pulmonary:     (+)EDIS risk factors hypertension, , . .   (-) tobacco use and asthma   Neurologic:  - neg neurologic ROS     Cardiovascular:     (+) hypertension--CAD, -past MI,-. : . . . :. . Previous cardiac testing Echodate:1/23/2020results:   Interpretation Summary  Borderline (EF 50-55%) reduced left ventricular function is present. Distal  septal hypokinesis.  Small circumferential pericardial effusion is present without any hemodynamic  significance.  Right ventricular function, chamber size, wall motion, and thickness are  normal.  No change from prior.date: results:ECG reviewed date:1/23/2020 results:Sinus rhythm  Anteroseptal infarct (cited on or before 11-JUL-2013)  Prolonged QT  Abnormal ECG  When compared with ECG of 23-JAN-2020 22:47,  Questionable change in QRS axis  Inverted T waves have replaced nonspecific T wave abnormality in Anterior leads  Nonspecific T wave abnormality, improved in Lateral leads  QT has lengthenedCath date: 6/18/2019 results:Normal coronary arteries          METS/Exercise Tolerance:  >4 METS   Hematologic:     (+) History of Transfusion previous transfusion reaction - RBC antibodies, -      Musculoskeletal:  - neg musculoskeletal ROS       GI/Hepatic:     (+) hepatitis type B,       Renal/Genitourinary:     (+) chronic renal disease, type: ESRD, Pt requires dialysis, type: Hemodialysis, Pt has no history of transplant,       Endo:  - neg endo ROS       Psychiatric:  - neg psychiatric ROS       Infectious Disease:  - neg infectious disease ROS       Malignancy:      - no malignancy   Other:    - neg other ROS           Physical Exam    Airway   Mallampati: 1  TM distance: >6cm  Neck ROM: Full  Neck:  No goiter, trachea  midline    Dental   Poor dentition    Constitutional: Awake, alert, cooperative, no apparent distress, and appears stated age.  Eyes: Pupils equal, round and reactive to light, extra ocular muscles intact, sclera clear, conjunctiva normal.  HENT: Normocephalic, oral pharynx with moist mucus membranes  Respiratory: non-labored breathing  Cardiovascular: Regular rate and rhythm, normal S1 and S2, and no murmur noted.    GI: Normal bowel sounds, soft, non-distended, non-tender, no masses palpated, no hepatosplenomegaly.    Skin: Warm and dry.    Musculoskeletal: There is no redness, warmth, or swelling of the joints. Gross motor strength is normal.    Neurologic: Awake, alert, oriented to name, place and time. Cranial nerves II-XII are grossly intact. Gait is normal.   Neuropsychiatric: Calm, cooperative. Normal affect.  Tearful at times    Labs / testing: (personally reviewed)  2/5/2020:  WBC 4.0 - 11.0 K/uL 6.3    RBC 3.80 - 5.30 M/uL 3.79Low     Hemoglobin 11.5 - 15.8 g/dL 10.7Low     Hematocrit 35.0 - 45.0 % 35.6    MCV 80.0 - 98.0 fL 93.9    MCH 25.5 - 34.0 pg 28.2    MCHC 31.5 - 36.5 g/dL 30.0Low     RDW-CV 11.5 - 15.5 % 14.6    Platelet Count 140 - 400 K/uL 289    MPV 5.1 - 9.9 fL 6.6      1/27/2020  Component      Latest Ref Rng & Units 1/27/2020   Sodium      133 - 144 mmol/L 137   Potassium      3.4 - 5.3 mmol/L 4.1   Chloride      94 - 109 mmol/L 102   Carbon Dioxide      20 - 32 mmol/L 26   Anion Gap      3 - 14 mmol/L 10   Glucose      70 - 99 mg/dL 80   Urea Nitrogen      7 - 30 mg/dL 42 (H)   Creatinine      0.52 - 1.04 mg/dL 11.40 (H)   GFR Estimate      >60 mL/min/1.73:m2 4 (L)   GFR Estimate If Black      >60 mL/min/1.73:m2 4 (L)   Calcium      8.5 - 10.1 mg/dL 8.6       EXAMINATION:  XR CHEST 2 VW 1/27/2020 8:26 AM.     COMPARISON: 1/26/2020.     HISTORY:  Eval pneumothorax. Increase chest pain.     FINDINGS: Upright PA and lateral radiographs of the chest. Continued  subcutaneous emphysema overlying  the right lateral chest wall. Trachea  is midline. Stable cardiomegaly. Perihilar and bibasilar interstitial  opacities are not significantly changed from prior exam. Continued  decrease in trace right apical pneumothorax following removal of right  chest tube.                                                                      IMPRESSION:   1. Continued decrease in trace right apical pneumothorax status post  removal of right chest tube.  2. Unchanged cardiomegaly with perihilar and bibasilar opacity likely  atelectasis with mild pulmonary venous hypertension..    EXAMINATION: CT CHEST/ABDOMEN/PELVIS W CONTRAST, 1/25/2020 5:25 AM     TECHNIQUE:  Helical CT images from the thoracic inlet through the  symphysis pubis were obtained with contrast. Contrast dose: iopamidol  (ISOVUE-370) solution 69 mL     COMPARISON: None     HISTORY: Patient with history of ESKD, coded today. Persistent lactic  acidosis after episode of hypotension. Concern for bowel ischemia.     FINDINGS:  Lines and tubes: Endotracheal tube tip terminates 3.4 cm above the  yulia. Right IJ central venous catheter tip terminates in the high  right atrium. Right apically directed chest tube courses through the  right major fissure. Moreno catheter bulb inflated in the bladder.     Chest: Linear nodular and groundglass opacity in the right upper lobe,  may represent sequelae of needle thoracostomy. Dependent atelectasis  in the lung bases. No pleural effusion. Small right pneumothorax.  Central tracheobronchial tree is patent.     Visualized thyroid gland is unremarkable. Heart size is normal. Small  pericardial effusion. Normal caliber of the aorta. Mild dilation of  the main pulmonary artery measuring 3.1 cm, as can be seen with  pulmonary hypertension. Normal branching pattern of the great vessels.  No pathologically enlarged thoracic lymph nodes. Normal esophagus.     Abdomen and pelvis: No focal hepatic lesions. No intra or extrahepatic  biliary  ductal dilatation. Cholelithiasis. No pericholecystic fluid to  suggest acute cholecystitis. Normal spleen, pancreas, and adrenal  glands. Atrophic multicystic kidneys. Nonobstructing left renal  calculus. No obstructing urinary tract calculi. The bladder is  decompressed with Moreno catheter in place and small amount of air  within the bladder lumen, consistent with recent catheterization.  Essure devices. No pelvic masses. Normal caliber of the small and  large bowel. There is wall thickening of the ascending and transverse  colon. No small bowel wall thickening.  Postoperative changes of  appendectomy. Trace free fluid in th  e pelvis. No pathologically  enlarged abdominal or pelvic lymph nodes. Mild mesenteric edema.  Normal caliber of the infrarenal aorta. Patent portal vein.      Bones and soft tissues: No acute or suspicious appearing bony  abnormalities. Mild anasarca. Subcutaneous emphysema along the right  chest wall.                                                                      IMPRESSION:   1. Wall thickening involving the ascending and transverse colon which  could be due to ischemia, infection or inflammation.   2. Small right pneumothorax with chest tube in place. Linear nodular  and groundglass opacity in the right upper lobe may be sequelae of  needle thoracostomy on 1/24/2020.  3. Cholelithiasis without CT evidence of acute cholecystitis.  4. Trace free fluid in the pelvis.  5. Atrophic multicystic kidneys and nonobstructing left  nephrolithiasis.     Outside records reviewed from: care everywhere    ASSESSMENT and PLAN  Paolo Larkin is a 44 year old female who presents for anesthesia evaluation for kidney transplant (ESRD) which is not scheduled at this time.  PAC referral for risk assessment and optimization for anesthesia with comorbid conditions of hypertension, hypotension after anesthesia induction 4/2019 with elevated tryptase, hypotension after central line placement 1/24/2020,  h/o  "vasospastic MI, h/o HELLP :    Pre-operative considerations:  1.  Cardiac:  Functional status- METS >4.  Pt does not exercise routinely but does all her own housework and denies cardiopulmonary symptoms.  High risk surgery with 11% (RCRI 3) risk of major adverse cardiac event.  -denies CP, SOB, MARIN, palpitations  -history of vasospastic MI 8/2012  -hypertension will hold losartan DOS, will take amlodipine and hydralazine DOS.  BP is 176/111 in clinic today but BP historically fluctuates with her HD.   -coronary angiogram 6/18/2019: normal coronary arteries  ---EKG 1/23/2020:   Sinus rhythm  Anteroseptal infarct (cited on or before 11-JUL-2013)  Prolonged QT  Abnormal ECG  When compared with ECG of 23-JAN-2020 22:47,  Questionable change in QRS axis  Inverted T waves have replaced nonspecific T wave abnormality in Anterior leads  Nonspecific T wave abnormality, improved in Lateral leads  QT has lengthened  ---echo 1/23/2020:  Interpretation Summary  Borderline (EF 50-55%) reduced left ventricular function is present. Distal  septal hypokinesis.  Small circumferential pericardial effusion is present without any hemodynamic  significance.  Right ventricular function, chamber size, wall motion, and thickness are  normal.  No change from prior.  -seen by cardiology 9/4/2019 without further workup needed: \"With recent coronary angiogram, no further testing is needed prior to transplant. Proceed with renal transplant at an acceptable perioperative risk. If she does not receive a transplant within 2 years time, return to cardiology clinic for repeat evaluation. If future stress testing is needed would recommend nuclear MPI rather than dobutamine stress echo due to resting WMA.\"   -VTE risk:  0.26%     2.  Pulm:  Airway feasible.  EDIS risk: low  -never smoker  -recent      3.  GI:  Risk of PONV score = 3.  If > 2, anti-emetic intervention recommended.     4.  Renal:  -ESRD of unknown etiology, HD since 2012.  -transplant " "waitlist     5.  taken to OR for transplant in 4/2019, aborted due to hypotension after induction with elevated tryptase. Subsequent allergy testing revealed allergy to cephalosporin. See Dr. Palmer's note from 11/19/2019.   \"Anaphylactic intraoperative reaction with drop of blood pressure and increase of Tryptase         Positive in skin tests to Cephalosporins (Cefazolin, Ceftazidime)         In skin tests NO reaction to Penicillins, Aminopenicillins, Piperacillin and Meropenem         No reaction to Rocuronium/Vecuronium, Fentanyl, Ondansetron, Propofol and Lidocaine     >> based on History itching to Vancomycin in 2012 (patient says that she fainted)?  Proposition:          Avoid during the next operation certainly the Cephalosporins.           Not absolutely sure about the Penicillins/Aminopenicillins. We could do before the operation an oral provocation test (with iv access) in our allergy clinic to Amoxicillin, just to be certain (however, if operation has to be done fast, then I just would avoid Penicillins and Cephalosporins)  During next operation, keep in exact record medications given to patient and be always ready to treat anaphylactic type reaction. Take tryptase again if reaction occurs and refer patient back to allergy clinic.\"    6.  Taken to OR for transplant 1/24/2020, aborted due to hypotension after central line placement.   Per anesthesia record \" Right IJV central line placed and as I was suturing the line in place the blood pressure dropped. At the same time a second dose of cisatracurium was also administered. Patient treated with epinephrine and continued to be hypotensive. Patient treated for possible pneumothorax with needle decompression and chest tube placement as she had coarse rhonchi. Patient vital signs did not improve. Pressors were continued. Central line check with aspiration of blood from all ports. Patient was given iv benadryl, and iv dexamethasone. Chest xray was done in " "the operating room to confirm line placement and check for pneumothorax. Patient transferred to ICU for further workup of allergic reaction.\"    -does not appear that tryptase level was drawn after this event  -no cephalosporins given    7.  Recommend patient consult with Dr. Palmer again regarding suspected anaphylactic reaction to intraoperative medications.     -Dr. Walker confirmed that our PICC lines are not impregnated with any antibiotics.  However could consider testing for allergy to central line antibiotic/antiseptic.     Patient discussed with Dr. Walker.             Sayra Correa PA-C      Preoperative Assessment Center  [facility]  Office phone: [phone]  Fax: [phone]      "

## 2020-02-13 NOTE — ANESTHESIA PREPROCEDURE EVALUATION
Anesthesia Pre-Procedure Evaluation    Patient: Paolo Vincent Trae   MRN:     [id number] Gender:   female   Age:    44 year old :      1975        Preoperative Diagnosis: * No surgery found *        Past Medical History:   Diagnosis Date     Anemia in chronic kidney disease      End stage kidney disease (H)      HELLP syndrome      Hepatitis B      HTN (hypertension)      MI (myocardial infarction) (H)      Proteinuria      Secondary hyperparathyroidism (H)      Thrombocytopaenia       Past Surgical History:   Procedure Laterality Date     BENCH KIDNEY Right 2020    Procedure: BACKBENCH PREPARATION RIGHT  KIDNEY AND RIGHT CHEST TUBE PLACEMENT.;  Surgeon: Armando Faustin MD;  Location: [facility] OR      SECTION  2012     CREATE FISTULA ARTERIOVENOUS UPPER EXTREMITY       CV CORONARY ANGIOGRAM N/A 2019    Procedure: CV CORONARY ANGIOGRAM;  Surgeon: Sabas Tilley MD;  Location:  [facility]     DILATION AND CURETTAGE, HYSTEROSCOPY WITH ULTRASOUND GUIDANCE  2019     INSERT CHEST TUBE  2020    Procedure: Insert chest tube;  Surgeon: Armando Faustin MD;  Location: [facility] OR          Anesthesia Evaluation     . Pt has had prior anesthetic.     History of anesthetic complications    hypotensive after induction 2019, procedure aborted.  Also had hypotension after central line placement 20.  transplant aborted      ROS/MED HX    ENT/Pulmonary:     (+)EDIS risk factors hypertension, , . .   (-) tobacco use and asthma   Neurologic:  - neg neurologic ROS     Cardiovascular:     (+) hypertension--CAD, -past MI,-. : . . . :. . Previous cardiac testing Echodate:2020results:   Interpretation Summary  Borderline (EF 50-55%) reduced left ventricular function is present. Distal  septal hypokinesis.  Small circumferential pericardial effusion is present without any hemodynamic  significance.  Right ventricular function, chamber size, wall motion, and thickness  are  normal.  No change from prior.date: results:ECG reviewed date:1/23/2020 results:Sinus rhythm  Anteroseptal infarct (cited on or before 11-JUL-2013)  Prolonged QT  Abnormal ECG  When compared with ECG of 23-JAN-2020 22:47,  Questionable change in QRS axis  Inverted T waves have replaced nonspecific T wave abnormality in Anterior leads  Nonspecific T wave abnormality, improved in Lateral leads  QT has lengthenedCath date: 6/18/2019 results:Normal coronary arteries          METS/Exercise Tolerance:  >4 METS   Hematologic:     (+) History of Transfusion previous transfusion reaction - RBC antibodies, -      Musculoskeletal:  - neg musculoskeletal ROS       GI/Hepatic:     (+) hepatitis type B,       Renal/Genitourinary:     (+) chronic renal disease, type: ESRD, Pt requires dialysis, type: Hemodialysis, Pt has no history of transplant,       Endo:  - neg endo ROS       Psychiatric:  - neg psychiatric ROS       Infectious Disease:  - neg infectious disease ROS       Malignancy:      - no malignancy   Other:    - neg other ROS                 [facility] AN PHYSICAL EXAM    LABS:  CBC:   Lab Results   Component Value Date    WBC 15.2 (H) 01/26/2020    WBC 21.6 (H) 01/24/2020    HGB 9.0 (L) 01/26/2020    HGB 9.9 (L) 01/25/2020    HCT 28.4 (L) 01/26/2020    HCT 31.9 (L) 01/24/2020     01/26/2020     01/24/2020     BMP:   Lab Results   Component Value Date     01/27/2020     01/26/2020    POTASSIUM 4.1 01/27/2020    POTASSIUM 4.6 01/26/2020    CHLORIDE 102 01/27/2020    CHLORIDE 103 01/26/2020    CO2 26 01/27/2020    CO2 26 01/26/2020    BUN 42 (H) 01/27/2020    BUN 30 01/26/2020    CR 11.40 (H) 01/27/2020    CR 8.74 (H) 01/26/2020    GLC 80 01/27/2020    GLC 87 01/26/2020     COAGS:   Lab Results   Component Value Date    PTT 35 01/23/2020    INR 1.05 01/24/2020     POC:   Lab Results   Component Value Date    BGM 79 01/27/2020    HCGS Negative 06/20/2013     OTHER:   Lab Results   Component Value  "Date    PH 7.47 (H) 01/25/2020    LACT 1.2 01/25/2020    A1C Canceled, Test credited 01/24/2020    HOLLIS 8.6 01/27/2020    PHOS 4.5 01/27/2020    MAG 2.4 (H) 01/27/2020    ALBUMIN 2.8 (L) 01/24/2020    PROTTOTAL 6.3 (L) 01/24/2020    ALT 18 01/24/2020    AST 22 01/24/2020    ALKPHOS 125 01/24/2020    BILITOTAL 0.3 01/24/2020        Preop Vitals    BP Readings from Last 3 Encounters:   01/27/20 (!) 140/93   12/19/19 (!) 148/86   11/19/19 (!) 149/91    Pulse Readings from Last 3 Encounters:   01/27/20 88   12/19/19 110   11/19/19 96      Resp Readings from Last 3 Encounters:   01/27/20 14   12/19/19 16   07/15/19 16    SpO2 Readings from Last 3 Encounters:   01/27/20 99%   12/19/19 100%   11/19/19 99%      Temp Readings from Last 1 Encounters:   01/27/20 98.2  F (36.8  C) (Oral)    Ht Readings from Last 1 Encounters:   12/19/19 1.595 m (5' 2.8\")      Wt Readings from Last 1 Encounters:   01/27/20 59.7 kg (131 lb 9.8 oz)    Estimated body mass index is 23.47 kg/m  as calculated from the following:    Height as of 12/19/19: 1.595 m (5' 2.8\").    Weight as of 1/27/20: 59.7 kg (131 lb 9.8 oz).     LDA:  Hemodialysis Vascular Access Arteriovenous fistula Left Forearm (Active)   Site Assessment WDL 1/27/2020  4:35 PM   Cannulation Needle Size 15 1/27/2020  4:35 PM   Dressing Intervention New dressing 1/27/2020  4:35 PM   Dressing Status Clean, dry, intact 1/27/2020  4:35 PM   Hand Off Report No 1/27/2020  4:35 PM   Number of days:         [facility] AN PLAN NO PONV RULE       PAC Discussion and Assessment    ASA Classification: 3  Case is suitable for: [place]  Anesthetic techniques and relevant risks discussed:   Invasive monitoring and risk discussed:   Types:   Possibility and Risk of blood transfusion discussed:   NPO instructions given:   Additional anesthetic preparation and risks discussed:   Needs early admission to pre-op area:   Other:     PAC Resident/NP Anesthesia Assessment:  Paolo Larkin is a 44 year old female " "who presents for anesthesia evaluation for kidney transplant (ESRD) which is not scheduled at this time.  PAC referral for risk assessment and optimization for anesthesia with comorbid conditions of hypertension, hypotension after anesthesia induction 4/2019 with elevated tryptase, hypotension after central line placement 1/24/2020,  h/o vasospastic MI, h/o HELLP :    Pre-operative considerations:  1.  Cardiac:  Functional status- METS >4.  Pt does not exercise routinely but does all her own housework and denies cardiopulmonary symptoms.  High risk surgery with 11% (RCRI 3) risk of major adverse cardiac event.  -denies CP, SOB, MARIN, palpitations  -history of vasospastic MI 8/2012  -hypertension will hold losartan DOS, will take amlodipine and hydralazine DOS.  BP is 176/111 in clinic today but BP historically fluctuates with her HD.   -coronary angiogram 6/18/2019: normal coronary arteries  ---EKG 1/23/2020:   Sinus rhythm  Anteroseptal infarct (cited on or before 11-JUL-2013)  Prolonged QT  Abnormal ECG  When compared with ECG of 23-JAN-2020 22:47,  Questionable change in QRS axis  Inverted T waves have replaced nonspecific T wave abnormality in Anterior leads  Nonspecific T wave abnormality, improved in Lateral leads  QT has lengthened  ---echo 1/23/2020:  Interpretation Summary  Borderline (EF 50-55%) reduced left ventricular function is present. Distal  septal hypokinesis.  Small circumferential pericardial effusion is present without any hemodynamic  significance.  Right ventricular function, chamber size, wall motion, and thickness are  normal.  No change from prior.  -seen by cardiology 9/4/2019 without further workup needed: \"With recent coronary angiogram, no further testing is needed prior to transplant. Proceed with renal transplant at an acceptable perioperative risk. If she does not receive a transplant within 2 years time, return to cardiology clinic for repeat evaluation. If future stress testing is " "needed would recommend nuclear MPI rather than dobutamine stress echo due to resting WMA.\"   -VTE risk:  0.26%     2.  Pulm:  Airway feasible.  EDIS risk: low  -never smoker  -recent      3.  GI:  Risk of PONV score = 3.  If > 2, anti-emetic intervention recommended.     4.  Renal:  -ESRD of unknown etiology, HD since 2012.  -transplant waitlist     5.  taken to OR for transplant in 4/2019, aborted due to hypotension after induction with elevated tryptase. Subsequent allergy testing revealed allergy to cephalosporin. See Dr. Palmer's note from 11/19/2019.   \"Anaphylactic intraoperative reaction with drop of blood pressure and increase of Tryptase         Positive in skin tests to Cephalosporins (Cefazolin, Ceftazidime)         In skin tests NO reaction to Penicillins, Aminopenicillins, Piperacillin and Meropenem         No reaction to Rocuronium/Vecuronium, Fentanyl, Ondansetron, Propofol and Lidocaine     >> based on History itching to Vancomycin in 2012 (patient says that she fainted)?  Proposition:          Avoid during the next operation certainly the Cephalosporins.           Not absolutely sure about the Penicillins/Aminopenicillins. We could do before the operation an oral provocation test (with iv access) in our allergy clinic to Amoxicillin, just to be certain (however, if operation has to be done fast, then I just would avoid Penicillins and Cephalosporins)  During next operation, keep in exact record medications given to patient and be always ready to treat anaphylactic type reaction. Take tryptase again if reaction occurs and refer patient back to allergy clinic.\"    6.  Taken to OR for transplant 1/24/2020, aborted due to hypotension after central line placement.   Per anesthesia record \" Right IJV central line placed and as I was suturing the line in place the blood pressure dropped. At the same time a second dose of cisatracurium was also administered. Patient treated with epinephrine and continued " "to be hypotensive. Patient treated for possible pneumothorax with needle decompression and chest tube placement as she had coarse rhonchi. Patient vital signs did not improve. Pressors were continued. Central line check with aspiration of blood from all ports. Patient was given iv benadryl, and iv dexamethasone. Chest xray was done in the operating room to confirm line placement and check for pneumothorax. Patient transferred to ICU for further workup of allergic reaction.\"  -does not appear that tryptase level was drawn after this event  -no cephalosporins given    7.  Recommend patient consult with Dr. Palmer again regarding suspected anaphylactic reaction to intraoperative medications.  Dr. Walker confirmed that our PICC lines are not impregnated with any antibiotics.  However could consider testing for allergy to central line antibiotic/antiseptic.     Patient discussed with Dr. Walker.     **For further details of assessment, testing, and physical exam please see H and P completed on same date.          Sayra Correa PA-C, [facility]      Reviewed and Signed by PAC Mid-Level Provider/Resident  Mid-Level Provider/Resident: Sayra Correa  Date: 12/13/2020  Time:     Attending Anesthesiologist Anesthesia Assessment:  We saw our patient back in PAC after she had her second hemodynamic collapse shortly after induction of anesthesia for kidney transplant.  In both cases the hypotension was profound and did occur very shortly after the central line placement.  The first case had elevated tryptase, there was no testing with the second event.  The allergy testing after the first event seemed benign except for possible reaction to cephalosporin, this was not given in the second case.  (Of note - she had all similar anesthetic medications 4/2019 elsewhere with no complications).  We are concerned she has an allergy to the antibiotics impregnated within the central line catheter - sulfa or chlorhexidine.    We are sending her " back to Dr Palmer's clinic to see if she can be tested for this.    I have called and spoken with the PICC team.  There catheters are not impregnated with any antibiotics.  I would recommend that we have a PICC placed on the floor before the next transplant and proceed only with peripheral IVs    We found non other cause for her profound hypotension and have ordered no other testing.        PLEASE NOTE:   MARKED ALLERGIC REACTION TO CHLORHEXIDINE ON TESTING.  DO NOT USE SKIN DECONTAMINATION WITH CHLORHEXIDINE OR USE CENTRAL LINE WITH  CHX COATING.    Reviewed and Signed by PAC Anesthesiologist  Anesthesiologist: Jarek Walker MD  Date: 2/13/2020  Time:   Pass/Fail:   Disposition:     PAC Pharmacist Assessment:        Pharmacist:   Date:   Time:    Sayra Correa PA-C

## 2020-03-03 ENCOUNTER — TELEPHONE (OUTPATIENT)
Dept: ALLERGY | Facility: CLINIC | Age: 45
End: 2020-03-03

## 2020-03-03 NOTE — TELEPHONE ENCOUNTER
----- Message from Pierre Palmer MD sent at 2/13/2020 10:19 PM CST -----  Regarding: RE: Anesthetic complication  I will test the patient for Sulphonamides and Chlorhexidine and Latex. Lets see if there is any reaction. Would be very interesting if she really reacts so strongly to the Chlorhexidine in the CVC    Arline, can you contact the patient and we have to order the Chlorhexidine for intradermal tests    Thanks    PB  ----- Message -----  From: Jarek Walker MD  Sent: 2/13/2020  11:16 AM CST  To: Dayne Marcelino MD, Karoline Doe MD, #  Subject: Anesthetic complication                          Dhaval Jay and Dayne,    We saw our patient back in PAC after she had her second hemodynamic collapse shortly after induction of anesthesia for kidney transplant.  In both cases the hypotension was profound and did occur very shortly after the central line placement.  The first case had elevated tryptase, there was no testing with the second event.  The allergy testing after the first event seemed benign except for possible reaction to cephalosporin, this was not given in the second case.  (Of note - she had all similar anesthetic medications 4/2019 elsewhere with no complications).  We are concerned she has an allergy to the antibiotics impregnated within the central line catheter - sulfa or chlorhexidine.    We are ending her back to Dr Palmer's clinic to see if she can be tested for this.    I have called and spoken with the PICC team.  There catheters are not impregnated with any antibiotics.  I would recommend that we have a PICC placed on the floor before the next transplant and proceed only with peripheral IVs    We found non other cause for her profound hypotension and have ordered no other testing.    Thank you for letting us see the patient again    Jarek

## 2020-03-10 ENCOUNTER — OFFICE VISIT (OUTPATIENT)
Dept: ALLERGY | Facility: CLINIC | Age: 45
End: 2020-03-10
Payer: MEDICARE

## 2020-03-10 DIAGNOSIS — T88.6XXA ANAPHYLACTIC REACTION DUE TO ADVERSE EFFECT OF CORRECT DRUG OR MEDICAMENT PROPERLY ADMINISTERED, INITIAL ENCOUNTER: ICD-10-CM

## 2020-03-10 DIAGNOSIS — Z88.9 DRUG ALLERGY: Primary | ICD-10-CM

## 2020-03-10 DIAGNOSIS — D47.09 MASTOCYTOSIS: ICD-10-CM

## 2020-03-10 RX ORDER — SULFAMETHOXAZOLE AND TRIMETHOPRIM 80; 16 MG/ML; MG/ML
80 INJECTION INTRAVENOUS ONCE
Status: COMPLETED | OUTPATIENT
Start: 2020-03-10 | End: 2020-03-10

## 2020-03-10 RX ADMIN — SULFAMETHOXAZOLE AND TRIMETHOPRIM 80 MG: 80; 16 INJECTION INTRAVENOUS at 12:20

## 2020-03-10 ASSESSMENT — PAIN SCALES - GENERAL: PAINLEVEL: NO PAIN (0)

## 2020-03-10 NOTE — PATIENT INSTRUCTIONS
Download the ACDS CAMP hayder from the apple store   Blue and white [place]    Your codes are    [id number]   [id number]    Make sure you check the box after putting in the codes     Everything listed on this hayder is okay for you to use. If you cant find it on the hayder do not use it.

## 2020-03-10 NOTE — PROGRESS NOTES
PATIENTS OWN      DRUGS/SUBSTANCES     Prick Tests        Substance/ Allergen Concentration Result (15min) Remarks   Chlorhexidine         Intradermal Tests   immed immed delayed delayed     Substance Conc 1st dil 2nd dil   days  days remarks   Histamine Hydrochloride (ALK) 0.1 mg/ml        NaCl 0.9%        Chlorhexidine            Patch Tests   as is  as is 1:2 paraffin 1:2 paraffin     Substance Conc  days  days  days  days remarks   Chlorhexidine

## 2020-03-10 NOTE — LETTER
3/10/2020         RE: Paolo Larkin  [address]  [facility] [id number]        Dear Colleague,    Thank you for referring your patient, Paolo Larkin, to the [facility]. Please see a copy of my visit note below.    PATIENTS OWN      DRUGS/SUBSTANCES     Prick Tests        Substance/ Allergen Concentration Result (15min) Remarks   Chlorhexidine         Intradermal Tests   immed immed delayed delayed     Substance Conc 1st dil 2nd dil   days  days remarks   Histamine Hydrochloride (ALK) 0.1 mg/ml        NaCl 0.9%        Chlorhexidine            Patch Tests   as is  as is 1:2 paraffin 1:2 paraffin     Substance Conc  days  days  days  days remarks   Chlorhexidine                  [facility] Dermatology Note      Dermatology Problem List:  1. Suspected anaphylactic reaction to intraoperative medications    Encounter Date: Mar 10, 2020    CC:   No chief complaint on file.        History of Present Illness:  Ms. Paolo aLrkin is a 44 year old female who presents for evaluation of suspected anaphylactic reaction to intraoperative medications. She was last seen on 10/1/2019 when she was evaluated by Dr. Palmer who recommended the patient have allergy testing. She returns today to     She has a history of HELLP complicated by MI and ESRD on the wait list for renal transplant since 2012. She was schduled for transplant in April 2019 but developed hypotension at about 11:10 AM after receiving various medications, including lidocaine (10:53), polyethylene glycol ophthalmic solution (10:53), propofol (10:54), rocuronium (10:54), ondansetron (10:54), and fentanyl (11:05). She my have also received cefuroxime preoperatively, although exact time unknown. She had a serum tryptase level of 188 shortly afterwards. Given her intraoperative hypotension, her renal transplant was cancelled for suspected anaphylactic reaction. Her renal transplant is now awaiting allergy evaluation. 2 weeks prior  "patient had a similar operation for hysteroscopy without problems.     Polyethylene glycol is irrelevant since this was an ophthalmic solution.    Long time ago patient got an antibiotic (2012) and patient passed out then. Unknown which antibiotic, but records from [facility] in 2012 show Vancomycin was listed.    History as of 03/10/20:    Patient returns to clinic today for further drug allergy testing. She had another reaction in the OR on 1/24/2020 with hypotension after central line failure. It was suspected that this was an allergic reaction to central line antibiotic/antiseptic. No additional concerns discussed.    Copied from anesthesia note 2/13/2020:  \"Taken to OR for transplant 1/24/2020, aborted due to hypotension after central line placement.   Per anesthesia record \" Right IJV central line placed and as I was suturing the line in place the blood pressure dropped. At the same time a second dose of cisatracurium was also administered. Patient treated with epinephrine and continued to be hypotensive. Patient treated for possible pneumothorax with needle decompression and chest tube placement as she had coarse rhonchi. Patient vital signs did not improve. Pressors were continued. Central line check with aspiration of blood from all ports. Patient was given iv benadryl, and iv dexamethasone. Chest xray was done in the operating room to confirm line placement and check for pneumothorax. Patient transferred to ICU for further workup of allergic reaction.\"  -does not appear that tryptase level was drawn after this event  -no cephalosporins given\"    Past Medical History:   Patient Active Problem List   Diagnosis     End stage kidney disease (H)     Anemia in chronic kidney disease     HTN (hypertension)     Secondary hyperparathyroidism (H)     Kidney transplant recipient     Kidney transplant candidate     Hypotension     Coronary artery disease involving native coronary artery of native heart without " angina pectoris     Abnormal cardiovascular stress test     Status post coronary angiogram     NSTEMI (non-ST elevated myocardial infarction) (H)     Renovascular hypertension     Vitamin D deficiency     Pre-kidney transplant, listed     Pneumothorax     Past Medical History:   Diagnosis Date     Anemia in chronic kidney disease      End stage kidney disease (H)      HELLP syndrome      Hepatitis B      HTN (hypertension)      MI (myocardial infarction) (H)      Proteinuria      Secondary hyperparathyroidism (H)      Thrombocytopaenia      Past Surgical History:   Procedure Laterality Date     BENCH KIDNEY Right 2020    Procedure: BACKBENCH PREPARATION RIGHT  KIDNEY AND RIGHT CHEST TUBE PLACEMENT.;  Surgeon: Armando Faustin MD;  Location:  OR      SECTION  2012     CREATE FISTULA ARTERIOVENOUS UPPER EXTREMITY       CV CORONARY ANGIOGRAM N/A 2019    Procedure: CV CORONARY ANGIOGRAM;  Surgeon: Sabas Tilley MD;  Location:  HEART CARDIAC CATH LAB     DILATION AND CURETTAGE, HYSTEROSCOPY WITH ULTRASOUND GUIDANCE  2019     INSERT CHEST TUBE  2020    Procedure: Insert chest tube;  Surgeon: Armando Faustin MD;  Location:  OR       Social History:  Patient reports that she has never smoked. She has never used smokeless tobacco. She reports that she does not drink alcohol or use drugs.    Family History:  Family History   Problem Relation Age of Onset     Kidney Disease No family hx of      Heart Disease No family hx of        Medications:  Current Outpatient Medications   Medication Sig Dispense Refill     acetaminophen (TYLENOL) 325 MG tablet Take 325-650 mg by mouth as needed for mild pain       amLODIPine (NORVASC) 10 MG tablet Take 10 mg by mouth every morning        calcium carbonate (TUMS) 500 MG chewable tablet Take 2 chew tab by mouth 3 times daily as needed for heartburn       hydrALAZINE (APRESOLINE) 25 MG tablet Take 50 mg by mouth 3 times daily         losartan (COZAAR) 100 MG tablet Take 1 tablet (100 mg) by mouth every morning Resume taking once blood pressures start increasing again       ondansetron (ZOFRAN-ODT) 4 MG ODT tab Take 1 tablet (4 mg) by mouth every 6 hours as needed for nausea or vomiting 15 tablet 0     oxyCODONE (ROXICODONE) 5 MG tablet Take 1 tablet (5 mg) by mouth every 6 hours as needed for moderate to severe pain 15 tablet 0     sevelamer (RENVELA) 800 MG tablet Take 1,600 mg by mouth 3 times daily (with meals)           Allergies   Allergen Reactions     Blood Transfusion Related (Informational Only)      Patient has a history of a clinically significant antibody against RBC antigens.  A delay in compatible RBCs may occur.     Cephalosporins Anaphylaxis     Per [facility] allergist report      Cefuroxime Other (See Comments)     Reaction with wheel per allergist at [facility] - plans to test other Cephalosporine and Penicillin antibiotics     Suspected anaphylactic reaction - hypotension and increase of Tryptase intraop      Heparin Flush      Penicillin G Unknown     Avoid if at all possible from [facility] allergist report - ? Allergy      Rocuronium Other (See Comments)     Slightly positive intradermal test at [facility]    Suspected anaphylactic reaction - hypotension and increase of Tryptase intraop      Vancomycin          Review of Systems:  -Allergy/Immunology: No history of nickel or other skin allergy. No history of asthma or hay fever., Const: Denies fevers, chills or changes in weight.   -Constitutional: Otherwise feeling well today, in usual state of health.  -HEENT: Patient denies nonhealing oral sores.  -Skin: As above in HPI. No additional skin concerns.    Physical exam:  Vitals: There were no vitals taken for this visit.  GEN: This is a well developed, well-nourished female in no acute distress, in a pleasant mood.    SKIN: Focused examination of the head and neck was performed.  -Phan skin type: IV  -No lesions of concern on areas  examined.       DRUGS/SUBSTANCES (10/09/2019)      Prick Tests         Substance/ Allergen Concentration Result (15min) Remarks   + Histamine Hydrochloride (ALK) 0.1 mg/ml ++    - NaCl 0.9% -    1 Cefturoxime Sodium 75 mg/ml (+)    2 Fentanyl 100 mcg/2ml -    3 Lidocane HCl 10mg/ml, 1% -    4 Ondansterone HCl 4mg/2ml -               6 Propofol 100mg/ 10 ml (+)    7 Rocuronium Bromide 10mg/ml (+)    8 Succinylcholine Chloride 50mg/ml -    9 Vecuronium Bromide 4mg/ml -              Intradermal Tests   immed immed delayed delayed      Substance Conc 1st dil 2nd dil   days  days remarks   + Histamine Hydrochloride (ALK) 0.1 mg/ml 10mm P/E       - NaCl 0.9% 2mm       1 Cefturoxime Sodium 1:10 6mmP       2 Fentanyl 1:10 -       3 Lidocane HCl 1:10 -       4 Ondansterone HCl 1:10 -                         6 Propofol 1:10 -       7 Rocuronium Bromide 1:200 3mmP       8 Succinylcholine Chloride 1:500 -       9 Vecuronium Bromide 1:10 -                 Conclusions: based on intradermal tests some indications for allergy to Cefuroxime, but the intradermal test to Rocuronium as well slightly positive. However, no signs for reaction to Vecuronium      DRUG ALLERGY TEST SERIES 11/19/19        Prick Tests         Substance/ Allergen Conc Result (20 min) Remarks    Histamine Hydrochloride (ALK) 0.1 mg/ml ++     NaCl 0.9% -    1 Cefazolin[1] 100 mg/ml -    2 Ceftriaxone* [2] 100 mg/ml -    3 Ceftazidime[3] 100 mg/ml -    4 Benzylpenicillin (Penicillin G) 1 Rafael IU/ml (600 mg) -    5 Ampicillin 100mg/ml -    6 Piperacillin-Tazobactam 33.75 mg/ml -    7 Meropenem (Carbapenem) 100 mg/ml (+)    8 Rocuronium Bromide 10 mg/ml +        Intraderrmal Testing (Placed 11/19/2019)    No Substance Conc.  Readings (15min) Evaluation   1 NaCl  0.9% -    2 Histamine  0.1mg / ml ++ 10 mm papule, 25 mm erytehma        Intradermal Tests   immed  delay delay      Substance Conc 1st dil   days  days remarks   1 Cefazolin[1] 1:5 7mm E/P +/++      2  Ceftriaxone* [2] 1:5 3mmEP (+)      3 Ceftazidime[3] 1:5 5 mmE/P +      4 Benzylpenicillin (Penicillin G) 1:100 0 -      5 Ampicillin 1:10 0 -      6 Piperacillin-Tazobactam 1:10 0 -      7 Meropenem (Carbapenem) 1:100 0 -      8 Rocuronium Bromide 1:200 0 -        PATIENTS OWN      DRUGS/SUBSTANCES 03/10/20     Prick Tests        Substance/ Allergen Concentration Result (15min) Remarks   Histamine Hydrochloride (ALK) 0.1 mg/ml ++    NaCl 0.9% - dermographism   Chlorhexdine - ChloraPrep with tint (yellow #6) 2% w/v chlorhexidne gluconate and 70% isopropyl alcohol 2% +    Chlorhexidine gluconate 0.2% -    Bactrim 1:100 -    Latex  -      Intradermal Tests   immed immed delayed delayed     Substance Conc 1st dil 2nd dil   days  days remarks   NaCl 0.9% -    3 mm p   Chlorhexidine  0.0002% +    11 mm p, 25 mm e   Bactrim  -    3 mm p       In intradermal tests, clear positive reaction to very low concentrations of chlorhexidine. Together with history and slightly positive prick test to chloraprep used during the insertion of the central line, it seems to be a strong and relevant allergy.    [x] Patient information given   [x] ACDS CAMP information's (# [id number], and [id number]) to following compounds: chlorhexidine digluconate   [] General information's to following compounds: ......      Diagnosis:    >> Anaphylactic intraoperative reaction with drop of blood pressure and increase of Tryptase    Positive in skin tests to Cephalosporins (Cefazolin, Ceftazidime) and Chlorhexidine    In skin tests NO reaction to Penicillins, Aminopenicillins, Piperacillin and Meropenem    No reaction to Rocuronium/Vecuronium, Fentanyl, Ondansetron, Propofol and Lidocaine    >> based on History itching to Vancomycin in 2012 (patient says that she fainted)?    Proposition:      Avoid during the next operation certainly the Cephalosporins.    Avoid all chlorhexidine in hospital and any products containing it at home    Follow suggestions of  the CAMP rogerio.    During next operation, keep in exact record medications given to patient and be always ready to treat anaphylactic type reaction. Take tryptase again if reaction occurs and refer patient back to allergy clinic.          Scribe Disclosure  I, Martha Todd, am serving as a scribe to document services personally performed by Dr. Pierre Palmer MD, based on data collection and the provider's statements to me.    I spent a total of 35 minutes face to face with Paolo Larkin during today s office visit. Over 50% of this time was spent discussing all the individual test results, correlating them to the clinical relevance, counseling the patient and/or coordinating care. Please see Assessment and Plan for details.  This excludes any time spent performing prick and intradermal tests. We installed as well a CAMP Rogerio for Chlorhexidine, re-organised the allergy reporting system in Epic and discussed the results with patient and               Drug Administration Record  Prior to injection, verified patient identity using patient's name and date of birth.  Due to injection administration, patient instructed to remain in clinic for 15 minutes  afterwards, and to report any adverse reaction to me immediately.  Drug Name: Bactrim   Dose: 0.5  Route administered: Patch, prick, ID  NDC #: 6830-5627-36  Amount of waste(mL):4.5ml  Reason for waste: Single use vial  LOT #: [id number]  SITE: Arm  : [employer]  EXPIRATION DATE: 10/2020      Again, thank you for allowing me to participate in the care of your patient.        Sincerely,        Pierre Palmer MD

## 2020-03-10 NOTE — NURSING NOTE
Allergy Rooming Note    Paolo Larkin's goals for this visit include:   Chief Complaint   Patient presents with     Allergy Testing Followup     Paolo is here for allergy medication testing     Arline Graham LPN

## 2020-03-10 NOTE — PROGRESS NOTES
"[facility] Dermatology Note      Dermatology Problem List:  1. Suspected anaphylactic reaction to intraoperative medications    Encounter Date: Mar 10, 2020    CC:   No chief complaint on file.        History of Present Illness:  Ms. Paolo Larkin is a 44 year old female who presents for evaluation of suspected anaphylactic reaction to intraoperative medications. She was last seen on 10/1/2019 when she was evaluated by Dr. Palmer who recommended the patient have allergy testing. She returns today to     She has a history of HELLP complicated by MI and ESRD on the wait list for renal transplant since 2012. She was schduled for transplant in April 2019 but developed hypotension at about 11:10 AM after receiving various medications, including lidocaine (10:53), polyethylene glycol ophthalmic solution (10:53), propofol (10:54), rocuronium (10:54), ondansetron (10:54), and fentanyl (11:05). She my have also received cefuroxime preoperatively, although exact time unknown. She had a serum tryptase level of 188 shortly afterwards. Given her intraoperative hypotension, her renal transplant was cancelled for suspected anaphylactic reaction. Her renal transplant is now awaiting allergy evaluation. 2 weeks prior patient had a similar operation for hysteroscopy without problems.     Polyethylene glycol is irrelevant since this was an ophthalmic solution.    Long time ago patient got an antibiotic (2012) and patient passed out then. Unknown which antibiotic, but records from [facility] in 2012 show Vancomycin was listed.    History as of 03/10/20:    Patient returns to clinic today for further drug allergy testing. She had another reaction in the OR on 1/24/2020 with hypotension after central line failure. It was suspected that this was an allergic reaction to central line antibiotic/antiseptic. No additional concerns discussed.    Copied from anesthesia note 2/13/2020:  \"Taken to OR for transplant " "1/24/2020, aborted due to hypotension after central line placement.   Per anesthesia record \" Right IJV central line placed and as I was suturing the line in place the blood pressure dropped. At the same time a second dose of cisatracurium was also administered. Patient treated with epinephrine and continued to be hypotensive. Patient treated for possible pneumothorax with needle decompression and chest tube placement as she had coarse rhonchi. Patient vital signs did not improve. Pressors were continued. Central line check with aspiration of blood from all ports. Patient was given iv benadryl, and iv dexamethasone. Chest xray was done in the operating room to confirm line placement and check for pneumothorax. Patient transferred to ICU for further workup of allergic reaction.\"  -does not appear that tryptase level was drawn after this event  -no cephalosporins given\"    Past Medical History:   Patient Active Problem List   Diagnosis     End stage kidney disease (H)     Anemia in chronic kidney disease     HTN (hypertension)     Secondary hyperparathyroidism (H)     Kidney transplant recipient     Kidney transplant candidate     Hypotension     Coronary artery disease involving native coronary artery of native heart without angina pectoris     Abnormal cardiovascular stress test     Status post coronary angiogram     NSTEMI (non-ST elevated myocardial infarction) (H)     Renovascular hypertension     Vitamin D deficiency     Pre-kidney transplant, listed     Pneumothorax     Past Medical History:   Diagnosis Date     Anemia in chronic kidney disease      End stage kidney disease (H)      HELLP syndrome      Hepatitis B      HTN (hypertension)      MI (myocardial infarction) (H) 2012     Proteinuria      Secondary hyperparathyroidism (H)      Thrombocytopaenia      Past Surgical History:   Procedure Laterality Date     BENCH KIDNEY Right 1/24/2020    Procedure: BACKBENCH PREPARATION RIGHT  KIDNEY AND RIGHT CHEST TUBE " PLACEMENT.;  Surgeon: Armando Faustin MD;  Location:  OR      SECTION  2012     CREATE FISTULA ARTERIOVENOUS UPPER EXTREMITY       CV CORONARY ANGIOGRAM N/A 2019    Procedure: CV CORONARY ANGIOGRAM;  Surgeon: Sabas Tilley MD;  Location:  HEART CARDIAC CATH LAB     DILATION AND CURETTAGE, HYSTEROSCOPY WITH ULTRASOUND GUIDANCE  2019     INSERT CHEST TUBE  2020    Procedure: Insert chest tube;  Surgeon: Armando Faustin MD;  Location:  OR       Social History:  Patient reports that she has never smoked. She has never used smokeless tobacco. She reports that she does not drink alcohol or use drugs.    Family History:  Family History   Problem Relation Age of Onset     Kidney Disease No family hx of      Heart Disease No family hx of        Medications:  Current Outpatient Medications   Medication Sig Dispense Refill     acetaminophen (TYLENOL) 325 MG tablet Take 325-650 mg by mouth as needed for mild pain       amLODIPine (NORVASC) 10 MG tablet Take 10 mg by mouth every morning        calcium carbonate (TUMS) 500 MG chewable tablet Take 2 chew tab by mouth 3 times daily as needed for heartburn       hydrALAZINE (APRESOLINE) 25 MG tablet Take 50 mg by mouth 3 times daily        losartan (COZAAR) 100 MG tablet Take 1 tablet (100 mg) by mouth every morning Resume taking once blood pressures start increasing again       ondansetron (ZOFRAN-ODT) 4 MG ODT tab Take 1 tablet (4 mg) by mouth every 6 hours as needed for nausea or vomiting 15 tablet 0     oxyCODONE (ROXICODONE) 5 MG tablet Take 1 tablet (5 mg) by mouth every 6 hours as needed for moderate to severe pain 15 tablet 0     sevelamer (RENVELA) 800 MG tablet Take 1,600 mg by mouth 3 times daily (with meals)           Allergies   Allergen Reactions     Blood Transfusion Related (Informational Only)      Patient has a history of a clinically significant antibody against RBC antigens.  A delay in compatible RBCs may occur.      Cephalosporins Anaphylaxis     Per [facility] allergist report      Cefuroxime Other (See Comments)     Reaction with wheel per allergist at [facility] - plans to test other Cephalosporine and Penicillin antibiotics     Suspected anaphylactic reaction - hypotension and increase of Tryptase intraop      Heparin Flush      Penicillin G Unknown     Avoid if at all possible from [facility] allergist report - ? Allergy      Rocuronium Other (See Comments)     Slightly positive intradermal test at [facility]    Suspected anaphylactic reaction - hypotension and increase of Tryptase intraop      Vancomycin          Review of Systems:  -Allergy/Immunology: No history of nickel or other skin allergy. No history of asthma or hay fever., Const: Denies fevers, chills or changes in weight.   -Constitutional: Otherwise feeling well today, in usual state of health.  -HEENT: Patient denies nonhealing oral sores.  -Skin: As above in HPI. No additional skin concerns.    Physical exam:  Vitals: There were no vitals taken for this visit.  GEN: This is a well developed, well-nourished female in no acute distress, in a pleasant mood.    SKIN: Focused examination of the head and neck was performed.  -Phan skin type: IV  -No lesions of concern on areas examined.       DRUGS/SUBSTANCES (10/09/2019)      Prick Tests         Substance/ Allergen Concentration Result (15min) Remarks   + Histamine Hydrochloride (ALK) 0.1 mg/ml ++    - NaCl 0.9% -    1 Cefturoxime Sodium 75 mg/ml (+)    2 Fentanyl 100 mcg/2ml -    3 Lidocane HCl 10mg/ml, 1% -    4 Ondansterone HCl 4mg/2ml -               6 Propofol 100mg/ 10 ml (+)    7 Rocuronium Bromide 10mg/ml (+)    8 Succinylcholine Chloride 50mg/ml -    9 Vecuronium Bromide 4mg/ml -              Intradermal Tests   immed immed delayed delayed      Substance Conc 1st dil 2nd dil   days  days remarks   + Histamine Hydrochloride (ALK) 0.1 mg/ml 10mm P/E       - NaCl 0.9% 2mm       1 Cefturoxime Sodium 1:10 6mmP        2 Fentanyl 1:10 -       3 Lidocane HCl 1:10 -       4 Ondansterone HCl 1:10 -                         6 Propofol 1:10 -       7 Rocuronium Bromide 1:200 3mmP       8 Succinylcholine Chloride 1:500 -       9 Vecuronium Bromide 1:10 -                 Conclusions: based on intradermal tests some indications for allergy to Cefuroxime, but the intradermal test to Rocuronium as well slightly positive. However, no signs for reaction to Vecuronium      DRUG ALLERGY TEST SERIES 11/19/19        Prick Tests         Substance/ Allergen Conc Result (20 min) Remarks    Histamine Hydrochloride (ALK) 0.1 mg/ml ++     NaCl 0.9% -    1 Cefazolin[1] 100 mg/ml -    2 Ceftriaxone* [2] 100 mg/ml -    3 Ceftazidime[3] 100 mg/ml -    4 Benzylpenicillin (Penicillin G) 1 [place] IU/ml (600 mg) -    5 Ampicillin 100mg/ml -    6 Piperacillin-Tazobactam 33.75 mg/ml -    7 Meropenem (Carbapenem) 100 mg/ml (+)    8 Rocuronium Bromide 10 mg/ml +        Intraderrmal Testing (Placed 11/19/2019)    No Substance Conc.  Readings (15min) Evaluation   1 NaCl  0.9% -    2 Histamine  0.1mg / ml ++ 10 mm papule, 25 mm erytehma        Intradermal Tests   immed  delay delay      Substance Conc 1st dil   days  days remarks   1 Cefazolin[1] 1:5 7mm E/P +/++      2 Ceftriaxone* [2] 1:5 3mmEP (+)      3 Ceftazidime[3] 1:5 5 mmE/P +      4 Benzylpenicillin (Penicillin G) 1:100 0 -      5 Ampicillin 1:10 0 -      6 Piperacillin-Tazobactam 1:10 0 -      7 Meropenem (Carbapenem) 1:100 0 -      8 Rocuronium Bromide 1:200 0 -        PATIENTS OWN      DRUGS/SUBSTANCES 03/10/20     Prick Tests        Substance/ Allergen Concentration Result (15min) Remarks   Histamine Hydrochloride (ALK) 0.1 mg/ml ++    NaCl 0.9% - dermographism   Chlorhexdine - ChloraPrep with tint (yellow #6) 2% w/v chlorhexidne gluconate and 70% isopropyl alcohol 2% +    Chlorhexidine gluconate 0.2% -    Bactrim 1:100 -    Latex  -      Intradermal Tests   immed immed delayed delayed     Substance Conc  1st dil 2nd dil   days  days remarks   NaCl 0.9% -    3 mm p   Chlorhexidine  0.0002% +    11 mm p, 25 mm e   Bactrim  -    3 mm p       In intradermal tests, clear positive reaction to very low concentrations of chlorhexidine. Together with history and slightly positive prick test to chloraprep used during the insertion of the central line, it seems to be a strong and relevant allergy.    [x] Patient information given   [x] ACDS CAMP information's (# [id number], and [id number]) to following compounds: chlorhexidine digluconate   [] General information's to following compounds: ......      Diagnosis:    >> Anaphylactic intraoperative reaction with drop of blood pressure and increase of Tryptase    Positive in skin tests to Cephalosporins (Cefazolin, Ceftazidime) and Chlorhexidine    In skin tests NO reaction to Penicillins, Aminopenicillins, Piperacillin and Meropenem    No reaction to Rocuronium/Vecuronium, Fentanyl, Ondansetron, Propofol and Lidocaine      >> Tryptase sligthly elevated without incidence (no clear signs for Mastocytosis)    Could explain hyperreactivities during operations and reaction to Vancomycine in 2012 (non-specific reaction)    Tryptase levels 2x elevated: 5/5/2020 (16 = normal ,11.5ng/ml) and 6/9/2020 16.4ng/ml    Evaluation by Hematologist Prof Brown: slightly elevated Tryptase could be linked to decreased renal function(Ref International Archives of Allergy and Immunology 04/2010, page 28 by Jayjay et al.). Bone marrow not necessary (BiP agrees, because Tryptase not massively increased).           Proposition:      Avoid during the next operation certainly the Cephalosporins.    Avoid all chlorhexidine in hospital and any products containing it at home    Follow suggestions of the CAMP hayder.    Premedicate the patient about 4 days before the operation/ on day of Operation and 2-3 days after Operation with Prednisone 50mg and Antihistamines (e.g. Allegra 180mg morning and evening).      During next operation, keep in exact record medications given to patient and be always ready to treat anaphylactic type reaction. Take tryptase again if reaction occurs and refer patient back to allergy clinic.      Scribe Disclosure  I, Martha Todd, am serving as a scribe to document services personally performed by Dr. Pierre Palmer MD, based on data collection and the provider's statements to me.    I spent a total of 35 minutes face to face with Paolo Larkin during today s office visit. Over 50% of this time was spent discussing all the individual test results, correlating them to the clinical relevance, counseling the patient and/or coordinating care. Please see Assessment and Plan for details.  This excludes any time spent performing prick and intradermal tests. We installed as well a CAMP Rogerio for Chlorhexidine, re-organised the allergy reporting system in Epic and discussed the results with patient and

## 2020-03-10 NOTE — PROGRESS NOTES
Drug Administration Record  Prior to injection, verified patient identity using patient's name and date of birth.  Due to injection administration, patient instructed to remain in clinic for 15 minutes  afterwards, and to report any adverse reaction to me immediately.  Drug Name: Bactrim   Dose: 0.5  Route administered: Patch, prick, ID  NDC #: 6414-0660-14  Amount of waste(mL):4.5ml  Reason for waste: Single use vial  LOT #: [id number]  SITE: [facility]  : [employer]  EXPIRATION DATE: 10/2020

## 2020-04-09 ENCOUNTER — TELEPHONE (OUTPATIENT)
Dept: TRANSPLANT | Facility: CLINIC | Age: 45
End: 2020-04-09

## 2020-04-09 NOTE — TELEPHONE ENCOUNTER
Merle from dialysis center called asking if patient was Active b/c they have seen any new letter or orders for; patient believes she is now Active. Note sent to coordinator to clarify with patient.

## 2020-04-13 ENCOUNTER — TELEPHONE (OUTPATIENT)
Dept: TRANSPLANT | Facility: CLINIC | Age: 45
End: 2020-04-13

## 2020-04-13 NOTE — TELEPHONE ENCOUNTER
Coordinator called pt, using  services. Reviewed with pt that she is still inactive on the kidney transplant waitlist. Reviewed that I am waiting to hear back from one of our transplant surgeons, who will be reviewing pt's most recent appointments with recommendations moving forward. Pt will be presented at our selection meeting after this review. Coordinator will call pt back after selection committee. Pt verbalized understanding to .    Coordinator called and left a msg for KULWANT Bloom at dialysis. Reviewed pt is inactive on the kidney transplant waitlist.

## 2020-04-15 ENCOUNTER — COMMITTEE REVIEW (OUTPATIENT)
Dept: TRANSPLANT | Facility: CLINIC | Age: 45
End: 2020-04-15

## 2020-04-15 NOTE — COMMITTEE REVIEW
Abdominal Committee Review Note     Evaluation Date: 6/20/2013  Committee Review Date: 4/15/2020    Organ being evaluated for: Kidney    Transplant Phase: Waitlist  Transplant Status: Inactive    Transplant Coordinator: Jocelyn Brooks  Transplant Surgeon:       Referring Physician: Marianne Gupta    Primary Diagnosis: Other, Specify - HELLP SYNDROME  Secondary Diagnosis:     Committee Review Members:  Nephrology Jeffrey Desai MD, Pasha Yoon, APRN CNP   Nutrition Lesly Clemente, RD   Transplant Felecia Allan PA-C, Lou Mario, RN, Jocelyn Brooks, RN, Paulina Davison, RN, Kika Gamble, RN, Dayne Marcelino MD, Sergio Jacob MD, Odette Leblanc RN       Transplant Eligibility:     Committee Review Decision: Remain Inactive    Relative Contraindications:     Absolute Contraindications:     Committee Chair Dayne Marcelino MD verbally attested to the committee's decision.    Committee Discussion Details: Committee reviewed pt's PACU note dated 2/13/20 and Allergist note dated 3/10/20. Dr. Marcelino would like a pt team conference set up with PACU and Allergy MD to review pt's case. We need to have a plan going into the OR with what we are going to do differently this time as pt has had two aborted kidney transplants in the OR.

## 2020-04-30 ENCOUNTER — TEAM CONFERENCE (OUTPATIENT)
Dept: TRANSPLANT | Facility: CLINIC | Age: 45
End: 2020-04-30

## 2020-04-30 NOTE — TELEPHONE ENCOUNTER
ATTENDEES:  CHASE Marcelino, (Transplant surgery) YOVANY Del Cid, (Transplant surgery),  PATRICK Palmer (Allergist), JENNIFER Correa (Anesthesia), Mindy (Transplant Coordinator)     DISCUSSION: Dr. Marcelino and Dr. Del Cid presented a review of previous aborted transplant procedures related to patient's reaction/hypotension.  Dr. Palmer presented allergy test result which resulted in recommended plan for next transplant surgery.    PLAN/ACTION ITEMS:      1) Obtain a random tryptase level with next lab draw.  Once result is available, patient's transplant coordinator to communicate result to this meeting's attendees.     2) Consensus that Chlorhexidine from any source, including but not limited to surgical scrub, central line should be AVOIDED as thought to have contributed to patient's suspected anaphylactic reaction.  Betadine preparations are acceptable to use.      3) Avoid Cephalosporins.  Zosyn and Meropenem acceptable to use.    4) Before the planned kidney transplant surgery, recommended patient be admitted to the ICU 3-4 hours before surgery.  The purpose of this action is to initiate anesthesia induction, perform line placement,  and observe response to confirm patient may move forward with the surgery.      5) Per Dr. Palmer, should patient have a future reaction, she should have a tryptase level drawn at that time and then another tryptase level drawn 2-3 months after the reaction.    Sayra Correa will review #4 action item with [facility] Anesthesia Colleagues.   Dr. Marcelino confirmed patient's case will be re-presented at next Multi-disciplinary team transplant committee meeting to review this plan.

## 2020-05-04 ENCOUNTER — TELEPHONE (OUTPATIENT)
Dept: TRANSPLANT | Facility: CLINIC | Age: 45
End: 2020-05-04

## 2020-05-04 DIAGNOSIS — N18.6 ESRD (END STAGE RENAL DISEASE) (H): Primary | ICD-10-CM

## 2020-05-04 DIAGNOSIS — Z76.82 ORGAN TRANSPLANT CANDIDATE: ICD-10-CM

## 2020-05-04 NOTE — TELEPHONE ENCOUNTER
Coordinator called and left msg with dialysis , who will pass msg along to KULWANT Bloom. Coordinator reviewed team conference note from last wk. Dialysis states they can draw lab but to send order to central scheduling at fax [phone]. Dialysis will call and alert us with lab results once completed.

## 2020-05-06 ENCOUNTER — TELEPHONE (OUTPATIENT)
Dept: TRANSPLANT | Facility: CLINIC | Age: 45
End: 2020-05-06

## 2020-05-06 ENCOUNTER — COMMITTEE REVIEW (OUTPATIENT)
Dept: TRANSPLANT | Facility: CLINIC | Age: 45
End: 2020-05-06

## 2020-05-06 ENCOUNTER — DOCUMENTATION ONLY (OUTPATIENT)
Dept: TRANSPLANT | Facility: CLINIC | Age: 45
End: 2020-05-06

## 2020-05-06 DIAGNOSIS — Z76.82 ORGAN TRANSPLANT CANDIDATE: ICD-10-CM

## 2020-05-06 DIAGNOSIS — N18.6 ESRD (END STAGE RENAL DISEASE) (H): ICD-10-CM

## 2020-05-06 NOTE — TELEPHONE ENCOUNTER
Coordinator called pt, using  services, to review that she was discussed at our kidney selection meeting today and was approved for active status. Reviewed team conference note from 20 with pt so she is aware that she will be admitted to the ICU with her next  transplant offer. (Review conference note by Lou Mario 20 for details with admission). Reviewed pt needs to have her tryptase level drawn but this will not hold up her active status. She needs to call when this has been drawn so we can look for results. Instructed pt to call with any questions. Pt verbalized good understanding and had no further questions.      Coordinator called and updated dialysis that pt is now active on the kidney transplant list. Reviewed and faxed over our team's plan for pt's next transplant admission.     UNOS updated, immunology & PFR notified, change in status letter routed to admin team to send out.

## 2020-05-07 DIAGNOSIS — N18.6 ESRD (END STAGE RENAL DISEASE) (H): ICD-10-CM

## 2020-05-07 DIAGNOSIS — Z76.82 ORGAN TRANSPLANT CANDIDATE: ICD-10-CM

## 2020-05-07 NOTE — COMMITTEE REVIEW
Abdominal Committee Review Note     Evaluation Date: 6/20/2013  Committee Review Date: 5/6/2020    Organ being evaluated for: Kidney    Transplant Phase: Waitlist  Transplant Status: Active    Transplant Coordinator: Jocelyn Brooks  Transplant Surgeon:       Referring Physician: Marianne Gupta    Primary Diagnosis: Other, Specify - HELLP SYNDROME  Secondary Diagnosis:     Committee Review Members:  Nephrology Jeffrey Desai MD, Pasha Yoon, APRN CNP   Nutrition Lesly Clemente,    Pharmacy Daron Bowens, [facility]    - Clinical Khadijah Rowell, [facility], Ivanna Kline, [facility]   Transplant Felecia Allan PA-C, Susi Otero, KULWANT, Lou Mario, KULWANT, Rebel Mccall MD, Jocelyn Brooks, KULWANT, Apple Bowen, DEREJE, Paulina Davison, KULWANT, Kika Gamble, KULWANT, Odette Leblanc RN       Transplant Eligibility:     Committee Review Decision: Remain Active    Relative Contraindications:     Absolute Contraindications:     Committee Chair Rebel Mccall MD verbally attested to the committee's decision.    Committee Discussion Details: Dr. Marcelino reviewed with committee team conference note dated 4/30/20 and pt's plan for next transplant admission. Reviewed that pt can be active on the waitlist. Pt should get tryptase level drawn when she can but this should not hold up her active status.

## 2020-05-11 ENCOUNTER — DOCUMENTATION ONLY (OUTPATIENT)
Dept: TRANSPLANT | Facility: CLINIC | Age: 45
End: 2020-05-11

## 2020-05-12 NOTE — PROGRESS NOTES
Coordinator notified Dr. Marcelino, Dr. Del Cid, Dr. Palmer and RAFFAELE Cullen that pt's tryptase level came back at 16 on 5/5/20.    See team conference note dated 4/30/20 for details.

## 2020-05-14 LAB
ALT SERPL-CCNC: 12 U/L (ref 0–55)
CREAT SERPL-MCNC: 9.64 MG/DL (ref 0.6–1.1)
GFR SERPL CREATININE-BSD FRML MDRD: 4 ML/MIN/1.73M2
POTASSIUM SERPL-SCNC: 4.1 MEQ/L (ref 3.5–5.1)

## 2020-05-15 ENCOUNTER — PATIENT OUTREACH (OUTPATIENT)
Dept: ONCOLOGY | Facility: CLINIC | Age: 45
End: 2020-05-15

## 2020-05-15 NOTE — TELEPHONE ENCOUNTER
ONCOLOGY INTAKE: Records Information      APPT INFORMATION:  Referring provider:  Pierre Laughlin  Referring provider s clinic:  Allergy Clinic  Reason for visit/diagnosis:  Drug allergy , Anaphylactic reaction due to adverse effect of correct drug or medicament proper, Mastocytosis  Has patient been notified of appointment date and time?: Yes    RECORDS INFORMATION:  Were the records received with the referral (via Rightfax)? No    Has patient been seen for any external appt for this diagnosis? No    If yes, where? N/A    Has patient had any imaging or procedures outside of [place] for this condition? No      If Yes, where? N/a    ADDITIONAL INFORMATION:  Patient called and confirmed with son and patient on the line.

## 2020-05-15 NOTE — PROGRESS NOTES
"New Patient Nurse Navigator Note     Referring provider:   Pierre Palmer MD  [facility]  [facility]      Referring Clinic/Organization: [facility]     Referred to:  Hematology/oncology    Requested provider (if applicable): First available - did not specify     Referral Received: 05/15/20       Evaluation for :    \" Reason for Referral:   patient with recurrent severe anaphylactoid reactions during induction of operations for transplantation. Has increased Tryptase and want to exclude Mastocytosis and/or Myelodysplasia   Clinical History (per Nurse review of records provided):       kidney transplant candidate, HELLP syndrome.   Allergist is requesting eval for mastocytosis     Clinical Assessment / Barriers to Care (Per Nurse): ready to schedule with Dr Chacon, next available.    Records Location: [facility]     Records Needed:     Additional testing needed prior to consult: none    Yaz Cao, RN, BSN, OCN         "

## 2020-05-26 ENCOUNTER — PRE VISIT (OUTPATIENT)
Dept: TRANSPLANT | Facility: CLINIC | Age: 45
End: 2020-05-26

## 2020-05-26 ENCOUNTER — VIRTUAL VISIT (OUTPATIENT)
Dept: TRANSPLANT | Facility: CLINIC | Age: 45
End: 2020-05-26
Attending: DERMATOLOGY
Payer: MEDICARE

## 2020-05-26 DIAGNOSIS — N18.6 END STAGE KIDNEY DISEASE (H): Primary | ICD-10-CM

## 2020-05-26 DIAGNOSIS — T78.2XXD ANAPHYLAXIS, SUBSEQUENT ENCOUNTER: ICD-10-CM

## 2020-05-26 NOTE — LETTER
"    5/26/2020         RE: Paolo Larkin  [address]  [place] [address]        Dear Colleague,    Thank you for referring your patient, Paolo Larkin, to the [facility]. Please see a copy of my visit note below.    Paolo Larkin is a 44 year old female who is being evaluated via a billable telephone visit.      The patient has been notified of following:     \"This telephone visit will be conducted via a call between you and your physician/provider. We have found that certain health care needs can be provided without the need for a physical exam.  This service lets us provide the care you need with a short phone conversation.  If a prescription is necessary we can send it directly to your pharmacy.  If lab work is needed we can place an order for that and you can then stop by our lab to have the test done at a later time.    Telephone visits are billed at different rates depending on your insurance coverage. During this emergency period, for some insurers they may be billed the same as an in-person visit.  Please reach out to your insurance provider with any questions.    If during the course of the call the physician/provider feels a telephone visit is not appropriate, you will not be charged for this service.\"    Patient has given verbal consent for Telephone visit?  Yes    What phone number would you like to be contacted at? [phone]    How would you like to obtain your AVS? Mail a copy     I have reviewed and updated the patient's allergies and medication list.    Concerns: Patient has no new concerns.      Refills: None         JASWINDER Brice            Phone call duration: 35 minutes     Hematology/Oncology Consult Note    Paolo Larkin MRN# [id number]   Age: 44 year old YOB: 1975          Reason for Consult:   Elevated tryptase, anaphylaxis and renal failure         Assessment and Plan:      ASSESSMENT:   1.  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   2.  End-stage renal disease.   3.  HELLP syndrome.   4.  History of coronary vasospasm.   5.  History of hypertension.   6.  Anemia of chronic renal failure.      I spent about 35 minutes with the , Raul, and Ms. Larkin, reviewing mastocytosis and her symptoms.  I am just not sure if she has mastocytosis.  I did not see the patient.  This was a telephone consult, but there are several features that did not seem to sync with mastocytosis.  She does have an elevated tryptase, but on 05/30/2019, it was 14.2 and in Care Everywhere, she had an elevated tryptase in [place] of 16 on 05/05.  Tryptase is not cleared by the kidneys and it is increased after anaphylaxis and systemic mastocytosis of myeloproliferative syndrome.  She does not have eosinophilia.  There has been an association of increased serum tryptase with decreased renal function reviewed in the International Archives of Allergy and Immunology 04/2010, page 28 by Jayjay et al.        I think that the best approach would be to consider not using chlorhexidine or cephalosporins and premedicating her with, as we do mastocytosis patients, with methylprednisolone, Benadryl, famotidine, etc.  I do not think a bone marrow biopsy is necessary based on the history that I have reviewed.  I would be happy to see her again.  I would be interested in IgE level.  I will discuss this with Dr. Palmer and with her nephrologist and see if we can move her to a kidney transplant.  She is a delightful lady.  I had an excellent conversation with her via the .      Thank you for this referral of this very pleasant woman.     I spent a total of 30 minutes face to face with Paolo Larkin during today's office visit. Over 50% of this time was spent counseling the patient and coordinating the care regarding elevated tryptase and anaphylaxis  Angel Chacon MD   [phone]           History of Present Illness:   History obtained from chart  review and confirmed with patient.TELEPHONE VISIT      HISTORY OF PRESENT ILLNESS:  I had the pleasure of a telephone consultation with Ms. Paolo Larkin, a 44-year-old woman with end-stage renal disease, elevated tryptase and a history of anaphylaxis.        Her history goes back to 2008.  She was born in [facility] and immigrated to the [place] in [place].  In late 2011, she developed HELLP syndrome and chronic kidney disease.  At that time, she was about 6 months' pregnant.  In 2012, she delivered a baby girl, but she ended up having kidney failure.  She had been on multiple dialysis and has anemia of chronic renal failure.  She has also had episodes of vasospasm of the coronary arteries, for which she has undergone an angiogram.  The good news is that she was being prepared for a kidney transplant.    She was taken initially to the operating room in 04/2019, but it was aborted due to hypotension after induction.     She was brought to the operating room on 01/24, and after the right IJ central line was placed and the line was being sutured, her blood pressure dropped.  She was given a cisatracurium and then she was treated with epinephrine and continued to be hypotensive.  There was a question of a possible pneumothorax, but that was not the case.  She was then given IV Benadryl and IV dexamethasone and the patient was transferred to the ICU for further workup.        She was seen by Dr. Palmer who found that she had an elevated tryptase of approximately 16.  He did do skin testing and found that she had intradermal tests that were positive for cefazolin, ceftazidime, but not penicillin G, piperacillin or meropenem.  She again had an episode of hypotension after IV line infection and disinfection with chlorhexidine and a pinprick and intradermal test.  She had a clear positive reaction to chlorhexidine with 1 cm papule and 2.5 cm of erythema.  Because of the elevated tryptase, Dr. Palmer wanted me to see her for  mastocytosis.        Her other allergic reactions have really been minimal.  She had some itching with vancomycin.     Dr. Palmer had seen her in 2019 and did the skin testing, and again she had another hypotension event in 2020.  She has no other symptoms of mastocytosis.  She has no flushing.  She has no diarrhea.  She has no skin rash.  She has had an elevated tryptase of 13-16.  I did not see an IgE.  She is not allergic to bee stings.  She has no GERD.  She has no evidence of other hypotensive episodes.  She is dialyzed 3 times a week.  She does get darbepoetin as she has anemia of chronic renal failure.                 Review of Systems:   A comprehensive ROS was performed with the patient and was found to be negative with the exception of that noted in the HPI above.       Past Medical History:     Past Medical History:   Diagnosis Date     Anemia in chronic kidney disease      End stage kidney disease (H)      HELLP syndrome      Hepatitis B      HTN (hypertension)      MI (myocardial infarction) (H)      Proteinuria      Secondary hyperparathyroidism (H)      Thrombocytopaenia                 Past Surgical History:     Past Surgical History:   Procedure Laterality Date     BENCH KIDNEY Right 2020    Procedure: BACKBENCH PREPARATION RIGHT  KIDNEY AND RIGHT CHEST TUBE PLACEMENT.;  Surgeon: Armando Faustin MD;  [facility]      SECTION  2012     CREATE FISTULA ARTERIOVENOUS UPPER EXTREMITY       CV CORONARY ANGIOGRAM N/A 2019    Procedure: CV CORONARY ANGIOGRAM;  Surgeon: Sabas Tilley MD;  Location:  HEART CARDIAC CATH LAB     DILATION AND CURETTAGE, HYSTEROSCOPY WITH ULTRASOUND GUIDANCE  2019     INSERT CHEST TUBE  2020    Procedure: Insert chest tube;  Surgeon: Armando Faustin MD;  Location:  OR            Social History:     Social History     Socioeconomic History     Marital status:      Spouse name: Not on file     Number of  children: Not on file     Years of education: Not on file     Highest education level: Not on file   Occupational History     Not on file   Social Needs     Financial resource strain: Not on file     Food insecurity     Worry: Not on file     Inability: Not on file     Transportation needs     Medical: Not on file     Non-medical: Not on file   Tobacco Use     Smoking status: Never Smoker     Smokeless tobacco: Never Used   Substance and Sexual Activity     Alcohol use: No     Drug use: No     Sexual activity: Not on file   Lifestyle     Physical activity     Days per week: Not on file     Minutes per session: Not on file     Stress: Not on file   Relationships     Social connections     Talks on phone: Not on file     Gets together: Not on file     Attends Baptism service: Not on file     Active member of club or organization: Not on file     Attends meetings of clubs or organizations: Not on file     Relationship status: Not on file     Intimate partner violence     Fear of current or ex partner: Not on file     Emotionally abused: Not on file     Physically abused: Not on file     Forced sexual activity: Not on file   Other Topics Concern     Parent/sibling w/ CABG, MI or angioplasty before 65F 55M? Not Asked   Social History Narrative    2 children, ages 23--son and 7--daughter.    .            Family History:     Family History   Problem Relation Age of Onset     Kidney Disease No family hx of      Heart Disease No family hx of             Allergies:     Allergies   Allergen Reactions     Blood Transfusion Related (Informational Only)      Patient has a history of a clinically significant antibody against RBC antigens.  A delay in compatible RBCs may occur.     Cephalosporins Anaphylaxis     Per [facility] allergist report: positive skin intradermal tests to Cefazolin and Ceftazidime, but NOT to Penicillin G, Piperacillin and Meropenem      Chlorhexidine Anaphylaxis     Several times during anesthesia at  initial phase during insertion of I.v. line and after disinfection with Chlorhexidine drop of blood pressure.  In Prick and as well intradermal tests clear positive reactions to Chlorhexidine (particularly in intradermal test to 0.0002% Chlorhexidine papule of 1cm and Erythema of 2.5cm). AVOID in future Chlorhexidine     Heparin Flush             Medications:   (Not in a hospital admission)           Physical Exam:   There were no vitals taken for this visit.           Data:   I have personally reviewed the following labs/imaging:  CBC@LABRCNTIPR(wbc:4,rbc:4,hgb:4,hct:4,mcv:4,mch:4,mchc:4,rdw:4,plt:4)@  CMP@LABRCNTIPR(na:4,potassium:4,chloride:4,co2:4,aniongap:4,g,bun:4,cr:4,gfrestimated:4,gfrestblack:4,viky:4,ma,phos:4,prottotal:4,albumin:4,bilitotal:4,alkphos:4,ast:4,alt:4)@  INR@LABRCNTIPR(inr:4)@        Again, thank you for allowing me to participate in the care of your patient.        Sincerely,        Angel Chacon MD

## 2020-05-26 NOTE — PROGRESS NOTES
"Paolo Larkin is a 44 year old female who is being evaluated via a billable telephone visit.      The patient has been notified of following:     \"This telephone visit will be conducted via a call between you and your physician/provider. We have found that certain health care needs can be provided without the need for a physical exam.  This service lets us provide the care you need with a short phone conversation.  If a prescription is necessary we can send it directly to your pharmacy.  If lab work is needed we can place an order for that and you can then stop by our lab to have the test done at a later time.    Telephone visits are billed at different rates depending on your insurance coverage. During this emergency period, for some insurers they may be billed the same as an in-person visit.  Please reach out to your insurance provider with any questions.    If during the course of the call the physician/provider feels a telephone visit is not appropriate, you will not be charged for this service.\"    Patient has given verbal consent for Telephone visit?  Yes    What phone number would you like to be contacted at? [phone]    How would you like to obtain your AVS? Mail a copy     I have reviewed and updated the patient's allergies and medication list.    Concerns: Patient has no new concerns.      Refills: None         JASWINDER Brice            Phone call duration: 35 minutes     Hematology/Oncology Consult Note    Paolo Larkin MRN# [id number]   Age: 44 year old YOB: 1975          Reason for Consult:   Elevated tryptase, anaphylaxis and renal failure         Assessment and Plan:      ASSESSMENT:   1.  Anaphylaxis likely due to chlorhexidine.   2.  End-stage renal disease.   3.  HELLP syndrome.   4.  History of coronary vasospasm.   5.  History of hypertension.   6.  Anemia of chronic renal failure.      I spent about 35 minutes with the , Raul, and Ms. Larkin, reviewing " mastocytosis and her symptoms.  I am just not sure if she has mastocytosis.  I did not see the patient.  This was a telephone consult, but there are several features that did not seem to sync with mastocytosis.  She does have an elevated tryptase, but on 05/30/2019, it was 14.2 and in Care Everywhere, she had an elevated tryptase in [place] of 16 on 05/05.  Tryptase is not cleared by the kidneys and it is increased after anaphylaxis and systemic mastocytosis of myeloproliferative syndrome.  She does not have eosinophilia.  There has been an association of increased serum tryptase with decreased renal function reviewed in the International Archives of Allergy and Immunology 04/2010, page 28 by Jayjay et al.        I think that the best approach would be to consider not using chlorhexidine or cephalosporins and premedicating her with, as we do mastocytosis patients, with methylprednisolone, Benadryl, famotidine, etc.  I do not think a bone marrow biopsy is necessary based on the history that I have reviewed.  I would be happy to see her again.  I would be interested in IgE level.  I will discuss this with Dr. Palmer and with her nephrologist and see if we can move her to a kidney transplant.  She is a delightful lady.  I had an excellent conversation with her via the .      Thank you for this referral of this very pleasant woman.     I spent a total of 30 minutes face to face with Paolo Larkin during today's office visit. Over 50% of this time was spent counseling the patient and coordinating the care regarding elevated tryptase and anaphylaxis  Angel Chacon MD   [phone]           History of Present Illness:   History obtained from chart review and confirmed with patient.TELEPHONE VISIT      HISTORY OF PRESENT ILLNESS:  I had the pleasure of a telephone consultation with Ms. Paolo Larkin, a 44-year-old woman with end-stage renal disease, elevated tryptase and a history of anaphylaxis.        Her  history goes back to 2008.  She was born in [facility] and immig[place] in [place].  In late 2011, she developed HELLP syndrome and chronic kidney disease.  At that time, she was about 6 months' pregnant.  In 2012, she delivered a baby girl, but she ended up having kidney failure.  She had been on multiple dialysis and has anemia of chronic renal failure.  She has also had episodes of vasospasm of the coronary arteries, for which she has undergone an angiogram.  The good news is that she was being prepared for a kidney transplant.    She was taken initially to the operating room in 04/2019, but it was aborted due to hypotension after induction.     She was brought to the operating room on 01/24, and after the right IJ central line was placed and the line was being sutured, her blood pressure dropped.  She was given a cisatracurium and then she was treated with epinephrine and continued to be hypotensive.  There was a question of a possible pneumothorax, but that was not the case.  She was then given IV Benadryl and IV dexamethasone and the patient was transferred to the ICU for further workup.        She was seen by Dr. Palmer who found that she had an elevated tryptase of approximately 16.  He did do skin testing and found that she had intradermal tests that were positive for cefazolin, ceftazidime, but not penicillin G, piperacillin or meropenem.  She again had an episode of hypotension after IV line infection and disinfection with chlorhexidine and a pinprick and intradermal test.  She had a clear positive reaction to chlorhexidine with 1 cm papule and 2.5 cm of erythema.  Because of the elevated tryptase, Dr. Palmer wanted me to see her for mastocytosis.        Her other allergic reactions have really been minimal.  She had some itching with vancomycin.     Dr. Palmer had seen her in 11/2019 and did the skin testing, and again she had another hypotension event in 01/2020.  She has no other symptoms of  mastocytosis.  She has no flushing.  She has no diarrhea.  She has no skin rash.  She has had an elevated tryptase of 13-16.  I did not see an IgE.  She is not allergic to bee stings.  She has no GERD.  She has no evidence of other hypotensive episodes.  She is dialyzed 3 times a week.  She does get darbepoetin as she has anemia of chronic renal failure.                 Review of Systems:   A comprehensive ROS was performed with the patient and was found to be negative with the exception of that noted in the HPI above.       Past Medical History:     Past Medical History:   Diagnosis Date     Anemia in chronic kidney disease      End stage kidney disease (H)      HELLP syndrome      Hepatitis B      HTN (hypertension)      MI (myocardial infarction) (H)      Proteinuria      Secondary hyperparathyroidism (H)      Thrombocytopaenia                 Past Surgical History:     Past Surgical History:   Procedure Laterality Date     BENCH KIDNEY Right 2020    Procedure: BACKBENCH PREPARATION RIGHT  KIDNEY AND RIGHT CHEST TUBE PLACEMENT.;  Surgeon: Armando Faustin MD;  [facility]      SECTION  2012     CREATE FISTULA ARTERIOVENOUS UPPER EXTREMITY       CV CORONARY ANGIOGRAM N/A 2019    Procedure: CV CORONARY ANGIOGRAM;  Surgeon: Sabas Tilley MD;  Location:  HEART CARDIAC CATH LAB     DILATION AND CURETTAGE, HYSTEROSCOPY WITH ULTRASOUND GUIDANCE  2019     INSERT CHEST TUBE  2020    Procedure: Insert chest tube;  Surgeon: Armando Faustin MD;  Location:  OR            Social History:     Social History     Socioeconomic History     Marital status:      Spouse name: Not on file     Number of children: Not on file     Years of education: Not on file     Highest education level: Not on file   Occupational History     Not on file   Social Needs     Financial resource strain: Not on file     Food insecurity     Worry: Not on file     Inability: Not on file      Transportation needs     Medical: Not on file     Non-medical: Not on file   Tobacco Use     Smoking status: Never Smoker     Smokeless tobacco: Never Used   Substance and Sexual Activity     Alcohol use: No     Drug use: No     Sexual activity: Not on file   Lifestyle     Physical activity     Days per week: Not on file     Minutes per session: Not on file     Stress: Not on file   Relationships     Social connections     Talks on phone: Not on file     Gets together: Not on file     Attends Latter-day service: Not on file     Active member of club or organization: Not on file     Attends meetings of clubs or organizations: Not on file     Relationship status: Not on file     Intimate partner violence     Fear of current or ex partner: Not on file     Emotionally abused: Not on file     Physically abused: Not on file     Forced sexual activity: Not on file   Other Topics Concern     Parent/sibling w/ CABG, MI or angioplasty before 65F 55M? Not Asked   Social History Narrative    2 children, ages 23--son and 7--daughter.    .            Family History:     Family History   Problem Relation Age of Onset     Kidney Disease No family hx of      Heart Disease No family hx of             Allergies:     Allergies   Allergen Reactions     Blood Transfusion Related (Informational Only)      Patient has a history of a clinically significant antibody against RBC antigens.  A delay in compatible RBCs may occur.     Cephalosporins Anaphylaxis     Per [facility] allergist report: positive skin intradermal tests to Cefazolin and Ceftazidime, but NOT to Penicillin G, Piperacillin and Meropenem      Chlorhexidine Anaphylaxis     Several times during anesthesia at initial phase during insertion of I.v. line and after disinfection with Chlorhexidine drop of blood pressure.  In [place] and as well intradermal tests clear positive reactions to Chlorhexidine (particularly in intradermal test to 0.0002% Chlorhexidine papule of 1cm and  Erythema of 2.5cm). AVOID in future Chlorhexidine     Heparin Flush             Medications:   (Not in a hospital admission)           Physical Exam:   There were no vitals taken for this visit.           Data:   I have personally reviewed the following labs/imaging:  CBC@LABRCNTIPR(wbc:4,rbc:4,hgb:4,hct:4,mcv:4,mch:4,mchc:4,rdw:4,plt:4)@  CMP@LABRCNTIPR(na:4,potassium:4,chloride:4,co2:4,aniongap:4,g,bun:4,cr:4,gfrestimated:4,gfrestblack:4,viky:4,ma,phos:4,prottotal:4,albumin:4,bilitotal:4,alkphos:4,ast:4,alt:4)@  INR@LABRCNTIPR(inr:4)@

## 2020-05-27 ENCOUNTER — TRANSFERRED RECORDS (OUTPATIENT)
Dept: HEALTH INFORMATION MANAGEMENT | Facility: CLINIC | Age: 45
End: 2020-05-27

## 2020-05-29 ENCOUNTER — TRANSFERRED RECORDS (OUTPATIENT)
Dept: HEALTH INFORMATION MANAGEMENT | Facility: CLINIC | Age: 45
End: 2020-05-29

## 2020-06-01 ENCOUNTER — DOCUMENTATION ONLY (OUTPATIENT)
Dept: TRANSPLANT | Facility: CLINIC | Age: 45
End: 2020-06-01

## 2020-06-02 DIAGNOSIS — N18.6 ESRD (END STAGE RENAL DISEASE) (H): ICD-10-CM

## 2020-06-02 DIAGNOSIS — Z76.82 ORGAN TRANSPLANT CANDIDATE: ICD-10-CM

## 2020-06-03 ENCOUNTER — TRANSFERRED RECORDS (OUTPATIENT)
Dept: HEALTH INFORMATION MANAGEMENT | Facility: CLINIC | Age: 45
End: 2020-06-03

## 2020-07-06 ENCOUNTER — DOCUMENTATION ONLY (OUTPATIENT)
Dept: TRANSPLANT | Facility: CLINIC | Age: 45
End: 2020-07-06

## 2020-07-06 ENCOUNTER — ORGAN (OUTPATIENT)
Dept: TRANSPLANT | Facility: CLINIC | Age: 45
End: 2020-07-06

## 2020-07-06 DIAGNOSIS — Z76.82 AWAITING ORGAN TRANSPLANT: Primary | ICD-10-CM

## 2020-07-07 NOTE — TELEPHONE ENCOUNTER
Organ Offer Encounter Information    Organ Offer Information  Organ offer date & time:  2020  8:42 PM  Coordinator/Fellow/Attending name:  Lilly Alonso, RN   Organ(s):   Organ UNOS ID Match Run ID Comment Organ Laterality   Kidney [id number] MNOP       Recent infections?:  No    New medications?:  No Recent pregnancy?:  No   Angicoagulation medications?:  No Recent vaccinations?:  No   Recent blood transfusions?:  No Recent hospitalizations?:  No   Has your insurance changed in the last 6-12 months?:  Neg    Patient last dialyzed:  2020 11:00 AM  Dialysis type:  Hemo  Discussed organ offer with:  Patient  Discussed risk category with Patient/Other:  DCD  Did not understand donor criteria, questions answered, verbalized understanding  Patient/Other asked to speak to a surgeon?:  No  Discussed program-specific outcomes:  Did not have questions regarding SRTR  Right to decline organ offer without penalty, Patient/Other:  Aware of option to decline without penalty  Organ offer decision status Patient/Other:  Accepted Offer  Organ disposition:  Case Cancelled - Other (specify) (Comment: DONOR DID NOT )  Additional Comments:      2020 8:45 PM  Kidney: Backup  MD: Karson/Chari  OPO Contact: Etelvina DAVILA Results: Compatible- no DSA per Dr. Lou  Plan (XM, NPO, Donor OR): Attempted to call patient and spouse regarding backup local kidney offer with . No answer and unable to leave VM at either number. Will attempt again.     Lilly Alonso  Transplant Coordinator    2020 8:50 PM:  Called patient again with Patricia . Patient answered and discussed DCD-local backup kidney offer. Patient wants to move forward. Donor OR set 0200--plan to have patient be NPO after midnight. Will call with updates as soon as I hear, or give an update in the morning. Provided contact information.    In the past month, have you had:  Any close contact with a suspect or  laboratory-confirmed COVID-19 patient: No  Travel anywhere: No    Fever: No  Cough: No  Short of Breath: No  Rash: No    Lilly Alonso  Transplant Coordinator    2020 6:36 AM:  Called patient via  to inform the donor did not  and she would not be getting a kidney from this donor. Patient understood, will await another organ offer.  Lilly Alonso  Transplant Coordinator    Attestation I have discussed all of the above with the Patient/Legal Guardian/Caregiver regarding this organ offer.:  Yes  Coordinator/Fellow/Attending name:  Lilly Alonso RN

## 2020-07-07 NOTE — PROGRESS NOTES
PATHOLOGY HLA CROSSMATCH CONSULTATION: DONOR/RECIPIENT  VIRTUAL CROSSMATCH - Kidney  Consultation Date: 2020  Consultation Requested by: Dr. Ty    Regarding: Compatibility of  donor organ UNOS #[id number] from OPO: MNOP with Paolo Larkin    Findings: Regarding a virtual crossmatch between Paolo Larkin and  donor listed above (match ID [id number]):  The most recent (2020) and 10 additional patient serum/sera  were analyzed.  The patient has no antibodies listed with HLA specificity against the donor organ.      Record Review Indicates: I personally reviewed the most recent serum, the historic peak sera, and all other sera with solid-phase HLA Single Antigen test results:  The patient has no HLA antibodies against the donor organ.     The results of this virtual XM are:   -most recent serum: compatible   -peak #1: compatible  -peak #2: compatible    Disclaimer: Clinical judgement must take into account other factors, such as non-HLA antibodies not detected in the assay. The VXM gives probabilities only.  The probability does not account for the potential for auto-antibodies that may be present in the patient's serum.  These autoantibodies may render the physical crossmatch falsely positive, and would be detected by an autologous crossmatch.  When possible, confirm findings with prospective allogeneic and autologous flow crossmatches before going to transplant as clinically indicated.     Glenda Lou MD  Medical Director, Immunology/Histocompatibility Laboratory

## 2020-07-14 ENCOUNTER — DOCUMENTATION ONLY (OUTPATIENT)
Dept: TRANSPLANT | Facility: CLINIC | Age: 45
End: 2020-07-14

## 2020-07-14 DIAGNOSIS — Z76.82 AWAITING ORGAN TRANSPLANT: Primary | ICD-10-CM

## 2020-08-03 DIAGNOSIS — Z76.82 ORGAN TRANSPLANT CANDIDATE: ICD-10-CM

## 2020-08-03 DIAGNOSIS — N18.6 ESRD (END STAGE RENAL DISEASE) (H): ICD-10-CM

## 2020-08-21 ENCOUNTER — TRANSFERRED RECORDS (OUTPATIENT)
Dept: HEALTH INFORMATION MANAGEMENT | Facility: CLINIC | Age: 45
End: 2020-08-21

## 2020-08-21 NOTE — PROGRESS NOTES
PATHOLOGY HLA CROSSMATCH CONSULTATION: DONOR/RECIPIENT  VIRTUAL CROSSMATCH - Kidney  Consultation Date: 2020  Consultation Requested by: Dr. Chris Ty    Regarding: Compatibility of  donor organ UNOS #[id number] from OPO: MNOP with Paolo Larikn    Findings: Regarding a virtual crossmatch between Paolo Larkin and  donor listed above (match ID [id number]):  The most recent (20) and seven (7) additional patient serum/sera  were analyzed.  The patient has no antibodies listed with HLA specificity against the donor organ.      Record Review Indicates: I personally reviewed the most recent serum, the historic peak sera, and all other sera with solid-phase HLA Single Antigen test results:  The patient has no HLA antibodies against the donor organ.     The results of this virtual XM are:   -most recent serum: compatible   -peak #1: compatible    Disclaimer: Clinical judgement must take into account other factors, such as non-HLA antibodies not detected in the assay. The VXM gives probabilities only.  The probability does not account for the potential for auto-antibodies that may be present in the patient's serum.  These autoantibodies may render the physical crossmatch falsely positive, and would be detected by an autologous crossmatch.  When possible, confirm findings with prospective allogeneic and autologous flow crossmatches before going to transplant as clinically indicated.     Salas Wooten, PhD    Salas Wooten, PhD,[facility]  Medical Director, Immunology/Histocompatibility Laboratory  Pager [phone]

## 2020-08-24 ENCOUNTER — APPOINTMENT (OUTPATIENT)
Dept: LAB | Facility: CLINIC | Age: 45
End: 2020-08-24
Attending: TRANSPLANT SURGERY
Payer: MEDICARE

## 2020-08-26 ENCOUNTER — DOCUMENTATION ONLY (OUTPATIENT)
Dept: TRANSPLANT | Facility: CLINIC | Age: 45
End: 2020-08-26

## 2020-08-26 DIAGNOSIS — Z76.82 AWAITING ORGAN TRANSPLANT: Primary | ICD-10-CM

## 2020-08-26 NOTE — PROGRESS NOTES
PATHOLOGY HLA CROSSMATCH CONSULTATION: DONOR/RECIPIENT  VIRTUAL CROSSMATCH - Kidney  Consultation Date: 2020  Consultation Requested by: Dr. Chris Ty    Regarding: Compatibility of  donor organ UNOS #[id number] from OPO: MNOP with Paolo Larkin    Findings: Regarding a virtual crossmatch between Paolo Larkin and  donor listed above (match ID [id number]):  The most recent (8/3/20) and 12 additional patient serum/sera  were analyzed.  The patient has no antibodies listed with HLA specificity against the donor organ.      Record Review Indicates: I personally reviewed the most recent serum, the historic peak sera, and all other sera with solid-phase HLA Single Antigen test results:  The patient has no HLA antibodies against the donor organ.     The results of this virtual XM are:   -most recent serum: compatible   -peak #1: compatible  -peak #2: compatible    Disclaimer: Clinical judgement must take into account other factors, such as non-HLA antibodies not detected in the assay. The VXM gives probabilities only.  The probability does not account for the potential for auto-antibodies that may be present in the patient's serum.  These autoantibodies may render the physical crossmatch falsely positive, and would be detected by an autologous crossmatch.  When possible, confirm findings with prospective allogeneic and autologous flow crossmatches before going to transplant as clinically indicated.     Salas Wooten, PhD    Salas Wooten, PhD,[facility]  Medical Director, Immunology/Histocompatibility Laboratory  Pager [phone]

## 2020-09-23 ENCOUNTER — TRANSFERRED RECORDS (OUTPATIENT)
Dept: HEALTH INFORMATION MANAGEMENT | Facility: CLINIC | Age: 45
End: 2020-09-23

## 2020-09-23 ENCOUNTER — DOCUMENTATION ONLY (OUTPATIENT)
Dept: TRANSPLANT | Facility: CLINIC | Age: 45
End: 2020-09-23

## 2020-09-23 DIAGNOSIS — Z76.82 ORGAN TRANSPLANT CANDIDATE: ICD-10-CM

## 2020-09-23 DIAGNOSIS — N18.6 ESRD (END STAGE RENAL DISEASE) (H): ICD-10-CM

## 2020-10-05 ENCOUNTER — APPOINTMENT (OUTPATIENT)
Dept: LAB | Facility: CLINIC | Age: 45
End: 2020-10-05
Attending: TRANSPLANT SURGERY
Payer: MEDICARE

## 2020-10-06 DIAGNOSIS — N18.6 ESRD (END STAGE RENAL DISEASE) (H): ICD-10-CM

## 2020-10-06 DIAGNOSIS — Z76.82 ORGAN TRANSPLANT CANDIDATE: ICD-10-CM

## 2020-10-08 ENCOUNTER — TELEPHONE (OUTPATIENT)
Dept: TRANSPLANT | Facility: CLINIC | Age: 45
End: 2020-10-08

## 2020-10-31 ENCOUNTER — DOCUMENTATION ONLY (OUTPATIENT)
Dept: TRANSPLANT | Facility: CLINIC | Age: 45
End: 2020-10-31

## 2020-10-31 DIAGNOSIS — Z76.82 AWAITING ORGAN TRANSPLANT: Primary | ICD-10-CM

## 2020-10-31 PROCEDURE — 80502 PR CLINICAL PATHOLOGY CONSULTATION COMPREHENSIVE: CPT | Performed by: PATHOLOGY

## 2020-10-31 NOTE — PROGRESS NOTES
PATHOLOGY HLA CROSSMATCH CONSULTATION: DONOR/RECIPIENT  VIRTUAL CROSSMATCH - Kidney  Consultation Date: 10/31/2020  Consultation Requested by: Dr. Saavedra    Regarding: Compatibility of  donor organ UNOS #[id number] from OPO: MNOP  with Paolo Larkin    Findings: Regarding a virtual crossmatch between Paolo Larkin and  donor listed above (match ID [id number]):  The most recent (10.5.2020) and 1 additional patient serum/sera  were analyzed.  The patient has no antibodies listed with HLA specificity against the donor organ.      Record Review Indicates: I personally reviewed the most recent serum, the historic peak sera, and all other sera with solid-phase HLA Single Antigen test results:  The patient has no HLA antibodies against the donor organ.     The results of this virtual XM are:   -most recent serum: compatible   -peak #1: compatible      Disclaimer: Clinical judgement must take into account other factors, such as non-HLA antibodies not detected in the assay. The VXM gives probabilities only.  The probability does not account for the potential for auto-antibodies that may be present in the patient's serum.  These autoantibodies may render the physical crossmatch falsely positive, and would be detected by an autologous crossmatch.  When possible, confirm findings with prospective allogeneic and autologous flow crossmatches before going to transplant as clinically indicated.     Glenda Lou MD  Medical Director, Immunology/Histocompatibility Laboratory

## 2020-11-02 ENCOUNTER — APPOINTMENT (OUTPATIENT)
Dept: LAB | Facility: CLINIC | Age: 45
End: 2020-11-02
Attending: TRANSPLANT SURGERY
Payer: MEDICARE

## 2020-11-02 ENCOUNTER — TELEPHONE (OUTPATIENT)
Dept: TRANSPLANT | Facility: CLINIC | Age: 45
End: 2020-11-02

## 2020-11-11 ENCOUNTER — DOCUMENTATION ONLY (OUTPATIENT)
Dept: TRANSPLANT | Facility: CLINIC | Age: 45
End: 2020-11-11

## 2020-11-11 DIAGNOSIS — Z76.82 AWAITING ORGAN TRANSPLANT: Primary | ICD-10-CM

## 2020-11-11 PROCEDURE — 80502 PR CLINICAL PATHOLOGY CONSULTATION COMPREHENSIVE: CPT | Performed by: PATHOLOGY

## 2020-11-12 ENCOUNTER — RESULTS ONLY (OUTPATIENT)
Dept: OTHER | Facility: CLINIC | Age: 45
End: 2020-11-12

## 2020-11-12 ENCOUNTER — HOSPITAL ENCOUNTER (INPATIENT)
Facility: CLINIC | Age: 45
LOS: 7 days | Discharge: NURSING, REHAB OR LONG-TERM CARE | DRG: 652 | End: 2020-11-19
Attending: SURGERY | Admitting: SURGERY
Payer: MEDICARE

## 2020-11-12 ENCOUNTER — ANESTHESIA (OUTPATIENT)
Dept: SURGERY | Facility: CLINIC | Age: 45
DRG: 652 | End: 2020-11-12
Payer: MEDICARE

## 2020-11-12 ENCOUNTER — APPOINTMENT (OUTPATIENT)
Dept: GENERAL RADIOLOGY | Facility: CLINIC | Age: 45
DRG: 652 | End: 2020-11-12
Attending: NURSE PRACTITIONER
Payer: MEDICARE

## 2020-11-12 ENCOUNTER — ANESTHESIA EVENT (OUTPATIENT)
Dept: SURGERY | Facility: CLINIC | Age: 45
DRG: 652 | End: 2020-11-12
Payer: MEDICARE

## 2020-11-12 ENCOUNTER — APPOINTMENT (OUTPATIENT)
Dept: ULTRASOUND IMAGING | Facility: CLINIC | Age: 45
DRG: 652 | End: 2020-11-12
Attending: SURGERY
Payer: MEDICARE

## 2020-11-12 ENCOUNTER — ORGAN (OUTPATIENT)
Dept: TRANSPLANT | Facility: CLINIC | Age: 45
End: 2020-11-12

## 2020-11-12 ENCOUNTER — APPOINTMENT (OUTPATIENT)
Dept: GENERAL RADIOLOGY | Facility: CLINIC | Age: 45
DRG: 652 | End: 2020-11-12
Attending: SURGERY
Payer: MEDICARE

## 2020-11-12 DIAGNOSIS — G60.9 IDIOPATHIC PERIPHERAL NEUROPATHY: Primary | ICD-10-CM

## 2020-11-12 DIAGNOSIS — Z76.82 AWAITING ORGAN TRANSPLANT: Primary | ICD-10-CM

## 2020-11-12 DIAGNOSIS — I15.0 RENOVASCULAR HYPERTENSION: ICD-10-CM

## 2020-11-12 DIAGNOSIS — Z94.0 KIDNEY TRANSPLANT RECIPIENT: ICD-10-CM

## 2020-11-12 DIAGNOSIS — Z94.0 KIDNEY TRANSPLANTED: ICD-10-CM

## 2020-11-12 DIAGNOSIS — G57.21 FEMORAL NEUROPATHY, RIGHT: ICD-10-CM

## 2020-11-12 LAB
ABO + RH BLD: NORMAL
ABO + RH BLD: NORMAL
ALBUMIN SERPL-MCNC: 3.8 G/DL (ref 3.4–5)
ALP SERPL-CCNC: 142 U/L (ref 40–150)
ALT SERPL W P-5'-P-CCNC: 18 U/L (ref 0–50)
ANION GAP SERPL CALCULATED.3IONS-SCNC: 11 MMOL/L (ref 3–14)
ANION GAP SERPL CALCULATED.3IONS-SCNC: 13 MMOL/L (ref 3–14)
APTT PPP: 33 SEC (ref 22–37)
APTT PPP: 34 SEC (ref 22–37)
AST SERPL W P-5'-P-CCNC: 19 U/L (ref 0–45)
B-HCG SERPL-ACNC: 2 IU/L (ref 0–5)
BASOPHILS # BLD AUTO: 0.1 10E9/L (ref 0–0.2)
BASOPHILS NFR BLD AUTO: 1.4 %
BILIRUB SERPL-MCNC: 0.5 MG/DL (ref 0.2–1.3)
BLD GP AB SCN SERPL QL: NORMAL
BLD PROD TYP BPU: NORMAL
BLOOD BANK CMNT PATIENT-IMP: NORMAL
BUN SERPL-MCNC: 28 MG/DL (ref 7–30)
BUN SERPL-MCNC: 30 MG/DL (ref 7–30)
CALCIUM SERPL-MCNC: 9.2 MG/DL (ref 8.5–10.1)
CALCIUM SERPL-MCNC: 9.5 MG/DL (ref 8.5–10.1)
CHLORIDE SERPL-SCNC: 97 MMOL/L (ref 94–109)
CHLORIDE SERPL-SCNC: 98 MMOL/L (ref 94–109)
CHOLEST SERPL-MCNC: 252 MG/DL
CO2 SERPL-SCNC: 24 MMOL/L (ref 20–32)
CO2 SERPL-SCNC: 29 MMOL/L (ref 20–32)
CREAT SERPL-MCNC: 8.02 MG/DL (ref 0.52–1.04)
CREAT SERPL-MCNC: 8.73 MG/DL (ref 0.52–1.04)
DIFFERENTIAL METHOD BLD: NORMAL
EOSINOPHIL # BLD AUTO: 0.6 10E9/L (ref 0–0.7)
EOSINOPHIL NFR BLD AUTO: 9.5 %
ERYTHROCYTE [DISTWIDTH] IN BLOOD BY AUTOMATED COUNT: 14.6 % (ref 10–15)
ERYTHROCYTE [DISTWIDTH] IN BLOOD BY AUTOMATED COUNT: 14.8 % (ref 10–15)
GFR SERPL CREATININE-BSD FRML MDRD: 5 ML/MIN/{1.73_M2}
GFR SERPL CREATININE-BSD FRML MDRD: 6 ML/MIN/{1.73_M2}
GLUCOSE BLDC GLUCOMTR-MCNC: 161 MG/DL (ref 70–99)
GLUCOSE SERPL-MCNC: 160 MG/DL (ref 70–99)
GLUCOSE SERPL-MCNC: 92 MG/DL (ref 70–99)
HBA1C MFR BLD: 4.9 % (ref 0–5.6)
HBV CORE IGM SERPL QL IA: NONREACTIVE
HBV SURFACE AG SERPL QL IA: NONREACTIVE
HCT VFR BLD AUTO: 31.2 % (ref 35–47)
HCT VFR BLD AUTO: 37.4 % (ref 35–47)
HCV AB SERPL QL IA: NONREACTIVE
HDLC SERPL-MCNC: 58 MG/DL
HGB BLD-MCNC: 11.9 G/DL (ref 11.7–15.7)
HGB BLD-MCNC: 9.9 G/DL (ref 11.7–15.7)
HIV 1+2 AB+HIV1 P24 AG SERPL QL IA: NONREACTIVE
IMM GRANULOCYTES # BLD: 0 10E9/L (ref 0–0.4)
IMM GRANULOCYTES NFR BLD: 0.2 %
INR PPP: 0.92 (ref 0.86–1.14)
INR PPP: 1.01 (ref 0.86–1.14)
LABORATORY COMMENT REPORT: NORMAL
LDLC SERPL CALC-MCNC: 156 MG/DL
LYMPHOCYTES # BLD AUTO: 2.1 10E9/L (ref 0.8–5.3)
LYMPHOCYTES NFR BLD AUTO: 33.7 %
MAGNESIUM SERPL-MCNC: 2.2 MG/DL (ref 1.6–2.3)
MCH RBC QN AUTO: 30.8 PG (ref 26.5–33)
MCH RBC QN AUTO: 30.9 PG (ref 26.5–33)
MCHC RBC AUTO-ENTMCNC: 31.7 G/DL (ref 31.5–36.5)
MCHC RBC AUTO-ENTMCNC: 31.8 G/DL (ref 31.5–36.5)
MCV RBC AUTO: 97 FL (ref 78–100)
MCV RBC AUTO: 97 FL (ref 78–100)
MONOCYTES # BLD AUTO: 0.6 10E9/L (ref 0–1.3)
MONOCYTES NFR BLD AUTO: 9.1 %
NEUTROPHILS # BLD AUTO: 2.9 10E9/L (ref 1.6–8.3)
NEUTROPHILS NFR BLD AUTO: 46.1 %
NONHDLC SERPL-MCNC: 194 MG/DL
NRBC # BLD AUTO: 0 10*3/UL
NRBC BLD AUTO-RTO: 0 /100
NUM BPU REQUESTED: 4
PHOSPHATE SERPL-MCNC: 4.8 MG/DL (ref 2.5–4.5)
PLATELET # BLD AUTO: 234 10E9/L (ref 150–450)
PLATELET # BLD AUTO: 303 10E9/L (ref 150–450)
POTASSIUM SERPL-SCNC: 3.9 MMOL/L (ref 3.4–5.3)
POTASSIUM SERPL-SCNC: 4.4 MMOL/L (ref 3.4–5.3)
PROT SERPL-MCNC: 8.4 G/DL (ref 6.8–8.8)
RBC # BLD AUTO: 3.21 10E12/L (ref 3.8–5.2)
RBC # BLD AUTO: 3.85 10E12/L (ref 3.8–5.2)
SARS-COV-2 RNA SPEC QL NAA+PROBE: NEGATIVE
SARS-COV-2 RNA SPEC QL NAA+PROBE: NORMAL
SODIUM SERPL-SCNC: 134 MMOL/L (ref 133–144)
SODIUM SERPL-SCNC: 136 MMOL/L (ref 133–144)
SPECIMEN EXP DATE BLD: NORMAL
SPECIMEN SOURCE: NORMAL
SPECIMEN SOURCE: NORMAL
TRIGL SERPL-MCNC: 188 MG/DL
WBC # BLD AUTO: 12.8 10E9/L (ref 4–11)
WBC # BLD AUTO: 6.2 10E9/L (ref 4–11)

## 2020-11-12 PROCEDURE — 86922 COMPATIBILITY TEST ANTIGLOB: CPT | Performed by: NURSE PRACTITIONER

## 2020-11-12 PROCEDURE — 250N000009 HC RX 250: Performed by: SURGERY

## 2020-11-12 PROCEDURE — 76776 US EXAM K TRANSPL W/DOPPLER: CPT

## 2020-11-12 PROCEDURE — 86825 HLA X-MATH NON-CYTOTOXIC: CPT | Performed by: SURGERY

## 2020-11-12 PROCEDURE — 71046 X-RAY EXAM CHEST 2 VIEWS: CPT

## 2020-11-12 PROCEDURE — 250N000012 HC RX MED GY IP 250 OP 636 PS 637: Performed by: NURSE PRACTITIONER

## 2020-11-12 PROCEDURE — 85025 COMPLETE CBC W/AUTO DIFF WBC: CPT | Performed by: NURSE PRACTITIONER

## 2020-11-12 PROCEDURE — 272N000201 ZZ HC ADHESIVE SKIN CLOSURE, DERMABOND

## 2020-11-12 PROCEDURE — 86850 RBC ANTIBODY SCREEN: CPT | Performed by: NURSE PRACTITIONER

## 2020-11-12 PROCEDURE — 250N000011 HC RX IP 250 OP 636

## 2020-11-12 PROCEDURE — 250N000011 HC RX IP 250 OP 636: Performed by: STUDENT IN AN ORGANIZED HEALTH CARE EDUCATION/TRAINING PROGRAM

## 2020-11-12 PROCEDURE — 87340 HEPATITIS B SURFACE AG IA: CPT | Performed by: NURSE PRACTITIONER

## 2020-11-12 PROCEDURE — 250N000011 HC RX IP 250 OP 636: Performed by: SURGERY

## 2020-11-12 PROCEDURE — 272N000278 HC DEVICE 5FR SECURACATH

## 2020-11-12 PROCEDURE — 87389 HIV-1 AG W/HIV-1&-2 AB AG IA: CPT | Performed by: NURSE PRACTITIONER

## 2020-11-12 PROCEDURE — 999N000155 HC STATISTIC RAPCV CVP MONITORING

## 2020-11-12 PROCEDURE — 272N000001 HC OR GENERAL SUPPLY STERILE: Performed by: SURGERY

## 2020-11-12 PROCEDURE — 272N000458 ZZ HC KIT, 5 FR DL BIOFLO OPEN ENDED PICC

## 2020-11-12 PROCEDURE — 84100 ASSAY OF PHOSPHORUS: CPT | Performed by: SURGERY

## 2020-11-12 PROCEDURE — 86803 HEPATITIS C AB TEST: CPT | Performed by: NURSE PRACTITIONER

## 2020-11-12 PROCEDURE — 50360 RNL ALTRNSPLJ W/O RCP NFRCT: CPT | Mod: 82 | Performed by: SURGERY

## 2020-11-12 PROCEDURE — 250N000009 HC RX 250: Performed by: NURSE PRACTITIONER

## 2020-11-12 PROCEDURE — 250N000009 HC RX 250: Performed by: STUDENT IN AN ORGANIZED HEALTH CARE EDUCATION/TRAINING PROGRAM

## 2020-11-12 PROCEDURE — 84702 CHORIONIC GONADOTROPIN TEST: CPT | Performed by: NURSE PRACTITIONER

## 2020-11-12 PROCEDURE — U0003 INFECTIOUS AGENT DETECTION BY NUCLEIC ACID (DNA OR RNA); SEVERE ACUTE RESPIRATORY SYNDROME CORONAVIRUS 2 (SARS-COV-2) (CORONAVIRUS DISEASE [COVID-19]), AMPLIFIED PROBE TECHNIQUE, MAKING USE OF HIGH THROUGHPUT TECHNOLOGIES AS DESCRIBED BY CMS-2020-01-R: HCPCS | Performed by: NURSE PRACTITIONER

## 2020-11-12 PROCEDURE — 80053 COMPREHEN METABOLIC PANEL: CPT | Performed by: NURSE PRACTITIONER

## 2020-11-12 PROCEDURE — 370N000002 HC ANESTHESIA TECHNICAL FEE, EACH ADDTL 15 MIN: Performed by: SURGERY

## 2020-11-12 PROCEDURE — 85730 THROMBOPLASTIN TIME PARTIAL: CPT | Performed by: SURGERY

## 2020-11-12 PROCEDURE — 999N000157 HC STATISTIC RCP TIME EA 10 MIN

## 2020-11-12 PROCEDURE — 86902 BLOOD TYPE ANTIGEN DONOR EA: CPT | Performed by: NURSE PRACTITIONER

## 2020-11-12 PROCEDURE — 250N000009 HC RX 250

## 2020-11-12 PROCEDURE — 36415 COLL VENOUS BLD VENIPUNCTURE: CPT | Performed by: NURSE PRACTITIONER

## 2020-11-12 PROCEDURE — 86901 BLOOD TYPING SEROLOGIC RH(D): CPT | Performed by: NURSE PRACTITIONER

## 2020-11-12 PROCEDURE — 120N000003 HC R&B IMCU UMMC

## 2020-11-12 PROCEDURE — 370N000001 HC ANESTHESIA TECHNICAL FEE, 1ST 30 MIN: Performed by: SURGERY

## 2020-11-12 PROCEDURE — 258N000003 HC RX IP 258 OP 636

## 2020-11-12 PROCEDURE — 50360 RNL ALTRNSPLJ W/O RCP NFRCT: CPT | Performed by: SURGERY

## 2020-11-12 PROCEDURE — 30243S1 TRANSFUSION OF NONAUTOLOGOUS GLOBULIN INTO CENTRAL VEIN, PERCUTANEOUS APPROACH: ICD-10-PCS | Performed by: SURGERY

## 2020-11-12 PROCEDURE — 999N000139 HC STATISTIC PRE-PROCEDURE ASSESSMENT II: Performed by: SURGERY

## 2020-11-12 PROCEDURE — 85027 COMPLETE CBC AUTOMATED: CPT | Performed by: SURGERY

## 2020-11-12 PROCEDURE — 999N001017 HC STATISTIC GLUCOSE BY METER IP

## 2020-11-12 PROCEDURE — 71045 X-RAY EXAM CHEST 1 VIEW: CPT | Mod: 26

## 2020-11-12 PROCEDURE — 80061 LIPID PANEL: CPT | Performed by: NURSE PRACTITIONER

## 2020-11-12 PROCEDURE — 258N000003 HC RX IP 258 OP 636: Performed by: STUDENT IN AN ORGANIZED HEALTH CARE EDUCATION/TRAINING PROGRAM

## 2020-11-12 PROCEDURE — 0TY00Z0 TRANSPLANTATION OF RIGHT KIDNEY, ALLOGENEIC, OPEN APPROACH: ICD-10-PCS | Performed by: SURGERY

## 2020-11-12 PROCEDURE — 86900 BLOOD TYPING SEROLOGIC ABO: CPT | Performed by: NURSE PRACTITIONER

## 2020-11-12 PROCEDURE — 999N000015 HC STATISTIC ARTERIAL MONITORING DAILY

## 2020-11-12 PROCEDURE — 258N000003 HC RX IP 258 OP 636: Performed by: SURGERY

## 2020-11-12 PROCEDURE — 258N000003 HC RX IP 258 OP 636: Performed by: NURSE PRACTITIONER

## 2020-11-12 PROCEDURE — 250N000011 HC RX IP 250 OP 636: Performed by: ANESTHESIOLOGY

## 2020-11-12 PROCEDURE — 83036 HEMOGLOBIN GLYCOSYLATED A1C: CPT | Performed by: NURSE PRACTITIONER

## 2020-11-12 PROCEDURE — 250N000013 HC RX MED GY IP 250 OP 250 PS 637: Performed by: NURSE PRACTITIONER

## 2020-11-12 PROCEDURE — 360N000028 HC SURGERY LEVEL 4 1ST 30 MIN - UMMC: Performed by: SURGERY

## 2020-11-12 PROCEDURE — 999N000065 XR CHEST PORT 1 VW

## 2020-11-12 PROCEDURE — 86705 HEP B CORE ANTIBODY IGM: CPT | Performed by: NURSE PRACTITIONER

## 2020-11-12 PROCEDURE — 360N000029 HC SURGERY LEVEL 4 EA 15 ADDTL MIN - UMMC: Performed by: SURGERY

## 2020-11-12 PROCEDURE — 80048 BASIC METABOLIC PNL TOTAL CA: CPT | Performed by: SURGERY

## 2020-11-12 PROCEDURE — 36569 INSJ PICC 5 YR+ W/O IMAGING: CPT

## 2020-11-12 PROCEDURE — 258N000001 HC RX 258: Performed by: SURGERY

## 2020-11-12 PROCEDURE — 85730 THROMBOPLASTIN TIME PARTIAL: CPT | Performed by: NURSE PRACTITIONER

## 2020-11-12 PROCEDURE — 250N000011 HC RX IP 250 OP 636: Performed by: NURSE PRACTITIONER

## 2020-11-12 PROCEDURE — 85610 PROTHROMBIN TIME: CPT | Performed by: NURSE PRACTITIONER

## 2020-11-12 PROCEDURE — 999N000054 HC STATISTIC EKG NON-CHARGEABLE

## 2020-11-12 PROCEDURE — 761N000005 HC RECOVERY PHASE 1 LEVEL 3 FIRST HR: Performed by: SURGERY

## 2020-11-12 PROCEDURE — 812N000003 HC ACQUISITION KIDNEY CADAVER

## 2020-11-12 PROCEDURE — 761N000006 HC RECOVERY PHASE 1 LEVEL 3 EA ADDTL HR: Performed by: SURGERY

## 2020-11-12 PROCEDURE — 71046 X-RAY EXAM CHEST 2 VIEWS: CPT | Mod: 26 | Performed by: RADIOLOGY

## 2020-11-12 PROCEDURE — 76776 US EXAM K TRANSPL W/DOPPLER: CPT | Mod: 26

## 2020-11-12 PROCEDURE — 250N000003 HC SEVOFLURANE, EA 15 MIN: Performed by: SURGERY

## 2020-11-12 PROCEDURE — 85610 PROTHROMBIN TIME: CPT | Performed by: SURGERY

## 2020-11-12 PROCEDURE — 83735 ASSAY OF MAGNESIUM: CPT | Performed by: SURGERY

## 2020-11-12 PROCEDURE — 93010 ELECTROCARDIOGRAM REPORT: CPT | Mod: 59 | Performed by: INTERNAL MEDICINE

## 2020-11-12 PROCEDURE — 86665 EPSTEIN-BARR CAPSID VCA: CPT | Performed by: NURSE PRACTITIONER

## 2020-11-12 PROCEDURE — 36415 COLL VENOUS BLD VENIPUNCTURE: CPT | Performed by: SURGERY

## 2020-11-12 PROCEDURE — 86645 CMV ANTIBODY IGM: CPT | Performed by: NURSE PRACTITIONER

## 2020-11-12 RX ORDER — SODIUM CHLORIDE, SODIUM LACTATE, POTASSIUM CHLORIDE, CALCIUM CHLORIDE 600; 310; 30; 20 MG/100ML; MG/100ML; MG/100ML; MG/100ML
INJECTION, SOLUTION INTRAVENOUS CONTINUOUS PRN
Status: DISCONTINUED | OUTPATIENT
Start: 2020-11-12 | End: 2020-11-12

## 2020-11-12 RX ORDER — ONDANSETRON 2 MG/ML
4 INJECTION INTRAMUSCULAR; INTRAVENOUS EVERY 30 MIN PRN
Status: DISCONTINUED | OUTPATIENT
Start: 2020-11-12 | End: 2020-11-13 | Stop reason: HOSPADM

## 2020-11-12 RX ORDER — LIDOCAINE HYDROCHLORIDE 20 MG/ML
INJECTION, SOLUTION INFILTRATION; PERINEURAL PRN
Status: DISCONTINUED | OUTPATIENT
Start: 2020-11-12 | End: 2020-11-12

## 2020-11-12 RX ORDER — MANNITOL 20 G/100ML
INJECTION, SOLUTION INTRAVENOUS PRN
Status: DISCONTINUED | OUTPATIENT
Start: 2020-11-12 | End: 2020-11-12

## 2020-11-12 RX ORDER — ASPIRIN 325 MG
325 TABLET ORAL DAILY
Status: DISCONTINUED | OUTPATIENT
Start: 2020-11-12 | End: 2020-11-13

## 2020-11-12 RX ORDER — PIPERACILLIN SODIUM, TAZOBACTAM SODIUM 2; .25 G/10ML; G/10ML
2.25 INJECTION, POWDER, LYOPHILIZED, FOR SOLUTION INTRAVENOUS ONCE
Status: COMPLETED | OUTPATIENT
Start: 2020-11-12 | End: 2020-11-12

## 2020-11-12 RX ORDER — MAGNESIUM HYDROXIDE 1200 MG/15ML
LIQUID ORAL PRN
Status: DISCONTINUED | OUTPATIENT
Start: 2020-11-12 | End: 2020-11-13 | Stop reason: HOSPADM

## 2020-11-12 RX ORDER — PROPOFOL 10 MG/ML
INJECTION, EMULSION INTRAVENOUS PRN
Status: DISCONTINUED | OUTPATIENT
Start: 2020-11-12 | End: 2020-11-12

## 2020-11-12 RX ORDER — LIDOCAINE 40 MG/G
CREAM TOPICAL
Status: ACTIVE | OUTPATIENT
Start: 2020-11-12 | End: 2020-11-15

## 2020-11-12 RX ORDER — HYDRALAZINE HYDROCHLORIDE 20 MG/ML
2.5-5 INJECTION INTRAMUSCULAR; INTRAVENOUS EVERY 10 MIN PRN
Status: DISCONTINUED | OUTPATIENT
Start: 2020-11-12 | End: 2020-11-13 | Stop reason: HOSPADM

## 2020-11-12 RX ORDER — METOPROLOL TARTRATE 1 MG/ML
1-2 INJECTION, SOLUTION INTRAVENOUS EVERY 5 MIN PRN
Status: DISCONTINUED | OUTPATIENT
Start: 2020-11-12 | End: 2020-11-13 | Stop reason: HOSPADM

## 2020-11-12 RX ORDER — ONDANSETRON 2 MG/ML
INJECTION INTRAMUSCULAR; INTRAVENOUS PRN
Status: DISCONTINUED | OUTPATIENT
Start: 2020-11-12 | End: 2020-11-12

## 2020-11-12 RX ORDER — FUROSEMIDE 10 MG/ML
INJECTION INTRAMUSCULAR; INTRAVENOUS PRN
Status: DISCONTINUED | OUTPATIENT
Start: 2020-11-12 | End: 2020-11-12

## 2020-11-12 RX ORDER — PROPOFOL 10 MG/ML
INJECTION, EMULSION INTRAVENOUS CONTINUOUS PRN
Status: DISCONTINUED | OUTPATIENT
Start: 2020-11-12 | End: 2020-11-12

## 2020-11-12 RX ORDER — NEOSTIGMINE METHYLSULFATE 1 MG/ML
VIAL (ML) INJECTION PRN
Status: DISCONTINUED | OUTPATIENT
Start: 2020-11-12 | End: 2020-11-12

## 2020-11-12 RX ORDER — FEXOFENADINE HCL 180 MG/1
180 TABLET ORAL ONCE
Status: COMPLETED | OUTPATIENT
Start: 2020-11-12 | End: 2020-11-12

## 2020-11-12 RX ORDER — FENTANYL CITRATE 50 UG/ML
INJECTION, SOLUTION INTRAMUSCULAR; INTRAVENOUS PRN
Status: DISCONTINUED | OUTPATIENT
Start: 2020-11-12 | End: 2020-11-12

## 2020-11-12 RX ORDER — CALCIUM CHLORIDE 100 MG/ML
INJECTION INTRAVENOUS; INTRAVENTRICULAR PRN
Status: DISCONTINUED | OUTPATIENT
Start: 2020-11-12 | End: 2020-11-12

## 2020-11-12 RX ORDER — HYDROMORPHONE HYDROCHLORIDE 1 MG/ML
.3-.5 INJECTION, SOLUTION INTRAMUSCULAR; INTRAVENOUS; SUBCUTANEOUS EVERY 5 MIN PRN
Status: DISCONTINUED | OUTPATIENT
Start: 2020-11-12 | End: 2020-11-13 | Stop reason: HOSPADM

## 2020-11-12 RX ORDER — ONDANSETRON 4 MG/1
4 TABLET, ORALLY DISINTEGRATING ORAL EVERY 30 MIN PRN
Status: DISCONTINUED | OUTPATIENT
Start: 2020-11-12 | End: 2020-11-13 | Stop reason: HOSPADM

## 2020-11-12 RX ORDER — SODIUM CHLORIDE, SODIUM GLUCONATE, SODIUM ACETATE, POTASSIUM CHLORIDE AND MAGNESIUM CHLORIDE 526; 502; 368; 37; 30 MG/100ML; MG/100ML; MG/100ML; MG/100ML; MG/100ML
INJECTION, SOLUTION INTRAVENOUS CONTINUOUS PRN
Status: DISCONTINUED | OUTPATIENT
Start: 2020-11-12 | End: 2020-11-12

## 2020-11-12 RX ORDER — ACETAMINOPHEN 325 MG/1
975 TABLET ORAL ONCE
Status: COMPLETED | OUTPATIENT
Start: 2020-11-12 | End: 2020-11-12

## 2020-11-12 RX ORDER — SODIUM CHLORIDE, SODIUM LACTATE, POTASSIUM CHLORIDE, CALCIUM CHLORIDE 600; 310; 30; 20 MG/100ML; MG/100ML; MG/100ML; MG/100ML
INJECTION, SOLUTION INTRAVENOUS CONTINUOUS
Status: DISCONTINUED | OUTPATIENT
Start: 2020-11-12 | End: 2020-11-13 | Stop reason: HOSPADM

## 2020-11-12 RX ORDER — FENTANYL CITRATE 50 UG/ML
25-50 INJECTION, SOLUTION INTRAMUSCULAR; INTRAVENOUS
Status: DISCONTINUED | OUTPATIENT
Start: 2020-11-12 | End: 2020-11-13 | Stop reason: HOSPADM

## 2020-11-12 RX ORDER — GLYCOPYRROLATE 0.2 MG/ML
INJECTION, SOLUTION INTRAMUSCULAR; INTRAVENOUS PRN
Status: DISCONTINUED | OUTPATIENT
Start: 2020-11-12 | End: 2020-11-12

## 2020-11-12 RX ADMIN — CISATRACURIUM BESYLATE 1 MG: 2 INJECTION INTRAVENOUS at 19:10

## 2020-11-12 RX ADMIN — PIPERACILLIN SODIUM AND TAZOBACTAM SODIUM 2.25 G: 2; .25 INJECTION, POWDER, LYOPHILIZED, FOR SOLUTION INTRAVENOUS at 17:45

## 2020-11-12 RX ADMIN — MIDAZOLAM 2 MG: 1 INJECTION INTRAMUSCULAR; INTRAVENOUS at 16:43

## 2020-11-12 RX ADMIN — CISATRACURIUM BESYLATE 1 MG: 2 INJECTION INTRAVENOUS at 18:26

## 2020-11-12 RX ADMIN — SODIUM CHLORIDE, POTASSIUM CHLORIDE, SODIUM LACTATE AND CALCIUM CHLORIDE: 600; 310; 30; 20 INJECTION, SOLUTION INTRAVENOUS at 16:45

## 2020-11-12 RX ADMIN — PHENYLEPHRINE HYDROCHLORIDE 200 MCG: 10 INJECTION INTRAVENOUS at 19:59

## 2020-11-12 RX ADMIN — PHENYLEPHRINE HYDROCHLORIDE 100 MCG: 10 INJECTION INTRAVENOUS at 18:48

## 2020-11-12 RX ADMIN — GLYCOPYRROLATE 0.4 MG: 0.2 INJECTION, SOLUTION INTRAMUSCULAR; INTRAVENOUS at 21:24

## 2020-11-12 RX ADMIN — HYDROMORPHONE HYDROCHLORIDE 0.5 MG: 1 INJECTION, SOLUTION INTRAMUSCULAR; INTRAVENOUS; SUBCUTANEOUS at 23:22

## 2020-11-12 RX ADMIN — SODIUM CHLORIDE 500 MG: 9 INJECTION, SOLUTION INTRAVENOUS at 17:42

## 2020-11-12 RX ADMIN — CISATRACURIUM BESYLATE 1 MG: 2 INJECTION INTRAVENOUS at 19:43

## 2020-11-12 RX ADMIN — MANNITOL 25 G: 20 INJECTION, SOLUTION INTRAVENOUS at 20:10

## 2020-11-12 RX ADMIN — MYCOPHENOLATE MOFETIL 1000 MG: 500 INJECTION, POWDER, LYOPHILIZED, FOR SOLUTION INTRAVENOUS at 17:42

## 2020-11-12 RX ADMIN — FEXOFENADINE HYDROCHLORIDE 180 MG: 180 TABLET, FILM COATED ORAL at 15:22

## 2020-11-12 RX ADMIN — FENTANYL CITRATE 50 MCG: 50 INJECTION, SOLUTION INTRAMUSCULAR; INTRAVENOUS at 17:53

## 2020-11-12 RX ADMIN — HYDROMORPHONE HYDROCHLORIDE 0.5 MG: 1 INJECTION, SOLUTION INTRAMUSCULAR; INTRAVENOUS; SUBCUTANEOUS at 21:16

## 2020-11-12 RX ADMIN — HYDROMORPHONE HYDROCHLORIDE 0.5 MG: 1 INJECTION, SOLUTION INTRAMUSCULAR; INTRAVENOUS; SUBCUTANEOUS at 22:52

## 2020-11-12 RX ADMIN — CALCIUM CHLORIDE 0.5 G: 100 INJECTION INTRAVENOUS; INTRAVENTRICULAR at 20:34

## 2020-11-12 RX ADMIN — SODIUM CHLORIDE, SODIUM GLUCONATE, SODIUM ACETATE, POTASSIUM CHLORIDE AND MAGNESIUM CHLORIDE: 526; 502; 368; 37; 30 INJECTION, SOLUTION INTRAVENOUS at 16:44

## 2020-11-12 RX ADMIN — ACETAMINOPHEN 975 MG: 325 TABLET, FILM COATED ORAL at 16:06

## 2020-11-12 RX ADMIN — CALCIUM CHLORIDE 0.5 G: 100 INJECTION INTRAVENOUS; INTRAVENTRICULAR at 21:27

## 2020-11-12 RX ADMIN — SODIUM CHLORIDE 500 ML: 9 INJECTION, SOLUTION INTRAVENOUS at 23:41

## 2020-11-12 RX ADMIN — FENTANYL CITRATE 50 MCG: 50 INJECTION, SOLUTION INTRAMUSCULAR; INTRAVENOUS at 16:47

## 2020-11-12 RX ADMIN — PHENYLEPHRINE HYDROCHLORIDE 100 MCG: 10 INJECTION INTRAVENOUS at 19:55

## 2020-11-12 RX ADMIN — NEOSTIGMINE METHYLSULFATE 2.5 MG: 1 INJECTION, SOLUTION INTRAVENOUS at 21:25

## 2020-11-12 RX ADMIN — PROPOFOL 10 MCG/KG/MIN: 10 INJECTION, EMULSION INTRAVENOUS at 19:40

## 2020-11-12 RX ADMIN — CISATRACURIUM BESYLATE 1 MG: 2 INJECTION INTRAVENOUS at 20:48

## 2020-11-12 RX ADMIN — FUROSEMIDE 60 MG: 10 INJECTION, SOLUTION INTRAVENOUS at 20:10

## 2020-11-12 RX ADMIN — CISATRACURIUM BESYLATE 8 MG: 2 INJECTION INTRAVENOUS at 16:51

## 2020-11-12 RX ADMIN — PROPOFOL 100 MG: 10 INJECTION, EMULSION INTRAVENOUS at 16:51

## 2020-11-12 RX ADMIN — DEXTROSE AND SODIUM CHLORIDE: 5; 900 INJECTION, SOLUTION INTRAVENOUS at 22:30

## 2020-11-12 RX ADMIN — ANTI-THYMOCYTE GLOBULIN (RABBIT) 100 MG: 5 INJECTION, POWDER, LYOPHILIZED, FOR SOLUTION INTRAVENOUS at 17:42

## 2020-11-12 RX ADMIN — ONDANSETRON 4 MG: 2 INJECTION INTRAMUSCULAR; INTRAVENOUS at 21:42

## 2020-11-12 RX ADMIN — PHENYLEPHRINE HYDROCHLORIDE 100 MCG: 10 INJECTION INTRAVENOUS at 18:36

## 2020-11-12 RX ADMIN — PREDNISONE 50 MG: 20 TABLET ORAL at 15:22

## 2020-11-12 RX ADMIN — CISATRACURIUM BESYLATE 1 MG: 2 INJECTION INTRAVENOUS at 20:17

## 2020-11-12 RX ADMIN — LIDOCAINE HYDROCHLORIDE 50 MG: 20 INJECTION, SOLUTION INFILTRATION; PERINEURAL at 16:48

## 2020-11-12 RX ADMIN — PHENYLEPHRINE HYDROCHLORIDE 100 MCG: 10 INJECTION INTRAVENOUS at 17:06

## 2020-11-12 RX ADMIN — LIDOCAINE HYDROCHLORIDE 2 ML: 10 INJECTION, SOLUTION EPIDURAL; INFILTRATION; INTRACAUDAL; PERINEURAL at 15:16

## 2020-11-12 ASSESSMENT — PAIN DESCRIPTION - DESCRIPTORS
DESCRIPTORS: ACHING
DESCRIPTORS: ACHING
DESCRIPTORS: SHARP
DESCRIPTORS: ACHING;SORE
DESCRIPTORS: SHARP
DESCRIPTORS: SHARP

## 2020-11-12 ASSESSMENT — LIFESTYLE VARIABLES: TOBACCO_USE: 0

## 2020-11-12 NOTE — PROGRESS NOTES
Spoke with Patient's allergist, Dr. Palmer, re: concern for rifampin/minocycline in central line catheter and the fact that patient wasn't allergy tested for either- Per Dr. Palmer, her allergic reaction is likely 2/2 chlorhexadine, highly unlikely for these antibiotics in the catheter to illicit these reactions, ok to proceed with central line placement, he did add that patient should be premedicated with allegra and prednisone

## 2020-11-12 NOTE — ANESTHESIA PREPROCEDURE EVALUATION
Anesthesia Pre-Procedure Evaluation    Patient: Paolo Vincent Trae   MRN:     [id number] Gender:   female   Age:    44 year old :      1975        Preoperative Diagnosis: * No surgery found *        Past Medical History:   Diagnosis Date     Anaphylactic reaction 2019    Shortly after OR induction     Anaphylactic reaction 2020    Shortly after OR induction     Anemia in chronic kidney disease      End stage kidney disease (H)      HELLP syndrome      Hepatitis B      HTN (hypertension)      Ischemic cardiomyopathy     With preserved EF preserved EF and septal wall hypokinesis     MI (myocardial infarction) (H)     Vasospastic     Proteinuria      Red blood cell antibody positive      Secondary hyperparathyroidism (H)      Thrombocytopaenia       Past Surgical History:   Procedure Laterality Date     BENCH KIDNEY Right 2020    Procedure: BACKBENCH PREPARATION RIGHT  KIDNEY AND RIGHT CHEST TUBE PLACEMENT.;  Surgeon: Armando Faustin MD;  Location: [facility] OR      SECTION  2012     CREATE FISTULA ARTERIOVENOUS UPPER EXTREMITY       CV CORONARY ANGIOGRAM N/A 2019    Procedure: CV CORONARY ANGIOGRAM;  Surgeon: Sabas Tilley MD;  [facility]     DILATION AND CURETTAGE, HYSTEROSCOPY WITH ULTRASOUND GUIDANCE  2019     INSERT CHEST TUBE  2020    Procedure: Insert chest tube;  Surgeon: Armando Faustin MD;  Location: [facility] OR          Anesthesia Evaluation     . Pt has had prior anesthetic.     History of anesthetic complications    hypotensive after induction 2019, procedure aborted.  Also had hypotension after central line placement 20.  transplant aborted      ROS/MED HX    ENT/Pulmonary:     (+)EDIS risk factors hypertension, , . .   (-) tobacco use and asthma   Neurologic:  - neg neurologic ROS     Cardiovascular:     (+) hypertension--CAD, -past MI,-. : . . . :. . Previous cardiac testing Echodate:2020results:   Interpretation  Summary  Borderline (EF 50-55%) reduced left ventricular function is present. Distal  septal hypokinesis.  Small circumferential pericardial effusion is present without any hemodynamic  significance.  Right ventricular function, chamber size, wall motion, and thickness are  normal.  No change from prior.date: results:ECG reviewed date:1/23/2020 results:Sinus rhythm  Anteroseptal infarct (cited on or before 11-JUL-2013)  Prolonged QT  Abnormal ECG  When compared with ECG of 23-JAN-2020 22:47,  Questionable change in QRS axis  Inverted T waves have replaced nonspecific T wave abnormality in Anterior leads  Nonspecific T wave abnormality, improved in Lateral leads  QT has lengthenedCath date: 6/18/2019 results:Normal coronary arteries          METS/Exercise Tolerance:  >4 METS   Hematologic:     (+) History of Transfusion previous transfusion reaction - RBC antibodies, -      Musculoskeletal:  - neg musculoskeletal ROS       GI/Hepatic:     (+) hepatitis type B,       Renal/Genitourinary:     (+) chronic renal disease, type: ESRD, Pt requires dialysis, type: Hemodialysis, Pt has no history of transplant,       Endo:  - neg endo ROS       Psychiatric:  - neg psychiatric ROS       Infectious Disease:  - neg infectious disease ROS       Malignancy:      - no malignancy   Other:    - neg other ROS                     PHYSICAL EXAM:   Mental Status/Neuro:    Airway: Facies: Challenging; Thick Neck  Mallampati: II   Respiratory: Auscultation: CTAB     Resp. Rate: Normal     Resp. Effort: Normal      CV: Rhythm: Regular  Rate: Age appropriate  Heart: Normal Sounds  Edema: None   Comments:      Dental: Normal Dentition                LABS:  CBC:   Lab Results   Component Value Date    WBC 6.2 11/12/2020    WBC 15.2 (H) 01/26/2020    HGB 11.9 11/12/2020    HGB 9.0 (L) 01/26/2020    HCT 37.4 11/12/2020    HCT 28.4 (L) 01/26/2020     11/12/2020     01/26/2020     BMP:   Lab Results   Component Value Date      "01/27/2020     01/26/2020    POTASSIUM 4.1 05/14/2020    POTASSIUM 4.1 01/27/2020    CHLORIDE 102 01/27/2020    CHLORIDE 103 01/26/2020    CO2 26 01/27/2020    CO2 26 01/26/2020    BUN 42 (H) 01/27/2020    BUN 30 01/26/2020    CR 9.64 (H) 05/14/2020    CR 11.40 (H) 01/27/2020    GLC 80 01/27/2020    GLC 87 01/26/2020     COAGS:   Lab Results   Component Value Date    PTT 35 01/23/2020    INR 1.05 01/24/2020     POC:   Lab Results   Component Value Date    BGM 79 01/27/2020    HCGS Negative 06/20/2013     OTHER:   Lab Results   Component Value Date    PH 7.47 (H) 01/25/2020    LACT 1.2 01/25/2020    A1C Canceled, Test credited 01/24/2020    HOLLIS 8.6 01/27/2020    PHOS 4.5 01/27/2020    MAG 2.4 (H) 01/27/2020    ALBUMIN 2.8 (L) 01/24/2020    PROTTOTAL 6.3 (L) 01/24/2020    ALT 12 05/14/2020    AST 22 01/24/2020    ALKPHOS 125 01/24/2020    BILITOTAL 0.3 01/24/2020        Preop Vitals    BP Readings from Last 3 Encounters:   02/13/20 (!) 176/111   01/27/20 (!) 140/93   12/19/19 (!) 148/86    Pulse Readings from Last 3 Encounters:   02/13/20 92   01/27/20 88   12/19/19 110      Resp Readings from Last 3 Encounters:   02/13/20 16   01/27/20 14   12/19/19 16    SpO2 Readings from Last 3 Encounters:   01/27/20 99%   12/19/19 100%   11/19/19 99%      Temp Readings from Last 1 Encounters:   02/13/20 36.6  C (97.9  F) (Oral)    Ht Readings from Last 1 Encounters:   02/13/20 1.597 m (5' 2.89\")      Wt Readings from Last 1 Encounters:   02/13/20 56.7 kg (125 lb)    Estimated body mass index is 22.22 kg/m  as calculated from the following:    Height as of 2/13/20: 1.597 m (5' 2.89\").    Weight as of 2/13/20: 56.7 kg (125 lb).     LDA:  Hemodialysis Vascular Access Arteriovenous fistula Left Forearm (Active)   Site Assessment WDL 01/27/20 1635   Cannulation Needle Size 15 01/27/20 1635   Dressing Intervention New dressing 01/27/20 1635   Dressing Status Clean, dry, intact 01/27/20 1635   Hand Off Report No 01/27/20 1635 "   Number of days:         Assessment:   ASA SCORE: 3    H&P: History and physical reviewed and following examination; no interval change.   Smoking Status:  Non-Smoker/Unknown   NPO Status: NPO Appropriate     Plan:   Anes. Type:  General   Pre-Medication: None   Induction:  IV (Standard)   Airway: ETT; Oral   Access/Monitoring: PIV; CVL   Maintenance: Balanced     Postop Plan:   Postop Pain: Opioids  Postop Sedation/Airway: Not planned     PONV Management:   Adult Risk Factors: Female, Non-Smoker, Postop Opioids   Prevention: Ondansetron     CONSENT: Direct conversation   Plan and risks discussed with: Patient   Blood Products: Consented (ALL Blood Products)       Comments for Plan/Consent:  Plan:  GA with ETT, routine monitors, use PICC line as central line, possible arterial line.  Avoid CHX (benadryl only),  No rocuronium, no cephalosporins    Will discuss with surgery team - I do not feel central line is needed given PICC access.  Patient NOT tested for rifampin on the non CHX central line.  Will only place if they feel it must be placed      Jarek Walker MD    Addendum:    Surgeons informed of increased risk of central line and still request placement.    Jarek Walker MD                    PAC Discussion and Assessment    ASA Classification: 3  Case is suitable for: [place]  Anesthetic techniques and relevant risks discussed:   Invasive monitoring and risk discussed:   Types:   Possibility and Risk of blood transfusion discussed:   NPO instructions given:   Additional anesthetic preparation and risks discussed:   Needs early admission to pre-op area:   Other:     PAC Resident/NP Anesthesia Assessment:  Paolo Larkin is a 44 year old female who presents for anesthesia evaluation for kidney transplant (ESRD) which is not scheduled at this time.  PAC referral for risk assessment and optimization for anesthesia with comorbid conditions of hypertension, hypotension after anesthesia induction 4/2019 with  "elevated tryptase, hypotension after central line placement 1/24/2020,  h/o vasospastic MI, h/o HELLP :    Pre-operative considerations:  1.  Cardiac:  Functional status- METS >4.  Pt does not exercise routinely but does all her own housework and denies cardiopulmonary symptoms.  High risk surgery with 11% (RCRI 3) risk of major adverse cardiac event.  -denies CP, SOB, MARIN, palpitations  -history of vasospastic MI 8/2012  -hypertension will hold losartan DOS, will take amlodipine and hydralazine DOS.  BP is 176/111 in clinic today but BP historically fluctuates with her HD.   -coronary angiogram 6/18/2019: normal coronary arteries  ---EKG 1/23/2020:   Sinus rhythm  Anteroseptal infarct (cited on or before 11-JUL-2013)  Prolonged QT  Abnormal ECG  When compared with ECG of 23-JAN-2020 22:47,  Questionable change in QRS axis  Inverted T waves have replaced nonspecific T wave abnormality in Anterior leads  Nonspecific T wave abnormality, improved in Lateral leads  QT has lengthened  ---echo 1/23/2020:  Interpretation Summary  Borderline (EF 50-55%) reduced left ventricular function is present. Distal  septal hypokinesis.  Small circumferential pericardial effusion is present without any hemodynamic  significance.  Right ventricular function, chamber size, wall motion, and thickness are  normal.  No change from prior.  -seen by cardiology 9/4/2019 without further workup needed: \"With recent coronary angiogram, no further testing is needed prior to transplant. Proceed with renal transplant at an acceptable perioperative risk. If she does not receive a transplant within 2 years time, return to cardiology clinic for repeat evaluation. If future stress testing is needed would recommend nuclear MPI rather than dobutamine stress echo due to resting WMA.\"   -VTE risk:  0.26%     2.  Pulm:  Airway feasible.  EDIS risk: low  -never smoker  -recent      3.  GI:  Risk of PONV score = 3.  If > 2, anti-emetic intervention " "recommended.     4.  Renal:  -ESRD of unknown etiology, HD since 2012.  -transplant waitlist     5.  taken to OR for transplant in 4/2019, aborted due to hypotension after induction with elevated tryptase. Subsequent allergy testing revealed allergy to cephalosporin. See Dr. Palmer's note from 11/19/2019.   \"Anaphylactic intraoperative reaction with drop of blood pressure and increase of Tryptase         Positive in skin tests to Cephalosporins (Cefazolin, Ceftazidime)         In skin tests NO reaction to Penicillins, Aminopenicillins, Piperacillin and Meropenem         No reaction to Rocuronium/Vecuronium, Fentanyl, Ondansetron, Propofol and Lidocaine     >> based on History itching to Vancomycin in 2012 (patient says that she fainted)?  Proposition:          Avoid during the next operation certainly the Cephalosporins.           Not absolutely sure about the Penicillins/Aminopenicillins. We could do before the operation an oral provocation test (with iv access) in our allergy clinic to Amoxicillin, just to be certain (however, if operation has to be done fast, then I just would avoid Penicillins and Cephalosporins)  During next operation, keep in exact record medications given to patient and be always ready to treat anaphylactic type reaction. Take tryptase again if reaction occurs and refer patient back to allergy clinic.\"    6.  Taken to OR for transplant 1/24/2020, aborted due to hypotension after central line placement.   Per anesthesia record \" Right IJV central line placed and as I was suturing the line in place the blood pressure dropped. At the same time a second dose of cisatracurium was also administered. Patient treated with epinephrine and continued to be hypotensive. Patient treated for possible pneumothorax with needle decompression and chest tube placement as she had coarse rhonchi. Patient vital signs did not improve. Pressors were continued. Central line check with aspiration of blood from all " "ports. Patient was given iv benadryl, and iv dexamethasone. Chest xray was done in the operating room to confirm line placement and check for pneumothorax. Patient transferred to ICU for further workup of allergic reaction.\"  -does not appear that tryptase level was drawn after this event  -no cephalosporins given    7.  Recommend patient consult with Dr. Palmer again regarding suspected anaphylactic reaction to intraoperative medications.  Dr. Walker confirmed that our PICC lines are not impregnated with any antibiotics.  However could consider testing for allergy to central line antibiotic/antiseptic.     Patient discussed with Dr. Walker.     **For further details of assessment, testing, and physical exam please see H and P completed on same date.          Sayra Correa PA-C, [facility]      Reviewed and Signed by PAC Mid-Level Provider/Resident  Mid-Level Provider/Resident: Sayra Correa  Date: 12/13/2020  Time:     Attending Anesthesiologist Anesthesia Assessment:  We saw our patient back in PAC after she had her second hemodynamic collapse shortly after induction of anesthesia for kidney transplant.  In both cases the hypotension was profound and did occur very shortly after the central line placement.  The first case had elevated tryptase, there was no testing with the second event.  The allergy testing after the first event seemed benign except for possible reaction to cephalosporin, this was not given in the second case.  (Of note - she had all similar anesthetic medications 4/2019 elsewhere with no complications).  We are concerned she has an allergy to the antibiotics impregnated within the central line catheter - sulfa or chlorhexidine.    We are sending her back to Dr Palmer's clinic to see if she can be tested for this.    I have called and spoken with the PICC team.  There catheters are not impregnated with any antibiotics.  I would recommend that we have a PICC placed on the floor before the next " transplant and proceed only with peripheral IVs    We found non other cause for her profound hypotension and have ordered no other testing.        PLEASE NOTE:   MARKED ALLERGIC REACTION TO CHLORHEXIDINE ON TESTING.  DO NOT USE SKIN DECONTAMINATION WITH CHLORHEXIDINE OR USE CENTRAL LINE WITH  CHX COATING.    Reviewed and Signed by PAC Anesthesiologist  Anesthesiologist: Jarek Walker MD  Date: 2/13/2020  Time:   Pass/Fail:   Disposition:     PAC Pharmacist Assessment:        Pharmacist:   Date:   Time:    Jarek Walker MD

## 2020-11-12 NOTE — PROGRESS NOTES
Patient admitted to unit 7A for pre-op kidney transplant. Pre-op order set initiated. STAT Allegra + Prednisone dose given per Destiny Vicente NP order. Currently down in XRay and to be sent straight to pre-op.

## 2020-11-12 NOTE — H&P
[facility]     History and Physical  Transplant Surgery     Date of Admission:  2020    Assessment & Plan   Paolo Larkin is a 44 year old Patricia-speaking female with history of ESKD of unknown etiology on dialysis since , HTN, vasospastic MI , ischemic cardiomyopathy (preserved EF, normal coronaries, and septal wall hypokinesis), HELLP, HBV, and a known RBC antibody. She presents for a possible kidney transplant. Patient has undergone attempted kidney transplant twice (2019, 2020), and each time the case was aborted due to hypotension and suspected anaphylaxis. A pre-op plan was made with anesthesia and Dr. Palmer (Allergist). Patient presents today for a possible  donor kidney transplant.     -NPO  -Pre-op labs, EKG, CXR, crossmatch, COVID test  -Allegra 180mg and prednisone 50mg STAT  -PICC placement per Anesthesia request  -NO CHLORHEXIDINE  -Consider Allergra BID following transplant    Destiny Vicente NP       Chief Complaint   Organ transplant candidate    History of Present Illness   Paolo Larkin is a 44 year old female with history of ESKD of unknown etiology on dialysis since , HTN, vasospastic MI , ischemic cardiomyopathy (preserved EF, normal coronaries, and septal wall hypokinesis), HELLP, HBV, and a known RBC antibody. She presents for a possible kidney transplant. Patient has undergone attempted kidney transplant twice (2019, 2020), and each time the case was aborted due to hypotension and suspected anaphylaxis. She underwent allergy testing with Dr. Palmer (see note 3/10/20):      Positive in skin tests to Cephalosporins (Cefazolin, Ceftazidime) and Chlorhexidine    In skin tests NO reaction to Penicillins, Aminopenicillins, Piperacillin and Meropenem    No reaction to Rocuronium/Vecuronium, Fentanyl, Ondansetron, Propofol and Lidocaine    Plan for transplant:    -Chlorhexidine from any source, including but not  limited to surgical scrub, central line should be AVOIDED as thought to have contributed to patient's suspected anaphylactic reaction.  Betadine preparations are acceptable to use.    -Avoid Cephalosporins. Zosyn and Meropenem acceptable to use.  -Prednisone 50mg and Allegra 180mg day of operation.    Patient feels well today. No fever, cough, dyspnea, chest pain, myalgia, N/V/D, or any other acute symptom. Last hemodialysis Wednesday via left arm fistula. EDW 50.3kg. Makes a small to moderate amount of urine.    Past Medical History    I have reviewed this patient's medical history and updated it with pertinent information if needed.   Past Medical History:   Diagnosis Date     Anaphylactic reaction 2019    Shortly after OR induction     Anaphylactic reaction 2020    Shortly after OR induction     Anemia in chronic kidney disease      End stage kidney disease (H)      HELLP syndrome      Hepatitis B      HTN (hypertension)      Ischemic cardiomyopathy     With preserved EF preserved EF and septal wall hypokinesis     MI (myocardial infarction) (H)     Vasospastic     Proteinuria      Red blood cell antibody positive      Secondary hyperparathyroidism (H)      Thrombocytopaenia        Past Surgical History   I have reviewed this patient's surgical history and updated it with pertinent information if needed.  Past Surgical History:   Procedure Laterality Date     BENCH KIDNEY Right 2020    Procedure: BACKBENCH PREPARATION RIGHT  KIDNEY AND RIGHT CHEST TUBE PLACEMENT.;  Surgeon: Armando Faustin MD;  Location: [facility] OR      SECTION  2012     CREATE FISTULA ARTERIOVENOUS UPPER EXTREMITY       CV CORONARY ANGIOGRAM N/A 2019    Procedure: CV CORONARY ANGIOGRAM;  Surgeon: aSbas Tilley MD;  Location:  [facility]     DILATION AND CURETTAGE, HYSTEROSCOPY WITH ULTRASOUND GUIDANCE  2019     INSERT CHEST TUBE  2020    Procedure: Insert chest tube;  Surgeon:  Armando Faustin MD;  Location: [facility] OR       Prior to Admission Medications  Pending access to medication list.  Cannot display prior to admission medications because the patient has not been admitted in this contact.     Allergies   Allergies   Allergen Reactions     Blood Transfusion Related (Informational Only)      Patient has a history of a clinically significant antibody against RBC antigens.  A delay in compatible RBCs may occur.     Cephalosporins Anaphylaxis     Per [facility] allergist report: positive skin intradermal tests to Cefazolin and Ceftazidime, but NOT to Penicillin G, Piperacillin and Meropenem      Chlorhexidine Anaphylaxis     Several times during anesthesia at initial phase during insertion of I.v. line and after disinfection with Chlorhexidine drop of blood pressure.  In Prick and as well intradermal tests clear positive reactions to Chlorhexidine (particularly in intradermal test to 0.0002% Chlorhexidine papule of 1cm and Erythema of 2.5cm). AVOID in future Chlorhexidine     Heparin Flush        Social History   I have reviewed this patient's social history and updated it with pertinent information if needed. Paolo Larkin  reports that she has never smoked. She has never used smokeless tobacco. She reports that she does not drink alcohol or use drugs.    Family History   I have reviewed this patient's family history and updated it with pertinent information if needed.   Family History   Problem Relation Age of Onset     Kidney Disease No family hx of      Heart Disease No family hx of        Review of Systems   The 10 point Review of Systems is negative other than noted in the HPI or here.     Physical Exam                      Vital Signs with Ranges  BP: ()/()   Arterial Line BP: ()/()   0 lbs 0 oz    Constitutional: NAD, alert  Eyes: Clear, no icterus, no injection  ENT: Deferred due to COVID testing in process. Denies sores or loose teeth.  Respiratory: Unlabored, CTA  Cardiovascular:  RRR, + murmur  GI: Soft, non-tender, non-distended,  scar  Lymph/Hematologic: No edema  Genitourinary: No catheter  Skin: Warm, no jaundice  Musculoskeletal: Strength 5/5  Neurologic: A&Ox4, coordination intact, strength equal  Neuropsychiatric: Calm, appropriate    Data   Pending    I have reviewed history, examined patient and discussed plan with the fellow/resident/MERVAT.    I concur with the findings in this note.    Time spent on admission activities: 45 minutes.

## 2020-11-12 NOTE — ANESTHESIA PROCEDURE NOTES
Airway    Patient location during procedure: OR    Staff -   Anesthesiologist:  Jarek Walker MD  Resident/Fellow: Esthela Goel  CRNA: Ramon Zee APRN CRNA  Performed By: SRNA    Consent for Airway   Urgency: elective    Indications and Patient Condition  Indications for airway management: dayna-procedural  Induction type:intravenousMask difficulty assessment: 1 - vent by mask    Final Airway Details  Final airway type: endotracheal airway  Successful airway:ETT - single  Endotracheal Airway Details   ETT size (mm): 7.0  Cuffed: yes  Successful intubation technique: direct laryngoscopy  Grade View of Cords: 1  Adjucts: stylet  Measured from: lips  Secured at (cm): 20  Secured with: pink tape  Bite block used: None    Post intubation assessment   Placement verified by: capnometry, equal breath sounds and chest rise   Number of attempts at approach: 1  Secured with:pink tape  Ease of procedure: easy  Dentition: Intact and Unchanged

## 2020-11-12 NOTE — PROCEDURES
[facility]     Double Lumen PICC Placement    Date/Time: 11/12/2020 3:36 PM  Performed by: Nelly Collins RN  Authorized by: Destiny Vicente APRN CNP   Indications: vascular access    UNIVERSAL PROTOCOL   Site Marked: Yes  Prior Images Obtained and Reviewed:  Yes  Required items: Required blood products, implants, devices and special equipment available    Patient identity confirmed:  Verbally with patient and arm band  NA - No sedation, light sedation, or local anesthesia  Confirmation Checklist:  Patient's identity using two indicators, relevant allergies, procedure was appropriate and matched the consent or emergent situation and correct equipment/implants were available  Time out: Immediately prior to the procedure a time out was called    Universal Protocol: the Joint Commission Universal Protocol was followed    Preparation: Patient was prepped and draped in usual sterile fashion           ANESTHESIA    Anesthesia: See MAR for details  Local Anesthetic:  Lidocaine 1% without epinephrine  Anesthetic Total (mL):  3      SEDATION    Patient Sedated: No        Preparation: skin prepped with Betadine  Skin prep agent: skin prep agent completely dried prior to procedure  Sterile barriers: maximum sterile barriers were used: cap, mask, sterile gown, sterile gloves, and large sterile sheet  Hand hygiene: hand hygiene performed prior to central venous catheter insertion  Type of line used: Power PICC  Catheter type: double lumen  Lumen type: non-valved  Catheter size: 5 Fr  Brand: Koofers  Lot number: [id number]  Placement method: venipuncture, MST, ultrasound and tip confirmation system  Number of attempts: 1  Successful placement: yes  Orientation: right  Location: basilic vein (vein diameter- 0.49cm)  Site rationale: AVF left  Arm circumference: adults 10 cm  Extremity circumference: 24.5  Visible catheter length: 3  Total catheter length: 37  Dressing and securement:  blood cleaned with CHG, site cleaned, securement device and sterile dressing applied (securAcath)  Post procedure assessment: blood return through all ports, free fluid flow and placement verified by x-ray  PROCEDURE   Patient Tolerance:  Patient tolerated the procedure well with no immediate complications

## 2020-11-12 NOTE — PROGRESS NOTES
Discussed central line with Dr Mary Anne Carey the transplant fellow who in turn spoke with Dr Marcelino.    Patient has PICC in place so has central access for thyroglobulin,    Surgical team is requesting additional central access.      Patient has had known complication with last central line (pneumothorax) and has also NOT been tested for rifampin or minocycline allergies.  I recommend not placing central line unless absolutely necessary.  The surgeons acknowledge these risks but still wish central line placed after our discussion.    Jarek Walker Md  Staff Anesthesiology

## 2020-11-13 ENCOUNTER — APPOINTMENT (OUTPATIENT)
Dept: ULTRASOUND IMAGING | Facility: CLINIC | Age: 45
DRG: 652 | End: 2020-11-13
Attending: PHYSICIAN ASSISTANT
Payer: MEDICARE

## 2020-11-13 ENCOUNTER — DOCUMENTATION ONLY (OUTPATIENT)
Dept: TRANSPLANT | Facility: CLINIC | Age: 45
End: 2020-11-13

## 2020-11-13 LAB
ALBUMIN UR-MCNC: 100 MG/DL
ANION GAP SERPL CALCULATED.3IONS-SCNC: 10 MMOL/L (ref 3–14)
APPEARANCE UR: CLEAR
BASOPHILS # BLD AUTO: 0 10E9/L (ref 0–0.2)
BASOPHILS NFR BLD AUTO: 0.1 %
BILIRUB UR QL STRIP: NEGATIVE
BUN SERPL-MCNC: 32 MG/DL (ref 7–30)
CALCIUM SERPL-MCNC: 8.7 MG/DL (ref 8.5–10.1)
CELL TYPE ALLO: NORMAL
CELL TYPE AUTO: NORMAL
CHANNELSHIFTALLOB1: -19
CHANNELSHIFTALLOB2: -33
CHANNELSHIFTALLOT1: -18
CHANNELSHIFTALLOT2: -37
CHANNELSHIFTAUTOB1: -102
CHANNELSHIFTAUTOT1: -101
CHLORIDE SERPL-SCNC: 101 MMOL/L (ref 94–109)
CMV IGM SERPL QL IA: <0.2 AI (ref 0–0.8)
CO2 SERPL-SCNC: 23 MMOL/L (ref 20–32)
COLOR UR AUTO: YELLOW
COMMENT ALLOB2: NORMAL
CREAT SERPL-MCNC: 8.26 MG/DL (ref 0.52–1.04)
CROSSMATCHDATEALLO: NORMAL
CROSSMATCHDATEAUTO: NORMAL
DIFFERENTIAL METHOD BLD: ABNORMAL
DONOR ALLO: NORMAL
DONOR AUTO: NORMAL
DONOR IDENTIFICATION: NORMAL
DONORCELLDATE ALLO: NORMAL
DONORCELLDATE AUTO: NORMAL
DSA COMMENTS: NORMAL
DSA PRESENT: NO
DSA TEST METHOD: NORMAL
EBV VCA IGM SER QL IA: <0.2 AI (ref 0–0.8)
EOSINOPHIL # BLD AUTO: 0 10E9/L (ref 0–0.7)
EOSINOPHIL NFR BLD AUTO: 0 %
ERYTHROCYTE [DISTWIDTH] IN BLOOD BY AUTOMATED COUNT: 15 % (ref 10–15)
GFR SERPL CREATININE-BSD FRML MDRD: 5 ML/MIN/{1.73_M2}
GLUCOSE BLDC GLUCOMTR-MCNC: 116 MG/DL (ref 70–99)
GLUCOSE BLDC GLUCOMTR-MCNC: 219 MG/DL (ref 70–99)
GLUCOSE BLDC GLUCOMTR-MCNC: 231 MG/DL (ref 70–99)
GLUCOSE SERPL-MCNC: 164 MG/DL (ref 70–99)
GLUCOSE UR STRIP-MCNC: 30 MG/DL
HCT VFR BLD AUTO: 31.8 % (ref 35–47)
HGB BLD-MCNC: 8.9 G/DL (ref 11.7–15.7)
HGB BLD-MCNC: 8.9 G/DL (ref 11.7–15.7)
HGB BLD-MCNC: 9.2 G/DL (ref 11.7–15.7)
HGB BLD-MCNC: 9.3 G/DL (ref 11.7–15.7)
HGB BLD-MCNC: 9.8 G/DL (ref 11.7–15.7)
HGB UR QL STRIP: ABNORMAL
IMM GRANULOCYTES # BLD: 0.1 10E9/L (ref 0–0.4)
IMM GRANULOCYTES NFR BLD: 0.4 %
INTERPRETATION ECG - MUSE: NORMAL
KETONES UR STRIP-MCNC: NEGATIVE MG/DL
LEUKOCYTE ESTERASE UR QL STRIP: NEGATIVE
LYMPHOCYTES # BLD AUTO: 0.1 10E9/L (ref 0.8–5.3)
LYMPHOCYTES NFR BLD AUTO: 0.6 %
MAGNESIUM SERPL-MCNC: 2.3 MG/DL (ref 1.6–2.3)
MCH RBC QN AUTO: 30.8 PG (ref 26.5–33)
MCHC RBC AUTO-ENTMCNC: 30.8 G/DL (ref 31.5–36.5)
MCV RBC AUTO: 100 FL (ref 78–100)
MONOCYTES # BLD AUTO: 0.5 10E9/L (ref 0–1.3)
MONOCYTES NFR BLD AUTO: 3.7 %
NEUTROPHILS # BLD AUTO: 12.8 10E9/L (ref 1.6–8.3)
NEUTROPHILS NFR BLD AUTO: 95.2 %
NITRATE UR QL: NEGATIVE
NRBC # BLD AUTO: 0 10*3/UL
NRBC BLD AUTO-RTO: 0 /100
ORGAN: NORMAL
PH UR STRIP: 7 PH (ref 5–7)
PHOSPHATE SERPL-MCNC: 4.9 MG/DL (ref 2.5–4.5)
PLATELET # BLD AUTO: 229 10E9/L (ref 150–450)
POS CUT OFF ALLO B: >93
POS CUT OFF ALLO T: >79
POS CUT OFF AUTO B: >93
POS CUT OFF AUTO T: >79
POTASSIUM SERPL-SCNC: 5.4 MMOL/L (ref 3.4–5.3)
POTASSIUM SERPL-SCNC: 5.7 MMOL/L (ref 3.4–5.3)
POTASSIUM SERPL-SCNC: 5.8 MMOL/L (ref 3.4–5.3)
PROT UR-MCNC: 2.27 G/L
PROT/CREAT 24H UR: 7.43 G/G CR (ref 0–0.2)
RBC # BLD AUTO: 3.18 10E12/L (ref 3.8–5.2)
RBC #/AREA URNS AUTO: >182 /HPF (ref 0–2)
RESULT ALLO B1: NORMAL
RESULT ALLO B2: NORMAL
RESULT ALLO T1: NORMAL
RESULT ALLO T2: NORMAL
RESULT AUTO B1: NORMAL
RESULT AUTO T1: NORMAL
SA1 CELL: NORMAL
SA1 COMMENTS: NORMAL
SA1 HI RISK ABY: NORMAL
SA1 MOD RISK ABY: NORMAL
SA1 TEST METHOD: NORMAL
SA2 CELL: NORMAL
SA2 COMMENTS: NORMAL
SA2 HI RISK ABY UA: NORMAL
SA2 MOD RISK ABY: NORMAL
SA2 TEST METHOD: NORMAL
SERUM DATE ALLO B1: NORMAL
SERUM DATE ALLO B2: NORMAL
SERUM DATE ALLO T1: NORMAL
SERUM DATE ALLO T2: NORMAL
SERUM DATE AUTO B1: NORMAL
SERUM DATE AUTO T1: NORMAL
SODIUM SERPL-SCNC: 135 MMOL/L (ref 133–144)
SOURCE: ABNORMAL
SP GR UR STRIP: 1.01 (ref 1–1.03)
TESTMETHODALLO: NORMAL
TESTMETHODAUTO: NORMAL
TREATMENT ALLO B1: NORMAL
TREATMENT ALLO B2: NORMAL
TREATMENT ALLO T1: NORMAL
TREATMENT ALLO T2: NORMAL
TREATMENT AUTO B1: NORMAL
TREATMENT AUTO T1: NORMAL
UNACCEPTABLE ANTIGEN: NORMAL
UNOS CPRA: 39
UROBILINOGEN UR STRIP-MCNC: NORMAL MG/DL (ref 0–2)
WBC # BLD AUTO: 13.5 10E9/L (ref 4–11)
WBC #/AREA URNS AUTO: 69 /HPF (ref 0–5)

## 2020-11-13 PROCEDURE — 36415 COLL VENOUS BLD VENIPUNCTURE: CPT | Performed by: STUDENT IN AN ORGANIZED HEALTH CARE EDUCATION/TRAINING PROGRAM

## 2020-11-13 PROCEDURE — 250N000011 HC RX IP 250 OP 636: Performed by: PHYSICIAN ASSISTANT

## 2020-11-13 PROCEDURE — 84132 ASSAY OF SERUM POTASSIUM: CPT | Performed by: STUDENT IN AN ORGANIZED HEALTH CARE EDUCATION/TRAINING PROGRAM

## 2020-11-13 PROCEDURE — 258N000003 HC RX IP 258 OP 636: Performed by: SURGERY

## 2020-11-13 PROCEDURE — 999N001017 HC STATISTIC GLUCOSE BY METER IP

## 2020-11-13 PROCEDURE — 80048 BASIC METABOLIC PNL TOTAL CA: CPT | Performed by: SURGERY

## 2020-11-13 PROCEDURE — 250N000011 HC RX IP 250 OP 636: Performed by: SURGERY

## 2020-11-13 PROCEDURE — 258N000001 HC RX 258: Performed by: STUDENT IN AN ORGANIZED HEALTH CARE EDUCATION/TRAINING PROGRAM

## 2020-11-13 PROCEDURE — 99222 1ST HOSP IP/OBS MODERATE 55: CPT | Mod: GC | Performed by: INTERNAL MEDICINE

## 2020-11-13 PROCEDURE — 84100 ASSAY OF PHOSPHORUS: CPT | Performed by: SURGERY

## 2020-11-13 PROCEDURE — 250N000012 HC RX MED GY IP 250 OP 636 PS 637: Performed by: SURGERY

## 2020-11-13 PROCEDURE — 250N000012 HC RX MED GY IP 250 OP 636 PS 637: Performed by: PHYSICIAN ASSISTANT

## 2020-11-13 PROCEDURE — 36415 COLL VENOUS BLD VENIPUNCTURE: CPT | Performed by: SURGERY

## 2020-11-13 PROCEDURE — 250N000013 HC RX MED GY IP 250 OP 250 PS 637: Performed by: STUDENT IN AN ORGANIZED HEALTH CARE EDUCATION/TRAINING PROGRAM

## 2020-11-13 PROCEDURE — 258N000003 HC RX IP 258 OP 636: Performed by: PHYSICIAN ASSISTANT

## 2020-11-13 PROCEDURE — 76776 US EXAM K TRANSPL W/DOPPLER: CPT | Mod: 26 | Performed by: RADIOLOGY

## 2020-11-13 PROCEDURE — 250N000013 HC RX MED GY IP 250 OP 250 PS 637: Performed by: SURGERY

## 2020-11-13 PROCEDURE — 250N000013 HC RX MED GY IP 250 OP 250 PS 637: Performed by: INTERNAL MEDICINE

## 2020-11-13 PROCEDURE — 84132 ASSAY OF SERUM POTASSIUM: CPT | Performed by: PHYSICIAN ASSISTANT

## 2020-11-13 PROCEDURE — 250N000013 HC RX MED GY IP 250 OP 250 PS 637: Performed by: PHYSICIAN ASSISTANT

## 2020-11-13 PROCEDURE — 83735 ASSAY OF MAGNESIUM: CPT | Performed by: SURGERY

## 2020-11-13 PROCEDURE — 84156 ASSAY OF PROTEIN URINE: CPT | Performed by: SURGERY

## 2020-11-13 PROCEDURE — 85018 HEMOGLOBIN: CPT | Performed by: PHYSICIAN ASSISTANT

## 2020-11-13 PROCEDURE — 258N000003 HC RX IP 258 OP 636: Performed by: STUDENT IN AN ORGANIZED HEALTH CARE EDUCATION/TRAINING PROGRAM

## 2020-11-13 PROCEDURE — 36592 COLLECT BLOOD FROM PICC: CPT | Performed by: SURGERY

## 2020-11-13 PROCEDURE — 76776 US EXAM K TRANSPL W/DOPPLER: CPT

## 2020-11-13 PROCEDURE — 120N000011 HC R&B TRANSPLANT UMMC

## 2020-11-13 PROCEDURE — 36592 COLLECT BLOOD FROM PICC: CPT | Performed by: PHYSICIAN ASSISTANT

## 2020-11-13 PROCEDURE — 36415 COLL VENOUS BLD VENIPUNCTURE: CPT | Performed by: PHYSICIAN ASSISTANT

## 2020-11-13 PROCEDURE — 84132 ASSAY OF SERUM POTASSIUM: CPT | Performed by: SURGERY

## 2020-11-13 PROCEDURE — 81001 URINALYSIS AUTO W/SCOPE: CPT | Performed by: SURGERY

## 2020-11-13 PROCEDURE — 85018 HEMOGLOBIN: CPT | Performed by: SURGERY

## 2020-11-13 PROCEDURE — 85025 COMPLETE CBC W/AUTO DIFF WBC: CPT | Performed by: SURGERY

## 2020-11-13 RX ORDER — NALOXONE HYDROCHLORIDE 0.4 MG/ML
.1-.4 INJECTION, SOLUTION INTRAMUSCULAR; INTRAVENOUS; SUBCUTANEOUS
Status: DISCONTINUED | OUTPATIENT
Start: 2020-11-13 | End: 2020-11-13

## 2020-11-13 RX ORDER — HYDROMORPHONE HYDROCHLORIDE 1 MG/ML
.3-.5 INJECTION, SOLUTION INTRAMUSCULAR; INTRAVENOUS; SUBCUTANEOUS
Status: DISCONTINUED | OUTPATIENT
Start: 2020-11-13 | End: 2020-11-16

## 2020-11-13 RX ORDER — FEXOFENADINE HCL 180 MG/1
180 TABLET ORAL 2 TIMES DAILY
Status: DISPENSED | OUTPATIENT
Start: 2020-11-13 | End: 2020-11-16

## 2020-11-13 RX ORDER — DEXTROSE MONOHYDRATE 25 G/50ML
25 INJECTION, SOLUTION INTRAVENOUS ONCE
Status: COMPLETED | OUTPATIENT
Start: 2020-11-13 | End: 2020-11-13

## 2020-11-13 RX ORDER — DIPHENHYDRAMINE HCL 12.5MG/5ML
25-50 LIQUID (ML) ORAL ONCE
Status: COMPLETED | OUTPATIENT
Start: 2020-11-13 | End: 2020-11-13

## 2020-11-13 RX ORDER — VALGANCICLOVIR 450 MG/1
450 TABLET, FILM COATED ORAL
Status: DISCONTINUED | OUTPATIENT
Start: 2020-11-16 | End: 2020-11-19 | Stop reason: HOSPADM

## 2020-11-13 RX ORDER — ONDANSETRON 2 MG/ML
4 INJECTION INTRAMUSCULAR; INTRAVENOUS EVERY 6 HOURS PRN
Status: DISCONTINUED | OUTPATIENT
Start: 2020-11-13 | End: 2020-11-19 | Stop reason: HOSPADM

## 2020-11-13 RX ORDER — NICOTINE POLACRILEX 4 MG
15-30 LOZENGE BUCCAL
Status: DISCONTINUED | OUTPATIENT
Start: 2020-11-13 | End: 2020-11-19 | Stop reason: HOSPADM

## 2020-11-13 RX ORDER — ASPIRIN 81 MG/1
81 TABLET, CHEWABLE ORAL DAILY
Status: DISCONTINUED | OUTPATIENT
Start: 2020-11-13 | End: 2020-11-13 | Stop reason: ALTCHOICE

## 2020-11-13 RX ORDER — ACETAMINOPHEN 325 MG/1
975 TABLET ORAL EVERY 8 HOURS
Status: DISPENSED | OUTPATIENT
Start: 2020-11-13 | End: 2020-11-15

## 2020-11-13 RX ORDER — FEXOFENADINE HCL 60 MG/1
180 TABLET, FILM COATED ORAL DAILY
Status: DISCONTINUED | OUTPATIENT
Start: 2020-11-13 | End: 2020-11-13

## 2020-11-13 RX ORDER — SODIUM CHLORIDE 9 MG/ML
1000 INJECTION, SOLUTION INTRAVENOUS CONTINUOUS PRN
Status: DISCONTINUED | OUTPATIENT
Start: 2020-11-13 | End: 2020-11-16

## 2020-11-13 RX ORDER — MYCOPHENOLATE MOFETIL 250 MG/1
750 CAPSULE ORAL
Status: DISCONTINUED | OUTPATIENT
Start: 2020-11-13 | End: 2020-11-19 | Stop reason: HOSPADM

## 2020-11-13 RX ORDER — FUROSEMIDE 10 MG/ML
40 INJECTION INTRAMUSCULAR; INTRAVENOUS ONCE
Status: COMPLETED | OUTPATIENT
Start: 2020-11-13 | End: 2020-11-13

## 2020-11-13 RX ORDER — AMOXICILLIN 250 MG
1 CAPSULE ORAL 2 TIMES DAILY
Status: DISCONTINUED | OUTPATIENT
Start: 2020-11-13 | End: 2020-11-19 | Stop reason: HOSPADM

## 2020-11-13 RX ORDER — ATORVASTATIN CALCIUM 10 MG/1
10 TABLET, FILM COATED ORAL DAILY
Status: DISCONTINUED | OUTPATIENT
Start: 2020-11-13 | End: 2020-11-19 | Stop reason: HOSPADM

## 2020-11-13 RX ORDER — OXYCODONE HYDROCHLORIDE 5 MG/1
5-10 TABLET ORAL EVERY 4 HOURS PRN
Status: DISCONTINUED | OUTPATIENT
Start: 2020-11-13 | End: 2020-11-17

## 2020-11-13 RX ORDER — DIPHENHYDRAMINE HCL 25 MG
25-50 CAPSULE ORAL ONCE
Status: COMPLETED | OUTPATIENT
Start: 2020-11-13 | End: 2020-11-13

## 2020-11-13 RX ORDER — SODIUM CHLORIDE 450 MG/100ML
INJECTION, SOLUTION INTRAVENOUS CONTINUOUS PRN
Status: DISCONTINUED | OUTPATIENT
Start: 2020-11-13 | End: 2020-11-16

## 2020-11-13 RX ORDER — ACETAMINOPHEN 325 MG/1
650 TABLET ORAL ONCE
Status: DISCONTINUED | OUTPATIENT
Start: 2020-11-13 | End: 2020-11-13 | Stop reason: CLARIF

## 2020-11-13 RX ORDER — TACROLIMUS 1 MG/1
1 CAPSULE ORAL
Status: DISCONTINUED | OUTPATIENT
Start: 2020-11-13 | End: 2020-11-16

## 2020-11-13 RX ORDER — SODIUM CHLORIDE 9 MG/ML
INJECTION, SOLUTION INTRAVENOUS CONTINUOUS
Status: DISCONTINUED | OUTPATIENT
Start: 2020-11-13 | End: 2020-11-14

## 2020-11-13 RX ORDER — DEXTROSE MONOHYDRATE 100 MG/ML
INJECTION, SOLUTION INTRAVENOUS CONTINUOUS
Status: DISCONTINUED | OUTPATIENT
Start: 2020-11-13 | End: 2020-11-14

## 2020-11-13 RX ORDER — SULFAMETHOXAZOLE AND TRIMETHOPRIM 400; 80 MG/1; MG/1
1 TABLET ORAL
Status: DISCONTINUED | OUTPATIENT
Start: 2020-11-13 | End: 2020-11-19 | Stop reason: HOSPADM

## 2020-11-13 RX ORDER — NALOXONE HYDROCHLORIDE 0.4 MG/ML
.1-.4 INJECTION, SOLUTION INTRAMUSCULAR; INTRAVENOUS; SUBCUTANEOUS
Status: DISCONTINUED | OUTPATIENT
Start: 2020-11-13 | End: 2020-11-19 | Stop reason: HOSPADM

## 2020-11-13 RX ORDER — MAGNESIUM OXIDE 400 MG/1
400 TABLET ORAL
Status: DISCONTINUED | OUTPATIENT
Start: 2020-11-14 | End: 2020-11-19 | Stop reason: HOSPADM

## 2020-11-13 RX ORDER — FUROSEMIDE 10 MG/ML
40 INJECTION INTRAMUSCULAR; INTRAVENOUS EVERY 6 HOURS
Status: DISCONTINUED | OUTPATIENT
Start: 2020-11-13 | End: 2020-11-16

## 2020-11-13 RX ORDER — ASPIRIN 81 MG/1
81 TABLET, CHEWABLE ORAL DAILY
Status: DISCONTINUED | OUTPATIENT
Start: 2020-11-13 | End: 2020-11-19 | Stop reason: HOSPADM

## 2020-11-13 RX ORDER — ACETAMINOPHEN 325 MG/1
650 TABLET ORAL EVERY 4 HOURS PRN
Status: DISCONTINUED | OUTPATIENT
Start: 2020-11-15 | End: 2020-11-19 | Stop reason: HOSPADM

## 2020-11-13 RX ORDER — AMLODIPINE BESYLATE 10 MG/1
10 TABLET ORAL DAILY
Status: DISCONTINUED | OUTPATIENT
Start: 2020-11-13 | End: 2020-11-18

## 2020-11-13 RX ORDER — ONDANSETRON 4 MG/1
4 TABLET, ORALLY DISINTEGRATING ORAL EVERY 6 HOURS PRN
Status: DISCONTINUED | OUTPATIENT
Start: 2020-11-13 | End: 2020-11-19 | Stop reason: HOSPADM

## 2020-11-13 RX ORDER — DEXTROSE MONOHYDRATE 25 G/50ML
25-50 INJECTION, SOLUTION INTRAVENOUS
Status: DISCONTINUED | OUTPATIENT
Start: 2020-11-13 | End: 2020-11-19 | Stop reason: HOSPADM

## 2020-11-13 RX ORDER — CARVEDILOL 6.25 MG/1
6.25 TABLET ORAL 2 TIMES DAILY WITH MEALS
Status: DISCONTINUED | OUTPATIENT
Start: 2020-11-13 | End: 2020-11-14

## 2020-11-13 RX ORDER — AMOXICILLIN 250 MG
2 CAPSULE ORAL 2 TIMES DAILY
Status: DISCONTINUED | OUTPATIENT
Start: 2020-11-13 | End: 2020-11-19 | Stop reason: HOSPADM

## 2020-11-13 RX ADMIN — ACETAMINOPHEN 975 MG: 325 TABLET, FILM COATED ORAL at 12:05

## 2020-11-13 RX ADMIN — ONDANSETRON 4 MG: 2 INJECTION INTRAMUSCULAR; INTRAVENOUS at 07:50

## 2020-11-13 RX ADMIN — TACROLIMUS 1 MG: 1 CAPSULE ORAL at 17:56

## 2020-11-13 RX ADMIN — FEXOFENADINE HCL 180 MG: 60 TABLET, FILM COATED ORAL at 13:58

## 2020-11-13 RX ADMIN — FUROSEMIDE 40 MG: 10 INJECTION, SOLUTION INTRAMUSCULAR; INTRAVENOUS at 23:47

## 2020-11-13 RX ADMIN — SODIUM CHLORIDE 500 ML: 9 INJECTION, SOLUTION INTRAVENOUS at 03:00

## 2020-11-13 RX ADMIN — OXYCODONE HYDROCHLORIDE 5 MG: 5 TABLET ORAL at 14:04

## 2020-11-13 RX ADMIN — ATORVASTATIN CALCIUM 10 MG: 10 TABLET, FILM COATED ORAL at 09:08

## 2020-11-13 RX ADMIN — OXYCODONE HYDROCHLORIDE 5 MG: 5 TABLET ORAL at 04:30

## 2020-11-13 RX ADMIN — FUROSEMIDE 40 MG: 10 INJECTION, SOLUTION INTRAVENOUS at 06:47

## 2020-11-13 RX ADMIN — METHYLPREDNISOLONE SODIUM SUCCINATE 250 MG: 125 INJECTION, POWDER, LYOPHILIZED, FOR SOLUTION INTRAMUSCULAR; INTRAVENOUS at 12:05

## 2020-11-13 RX ADMIN — SODIUM CHLORIDE: 9 INJECTION, SOLUTION INTRAVENOUS at 19:59

## 2020-11-13 RX ADMIN — SODIUM CHLORIDE 1000 ML: 9 INJECTION, SOLUTION INTRAVENOUS at 06:48

## 2020-11-13 RX ADMIN — FUROSEMIDE 40 MG: 10 INJECTION, SOLUTION INTRAMUSCULAR; INTRAVENOUS at 17:56

## 2020-11-13 RX ADMIN — FUROSEMIDE 40 MG: 10 INJECTION, SOLUTION INTRAMUSCULAR; INTRAVENOUS at 14:04

## 2020-11-13 RX ADMIN — DEXTROSE MONOHYDRATE 25 G: 500 INJECTION PARENTERAL at 21:49

## 2020-11-13 RX ADMIN — HUMAN INSULIN 5.2 UNITS: 100 INJECTION, SOLUTION SUBCUTANEOUS at 21:50

## 2020-11-13 RX ADMIN — SULFAMETHOXAZOLE AND TRIMETHOPRIM 1 TABLET: 400; 80 TABLET ORAL at 09:09

## 2020-11-13 RX ADMIN — HYDROMORPHONE HYDROCHLORIDE 0.5 MG: 1 INJECTION, SOLUTION INTRAMUSCULAR; INTRAVENOUS; SUBCUTANEOUS at 08:05

## 2020-11-13 RX ADMIN — DEXTROSE MONOHYDRATE: 100 INJECTION, SOLUTION INTRAVENOUS at 21:50

## 2020-11-13 RX ADMIN — DEXTROSE AND SODIUM CHLORIDE: 5; 900 INJECTION, SOLUTION INTRAVENOUS at 06:48

## 2020-11-13 RX ADMIN — DIPHENHYDRAMINE HYDROCHLORIDE 25 MG: 25 CAPSULE ORAL at 12:05

## 2020-11-13 RX ADMIN — ACETAMINOPHEN 975 MG: 325 TABLET, FILM COATED ORAL at 19:53

## 2020-11-13 RX ADMIN — CARVEDILOL 6.25 MG: 6.25 TABLET, FILM COATED ORAL at 15:35

## 2020-11-13 RX ADMIN — MYCOPHENOLATE MOFETIL 750 MG: 250 CAPSULE ORAL at 09:09

## 2020-11-13 RX ADMIN — FUROSEMIDE 40 MG: 10 INJECTION, SOLUTION INTRAVENOUS at 15:36

## 2020-11-13 RX ADMIN — ACETAMINOPHEN 975 MG: 325 TABLET, FILM COATED ORAL at 00:24

## 2020-11-13 RX ADMIN — DOCUSATE SODIUM 50 MG AND SENNOSIDES 8.6 MG 2 TABLET: 8.6; 5 TABLET, FILM COATED ORAL at 19:53

## 2020-11-13 RX ADMIN — ANTI-THYMOCYTE GLOBULIN (RABBIT) 100 MG: 5 INJECTION, POWDER, LYOPHILIZED, FOR SOLUTION INTRAVENOUS at 12:38

## 2020-11-13 RX ADMIN — AMLODIPINE BESYLATE 10 MG: 10 TABLET ORAL at 14:03

## 2020-11-13 RX ADMIN — OXYCODONE HYDROCHLORIDE 5 MG: 5 TABLET ORAL at 19:53

## 2020-11-13 RX ADMIN — ASPIRIN 325 MG ORAL TABLET 325 MG: 325 PILL ORAL at 09:10

## 2020-11-13 RX ADMIN — ASPIRIN 325 MG ORAL TABLET 325 MG: 325 PILL ORAL at 00:24

## 2020-11-13 RX ADMIN — SODIUM CHLORIDE: 9 INJECTION, SOLUTION INTRAVENOUS at 12:38

## 2020-11-13 RX ADMIN — MYCOPHENOLATE MOFETIL 750 MG: 250 CAPSULE ORAL at 17:56

## 2020-11-13 RX ADMIN — CARVEDILOL 6.25 MG: 6.25 TABLET, FILM COATED ORAL at 17:56

## 2020-11-13 ASSESSMENT — PAIN DESCRIPTION - DESCRIPTORS
DESCRIPTORS: SORE;TENDER
DESCRIPTORS: ACHING
DESCRIPTORS: ACHING
DESCRIPTORS: SORE
DESCRIPTORS: ACHING
DESCRIPTORS: ACHING
DESCRIPTORS: SORE

## 2020-11-13 ASSESSMENT — ACTIVITIES OF DAILY LIVING (ADL)
WALKING_OR_CLIMBING_STAIRS_DIFFICULTY: NO
ADLS_ACUITY_SCORE: 12
DRESSING/BATHING_DIFFICULTY: NO
TOILETING_ISSUES: NO
ADLS_ACUITY_SCORE: 10
ADLS_ACUITY_SCORE: 12
DOING_ERRANDS_INDEPENDENTLY_DIFFICULTY: NO
ADLS_ACUITY_SCORE: 12
ADLS_ACUITY_SCORE: 12

## 2020-11-13 ASSESSMENT — MIFFLIN-ST. JEOR: SCORE: 1153.13

## 2020-11-13 NOTE — PHARMACY-TRANSPLANT NOTE
Adult Kidney Transplant Post Operative Note    44 year old female s/p  donor kidney transplant on 2020 for ESRD of unknown etiology.      Planned immunosuppression regimen per kidney transplant protocol:  INDUCTION: thymoglobulin to total ~ 6mg/kg and methylprednisolone IV daily x 3 doses used as pre-med for thymoglobulin.  MAINTENANCE:  mycophenolate and tacrolimus with goal trough levels of 8-10 mcg/L for first 6 months post-transplant.    Opportunistic pathogen prophylaxis includes: trimethoprim/sulfamethoxazole and valganciclovir.    Patient is not enrolled in medication study.    Pharmacy will monitor for medication interactions and immunosuppression levels in conjunction with the team. Medication therapy needs for discharge planning will continue to be addressed throughout the current admission via multidisciplinary rounds and order review.  Pharmacy will make recommendations as appropriate.    Yaz Garcia, PharmD  PGY-1 Pharmacy Resident

## 2020-11-13 NOTE — TELEPHONE ENCOUNTER
Organ Offer Encounter Information    Organ Offer Information  Organ offer date & time: 11/12/2020  8:03 AM  Coordinator/Fellow/Attending name: Esthela Cruz RN   Organ(s):  Organ UNOS ID Match Run ID Comment Organ Laterality   Kidney [id number] MNOP       Recent infections?: No    New medications?: No Recent pregnancy?: No   Angicoagulation medications?: No Recent vaccinations?: No   Recent blood transfusions?: No Recent hospitalizations?: No   Has your insurance changed in the last 6-12 months?: Neg    Patient last dialyzed: 11/11/2020  8:09 AM  Dialysis type: Hemo  Discussed organ offer with: Patient  Patient/Caregiver name: Patricia  ID #[id number]  Discussed risk category with Patient/Other: DCD  Understood donor criteria, verbalized understanding  Patient/Other asked to speak to a surgeon?: No  Discussed program-specific outcomes: Did not have questions regarding SRTR  Right to decline organ offer without penalty, Patient/Other: Aware of option to decline without penalty  Organ offer decision status Patient/Other: Accepted Offer  Organ disposition: Transplanted  Additional Comments: 11/12/2020 8:04 AM  Kidney: Local, DCD  MD: Cherry/KESHIA  OPO Contact: Jazzmine OSEGUERA  VXM Results: Je - Compatible, no DSA   Plan (XM, NPO, Donor OR): Called patient with local donor kidney offer, DCD donor but kidney is already out.  Spoke to patient via Patricia , health assessment as above, no sensitizing events.  Last PO intake last evening, instructed to take AM meds with a sip of water but otherwise NPO.  Admission instructions given, ETA 1330.  Will start XM with in lab serum per Dr. Carey, orders placed, spoke to Anya in immunology about plan.  - - -   COVID Screening  In the past month, have you had:  Any close contact with a suspect or laboratory-confirmed COVID-19 patient: No  Travel anywhere: No  Fever: No  Cough: No  Short of Breath: No  Rash: No  - - -   Admissions: 0822 - Felicia, ETA 1330  Unit:  0841 - [facility] 4A notified  Update Provider Entering Orders (XM Plan & COVID Testing): 0835 - DEREJE Gomez notified of admission and need for FXM  Immunology: 0830 - Notified Anya of plan for XM and admission  Inpatient Lab (COVID Testing [phone], Option 2): 0901 - Hipo notified of pre-op testing  Book OR: 0856 - Berna, booked for 1400  Vessel Storage Confirmation (PA/KP/LI): N/A  Blood Bank: [address] [place] [address] notified  Research: ON HOLD  TransNet/ABO Verification: 0850 - Done  Add Organ: 0850 - Done  Livia Cruz RN   Transplant Coordinator    November 12, 2020 1:41 PM  Patient to go to 7A instead of 4A (per Bryon in patient placement, ok with Dr. Marcelino), updated Collene on 7A.  Livia Cruz RN   Transplant Coordinator            Attestation I have discussed all of the above with the Patient/Legal Guardian/Caregiver regarding this organ offer.: Yes  Coordinator/Fellow/Attending name: Esthela Cruz RN

## 2020-11-13 NOTE — PHARMACY-ADMISSION MEDICATION HISTORY
Admission medication history interview status for the 11/12/2020 admission is complete. See Epic admission navigator for allergy information, pharmacy, prior to admission medications and immunization status.     Medication history interview sources:    - Patient dispense report history   - Patient's prescription bottles (reviewd by nurse)    Changes made to PTA medication list (reason)  Added: none  Deleted:   - Ondansetron 4 mg ODT (no recent fill history, patient did not report taking)  - Oxycodone 5 mg (no recent fill history, patient did not report taking)  Changed: none    Additional medication history information (including reliability of information, actions taken by pharmacist):  - Patient had prescription bottles with her that nurse was able to visualize in her room with her and then report to pharmacy   - Patient's report matched recent fill history from pharmacy       Prior to Admission medications    Medication Sig Last Dose Taking? Auth Provider   amLODIPine (NORVASC) 10 MG tablet Take 10 mg by mouth every morning  11/11/2020 at 2000 Yes Reported, Patient   hydrALAZINE (APRESOLINE) 25 MG tablet Take 50 mg by mouth 3 times daily  11/11/2020 at 2000 Yes Lorna Clancy APRN CNP   losartan (COZAAR) 100 MG tablet Take 1 tablet (100 mg) by mouth every morning Resume taking once blood pressures start increasing again  Patient taking differently: Take 100 mg by mouth every evening Resume taking once blood pressures start increasing again 11/11/2020 at 2000 Yes Maria De Jesus Boone PA-C   sevelamer (RENVELA) 800 MG tablet Take 1,600 mg by mouth 3 times daily (with meals)  11/11/2020 at 1800 Yes Reported, Patient   acetaminophen (TYLENOL) 325 MG tablet Take 325-650 mg by mouth every 4 hours as needed for mild pain or fever  Unknown at Unknown time  Reported, Patient   calcium carbonate (TUMS) 500 MG chewable tablet Take 2 chew tab by mouth 3 times daily as needed for heartburn Unknown at Unknown time   Reported, Patient         Medication history completed by: Yaz Garcia, PharmD

## 2020-11-13 NOTE — ANESTHESIA PROCEDURE NOTES
Arterial Line Procedure Note      Staff -   Anesthesiologist:  Jarek Walker MD  Performed By: anesthesiologist    Location: In OR After Induction  Procedure Start/Stop Times:     patient identified, IV checked, site marked, risks and benefits discussed, informed consent, monitors and equipment checked, pre-op evaluation and at physician/surgeon's request      Correct Patient: Yes      Correct Position: Yes      Correct Site: Yes      Correct Procedure: Yes      Correct Laterality:  Yes    Site Marked:  N/A  Line Placement:     Procedure:  Arterial Line    Insertion Site:  Radial    Insertion laterality:  Right    Skin Prep: Betadine      Patient Prep: patient draped, mask, sterile gloves, hat and hand hygiene      Local skin infiltration:  None    Ultrasound Guided?: Yes      Artery evaluated via ultrasound confirming patency.   Using realtime imaging, the artery was punctured and the needle was observed entering the artery.      A permanent image is NOT entered into the patient's record.      Catheter size:  20 gauge, 12 cm    Cath secured with: suture      Dressing:  Tegaderm    Complications:  None obvious    Arterial waveform: Yes      IBP within 10% of NIBP: Yes

## 2020-11-13 NOTE — PROGRESS NOTES
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 Patient has undergone attempted kidney transplant twice (2019, 2020), and each time the case was aborted due to hypotension and suspected anaphylaxis. A pre-op plan was made with anesthesia and Dr. Palmer (Allergist). Patient was admitted for a  donor kidney transplant.        Graft function: DDKT 20 with no ureteral stent. DCD donor with >20 hours CIT. US POD 1 AM with 6.3 cm dayna-nephric hematoma and good blood flow. Cr 8.0->8.3. K increased to 5.7 this AM, decreased to 5.4 with Lasix, continue to check every 4 hours.   Immunosuppression management: high risk protocol due to potential for DGF   intra-op/250 mg POD 1  Thymo 100 intra-op/100 mg POD 1   mg BID  FK 1 mg BID.   Complexity of management: High. Contributing factors: induction, history of hypoetension with operation and anesthesia, and long ishemia time  Hematology: Hgb 9.8, WBC 13.5, plt 229 recheck CBC tomorrow AM  Anemia of CKD  Cardiorespiratory:   HTN: BP 140s-160s overnight. Start Norvasc 10 mg daily per nephrology. CVP 8.   GI/Nutrition: clear liquid, ADAT. No flatus or bowel function at this time, continue senna/colace  Endocrine: no h/o of DM, monitor BS and sliding scale insulin as needed  Fluid/Electrolytes: MIVF: D50.9% @ 125cc/hour, change to NS @ 50 ml/hour  : Moreno to remain due to new surgical anastomosis  Infectious disease: AF.   H/o HEP C core antibody positive: Will discuss Entecavir initiation  Prophylaxis: DVT, fall, GI, viral (Valycte x 3 months), pneumocystis (bactrim indefinitely)  Disposition: 6B, transfer to     Medical Decision Making:  "Medium  Subsequent visit 59116 (moderate level decision making)    MERVAT/Fellow/Resident Provider: Elizabeth Lee PA-C [id number]    Faculty: Dayne Marcelino M.D.  _________________________________________________________________  Transplant History:   11/12/2020 (Kidney), Postoperative day: 1     Interval History: History is obtained from the patient  Overnight events: Nausea this AM. Right leg with weakness and decreased sensation post op.     ROS:   A 10-point review of systems was negative except as noted above.    Meds:    acetaminophen  975 mg Oral Q8H     anti-thymocyte globulin  100 mg Intravenous Central line Once     aspirin  81 mg Oral Daily     atorvastatin  10 mg Oral Daily     fexofenadine  180 mg Oral Daily     furosemide  40 mg Intravenous Q6H     [START ON 11/14/2020] magnesium oxide  400 mg Oral Daily with lunch     mycophenolate  750 mg Oral BID IS     senna-docusate  1 tablet Oral BID    Or     senna-docusate  2 tablet Oral BID     sodium chloride (PF)  10 mL Intracatheter Q8H     sulfamethoxazole-trimethoprim  1 tablet Oral Once per day on Mon Wed Fri     tacrolimus  1 mg Oral BID IS     [START ON 11/16/2020] valGANciclovir  450 mg Oral Twice Weekly       Physical Exam:     Admit Weight: 52 kg (114 lb 9.6 oz)    Current vitals:   BP (!) 169/85 (BP Location: Left leg, Cuff Size: Adult Small)   Pulse 90   Temp 98.6  F (37  C) (Oral)   Resp 16   Ht 1.6 m (5' 3\")   Wt 52.4 kg (115 lb 8.3 oz)   SpO2 99%   BMI 20.46 kg/m      Vital sign ranges:    Temp:  [97.1  F (36.2  C)-98.6  F (37  C)] 98.6  F (37  C)  Pulse:  [] 90  Resp:  [12-20] 16  BP: (117-201)/() 169/85  MAP:  [62 mmHg-77 mmHg] 62 mmHg  Arterial Line BP: ()/(44-55) 83/44  SpO2:  [94 %-100 %] 99 %    General Appearance: in no apparent distress.   Skin: Warm, perfused  Heart: RRR  Lungs: NLB on room air   Abdomen: The abdomen is soft, NT. Incision covered with operative dressing, no saturation.   : soni is present.  Urine " with hematuria.  Extremities: edema: 1+ bilaterally, strength: RLE <LLE  Neurologic: awake, alert and oriented. Tremor absent.    Data:   CMP  Recent Labs   Lab 11/13/20  0936 11/13/20 0441 11/12/20 2217 11/12/20  1500   NA  --  135 134 136   POTASSIUM 5.4* 5.7* 4.4 3.9   CHLORIDE  --  101 98 97   CO2  --  23 24 29   GLC  --  164* 160* 92   BUN  --  32* 30 28   CR  --  8.26* 8.02* 8.73*   GFRESTIMATED  --  5* 6* 5*   GFRESTBLACK  --  6* 6* 6*   HOLLIS  --  8.7 9.2 9.5   MAG  --  2.3 2.2  --    PHOS  --  4.9* 4.8*  --    ALBUMIN  --   --   --  3.8   BILITOTAL  --   --   --  0.5   ALKPHOS  --   --   --  142   AST  --   --   --  19   ALT  --   --   --  18     CBC  Recent Labs   Lab 11/13/20  1137 11/13/20  0802 11/13/20 0441 11/12/20 2217 11/12/20  1500   HGB 9.2* 9.3* 9.8* 9.9* 11.9   WBC  --   --  13.5* 12.8* 6.2   PLT  --   --  229 234 303   A1C  --   --   --   --  4.9

## 2020-11-13 NOTE — OP NOTE
Transplant Surgery  Operative Note     Procedure date:  11/12/20    Preoperative diagnosis:  End Stage renal failure due to unknown etiology (no kidney biopsy)    Postoperative diagnosis:  Same,    Procedure:  1. Right kidney  transplant,  Donation after Circulatory Death , Right  iliac fossa, with venous reconstruction. A J-J ureteral stent was not placed.  2. Kidney allograft preparation on Back Table      Surgeon:  АНДРЕЙ BRUNNER    Fellow/Assistant:  MD Susan, fellow. There was no qualified resident to assist with this procedure.    Anesthesia:  General    Specimen:  None    Drains:  no drain    Urine output:  5 mls    Estimated blood loss:  50    Fluids administered:       Indication: The patient has End Stage renal failure due to unknown etiology and received an organ offer for a Donation after Circulatory Death  kidney allograft. After discussing the risks and benefits of proceeding, the patient agreed to proceed with surgery and provided informed consent.  Findings: Integrity of recipient artery: Normal, single artery, (single renal vein) caval conduit, and ureter    Final ABO/Crossmatch verification: After the donor organ arrived to the operating room and prior to anastomosis, I participated in the transplant pre-verification upon organ receipt timeout by visually verifying the donor ID, organ and laterality, donor blood type, recipient unique identifier, recipient blood type, and that the donor and recipient are blood type compatible. The crossmatch was done prospectively; the T cell flow crossmatch result was negative and B cell flow crossmatch result was negative prior to anastomosis.  The patient received Thymoglobulin, Cellcept and Solumedrol on induction.    Donor Organ Information:   Donor UNOS ID:  [id number]    Donor arterial clamp on:  11/11/2020 11:06 AM    Total ischemic time:  1984 min    Cold ischemic time:  1,945 min    Warm ischemic time:  39 min    Preservation fluid:  UW      Back Table  Details:   Procedure:  Bench preparation of the kidney allograft for transplantation with venous reconstruction    Surgeon:  АНДРЕЙ BRUNNER    Faculty Co-Surgeon:       Fellow/Assistant: MD Susan, fellow. There was no qualified resident to assist with this procedure.    Donor arrival to recipient room:  11/12/2020  5:13 PM    Graft injury:  No    Graft biopsy:  yes    Organ received on:  Ice    Pump resistance:      Pump flow:      Arterial anatomy:  Single    Donor arterial quality:  Normal    Venous anatomy:  Single    Ureteral anatomy:  Single    Any reconstruction:  Yes    Artery:  None    Vein:  Caval conduit     Complications: None.    Findings: Normal see above      None.    Back Table Preparation:  The donor kidney was received and inspected. It had been flushed with UW. The graft was prepared on the back table by removing perinephric fat and ligating venous tributaries and lymphatics. The ureter was also cleaned of excess tissue. If required, reconstruction was performed as detailed above. The kidney was stored in iced cold preservation solution until ready for transplantation. Faculty was present for the critical portions of the procedure.    Operative Procedure:   Arterial anastomosis start:  11/12/2020  7:31 PM    Arterial unclamp:  11/12/2020  8:10 PM    Extra vessels used:   none      The patient was brought to the operating room, placed in a supine position, and a time out was performed. Sequential compression devices were placed on both lower extremities and general endotracheal anesthesia was induced.  The patient was given IV antibiotics and a Moreno catheter. A central line was placed by Anesthesia service. The abdomen was then shaved, prepped, and draped in the usual sterile fashion.  An incision was made in the right lower quadrant and carried down through the subcutaneous tissue and the abdominal wall fascia. If encountered, the epigastric vessels were ligated in continuity, divided and  secured with surgical clips. The right iliac artery and vein were exposed. The retractor system was placed and the lymphatics overlying the vessels were serially ligated and divided.     The patient was not heparinized. We applied atraumatic vascular clamps and the donor kidney was brought to the operative field. We made a venotomy and the caval conduit was anastomosed to the recipient right External Iliac vein in an end-to-side fashion. An arteriotomy was made and the donor renal artery was anastomosed to the recipient right external iliac artery  in an end to side fashion. The patient was simultaneously loaded with IV mannitol, Lasix and volume. The renal artery was protected and the clamps were removed. After several cardiac cycles, we opened the renal artery and the kidney had Fair reperfusion and was soft, pink  and normal.    The transplant ureter was managed by creating a Liche (anterior multistitch) anastomosis with absorbable suture. No The kidney made Yes urine prior to implantation.    Hemostasis was obtained, the anastomoses inspected, and the kidney placed in the iliac fossa. After placement, the vessel lay was inspected and found to be acceptable. The kidney position was Retroperitoneal. The field was irrigated with antibiotic solution. No drain was placed. The retractor was removed and the abdominal wall fascia reapproximated. Subcutaneous tissues were irrigated and hemostasis obtained.  The skin was reapproximated with staples and a dry dressing was applied.   All needle, sponge and instrument counts were correct x 2. The patient was awakened, extubated, and transferred to PACU for post-op monitoring. Faculty was present for key portions of the procedure.    I was present during the key portions of the procedure, and I was immediately available for the entire procedure.

## 2020-11-13 NOTE — DISCHARGE INSTRUCTIONS
Diet recommendations post-transplant: High protein diet x 8 weeks.  Heart healthy dietary habits long term (low saturated/trans fat, low sodium). Practice food safety precautions. See nutrition handout and food safety booklet for more information.

## 2020-11-13 NOTE — CONSULTS
Transplant Admission Psychosocial Assessment    Patient Name: Paolo Nguyenoo  : 1975  Age: 44 year old  MRN: [id number]  Completed assessment with: Pt and Patricia      Patient underwent  donor transplant.  Met with patient to update psychosocial assessment and provide brief education about SW role while inpatient, as well as expectations/requirements and follow up needs post-transplant. SW also provided education about need for compliance with transplant medications, and explained ESRD Medicare benefits and medication coverage under Medicare part B.  Medicare 2728 forms completed and signed by patient.  Presenting Information   Living Situation: Patient lives in an apartment with her , Jaw, and 7-year-old daughter in [place], [place]. Patient has a 22-year-old son who does not live with them, but he also lives in [place].  If not local, plans for short term stay:  Patient plans to stay at her aunt and uncle's home in [place].    Previous Functional Status: Independent with ADL's, drives self  Cultural/Language/Spiritual Considerations: Patricia-speaking female, originally from [place]  Primary Support Person .   Other support:  Son, aunt and uncle.   Plan for support in immediate post-transplant period: Patient plans to stay at her aunt and uncle's house in [place] post-transplant and her aunt and uncle will care for her.     Health Care Directive  Decision Maker: Patient   Alternate Decision Maker:  per [employer] policy.   Health Care Directive: Declined completing    Mental Health/Coping:   History of Mental Health: Denied  History of Chemical Health: Denied   Current status: Patient denied any current mental health or substance use concerns.   Coping: Pt appears to be coping well, stated she is doing well  Services Needed/Recommended: None at this time     Financial   Income: Patient does not work and is not on disability. She is reliant on her 's  income.   Impact of transplant on income: None identified at this time.   Insurance and medication coverage: Patient has Medicare A&B, as well as BCBS through spouse employer    SOT*LIAISON PAW LEI IS A KIDNEY TRANSPLANT PATIENT (DATE OF TX: 11/12/20). PRIMARY HEALTH INSURANCE IS THROUGH MEDICARE ID#[id number] (PART A EFFECTIVE 6/1/14 AND PART B EFFECTIVE 8/1/15). WITH SECONDARY COVERAGE THROUGH BCBS ANTHEM ID#[id number] [facility]#[id number] PERSON CODE#02 SPOUSE (EFFECTIVE 5/1/18). BILL ALL ELIGIBLE PART B MEDICATIONS TO PRIMARY RX PROCESSOR MEDICARE PART B THEN COB TO BX SECONDARY ID#[id number] AND WILL PAY $0 AT TIME OF SERVICES.    PHARMACY BENEFITS ARE THROUGH [employer], PATIENT DOES NOT HAVE MEDICARE PART D INSURANCE. BILL ALL OTHER MEDICATIONS TO RX PROCESSOR [employer] ID#[id number] GRP#[id number] PCN#[id number] BIN#[id number] (EFFECTIVE 5/1/18). PT HAS NO DEDUCTIBLE AND A $5000 MAX OUT-OF-POCKET. PATIENT WILL PAY %20 COINSURANCE FOR GENERIC, %20 COINSURANCE WITH A MINIMUM $20 OR A MAXIMUM $50 COPAY FOR BRAND, & %20 COINSURANCE OR A $75 COPAY FOR NON-FORMULARY BRAND MEDICATIONS.     TESTCLAIMS: MYCOPHENOLATE 250MG=$0 AT TIME OF SERVICES, ENVARSUS XR 4MG=$0 AT TIME OF SERVICES, PROGRAF 1MG=$0 AT TIME OF SERVICES, TACROLIMUS 1MG=$0 AT TIME OF SERVICES, CYCLOSPORINE 100MG=$0 AT TIME OF SERVICES, VALGANCICLOVIR 450MG=$0    Financial concerns: None identified at this time  Resources needed: None identified at this time.     Assessment, recommendations and discharge plan:  Pt had been on dialysis since 2012. Reviewed transplant education (Medicare, rehabilitation, donor issues, community/financial resources, and psych/family adjustment) as well as psychosocial risks of transplant. Patient seemed to process information well via interpretor. Appeared well informed, motivated, and able to follow post transplant requirements. Behavior was appropriate during interview. Has adequate income and insurance  coverage. Adequate social support. Pt had no questions or concerns for this writer at this time.     Khadijah Rowell, [facility]    Kidney/Pancreas/Auto Islet Transplant Programs

## 2020-11-13 NOTE — PROGRESS NOTES
Patient removed from UNOS waitlist after  donor kidney transplant. UNOS ID [id number].   Donor PHS increased risk: No   If Yes, MD documentation N/A

## 2020-11-13 NOTE — OR NURSING
Pt to Pacu from the OR. Vitals are WNL's. Pt is stable. Patricia  on ipad being used. Chest xray done and seen by Dr. Walker who is at bedside.

## 2020-11-13 NOTE — PROGRESS NOTES
Final positive donor sputum culture results have been uploaded to [facility].  Donor ID [id number].  Dr. Carey notified of results.

## 2020-11-13 NOTE — OR NURSING
Ultra sound is done. Labs have been drawn. Vitals stable. Dr. Walker - Anesthesiologist  at bedside and says Pt can go to 6B. Following message sent to Dr. Martin - Transplant Resident:  Patient Paw Trae ([id number]), Ultra sound is done. labs are done, Urine output for 1st hour was 12 cc. CVP is 2-4. Other vitals are WNL's.

## 2020-11-13 NOTE — PROVIDER NOTIFICATION
Transfer    Transferred to: 7A  Via: bed  Reason for transfer: Pt improving and no longer requiring 6B level care.   Family: Aware of transfer - notified friend in chart [facility] [phone]  Belongings: Packed and sent with pt - 3 bags & purse  Chart: Delivered with pt to next unit  Medications: Meds sent to new unit with pt  Report given to: KULWANT Ibarra    Pt status: A&O. Temp: 98.5  F (36.9  C) Temp src: Oral BP: (!) 176/90 Pulse: 94   Resp: 16 SpO2: 99 % O2 Device: None (Room air) See end shift note at 1624; unchanged.

## 2020-11-13 NOTE — PLAN OF CARE
Care Provided: 0700-Transfer 1630    Temp: 98.5  F (36.9  C) Temp src: Oral BP: (!) 176/90 Pulse: 94   Resp: 16 SpO2: 99 % O2 Device: None (Room air) Oxygen Delivery: 1 LPM    Neuro: A&Ox4. Patricia speaking, communication appropriate with IPad   Cardiac: SR/ST 80-100s. BP elevated, restarted Coreg & Norvasc. CVP 7-9 prior to dc'ing. Denies chest pain.   Respiratory: Sating >97% on RA. Lungs clear. Using IS frequently.   GI: BS hypoactive, but picking up throughout shift. Tolerated full liquids for lunch, poor appetite. Nausea in AM, improved with PRN zofran, no emesis.   : Moreno, UOP 40-50cc q1/h most of shift. Improved to 75cc & 90cc in last two hours after lasix. Pink/yellow.    Activity: Minimal activity on shift d/t complaints of numbness/weakness in RLE that has patient concerned; team aware. Did range of motion in bed ~Q3h on lower extremities. Pt repositioning in bed independently.   Pain: Abdominal pain at incisional site. PRN IV dilaudid 0.5mg x1 in AM d/t nausea; PRN 5mg oxy given in afternoon along with tylenol for thymo with reported relief.   Skin: Incision dressing intact; marked previous shift with no extension on this shift; per team leave in place until 11/14. Bruise around left eye is reported from playing with daughter and present at admission. No new deficits noted on shift.   Incision/Surgical Site 11/12/20 Right Abdomen (Active)   Incision Assessment UTV 11/13/20 1530   Vivian-Incision Assessment UTV 11/13/20 0100   Closure SHIV 11/13/20 1530   Incision Drainage Amount None 11/13/20 1530   Drainage Description Serosanguinous 11/13/20 0400   Dressing Intervention Dressing marked - No change 11/13/20 1530     Lab:  K 5.7 in AM, came down to 5.4. In afternoon 5.7 again (lasix being given at the same time lab drawn), next draw at 1800. Team consented for dialysis in case it is needed; consent in the chart.     LDAs:    - Moreno  - PICC: MIVF NS @ 50cc/hr  - Internal Jugular: Nicol lal  infusing    Plan: Transfer to  where they will continue to monitor pt closely and notify primary team with any changes.

## 2020-11-13 NOTE — ANESTHESIA PROCEDURE NOTES
Central Line Procedure Note      Staff -   Anesthesiologist:  Jarek Walker MD  Performed By: anesthesiologist  Location: In OR after induction  Procedure Start/Stop Times:     patient identified, IV checked, site marked, risks and benefits discussed, informed consent, monitors and equipment checked, pre-op evaluation and at physician/surgeon's request      Correct Patient: Yes      Correct Position: Yes      Correct Site: Yes      Correct Procedure: Yes      Correct Laterality:  Yes    Site Marked:  Yes  Line Placement:     Procedure:  Central Line    Insertion laterality:  Right    Insertion site:  Internal Jugular    Position:  Trendelenburg    Sterility preparation included the following: hand hygiene performed prior to central venous catheter insertion, maximum sterile barriers were used: cap, mask, sterile gown, sterile gloves, and large sterile sheet, antiseptic used during central venous catheter insertion and skin prep agent completely dried prior to procedure         Injection Technique:  Ultrasound guided and Seldinger Technique    Sterile Ultrasound Technique:  Sterile probe cover and Sterile gel    Vein evaluated via U/S for patency/adequacy of catheter insertion and is adequate.  Using realtime U/S imaging the vein was punctured, and needle was observed entering vein on U/S      Permanent Image entered into patient's record      Local skin infiltration:  None    Catheter size:  Other (See Comment) (7 [place] 3 lumen 20 cm  Rifampin and minocycline impregnated)    Catheter length at skin (cm):  12    Cath secured with: suture and anchor securement device      Dressing:  Tegaderm    Complications:  None obvious    Blood aspirated all lumens: Yes      All Lumens Flushed: Yes      Verification method:  X-ray    Person verifying:  Aaron  Assessment/Narrative:      CHX free triple lumen and prep  Single pass of TWN and wire{

## 2020-11-13 NOTE — ANESTHESIA POSTPROCEDURE EVALUATION
Anesthesia POST Procedure Evaluation    Patient: Paolo Vincent Trae   MRN:     [id number] Gender:   female   Age:    44 year old :      1975        Preoperative Diagnosis: ESRD (end stage renal disease) (H) [N18.6]   Procedure(s):  TRANSPLANT, KIDNEY, RECIPIENT,  DONOR   Postop Comments: No value filed.     Anesthesia Type: General          Postop Pain Control: Uneventful            Sign Out: Well controlled pain   PONV: No   Neuro/Psych: Uneventful            Sign Out: Acceptable/Baseline neuro status   Airway/Respiratory: Uneventful            Sign Out: Acceptable/Baseline resp. status   CV/Hemodynamics: Uneventful            Sign Out: Acceptable CV status   Other NRE: NONE   DID A NON-ROUTINE EVENT OCCUR? No         Last Anesthesia Record Vitals:  CRNA VITALS  2020 2120 - 2020 2220      2020             Pulse:  100    ART BP:  118/61    ART Mean:  82    Ht Rate:  100    SpO2:  100 %    Resp Rate (observed):  17          Last PACU Vitals:  Vitals Value Taken Time   /75 20 2250   Temp 36.8  C (98.2  F) 20 2200   Pulse 98 20 2259   Resp 18 20 2215   SpO2 100 % 20 2259   Temp src     NIBP     Pulse     SpO2     Resp     Temp     Ht Rate     Temp 2     Vitals shown include unvalidated device data.      Electronically Signed By: Jarek Walker MD, 2020, 11:00 PM

## 2020-11-13 NOTE — PROGRESS NOTES
"CLINICAL NUTRITION SERVICES - ASSESSMENT NOTE     Nutrition Prescription    RECOMMENDATIONS FOR MDs/PROVIDERS TO ORDER:  Liberalize diet as medically able to promote calorie/protien intake to promote healing.     Malnutrition Status:    Patient does not meet two criteria for the diagnosis of malnutrition at this time      Recommendations already ordered by Registered Dietitian (RD):  Discharge diet instruction written     Future/Additional Recommendations:  If suspect eating poorly, recommend starting oral nutrition supplements. If intakes are variable, recommend start calorie count.     Education review on acutely increased energy and protein needs, 6-8 weeks post surgery and long term recommendation for heart healthy (low saturated fat, low sodium) diet and food safety precautions.          REASON FOR ASSESSMENT  Pt is a 44 year old female seen by the dietitian for MD Order- Assess & Educate post-op SOT    CLINICAL HISTORY   history of ESKD of unknown etiology on dialysis since 2012, HTN, vasospastic MI 2012, ischemic cardiomyopathy (preserved EF, normal coronaries, and septal wall hypokinesis), HELLP, HBV, and a known RBC antibody.    NUTRITION HISTORY  Attempted to reach patient multiple times, busy with nursing cares or sleeping. Handouts left at beside.     CURRENT NUTRITION ORDERS  Diet: Clear Liquid--> Regular   Intake/Tolerance:  No intakes documented     LABS  K+ 5.7 (H)  Urea Nitrogen 32 (H)  Creatinine 8.26 (H)  Po4 4.9 (H)  Glucose 164 (H)    MEDICATIONS  Thymoglobulin   Lasix   Mycophenolate  Methylprednisolone  Senokot     ANTHROPOMETRICS  Height: 160 cm (5' 3\")  Most Recent Weight: 52.4kg (115 lb 8.3 oz)    IBW: 52.3 kg  BMI: Normal BMI  Weight History:   Wt Readings from Last 15 Encounters:   11/13/20 52.4 kg (115 lb 8.3 oz)   02/13/20 56.7 kg (125 lb)   01/27/20 59.7 kg (131 lb 9.8 oz)   12/19/19 57.2 kg (126 lb)   11/19/19 57.2 kg (126 lb)   09/04/19 58.1 kg (128 lb)   06/06/19 52.5 kg (115 lb " 12.8 oz)   06/06/19 52.2 kg (115 lb)   05/30/19 53.5 kg (117 lb 14.4 oz)   05/30/19 53.8 kg (118 lb 8 oz)   04/24/19 53.9 kg (118 lb 13.3 oz)   11/08/18 52.7 kg (116 lb 1.6 oz)   11/08/18 52.8 kg (116 lb 4.8 oz)   11/21/17 51.1 kg (112 lb 11.2 oz)   11/29/16 48.2 kg (106 lb 4.2 oz)   12% weight loss in 11 months,does not meet critera. Suspect weight may have been increased due to fluid as prior weights lower    Dosing Weight: 52 kg (actual, lowest weight)     ASSESSED NUTRITION NEEDS:  Estimated Energy Needs:8242-3820 kcals (30-35 Kcal/Kg)  Justification: increased needs post-op SOT  Estimated Protein Needs:  grams protein (1.3-2 gm/Kg)  Justification: increased needs post op SOT  Estimated Fluid Needs: 2993-1224 mL (25-30 mL/Kg)  Justification: maintenance, or per MD pending fluid status and adequate UOP    PHYSICAL FINDINGS  See malnutrition section below.    MALNUTRITION  % Intake: Unable to assess  % Weight Loss: Weight loss does not meet criteria  Subcutaneous Fat Loss: None observed per RD, visual assessment  Muscle Loss: None observed per RD, per visual assessment    Fluid Accumulation/Edema: None noted  Malnutrition Diagnosis: Patient does not meet two criteria for the diagnosis of malnutrition at this time      NUTRITION DIAGNOSIS:  Food and nutrition-related knowledge deficit r/t length of time since previous post-transplant education AEB patient verbal report, review of chart record, and MD consult for nutrition education.     INTERVENTIONS  Implementation  Nutrition Education:  Provided handout: Post-transplant diet guidelines. Patient receptive to information provided.    Goals  1. Patient will verbalize understanding of 3 important aspects of post-transplant diet guidelines.   2. PO intake >50% meals TID.    Monitoring/Evaluation  Progress toward goals will be monitored and evaluated per protocol.    Brooklyn Wiley RD, LD  6B pager: [phone]

## 2020-11-13 NOTE — CONSULTS
[facility]   Transplant Nephrology Consult  Date of Admission:  11/12/2020  Today's Date: 11/13/2020  Requesting physician: Dayne Marcelino MD    Recommendations:  - Agree with lasix PRN for hyperkalemia - UOP improving, however if K rising, may need short run HD  - Resume home dose amlodipine 10mg daily   - Continue MIVF   - Back in 2013, had positive Hep B surface Ag and positive Hep B core antibody. Repeat Hep B core Ab and Hep B DNA.     Assessment & Plan   # DDKT: Trend up - graft had cold ischemia time ~32.4h. UOP slowly increasing.   - Baseline Cr ~ unknown   - Proteinuria: 7.4g/g prior to transplant. Needs to be repeated with FSGS protocol given unknown native disease   - Date DSA Last Checked: Nov/2020      Latest DSA: final XM pending   - BK Viremia: Not checked post transplant   - Kidney Tx Biopsy: No    # Immunosuppression: Tacrolimus immediate release (goal 8-10) and Mycophenolate mofetil (dose 750 mg every 12 hours)   - Changes: No - High risk rATG protocol for induction due to likely DGF    # Infection Prophylaxis:   - PJP: Sulfa/TMP (Bactrim)  - CMV: Valcyte x3m     # Hypertension: Inadequate control;  Goal BP: < 150/90   - Volume status: Mildly hypervolemic  EDW ~ unknown   - Changes: Yes - resume home amlodipine 10mg daily    # Elevated Blood Glucose: Glucose generally running ~ 160   - Management as per primary team.    # Anemia in Chronic Renal Disease: Hgb: Stable      JESSI: No   - Iron studies: Not checked recently    # Mineral Bone Disorder:   - Secondary renal hyperparathyroidism; PTH level: Significantly elevated (601-1200 pg/ml)        On treatment: likely had been on treatment while on HD  - Vitamin D; level: Not checked recently        On supplement: No  - Calcium; level: Normal        On supplement: No  - Phosphorus; level: High        On supplement: No    # Electrolytes:   - Potassium; level: High        On supplement: No  - Magnesium;  level: Normal        On supplement: No  - Bicarbonate; level: Normal        On supplement: No    # Prior Hep B infection: Back in 2013, had positive Hep B surface Ag and positive Hep B core antibody. Repeat Hep B core Ab, Hep B DNA. May need entecavir depending on our protocol with rATG induction    # Transplant History:  Etiology of Kidney Failure: Unknown etiology (no kidney biopsy)  Tx: DDKT  Transplant: 11/12/2020 (Kidney)  Donor Type: Donation after Circulatory Death  Donor Class:   Crossmatch at time of Tx: negative  Significant changes in immunosuppression: None  Significant transplant-related complications: None    Recommendations were communicated to the primary team verbally.    Seen and discussed with Dr. Shavon Denise MD  Pager: [phone]    Physician Attestation   I, Edgardo Sands MD, personally examined and evaluated this patient.  I discussed the patient with the resident/fellow and care team, and agree with the assessment and plan of care as documented in the note on 11/13/20 .      I personally reviewed vital signs, medications, labs and imaging   .  Edgardo Sands MD  Date of Service (when I saw the patient): 11/13/20        REASON FOR CONSULT   DDKT    History of Present Illness   Paolo Larkin is a 44 year old Patricia-speaking female with history of ESKD of unknown etiology on dialysis since 2012, HTN, vasospastic MI 2012, ischemic cardiomyopathy (preserved EF, normal coronaries, and septal wall hypokinesis), HELLP, HBV, and a known RBC antibody. She was admitted for DDKT 11/12.    Of note, she had undergone attempted kidney transplant twice (4/2019, 1/2020), and each time the case was aborted due to hypotension and suspected anaphylaxis. Had seen an allergist and has severe chlorhexidine allergy.     No complications with yesterday's surgery. No drain. 5mL UOP.     Currently feels well. Mild pain at incision, but controlled with pain medication. Denies fever, chills, n/v/d. Has not  passed gas.     Review of Systems    The 10 point Review of Systems is negative other than noted in the HPI or here.     Past Medical History    I have reviewed this patient's medical history and updated it with pertinent information if needed.   Past Medical History:   Diagnosis Date     Anaphylactic reaction 2019    Shortly after OR induction     Anaphylactic reaction 2020    Shortly after OR induction     Anemia in chronic kidney disease      End stage kidney disease (H)      HELLP syndrome      Hepatitis B      HTN (hypertension)      Ischemic cardiomyopathy     With preserved EF preserved EF and septal wall hypokinesis     MI (myocardial infarction) (H)     Vasospastic     Proteinuria      Red blood cell antibody positive      Secondary hyperparathyroidism (H)      Thrombocytopaenia        Past Surgical History   I have reviewed this patient's surgical history and updated it with pertinent information if needed.  Past Surgical History:   Procedure Laterality Date     BENCH KIDNEY Right 2020    Procedure: BACKBENCH PREPARATION RIGHT  KIDNEY AND RIGHT CHEST TUBE PLACEMENT.;  Surgeon: Armando Faustin MD;  Location: [facility] OR      SECTION  2012     CREATE FISTULA ARTERIOVENOUS UPPER EXTREMITY       CV CORONARY ANGIOGRAM N/A 2019    Procedure: CV CORONARY ANGIOGRAM;  Surgeon: Sabas Tilley MD;  Location:  [facility]     DILATION AND CURETTAGE, HYSTEROSCOPY WITH ULTRASOUND GUIDANCE  2019     INSERT CHEST TUBE  2020    Procedure: Insert chest tube;  Surgeon: Armando Faustin MD;  Location: [facility] OR     PICC DOUBLE LUMEN PLACEMENT Right 2020    5FR DL PICC. Length 37cm (3cm out). Tip at low SVC.     TRANSPLANT KIDNEY RECIPIENT  DONOR Right 2020    Procedure: TRANSPLANT, KIDNEY, RECIPIENT,  DONOR;  Surgeon: Dayne Marcelino MD;  Location: [facility] OR       Family History   I have reviewed this patient's family history and updated  it with pertinent information if needed.   Family History   Problem Relation Age of Onset     Kidney Disease No family hx of      Heart Disease No family hx of        Social History   I have reviewed this patient's social history and updated it with pertinent information if needed. Paolo Larkin  reports that she has never smoked. She has never used smokeless tobacco. She reports that she does not drink alcohol or use drugs.    Allergies   Allergies   Allergen Reactions     Blood Transfusion Related (Informational Only)      Patient has a history of a clinically significant antibody against RBC antigens.  A delay in compatible RBCs may occur.     Cephalosporins Anaphylaxis     Per [facility] allergist report: positive skin intradermal tests to Cefazolin and Ceftazidime, but NOT to Penicillin G, Piperacillin and Meropenem      Chlorhexidine Anaphylaxis     Several times during anesthesia at initial phase during insertion of I.v. line and after disinfection with Chlorhexidine drop of blood pressure.  In Prick and as well intradermal tests clear positive reactions to Chlorhexidine (particularly in intradermal test to 0.0002% Chlorhexidine papule of 1cm and Erythema of 2.5cm). AVOID in future Chlorhexidine     Heparin Flush      Prior to Admission Medications     acetaminophen  975 mg Oral Q8H     aspirin  81 mg Oral Daily     aspirin  325 mg Oral Daily     atorvastatin  10 mg Oral Daily     [START ON 11/14/2020] magnesium oxide  400 mg Oral Daily with lunch     mycophenolate  750 mg Oral BID IS     senna-docusate  1 tablet Oral BID    Or     senna-docusate  2 tablet Oral BID     sodium chloride (PF)  10 mL Intracatheter Q8H     sulfamethoxazole-trimethoprim  1 tablet Oral Once per day on Mon Wed Fri     [START ON 11/16/2020] valGANciclovir  450 mg Oral Twice Weekly       dextrose 5% and 0.9% NaCl 125 mL/hr at 11/13/20 0800     Another Antibiotic has been ordered. Stopped (11/12/20 1430)     NaCl       NaCl         Physical  "Exam   Temp  Av.2  F (36.8  C)  Min: 97.1  F (36.2  C)  Max: 98.5  F (36.9  C)  Arterial Line BP  Min: 83/44  Max: 109/54  Arterial Line MAP (mmHg)  Av.4 mmHg  Min: 62 mmHg  Max: 77 mmHg      Pulse  Av.8  Min: 92  Max: 109 Resp  Avg: 15.4  Min: 12  Max: 20  SpO2  Av.4 %  Min: 94 %  Max: 100 %    CVP (mmHg): 9 mmHgBP (!) 166/85 (BP Location: Left leg, Cuff Size: Adult Small)   Pulse 99   Temp 98.5  F (36.9  C) (Oral)   Resp 16   Ht 1.6 m (5' 3\")   Wt 52.4 kg (115 lb 8.3 oz)   SpO2 100%   BMI 20.46 kg/m     Date 20 0700 - 20 0659   Shift 3439-6217 2067-1257 7984-4743 24 Hour Total   INTAKE   P.O. 200   200   I.V. 250   250   IV Piggyback 1000   1000   Shift Total(mL/kg) 1450(27.67)   1450(27.67)   OUTPUT   Urine 125   125   Shift Total(mL/kg) 125(2.39)   125(2.39)   Weight (kg) 52.4 52.4 52.4 52.4      Admit Weight: 52 kg (114 lb 9.6 oz)     GENERAL APPEARANCE: alert and no distress  HENT: mouth without ulcers or lesions  LYMPHATICS: no cervical or supraclavicular nodes  RESP: lungs clear to auscultation - no rales, rhonchi or wheezes  CV: regular rhythm, normal rate, no rub, no murmur  EDEMA: no LE edema bilaterally  ABDOMEN: soft, nondistended  MS: extremities normal in appearance  SKIN: no rash    Data   CMP  Recent Labs   Lab 20  0441 20  2217 20  1500    134 136   POTASSIUM 5.7* 4.4 3.9   CHLORIDE 101 98 97   CO2 23 24 29   ANIONGAP 10 13 11   * 160* 92   BUN 32* 30 28   CR 8.26* 8.02* 8.73*   GFRESTIMATED 5* 6* 5*   GFRESTBLACK 6* 6* 6*   HOLLIS 8.7 9.2 9.5   MAG 2.3 2.2  --    PHOS 4.9* 4.8*  --    PROTTOTAL  --   --  8.4   ALBUMIN  --   --  3.8   BILITOTAL  --   --  0.5   ALKPHOS  --   --  142   AST  --   --  19   ALT  --   --  18     CBC  Recent Labs   Lab 20  0802 20  0441 20  2217 20  1500   HGB 9.3* 9.8* 9.9* 11.9   WBC  --  13.5* 12.8* 6.2   RBC  --  3.18* 3.21* 3.85   HCT  --  31.8* 31.2* 37.4   MCV  --  100 97 97 "   MCH  --  30.8 30.8 30.9   MCHC  --  30.8* 31.7 31.8   RDW  --  15.0 14.6 14.8   PLT  --  229 234 303     INR  Recent Labs   Lab 11/12/20 2217 11/12/20  1500   INR 1.01 0.92   PTT 33 34     ABGNo lab results found in last 7 days.   Urine Studies  Recent Labs   Lab Test 11/13/20  0300 01/23/20 2300 04/22/19  2340 06/20/13  0834   COLOR Yellow Light Yellow Straw Straw   APPEARANCE Clear Slightly Cloudy Clear Clear   URINEGLC 30* 30* 30* 70*   URINEBILI Negative Negative Negative Negative   URINEKETONE Negative Negative Negative Negative   SG 1.010 1.003 1.004 1.005   UBLD Large* Trace* Trace* Negative   URINEPH 7.0 8.0* 8.5* 8.5*   PROTEIN 100* 30* 30* 30*   NITRITE Negative Negative Negative Negative   LEUKEST Negative Large* Trace* Moderate*   RBCU >182* 2 <1 <1   WBCU 69* 48* 3 15*     Recent Labs   Lab Test 11/13/20 0300 01/23/20 2300 04/22/19  2340   UTPG 7.43* 5.08* 6.67*     PTH  No lab results found.  Iron Studies  No lab results found.    IMAGING:  All imaging studies reviewed by me.

## 2020-11-13 NOTE — OR NURSING
"Pt stable and vitals WNL's. Pt reports pain is \"a little.\" CVP has increased to 6-7 as NS bolus is running. Will transfer care to 6B.  "

## 2020-11-13 NOTE — PROVIDER NOTIFICATION
Time of notification: 10:01 AM  Provider notified: Kidney Txp PA/NP *[id number]    Patient status: Pt complaining that RLE numb with weakness and has felt that way since she woke up this morning. Pt reported this through "[employer]" . Assessed and really no strength to move, able wiggle toes but requires RN support to raise off bed and to bend. Got in AM report from RN that patient had difficulty standing for weight and was unbalanced.     Orders received: Team assessed. Per renal US, hematoma present at site. No changes at this time, continue to monitor closely and notify if sensation/motor decreases.

## 2020-11-13 NOTE — PROGRESS NOTES
Transfer  Transferred from: PACU  Via:bed  Reason for transfer: Pt appropriate for 6B post Kidney Transplant  Belongings: Received with pt, at bedside  Chart: Received with pt  Code Status verified on armband: yes  2 RN Skin Assessment Completed By: Meenu DIAZ  Bed surface reassessed with algorithm and charted: yes  New bed surface ordered: no, NA    Report received from: PACU  Pt status: arrived in stable condition with minimal incisional pain

## 2020-11-13 NOTE — OR NURSING
Dr. Martin called and acknowledged message. He will talk to transplant fellow to discuss low urine output.

## 2020-11-13 NOTE — PLAN OF CARE
Neuro: A&Ox4, Patricia speaking but able communicate basic needs in english  Cardiac: SR/ST. BP stable   Respiratory: Sating 96% on RA. Occasional cough with white sputum, IS encouraged  GI/: Moreno in place for close monitoring of urine output. BS active.  Diet/appetite: Tolerating clear liquid diet. Fluids encouraged  Activity:  Assist of 2 for standing at bedside  Pain: Minimal pain, medicated with PRN oxycodone prior to activity   Skin: RLQ surgical dressing in place, scant amount of drainage marked on dressing.  LDA's: RIPABLO for CVP monitoring. RUE PICC    Plan: Continue with POC. Notify primary team with changes.

## 2020-11-13 NOTE — ANESTHESIA CARE TRANSFER NOTE
Patient: Paolo Nguyenoo    Procedure(s):  TRANSPLANT, KIDNEY, RECIPIENT,  DONOR    Diagnosis: ESRD (end stage renal disease) (H) [N18.6]  Diagnosis Additional Information: No value filed.    Anesthesia Type:   General     Note:  Airway :Face Mask  Patient transferred to:PACU  Handoff Report: Identifed the Patient, Identified the Reponsible Provider, Reviewed the pertinent medical history, Discussed the surgical course, Reviewed Intra-OP anesthesia mangement and issues during anesthesia, Set expectations for post-procedure period and Allowed opportunity for questions and acknowledgement of understanding    Signed out to PACU team to avoid all chlorhexidine products w/ patient's history of anaphylaxis.    Vitals: (Last set prior to Anesthesia Care Transfer)    CRNA VITALS  2020 2120 - 2020 2209      2020             Pulse:  100    ART BP:  118/61    ART Mean:  82    Ht Rate:  100    SpO2:  100 %    Resp Rate (observed):  17                Electronically Signed By: Celio Rojas MD  2020  10:09 PM

## 2020-11-14 LAB
ANION GAP SERPL CALCULATED.3IONS-SCNC: 12 MMOL/L (ref 3–14)
BASOPHILS # BLD AUTO: 0 10E9/L (ref 0–0.2)
BASOPHILS NFR BLD AUTO: 0.1 %
BUN SERPL-MCNC: 44 MG/DL (ref 7–30)
CALCIUM SERPL-MCNC: 8.9 MG/DL (ref 8.5–10.1)
CHLORIDE SERPL-SCNC: 105 MMOL/L (ref 94–109)
CO2 SERPL-SCNC: 21 MMOL/L (ref 20–32)
CREAT SERPL-MCNC: 8.27 MG/DL (ref 0.52–1.04)
DIFFERENTIAL METHOD BLD: ABNORMAL
EOSINOPHIL # BLD AUTO: 0 10E9/L (ref 0–0.7)
EOSINOPHIL NFR BLD AUTO: 0 %
ERYTHROCYTE [DISTWIDTH] IN BLOOD BY AUTOMATED COUNT: 15.1 % (ref 10–15)
GFR SERPL CREATININE-BSD FRML MDRD: 5 ML/MIN/{1.73_M2}
GLUCOSE BLDC GLUCOMTR-MCNC: 148 MG/DL (ref 70–99)
GLUCOSE BLDC GLUCOMTR-MCNC: 156 MG/DL (ref 70–99)
GLUCOSE BLDC GLUCOMTR-MCNC: 172 MG/DL (ref 70–99)
GLUCOSE SERPL-MCNC: 112 MG/DL (ref 70–99)
HCT VFR BLD AUTO: 26.9 % (ref 35–47)
HGB BLD-MCNC: 8.4 G/DL (ref 11.7–15.7)
HGB BLD-MCNC: 8.7 G/DL (ref 11.7–15.7)
IMM GRANULOCYTES # BLD: 0.1 10E9/L (ref 0–0.4)
IMM GRANULOCYTES NFR BLD: 0.5 %
LYMPHOCYTES # BLD AUTO: 0.1 10E9/L (ref 0.8–5.3)
LYMPHOCYTES NFR BLD AUTO: 1.4 %
MAGNESIUM SERPL-MCNC: 2.1 MG/DL (ref 1.6–2.3)
MCH RBC QN AUTO: 30.7 PG (ref 26.5–33)
MCHC RBC AUTO-ENTMCNC: 31.2 G/DL (ref 31.5–36.5)
MCV RBC AUTO: 98 FL (ref 78–100)
MONOCYTES # BLD AUTO: 0.6 10E9/L (ref 0–1.3)
MONOCYTES NFR BLD AUTO: 5.8 %
NEUTROPHILS # BLD AUTO: 9 10E9/L (ref 1.6–8.3)
NEUTROPHILS NFR BLD AUTO: 92.2 %
NRBC # BLD AUTO: 0 10*3/UL
NRBC BLD AUTO-RTO: 0 /100
PHOSPHATE SERPL-MCNC: 6.8 MG/DL (ref 2.5–4.5)
PLATELET # BLD AUTO: 219 10E9/L (ref 150–450)
POTASSIUM SERPL-SCNC: 4.8 MMOL/L (ref 3.4–5.3)
POTASSIUM SERPL-SCNC: 4.9 MMOL/L (ref 3.4–5.3)
POTASSIUM SERPL-SCNC: 5.2 MMOL/L (ref 3.4–5.3)
POTASSIUM SERPL-SCNC: 5.2 MMOL/L (ref 3.4–5.3)
RBC # BLD AUTO: 2.74 10E12/L (ref 3.8–5.2)
SODIUM SERPL-SCNC: 137 MMOL/L (ref 133–144)
TACROLIMUS BLD-MCNC: <3 UG/L (ref 5–15)
TME LAST DOSE: ABNORMAL H
WBC # BLD AUTO: 9.8 10E9/L (ref 4–11)

## 2020-11-14 PROCEDURE — 80197 ASSAY OF TACROLIMUS: CPT | Performed by: SURGERY

## 2020-11-14 PROCEDURE — 120N000011 HC R&B TRANSPLANT UMMC

## 2020-11-14 PROCEDURE — 36592 COLLECT BLOOD FROM PICC: CPT | Performed by: SURGERY

## 2020-11-14 PROCEDURE — 250N000013 HC RX MED GY IP 250 OP 250 PS 637: Performed by: SURGERY

## 2020-11-14 PROCEDURE — 250N000013 HC RX MED GY IP 250 OP 250 PS 637: Performed by: PHYSICIAN ASSISTANT

## 2020-11-14 PROCEDURE — 36415 COLL VENOUS BLD VENIPUNCTURE: CPT | Performed by: PHYSICIAN ASSISTANT

## 2020-11-14 PROCEDURE — 84132 ASSAY OF SERUM POTASSIUM: CPT | Performed by: SURGERY

## 2020-11-14 PROCEDURE — 87517 HEPATITIS B DNA QUANT: CPT | Performed by: SURGERY

## 2020-11-14 PROCEDURE — 99233 SBSQ HOSP IP/OBS HIGH 50: CPT | Mod: 24 | Performed by: INTERNAL MEDICINE

## 2020-11-14 PROCEDURE — 83735 ASSAY OF MAGNESIUM: CPT | Performed by: SURGERY

## 2020-11-14 PROCEDURE — 36592 COLLECT BLOOD FROM PICC: CPT | Performed by: PHYSICIAN ASSISTANT

## 2020-11-14 PROCEDURE — 86704 HEP B CORE ANTIBODY TOTAL: CPT | Performed by: SURGERY

## 2020-11-14 PROCEDURE — 86706 HEP B SURFACE ANTIBODY: CPT | Performed by: SURGERY

## 2020-11-14 PROCEDURE — 250N000012 HC RX MED GY IP 250 OP 636 PS 637: Performed by: SURGERY

## 2020-11-14 PROCEDURE — 80048 BASIC METABOLIC PNL TOTAL CA: CPT | Performed by: SURGERY

## 2020-11-14 PROCEDURE — 258N000003 HC RX IP 258 OP 636: Performed by: PHYSICIAN ASSISTANT

## 2020-11-14 PROCEDURE — 258N000003 HC RX IP 258 OP 636: Performed by: STUDENT IN AN ORGANIZED HEALTH CARE EDUCATION/TRAINING PROGRAM

## 2020-11-14 PROCEDURE — 999N001017 HC STATISTIC GLUCOSE BY METER IP

## 2020-11-14 PROCEDURE — 250N000011 HC RX IP 250 OP 636: Performed by: STUDENT IN AN ORGANIZED HEALTH CARE EDUCATION/TRAINING PROGRAM

## 2020-11-14 PROCEDURE — 85018 HEMOGLOBIN: CPT | Performed by: PHYSICIAN ASSISTANT

## 2020-11-14 PROCEDURE — 250N000012 HC RX MED GY IP 250 OP 636 PS 637: Performed by: PHYSICIAN ASSISTANT

## 2020-11-14 PROCEDURE — 250N000011 HC RX IP 250 OP 636: Performed by: PHYSICIAN ASSISTANT

## 2020-11-14 PROCEDURE — 84100 ASSAY OF PHOSPHORUS: CPT | Performed by: SURGERY

## 2020-11-14 PROCEDURE — 84132 ASSAY OF SERUM POTASSIUM: CPT | Performed by: PHYSICIAN ASSISTANT

## 2020-11-14 PROCEDURE — 250N000013 HC RX MED GY IP 250 OP 250 PS 637: Performed by: INTERNAL MEDICINE

## 2020-11-14 PROCEDURE — 85025 COMPLETE CBC W/AUTO DIFF WBC: CPT | Performed by: SURGERY

## 2020-11-14 RX ORDER — METHYLPREDNISOLONE SODIUM SUCCINATE 125 MG/2ML
100 INJECTION, POWDER, LYOPHILIZED, FOR SOLUTION INTRAMUSCULAR; INTRAVENOUS ONCE
Status: COMPLETED | OUTPATIENT
Start: 2020-11-14 | End: 2020-11-14

## 2020-11-14 RX ORDER — SUCRALFATE ORAL 1 G/10ML
1 SUSPENSION ORAL
Status: DISCONTINUED | OUTPATIENT
Start: 2020-11-14 | End: 2020-11-16

## 2020-11-14 RX ORDER — CARVEDILOL 6.25 MG/1
6.25 TABLET ORAL ONCE
Status: DISCONTINUED | OUTPATIENT
Start: 2020-11-14 | End: 2020-11-14

## 2020-11-14 RX ORDER — CARVEDILOL 12.5 MG/1
12.5 TABLET ORAL 2 TIMES DAILY
Status: DISCONTINUED | OUTPATIENT
Start: 2020-11-14 | End: 2020-11-14

## 2020-11-14 RX ORDER — PANTOPRAZOLE SODIUM 40 MG/1
40 TABLET, DELAYED RELEASE ORAL
Status: DISCONTINUED | OUTPATIENT
Start: 2020-11-15 | End: 2020-11-14

## 2020-11-14 RX ORDER — ATROPA BELLADONNA AND OPIUM 16.2; 3 MG/1; MG/1
30 SUPPOSITORY RECTAL EVERY 8 HOURS PRN
Status: DISCONTINUED | OUTPATIENT
Start: 2020-11-14 | End: 2020-11-19 | Stop reason: HOSPADM

## 2020-11-14 RX ORDER — HYDRALAZINE HYDROCHLORIDE 20 MG/ML
10 INJECTION INTRAMUSCULAR; INTRAVENOUS EVERY 4 HOURS PRN
Status: DISCONTINUED | OUTPATIENT
Start: 2020-11-14 | End: 2020-11-16

## 2020-11-14 RX ORDER — DIPHENHYDRAMINE HCL 25 MG
25-50 CAPSULE ORAL ONCE
Status: COMPLETED | OUTPATIENT
Start: 2020-11-14 | End: 2020-11-14

## 2020-11-14 RX ORDER — BISACODYL 10 MG
10 SUPPOSITORY, RECTAL RECTAL DAILY PRN
Status: DISCONTINUED | OUTPATIENT
Start: 2020-11-14 | End: 2020-11-19 | Stop reason: HOSPADM

## 2020-11-14 RX ORDER — PANTOPRAZOLE SODIUM 40 MG/1
40 TABLET, DELAYED RELEASE ORAL
Status: DISCONTINUED | OUTPATIENT
Start: 2020-11-14 | End: 2020-11-19 | Stop reason: HOSPADM

## 2020-11-14 RX ORDER — CALCIUM GLUCONATE 94 MG/ML
1 INJECTION, SOLUTION INTRAVENOUS ONCE
Status: DISCONTINUED | OUTPATIENT
Start: 2020-11-14 | End: 2020-11-14 | Stop reason: CLARIF

## 2020-11-14 RX ORDER — SODIUM CHLORIDE 9 MG/ML
INJECTION, SOLUTION INTRAVENOUS CONTINUOUS
Status: DISCONTINUED | OUTPATIENT
Start: 2020-11-14 | End: 2020-11-16

## 2020-11-14 RX ORDER — ACETAMINOPHEN 325 MG/1
650 TABLET ORAL ONCE
Status: DISCONTINUED | OUTPATIENT
Start: 2020-11-14 | End: 2020-11-15

## 2020-11-14 RX ORDER — DIPHENHYDRAMINE HCL 12.5MG/5ML
25-50 LIQUID (ML) ORAL ONCE
Status: COMPLETED | OUTPATIENT
Start: 2020-11-14 | End: 2020-11-14

## 2020-11-14 RX ORDER — CARVEDILOL 6.25 MG/1
6.25 TABLET ORAL 2 TIMES DAILY
Status: DISCONTINUED | OUTPATIENT
Start: 2020-11-14 | End: 2020-11-16

## 2020-11-14 RX ORDER — POLYETHYLENE GLYCOL 3350 17 G/17G
17 POWDER, FOR SOLUTION ORAL DAILY
Status: DISCONTINUED | OUTPATIENT
Start: 2020-11-14 | End: 2020-11-19 | Stop reason: HOSPADM

## 2020-11-14 RX ADMIN — AMLODIPINE BESYLATE 10 MG: 10 TABLET ORAL at 09:25

## 2020-11-14 RX ADMIN — FUROSEMIDE 40 MG: 10 INJECTION, SOLUTION INTRAMUSCULAR; INTRAVENOUS at 06:47

## 2020-11-14 RX ADMIN — CALCIUM GLUCONATE 1 G: 98 INJECTION, SOLUTION INTRAVENOUS at 05:24

## 2020-11-14 RX ADMIN — FUROSEMIDE 40 MG: 10 INJECTION, SOLUTION INTRAMUSCULAR; INTRAVENOUS at 12:13

## 2020-11-14 RX ADMIN — ACETAMINOPHEN 975 MG: 325 TABLET, FILM COATED ORAL at 12:11

## 2020-11-14 RX ADMIN — CARVEDILOL 6.25 MG: 6.25 TABLET, FILM COATED ORAL at 20:41

## 2020-11-14 RX ADMIN — ACETAMINOPHEN 975 MG: 325 TABLET, FILM COATED ORAL at 20:41

## 2020-11-14 RX ADMIN — TACROLIMUS 1 MG: 1 CAPSULE ORAL at 09:23

## 2020-11-14 RX ADMIN — CARVEDILOL 6.25 MG: 6.25 TABLET, FILM COATED ORAL at 09:33

## 2020-11-14 RX ADMIN — OXYCODONE HYDROCHLORIDE 5 MG: 5 TABLET ORAL at 16:09

## 2020-11-14 RX ADMIN — MYCOPHENOLATE MOFETIL 750 MG: 250 CAPSULE ORAL at 09:22

## 2020-11-14 RX ADMIN — DOCUSATE SODIUM 50 MG AND SENNOSIDES 8.6 MG 2 TABLET: 8.6; 5 TABLET, FILM COATED ORAL at 09:23

## 2020-11-14 RX ADMIN — METHYLPREDNISOLONE SODIUM SUCCINATE 100 MG: 125 INJECTION, POWDER, FOR SOLUTION INTRAMUSCULAR; INTRAVENOUS at 12:13

## 2020-11-14 RX ADMIN — ACETAMINOPHEN 975 MG: 325 TABLET, FILM COATED ORAL at 03:31

## 2020-11-14 RX ADMIN — BISACODYL 10 MG: 10 SUPPOSITORY RECTAL at 16:09

## 2020-11-14 RX ADMIN — ANTI-THYMOCYTE GLOBULIN (RABBIT) 100 MG: 5 INJECTION, POWDER, LYOPHILIZED, FOR SOLUTION INTRAVENOUS at 12:47

## 2020-11-14 RX ADMIN — FUROSEMIDE 40 MG: 10 INJECTION, SOLUTION INTRAMUSCULAR; INTRAVENOUS at 17:56

## 2020-11-14 RX ADMIN — SODIUM CHLORIDE, PRESERVATIVE FREE: 5 INJECTION INTRAVENOUS at 14:52

## 2020-11-14 RX ADMIN — DOCUSATE SODIUM 50 MG AND SENNOSIDES 8.6 MG 2 TABLET: 8.6; 5 TABLET, FILM COATED ORAL at 20:41

## 2020-11-14 RX ADMIN — ASPIRIN 81 MG: 81 TABLET, CHEWABLE ORAL at 09:26

## 2020-11-14 RX ADMIN — POLYETHYLENE GLYCOL 3350 17 G: 17 POWDER, FOR SOLUTION ORAL at 12:47

## 2020-11-14 RX ADMIN — PANTOPRAZOLE SODIUM 40 MG: 40 TABLET, DELAYED RELEASE ORAL at 12:12

## 2020-11-14 RX ADMIN — MYCOPHENOLATE MOFETIL 750 MG: 250 CAPSULE ORAL at 17:56

## 2020-11-14 RX ADMIN — MAGNESIUM OXIDE 400 MG: 400 TABLET ORAL at 12:12

## 2020-11-14 RX ADMIN — SUCRALFATE 1 G: 1 SUSPENSION ORAL at 15:57

## 2020-11-14 RX ADMIN — FEXOFENADINE HYDROCHLORIDE 180 MG: 180 TABLET, FILM COATED ORAL at 09:24

## 2020-11-14 RX ADMIN — ATORVASTATIN CALCIUM 10 MG: 10 TABLET, FILM COATED ORAL at 09:26

## 2020-11-14 RX ADMIN — TACROLIMUS 1 MG: 1 CAPSULE ORAL at 17:56

## 2020-11-14 RX ADMIN — DIPHENHYDRAMINE HYDROCHLORIDE 50 MG: 25 CAPSULE ORAL at 12:12

## 2020-11-14 ASSESSMENT — ACTIVITIES OF DAILY LIVING (ADL)
ADLS_ACUITY_SCORE: 12
ADLS_ACUITY_SCORE: 16
ADLS_ACUITY_SCORE: 12
ADLS_ACUITY_SCORE: 16
ADLS_ACUITY_SCORE: 12
ADLS_ACUITY_SCORE: 12

## 2020-11-14 ASSESSMENT — MIFFLIN-ST. JEOR: SCORE: 1229.13

## 2020-11-14 NOTE — PROVIDER NOTIFICATION
Paged on-call that pt's K recheck was 5.8 continue to trend up (5.7 last check). Provider reached out and is waiting for fellow to call back to make a plan.

## 2020-11-14 NOTE — PROVIDER NOTIFICATION
While doing chart review I noticed HTN for Paw  I paged on call resident #[phone]  /87    Recheck /90

## 2020-11-14 NOTE — PHARMACY-CONSULT NOTE
Patient is being treated for hyperkalemia. A pharmacy consult was initiated to review the patient's medication list for possible causes of hyperkalemia.  The following medications for this patient may cause or exacerbate hyperkalemia: MMF, Bactrim, Tacrolimus, Valcyte (has not taken yet)    Continue current medication regimen. Monitor tacrolimus levels to avoid supratherapeutic blood levels. May consider delaying or holding antimicrobial prophylaxis (Bactrim, Valcyte) while awaiting for hyperkalemia to resolve.       Estiven Cali, PharmD  [phone]

## 2020-11-14 NOTE — PROGRESS NOTES
[facility]    Transplant Nephrology Progress Note  Date of Admission:  11/12/2020  Today's Date: 11/14/2020    Recommendations:     - Lasix 40 IV q8hr and start NS at 100/hr (stop D10). Continue to monitor K+.     Assessment & Plan      # DDKT: baseline TBD. Creatinine stable. Graft had cold ischemia time >20 hr. UOP slowly increasing.   - Proteinuria: 7.4g/g prior to transplant. Needs to be repeated with FSGS protocol given unknown native disease   - Date DSA Last Checked:Nov/2020  Latest DSA: final XM pending   - BK Viremia: Not checked post transplant   - Kidney Tx Biopsy: No    Hep B core positive: DNA quant pending, will need entecavir if positive    # Immunosuppression: Tacrolimus immediate release (goal 8-10) and Mycophenolate mofetil (dose 750 mg every 12 hours)              - Changes: No - High risk rATG protocol for induction due to likely DGF     # Infection Prophylaxis:   - PJP: Sulfa/TMP (Bactrim)  - CMV: Valcyte x3m      # Hypertension: Borderline control. BP has been taken on RLE. Re-check on right wrist 120's/70's            Goal BP: < 150/90              - Volume status: euvolemic             EDW ~ unknown              - Changes: No     # Elevated Blood Glucose: Glucose generally running ~ 140-160              - Management as per primary team.     # Anemia in Chronic Renal Disease: Hgb: Stable      JESSI: No              - Iron studies: Not checked recently     # Mineral Bone Disorder:   - Secondary renal hyperparathyroidism; PTH level: Significantly elevated (601-1200 pg/ml)        On treatment: likely had been on treatment while on HD  - Vitamin D; level: Not checked recently        On supplement: No  - Calcium; level: Normal        On supplement: No  - Phosphorus; level: High        On supplement: No     # Electrolytes:   - Potassium; level: trending down        On supplement: No  - Magnesium; level: Normal        On supplement: No  - Bicarbonate;  level: Normal        On supplement: No        # Transplant History:  Etiology of Kidney Failure: Unknown etiology (no kidney biopsy)  Tx: DDKT  Transplant: 2020 (Kidney)  Donor Type: Donation after Circulatory Death           Donor Class:   Crossmatch at time of Tx: negative  Significant changes in immunosuppression: None  Significant transplant-related complications: None    Recommendations were communicated to the primary team via this note.    Seen and discussed with Dr. John Allan, PADrewC   Pager: [phone]    Interval History   She reports some mild abdominal pain that resolves with bowel movement. No significant change in creatinine. Good urine output. As above, BP have been being taken on the lower extremity. Repeat BP during this exam on right wrist 120's/70's mmHg. No chest pain, SOB, f/s/c, n/v.     Review of Systems   4 point ROS was obtained and negative except as noted in the Interval History.    MEDICATIONS:    acetaminophen  650 mg Oral Once     acetaminophen  975 mg Oral Q8H     amLODIPine  10 mg Oral Daily     anti-thymocyte globulin  100 mg Intravenous Central line Once     aspirin  81 mg Oral Daily     atorvastatin  10 mg Oral Daily     carvedilol  6.25 mg Oral BID w/meals     fexofenadine  180 mg Oral BID     furosemide  40 mg Intravenous Q6H     magnesium oxide  400 mg Oral Daily with lunch     mycophenolate  750 mg Oral BID IS     pantoprazole  40 mg Oral QAM AC     polyethylene glycol  17 g Oral Daily     senna-docusate  1 tablet Oral BID    Or     senna-docusate  2 tablet Oral BID     sodium chloride (PF)  10 mL Intracatheter Q8H     sucralfate  1 g Oral BID AC     sulfamethoxazole-trimethoprim  1 tablet Oral Once per day on      tacrolimus  1 mg Oral BID IS     [START ON 2020] valGANciclovir  450 mg Oral Twice Weekly       dextrose 10% Stopped (20 0246)     NaCl       NaCl         Physical Exam   Temp  Av.4  F (36.9  C)  Min: 97.1  F (36.2  C)   "Max: 99.1  F (37.3  C)  Arterial Line BP  Min: 83/44  Max: 109/54  Arterial Line MAP (mmHg)  Av.4 mmHg  Min: 62 mmHg  Max: 77 mmHg      Pulse  Av.4  Min: 87  Max: 109 Resp  Avg: 15.6  Min: 12  Max: 20  SpO2  Av.1 %  Min: 94 %  Max: 100 %    CVP (mmHg): 8 mmHg(Order to stop CVP monitoring. )BP (!) 160/84 (BP Location: Left leg)   Pulse 89   Temp 98.2  F (36.8  C) (Oral)   Resp 14   Ht 1.6 m (5' 3\")   Wt 52.4 kg (115 lb 8.3 oz)   SpO2 99%   BMI 20.46 kg/m     Date 20 0700 - 11/15/20 0659   Shift 4582-4710 4587-2371 6345-2157 24 Hour Total   INTAKE   P.O. 240   240   Shift Total(mL/kg) 240(4.58)   240(4.58)   OUTPUT   Urine 700   700   Shift Total(mL/kg) 700(13.36)   700(13.36)   Weight (kg) 52.4 52.4 52.4 52.4      Admit Weight: 52 kg (114 lb 9.6 oz)     GENERAL APPEARANCE: alert and no distress  HENT: mouth without ulcers or lesions  RESP: no audible wheeze  CV:  normal rate  EDEMA: no LE edema bilaterally  ABDOMEN: soft, nondistended, nontender  MS: extremities normal - no gross deformities noted, no evidence of inflammation in joints, no muscle tenderness  SKIN: no rash    Data   All labs reviewed by me.  CMP  Recent Labs   Lab 20  0537 20  0344 20  2144 206 20  0441 20  0441 207 20  1500     --   --   --   --  135 134 136   POTASSIUM 5.2 5.2 5.7* 5.8*   < > 5.7* 4.4 3.9   CHLORIDE 105  --   --   --   --  101 98 97   CO2 21  --   --   --   --  23 24 29   ANIONGAP 12  --   --   --   --  10 13 11   *  --   --   --   --  164* 160* 92   BUN 44*  --   --   --   --  32* 30 28   CR 8.27*  --   --   --   --  8.26* 8.02* 8.73*   GFRESTIMATED 5*  --   --   --   --  5* 6* 5*   GFRESTBLACK 6*  --   --   --   --  6* 6* 6*   HOLLIS 8.9  --   --   --   --  8.7 9.2 9.5   MAG 2.1  --   --   --   --  2.3 2.2  --    PHOS 6.8*  --   --   --   --  4.9* 4.8*  --    PROTTOTAL  --   --   --   --   --   --   --  8.4   ALBUMIN  --   --   --   --  "  --   --   --  3.8   BILITOTAL  --   --   --   --   --   --   --  0.5   ALKPHOS  --   --   --   --   --   --   --  142   AST  --   --   --   --   --   --   --  19   ALT  --   --   --   --   --   --   --  18    < > = values in this interval not displayed.     CBC  Recent Labs   Lab 11/14/20  0537 11/14/20  0344 11/13/20 2144 11/13/20 1956 11/13/20 0441 11/13/20 0441 11/12/20 2217 11/12/20  1500   HGB 8.4* 8.7* 8.9* 8.9*   < > 9.8* 9.9* 11.9   WBC 9.8  --   --   --   --  13.5* 12.8* 6.2   RBC 2.74*  --   --   --   --  3.18* 3.21* 3.85   HCT 26.9*  --   --   --   --  31.8* 31.2* 37.4   MCV 98  --   --   --   --  100 97 97   MCH 30.7  --   --   --   --  30.8 30.8 30.9   MCHC 31.2*  --   --   --   --  30.8* 31.7 31.8   RDW 15.1*  --   --   --   --  15.0 14.6 14.8     --   --   --   --  229 234 303    < > = values in this interval not displayed.     INR  Recent Labs   Lab 11/12/20 2217 11/12/20  1500   INR 1.01 0.92   PTT 33 34     ABGNo lab results found in last 7 days.   Urine Studies  Recent Labs   Lab Test 11/13/20  0300 01/23/20  2300 04/22/19  2340 06/20/13  0834   COLOR Yellow Light Yellow Straw Straw   APPEARANCE Clear Slightly Cloudy Clear Clear   URINEGLC 30* 30* 30* 70*   URINEBILI Negative Negative Negative Negative   URINEKETONE Negative Negative Negative Negative   SG 1.010 1.003 1.004 1.005   UBLD Large* Trace* Trace* Negative   URINEPH 7.0 8.0* 8.5* 8.5*   PROTEIN 100* 30* 30* 30*   NITRITE Negative Negative Negative Negative   LEUKEST Negative Large* Trace* Moderate*   RBCU >182* 2 <1 <1   WBCU 69* 48* 3 15*     Recent Labs   Lab Test 11/13/20  0300 01/23/20  2300 04/22/19  2340   UTPG 7.43* 5.08* 6.67*     PTH  No lab results found.  Iron Studies  No lab results found.    IMAGING:  All imaging studies reviewed by me.     Patient was seen by myself, Dr. Emile Lopez, in conjunction with Felecia Allan PA-C as part of a shared visit.    I personally reviewed past medical and surgical history,  vital signs, medications and labs.  Present and past medical history, along with significant physical exam findings were all reviewed with MERVAT.    My key findings:  Mild hyperkalemia will manage medically   Will continue with post engraftment standard management

## 2020-11-14 NOTE — PLAN OF CARE
Admitted/transferred from:   Time of arrival on unit 8143  2 RN  skin assessment completed by Ly CARRION RN and Allan HERBERT RN  Skin assessment finding: intact. Abdominal incision dressing intact and to remain on for another dy per surgeon  Interventions/actions: Continue with POC     Will continue to monitor.

## 2020-11-14 NOTE — PROVIDER NOTIFICATION
Paged provider that pt now cannot put any weight on her R leg. She almost fell when trying to go to the bathroom. This is new but I know she was having numbness and tingling in that leg earlier.

## 2020-11-14 NOTE — PLAN OF CARE
"1900 - 0730    BP (!) 170/90 (BP Location: Left leg)   Pulse 91   Temp 97.8  F (36.6  C) (Oral)   Resp 16   Ht 1.6 m (5' 3\")   Wt 52.4 kg (115 lb 8.3 oz)   SpO2 99%   BMI 20.46 kg/m      VS: HTN 180s/90s provider aware, OVSS on RA  B - 231  Labs: K 5.8, 5.7, 5.2 - pt was given insulin and dextrose and Ca gluconate - recheck in AM. Hgb 8.9, 8.7  Pain/Nausea/PRN: Scheduled tylenol and PRN oxycodone x 1 adequately controlling pain. Denied nausea  Diet: Full liquid  LDA: I. J. Saline locked, L AV fistula WDL, R PICC infusing NS at 50 mL/hr  GI: No BM, pt feeling constipated  : Soni 1425 mL, IV lasix q6h - soni very positional, pt experiencing intense bladder fullness when not draining.   Skin: abdominal incision covered, no new drainage  Mobility: Assist x 2, pt now unable to put weight on R leg - provider up to assess pt, believes it could be a pinched nerve, will pass along to day team  Plan: continue to monitor    "

## 2020-11-14 NOTE — PROGRESS NOTES
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 Patient has undergone attempted kidney transplant twice (2019, 2020), and each time the case was aborted due to hypotension and suspected anaphylaxis. A pre-op plan was made with anesthesia and Dr. Palmer (Allergist). Patient was admitted for a  donor kidney transplant.      Graft function: DDKT 20 with no ureteral stent. DCD donor with >20 hours CIT. US POD 1 AM with 6.3 cm dayna-nephric hematoma and good blood flow. Cr plateaued today, 8.27. K increased to 5.7, now stable at 5.2 with medical management. Will continue to monitor. Good urine output.  Immunosuppression management: high risk protocol due to potential for DGF   intra-op/250 mg/100 mg POD 2  Thymo 100 intra-op/100 mg/100mg POD 2   mg BID  FK 1 mg BID.   Complexity of management: High. Contributing factors: induction, history of hypoetension with operation and anesthesia, and long ishemia time  Hematology: Hgb 8.4, WBC 9.8, plt 219, continue to monitor  Anemia of CKD  Cardiorespiratory:   HTN: BP 140s-190s overnight. Start Norvasc 10 mg daily per nephrology, PRN hydralazine   GI/Nutrition: ADAT-2k diet. No flatus or bowel function at this time, continue senna/colace  Endocrine: no h/o of DM, monitor BS and sliding scale insulin as needed  Fluid/Electrolytes: MIVF: NS @ 75 ml/hour per nephrology  : Moreno to remain due to new surgical anastomosis-plan for removal on POD3  Infectious disease: AF.   H/o HEP C core antibody positive: Will discuss Entecavir initiation  Prophylaxis: DVT, fall, GI, viral (Valycte x 3 months), pneumocystis (bactrim indefinitely)  Disposition: 7A,  "will plan for discharge Monday AM, will need to complete education before that time    Medical Decision Making: Medium  Subsequent visit 25923 (moderate level decision making)    MERVAT/Fellow/Resident Provider: Maria De Jesus Boone PA-C [id number]    Faculty: Dayne Marcelino M.D.  _________________________________________________________________  Transplant History:   11/12/2020 (Kidney), Postoperative day: 2     Interval History: History is obtained from the patient  Overnight events: Right leg with weakness and decreased sensation post op.  Some bladder spasms and heartburn    ROS:   A 10-point review of systems was negative except as noted above.    Meds:    acetaminophen  650 mg Oral Once     acetaminophen  975 mg Oral Q8H     amLODIPine  10 mg Oral Daily     anti-thymocyte globulin  100 mg Intravenous Central line Once     aspirin  81 mg Oral Daily     atorvastatin  10 mg Oral Daily     carvedilol  6.25 mg Oral BID w/meals     diphenhydrAMINE  25-50 mg Oral Once    Or     diphenhydrAMINE  25-50 mg Per NG tube Once     fexofenadine  180 mg Oral BID     furosemide  40 mg Intravenous Q6H     magnesium oxide  400 mg Oral Daily with lunch     methylPREDNISolone  100 mg Intravenous Once     mycophenolate  750 mg Oral BID IS     pantoprazole  40 mg Oral QAM AC     senna-docusate  1 tablet Oral BID    Or     senna-docusate  2 tablet Oral BID     sodium chloride (PF)  10 mL Intracatheter Q8H     sucralfate  1 g Oral BID AC     sulfamethoxazole-trimethoprim  1 tablet Oral Once per day on Mon Wed Fri     tacrolimus  1 mg Oral BID IS     [START ON 11/16/2020] valGANciclovir  450 mg Oral Twice Weekly       Physical Exam:     Admit Weight: 52 kg (114 lb 9.6 oz)    Current vitals:   BP (!) 151/81 (BP Location: Left leg)   Pulse 95   Temp 98  F (36.7  C) (Oral)   Resp 16   Ht 1.6 m (5' 3\")   Wt 52.4 kg (115 lb 8.3 oz)   SpO2 97%   BMI 20.46 kg/m      Vital sign ranges:    Temp:  [97.8  F (36.6  C)-99.1  F (37.3  C)] 98  F (36.7 "  C)  Pulse:  [87-96] 95  Resp:  [16] 16  BP: (151-192)/(81-95) 151/81  SpO2:  [94 %-100 %] 97 %    General Appearance: in no apparent distress.   Skin: Warm, perfused  Heart: RRR  Lungs: NLB on room air   Abdomen: The abdomen is soft, NT. Incision c/d/i   : soni is present.  Urine with mild hematuria.  Extremities: edema: trace bilaterally, strength: RLE <LLE  Neurologic: awake, alert and oriented. Tremor absent.    Data:   CMP  Recent Labs   Lab 11/14/20 0537 11/14/20 0344 11/13/20 0441 11/13/20 0441 11/12/20  1500 11/12/20  1500     --   --  135   < > 136   POTASSIUM 5.2 5.2   < > 5.7*   < > 3.9   CHLORIDE 105  --   --  101   < > 97   CO2 21  --   --  23   < > 29   *  --   --  164*   < > 92   BUN 44*  --   --  32*   < > 28   CR 8.27*  --   --  8.26*   < > 8.73*   GFRESTIMATED 5*  --   --  5*   < > 5*   GFRESTBLACK 6*  --   --  6*   < > 6*   HOLLIS 8.9  --   --  8.7   < > 9.5   MAG 2.1  --   --  2.3   < >  --    PHOS 6.8*  --   --  4.9*   < >  --    ALBUMIN  --   --   --   --   --  3.8   BILITOTAL  --   --   --   --   --  0.5   ALKPHOS  --   --   --   --   --  142   AST  --   --   --   --   --  19   ALT  --   --   --   --   --  18    < > = values in this interval not displayed.     CBC  Recent Labs   Lab 11/14/20 0537 11/14/20 0344 11/13/20 0441 11/13/20 0441 11/12/20  1500 11/12/20  1500   HGB 8.4* 8.7*   < > 9.8*   < > 11.9   WBC 9.8  --   --  13.5*   < > 6.2     --   --  229   < > 303   A1C  --   --   --   --   --  4.9    < > = values in this interval not displayed.

## 2020-11-14 NOTE — PROGRESS NOTES
"BP (!) 175/90 (BP Location: Left leg)   Pulse 94   Temp 99.1  F (37.3  C)   Resp 16   Ht 1.6 m (5' 3\")   Wt 52.4 kg (115 lb 8.3 oz)   SpO2 94%   BMI 20.46 kg/m     Pt transferred to  from  around 1715  VS: VSS on RA except elevated BP (provider notified) and continue to monitor BP  pain c/o incisional pain but declined pain meds  GI: on full liquid diet and tolerating good  : Moreno in place with pink urine 165 ml  Drains: ISAAC MALDONADO. R PICC infusion NS at 50ml/h  Plan of Care - Continue with POC   "

## 2020-11-14 NOTE — PLAN OF CARE
"BP (!) 162/84 (BP Location: Left leg)   Pulse 97   Temp 98.4  F (36.9  C) (Oral)   Resp 18   Ht 1.6 m (5' 3\")   Wt 52.4 kg (115 lb 8.3 oz)   SpO2 98%   BMI 20.46 kg/m     Neuro: A/Ox3, Patricia speaking and  utulized. RLE weakness, numbness/tingling and provider is aware  VS: VSS on RA except elevated BP within parameters  Pain: c/o abdominal/incisional pain, PRN oxycodone given with some relief  GI: on 2gr K no appetite and pt didn't eat today. Denies nausea. Constipated, Senna + miralax and suppository given no effect yet  : Moreno in place with good clear UOP  Skin: Abdominal incision intact/dry TARAH. Skin intact  Drains - RIJ intact infusing Thymo and pt tolerating good. R arm PICC infusing NS 75ml/h  Activity - Extensive assist of 1 to bedside commode. Bed alarm in place  Education - learning difficulties due to language, contacted PLC and pt is scheduled for Monday noon with pt's aunty present. Today this writer was able to teach pt medication, infection/fall prevention and had pt watch some videos on my transplant place (reinforcement is needed)  Plan of Care - Continue with POC    "

## 2020-11-15 ENCOUNTER — APPOINTMENT (OUTPATIENT)
Dept: PHYSICAL THERAPY | Facility: CLINIC | Age: 45
DRG: 652 | End: 2020-11-15
Attending: PHYSICIAN ASSISTANT
Payer: MEDICARE

## 2020-11-15 DIAGNOSIS — Z79.899 ENCOUNTER FOR LONG-TERM CURRENT USE OF MEDICATION: ICD-10-CM

## 2020-11-15 DIAGNOSIS — Z94.0 KIDNEY REPLACED BY TRANSPLANT: Primary | ICD-10-CM

## 2020-11-15 DIAGNOSIS — Z48.298 AFTERCARE FOLLOWING ORGAN TRANSPLANT: ICD-10-CM

## 2020-11-15 LAB
ALBUMIN UR-MCNC: NEGATIVE MG/DL
ANION GAP SERPL CALCULATED.3IONS-SCNC: 12 MMOL/L (ref 3–14)
APPEARANCE UR: CLEAR
BASOPHILS # BLD AUTO: 0 10E9/L (ref 0–0.2)
BASOPHILS NFR BLD AUTO: 0.2 %
BILIRUB UR QL STRIP: NEGATIVE
BUN SERPL-MCNC: 56 MG/DL (ref 7–30)
CALCIUM SERPL-MCNC: 8.8 MG/DL (ref 8.5–10.1)
CHLORIDE SERPL-SCNC: 106 MMOL/L (ref 94–109)
CO2 SERPL-SCNC: 20 MMOL/L (ref 20–32)
COLOR UR AUTO: ABNORMAL
CREAT SERPL-MCNC: 7.68 MG/DL (ref 0.52–1.04)
DIFFERENTIAL METHOD BLD: ABNORMAL
EOSINOPHIL # BLD AUTO: 0 10E9/L (ref 0–0.7)
EOSINOPHIL NFR BLD AUTO: 0 %
ERYTHROCYTE [DISTWIDTH] IN BLOOD BY AUTOMATED COUNT: 15.2 % (ref 10–15)
GFR SERPL CREATININE-BSD FRML MDRD: 6 ML/MIN/{1.73_M2}
GLUCOSE SERPL-MCNC: 88 MG/DL (ref 70–99)
GLUCOSE UR STRIP-MCNC: NEGATIVE MG/DL
HCT VFR BLD AUTO: 26.5 % (ref 35–47)
HGB BLD-MCNC: 8 G/DL (ref 11.7–15.7)
HGB UR QL STRIP: ABNORMAL
IMM GRANULOCYTES # BLD: 0 10E9/L (ref 0–0.4)
IMM GRANULOCYTES NFR BLD: 0.4 %
KETONES UR STRIP-MCNC: NEGATIVE MG/DL
LEUKOCYTE ESTERASE UR QL STRIP: NEGATIVE
LYMPHOCYTES # BLD AUTO: 0.2 10E9/L (ref 0.8–5.3)
LYMPHOCYTES NFR BLD AUTO: 3 %
MAGNESIUM SERPL-MCNC: 2.1 MG/DL (ref 1.6–2.3)
MCH RBC QN AUTO: 30 PG (ref 26.5–33)
MCHC RBC AUTO-ENTMCNC: 30.2 G/DL (ref 31.5–36.5)
MCV RBC AUTO: 99 FL (ref 78–100)
MONOCYTES # BLD AUTO: 0.4 10E9/L (ref 0–1.3)
MONOCYTES NFR BLD AUTO: 7 %
MUCOUS THREADS #/AREA URNS LPF: PRESENT /LPF
NEUTROPHILS # BLD AUTO: 4.7 10E9/L (ref 1.6–8.3)
NEUTROPHILS NFR BLD AUTO: 89.4 %
NITRATE UR QL: NEGATIVE
NRBC # BLD AUTO: 0 10*3/UL
NRBC BLD AUTO-RTO: 0 /100
PH UR STRIP: 6 PH (ref 5–7)
PHOSPHATE SERPL-MCNC: 7.2 MG/DL (ref 2.5–4.5)
PLATELET # BLD AUTO: 193 10E9/L (ref 150–450)
POTASSIUM SERPL-SCNC: 4.5 MMOL/L (ref 3.4–5.3)
RBC # BLD AUTO: 2.67 10E12/L (ref 3.8–5.2)
RBC #/AREA URNS AUTO: 1 /HPF (ref 0–2)
SODIUM SERPL-SCNC: 138 MMOL/L (ref 133–144)
SOURCE: ABNORMAL
SP GR UR STRIP: 1.01 (ref 1–1.03)
SQUAMOUS #/AREA URNS AUTO: <1 /HPF (ref 0–1)
TRANS CELLS #/AREA URNS HPF: <1 /HPF (ref 0–1)
UROBILINOGEN UR STRIP-MCNC: NORMAL MG/DL (ref 0–2)
WBC # BLD AUTO: 5.3 10E9/L (ref 4–11)
WBC #/AREA URNS AUTO: 1 /HPF (ref 0–5)

## 2020-11-15 PROCEDURE — 97162 PT EVAL MOD COMPLEX 30 MIN: CPT | Mod: GP | Performed by: REHABILITATION PRACTITIONER

## 2020-11-15 PROCEDURE — 99233 SBSQ HOSP IP/OBS HIGH 50: CPT | Mod: 24 | Performed by: INTERNAL MEDICINE

## 2020-11-15 PROCEDURE — 87086 URINE CULTURE/COLONY COUNT: CPT | Performed by: PHYSICIAN ASSISTANT

## 2020-11-15 PROCEDURE — 250N000012 HC RX MED GY IP 250 OP 636 PS 637: Performed by: PHYSICIAN ASSISTANT

## 2020-11-15 PROCEDURE — 250N000012 HC RX MED GY IP 250 OP 636 PS 637: Performed by: SURGERY

## 2020-11-15 PROCEDURE — 97530 THERAPEUTIC ACTIVITIES: CPT | Mod: GP | Performed by: REHABILITATION PRACTITIONER

## 2020-11-15 PROCEDURE — 83735 ASSAY OF MAGNESIUM: CPT | Performed by: SURGERY

## 2020-11-15 PROCEDURE — 250N000013 HC RX MED GY IP 250 OP 250 PS 637: Performed by: INTERNAL MEDICINE

## 2020-11-15 PROCEDURE — 258N000003 HC RX IP 258 OP 636: Performed by: PHYSICIAN ASSISTANT

## 2020-11-15 PROCEDURE — 84100 ASSAY OF PHOSPHORUS: CPT | Performed by: SURGERY

## 2020-11-15 PROCEDURE — 250N000013 HC RX MED GY IP 250 OP 250 PS 637: Performed by: PHYSICIAN ASSISTANT

## 2020-11-15 PROCEDURE — 81001 URINALYSIS AUTO W/SCOPE: CPT | Performed by: PHYSICIAN ASSISTANT

## 2020-11-15 PROCEDURE — 85025 COMPLETE CBC W/AUTO DIFF WBC: CPT | Performed by: SURGERY

## 2020-11-15 PROCEDURE — 250N000013 HC RX MED GY IP 250 OP 250 PS 637: Performed by: SURGERY

## 2020-11-15 PROCEDURE — 36592 COLLECT BLOOD FROM PICC: CPT | Performed by: SURGERY

## 2020-11-15 PROCEDURE — 80048 BASIC METABOLIC PNL TOTAL CA: CPT | Performed by: SURGERY

## 2020-11-15 PROCEDURE — 120N000011 HC R&B TRANSPLANT UMMC

## 2020-11-15 PROCEDURE — 250N000011 HC RX IP 250 OP 636: Performed by: PHYSICIAN ASSISTANT

## 2020-11-15 PROCEDURE — 97116 GAIT TRAINING THERAPY: CPT | Mod: GP | Performed by: REHABILITATION PRACTITIONER

## 2020-11-15 RX ADMIN — MYCOPHENOLATE MOFETIL 750 MG: 250 CAPSULE ORAL at 07:31

## 2020-11-15 RX ADMIN — FEXOFENADINE HYDROCHLORIDE 180 MG: 180 TABLET, FILM COATED ORAL at 07:35

## 2020-11-15 RX ADMIN — FUROSEMIDE 40 MG: 10 INJECTION, SOLUTION INTRAMUSCULAR; INTRAVENOUS at 00:11

## 2020-11-15 RX ADMIN — SUCRALFATE 1 G: 1 SUSPENSION ORAL at 10:10

## 2020-11-15 RX ADMIN — ACETAMINOPHEN 975 MG: 325 TABLET, FILM COATED ORAL at 12:13

## 2020-11-15 RX ADMIN — SUCRALFATE 1 G: 1 SUSPENSION ORAL at 16:47

## 2020-11-15 RX ADMIN — FUROSEMIDE 40 MG: 10 INJECTION, SOLUTION INTRAMUSCULAR; INTRAVENOUS at 06:30

## 2020-11-15 RX ADMIN — SODIUM CHLORIDE, PRESERVATIVE FREE: 5 INJECTION INTRAVENOUS at 17:38

## 2020-11-15 RX ADMIN — POLYETHYLENE GLYCOL 3350 17 G: 17 POWDER, FOR SOLUTION ORAL at 07:38

## 2020-11-15 RX ADMIN — ACETAMINOPHEN 650 MG: 325 TABLET, FILM COATED ORAL at 19:57

## 2020-11-15 RX ADMIN — ATORVASTATIN CALCIUM 10 MG: 10 TABLET, FILM COATED ORAL at 07:35

## 2020-11-15 RX ADMIN — FUROSEMIDE 40 MG: 10 INJECTION, SOLUTION INTRAMUSCULAR; INTRAVENOUS at 12:14

## 2020-11-15 RX ADMIN — DOCUSATE SODIUM 50 MG AND SENNOSIDES 8.6 MG 2 TABLET: 8.6; 5 TABLET, FILM COATED ORAL at 07:35

## 2020-11-15 RX ADMIN — PANTOPRAZOLE SODIUM 40 MG: 40 TABLET, DELAYED RELEASE ORAL at 07:35

## 2020-11-15 RX ADMIN — ASPIRIN 81 MG: 81 TABLET, CHEWABLE ORAL at 07:31

## 2020-11-15 RX ADMIN — FUROSEMIDE 40 MG: 10 INJECTION, SOLUTION INTRAMUSCULAR; INTRAVENOUS at 18:36

## 2020-11-15 RX ADMIN — CARVEDILOL 6.25 MG: 6.25 TABLET, FILM COATED ORAL at 19:57

## 2020-11-15 RX ADMIN — ACETAMINOPHEN 975 MG: 325 TABLET, FILM COATED ORAL at 04:02

## 2020-11-15 RX ADMIN — CARVEDILOL 6.25 MG: 6.25 TABLET, FILM COATED ORAL at 07:34

## 2020-11-15 RX ADMIN — TACROLIMUS 1 MG: 1 CAPSULE ORAL at 18:35

## 2020-11-15 RX ADMIN — AMLODIPINE BESYLATE 10 MG: 10 TABLET ORAL at 07:32

## 2020-11-15 RX ADMIN — TACROLIMUS 1 MG: 1 CAPSULE ORAL at 07:31

## 2020-11-15 RX ADMIN — MAGNESIUM OXIDE 400 MG: 400 TABLET ORAL at 12:13

## 2020-11-15 RX ADMIN — MYCOPHENOLATE MOFETIL 750 MG: 250 CAPSULE ORAL at 18:35

## 2020-11-15 RX ADMIN — SODIUM CHLORIDE, PRESERVATIVE FREE: 5 INJECTION INTRAVENOUS at 04:03

## 2020-11-15 ASSESSMENT — ACTIVITIES OF DAILY LIVING (ADL)
ADLS_ACUITY_SCORE: 12
ADLS_ACUITY_SCORE: 14
ADLS_ACUITY_SCORE: 14

## 2020-11-15 ASSESSMENT — MIFFLIN-ST. JEOR: SCORE: 1216.13

## 2020-11-15 NOTE — PROGRESS NOTES
[facility]    Transplant Nephrology Progress Note  Date of Admission:  11/12/2020  Today's Date: 11/15/2020    Recommendations:  - Repeat urinalysis and culture    Assessment & Plan      # DDKT: Graft had cold ischemia time >20 hr and transplant renal US with 6.3 cm perinephric fluid collection, possible hematoma. UOP increasing. Creatinine had plateaued, now with slight trend down.   - Proteinuria: 7.4g/g prior to transplant. Needs to be repeated with FSGS protocol given unknown native disease   - Date DSA Last Checked:Nov/2020  Latest DSA: final XM pending   - BK Viremia: Not checked post transplant   - Kidney Tx Biopsy: No    Hep B core positive: DNA quant pending, will need entecavir if positive    # Immunosuppression: Tacrolimus immediate release (goal 8-10) and Mycophenolate mofetil (dose 750 mg every 12 hours)     # Infection Prophylaxis:   - PJP: Sulfa/TMP (Bactrim)  - CMV: Valcyte x3m      # Hypertension: Borderline control. BP has been taken on RLE. Re-check on right wrist 120's/70's            Goal BP: < 150/90              - Volume status: euvolemic             EDW ~ unknown              - Changes: No      # Elevated Blood Glucose: Glucose generally running ~ 140-160              - Management as per primary team.     # Anemia in Chronic Renal Disease: Hgb: slow trend down, continue to monitor      JESSI: No              - Iron studies: Not checked recently     # Mineral Bone Disorder:   - Secondary renal hyperparathyroidism; PTH level: Significantly elevated (601-1200 pg/ml)        On treatment: likely had been on treatment while on HD  - Vitamin D; level: Not checked recently        On supplement: No  - Calcium; level: Normal        On supplement: No  - Phosphorus; level: High        On supplement: No     # Electrolytes:   - Potassium; level: Now normal      On supplement: No  - Magnesium; level: Normal        On supplement: Yes  - Bicarbonate; level: Normal         On supplement: No    # Abnormal Urinalysis: recommend repeat      # Transplant History:  Etiology of Kidney Failure: Unknown etiology (no kidney biopsy)  Tx: DDKT  Transplant: 2020 (Kidney)  Donor Type: Donation after Circulatory Death           Donor Class:   Crossmatch at time of Tx: negative  Significant changes in immunosuppression: None  Significant transplant-related complications: None    Recommendations were communicated to the primary team via this note.    Seen and discussed with ARTIS BeckC   Pager: [phone]    Interval History   Creatinine decreased from yesterday.     Review of Systems   4 point ROS was obtained and negative except as noted in the Interval History.    MEDICATIONS:    acetaminophen  650 mg Oral Once     acetaminophen  975 mg Oral Q8H     amLODIPine  10 mg Oral Daily     aspirin  81 mg Oral Daily     atorvastatin  10 mg Oral Daily     carvedilol  6.25 mg Oral BID     fexofenadine  180 mg Oral BID     furosemide  40 mg Intravenous Q6H     magnesium oxide  400 mg Oral Daily with lunch     mycophenolate  750 mg Oral BID IS     pantoprazole  40 mg Oral QAM AC     polyethylene glycol  17 g Oral Daily     senna-docusate  1 tablet Oral BID    Or     senna-docusate  2 tablet Oral BID     sodium chloride (PF)  10 mL Intracatheter Q8H     sucralfate  1 g Oral BID AC     sulfamethoxazole-trimethoprim  1 tablet Oral Once per day on      tacrolimus  1 mg Oral BID IS     [START ON 2020] valGANciclovir  450 mg Oral Twice Weekly       NaCl       sodium chloride 75 mL/hr at 11/15/20 0739     NaCl         Physical Exam   Temp  Av.4  F (36.9  C)  Min: 97.1  F (36.2  C)  Max: 99.1  F (37.3  C)  Arterial Line BP  Min: 83/44  Max: 109/54  Arterial Line MAP (mmHg)  Av.4 mmHg  Min: 62 mmHg  Max: 77 mmHg      Pulse  Av.4  Min: 87  Max: 109 Resp  Avg: 15.6  Min: 12  Max: 20  SpO2  Av.1 %  Min: 94 %  Max: 100 %    CVP (mmHg): 8 mmHg(Order to  "stop CVP monitoring. )BP (!) 171/99 (BP Location: Left leg)   Pulse 95   Temp 97.6  F (36.4  C) (Oral)   Resp 16   Ht 1.6 m (5' 3\")   Wt 58.7 kg (129 lb 6.6 oz)   SpO2 100%   BMI 22.92 kg/m     Date 11/14/20 0700 - 11/15/20 0659   Shift 6478-1753 2787-8721 6450-8952 24 Hour Total   INTAKE   P.O. 240   240   Shift Total(mL/kg) 240(4.58)   240(4.58)   OUTPUT   Urine 700   700   Shift Total(mL/kg) 700(13.36)   700(13.36)   Weight (kg) 52.4 52.4 52.4 52.4      Admit Weight: 52 kg (114 lb 9.6 oz)     GENERAL APPEARANCE: alert and no distress  HENT: mouth without ulcers or lesions  RESP: no audible wheeze  CV:  normal rate  EDEMA: no LE edema bilaterally  ABDOMEN: soft, nondistended, nontender  MS: extremities normal - no gross deformities noted, no evidence of inflammation in joints, no muscle tenderness  SKIN: no rash    Data   All labs reviewed by me.  CMP  Recent Labs   Lab 11/15/20  0627 11/14/20  1731 11/14/20  1322 11/14/20  0537 11/13/20  0441 11/13/20  0441 11/12/20  2217 11/12/20  1500     --   --  137  --  135 134 136   POTASSIUM 4.5 4.8 4.9 5.2   < > 5.7* 4.4 3.9   CHLORIDE 106  --   --  105  --  101 98 97   CO2 20  --   --  21  --  23 24 29   ANIONGAP 12  --   --  12  --  10 13 11   GLC 88  --   --  112*  --  164* 160* 92   BUN 56*  --   --  44*  --  32* 30 28   CR 7.68*  --   --  8.27*  --  8.26* 8.02* 8.73*   GFRESTIMATED 6*  --   --  5*  --  5* 6* 5*   GFRESTBLACK 7*  --   --  6*  --  6* 6* 6*   HOLLIS 8.8  --   --  8.9  --  8.7 9.2 9.5   MAG 2.1  --   --  2.1  --  2.3 2.2  --    PHOS 7.2*  --   --  6.8*  --  4.9* 4.8*  --    PROTTOTAL  --   --   --   --   --   --   --  8.4   ALBUMIN  --   --   --   --   --   --   --  3.8   BILITOTAL  --   --   --   --   --   --   --  0.5   ALKPHOS  --   --   --   --   --   --   --  142   AST  --   --   --   --   --   --   --  19   ALT  --   --   --   --   --   --   --  18    < > = values in this interval not displayed.     CBC  Recent Labs   Lab 11/15/20  0627 " 11/14/20  0537 11/14/20  0344 11/13/20  2144 11/13/20  0441 11/13/20 0441 11/12/20 2217   HGB 8.0* 8.4* 8.7* 8.9*   < > 9.8* 9.9*   WBC 5.3 9.8  --   --   --  13.5* 12.8*   RBC 2.67* 2.74*  --   --   --  3.18* 3.21*   HCT 26.5* 26.9*  --   --   --  31.8* 31.2*   MCV 99 98  --   --   --  100 97   MCH 30.0 30.7  --   --   --  30.8 30.8   MCHC 30.2* 31.2*  --   --   --  30.8* 31.7   RDW 15.2* 15.1*  --   --   --  15.0 14.6    219  --   --   --  229 234    < > = values in this interval not displayed.     INR  Recent Labs   Lab 11/12/20 2217 11/12/20  1500   INR 1.01 0.92   PTT 33 34     ABGNo lab results found in last 7 days.   Urine Studies  Recent Labs   Lab Test 11/13/20 0300 01/23/20 2300 04/22/19  2340 06/20/13  0834   COLOR Yellow Light Yellow Straw Straw   APPEARANCE Clear Slightly Cloudy Clear Clear   URINEGLC 30* 30* 30* 70*   URINEBILI Negative Negative Negative Negative   URINEKETONE Negative Negative Negative Negative   SG 1.010 1.003 1.004 1.005   UBLD Large* Trace* Trace* Negative   URINEPH 7.0 8.0* 8.5* 8.5*   PROTEIN 100* 30* 30* 30*   NITRITE Negative Negative Negative Negative   LEUKEST Negative Large* Trace* Moderate*   RBCU >182* 2 <1 <1   WBCU 69* 48* 3 15*     Recent Labs   Lab Test 11/13/20 0300 01/23/20 2300 04/22/19  2340   UTPG 7.43* 5.08* 6.67*     PTH  No lab results found.  Iron Studies  No lab results found.    IMAGING:  All imaging studies reviewed by me.       Patient was seen by myself, Dr. Emile Lopez, in conjunction with Feleica Allan PA-C as part of a shared visit.    I personally reviewed past medical and surgical history, vital signs, medications and labs.  Present and past medical history, along with significant physical exam findings were all reviewed with MERVAT.    My key findings:  Repeat UA as above   Usual post engraftment care otherwise   No immediate dialysis needs

## 2020-11-15 NOTE — PROGRESS NOTES
11/15/20 1400   Quick Adds   Type of Visit Initial PT Evaluation       Present yes   Language Patricia   Living Environment   People in home child(stephanie), dependent;spouse;other (see comments)   Current Living Arrangements apartment   Home Accessibility stairs to enter home   Number of Stairs, Main Entrance 4   Stair Railings, Main Entrance railings safe and in good condition   Transportation Anticipated family or friend will provide   Living Environment Comments Pt lives in family apartment with 4 stairs to enter home. However wants to stay with aunt and her friend. Aunt's friend will be present for family education.    Self-Care   Usual Activity Tolerance good   Current Activity Tolerance fair   Regular Exercise No   Equipment Currently Used at Home none   Activity/Exercise/Self-Care Comment Limited by R LE weakness   Disability/Function   Fall history within last six months no   Change in Functional Status Since Onset of Current Illness/Injury yes   General Information   Onset of Illness/Injury or Date of Surgery 20   Referring Physician Maria De Jesus Boone, CHELA   Patient/Family Therapy Goals Statement (PT) Return to home with d/c   Pertinent History of Current Problem (include personal factors and/or comorbidities that impact the POC) Paolo Larkin is a 44 year old Patricia-speaking female with history of ESKD of unknown etiology on dialysis since , HTN, vasospastic MI , ischemic cardiomyopathy (preserved EF, normal coronaries, and septal wall hypokinesis), HELLP, HBV, and a known RBC antibody. Patient has undergone attempted kidney transplant twice (2019, 2020), and each time the case was aborted due to hypotension and suspected anaphylaxis. A pre-op plan was made with anesthesia and Dr. Palmer (Allergist). Patient was admitted for a  donor kidney transplant.    Existing Precautions/Restrictions abdominal;fall   General Observations Pt is at risk of falls due to  R LE weakness. Pt R LE can't extended and in numb in lower calf since surgery.    Cognition   Orientation Status (Cognition) oriented x 4;oriented to;person;place;situation   Affect/Mental Status (Cognition) WFL   Follows Commands (Cognition) WFL   Pain Assessment   Patient Currently in Pain No   Integumentary/Edema   Integumentary/Edema no deficits were identifed   Integumentary/Edema Comments B LE   Posture    Posture Forward head position;Protracted shoulders   Posture Comments Pt has forward bend posture as she tries to observe R LE during walking   Range of Motion (ROM)   ROM Quick Adds ROM WFL   ROM Comment B LE   Strength   Strength Comments Pt's R LE has weakness   Bed Mobility   Comment (Bed Mobility) NT   Transfers   Transfers toilet transfer;sit-stand transfer   Sit-Stand Transfer   Sit-Stand Mobile (Transfers) moderate assist (50% patient effort)   Assistive Device (Sit-Stand Transfers) walker, standard   Sit/Stand Transfer Comments Guard R LE to prevent buckling   Toilet Transfer   Type (Toilet Transfer) stand pivot/stand step   Mobile Level (Toilet Transfer) moderate assist (50% patient effort)   Assistive Device (Toilet Transfer) commode chair;walker, standard   Toilet Transfer Skill Comments Guard R LE   Gait/Stairs (Locomotion)   Mobile Level (Gait) moderate assist (50% patient effort);2 person assist   Assistive Device (Gait) walker, standard   Pattern (Gait) other (see comments)   Deviations/Abnormal Patterns (Gait) right sided deviations;antalgic;gait speed decreased;stride length decreased   Comment (Gait/Stairs) R Knee celia without guard. Pt requires 2 person mod assist or lift pants for saftey    Transfer Skills   Transfer Comments Mod A   Balance   Balance Comments NT   Sensory Examination   Sensory Perception Comments Numbness in R medial knee and calf   Coordination   Coordination Comments NT   Muscle Tone   Muscle Tone Comments WFL   Clinical Impression   Criteria for  Skilled Therapeutic Intervention yes, treatment indicated   PT Diagnosis (PT) Impaired Mobility    Influenced by the following impairments Weakness and deconditioning    Functional limitations due to impairments walking, bathroom, ADLs, Stairs   Clinical Presentation Evolving/Changing   Clinical Presentation Rationale pt presentation, clinical reasoning   Clinical Decision Making (Complexity) moderate complexity   Therapy Frequency (PT) 5x/week   Predicted Duration of Therapy Intervention (days/wks) 1 week   Planned Therapy Interventions (PT) balance training;bed mobility training;gait training;home exercise program;stair training;strengthening;transfer training   Anticipated Equipment Needs at Discharge (PT) walker, standard;gait belt;reacher;commode chair   Risk & Benefits of therapy have been explained evaluation/treatment results reviewed;care plan/treatment goals reviewed;risks/benefits reviewed;current/potential barriers reviewed;participants included;patient   Clinical Impression Comments Pt experiences no pain post surgery. However she demo severe weakness in R LE along with numbness in calf. Pt requires 2 person Mod A for amb or lift pants with OH lift.    PT Discharge Planning    PT Discharge Recommendation (DC Rec) Transitional Care Facility   PT Rationale for DC Rec Pt is not safe to return home 2/2 R LE weakness and risk of falls. Pt also has stairs to enter home which she has not tried as of yet. Pt can benefit from gait, stair, and balance training.    PT Brief overview of current status  Mod A x 2 with amb.  High fall risk, must block R knee during gait, Mod A with transfers.    Total Evaluation Time   Total Evaluation Time (Minutes) 10

## 2020-11-15 NOTE — PROVIDER NOTIFICATION
Provider was notified about possible fall without injury. MD from surgery team came to see pt and no new order  Around 1900, pt was found on her knees next to bed, bed alarm was alarming. No injury was noted on pt. She was assisted by 3 staff to stand up and get back to bed. Pt's R leg weakness has not changed. Patricia  was used to assess pt after fall and pt said that nothing happened she was trying to reach for toilette paper then she landed on her knees.   Medium BM in bedside commode.

## 2020-11-15 NOTE — PROGRESS NOTES
Transplant Surgery  Inpatient Daily Progress Note  11/15/2020    Assessment & Plan: Paolo Larkin is a 44 year old Patricia-speaking female with history of ESKD of unknown etiology on dialysis since , HTN, vasospastic MI , ischemic cardiomyopathy (preserved EF, normal coronaries, and septal wall hypokinesis), HELLP, HBV, and a known RBC antibody. Patient has undergone attempted kidney transplant twice (2019, 2020), and each time the case was aborted due to hypotension and suspected anaphylaxis. A pre-op plan was made with anesthesia and Dr. Palmer (Allergist). Patient was admitted for a  donor kidney transplant.      Graft function: DDKT 20 with no ureteral stent. DCD donor with >20 hours CIT. US POD 1 AM with 6.3 cm dayna-nephric hematoma and good blood flow. Cr plateaued and starting to decrease, 7.68. K increased to 5.7, now stable at 4.5 with medical management. Will continue to monitor. Good urine output.  Immunosuppression management: high risk protocol due to potential for DGF   intra-op/250 mg/100 mg POD 2  Thymo 100 intra-op/100 mg/100mg POD 2   mg BID  FK 1 mg BID.   Complexity of management: High. Contributing factors: induction, history of hypoetension with operation and anesthesia, and long ishemia time  Hematology: Hgb 8.0, WBC 5.3, plt 193, continue to monitor  Anemia of CKD  Cardiorespiratory:   HTN: BP 130s-160s overnight. Start Norvasc 10 mg daily per nephrology, PRN hydralazine   GI/Nutrition: ADAT-2k diet. No flatus or bowel function at this time, continue senna/colace  Endocrine: no h/o of DM, monitor BS and sliding scale insulin as needed  Fluid/Electrolytes: MIVF: NS @ 100 ml/hour per nephrology  : Moreno removal today (POD3)  Infectious disease: AF.   H/o HEP C core antibody positive: Will discuss Entecavir initiation after HBV quant is back  Prophylaxis: DVT, fall, GI, viral (Valycte x 3 months), pneumocystis (bactrim indefinitely)  Disposition: 7A, will  "plan for discharge Monday AM, will need to complete education before that time. Work with PT for R leg weakness.    Medical Decision Making: Medium  Subsequent visit 22818 (moderate level decision making)    MERVAT/Fellow/Resident Provider: Crystal Elmore MD, PGY-1    Faculty: Dayne Marcelino M.D.  _________________________________________________________________  Transplant History:   11/12/2020 (Kidney), Postoperative day: 3     Interval History: History is obtained from the patient  Overnight events: Right leg with weakness and decreased sensation post op.      ROS:   A 10-point review of systems was negative except as noted above.    Meds:    acetaminophen  650 mg Oral Once     acetaminophen  975 mg Oral Q8H     amLODIPine  10 mg Oral Daily     aspirin  81 mg Oral Daily     atorvastatin  10 mg Oral Daily     carvedilol  6.25 mg Oral BID     fexofenadine  180 mg Oral BID     furosemide  40 mg Intravenous Q6H     magnesium oxide  400 mg Oral Daily with lunch     mycophenolate  750 mg Oral BID IS     pantoprazole  40 mg Oral QAM AC     polyethylene glycol  17 g Oral Daily     senna-docusate  1 tablet Oral BID    Or     senna-docusate  2 tablet Oral BID     sodium chloride (PF)  10 mL Intracatheter Q8H     sucralfate  1 g Oral BID AC     sulfamethoxazole-trimethoprim  1 tablet Oral Once per day on Mon Wed Fri     tacrolimus  1 mg Oral BID IS     [START ON 11/16/2020] valGANciclovir  450 mg Oral Twice Weekly       Physical Exam:     Admit Weight: 52 kg (114 lb 9.6 oz)    Current vitals:   BP (!) 171/99 (BP Location: Left leg)   Pulse 95   Temp 97.6  F (36.4  C) (Oral)   Resp 16   Ht 1.6 m (5' 3\")   Wt 58.7 kg (129 lb 6.6 oz)   SpO2 100%   BMI 22.92 kg/m      Vital sign ranges:    Temp:  [97.6  F (36.4  C)-98.9  F (37.2  C)] 97.6  F (36.4  C)  Pulse:  [89-97] 95  Resp:  [14-24] 16  BP: (136-171)/(78-99) 171/99  SpO2:  [97 %-100 %] 100 %    General Appearance: in no apparent distress.   Skin: Warm, perfused  Heart: " RRR  Lungs: NLB on room air   Abdomen: The abdomen is soft, NT. Incision c/d/i   : soni is present.  Urine with mild hematuria.  Extremities: edema: trace bilaterally, strength: RLE <LLE  Neurologic: awake, alert and oriented. Tremor absent.    Data:   CMP  Recent Labs   Lab 11/15/20  0627 11/14/20  1731 11/14/20  0537 11/14/20  0537 11/12/20  1500 11/12/20  1500     --   --  137   < > 136   POTASSIUM 4.5 4.8   < > 5.2   < > 3.9   CHLORIDE 106  --   --  105   < > 97   CO2 20  --   --  21   < > 29   GLC 88  --   --  112*   < > 92   BUN 56*  --   --  44*   < > 28   CR 7.68*  --   --  8.27*   < > 8.73*   GFRESTIMATED 6*  --   --  5*   < > 5*   GFRESTBLACK 7*  --   --  6*   < > 6*   HOLLIS 8.8  --   --  8.9   < > 9.5   MAG 2.1  --   --  2.1   < >  --    PHOS 7.2*  --   --  6.8*   < >  --    ALBUMIN  --   --   --   --   --  3.8   BILITOTAL  --   --   --   --   --  0.5   ALKPHOS  --   --   --   --   --  142   AST  --   --   --   --   --  19   ALT  --   --   --   --   --  18    < > = values in this interval not displayed.     CBC  Recent Labs   Lab 11/15/20  0627 11/14/20  0537 11/12/20  1500 11/12/20  1500   HGB 8.0* 8.4*   < > 11.9   WBC 5.3 9.8   < > 6.2    219   < > 303   A1C  --   --   --  4.9    < > = values in this interval not displayed.

## 2020-11-15 NOTE — PLAN OF CARE
"1900 - 0730    /82 (BP Location: Left leg)   Pulse 89   Temp 97.6  F (36.4  C) (Oral)   Resp 16   Ht 1.6 m (5' 3\")   Wt 58.7 kg (129 lb 6.6 oz)   SpO2 100%   BMI 22.92 kg/m      VSS on RA  Labs: pending  Pain/Nausea/PRN: Scheduled tylenol adequately controlling pain. Denied nausea  Diet: 2 g K  LDA: I. J. Saline locked, L AV fistula WDL, R PICC infusing NS at 75 mL/hr  GI: BM x 1  : Soni 2050 mL, IV lasix q6h - soni very positional, pt experiencing intense bladder fullness when not draining.   Skin: abdominal incision open to air  Mobility: Assist x 2, pt unable to put weight on R leg   Plan: lab book and med card updated, continue to monitor  "

## 2020-11-16 ENCOUNTER — APPOINTMENT (OUTPATIENT)
Dept: PHYSICAL THERAPY | Facility: CLINIC | Age: 45
DRG: 652 | End: 2020-11-16
Attending: SURGERY
Payer: MEDICARE

## 2020-11-16 ENCOUNTER — TELEPHONE (OUTPATIENT)
Dept: TRANSPLANT | Facility: CLINIC | Age: 45
End: 2020-11-16

## 2020-11-16 ENCOUNTER — APPOINTMENT (OUTPATIENT)
Dept: EDUCATION SERVICES | Facility: CLINIC | Age: 45
End: 2020-11-16
Attending: SURGERY
Payer: MEDICARE

## 2020-11-16 LAB
ALBUMIN UR-MCNC: 10 MG/DL
ANION GAP SERPL CALCULATED.3IONS-SCNC: 9 MMOL/L (ref 3–14)
APPEARANCE UR: CLEAR
BACTERIA #/AREA URNS HPF: ABNORMAL /HPF
BACTERIA SPEC CULT: NO GROWTH
BASOPHILS # BLD AUTO: 0 10E9/L (ref 0–0.2)
BASOPHILS NFR BLD AUTO: 0.2 %
BILIRUB UR QL STRIP: NEGATIVE
BLD PROD TYP BPU: NORMAL
BLD UNIT ID BPU: 0
BLOOD PRODUCT CODE: NORMAL
BPU ID: NORMAL
BUN SERPL-MCNC: 58 MG/DL (ref 7–30)
CALCIUM SERPL-MCNC: 8.3 MG/DL (ref 8.5–10.1)
CHLORIDE SERPL-SCNC: 108 MMOL/L (ref 94–109)
CO2 SERPL-SCNC: 23 MMOL/L (ref 20–32)
COLOR UR AUTO: ABNORMAL
CREAT SERPL-MCNC: 6.66 MG/DL (ref 0.52–1.04)
DIFFERENTIAL METHOD BLD: ABNORMAL
EOSINOPHIL # BLD AUTO: 0.1 10E9/L (ref 0–0.7)
EOSINOPHIL NFR BLD AUTO: 0.9 %
ERYTHROCYTE [DISTWIDTH] IN BLOOD BY AUTOMATED COUNT: 14.7 % (ref 10–15)
GFR SERPL CREATININE-BSD FRML MDRD: 7 ML/MIN/{1.73_M2}
GLUCOSE SERPL-MCNC: 81 MG/DL (ref 70–99)
GLUCOSE UR STRIP-MCNC: NEGATIVE MG/DL
HCT VFR BLD AUTO: 23.6 % (ref 35–47)
HGB BLD-MCNC: 7.5 G/DL (ref 11.7–15.7)
HGB UR QL STRIP: ABNORMAL
IMM GRANULOCYTES # BLD: 0 10E9/L (ref 0–0.4)
IMM GRANULOCYTES NFR BLD: 0.4 %
KETONES UR STRIP-MCNC: NEGATIVE MG/DL
LEUKOCYTE ESTERASE UR QL STRIP: NEGATIVE
LYMPHOCYTES # BLD AUTO: 0.2 10E9/L (ref 0.8–5.3)
LYMPHOCYTES NFR BLD AUTO: 3.2 %
Lab: NORMAL
MAGNESIUM SERPL-MCNC: 1.7 MG/DL (ref 1.6–2.3)
MCH RBC QN AUTO: 31.1 PG (ref 26.5–33)
MCHC RBC AUTO-ENTMCNC: 31.8 G/DL (ref 31.5–36.5)
MCV RBC AUTO: 98 FL (ref 78–100)
MONOCYTES # BLD AUTO: 0.5 10E9/L (ref 0–1.3)
MONOCYTES NFR BLD AUTO: 8.4 %
NEUTROPHILS # BLD AUTO: 4.7 10E9/L (ref 1.6–8.3)
NEUTROPHILS NFR BLD AUTO: 86.9 %
NITRATE UR QL: NEGATIVE
NRBC # BLD AUTO: 0 10*3/UL
NRBC BLD AUTO-RTO: 0 /100
PH UR STRIP: 5 PH (ref 5–7)
PHOSPHATE SERPL-MCNC: 4.7 MG/DL (ref 2.5–4.5)
PLATELET # BLD AUTO: 163 10E9/L (ref 150–450)
POTASSIUM SERPL-SCNC: 3.5 MMOL/L (ref 3.4–5.3)
RBC # BLD AUTO: 2.41 10E12/L (ref 3.8–5.2)
RBC #/AREA URNS AUTO: 25 /HPF (ref 0–2)
SODIUM SERPL-SCNC: 140 MMOL/L (ref 133–144)
SOURCE: ABNORMAL
SP GR UR STRIP: 1.01 (ref 1–1.03)
SPECIMEN SOURCE: NORMAL
SQUAMOUS #/AREA URNS AUTO: 1 /HPF (ref 0–1)
TACROLIMUS BLD-MCNC: <3 UG/L (ref 5–15)
TME LAST DOSE: ABNORMAL H
TRANS CELLS #/AREA URNS HPF: <1 /HPF (ref 0–1)
TRANSFUSION STATUS PATIENT QL: NORMAL
UROBILINOGEN UR STRIP-MCNC: NORMAL MG/DL (ref 0–2)
WBC # BLD AUTO: 5.4 10E9/L (ref 4–11)
WBC #/AREA URNS AUTO: 1 /HPF (ref 0–5)

## 2020-11-16 PROCEDURE — 81001 URINALYSIS AUTO W/SCOPE: CPT | Performed by: PHYSICIAN ASSISTANT

## 2020-11-16 PROCEDURE — 250N000012 HC RX MED GY IP 250 OP 636 PS 637: Performed by: SURGERY

## 2020-11-16 PROCEDURE — 250N000012 HC RX MED GY IP 250 OP 636 PS 637: Performed by: PHYSICIAN ASSISTANT

## 2020-11-16 PROCEDURE — 250N000011 HC RX IP 250 OP 636: Performed by: PHYSICIAN ASSISTANT

## 2020-11-16 PROCEDURE — 250N000013 HC RX MED GY IP 250 OP 250 PS 637: Performed by: PHYSICIAN ASSISTANT

## 2020-11-16 PROCEDURE — 36592 COLLECT BLOOD FROM PICC: CPT | Performed by: SURGERY

## 2020-11-16 PROCEDURE — 97116 GAIT TRAINING THERAPY: CPT | Mod: GP | Performed by: REHABILITATION PRACTITIONER

## 2020-11-16 PROCEDURE — 99233 SBSQ HOSP IP/OBS HIGH 50: CPT | Mod: 24 | Performed by: INTERNAL MEDICINE

## 2020-11-16 PROCEDURE — 250N000013 HC RX MED GY IP 250 OP 250 PS 637: Performed by: SURGERY

## 2020-11-16 PROCEDURE — 83735 ASSAY OF MAGNESIUM: CPT | Performed by: SURGERY

## 2020-11-16 PROCEDURE — 87086 URINE CULTURE/COLONY COUNT: CPT | Performed by: PHYSICIAN ASSISTANT

## 2020-11-16 PROCEDURE — 97530 THERAPEUTIC ACTIVITIES: CPT | Mod: GP | Performed by: REHABILITATION PRACTITIONER

## 2020-11-16 PROCEDURE — 99232 SBSQ HOSP IP/OBS MODERATE 35: CPT | Mod: 24 | Performed by: TRANSPLANT SURGERY

## 2020-11-16 PROCEDURE — 85025 COMPLETE CBC W/AUTO DIFF WBC: CPT | Performed by: SURGERY

## 2020-11-16 PROCEDURE — 84100 ASSAY OF PHOSPHORUS: CPT | Performed by: SURGERY

## 2020-11-16 PROCEDURE — 250N000013 HC RX MED GY IP 250 OP 250 PS 637: Performed by: STUDENT IN AN ORGANIZED HEALTH CARE EDUCATION/TRAINING PROGRAM

## 2020-11-16 PROCEDURE — 250N000013 HC RX MED GY IP 250 OP 250 PS 637: Performed by: INTERNAL MEDICINE

## 2020-11-16 PROCEDURE — 80197 ASSAY OF TACROLIMUS: CPT | Performed by: SURGERY

## 2020-11-16 PROCEDURE — 120N000011 HC R&B TRANSPLANT UMMC

## 2020-11-16 PROCEDURE — 80048 BASIC METABOLIC PNL TOTAL CA: CPT | Performed by: SURGERY

## 2020-11-16 RX ORDER — DIPHENHYDRAMINE HCL 25 MG
25 CAPSULE ORAL ONCE
Status: COMPLETED | OUTPATIENT
Start: 2020-11-16 | End: 2020-11-16

## 2020-11-16 RX ORDER — FUROSEMIDE 10 MG/ML
40 INJECTION INTRAMUSCULAR; INTRAVENOUS DAILY
Status: DISCONTINUED | OUTPATIENT
Start: 2020-11-17 | End: 2020-11-17

## 2020-11-16 RX ORDER — TACROLIMUS 1 MG/1
2 CAPSULE ORAL
Status: DISCONTINUED | OUTPATIENT
Start: 2020-11-16 | End: 2020-11-17

## 2020-11-16 RX ORDER — HYDRALAZINE HYDROCHLORIDE 20 MG/ML
20 INJECTION INTRAMUSCULAR; INTRAVENOUS EVERY 4 HOURS PRN
Status: DISCONTINUED | OUTPATIENT
Start: 2020-11-16 | End: 2020-11-19 | Stop reason: HOSPADM

## 2020-11-16 RX ORDER — CARVEDILOL 6.25 MG/1
6.25 TABLET ORAL ONCE
Status: COMPLETED | OUTPATIENT
Start: 2020-11-16 | End: 2020-11-16

## 2020-11-16 RX ORDER — CARVEDILOL 12.5 MG/1
12.5 TABLET ORAL 2 TIMES DAILY
Status: DISCONTINUED | OUTPATIENT
Start: 2020-11-16 | End: 2020-11-18

## 2020-11-16 RX ADMIN — CARVEDILOL 6.25 MG: 6.25 TABLET, FILM COATED ORAL at 12:30

## 2020-11-16 RX ADMIN — MYCOPHENOLATE MOFETIL 750 MG: 250 CAPSULE ORAL at 17:50

## 2020-11-16 RX ADMIN — CARVEDILOL 6.25 MG: 6.25 TABLET, FILM COATED ORAL at 09:25

## 2020-11-16 RX ADMIN — ATORVASTATIN CALCIUM 10 MG: 10 TABLET, FILM COATED ORAL at 09:15

## 2020-11-16 RX ADMIN — TACROLIMUS 1 MG: 1 CAPSULE ORAL at 09:15

## 2020-11-16 RX ADMIN — MYCOPHENOLATE MOFETIL 750 MG: 250 CAPSULE ORAL at 09:15

## 2020-11-16 RX ADMIN — MAGNESIUM OXIDE 400 MG: 400 TABLET ORAL at 12:30

## 2020-11-16 RX ADMIN — FEXOFENADINE HYDROCHLORIDE 180 MG: 180 TABLET, FILM COATED ORAL at 01:01

## 2020-11-16 RX ADMIN — FUROSEMIDE 40 MG: 10 INJECTION, SOLUTION INTRAMUSCULAR; INTRAVENOUS at 06:27

## 2020-11-16 RX ADMIN — DOCUSATE SODIUM 50 MG AND SENNOSIDES 8.6 MG 2 TABLET: 8.6; 5 TABLET, FILM COATED ORAL at 09:44

## 2020-11-16 RX ADMIN — FEXOFENADINE HYDROCHLORIDE 180 MG: 180 TABLET, FILM COATED ORAL at 19:59

## 2020-11-16 RX ADMIN — ASPIRIN 81 MG: 81 TABLET, CHEWABLE ORAL at 09:25

## 2020-11-16 RX ADMIN — DIPHENHYDRAMINE HYDROCHLORIDE 25 MG: 25 CAPSULE ORAL at 05:19

## 2020-11-16 RX ADMIN — ACETAMINOPHEN 650 MG: 325 TABLET, FILM COATED ORAL at 00:58

## 2020-11-16 RX ADMIN — FEXOFENADINE HYDROCHLORIDE 180 MG: 180 TABLET, FILM COATED ORAL at 12:30

## 2020-11-16 RX ADMIN — VALGANCICLOVIR 450 MG: 450 TABLET, FILM COATED ORAL at 09:15

## 2020-11-16 RX ADMIN — FUROSEMIDE 40 MG: 10 INJECTION, SOLUTION INTRAMUSCULAR; INTRAVENOUS at 00:58

## 2020-11-16 RX ADMIN — CARVEDILOL 12.5 MG: 12.5 TABLET, FILM COATED ORAL at 19:56

## 2020-11-16 RX ADMIN — SUCRALFATE 1 G: 1 SUSPENSION ORAL at 09:18

## 2020-11-16 RX ADMIN — DOCUSATE SODIUM 50 MG AND SENNOSIDES 8.6 MG 1 TABLET: 8.6; 5 TABLET, FILM COATED ORAL at 19:56

## 2020-11-16 RX ADMIN — TACROLIMUS 2 MG: 1 CAPSULE ORAL at 17:51

## 2020-11-16 RX ADMIN — ACETAMINOPHEN 650 MG: 325 TABLET, FILM COATED ORAL at 12:32

## 2020-11-16 RX ADMIN — HYDRALAZINE HYDROCHLORIDE 10 MG: 20 INJECTION, SOLUTION INTRAMUSCULAR; INTRAVENOUS at 00:59

## 2020-11-16 RX ADMIN — POLYETHYLENE GLYCOL 3350 17 G: 17 POWDER, FOR SOLUTION ORAL at 09:44

## 2020-11-16 RX ADMIN — AMLODIPINE BESYLATE 10 MG: 10 TABLET ORAL at 09:15

## 2020-11-16 RX ADMIN — SULFAMETHOXAZOLE AND TRIMETHOPRIM 1 TABLET: 400; 80 TABLET ORAL at 09:15

## 2020-11-16 RX ADMIN — PANTOPRAZOLE SODIUM 40 MG: 40 TABLET, DELAYED RELEASE ORAL at 09:15

## 2020-11-16 ASSESSMENT — ACTIVITIES OF DAILY LIVING (ADL)
ADLS_ACUITY_SCORE: 12

## 2020-11-16 ASSESSMENT — MIFFLIN-ST. JEOR: SCORE: 1199.13

## 2020-11-16 NOTE — PLAN OF CARE
"BP (!) 164/91 (BP Location: Left leg)   Pulse 89   Temp 98  F (36.7  C) (Oral)   Resp 16   Ht 1.6 m (5' 3\")   Wt 58.7 kg (129 lb 6.6 oz)   SpO2 100%   BMI 22.92 kg/m     Neuro: A/Ox4  VS: VSS on RA  Pain: treated by scheduled tylenol  GI: on 2 gr K, no appetite. Denies nausea. Loose stool x3  : Moreno removed, PVR 93, voiding without difficulty clear UOP  Skin/Drain: Abdominal incision intact TARAH. Internal jugular removed. PICC infusing NS at 75ml/h  Activity - RLE weakness, assist of 1 with walker/GB. Bed alarm in place  Education - med card. Reviewed transplant with pt and her cousin. Fall prevention  Plan of Care - Continue with POC    "

## 2020-11-16 NOTE — DISCHARGE SUMMARY
[facility]     Discharge Summary  Transplant Surgery    Date of Admission:  2020  Date of Discharge:  2020  Discharging Provider: Reba Almanzar  Date of Service (when I saw the patient): 20    Discharge Diagnoses   Principal Problem:    Kidney transplanted  Active Problems:    Anemia in chronic kidney disease    Renovascular hypertension    Femoral neuropathy, right      Procedure/Surgery Information   Procedure: Procedure(s):  TRANSPLANT, KIDNEY, RECIPIENT,  DONOR   Surgeon(s): Surgeon(s) and Role:     * Dayne Marcelino MD - Primary     * Mary Anne Carey MD - Fellow - Assisting         History of Present Illness   Paolo Larkin is a 44 year old Patricia-speaking female with history of ESKD of unknown etiology on dialysis since , HTN, vasospastic MI , ischemic cardiomyopathy (preserved EF, normal coronaries, and septal wall hypokinesis), HELLP, HBV, and a known RBC antibody. Patient has undergone attempted kidney transplant twice (2019, 2020), and each time the case was aborted due to hypotension and suspected anaphylaxis. A pre-op plan was made with anesthesia and Dr. Palmer (Allergist). Patient was admitted for a  donor kidney transplant on 20, no ureteral stent placed.     Hospital Course   S/p DDKT 20 with no ureteral stent: DCD donor with >20 hours CIT. US POD 1 AM with 6.3 cm dayna-nephric hematoma and good blood flow. Slow graft function, Creatinine did improve and was downtrending to 3.6 at discharge. Hyperkalemia initially post-op was treated with medical management, now resolved. Good urine output. Moreno removed POD3, voiding without retention.    Immunosuppression management: Completed high risk protocol due to potential for DGF: Solu-Medrol burst, and thymo 100 mg/kg x 3, or 5.8 mg/kg  Maintenance immunosuppression with Cellcept 750 mg BID, tacrolimus goal 8-10. Viral prophylaxis with  (Valycte x 3 months), pneumocystis (bactrim indefinitely)    Anemia of CKD: Hgb 7.3 at discharge    HTN: Controlled, -130 at discharge. She will continue Norvasc 5 mg daily and coreg reduced to 3.125mg BID per nephrology.    Mild steroid induced hyperglycemia: resolved.     Femoral neuropathy with RLE weakness: most likely related to femoral nerve injury/stretch from surgery. Neurology saw and evaluated patient. At discharge she had right LE 1/5 knee extension strength and 3/5 hip flexion strength. She was seen and evaluated by PT for right LE weakness causing leg to buckle with home safety concerns.  She will discharge to a rehab facility to continue to work with physical therapy until she is able to move safely at home.    H/o HEP B core antibody positive: HBV quant negative.    Transplant coordinator: Yaz Villa [phone]  DSA at time of transplant: No  Ureteral stent: No  CMV: Donor -  /Recipient +  EBV: Donor + / Recipient +  Thymoglobulin: 300 mg (5.8 mg/kg)    Post-operative pain control: included Hydromorphone (dilaudid) IV and po oxycodone and will be Tylenol alone on discharge.       Imaging study follow up needs:   -If  No improvement to her RLE weakness after several weeks, Consider an EMG.      Discharge Disposition   Discharged to rehabilitation facility   Condition at discharge: Stable    Pending Results   These results will be followed up by transplant nephrology  Unresulted Labs Ordered in the Past 30 Days of this Admission     Date and Time Order Name Status Description    11/12/2020 0822 HLA FINAL CROSSMATCH RECIPIENT In process         Primary Care Physician   Lorna Clancy    Physical Exam   Temp: 98.1  F (36.7  C) Temp src: Oral BP: 112/68 Pulse: 73   Resp: 16 SpO2: 100 % O2 Device: None (Room air)    Vitals:    11/16/20 0039 11/17/20 0100 11/19/20 0357   Weight: 57.5 kg (126 lb 12.2 oz) 54 kg (119 lb 1.6 oz) 51 kg (112 lb 7 oz)     Vital Signs with Ranges  Temp:  [98  F  (36.7  C)-98.6  F (37  C)] 98.1  F (36.7  C)  Pulse:  [73-85] 73  Resp:  [12-16] 16  BP: (112-131)/(29-88) 112/68  SpO2:  [100 %] 100 %  I/O last 3 completed shifts:  In: 1360 [P.O.:1340; I.V.:20]  Out: 2000 [Urine:2000]      General Appearance: in no apparent distress.   Skin: Warm, dry  Heart: RRR  Lungs: NLB on room air   Abdomen: The abdomen is soft, NT. Incision c/d/i   :  voiding.   Extremities: edema: trace bilaterally, strength: RLE <LLE  Neurologic: awake, alert and oriented. Tremor absent.     Consultations This Hospital Stay   NEPHROLOGY KIDNEY/PANCREAS TRANSPLANT ADULT IP CONSULT  VASCULAR ACCESS FOR PICC PLACEMENT ADULT IP CONSULT  SOT MEDICATION HISTORY IP PHARMACY CONSULT  SOCIAL WORK IP CONSULT  PHARMACY IP CONSULT  NUTRITION SERVICES ADULT IP CONSULT  NEPHROLOGY KIDNEY/PANCREAS TRANSPLANT ADULT IP CONSULT  MEDICATION HISTORY IP PHARMACY CONSULT  PHARMACY IP CONSULT  PHYSICAL THERAPY ADULT IP CONSULT  PATIENT LEARNING CENTER IP CONSULT  NEUROLOGY GENERAL ADULT IP CONSULT  PHYSICAL THERAPY ADULT IP CONSULT  OCCUPATIONAL THERAPY ADULT IP CONSULT    Time Spent on this Encounter   I have spent greater than 30 minutes on this discharge.    Discharge Orders   Discharge Medications   Current Discharge Medication List      START taking these medications    Details   aspirin (ASA) 81 MG chewable tablet Take 1 tablet (81 mg) by mouth daily  Qty:      Associated Diagnoses: Kidney transplanted      atorvastatin (LIPITOR) 10 MG tablet Take 1 tablet (10 mg) by mouth daily  Qty:      Associated Diagnoses: Kidney transplanted      carvedilol (COREG) 3.125 MG tablet Take 1 tablet (3.125 mg) by mouth 2 times daily  Qty:      Associated Diagnoses: Renovascular hypertension      magnesium oxide (MAG-OX) 400 MG tablet Take 1 tablet (400 mg) by mouth daily (with lunch)  Qty:      Associated Diagnoses: Kidney transplanted      mycophenolate (GENERIC EQUIVALENT) 250 MG capsule Take 3 capsules (750 mg) by mouth 2 times  daily    Associated Diagnoses: Kidney transplanted      pantoprazole (PROTONIX) 40 MG EC tablet Take 1 tablet (40 mg) by mouth every morning (before breakfast)  Qty:      Associated Diagnoses: Kidney transplanted      senna-docusate (SENOKOT-S/PERICOLACE) 8.6-50 MG tablet Take 1 tablet by mouth 2 times daily as needed for constipation    Associated Diagnoses: Kidney transplanted      sulfamethoxazole-trimethoprim (BACTRIM) 400-80 MG tablet Take 1 tablet by mouth three times a week  Qty:      Associated Diagnoses: Kidney transplanted      tacrolimus (GENERIC EQUIVALENT) 1 MG capsule Take 4 capsules (4 mg) by mouth 2 times daily    Associated Diagnoses: Kidney transplanted      valGANciclovir (VALCYTE) 450 MG tablet Take 1 tablet (450 mg) by mouth twice a week  Qty:      Associated Diagnoses: Kidney transplanted         CONTINUE these medications which have CHANGED    Details   amLODIPine (NORVASC) 5 MG tablet Take 1 tablet (5 mg) by mouth daily  Qty:      Associated Diagnoses: Renovascular hypertension         CONTINUE these medications which have NOT CHANGED    Details   acetaminophen (TYLENOL) 325 MG tablet Take 325-650 mg by mouth every 4 hours as needed for mild pain or fever          STOP taking these medications       calcium carbonate (TUMS) 500 MG chewable tablet Comments:   Reason for Stopping:         hydrALAZINE (APRESOLINE) 25 MG tablet Comments:   Reason for Stopping:         losartan (COZAAR) 100 MG tablet Comments:   Reason for Stopping:         sevelamer (RENVELA) 800 MG tablet Comments:   Reason for Stopping:                  General info for SNF    Length of Stay Estimate: Short Term Care: Estimated # of Days <30  Condition at Discharge: Improving  Level of care:skilled   Rehabilitation Potential: Good  Admission H&P remains valid and up-to-date: Yes  Use Nursing Home Standing Orders: Yes     Reason for your hospital stay    End Stage renal disease, s/p  donor kidney  transplant  Hyperkalemia  Femoral Neuropathy  Anemia of chronic disease  Hypertension     Daily weights    Call Provider for weight gain of more than 2 pounds per day or 5 pounds per week.     Follow Up and recommended labs and tests    Upon discharge from rehab facility, will need to be seen in the [facility] at 7 am (ph. [phone], option 7) .  Your labs will be drawn at the beginning of your appointment at 7:00 am.  DO NOT take your medications prior to having labs drawn. Please bring all your medications with you from home to take after labs are drawn.    LABS:  CBC, BMP, Mg, Phos, Tacrolimus level to be drawn daily while in rehab and then every Monday, Thursday by home health care nurse if arranged, or at an outpatient lab.     FOLLOW UP APPOINTMENTS:  Remember to always bring an updated medication list to all appointments.     An appointment with your transplant surgeon will be scheduled for approximately 2 weeks following discharge from the hospital.  Your transplant surgeon is: Dr. Dayne Marcelino.  You will follow up with transplant nephrology in clinic as scheduled.   Follow up with primary care provider in 4-8 weeks. (Pt to schedule)  Call scheduling at [phone] if you have not heard about your appointments within 48 hours after discharge.  If you have staples in place, they will be removed in 3 weeks after operation.    WHEN TO CONTACT YOUR  COORDINATOR:  Transplant Coordinator [phone]  Notify your coordinator if you have pain over your kidney, increased redness or drainage from your incision, fever greater than 100.5F, or decreased urine output.  Notify your coordinator immediately if you are ever unable to take your immunosuppressive medications for any reason.  If it is outside of office hours, please call the hospital switchboard at [phone] and ask to have the kidney transplant surgery fellow paged for urgent medical questions, or present to the emergency  department.    OTHER DISCHARGE INSTRUCTIONS:  Monitor blood pressure and weight daily initially post transplant.  Lift no greater than 10 pounds for 6-8 weeks from the time of surgery.  No driving while taking narcotics or 3 weeks after surgery.    Diet recommendations post-transplant: Heart healthy dietary habits long term (low saturated/trans fat, low sodium). High protein diet x 8 weeks. Practice food safety precautions.    Discharged to rehab     Activity - Up with assistive device     Activity - Up with nursing assistance     Physical Therapy Adult Consult    Evaluate and treat as clinically indicated.    Reason:  Femoral neuropathy     Occupational Therapy Adult Consult    Evaluate and treat as clinically indicated.    Reason: Femoral Neuropathy     Fall precautions     Wheelchair DME    I, the undersigned, certify that the above prescribed supplies are medically necessary for this patient and is both reasonable and necessary in reference to accepted standards of medical and necessary in reference to accepted standards of medical practice in the treatment of this patient's condition and is not prescribed as a convenience.      Walker    DME Documentation:   Describe the reason for need to support medical necessity: gait impairment.     I, the undersigned, certify that the above prescribed supplies are medically necessary for this patient and is both reasonable and necessary in reference to accepted standards of medical and necessary in reference to accepted standards of medical practice in the treatment of this patient's condition and is not prescribed as a convenience.     Advance Diet as Tolerated    Follow this diet upon discharge: Orders Placed This Encounter      Regular Diet Adult         Data   Results for orders placed or performed during the hospital encounter of 11/12/20   XR Chest 2 Views    Narrative    Exam: XR CHEST 2 VW, 11/12/2020 3:49 PM    Indication: Pre-op kidney transplant    Comparison:  1/27/2020    Findings:   Right arm PICC tip in the low superior vena cava. Heart and pulmonary  vasculature within normal limits. Mild elevation of the right  hemidiaphragm pleural spaces are clear. Upper abdomen unremarkable.      Impression    Impression: PICC tip in the low superior vena cava. Lungs are clear.    DANIELA PULIDO MD   XR Chest Port 1 View    Narrative    EXAM: XR CHEST PORT 1 VW  11/12/2020 10:37 PM     HISTORY:  s/p central line placement       COMPARISON:  11/12/2020    TECHNIQUE: Single frontal radiograph of the chest    FINDINGS:   New right IJ approach central venous catheter tip projects in mid SVC.  Stable position of right upper extremity PICC.    The trachea is midline. The cardiomediastinal silhouette is  well-defined. The pulmonary vasculature are distinct. No acute  consolidation, pleural effusion or appreciable pneumothorax.      Impression    IMPRESSION: Right IJ approach central venous catheter tip projects in  the mid SVC.     I have personally reviewed the examination and initial interpretation  and I agree with the findings.    OCTAVIO ALVAREZ MD   US Renal Transplant    Narrative    EXAMINATION: US RENAL TRANSPLANT,  11/12/2020 11:30 PM     COMPARISON: None.    HISTORY: Status post kidney transplant. Single vein, artery and ureter    TECHNIQUE:  Grey-scale, color Doppler and spectral flow analysis.     FINDINGS:  The transplant kidney is located right lower quadrant, and measures 11  cm length. Parenchyma is of normal thickness and echogenicity. No  focal lesions. No hydronephrosis. Small fluid collection measuring 2.7  x 1.4 cm x 2.6 along the superior pole of the transplant kidney.    Renal artery flow:   131 cm/sec peak systolic at hilum.  311 cm/sec peak systolic at anastomosis.    Lower Pole Arcuate artery:  0.70 resistive index.     Mid pole Arcuate artery:  0.65 resistive index.     Upper Pole Arcuate artery:  0.68 resistive index.    Renal Vein Flow:  144 cm/sec at hilum.    89 cm/sec at anastomosis.    Iliac artery flow:  264 cm/sec peak systolic above anastomosis.  190 cm/sec peak systolic below anastomosis.    Iliac vein flow:  Patent above and below the anastomosis.      Impression    IMPRESSION: In this immediate postoperative right lower quadrant  transplant kidney:    1.  Elevated renal artery velocity at the anastomosis, likely  secondary to edema. Attention on follow-up.  2.  Small 2.7 cm superior pole fluid collection likely representing  small perinephric hematoma.    I have personally reviewed the examination and initial interpretation  and I agree with the findings.    OCTAVIO ALVAREZ MD    Renal Transplant    Narrative    EXAMINATION:  RENAL TRANSPLANT,  11/13/2020 9:27 AM     COMPARISON: 11/12/2020    HISTORY: Status post renal transplant postop day 1. Evaluate blood  flow. decreased UO and increase Cr.    TECHNIQUE:  Grey-scale, color Doppler and spectral flow analysis. Cine  clips through the transplant were saved in the patient's record.    FINDINGS:  The transplant kidney is located right lower quadrant, and measures  11.4 cm. Parenchyma is of normal thickness and echogenicity. No focal  lesions. No hydronephrosis. There is a 0.9 x 1.2 x 6.3 cm fluid  collection along the superior pole of the transplanted kidney without  internal blood flow, previously measured 2.7 x 1.4 x 2.6 cm.    Renal artery flow:   81 cm/sec peak systolic at hilum.  65 cm/sec peak systolic at anastomosis.  Arcuate artery resistive indices (upper to lower): 0.61, 0.48, 0.65    Renal Vein Flow:  The renal vein is patent.  46 cm/sat hilum.   40 cm/s at anastomosis.    Iliac artery flow:  191 cm/s peak systolic above anastomosis.  95 cm/s peak systolic below anastomosis.    Iliac vein flow:  Patent above and below the anastomosis.    The bladder is empty and contains Moreno catheter.      Impression    IMPRESSION:   1. Interval increase in fluid collection along the transplanted  kidney  measuring up to 6.3 cm, previously measuring 2.7 cm, likely  representing a perinephric hematoma.    2. Patent Doppler ultrasound evaluation of the right lower quadrant  renal transplant with interval decrease in previously seen elevated  renal artery velocities.    I have personally reviewed the examination and initial interpretation  and I agree with the findings.    MARK MONTGOMERY MD     Most Recent 3 CBC's:  Recent Labs   Lab Test 11/19/20  0645 11/18/20  0547 11/17/20  0602   WBC 5.0 3.5* 4.1   HGB 7.3* 7.1* 7.2*   MCV 97 96 97    179 177     Most Recent 3 BMP's:  Recent Labs   Lab Test 11/19/20  0645 11/18/20  0547 11/17/20  0602    140 139   POTASSIUM 3.8 3.6 3.4   CHLORIDE 109 108 106   CO2 23 25 24   BUN 43* 50* 54*   CR 3.61* 4.47* 5.66*   ANIONGAP 9 7 10   HOLLIS 9.2 9.2 8.8   GLC 90 90 92     Most Recent Urinalysis:  Recent Labs   Lab Test 11/16/20  1500   COLOR Light Yellow   APPEARANCE Clear   URINEGLC Negative   URINEBILI Negative   URINEKETONE Negative   SG 1.010   UBLD Moderate*   URINEPH 5.0   PROTEIN 10*   NITRITE Negative   LEUKEST Negative   RBCU 25*   WBCU 1     Attestation:    The patient has been seen and evaluated by me.   Vital signs, labs, medications and orders were reviewed.   When obtained, diagnostic images were reviewed by me and interpreted as above.    The care plan was discussed with the multidisciplinary team and I agree with the findings and plan in this note, with any differences recorded in blue.    Immunosuppressive medication management was reviewed and adjusted as reflected in the note and orders.     .

## 2020-11-16 NOTE — PROGRESS NOTES
Reason for Assessment:  Nutrition education regarding post transplant diet.   Diet History:  Patient has not received post transplant diet education in the past.  Patient has english speaking/reading family/friends who are able to review handouts with her in the future.  Nutrition Diagnosis:  Food- and nutrition-related knowledge deficit r/t no previous knowledge of post transplant diet guidelines AEB patient verbal report.   Interventions:   Provided instruction via  on post-transplant diet with discussion regarding protein sources and high protein needs in acute post-tx phase.   -- Reviewed recommendations to follow low fat/low sodium diet long term and discussed heart healthy diet tips.    -- Reviewed need for food safety precautions to prevent food borne illness.  Provided handouts: Post Transplant Diet Guidelines and USDA food safety booklet  Pt verbalized understanding of diet following education and denied further nutritional questions.    Goals:   Patient will verbalize understanding of education provided.  Follow-up:    Patient to ask any further nutrition-related questions before discharge.  In addition, pt may request outpatient RD appointment.    Anya Hendricks   Dietetic Intern    I agree with the nutrition education by the dietetic intern.  Tg Reagan, MS/CHRISTINA/CECILY/CNS  7A RD Pager: [phone]

## 2020-11-16 NOTE — TELEPHONE ENCOUNTER
Pharmacist provided medication teaching for discharge with a focus on new medications/dose changes.  The discharge medication list was reviewed with the patient/family and the following points were discussed, as applicable: Name, description, purpose, dose/strength, duration of medications, common side effects, food/medications to avoid and how to obtain refills.    Education was provided over the phone to the patient's niece, Tg (who will be primary family member helping Paw). Paw was also present and received instruction via Ler.    Clinical Pharmacy Consult:                                                      Transplant Specific:   Date of Transplant: 11/12/2020  Type of Transplant: kidney  First Transplant: yes  History of rejection: no. Attempted transplants 04/2019 and 01/2020 aborted d/t hypotension and suspected anaphylaxis.    Immunosuppression Regimen   TAC 1 mg qAM & 1 mg qPM and  mg qAM & 750 mg qPM  Patient specific goal: 8 to 10 (tacrolimus)  Most recent level: <3, date 11/16/2020  Immunosuppressant Levels:  Subtherapeutic  Pt adherent to lab draws: yes  Scr:   Lab Results   Component Value Date    CR 6.66 11/16/2020     Side effects: no side effects    Prophylactic Medications  Antibacterial:  Bactrim 400-80 mg three times a week  Scheduled Discontinue Date: Lifelong    Antifungal: Not needed thus far  Scheduled Discontinue Date: N/A    Antiviral: CrCl 10 to 24 mL/minute: Valcyte 450 mg twice weekly   Scheduled Discontinue Date: 3 months    Acid Reducer: Protonix (pantoprazole) 40 mg daily   Scheduled Reviewed Date: N/A    Thrombosis Prevention: Aspirin 81 mg PO daily  Scheduled Discontinue Date: TBD    Blood Pressure Management  Frequency of home Blood Pressure checks: once daily  Most recent home BP: 152/77  Patient Blood pressure goal: <140/90  Patient blood pressure at goal:  no  Hospitalizations/ER visits since last assessment: 0      Med rec/DUR performed: yes  Med Rec Discrepancies:  no    Patient will  bp monitor, thermometer and 7 day pill organizer from Discharge Pharmacy.    Reminders:    1.  Bring to first clinic appt: med box, med card, bp monitor, all medications being taken, and lab book.  2.    MTM pharmacist visit on first clinic appt and if ok, again in 3 to 4 months during follow up appt.  3.   Avoid Grapefruit and Grapefruit juice.   4.   Avoid herbal supplements. If wish to take other medications or supplements, call your coordinator.   5.   Keep lab appts.   6.  Can use apps on phone like [employer] to help manage medication lists and reminders.   7.  Make sure you are protecting your skin by wearing long sleeves and applying sunscreen to exposed skin, for any significant time in the sun.     Transplant Coordinator is Yaz Wagoner Pharm.D.

## 2020-11-16 NOTE — PLAN OF CARE
"[id number]:    BP (!) 152/77 (BP Location: Left leg)   Pulse 89   Temp 98.6  F (37  C) (Oral)   Resp 18   Ht 1.6 m (5' 3\")   Wt 57.5 kg (126 lb 12.2 oz)   SpO2 100%   BMI 22.46 kg/m       Neuro/Orientation: intact  VS: hypertensive and pt on scheduled BP meds. Pt received one time extra dose of PO coreg. Also slightly tachy but OVSS on room air.  BG: n/a.   LABS: see chart.   Pain: tylenol x 1 for headache  Nausea: denies.   PRNs: n/a  Diet: now on regular diet with fair appetite.   LDA: PICC now saline locked (MIV fluids discontinued). PIV saline locked.   GI/: voiding frequent amounts and pt was concerned. Team notified during rounds and ordered urine sample (still needs to be collected). Having loose, watery stool. Pt took stool softener tabs and miralax this morning. Stools have slowed this evening.  Skin: incision with staples open to air. Otherwise intact.   Activity: assist x 1-2. Pt pivoting to chair/commode independently. Pt reminded to call for assistance due to concern for increase in fall risk.   Education: medication card and lab book updated. PLC came to bedside to do transplant education. Specialty pharmacy on phone with family in room this afternoon.   Tests/Procedures: n/a.   Plan: continue POC.     All care explained and questions answered. Will continue to monitor and notify team of changes.     "

## 2020-11-16 NOTE — PROGRESS NOTES
[facility]    Transplant Nephrology Progress Note  Date of Admission:  11/12/2020  Today's Date: 11/16/2020    Recommendations:  - Increase tacrolimus to 2.5 mg BID, recheck trough level.  - PT/OT.    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 UOP increasing. Creatinine had plateaued, now with clear trend down.   - Proteinuria: 7.4g/g prior to transplant. Needs to be repeated with FSGS protocol given unknown native disease   - Date DSA Last Checked:Nov/2020  Latest DSA: final XM pending   - BK Viremia: Not checked post transplant   - Kidney Tx Biopsy: No    Hep B core positive: DNA quant pending, will need entecavir if positive    # Immunosuppression: Tacrolimus immediate release (goal 8-10) and Mycophenolate mofetil (dose 750 mg every 12 hours)   - Increase tacrolimus to 2.5 mg BID, recheck trough level     # Infection Prophylaxis:   - PJP: Sulfa/TMP (Bactrim)  - CMV: Valcyte x3m      # Hypertension: Borderline control. BP has been taken on RLE. Re-check on right wrist 120's/70's            Goal BP: < 150/90              - Volume status: euvolemic             EDW ~ unknown              - Changes: No      # Elevated Blood Glucose: Glucose generally running ~ 140-160              - Management as per primary team.     # Anemia in Chronic Renal Disease: Hgb: slow trend down, continue to monitor      JESSI: No              - Iron studies: Not checked recently     # Mineral Bone Disorder:   - Secondary renal hyperparathyroidism; PTH level: Significantly elevated (601-1200 pg/ml)        On treatment: likely had been on treatment while on HD  - Vitamin D; level: Not checked recently        On supplement: No  - Calcium; level: Normal        On supplement: No  - Phosphorus; level: High        On supplement: No     # Electrolytes:   - Potassium; level: Now normal      On  supplement: No  - Magnesium; level: Normal        On supplement: Yes  - Bicarbonate; level: Normal        On supplement: No    # Abnormal Urinalysis: recommend repeat      # Transplant History:  Etiology of Kidney Failure: Unknown etiology (no kidney biopsy)  Tx: DDKT  Transplant: 2020 (Kidney)  Donor Type: Donation after Circulatory Death           Donor Class:   Crossmatch at time of Tx: negative  Significant changes in immunosuppression: None  Significant transplant-related complications: None    Recommendations were communicated to the primary team verbally.    Seen and discussed with Dr. John Crespo MD   Pager: [phone]    Interval History   Creatinine decreased from yesterday. UOP > 2.7 L over last 24 hr. No acute complaints.     Review of Systems   4 point ROS was obtained and negative except as noted in the Interval History.    MEDICATIONS:    amLODIPine  10 mg Oral Daily     aspirin  81 mg Oral Daily     atorvastatin  10 mg Oral Daily     carvedilol  12.5 mg Oral BID     fexofenadine  180 mg Oral BID     [START ON 2020] furosemide  40 mg Intravenous Daily     magnesium oxide  400 mg Oral Daily with lunch     mycophenolate  750 mg Oral BID IS     pantoprazole  40 mg Oral QAM AC     polyethylene glycol  17 g Oral Daily     senna-docusate  1 tablet Oral BID    Or     senna-docusate  2 tablet Oral BID     sodium chloride (PF)  10 mL Intracatheter Q8H     sulfamethoxazole-trimethoprim  1 tablet Oral Once per day on      tacrolimus  2 mg Oral BID IS     valGANciclovir  450 mg Oral Twice Weekly         Physical Exam   Temp  Av.4  F (36.9  C)  Min: 97.1  F (36.2  C)  Max: 99.1  F (37.3  C)  Arterial Line BP  Min: 83/44  Max: 109/54  Arterial Line MAP (mmHg)  Av.4 mmHg  Min: 62 mmHg  Max: 77 mmHg      Pulse  Av.4  Min: 87  Max: 109 Resp  Avg: 15.6  Min: 12  Max: 20  SpO2  Av.1 %  Min: 94 %  Max: 100 %    CVP (mmHg): 8 mmHg(Order to stop CVP monitoring. )BP  "131/67 (BP Location: Left arm)   Pulse 89   Temp 98.6  F (37  C) (Oral)   Resp 18   Ht 1.6 m (5' 3\")   Wt 57.5 kg (126 lb 12.2 oz)   SpO2 100%   BMI 22.46 kg/m     Date 11/14/20 0700 - 11/15/20 0659   Shift 4388-2188 2651-5097 4150-6580 24 Hour Total   INTAKE   P.O. 240   240   Shift Total(mL/kg) 240(4.58)   240(4.58)   OUTPUT   Urine 700   700   Shift Total(mL/kg) 700(13.36)   700(13.36)   Weight (kg) 52.4 52.4 52.4 52.4      Admit Weight: 52 kg (114 lb 9.6 oz)     GENERAL APPEARANCE: alert and no distress  HENT: mouth without ulcers or lesions  RESP: no audible wheeze  CV:  normal rate  EDEMA: no LE edema bilaterally  ABDOMEN: soft, nondistended, nontender  MS: extremities normal - no gross deformities noted, no evidence of inflammation in joints, no muscle tenderness  SKIN: no rash    Data   All labs reviewed by me.  CMP  Recent Labs   Lab 11/16/20  0527 11/15/20  0627 11/14/20  1731 11/14/20  1322 11/14/20  0537 11/13/20  0441 11/13/20  0441 11/12/20  1500 11/12/20  1500    138  --   --  137  --  135   < > 136   POTASSIUM 3.5 4.5 4.8 4.9 5.2   < > 5.7*   < > 3.9   CHLORIDE 108 106  --   --  105  --  101   < > 97   CO2 23 20  --   --  21  --  23   < > 29   ANIONGAP 9 12  --   --  12  --  10   < > 11   GLC 81 88  --   --  112*  --  164*   < > 92   BUN 58* 56*  --   --  44*  --  32*   < > 28   CR 6.66* 7.68*  --   --  8.27*  --  8.26*   < > 8.73*   GFRESTIMATED 7* 6*  --   --  5*  --  5*   < > 5*   GFRESTBLACK 8* 7*  --   --  6*  --  6*   < > 6*   HOLLIS 8.3* 8.8  --   --  8.9  --  8.7   < > 9.5   MAG 1.7 2.1  --   --  2.1  --  2.3   < >  --    PHOS 4.7* 7.2*  --   --  6.8*  --  4.9*   < >  --    PROTTOTAL  --   --   --   --   --   --   --   --  8.4   ALBUMIN  --   --   --   --   --   --   --   --  3.8   BILITOTAL  --   --   --   --   --   --   --   --  0.5   ALKPHOS  --   --   --   --   --   --   --   --  142   AST  --   --   --   --   --   --   --   --  19   ALT  --   --   --   --   --   --   --   --  " 18    < > = values in this interval not displayed.     CBC  Recent Labs   Lab 11/16/20  0527 11/15/20  0627 11/14/20  0537 11/14/20  0344 11/13/20  0441 11/13/20  0441   HGB 7.5* 8.0* 8.4* 8.7*   < > 9.8*   WBC 5.4 5.3 9.8  --   --  13.5*   RBC 2.41* 2.67* 2.74*  --   --  3.18*   HCT 23.6* 26.5* 26.9*  --   --  31.8*   MCV 98 99 98  --   --  100   MCH 31.1 30.0 30.7  --   --  30.8   MCHC 31.8 30.2* 31.2*  --   --  30.8*   RDW 14.7 15.2* 15.1*  --   --  15.0    193 219  --   --  229    < > = values in this interval not displayed.     INR  Recent Labs   Lab 11/12/20  2217 11/12/20  1500   INR 1.01 0.92   PTT 33 34     ABGNo lab results found in last 7 days.   Urine Studies  Recent Labs   Lab Test 11/16/20  1500 11/15/20  1720 11/13/20  0300 01/23/20  2300   COLOR Light Yellow Light Yellow Yellow Light Yellow   APPEARANCE Clear Clear Clear Slightly Cloudy   URINEGLC Negative Negative 30* 30*   URINEBILI Negative Negative Negative Negative   URINEKETONE Negative Negative Negative Negative   SG 1.010 1.007 1.010 1.003   UBLD Moderate* Small* Large* Trace*   URINEPH 5.0 6.0 7.0 8.0*   PROTEIN 10* Negative 100* 30*   NITRITE Negative Negative Negative Negative   LEUKEST Negative Negative Negative Large*   RBCU 25* 1 >182* 2   WBCU 1 1 69* 48*     Recent Labs   Lab Test 11/13/20  0300 01/23/20  2300 04/22/19  2340   UTPG 7.43* 5.08* 6.67*     PTH  No lab results found.  Iron Studies  No lab results found.    IMAGING:  All imaging studies reviewed by me.       James Crespo MD   Transplant Nephrology Fellow  11/16/2020 at 5:59 PM    Patient was seen and evaluated by me, Emile Lopez MD. I have reviewed the note and agree with the the plan of care as documented by the fellow.

## 2020-11-16 NOTE — PROGRESS NOTES
"Transplant Social Work Services Progress Note      Date of Initial Social Work Evaluation: 2020  Collaborated with: Patient    Data: Patient is a 44 year old Patricia-speaking female with history of ESKD of unknown etiology on dialysis since , HTN, vasospastic MI , ischemic cardiomyopathy (preserved EF, normal coronaries, and septal wall hypokinesis), HELLP, HBV, and a known RBC antibody. Patient has undergone attempted kidney transplant twice (2019, 2020), and each time the case was aborted due to hypotension and suspected anaphylaxis. A pre-op plan was made with anesthesia and Dr. Palmer (Allergist). Patient was admitted for a  donor kidney transplant.     Intervention:     Patient: CLAUDIA spoke with patient via phone, with Patricia , to let her know that SW will be coming into her room to get her to sign a form for Medicare. Patient had general questions about her medication coverage. SW reviewed the cost of her immunosuppression medications. Patient thankful for $0 copay. SW then asked if patient needed any help setting up her meds or reminders to take? Patient reports she does need help because she does not speak English. SW asked who will be helping her and patient said no one know but when she gets home, her friends daughter can help. SW asked about the PLC class and her \"aunty\" helping her. Patient said that will be helpful to her to have that education.     [facility] ARU Liasion: Patient hypertensive and likely unable to take tomorrow. They are also verifying insurance but if Medicare is primary, she won't need authorization.     Assessment: Patient doing well and being followed for ARU.     Education provided by CLAUDIA: 0780 Form    Plan:    Discharge Plans in Progress: [facility] ARU    Barriers to d/c plan: Medical Stability    Follow up Plan: SW to continue following for discharge planning needs    CHEN Da Silva, JESUS  7A   Ph: [phone]  Pager: [phone]  "

## 2020-11-16 NOTE — PROGRESS NOTES
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 Patient has undergone attempted kidney transplant twice (2019, 2020), and each time the case was aborted due to hypotension and suspected anaphylaxis. A pre-op plan was made with anesthesia and Dr. Palmer (Allergist). Patient was admitted for a  donor kidney transplant.      Graft function: DDKT 20 with no ureteral stent. DCD donor with >20 hours CIT. US POD 1 AM with 6.3 cm dayna-nephric hematoma and good blood flow. Cr plateaued and starting to decrease, now 6. 7 (7.7). Hyperkalemia initially post-op was treated with medical management, now resolved. Good urine output.  Immunosuppression management: high risk protocol due to potential for DGF   intra-op/250 mg/100 mg POD 2  Thymo 100 intra-op/100 mg/100mg POD 2-completed 300 mg or 5.8 mg/kg   mg BID  Tac 1 mg BID- can start to titrate dose up now that renal function is improving, will await today's level.  Complexity of management: High. Contributing factors: induction, history of hypoetension with operation and anesthesia, and long ishemia time  Hematology:  WBC 5.4, plt 163, continue to monitor  Anemia of CKD: Hgb 7.5  Cardiorespiratory:   HTN: Start Norvasc 10 mg daily per nephrology, also on coreg 6.25 BID. Will increase coreg to 12.5 BID due to SBPs ~190. Continue PRN hydralazine   GI/Nutrition: 2g K diet- will advance to regular diet now that K is 3.5. No flatus or bowel function at this time, continue senna/colace/miralax, suppository PRN  Endocrine: no h/o of DM, mild steroid induced hyperglycemia, resolved.   Fluid/Electrolytes: MIVF: discontinue IVF, will decrease lasix to  daily, continue to monitor urine output throughout the day  : Moreno removed POD3, voiding frequently since  Neuro: RLE weakness, likely related to femoral nerve injury/stretch from surgery. Continue to work with physical therapy to determine patient's ability to move safely on her own.  Infectious disease: AF.   H/o HEP C core antibody positive: Will discuss Entecavir initiation after HBV quant is back  Prophylaxis: DVT, fall, GI, viral (Valycte x 3 months), pneumocystis (bactrim indefinitely)  Disposition: 7A, PT recommending TCU placement due to R leg weakness, pt agreeable. To complete transplant education 11/16.    Medical Decision Making: Medium  Subsequent visit [id number] (moderate level decision making)    MERVAT/Fellow/Resident Provider: Maria De Jesus Boone PA-C    Faculty: Armando Faustin M.D.  _________________________________________________________________  Transplant History:   11/12/2020 (Kidney), Postoperative day: 4     Interval History: History is obtained from the patient  Overnight events: Right leg weakness. Frequent urination making it difficult for her to sleep. No BM yet.    ROS:   A 10-point review of systems was negative except as noted above.    Meds:    amLODIPine  10 mg Oral Daily     aspirin  81 mg Oral Daily     atorvastatin  10 mg Oral Daily     carvedilol  6.25 mg Oral BID     fexofenadine  180 mg Oral BID     [START ON 11/17/2020] furosemide  40 mg Intravenous Daily     magnesium oxide  400 mg Oral Daily with lunch     mycophenolate  750 mg Oral BID IS     pantoprazole  40 mg Oral QAM AC     polyethylene glycol  17 g Oral Daily     senna-docusate  1 tablet Oral BID    Or     senna-docusate  2 tablet Oral BID     sodium chloride (PF)  10 mL Intracatheter Q8H     sucralfate  1 g Oral BID AC     sulfamethoxazole-trimethoprim  1 tablet Oral Once per day on Mon Wed Fri     tacrolimus  1 mg Oral BID IS     valGANciclovir  450 mg Oral Twice Weekly       Physical Exam:     Admit Weight: 52 kg (114  "lb 9.6 oz)    Current vitals:   BP (!) 188/110 (BP Location: Left leg)   Pulse 96   Temp 98.9  F (37.2  C) (Oral)   Resp 18   Ht 1.6 m (5' 3\")   Wt 57.5 kg (126 lb 12.2 oz)   SpO2 100%   BMI 22.46 kg/m      Vital sign ranges:    Temp:  [98  F (36.7  C)-99.1  F (37.3  C)] 98.9  F (37.2  C)  Pulse:  [] 96  Resp:  [12-18] 18  BP: (137-191)/() 188/110  SpO2:  [100 %] 100 %    General Appearance: in no apparent distress.   Skin: Warm, perfused  Heart: RRR  Lungs: NLB on room air   Abdomen: The abdomen is soft, NT. Incision c/d/i   : soni removed, voiding.   Extremities: edema: trace bilaterally, strength: RLE <LLE  Neurologic: awake, alert and oriented. Tremor absent.    Data:   CMP  Recent Labs   Lab 11/16/20  0527 11/15/20  0627 11/12/20  1500 11/12/20  1500    138   < > 136   POTASSIUM 3.5 4.5   < > 3.9   CHLORIDE 108 106   < > 97   CO2 23 20   < > 29   GLC 81 88   < > 92   BUN 58* 56*   < > 28   CR 6.66* 7.68*   < > 8.73*   GFRESTIMATED 7* 6*   < > 5*   GFRESTBLACK 8* 7*   < > 6*   HOLLIS 8.3* 8.8   < > 9.5   MAG 1.7 2.1   < >  --    PHOS 4.7* 7.2*   < >  --    ALBUMIN  --   --   --  3.8   BILITOTAL  --   --   --  0.5   ALKPHOS  --   --   --  142   AST  --   --   --  19   ALT  --   --   --  18    < > = values in this interval not displayed.     CBC  Recent Labs   Lab 11/16/20  0527 11/15/20  0627 11/12/20  1500 11/12/20  1500   HGB 7.5* 8.0*   < > 11.9   WBC 5.4 5.3   < > 6.2    193   < > 303   A1C  --   --   --  4.9    < > = values in this interval not displayed.     Attestation:    The patient has been seen and evaluated by me.   Vital signs, labs, medications and orders were reviewed.   When obtained, diagnostic images were reviewed by me and interpreted as above.    The care plan was discussed with the multidisciplinary team and I agree with the findings and plan in this note, with any differences recorded in blue.    Immunosuppressive medication management was reviewed and " adjusted as reflected in the note and orders.     .

## 2020-11-16 NOTE — CONSULTS
Paolo was seen at the bedside for instructions on her transplant medications. She had the use of a video . Family was not present for the class and as stated per Pt., her niece will be setting up and monitoring her medications and lab results. I did notify the niece about rescheduling a class to bring her into the treatment plan for her aunt. Paolo will need continued reinforcement on medications with re: to showing them to her when setting her up and explaining what the medications are for. She was given pictures of the medications that she would be able to use as a reference when doing this.

## 2020-11-16 NOTE — PLAN OF CARE
"1900 - 0730    /72 (BP Location: Left leg)   Pulse 99   Temp 99.1  F (37.3  C) (Oral)   Resp 16   Ht 1.6 m (5' 3\")   Wt 57.5 kg (126 lb 12.2 oz)   SpO2 100%   BMI 22.46 kg/m      VS: HTN 180s/80s - received IV hydralazine x 1 OVSS on RA  Labs: pending  Pain/Nausea/PRN: PRN tylenol x 2 adequately controlling pain. Denied nausea  Diet: 2 g K  LDA: L AV fistula WDL, R PICC infusing NS at 75 mL/hr  GI: having loose stools, held senna  : Voiding adequately, IV lasix q6h  Skin: abdominal incision open to air  Mobility: Assist x 2, pt unable to put weight on R leg   Plan: lab book and med card updated, continue to monitor        "

## 2020-11-17 ENCOUNTER — APPOINTMENT (OUTPATIENT)
Dept: PHYSICAL THERAPY | Facility: CLINIC | Age: 45
DRG: 652 | End: 2020-11-17
Attending: SURGERY
Payer: MEDICARE

## 2020-11-17 LAB
ANION GAP SERPL CALCULATED.3IONS-SCNC: 10 MMOL/L (ref 3–14)
BACTERIA SPEC CULT: NORMAL
BASOPHILS # BLD AUTO: 0 10E9/L (ref 0–0.2)
BASOPHILS NFR BLD AUTO: 0.2 %
BUN SERPL-MCNC: 54 MG/DL (ref 7–30)
CALCIUM SERPL-MCNC: 8.8 MG/DL (ref 8.5–10.1)
CHLORIDE SERPL-SCNC: 106 MMOL/L (ref 94–109)
CO2 SERPL-SCNC: 24 MMOL/L (ref 20–32)
CREAT SERPL-MCNC: 5.66 MG/DL (ref 0.52–1.04)
DIFFERENTIAL METHOD BLD: ABNORMAL
EOSINOPHIL # BLD AUTO: 0.1 10E9/L (ref 0–0.7)
EOSINOPHIL NFR BLD AUTO: 3.2 %
ERYTHROCYTE [DISTWIDTH] IN BLOOD BY AUTOMATED COUNT: 14.6 % (ref 10–15)
GFR SERPL CREATININE-BSD FRML MDRD: 8 ML/MIN/{1.73_M2}
GLUCOSE SERPL-MCNC: 92 MG/DL (ref 70–99)
HCT VFR BLD AUTO: 22.1 % (ref 35–47)
HGB BLD-MCNC: 7.2 G/DL (ref 11.7–15.7)
IMM GRANULOCYTES # BLD: 0 10E9/L (ref 0–0.4)
IMM GRANULOCYTES NFR BLD: 0.2 %
LYMPHOCYTES # BLD AUTO: 0.2 10E9/L (ref 0.8–5.3)
LYMPHOCYTES NFR BLD AUTO: 4.2 %
Lab: NORMAL
MAGNESIUM SERPL-MCNC: 1.8 MG/DL (ref 1.6–2.3)
MCH RBC QN AUTO: 31.4 PG (ref 26.5–33)
MCHC RBC AUTO-ENTMCNC: 32.6 G/DL (ref 31.5–36.5)
MCV RBC AUTO: 97 FL (ref 78–100)
MONOCYTES # BLD AUTO: 0.5 10E9/L (ref 0–1.3)
MONOCYTES NFR BLD AUTO: 13 %
NEUTROPHILS # BLD AUTO: 3.2 10E9/L (ref 1.6–8.3)
NEUTROPHILS NFR BLD AUTO: 79.2 %
NRBC # BLD AUTO: 0 10*3/UL
NRBC BLD AUTO-RTO: 0 /100
PHOSPHATE SERPL-MCNC: 4.3 MG/DL (ref 2.5–4.5)
PLATELET # BLD AUTO: 177 10E9/L (ref 150–450)
POTASSIUM SERPL-SCNC: 3.4 MMOL/L (ref 3.4–5.3)
RBC # BLD AUTO: 2.29 10E12/L (ref 3.8–5.2)
SODIUM SERPL-SCNC: 139 MMOL/L (ref 133–144)
SPECIMEN SOURCE: NORMAL
TACROLIMUS BLD-MCNC: <3 UG/L (ref 5–15)
TME LAST DOSE: ABNORMAL H
WBC # BLD AUTO: 4.1 10E9/L (ref 4–11)

## 2020-11-17 PROCEDURE — 250N000012 HC RX MED GY IP 250 OP 636 PS 637: Performed by: SURGERY

## 2020-11-17 PROCEDURE — 250N000012 HC RX MED GY IP 250 OP 636 PS 637: Performed by: PHYSICIAN ASSISTANT

## 2020-11-17 PROCEDURE — 250N000013 HC RX MED GY IP 250 OP 250 PS 637: Performed by: SURGERY

## 2020-11-17 PROCEDURE — 120N000011 HC R&B TRANSPLANT UMMC

## 2020-11-17 PROCEDURE — 80197 ASSAY OF TACROLIMUS: CPT | Performed by: NURSE PRACTITIONER

## 2020-11-17 PROCEDURE — 250N000013 HC RX MED GY IP 250 OP 250 PS 637: Performed by: NURSE PRACTITIONER

## 2020-11-17 PROCEDURE — 250N000013 HC RX MED GY IP 250 OP 250 PS 637: Performed by: INTERNAL MEDICINE

## 2020-11-17 PROCEDURE — 250N000011 HC RX IP 250 OP 636: Performed by: PHYSICIAN ASSISTANT

## 2020-11-17 PROCEDURE — 36592 COLLECT BLOOD FROM PICC: CPT | Performed by: SURGERY

## 2020-11-17 PROCEDURE — 80048 BASIC METABOLIC PNL TOTAL CA: CPT | Performed by: SURGERY

## 2020-11-17 PROCEDURE — 97530 THERAPEUTIC ACTIVITIES: CPT | Mod: GP | Performed by: REHABILITATION PRACTITIONER

## 2020-11-17 PROCEDURE — 250N000012 HC RX MED GY IP 250 OP 636 PS 637: Performed by: NURSE PRACTITIONER

## 2020-11-17 PROCEDURE — 97116 GAIT TRAINING THERAPY: CPT | Mod: GP | Performed by: REHABILITATION PRACTITIONER

## 2020-11-17 PROCEDURE — 85025 COMPLETE CBC W/AUTO DIFF WBC: CPT | Performed by: SURGERY

## 2020-11-17 PROCEDURE — 250N000013 HC RX MED GY IP 250 OP 250 PS 637: Performed by: PHYSICIAN ASSISTANT

## 2020-11-17 PROCEDURE — 84100 ASSAY OF PHOSPHORUS: CPT | Performed by: SURGERY

## 2020-11-17 PROCEDURE — 99233 SBSQ HOSP IP/OBS HIGH 50: CPT | Mod: 24 | Performed by: INTERNAL MEDICINE

## 2020-11-17 PROCEDURE — 83735 ASSAY OF MAGNESIUM: CPT | Performed by: SURGERY

## 2020-11-17 RX ORDER — OXYCODONE HYDROCHLORIDE 5 MG/1
5 TABLET ORAL EVERY 6 HOURS PRN
Status: DISCONTINUED | OUTPATIENT
Start: 2020-11-17 | End: 2020-11-19 | Stop reason: HOSPADM

## 2020-11-17 RX ORDER — TACROLIMUS 1 MG/1
3 CAPSULE ORAL
Status: DISCONTINUED | OUTPATIENT
Start: 2020-11-17 | End: 2020-11-18

## 2020-11-17 RX ORDER — POTASSIUM CHLORIDE 750 MG/1
20 TABLET, EXTENDED RELEASE ORAL ONCE
Status: COMPLETED | OUTPATIENT
Start: 2020-11-17 | End: 2020-11-17

## 2020-11-17 RX ADMIN — FUROSEMIDE 40 MG: 10 INJECTION, SOLUTION INTRAVENOUS at 08:22

## 2020-11-17 RX ADMIN — MAGNESIUM OXIDE 400 MG: 400 TABLET ORAL at 12:15

## 2020-11-17 RX ADMIN — MYCOPHENOLATE MOFETIL 750 MG: 250 CAPSULE ORAL at 17:58

## 2020-11-17 RX ADMIN — TACROLIMUS 2 MG: 1 CAPSULE ORAL at 08:20

## 2020-11-17 RX ADMIN — PANTOPRAZOLE SODIUM 40 MG: 40 TABLET, DELAYED RELEASE ORAL at 08:22

## 2020-11-17 RX ADMIN — ASPIRIN 81 MG: 81 TABLET, CHEWABLE ORAL at 08:21

## 2020-11-17 RX ADMIN — AMLODIPINE BESYLATE 10 MG: 10 TABLET ORAL at 08:20

## 2020-11-17 RX ADMIN — MYCOPHENOLATE MOFETIL 750 MG: 250 CAPSULE ORAL at 08:20

## 2020-11-17 RX ADMIN — DOCUSATE SODIUM 50 MG AND SENNOSIDES 8.6 MG 2 TABLET: 8.6; 5 TABLET, FILM COATED ORAL at 08:20

## 2020-11-17 RX ADMIN — POTASSIUM CHLORIDE 20 MEQ: 750 TABLET, EXTENDED RELEASE ORAL at 12:15

## 2020-11-17 RX ADMIN — TACROLIMUS 3 MG: 1 CAPSULE ORAL at 17:58

## 2020-11-17 RX ADMIN — CARVEDILOL 12.5 MG: 12.5 TABLET, FILM COATED ORAL at 08:21

## 2020-11-17 RX ADMIN — ATORVASTATIN CALCIUM 10 MG: 10 TABLET, FILM COATED ORAL at 08:21

## 2020-11-17 RX ADMIN — POLYETHYLENE GLYCOL 3350 17 G: 17 POWDER, FOR SOLUTION ORAL at 08:22

## 2020-11-17 RX ADMIN — CARVEDILOL 12.5 MG: 12.5 TABLET, FILM COATED ORAL at 19:20

## 2020-11-17 ASSESSMENT — ACTIVITIES OF DAILY LIVING (ADL)
ADLS_ACUITY_SCORE: 12
ADLS_ACUITY_SCORE: 12
ADLS_ACUITY_SCORE: 13
ADLS_ACUITY_SCORE: 12
ADLS_ACUITY_SCORE: 13
ADLS_ACUITY_SCORE: 12

## 2020-11-17 ASSESSMENT — MIFFLIN-ST. JEOR: SCORE: 1164.36

## 2020-11-17 NOTE — PROGRESS NOTES
"Transplant Social Work Services Progress Note      Date of Initial Social Work Evaluation: 20  Collaborated with: PT, Patient, Patricia     Data:Patient is a 44 year old Patricia-speaking female with history of ESKD of unknown etiology on dialysis since , HTN, vasospastic MI , ischemic cardiomyopathy (preserved EF, normal coronaries, and septal wall hypokinesis), HELLP, HBV, and a known RBC antibody. Patient has undergone attempted kidney transplant twice (2019, 2020), and each time the case was aborted due to hypotension and suspected anaphylaxis. A pre-op plan was made with anesthesia and Dr. Palmer (Allergist). Patient was admitted for a  donor kidney transplant.     Intervention:     PT: They would like to set up caregiver training with patient's 24/7 caregiver but are unclear on who that is. They are also unclear on what side the railings are on at her aunts house. SW to talk with patient and page PT at [phone].     Patient: SW called patient with Patricia  to confirm her 24/7 caregiver as PT wants them to come to the hospital tomorrow. SW asked who patient's 24/7 caregiver will be as PT would like to teach them how to properly help patient. Patient stated she \"can learn how to take care of herself and doesn't need any help\". SW explained that PT recommended 24/7 care and that she is not safe to go home without that. SW also explained it is a requirement to get a transplant that in the beginning she have a 24/7 caregiver. Paw said she told her  to come but he needs a doctors note to get off work, which she has not received from the provider. SW specifically asked if he will be patient's 24/7 caregiver in [place] and patient said her \"niece\" will help her and he will drive them around. SW asked if she had the name and phone number for her niece. Patient does not know her name or remember her phone. SW asked why she doesn't know her niece's name and she said it is " actually her friend Michael's daughter. Per patient, Michael's daughter speaks English and will not need an . SW to give PT Michael's phone to arrange caregiver training with her daughter.     PT: Paged contact information for Michael.     Reba Almanzar NP: Paged to update on caregiver situation and need for work excuse letter.     Assessment: Patient needs to have a 24/7 caregiver to return home safely per PT. Per patient, this will be her friend Michael's daughter. Her , if he receives the work excuse letter, will transport them where they need to go.     Education provided by SW: Need for a 24/7 Caregiver post transplant    Plan:    Discharge Plans in Progress: Home with 24/7 Assist and home care    Barriers to d/c plan: Caregiver Training with PT, Medical Stability    Follow up Plan: CLAUDIA to continue following for discharge planning needs    CHEN Da Silva, JESUS  7A   Ph: [phone]  Pager: [phone]

## 2020-11-17 NOTE — PROGRESS NOTES
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 Patient has undergone attempted kidney transplant twice (2019, 2020), and each time the case was aborted due to hypotension and suspected anaphylaxis. A pre-op plan was made with anesthesia and Dr. Palmer (Allergist). Patient was admitted for a  donor kidney transplant.     Intervention:     Treatment Team: Ready for discharge today.     [facility]: Per review of notes, patient up ad clarice in room. They will wait to see PT/OT recs today.     Assessment: Patient may no longer need ARU and be able to discharge home with services.     Education provided by SW: None    Plan:    Discharge Plans in Progress: [facility] vs Home with assist and services    Barriers to d/c plan: Final Discharge Disposition    Follow up Plan: CLAUDIA to continue following for discharge needs    CHEN Da Silva, JESUS  7A   Ph: [phone]  Pager: [phone]

## 2020-11-17 NOTE — PLAN OF CARE
"/83 (BP Location: Right arm)   Pulse 90   Temp 98.6  F (37  C) (Oral)   Resp 18   Ht 1.6 m (5' 3\")   Wt 54 kg (119 lb 1.6 oz)   SpO2 100%   BMI 21.10 kg/m    VS: VSS on RA   Neuro: intact, Patricia speaking   Mobility: up independently in room, BA on for first half of night due to recent fall but pt was strong on feet so turned off  Discomfort: denied pain and nausea   LDAs: PICC SL, PIV SL, left fistula with + bruit/thrill   Labs: see chart  Glu: n/a  GI/: voiding adequately in bedside commode, no BM during shift  Diet: 2g K diet  Skin: no new deficits noted  Plan: continue POC, update with changes     "

## 2020-11-17 NOTE — PROGRESS NOTES
[facility]    Transplant Nephrology Progress Note  Date of Admission:  11/12/2020  Today's Date: 11/17/2020    Recommendations:  - Increase tacrolimus to 3 mg BID, recheck trough level.  - Discontinue lasix.  - PT/OT.    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 UOP increasing. Creatinine had plateaued, now with clear trend down.   - Proteinuria: 7.4g/g prior to transplant. Needs to be repeated with FSGS protocol given unknown native disease   - Date DSA Last Checked:Nov/2020  Latest DSA: final XM pending   - BK Viremia: Not checked post transplant   - Kidney Tx Biopsy: No    Hep B core positive: DNA quant pending, will need entecavir if positive    # Immunosuppression: Tacrolimus immediate release (goal 8-10) and Mycophenolate mofetil (dose 750 mg every 12 hours)   - Increase tacrolimus to 3 mg BID, recheck trough level     # Infection Prophylaxis:   - PJP: Sulfa/TMP (Bactrim)  - CMV: Valcyte x3m      # Hypertension: Control.           Goal BP: < 150/90              - Volume status: euvolemic             EDW ~ unknown              - Changes: discontinue lasix     # Elevated Blood Glucose: Glucose generally running ~ 140-160              - Management as per primary team.     # Anemia in Chronic Renal Disease: Hgb: slow trend down, continue to monitor      JESSI: No              - Iron studies: Not checked recently     # Mineral Bone Disorder:   - Secondary renal hyperparathyroidism; PTH level: Significantly elevated (601-1200 pg/ml)        On treatment: likely had been on treatment while on HD  - Vitamin D; level: Not checked recently        On supplement: No  - Calcium; level: Normal        On supplement: No  - Phosphorus; level: High        On supplement: No     # Electrolytes:   - Potassium; level: Now normal      On supplement: No  - Magnesium; level: Normal     "    On supplement: Yes  - Bicarbonate; level: Normal        On supplement: No       # Transplant History:  Etiology of Kidney Failure: Unknown etiology (no kidney biopsy)  Tx: DDKT  Transplant: 2020 (Kidney)  Donor Type: Donation after Circulatory Death           Donor Class:   Crossmatch at time of Tx: negative  Significant changes in immunosuppression: None  Significant transplant-related complications: None    Recommendations were communicated to the primary team verbally.    Seen and discussed with Dr. John Crespo MD       Interval History   Creatinine continued to trend down. UOP > 3 L over last 24 hr. No acute complaints.     Review of Systems   4 point ROS was obtained and negative except as noted in the Interval History.    MEDICATIONS:    amLODIPine  10 mg Oral Daily     aspirin  81 mg Oral Daily     atorvastatin  10 mg Oral Daily     carvedilol  12.5 mg Oral BID     magnesium oxide  400 mg Oral Daily with lunch     mycophenolate  750 mg Oral BID IS     pantoprazole  40 mg Oral QAM AC     polyethylene glycol  17 g Oral Daily     senna-docusate  1 tablet Oral BID    Or     senna-docusate  2 tablet Oral BID     sodium chloride (PF)  10 mL Intracatheter Q8H     sulfamethoxazole-trimethoprim  1 tablet Oral Once per day on      tacrolimus  3 mg Oral BID IS     valGANciclovir  450 mg Oral Twice Weekly         Physical Exam   Temp  Av.4  F (36.9  C)  Min: 97.1  F (36.2  C)  Max: 99.1  F (37.3  C)  Arterial Line BP  Min: 83/44  Max: 109/54  Arterial Line MAP (mmHg)  Av.4 mmHg  Min: 62 mmHg  Max: 77 mmHg      Pulse  Av.4  Min: 87  Max: 109 Resp  Avg: 15.6  Min: 12  Max: 20  SpO2  Av.1 %  Min: 94 %  Max: 100 %    CVP (mmHg): 8 mmHg(Order to stop CVP monitoring. )BP (!) 159/102 (BP Location: Left arm)   Pulse 86   Temp 98.6  F (37  C)   Resp 16   Ht 1.6 m (5' 3\")   Wt 54 kg (119 lb 1.6 oz)   SpO2 100%   BMI 21.10 kg/m     Date 20 0700 - 11/15/20 0659 "   Shift 5846-5905 8029-9375 5203-6264 24 Hour Total   INTAKE   P.O. 240   240   Shift Total(mL/kg) 240(4.58)   240(4.58)   OUTPUT   Urine 700   700   Shift Total(mL/kg) 700(13.36)   700(13.36)   Weight (kg) 52.4 52.4 52.4 52.4      Admit Weight: 52 kg (114 lb 9.6 oz)     GENERAL APPEARANCE: alert and no distress  HENT: mouth without ulcers or lesions  RESP: no audible wheeze  CV:  normal rate  EDEMA: no LE edema bilaterally  ABDOMEN: soft, nondistended, nontender  MS: extremities normal - no gross deformities noted, no evidence of inflammation in joints, no muscle tenderness  SKIN: no rash    Data   All labs reviewed by me.  CMP  Recent Labs   Lab 11/17/20  0602 11/16/20  0527 11/15/20  0627 11/14/20  1731 11/14/20  0537 11/14/20  0537 11/12/20  1500 11/12/20  1500    140 138  --   --  137   < > 136   POTASSIUM 3.4 3.5 4.5 4.8   < > 5.2   < > 3.9   CHLORIDE 106 108 106  --   --  105   < > 97   CO2 24 23 20  --   --  21   < > 29   ANIONGAP 10 9 12  --   --  12   < > 11   GLC 92 81 88  --   --  112*   < > 92   BUN 54* 58* 56*  --   --  44*   < > 28   CR 5.66* 6.66* 7.68*  --   --  8.27*   < > 8.73*   GFRESTIMATED 8* 7* 6*  --   --  5*   < > 5*   GFRESTBLACK 10* 8* 7*  --   --  6*   < > 6*   HOLLIS 8.8 8.3* 8.8  --   --  8.9   < > 9.5   MAG 1.8 1.7 2.1  --   --  2.1   < >  --    PHOS 4.3 4.7* 7.2*  --   --  6.8*   < >  --    PROTTOTAL  --   --   --   --   --   --   --  8.4   ALBUMIN  --   --   --   --   --   --   --  3.8   BILITOTAL  --   --   --   --   --   --   --  0.5   ALKPHOS  --   --   --   --   --   --   --  142   AST  --   --   --   --   --   --   --  19   ALT  --   --   --   --   --   --   --  18    < > = values in this interval not displayed.     CBC  Recent Labs   Lab 11/17/20  0602 11/16/20  0527 11/15/20  0627 11/14/20  0537   HGB 7.2* 7.5* 8.0* 8.4*   WBC 4.1 5.4 5.3 9.8   RBC 2.29* 2.41* 2.67* 2.74*   HCT 22.1* 23.6* 26.5* 26.9*   MCV 97 98 99 98   MCH 31.4 31.1 30.0 30.7   MCHC 32.6 31.8 30.2* 31.2*    RDW 14.6 14.7 15.2* 15.1*    163 193 219     INR  Recent Labs   Lab 11/12/20  2217 11/12/20  1500   INR 1.01 0.92   PTT 33 34     ABGNo lab results found in last 7 days.   Urine Studies  Recent Labs   Lab Test 11/16/20  1500 11/15/20  1720 11/13/20  0300 01/23/20  2300   COLOR Light Yellow Light Yellow Yellow Light Yellow   APPEARANCE Clear Clear Clear Slightly Cloudy   URINEGLC Negative Negative 30* 30*   URINEBILI Negative Negative Negative Negative   URINEKETONE Negative Negative Negative Negative   SG 1.010 1.007 1.010 1.003   UBLD Moderate* Small* Large* Trace*   URINEPH 5.0 6.0 7.0 8.0*   PROTEIN 10* Negative 100* 30*   NITRITE Negative Negative Negative Negative   LEUKEST Negative Negative Negative Large*   RBCU 25* 1 >182* 2   WBCU 1 1 69* 48*     Recent Labs   Lab Test 11/13/20  0300 01/23/20  2300 04/22/19  2340   UTPG 7.43* 5.08* 6.67*     PTH  No lab results found.  Iron Studies  No lab results found.    IMAGING:  All imaging studies reviewed by me.       James Crespo MD   Transplant Nephrology Fellow  11/17/2020 at 3:59 PM    Patient was seen and evaluated by me, Emile Lopez MD. I have reviewed the note and agree with the the plan of care as documented by the fellow.

## 2020-11-17 NOTE — PLAN OF CARE
"1900-2330  /78   Pulse 95   Temp 99  F (37.2  C)   Resp 20   Ht 1.6 m (5' 3\")   Wt 57.5 kg (126 lb 12.2 oz)   SpO2 99%   BMI 22.46 kg/m    Denies pain or nausea. Up to BSC independently, voiding adequate amts. Resting in between cares.   "

## 2020-11-17 NOTE — PROGRESS NOTES
Transplant Surgery  Inpatient Daily Progress Note  2020    Assessment & Plan: Paolo Larkin is a 45 year old Patricia-speaking female with history of ESKD of unknown etiology on dialysis since , HTN, vasospastic MI , ischemic cardiomyopathy (preserved EF, normal coronaries, and septal wall hypokinesis), HELLP, HBV, and a known RBC antibody. Patient has undergone attempted kidney transplant twice (2019, 2020), and each time the case was aborted due to hypotension and suspected anaphylaxis. A pre-op plan was made with anesthesia and Dr. Palmer (Allergist). She is now s/p  donor kidney transplant 20 with Dr. Marcelino.      Graft function: DDKT 20 with no ureteral stent. DCD donor with >20 hours CIT. US POD 1 AM with 6.3 cm dayna-nephric hematoma and good blood flow. Cr plateaued and starting to decrease, now 5.5 (6.6).   -Hyperkalemia initially post-op was treated with medical management, now resolved. Good urine output. 3.4L  -Renal US : Perinephephric hematoma, patent doppler  Immunosuppression management: High risk protocol due to potential for DGF   intra-op/250 mg/100 mg -complete  Thymo 100 intra-op/100 mg/100mg POD -kfujyf=162 mg, 5.8 mg/kg  MMF: 750 mg BID  Tac 2 mg BID- Increase to goal of 8-10 with improving renal function. Tacrolimus 3mg BID.  Complexity of management: High. Contributing factors: induction, history of hypoetension with operation and anesthesia, and long ishemia time  Neurology: Acute postoperative pain: Continue oxycodone PRN pain, not currently using. Will decrease to 5mg Q6hrs PRN  Hematology:  WBC 4.1, plt 177, continue to monitor  Anemia of CKD/ABL: Hgb slowly downtrending, 7.2 today. Transfuse <7.  Cardiorespiratory:   HTN: Controlled, -130. Continue Norvasc 10 mg daily, coreg 12.5mg BID. Continue PRN hydralazine   GI/Nutrition: Reg diet  Postop ileus-resolved, continue senna/colace/miralax, suppository PRN  Endocrine: no h/o of  DM, mild steroid induced hyperglycemia, resolved.   Fluid/Electrolytes: MIVF: CIV,   Hypervolemia: Improving, Weight +2Kg from admit, receiving Lasix 40mg IV daily, stop today.   Hypokalemia: K 3.4, 20meqKCl x1 today with diuretics.  Liberalize diet.  : [place] removed POD3, voiding without retention.  Neuro: RLE weakness, likely related to femoral nerve injury/stretch from surgery. Continue to work with physical therapy to determine patient's ability to move safely on her own.  Infectious disease: AF.   H/o HEP B core antibody positive: Will discuss Entecavir initiation after HBV quant is back (11/14 pending)  Prophylaxis: DVT, fall, GI, viral (Valycte x 3 months), pneumocystis (bactrim indefinitely)  Disposition: 7A, PT recommending  ARU placement due to R leg weakness, buckling. Plan to discharge when bed available. Completed transplant education 11/16.    Medical Decision Making: Medium  Subsequent visit 49789 (moderate level decision making)    MERVAT/Fellow/Resident Provider:  Reba Almanzar NP      Faculty: Armando Faustin M.D.  _________________________________________________________________  Transplant History:   11/12/2020 (Kidney), Postoperative day: 5     Interval History: History is obtained from the patient  Overnight events: Right leg weakness improving per pt. Good uop. Tolerating diet, no n/v. +BM.      ROS:   A 10-point review of systems was negative except as noted above.    Meds:    amLODIPine  10 mg Oral Daily     aspirin  81 mg Oral Daily     atorvastatin  10 mg Oral Daily     carvedilol  12.5 mg Oral BID     magnesium oxide  400 mg Oral Daily with lunch     mycophenolate  750 mg Oral BID IS     pantoprazole  40 mg Oral QAM AC     polyethylene glycol  17 g Oral Daily     potassium chloride  20 mEq Oral Once     senna-docusate  1 tablet Oral BID    Or     senna-docusate  2 tablet Oral BID     sodium chloride (PF)  10 mL Intracatheter Q8H     sulfamethoxazole-trimethoprim  1 tablet Oral Once per  "day on Mon Wed Fri     tacrolimus  2 mg Oral BID IS     valGANciclovir  450 mg Oral Twice Weekly       Physical Exam:     Admit Weight: 52 kg (114 lb 9.6 oz)    Current vitals:   BP (!) 136/91 (BP Location: Left leg)   Pulse 85   Temp 98.6  F (37  C)   Resp 16   Ht 1.6 m (5' 3\")   Wt 54 kg (119 lb 1.6 oz)   SpO2 100%   BMI 21.10 kg/m      Vital sign ranges:    Temp:  [98.6  F (37  C)-99  F (37.2  C)] 98.6  F (37  C)  Pulse:  [85-95] 85  Resp:  [16-20] 16  BP: (114-152)/(67-94) 136/91  SpO2:  [98 %-100 %] 100 %    General Appearance: in no apparent distress.   Skin: Warm, dry  Heart: RRR  Lungs: NLB on room air   Abdomen: The abdomen is soft, NT. Incision c/d/i   :  voiding.   Extremities: edema: trace bilaterally, strength: RLE <LLE  Neurologic: awake, alert and oriented. Tremor absent.    Data:   CMP  Recent Labs   Lab 11/17/20  0602 11/16/20 0527 11/12/20  1500 11/12/20  1500    140   < > 136   POTASSIUM 3.4 3.5   < > 3.9   CHLORIDE 106 108   < > 97   CO2 24 23   < > 29   GLC 92 81   < > 92   BUN 54* 58*   < > 28   CR 5.66* 6.66*   < > 8.73*   GFRESTIMATED 8* 7*   < > 5*   GFRESTBLACK 10* 8*   < > 6*   HOLLIS 8.8 8.3*   < > 9.5   MAG 1.8 1.7   < >  --    PHOS 4.3 4.7*   < >  --    ALBUMIN  --   --   --  3.8   BILITOTAL  --   --   --  0.5   ALKPHOS  --   --   --  142   AST  --   --   --  19   ALT  --   --   --  18    < > = values in this interval not displayed.     CBC  Recent Labs   Lab 11/17/20  0602 11/16/20 0527 11/12/20  1500 11/12/20  1500   HGB 7.2* 7.5*   < > 11.9   WBC 4.1 5.4   < > 6.2    163   < > 303   A1C  --   --   --  4.9    < > = values in this interval not displayed.     Attestation:    The patient has been seen and evaluated by me.   Vital signs, labs, medications and orders were reviewed.   When obtained, diagnostic images were reviewed by me and interpreted as above.    The care plan was discussed with the multidisciplinary team and I agree with the findings and plan in this " note, with any differences recorded in blue.    Immunosuppressive medication management was reviewed and adjusted as reflected in the note and orders.     .

## 2020-11-17 NOTE — PLAN OF CARE
"BP (!) 159/102 (BP Location: Left arm)   Pulse 86   Temp 98.6  F (37  C)   Resp 16   Ht 1.6 m (5' 3\")   Wt 54 kg (119 lb 1.6 oz)   SpO2 100%   BMI 21.10 kg/m     Neuro: A/Ox3, Patricia speaking  VS: VSS on RA except elevated BP  Pain: Denies  GI: on 2gr K and tolerating good. Denies nausea. N BM  : voiding without difficulty  R PICC SL  Abdominal incision intact/open to air  Activity - Assist of 1 with walker/GB. RLE weakness  Plan of Care - Continue with POC    "

## 2020-11-18 ENCOUNTER — APPOINTMENT (OUTPATIENT)
Dept: PHYSICAL THERAPY | Facility: CLINIC | Age: 45
DRG: 652 | End: 2020-11-18
Attending: SURGERY
Payer: MEDICARE

## 2020-11-18 LAB
ANION GAP SERPL CALCULATED.3IONS-SCNC: 7 MMOL/L (ref 3–14)
BASOPHILS # BLD AUTO: 0 10E9/L (ref 0–0.2)
BASOPHILS NFR BLD AUTO: 0.3 %
BUN SERPL-MCNC: 50 MG/DL (ref 7–30)
CALCIUM SERPL-MCNC: 9.2 MG/DL (ref 8.5–10.1)
CHLORIDE SERPL-SCNC: 108 MMOL/L (ref 94–109)
CO2 SERPL-SCNC: 25 MMOL/L (ref 20–32)
CREAT SERPL-MCNC: 4.47 MG/DL (ref 0.52–1.04)
DIFFERENTIAL METHOD BLD: ABNORMAL
EOSINOPHIL # BLD AUTO: 0.3 10E9/L (ref 0–0.7)
EOSINOPHIL NFR BLD AUTO: 8.8 %
ERYTHROCYTE [DISTWIDTH] IN BLOOD BY AUTOMATED COUNT: 14.4 % (ref 10–15)
GFR SERPL CREATININE-BSD FRML MDRD: 11 ML/MIN/{1.73_M2}
GLUCOSE SERPL-MCNC: 90 MG/DL (ref 70–99)
HBV DNA SERPL NAA+PROBE-ACNC: NORMAL [IU]/ML
HBV DNA SERPL NAA+PROBE-LOG IU: NORMAL {LOG_IU}/ML
HCT VFR BLD AUTO: 22 % (ref 35–47)
HGB BLD-MCNC: 7.1 G/DL (ref 11.7–15.7)
IMM GRANULOCYTES # BLD: 0 10E9/L (ref 0–0.4)
IMM GRANULOCYTES NFR BLD: 0.3 %
LYMPHOCYTES # BLD AUTO: 0.2 10E9/L (ref 0.8–5.3)
LYMPHOCYTES NFR BLD AUTO: 5.4 %
MAGNESIUM SERPL-MCNC: 1.8 MG/DL (ref 1.6–2.3)
MCH RBC QN AUTO: 31 PG (ref 26.5–33)
MCHC RBC AUTO-ENTMCNC: 32.3 G/DL (ref 31.5–36.5)
MCV RBC AUTO: 96 FL (ref 78–100)
MONOCYTES # BLD AUTO: 0.5 10E9/L (ref 0–1.3)
MONOCYTES NFR BLD AUTO: 14 %
NEUTROPHILS # BLD AUTO: 2.5 10E9/L (ref 1.6–8.3)
NEUTROPHILS NFR BLD AUTO: 71.2 %
NRBC # BLD AUTO: 0 10*3/UL
NRBC BLD AUTO-RTO: 0 /100
PHOSPHATE SERPL-MCNC: 4.6 MG/DL (ref 2.5–4.5)
PLATELET # BLD AUTO: 179 10E9/L (ref 150–450)
POTASSIUM SERPL-SCNC: 3.6 MMOL/L (ref 3.4–5.3)
RBC # BLD AUTO: 2.29 10E12/L (ref 3.8–5.2)
SODIUM SERPL-SCNC: 140 MMOL/L (ref 133–144)
TACROLIMUS BLD-MCNC: <3 UG/L (ref 5–15)
TME LAST DOSE: ABNORMAL H
WBC # BLD AUTO: 3.5 10E9/L (ref 4–11)

## 2020-11-18 PROCEDURE — 80048 BASIC METABOLIC PNL TOTAL CA: CPT | Performed by: SURGERY

## 2020-11-18 PROCEDURE — 250N000013 HC RX MED GY IP 250 OP 250 PS 637: Performed by: SURGERY

## 2020-11-18 PROCEDURE — 36592 COLLECT BLOOD FROM PICC: CPT | Performed by: SURGERY

## 2020-11-18 PROCEDURE — 250N000013 HC RX MED GY IP 250 OP 250 PS 637: Performed by: INTERNAL MEDICINE

## 2020-11-18 PROCEDURE — 80197 ASSAY OF TACROLIMUS: CPT | Performed by: SURGERY

## 2020-11-18 PROCEDURE — 84100 ASSAY OF PHOSPHORUS: CPT | Performed by: SURGERY

## 2020-11-18 PROCEDURE — 99222 1ST HOSP IP/OBS MODERATE 55: CPT | Mod: GC | Performed by: PSYCHIATRY & NEUROLOGY

## 2020-11-18 PROCEDURE — 250N000013 HC RX MED GY IP 250 OP 250 PS 637: Performed by: PHYSICIAN ASSISTANT

## 2020-11-18 PROCEDURE — 120N000011 HC R&B TRANSPLANT UMMC

## 2020-11-18 PROCEDURE — 85025 COMPLETE CBC W/AUTO DIFF WBC: CPT | Performed by: SURGERY

## 2020-11-18 PROCEDURE — 250N000013 HC RX MED GY IP 250 OP 250 PS 637: Performed by: STUDENT IN AN ORGANIZED HEALTH CARE EDUCATION/TRAINING PROGRAM

## 2020-11-18 PROCEDURE — 97530 THERAPEUTIC ACTIVITIES: CPT | Mod: GP

## 2020-11-18 PROCEDURE — 99233 SBSQ HOSP IP/OBS HIGH 50: CPT | Mod: 24 | Performed by: INTERNAL MEDICINE

## 2020-11-18 PROCEDURE — 250N000011 HC RX IP 250 OP 636: Performed by: SURGERY

## 2020-11-18 PROCEDURE — 83735 ASSAY OF MAGNESIUM: CPT | Performed by: SURGERY

## 2020-11-18 PROCEDURE — 250N000012 HC RX MED GY IP 250 OP 636 PS 637: Performed by: NURSE PRACTITIONER

## 2020-11-18 PROCEDURE — 97116 GAIT TRAINING THERAPY: CPT | Mod: GP

## 2020-11-18 PROCEDURE — 250N000012 HC RX MED GY IP 250 OP 636 PS 637: Performed by: SURGERY

## 2020-11-18 RX ORDER — AMLODIPINE BESYLATE 5 MG/1
5 TABLET ORAL DAILY
Status: DISCONTINUED | OUTPATIENT
Start: 2020-11-19 | End: 2020-11-19 | Stop reason: HOSPADM

## 2020-11-18 RX ORDER — CARVEDILOL 12.5 MG/1
12.5 TABLET ORAL 2 TIMES DAILY
Qty: 60 TABLET | Refills: 1 | Status: CANCELLED | OUTPATIENT
Start: 2020-11-18

## 2020-11-18 RX ORDER — MAGNESIUM OXIDE 400 MG/1
400 TABLET ORAL
Qty: 30 TABLET | Refills: 1 | Status: CANCELLED | OUTPATIENT
Start: 2020-11-18

## 2020-11-18 RX ORDER — SIMETHICONE 80 MG
80 TABLET,CHEWABLE ORAL EVERY 6 HOURS PRN
Status: DISCONTINUED | OUTPATIENT
Start: 2020-11-18 | End: 2020-11-19 | Stop reason: HOSPADM

## 2020-11-18 RX ORDER — CARVEDILOL 6.25 MG/1
6.25 TABLET ORAL 2 TIMES DAILY
Status: DISCONTINUED | OUTPATIENT
Start: 2020-11-18 | End: 2020-11-19

## 2020-11-18 RX ORDER — TACROLIMUS 1 MG/1
1 CAPSULE ORAL ONCE
Status: COMPLETED | OUTPATIENT
Start: 2020-11-18 | End: 2020-11-18

## 2020-11-18 RX ORDER — AMOXICILLIN 250 MG
1 CAPSULE ORAL 2 TIMES DAILY PRN
Qty: 60 TABLET | Refills: 1 | Status: CANCELLED | OUTPATIENT
Start: 2020-11-18

## 2020-11-18 RX ORDER — PANTOPRAZOLE SODIUM 40 MG/1
40 TABLET, DELAYED RELEASE ORAL
Qty: 30 TABLET | Refills: 0 | Status: CANCELLED | OUTPATIENT
Start: 2020-11-19

## 2020-11-18 RX ORDER — ATORVASTATIN CALCIUM 10 MG/1
10 TABLET, FILM COATED ORAL DAILY
Qty: 30 TABLET | Refills: 3 | Status: CANCELLED | OUTPATIENT
Start: 2020-11-19

## 2020-11-18 RX ORDER — MYCOPHENOLATE MOFETIL 250 MG/1
750 CAPSULE ORAL 2 TIMES DAILY
Qty: 180 CAPSULE | Refills: 11 | Status: CANCELLED | OUTPATIENT
Start: 2020-11-18

## 2020-11-18 RX ORDER — ASPIRIN 81 MG/1
81 TABLET, CHEWABLE ORAL DAILY
Qty: 30 TABLET | Refills: 3 | Status: CANCELLED | OUTPATIENT
Start: 2020-11-19

## 2020-11-18 RX ORDER — VALGANCICLOVIR 450 MG/1
450 TABLET, FILM COATED ORAL
Qty: 60 TABLET | Refills: 3 | Status: CANCELLED | OUTPATIENT
Start: 2020-11-19

## 2020-11-18 RX ORDER — TACROLIMUS 1 MG/1
4 CAPSULE ORAL
Status: DISCONTINUED | OUTPATIENT
Start: 2020-11-18 | End: 2020-11-19 | Stop reason: HOSPADM

## 2020-11-18 RX ORDER — SULFAMETHOXAZOLE AND TRIMETHOPRIM 400; 80 MG/1; MG/1
1 TABLET ORAL
Qty: 30 TABLET | Refills: 3 | Status: CANCELLED | OUTPATIENT
Start: 2020-11-20

## 2020-11-18 RX ADMIN — ONDANSETRON 4 MG: 4 TABLET, ORALLY DISINTEGRATING ORAL at 10:26

## 2020-11-18 RX ADMIN — CARVEDILOL 12.5 MG: 12.5 TABLET, FILM COATED ORAL at 08:15

## 2020-11-18 RX ADMIN — SIMETHICONE 80 MG: 80 TABLET, CHEWABLE ORAL at 20:25

## 2020-11-18 RX ADMIN — ATORVASTATIN CALCIUM 10 MG: 10 TABLET, FILM COATED ORAL at 08:14

## 2020-11-18 RX ADMIN — SULFAMETHOXAZOLE AND TRIMETHOPRIM 1 TABLET: 400; 80 TABLET ORAL at 08:23

## 2020-11-18 RX ADMIN — TACROLIMUS 4 MG: 1 CAPSULE ORAL at 17:16

## 2020-11-18 RX ADMIN — MYCOPHENOLATE MOFETIL 750 MG: 250 CAPSULE ORAL at 17:16

## 2020-11-18 RX ADMIN — MAGNESIUM OXIDE 400 MG: 400 TABLET ORAL at 13:03

## 2020-11-18 RX ADMIN — DOCUSATE SODIUM 50 MG AND SENNOSIDES 8.6 MG 1 TABLET: 8.6; 5 TABLET, FILM COATED ORAL at 20:25

## 2020-11-18 RX ADMIN — PANTOPRAZOLE SODIUM 40 MG: 40 TABLET, DELAYED RELEASE ORAL at 08:13

## 2020-11-18 RX ADMIN — TACROLIMUS 1 MG: 1 CAPSULE ORAL at 13:03

## 2020-11-18 RX ADMIN — POLYETHYLENE GLYCOL 3350 17 G: 17 POWDER, FOR SOLUTION ORAL at 08:15

## 2020-11-18 RX ADMIN — ACETAMINOPHEN 650 MG: 325 TABLET, FILM COATED ORAL at 03:00

## 2020-11-18 RX ADMIN — TACROLIMUS 3 MG: 1 CAPSULE ORAL at 08:13

## 2020-11-18 RX ADMIN — ASPIRIN 81 MG: 81 TABLET, CHEWABLE ORAL at 08:14

## 2020-11-18 RX ADMIN — DOCUSATE SODIUM 50 MG AND SENNOSIDES 8.6 MG 2 TABLET: 8.6; 5 TABLET, FILM COATED ORAL at 08:15

## 2020-11-18 RX ADMIN — CARVEDILOL 6.25 MG: 6.25 TABLET, FILM COATED ORAL at 20:25

## 2020-11-18 RX ADMIN — AMLODIPINE BESYLATE 10 MG: 10 TABLET ORAL at 08:14

## 2020-11-18 RX ADMIN — MYCOPHENOLATE MOFETIL 750 MG: 250 CAPSULE ORAL at 08:13

## 2020-11-18 ASSESSMENT — ACTIVITIES OF DAILY LIVING (ADL)
ADLS_ACUITY_SCORE: 11
ADLS_ACUITY_SCORE: 10
ADLS_ACUITY_SCORE: 11
ADLS_ACUITY_SCORE: 12

## 2020-11-18 NOTE — PROGRESS NOTES
Transplant Surgery  Inpatient Daily Progress Note  2020    Assessment & Plan: Paolo Larkin is a 45 year old Patricia-speaking female with history of ESKD of unknown etiology on dialysis since , HTN, vasospastic MI , ischemic cardiomyopathy (preserved EF, normal coronaries, and septal wall hypokinesis), HELLP, HBV, and a known RBC antibody. Patient has undergone attempted kidney transplant twice (2019, 2020), and each time the case was aborted due to hypotension and suspected anaphylaxis. A pre-op plan was made with anesthesia and Dr. Palmer (Allergist). She is now s/p  donor kidney transplant 20 with Dr. Marcelino.      Graft function: DDKT 20 with no ureteral stent. DCD donor with >20 hours CIT. US POD 1 AM with 6.3 cm dayna-nephric hematoma and good blood flow. Cr plateaued and starting to decrease, now 5.5 (6.6).   -Hyperkalemia initially post-op was treated with medical management, now resolved. Good urine output. 2L  -Renal US : Perinephephric hematoma, patent doppler  Immunosuppression management: High risk protocol due to potential for DGF   intra-op/250 mg/100 mg -complete  Thymo 100 intra-op/100 mg/100mg POD -efhtgo=082 mg, 5.8 mg/kg  MMF: 750 mg BID  Tac 3mg BID, remains undetectable. Increase to 4mg BID. Titrate to a goal of 8-10 with improving renal function.  Complexity of management: High. Contributing factors: induction, history of hypoetension with operation and anesthesia, and long ishemia time  Neurology: Acute postoperative pain: Continue oxycodone Q6hrs PRN pain, not currently using.   Hematology:  WBC 3.5, plt 179, continue to monitor  Anemia of CKD/ABL: Hgb slowly downtrending, 7.1 today. Transfuse <7.  Cardiorespiratory:   HTN: Controlled, -150. Continue Norvasc, decrease from 10 mg  To 5mg daily, coreg 12.5mg BID, decrease to 6.25mg BID. Continue PRN hydralazine   GI/Nutrition: Reg diet, RD following, encourage po intake.   Postop  ileus-resolved, continue senna/colace/miralax, suppository PRN  Endocrine: no h/o of DM, mild steroid induced hyperglycemia, resolved.   Fluid/Electrolytes: MIVF: CIV,   Hypervolemia: As of 11/17, Weight +2Kg from admit, diuretics stopped 11/17.   Hypokalemia: K 3.6, diet liberalized  : Moreno removed POD3, voiding without retention.  Neuro: RLE weakness, likely related to femoral nerve injury/retraction from surgery. Continue to work with physical therapy, currently recommending ARU due to right leg weakness/buckling. Consult Neuro today  Infectious disease: AF.   H/o HEP B core antibody positive: Hep b quant negative.   Prophylaxis: DVT, fall, GI, viral (Valycte x 3 months), pneumocystis (bactrim indefinitely)  Disposition: 7A, PT recommending  ARU placement due to R leg weakness, buckling. Plan to discharge when bed available. Completed transplant education 11/16.    Medical Decision Making: Medium  Subsequent visit 81313 (moderate level decision making)    MERVAT/Fellow/Resident Provider:  Reba Almanzar NP      Faculty: Armando Faustin M.D.  _________________________________________________________________  Transplant History:   11/12/2020 (Kidney), Postoperative day: 6     Interval History: History is obtained from the patient  Overnight events: Right leg weakness improving per pt continues to utilize walker. Good uop. Tolerating diet, no n/v.     ROS:   A 10-point review of systems was negative except as noted above.    Meds:    [START ON 11/19/2020] amLODIPine  5 mg Oral Daily     aspirin  81 mg Oral Daily     atorvastatin  10 mg Oral Daily     carvedilol  6.25 mg Oral BID     magnesium oxide  400 mg Oral Daily with lunch     mycophenolate  750 mg Oral BID IS     pantoprazole  40 mg Oral QAM AC     polyethylene glycol  17 g Oral Daily     senna-docusate  1 tablet Oral BID    Or     senna-docusate  2 tablet Oral BID     sodium chloride (PF)  10 mL Intracatheter Q8H     sulfamethoxazole-trimethoprim  1 tablet  "Oral Once per day on Mon Wed Fri     tacrolimus  4 mg Oral BID IS     valGANciclovir  450 mg Oral Twice Weekly       Physical Exam:     Admit Weight: 52 kg (114 lb 9.6 oz)    Current vitals:   /80 (BP Location: Right arm)   Pulse 85   Temp 98.6  F (37  C) (Oral)   Resp 16   Ht 1.6 m (5' 3\")   Wt 54 kg (119 lb 1.6 oz)   SpO2 100%   BMI 21.10 kg/m      Vital sign ranges:    Temp:  [98.2  F (36.8  C)-99.2  F (37.3  C)] 98.6  F (37  C)  Pulse:  [78-90] 85  Resp:  [16-17] 16  BP: (118-137)/(29-85) 122/80  SpO2:  [97 %-100 %] 100 %    General Appearance: in no apparent distress.   Skin: Warm, dry  Heart: RRR  Lungs: NLB on room air   Abdomen: The abdomen is soft, NT. Incision c/d/i   :  voiding.   Extremities: edema: trace bilaterally, strength: RLE <LLE  Neurologic: awake, alert and oriented. Tremor absent.    Data:   CMP  Recent Labs   Lab 11/18/20  0547 11/17/20  0602 11/12/20  1500 11/12/20  1500    139   < > 136   POTASSIUM 3.6 3.4   < > 3.9   CHLORIDE 108 106   < > 97   CO2 25 24   < > 29   GLC 90 92   < > 92   BUN 50* 54*   < > 28   CR 4.47* 5.66*   < > 8.73*   GFRESTIMATED 11* 8*   < > 5*   GFRESTBLACK 13* 10*   < > 6*   HOLLIS 9.2 8.8   < > 9.5   MAG 1.8 1.8   < >  --    PHOS 4.6* 4.3   < >  --    ALBUMIN  --   --   --  3.8   BILITOTAL  --   --   --  0.5   ALKPHOS  --   --   --  142   AST  --   --   --  19   ALT  --   --   --  18    < > = values in this interval not displayed.     CBC  Recent Labs   Lab 11/18/20  0547 11/17/20  0602 11/12/20  1500 11/12/20  1500   HGB 7.1* 7.2*   < > 11.9   WBC 3.5* 4.1   < > 6.2    177   < > 303   A1C  --   --   --  4.9    < > = values in this interval not displayed.     Attestation:    The patient has been seen and evaluated by me.   Vital signs, labs, medications and orders were reviewed.   When obtained, diagnostic images were reviewed by me and interpreted as above.    The care plan was discussed with the multidisciplinary team and I agree with the " findings and plan in this note, with any differences recorded in blue.    Immunosuppressive medication management was reviewed and adjusted as reflected in the note and orders.     .

## 2020-11-18 NOTE — PLAN OF CARE
"/69 (BP Location: Right arm)   Pulse 79   Temp 98.3  F (36.8  C) (Oral)   Resp 16   Ht 1.6 m (5' 3\")   Wt 54 kg (119 lb 1.6 oz)   SpO2 100%   BMI 21.10 kg/m    VS: VSS on RA   Neuro: intact  Mobility: independent to BSC   Discomfort: endorsed some pain, given PRN tylenol x1, no complaints of nausea   LDAs: rt PICC SL   Labs: see chart  Glu: n/a  GI/: up to void adequate amounts during shift, no BM during shift   Diet: 2g K diet  Skin: no new deficits noted   Plan: continue POC, update with changes     "

## 2020-11-18 NOTE — CONSULTS
[facility]  Neurology Consultation    Patient Name:  Paolo Larkin  MRN:  [id number]    :  1975  Date of Service:  2020  Primary care provider:  Lorna Clancy      Neurology consultation service was asked to see Paolo Larkin by Reba Almanzar to evaluate for right leg weakness.       History of Present Illness:     History as per the patient and charts     45 year old Patricia speaking female s/p right kidney transplant done on 2020, with h/o ESRD os unknown etiology on dialysis (since ), HTN, MI , cardiomyopathy with preserved ejection fraction, HELLP, HBV who developed right lower extremity weakness after the transplant surgery. This weakness was first identified when the patient attempted to walk after the surgery. Initially patient had pain in the lower back which was not radiating in the lower extremities, and has improved to the extent of no current pain as of today. She also developed numbness in the right lower extremity which initially involved the area belw the knee but it has improved to currently involve the area in front of and just below the knee. No symptoms in the left lower or any upper extremity. Patient reports that she was initially weak in the right lower extremity, but improved over the last few days.      ROS  A 10-point ROS was performed as per HPI.    PM  Past Medical History:   Diagnosis Date     Anaphylactic reaction 2019    Shortly after OR induction     Anaphylactic reaction 2020    Shortly after OR induction     Anemia in chronic kidney disease      End stage kidney disease (H)      HELLP syndrome      Hepatitis B      HTN (hypertension)      Ischemic cardiomyopathy     With preserved EF preserved EF and septal wall hypokinesis     MI (myocardial infarction) (H)     Vasospastic     Proteinuria      Red blood cell antibody positive      Secondary hyperparathyroidism (H)      Thrombocytopaenia      Past  Surgical History:   Procedure Laterality Date     BENCH KIDNEY Right 2020    Procedure: BACKBENCH PREPARATION RIGHT  KIDNEY AND RIGHT CHEST TUBE PLACEMENT.;  Surgeon: Armando Faustin MD;  Location: [facility] OR      SECTION  2012     CREATE FISTULA ARTERIOVENOUS UPPER EXTREMITY       CV CORONARY ANGIOGRAM N/A 2019    Procedure: CV CORONARY ANGIOGRAM;  Surgeon: Sabas Tilley MD;  Location: [facility] HEART CARDIAC CATH LAB     DILATION AND CURETTAGE, HYSTEROSCOPY WITH ULTRASOUND GUIDANCE  2019     INSERT CHEST TUBE  2020    Procedure: Insert chest tube;  Surgeon: Armando Faustin MD;  Location: [facility] OR     PICC DOUBLE LUMEN PLACEMENT Right 2020    5FR DL PICC. Length 37cm (3cm out). Tip at low SVC.     TRANSPLANT KIDNEY RECIPIENT  DONOR Right 2020    Procedure: TRANSPLANT, KIDNEY, RECIPIENT,  DONOR;  Surgeon: Dayne Marcelino MD;  Location: [facility] OR       Medications   Medications Prior to Admission   Medication Sig Dispense Refill Last Dose     amLODIPine (NORVASC) 10 MG tablet Take 10 mg by mouth every morning    2020 at 2000     hydrALAZINE (APRESOLINE) 25 MG tablet Take 50 mg by mouth 3 times daily    2020 at 2000     losartan (COZAAR) 100 MG tablet Take 1 tablet (100 mg) by mouth every morning Resume taking once blood pressures start increasing again (Patient taking differently: Take 100 mg by mouth every evening Resume taking once blood pressures start increasing again)   2020 at 2000     sevelamer (RENVELA) 800 MG tablet Take 1,600 mg by mouth 3 times daily (with meals)    2020 at 1800     acetaminophen (TYLENOL) 325 MG tablet Take 325-650 mg by mouth every 4 hours as needed for mild pain or fever    Unknown at Unknown time     calcium carbonate (TUMS) 500 MG chewable tablet Take 2 chew tab by mouth 3 times daily as needed for heartburn   Unknown at Unknown time       Allergies  Allergies   Allergen Reactions     Blood  "Transfusion Related (Informational Only)      Patient has a history of a clinically significant antibody against RBC antigens.  A delay in compatible RBCs may occur.     Cephalosporins Anaphylaxis     Per [facility] allergist report: positive skin intradermal tests to Cefazolin and Ceftazidime, but NOT to Penicillin G, Piperacillin and Meropenem      Chlorhexidine Anaphylaxis     Several times during anesthesia at initial phase during insertion of I.v. line and after disinfection with Chlorhexidine drop of blood pressure.  In Prick and as well intradermal tests clear positive reactions to Chlorhexidine (particularly in intradermal test to 0.0002% Chlorhexidine papule of 1cm and Erythema of 2.5cm). AVOID in future Chlorhexidine     Heparin Flush        Social History  I have reviewed this patient's social history    Family History    I have reviewed this patient's family history    Physical Examination   Vitals: /80 (BP Location: Right arm)   Pulse 85   Temp 98.6  F (37  C) (Oral)   Resp 16   Ht 1.6 m (5' 3\")   Wt 54 kg (119 lb 1.6 oz)   SpO2 100%   BMI 21.10 kg/m    General: Adult patient, lying in bed, NAD  HEENT: NC/AT, no icterus, op pink and moist  Cardiac: RRR  Chest: non-labored on RA  Abdomen: S/NT/ND  Extremities: Warm, no edema  Skin: No rash or lesion   Psych: Mood pleasant, affect congruent  Neuro:  Mental status: Awake, alert, attentive, oriented to self, time, place, and circumstance. Language is fluent and coherent with intact comprehension of complex commands, naming and repetition.  Cranial nerves: VFF, PERRL, conjugate gaze, EOMI, facial sensation intact, face symmetric, shoulder shrug strong, tongue/uvula midline, no dysarthria.   Motor: Normal bulk and tone. No abnormal movements. 5/5 strength in 4/4 extremities, other than 4-/ in right hip flexion and 1/5 in right knee extension.  Reflexes: 2+ reflexic (other than 1+ in right patellar reflex) and symmetric biceps, brachioradialis, " triceps, and achilles. Negative Negrete, no clonus, toes down-going.  Sensory: Intact to light touch, pin, vibration, and proprioception, other than decreased sensation in the anterior aspect of right knee and just below the right knee.   Coordination: FNF and HS without ataxia or dysmetria. Rapid alternating movements intact.   Gait: Normal width, stride length, turn, and arm swing. Station normal. Heel, toe, and tandem walk intact. Patient not able to       Investigations   No pertinent investigations    Impression  45 year old Patricia speaking female s/p right kidney transplant done on 11/12/2020, with right lower extremity weakness after the transplant surgery, and numbness in right lower extremity below the knee, which has been slowly improving post surgery. This could be right sided acute femoral neuropathy. Given that the symptoms are improving, it is likely that the patient will recover but it will take time- upto few weeks to months.     Recommendations    - No immediate neurological intervention or imaging indicated  - Patient can follow with PCP in 4 weeks. If required/deemed necessary by PCP, can send referral to neurology for outpatient follow up.    Thank you for involving Neurology in the care of Paolo Larkin.  Please do not hesitate to call with questions/concerns (consult pager [phone]).      Patient was seen and discussed with Dr. Marion.    Zain Turner. MD  Nsurg/Gen Surg Resident PGY1  Pager [phone]

## 2020-11-18 NOTE — PLAN OF CARE
"/80 (BP Location: Right arm)   Pulse 85   Temp 98.6  F (37  C) (Oral)   Resp 16   Ht 1.6 m (5' 3\")   Wt 54 kg (119 lb 1.6 oz)   SpO2 100%   BMI 21.10 kg/m     Neuro: A/Ox3. R leg weakness improving  VS: VSS on RA  Pain: c/o tolerable abdominal pain and declined pain meds  GI: 2gr K diet with poor appetite. C/o nausea treated by Zofran POx1  : Voiding without difficultly  R arm PICC SL  Skin: Abdominal incision intact TARAH  Activity - Assist of 1 with walker/GB, bed/chair alarm in place  Education - med card/lab book updated. Fall prevention. PO intake  Plan of Care - Continue with POC    "

## 2020-11-18 NOTE — INTERIM SUMMARY
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  Pt was treated for postop hyperkalemia, anemia, post-op ileus, hypervolemia, and acute pain. She also developed acute R LE weakness and neurology was consulted. On exam she had 1/5 strength in knee extensors and 3+/5 strength in hip flexors. Hip extension, adduction, abduction, knee flexion and dorsiflexion/plantarflexion normal. This was determined to be classic R LE femoral neuropathy, likely from nerve injury/retraction positioning in surgery. Given improvement, prognosis is typically good although can take weeks to months. The patient requires an intensive inpatient rehab program to address the following acute impairments: post-surgical abdominal precautions, impaired strength, impaired activity tolerance, impaired balance, impaired sensation, and impaired weight shifting.  These impairments are contributing to functional limitations, specifically impacting her safety and independence with transfers, gait, stairs, and ADLs.     Current Active Medical Management Needs/Risks for Clinical Complications  The patient requires the high level of rehabilitation physician supervision that accompanies the provision of intensive rehabilitation therapy.  The patient needs the services of the rehabilitation physician to assess the patient medically and functionally and to modify the course of treatment as needed  to maximize the patient's capacity to benefit from the rehabilitation process.  The patient will require medical mgmt and assessment of:    Kidney function in setting of DDKT: Creatinine had plateaued, now with clear trend down. Proteinuria: 7.4g/g prior to transplant. Needs to be repeated with FSGS protocol given unknown native disease.     Immunosuppression:  Mycophenolate, Tacrolimus (titrate to a goal of 8-10 w/ improving renal function). Will need tacro follow up.     Pain: will need adjustment of pain medications as needed - c/o gas pain, given Simethicone x1; PRN tylenol Q4    Anemia in setting of CKD and acute blood loss: trend Hgb, transfuse for Hgb <7.     HTN: with goal <150/90, keeping volume euvolemic; reduced the dose of amlodipine to 5 mg and coreg to 6.25 mg BID 11/18  Management of blood sugars, elevated during hospitalization, generally running 140-160;   Ongoing improvement in RLE function due to R femoral neuropathy. Patient is at risk for falls with injury, DVT (provide DVT prophylaxis), and infection due to immunosuppressed status (on infection prophylaxis).     Past Medical/Surgical History  Surgery in the past 100 days: Yes  Additional relevant past medical history: Anaphylactic reaction to previous kidney tx attempts; anemia in CKD; ESRD; HELLP syndrome; Hep B; HTN; ischemic cardiomyopathy; MI; red blood cell antibody positive; thrombocytopenia; secondary hyperparathyroidism; proteinuria.    Level of Functioning prior to Admission:  Home Environment  Lives with: spouse, Jaw, and 7 year old daughter  Living arrangements: apartment  Home accessibility:  Stairs to enter  Stairs to enter home: 4  Equipment currently used at home: none  Activity/exercise/self-care comment: Limited by R LE weakness    Pt lives in family apartment with 4 stairs to enter home. However wants to stay with aunt and her friend. Aunt's friend will be present for family education.       Level of Function: GG Scale  (Section GG Functional Ability and Goals; CMS's DONNELLY Version 3.0 Manual effective 10.1.2019):  PT Current Function Goals for Rehab   Bed Rolling 5 Setup or clean-up assistance 6 Independent   Supine to Sit 5 Setup or clean-up assistance 6 Independent   Sit to Stand 3 Partial/moderate assistance 6 Independent   Transfer 3 Partial/moderate assistance 6 Independent   Ambulation 1 Dependent - portable lift 6 Independent   Stairs 1 Dependent 6 Independent     OT Current Function Goals for Rehab   Feeding 5 Setup or clean-up assistance 6 Independent   Grooming Not completed 6 Independent   Bathing Not completed 6 Independent   Upper Body Dressing Not completed 6 Independent   Lower Body Dressing Not completed 6 Independent   Toileting Not completed 6 Independent   Toilet Transfer 3 Partial/moderate assistance 6 Independent   Tub/Shower Transfer Not completed 6 Independent   Cognition Not Assessed Supervision     SLP Current Function Goals for Rehab   Swallow Not Impaired Not applicable   Communication Not Impaired Not applicable       Current Diet:  Regular diet and Thin liquids    Summary Statement:  Patient is participating in daily therapy and making gains. Currently, pt is able to climb 4 stairs using B railing, min A x 2, and step-to pattern. PT guarded weaker R LE from buckling. Pt facilitated gait training to 300 ft using FWW, portable lift, and CGA. Pt balance is challenged using speed walking with sudden stop, and vertical + horizontal head gazes. Pt transfers bed<>w/c mod A with bear hug method for pt UE support on therapist. Pt goes from sit<>stand x 5 with Mod A, verbal cues for forward scooting and hand placement. Pt leans weight into PT for support to R side.    Expected Therapies and Services required during Inpatient Rehab admission  Intensity of Therapy: Patient requires intensive therapies not available in a lesser level of care. Patient is motivated, making gains, and can tolerate 3 hours of therapy a  day.  Physical Therapy: 90 minutes per day, 7 days a week for 10 days  Occupational Therapy: 90 minutes per day, 7 days a week for 10 days  Speech and Language Therapy: No SLP needs at this time  Rehabilitation Nursing Needs: Patient requires 24 hour Rehab Nursing to manage wound care, vitals, medication education, positioning, carryover of new rehab techniques, care coordination, skin integrity, assess neurologic status and provide safe environment for patient at falls risk.    Precautions/restrictions/special needs:  Precautions: fall precautions and abdominal precautions  Restrictions: none indicated   Special Needs:  needs -  Patricia speaking    Expected Level of Improvement: Mod I for all household mobility and ADLs  Expected Length of time to achieve: 10 days.     Anticipated Discharge Needs:  Anticipated Discharge Destination: Other home: aunt and uncle's home in [facility]  Anticipated Discharge Support: Family member  24/7 support available : Yes  Identified caregiver(s):  Aunt and uncle  Anticipated Discharge Needs: Home with homecare progressing to OP therapy as indicated;    If does not make full recovery from RLE femoral neuropathy over next couple months would consider referral to neurology with EMG at that time to confirm axonal damage and long term prognosis    Identified challenges/barriers: none    Rehab Admission Liaison Signature:    Physician statement of review and agreement:  I have reviewed and am in agreement of the need for IRF stay to address above functional and medical needs. In addition to above statements address, Patient requires intensive active and ongoing therapeutic intervention and multiple therapies; Patient requires medical supervision; Expected to actively participate in the intensive rehab program; Sufficiently stable to actively participate; Expectation for measurable improvement in functional capacity or adaption to impairments.    Physician Signature/Date/Time:

## 2020-11-18 NOTE — PROGRESS NOTES
[facility]    Transplant Nephrology Progress Note  Date of Admission:  11/12/2020  Today's Date: 11/18/2020    Recommendations:  - Follow up Tac level.  - Reduce the dose of amlodipine to 5 mg and coreg to 6.25 mg BID.     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   - Proteinuria: 7.4g/g prior to transplant. Needs to be repeated with FSGS protocol given unknown native disease   - Date DSA Last Checked:Nov/2020  Latest DSA: final XM pending   - BK Viremia: Not checked post transplant   - Kidney Tx Biopsy: No    # Hep B core positive: DNA is negative .    # Immunosuppression: Tacrolimus immediate release (goal 8-10) and Mycophenolate mofetil (dose 750 mg every 12 hours)   - Changes: No, follow up tacrolimus check.     # Infection Prophylaxis:   - PJP: Sulfa/TMP (Bactrim)  - CMV: Valcyte x3m      # Hypertension: Controlled to low normal BP.           Goal BP: < 150/90              - Volume status: euvolemic             EDW ~ unknown              - Changes: - Reduce the dose of amlodipine to 5 mg and coreg to 6.25 mg BID    # Elevated Blood Glucose: Glucose generally running ~ 140-160              - Management as per primary team.     # Anemia in Chronic Renal Disease: Hgb: slow trend down, continue to monitor      JESSI: No              - Iron studies: Not checked recently     # Mineral Bone Disorder:   - Secondary renal hyperparathyroidism; PTH level: Significantly elevated (601-1200 pg/ml)        On treatment: likely had been on treatment while on HD  - Vitamin D; level: Not checked recently        On supplement: No  - Calcium; level: Normal        On supplement: No  - Phosphorus; level: High       On supplement: No     # Electrolytes:   - Potassium; level: Now normal      On supplement: No  - Magnesium; level: Normal        On supplement: Yes  - Bicarbonate; level: Normal        On supplement:  "No       # Transplant History:  Etiology of Kidney Failure: Unknown etiology (no kidney biopsy)  Tx: DDKT  Transplant: 2020 (Kidney)  Donor Type: Donation after Circulatory Death           Donor Class:   Crossmatch at time of Tx: negative  Significant changes in immunosuppression: None  Significant transplant-related complications: None    Recommendations were communicated to the primary team verbally.    Seen and discussed with Dr. John Crespo MD       Interval History   Creatinine continued to trend down. UOP > 3 L over last 24 hr. No acute complaints. BP is on the low normal.     Review of Systems   4 point ROS was obtained and negative except as noted in the Interval History.    MEDICATIONS:    [START ON 2020] amLODIPine  5 mg Oral Daily     aspirin  81 mg Oral Daily     atorvastatin  10 mg Oral Daily     carvedilol  6.25 mg Oral BID     magnesium oxide  400 mg Oral Daily with lunch     mycophenolate  750 mg Oral BID IS     pantoprazole  40 mg Oral QAM AC     polyethylene glycol  17 g Oral Daily     senna-docusate  1 tablet Oral BID    Or     senna-docusate  2 tablet Oral BID     sodium chloride (PF)  10 mL Intracatheter Q8H     sulfamethoxazole-trimethoprim  1 tablet Oral Once per day on      tacrolimus  4 mg Oral BID IS     valGANciclovir  450 mg Oral Twice Weekly         Physical Exam   Temp  Av.4  F (36.9  C)  Min: 97.1  F (36.2  C)  Max: 99.1  F (37.3  C)  Arterial Line BP  Min: 83/44  Max: 109/54  Arterial Line MAP (mmHg)  Av.4 mmHg  Min: 62 mmHg  Max: 77 mmHg      Pulse  Av.4  Min: 87  Max: 109 Resp  Avg: 15.6  Min: 12  Max: 20  SpO2  Av.1 %  Min: 94 %  Max: 100 %    CVP (mmHg): 8 mmHg(Order to stop CVP monitoring. )/76 (BP Location: Right arm)   Pulse 80   Temp 98.4  F (36.9  C) (Oral)   Resp 16   Ht 1.6 m (5' 3\")   Wt 54 kg (119 lb 1.6 oz)   SpO2 100%   BMI 21.10 kg/m     Date 20 0700 - 11/15/20 0659   Shift 1536-4744 3831-6092 " 5314-0737 24 Hour Total   INTAKE   P.O. 240   240   Shift Total(mL/kg) 240(4.58)   240(4.58)   OUTPUT   Urine 700   700   Shift Total(mL/kg) 700(13.36)   700(13.36)   Weight (kg) 52.4 52.4 52.4 52.4      Admit Weight: 52 kg (114 lb 9.6 oz)     GENERAL APPEARANCE: alert and no distress  HENT: mouth without ulcers or lesions  RESP: no audible wheeze  CV:  normal rate  EDEMA: no LE edema bilaterally  ABDOMEN: soft, nondistended, nontender  MS: extremities normal - no gross deformities noted, no evidence of inflammation in joints, no muscle tenderness  SKIN: no rash    Data   All labs reviewed by me.  CMP  Recent Labs   Lab 11/18/20 0547 11/17/20 0602 11/16/20  0527 11/15/20  0627 11/12/20  1500 11/12/20  1500    139 140 138   < > 136   POTASSIUM 3.6 3.4 3.5 4.5   < > 3.9   CHLORIDE 108 106 108 106   < > 97   CO2 25 24 23 20   < > 29   ANIONGAP 7 10 9 12   < > 11   GLC 90 92 81 88   < > 92   BUN 50* 54* 58* 56*   < > 28   CR 4.47* 5.66* 6.66* 7.68*   < > 8.73*   GFRESTIMATED 11* 8* 7* 6*   < > 5*   GFRESTBLACK 13* 10* 8* 7*   < > 6*   HOLLIS 9.2 8.8 8.3* 8.8   < > 9.5   MAG 1.8 1.8 1.7 2.1   < >  --    PHOS 4.6* 4.3 4.7* 7.2*   < >  --    PROTTOTAL  --   --   --   --   --  8.4   ALBUMIN  --   --   --   --   --  3.8   BILITOTAL  --   --   --   --   --  0.5   ALKPHOS  --   --   --   --   --  142   AST  --   --   --   --   --  19   ALT  --   --   --   --   --  18    < > = values in this interval not displayed.     CBC  Recent Labs   Lab 11/18/20  0547 11/17/20  0602 11/16/20  0527 11/15/20  0627   HGB 7.1* 7.2* 7.5* 8.0*   WBC 3.5* 4.1 5.4 5.3   RBC 2.29* 2.29* 2.41* 2.67*   HCT 22.0* 22.1* 23.6* 26.5*   MCV 96 97 98 99   MCH 31.0 31.4 31.1 30.0   MCHC 32.3 32.6 31.8 30.2*   RDW 14.4 14.6 14.7 15.2*    177 163 193     INR  Recent Labs   Lab 11/12/20  2217 11/12/20  1500   INR 1.01 0.92   PTT 33 34     ABGNo lab results found in last 7 days.   Urine Studies  Recent Labs   Lab Test 11/16/20  1500 11/15/20  1720  11/13/20 0300 01/23/20  2300   COLOR Light Yellow Light Yellow Yellow Light Yellow   APPEARANCE Clear Clear Clear Slightly Cloudy   URINEGLC Negative Negative 30* 30*   URINEBILI Negative Negative Negative Negative   URINEKETONE Negative Negative Negative Negative   SG 1.010 1.007 1.010 1.003   UBLD Moderate* Small* Large* Trace*   URINEPH 5.0 6.0 7.0 8.0*   PROTEIN 10* Negative 100* 30*   NITRITE Negative Negative Negative Negative   LEUKEST Negative Negative Negative Large*   RBCU 25* 1 >182* 2   WBCU 1 1 69* 48*     Recent Labs   Lab Test 11/13/20 0300 01/23/20  2300 04/22/19  2340   UTPG 7.43* 5.08* 6.67*     PTH  No lab results found.  Iron Studies  No lab results found.    IMAGING:  All imaging studies reviewed by me.       James Crespo MD   Transplant Nephrology Fellow  11/18/2020 at 4:59 PM    Patient was seen and evaluated by me, Emile Lopez MD. I have reviewed the note and agree with the the plan of care as documented by the fellow.

## 2020-11-18 NOTE — PLAN OF CARE
"1041-7585  /73 (BP Location: Right arm)   Pulse 81   Temp 99.2  F (37.3  C) (Oral)   Resp 17   Ht 1.6 m (5' 3\")   Wt 54 kg (119 lb 1.6 oz)   SpO2 100%   BMI 21.10 kg/m    Assessment done with use of  IPAD. Pt denies pain or nausea. Appeared to be crying at change of shift, stated that she is missing her daughter. Fair po intake for dinner. One BM this shift. Up to BSC, voiding adeq amts. Offered to assist in ambulating patient, she refused. Did tell her to use the IS which she was able to do independently.   "

## 2020-11-19 ENCOUNTER — TELEPHONE (OUTPATIENT)
Dept: TRANSPLANT | Facility: CLINIC | Age: 45
End: 2020-11-19

## 2020-11-19 ENCOUNTER — HOSPITAL ENCOUNTER (INPATIENT)
Facility: CLINIC | Age: 45
LOS: 3 days | Discharge: HOME | DRG: 949 | End: 2020-11-22
Attending: PHYSICAL MEDICINE & REHABILITATION | Admitting: PHYSICAL MEDICINE & REHABILITATION
Payer: MEDICARE

## 2020-11-19 VITALS
RESPIRATION RATE: 16 BRPM | OXYGEN SATURATION: 100 % | DIASTOLIC BLOOD PRESSURE: 68 MMHG | WEIGHT: 112.43 LBS | TEMPERATURE: 98.1 F | HEART RATE: 73 BPM | SYSTOLIC BLOOD PRESSURE: 112 MMHG | HEIGHT: 63 IN | BODY MASS INDEX: 19.92 KG/M2

## 2020-11-19 DIAGNOSIS — Z94.0 KIDNEY TRANSPLANTED: ICD-10-CM

## 2020-11-19 DIAGNOSIS — Z94.0 KIDNEY TRANSPLANT RECIPIENT: Primary | ICD-10-CM

## 2020-11-19 DIAGNOSIS — I15.0 RENOVASCULAR HYPERTENSION: ICD-10-CM

## 2020-11-19 PROBLEM — G57.21 FEMORAL NEUROPATHY OF RIGHT LOWER EXTREMITY: Status: ACTIVE | Noted: 2020-11-19

## 2020-11-19 PROBLEM — G57.21: Status: ACTIVE | Noted: 2020-11-19

## 2020-11-19 LAB
ANION GAP SERPL CALCULATED.3IONS-SCNC: 9 MMOL/L (ref 3–14)
BASOPHILS # BLD AUTO: 0 10E9/L (ref 0–0.2)
BASOPHILS NFR BLD AUTO: 0.6 %
BUN SERPL-MCNC: 43 MG/DL (ref 7–30)
CALCIUM SERPL-MCNC: 9.2 MG/DL (ref 8.5–10.1)
CHLORIDE SERPL-SCNC: 109 MMOL/L (ref 94–109)
CO2 SERPL-SCNC: 23 MMOL/L (ref 20–32)
CREAT SERPL-MCNC: 3.61 MG/DL (ref 0.52–1.04)
DIFFERENTIAL METHOD BLD: ABNORMAL
EOSINOPHIL # BLD AUTO: 0.4 10E9/L (ref 0–0.7)
EOSINOPHIL NFR BLD AUTO: 8.5 %
ERYTHROCYTE [DISTWIDTH] IN BLOOD BY AUTOMATED COUNT: 14.4 % (ref 10–15)
GFR SERPL CREATININE-BSD FRML MDRD: 15 ML/MIN/{1.73_M2}
GLUCOSE SERPL-MCNC: 90 MG/DL (ref 70–99)
HCT VFR BLD AUTO: 22.9 % (ref 35–47)
HGB BLD-MCNC: 7.3 G/DL (ref 11.7–15.7)
IMM GRANULOCYTES # BLD: 0 10E9/L (ref 0–0.4)
IMM GRANULOCYTES NFR BLD: 0.6 %
LYMPHOCYTES # BLD AUTO: 0.3 10E9/L (ref 0.8–5.3)
LYMPHOCYTES NFR BLD AUTO: 5.6 %
MAGNESIUM SERPL-MCNC: 1.8 MG/DL (ref 1.6–2.3)
MCH RBC QN AUTO: 30.8 PG (ref 26.5–33)
MCHC RBC AUTO-ENTMCNC: 31.9 G/DL (ref 31.5–36.5)
MCV RBC AUTO: 97 FL (ref 78–100)
MONOCYTES # BLD AUTO: 0.6 10E9/L (ref 0–1.3)
MONOCYTES NFR BLD AUTO: 11.3 %
NEUTROPHILS # BLD AUTO: 3.7 10E9/L (ref 1.6–8.3)
NEUTROPHILS NFR BLD AUTO: 73.4 %
NRBC # BLD AUTO: 0 10*3/UL
NRBC BLD AUTO-RTO: 0 /100
PHOSPHATE SERPL-MCNC: 4.4 MG/DL (ref 2.5–4.5)
PLATELET # BLD AUTO: 232 10E9/L (ref 150–450)
POTASSIUM SERPL-SCNC: 3.8 MMOL/L (ref 3.4–5.3)
RBC # BLD AUTO: 2.37 10E12/L (ref 3.8–5.2)
SODIUM SERPL-SCNC: 141 MMOL/L (ref 133–144)
WBC # BLD AUTO: 5 10E9/L (ref 4–11)

## 2020-11-19 PROCEDURE — 80048 BASIC METABOLIC PNL TOTAL CA: CPT | Performed by: SURGERY

## 2020-11-19 PROCEDURE — 83735 ASSAY OF MAGNESIUM: CPT | Performed by: SURGERY

## 2020-11-19 PROCEDURE — 99207 PR CONSULT E&M CHANGED TO INITIAL LEVEL: CPT | Performed by: INTERNAL MEDICINE

## 2020-11-19 PROCEDURE — 250N000013 HC RX MED GY IP 250 OP 250 PS 637: Performed by: SURGERY

## 2020-11-19 PROCEDURE — 85025 COMPLETE CBC W/AUTO DIFF WBC: CPT | Performed by: SURGERY

## 2020-11-19 PROCEDURE — 250N000013 HC RX MED GY IP 250 OP 250 PS 637: Performed by: INTERNAL MEDICINE

## 2020-11-19 PROCEDURE — 250N000013 HC RX MED GY IP 250 OP 250 PS 637: Performed by: PHYSICIAN ASSISTANT

## 2020-11-19 PROCEDURE — 250N000012 HC RX MED GY IP 250 OP 636 PS 637: Performed by: NURSE PRACTITIONER

## 2020-11-19 PROCEDURE — 99233 SBSQ HOSP IP/OBS HIGH 50: CPT | Mod: 24 | Performed by: INTERNAL MEDICINE

## 2020-11-19 PROCEDURE — 36592 COLLECT BLOOD FROM PICC: CPT | Performed by: SURGERY

## 2020-11-19 PROCEDURE — 250N000012 HC RX MED GY IP 250 OP 636 PS 637: Performed by: INTERNAL MEDICINE

## 2020-11-19 PROCEDURE — 250N000012 HC RX MED GY IP 250 OP 636 PS 637: Performed by: SURGERY

## 2020-11-19 PROCEDURE — 99222 1ST HOSP IP/OBS MODERATE 55: CPT | Performed by: INTERNAL MEDICINE

## 2020-11-19 PROCEDURE — 99223 1ST HOSP IP/OBS HIGH 75: CPT | Performed by: PHYSICAL MEDICINE & REHABILITATION

## 2020-11-19 PROCEDURE — 128N000003 HC R&B REHAB

## 2020-11-19 PROCEDURE — 84100 ASSAY OF PHOSPHORUS: CPT | Performed by: SURGERY

## 2020-11-19 PROCEDURE — 999N000044 HC STATISTIC CVC DRESSING CHANGE

## 2020-11-19 RX ORDER — AMLODIPINE BESYLATE 5 MG/1
5 TABLET ORAL DAILY
Status: ON HOLD | DISCHARGE
Start: 2020-11-20 | End: 2020-11-20

## 2020-11-19 RX ORDER — CARVEDILOL 3.12 MG/1
3.12 TABLET ORAL 2 TIMES DAILY
Status: ON HOLD | DISCHARGE
Start: 2020-11-19 | End: 2020-11-20

## 2020-11-19 RX ORDER — TACROLIMUS 1 MG/1
4 CAPSULE ORAL
Status: DISCONTINUED | OUTPATIENT
Start: 2020-11-19 | End: 2020-11-20

## 2020-11-19 RX ORDER — ATORVASTATIN CALCIUM 10 MG/1
10 TABLET, FILM COATED ORAL DAILY
Status: ON HOLD | DISCHARGE
Start: 2020-11-20 | End: 2020-11-20

## 2020-11-19 RX ORDER — ATORVASTATIN CALCIUM 10 MG/1
10 TABLET, FILM COATED ORAL DAILY
Status: DISCONTINUED | OUTPATIENT
Start: 2020-11-20 | End: 2020-11-22 | Stop reason: HOSPADM

## 2020-11-19 RX ORDER — AMOXICILLIN 250 MG
1 CAPSULE ORAL 2 TIMES DAILY PRN
DISCHARGE
Start: 2020-11-19 | End: 2020-11-30

## 2020-11-19 RX ORDER — MYCOPHENOLATE MOFETIL 250 MG/1
750 CAPSULE ORAL 2 TIMES DAILY
Status: ON HOLD | DISCHARGE
Start: 2020-11-19 | End: 2020-11-20

## 2020-11-19 RX ORDER — ASPIRIN 81 MG/1
81 TABLET, CHEWABLE ORAL DAILY
Status: ON HOLD | DISCHARGE
Start: 2020-11-20 | End: 2020-11-20

## 2020-11-19 RX ORDER — PANTOPRAZOLE SODIUM 40 MG/1
40 TABLET, DELAYED RELEASE ORAL
Status: DISCONTINUED | OUTPATIENT
Start: 2020-11-20 | End: 2020-11-22 | Stop reason: HOSPADM

## 2020-11-19 RX ORDER — ACETAMINOPHEN 325 MG/1
325-650 TABLET ORAL EVERY 4 HOURS PRN
Status: DISCONTINUED | OUTPATIENT
Start: 2020-11-19 | End: 2020-11-22 | Stop reason: HOSPADM

## 2020-11-19 RX ORDER — VALGANCICLOVIR 450 MG/1
450 TABLET, FILM COATED ORAL
Status: ON HOLD | DISCHARGE
Start: 2020-11-19 | End: 2020-11-20

## 2020-11-19 RX ORDER — AMOXICILLIN 250 MG
1 CAPSULE ORAL 2 TIMES DAILY PRN
Status: DISCONTINUED | OUTPATIENT
Start: 2020-11-19 | End: 2020-11-22 | Stop reason: HOSPADM

## 2020-11-19 RX ORDER — SULFAMETHOXAZOLE AND TRIMETHOPRIM 400; 80 MG/1; MG/1
1 TABLET ORAL
Status: ON HOLD | DISCHARGE
Start: 2020-11-20 | End: 2020-11-20

## 2020-11-19 RX ORDER — MAGNESIUM OXIDE 400 MG/1
400 TABLET ORAL
Status: DISCONTINUED | OUTPATIENT
Start: 2020-11-20 | End: 2020-11-22 | Stop reason: HOSPADM

## 2020-11-19 RX ORDER — SULFAMETHOXAZOLE AND TRIMETHOPRIM 400; 80 MG/1; MG/1
1 TABLET ORAL
Status: DISCONTINUED | OUTPATIENT
Start: 2020-11-20 | End: 2020-11-22 | Stop reason: HOSPADM

## 2020-11-19 RX ORDER — PANTOPRAZOLE SODIUM 40 MG/1
40 TABLET, DELAYED RELEASE ORAL
Status: ON HOLD | DISCHARGE
Start: 2020-11-20 | End: 2020-11-20

## 2020-11-19 RX ORDER — ASPIRIN 81 MG/1
81 TABLET, CHEWABLE ORAL DAILY
Status: DISCONTINUED | OUTPATIENT
Start: 2020-11-20 | End: 2020-11-22 | Stop reason: HOSPADM

## 2020-11-19 RX ORDER — CARVEDILOL 3.12 MG/1
3.12 TABLET ORAL 2 TIMES DAILY
Status: DISCONTINUED | OUTPATIENT
Start: 2020-11-19 | End: 2020-11-19 | Stop reason: HOSPADM

## 2020-11-19 RX ORDER — VALGANCICLOVIR 450 MG/1
450 TABLET, FILM COATED ORAL
Status: DISCONTINUED | OUTPATIENT
Start: 2020-11-23 | End: 2020-11-22 | Stop reason: HOSPADM

## 2020-11-19 RX ORDER — MAGNESIUM OXIDE 400 MG/1
400 TABLET ORAL
Status: ON HOLD | DISCHARGE
Start: 2020-11-19 | End: 2020-11-20

## 2020-11-19 RX ORDER — TACROLIMUS 1 MG/1
4 CAPSULE ORAL 2 TIMES DAILY
Status: ON HOLD | DISCHARGE
Start: 2020-11-19 | End: 2020-11-20

## 2020-11-19 RX ORDER — MYCOPHENOLATE MOFETIL 250 MG/1
750 CAPSULE ORAL
Status: DISCONTINUED | OUTPATIENT
Start: 2020-11-19 | End: 2020-11-22 | Stop reason: HOSPADM

## 2020-11-19 RX ORDER — CARVEDILOL 3.12 MG/1
3.12 TABLET ORAL 2 TIMES DAILY
Status: DISCONTINUED | OUTPATIENT
Start: 2020-11-19 | End: 2020-11-22 | Stop reason: HOSPADM

## 2020-11-19 RX ORDER — AMLODIPINE BESYLATE 5 MG/1
5 TABLET ORAL DAILY
Status: DISCONTINUED | OUTPATIENT
Start: 2020-11-20 | End: 2020-11-22 | Stop reason: HOSPADM

## 2020-11-19 RX ADMIN — PANTOPRAZOLE SODIUM 40 MG: 40 TABLET, DELAYED RELEASE ORAL at 08:12

## 2020-11-19 RX ADMIN — TACROLIMUS 4 MG: 1 CAPSULE ORAL at 08:12

## 2020-11-19 RX ADMIN — CARVEDILOL 3.12 MG: 3.12 TABLET, FILM COATED ORAL at 20:38

## 2020-11-19 RX ADMIN — ACETAMINOPHEN 650 MG: 325 TABLET, FILM COATED ORAL at 01:41

## 2020-11-19 RX ADMIN — MYCOPHENOLATE MOFETIL 750 MG: 250 CAPSULE ORAL at 18:25

## 2020-11-19 RX ADMIN — TACROLIMUS 4 MG: 1 CAPSULE ORAL at 18:26

## 2020-11-19 RX ADMIN — DOCUSATE SODIUM 50 MG AND SENNOSIDES 8.6 MG 2 TABLET: 8.6; 5 TABLET, FILM COATED ORAL at 08:12

## 2020-11-19 RX ADMIN — CARVEDILOL 6.25 MG: 6.25 TABLET, FILM COATED ORAL at 08:12

## 2020-11-19 RX ADMIN — AMLODIPINE BESYLATE 5 MG: 5 TABLET ORAL at 08:12

## 2020-11-19 RX ADMIN — MAGNESIUM OXIDE 400 MG: 400 TABLET ORAL at 13:20

## 2020-11-19 RX ADMIN — ASPIRIN 81 MG: 81 TABLET, CHEWABLE ORAL at 08:12

## 2020-11-19 RX ADMIN — VALGANCICLOVIR 450 MG: 450 TABLET, FILM COATED ORAL at 10:00

## 2020-11-19 RX ADMIN — POLYETHYLENE GLYCOL 3350 17 G: 17 POWDER, FOR SOLUTION ORAL at 08:12

## 2020-11-19 RX ADMIN — ATORVASTATIN CALCIUM 10 MG: 10 TABLET, FILM COATED ORAL at 08:12

## 2020-11-19 RX ADMIN — MYCOPHENOLATE MOFETIL 750 MG: 250 CAPSULE ORAL at 08:12

## 2020-11-19 ASSESSMENT — ACTIVITIES OF DAILY LIVING (ADL)
ADLS_ACUITY_SCORE: 10

## 2020-11-19 ASSESSMENT — MIFFLIN-ST. JEOR: SCORE: 1134.13

## 2020-11-19 NOTE — PROGRESS NOTES
[facility]    Transplant Nephrology Progress Note  Date of Admission:  11/12/2020  Today's Date: 11/19/2020    Recommendations:  - Increase tacrolimus to 4 mg BID.  - Follow up Tac level.  - Reduce the dose of coreg to 3.125 mg BID.  - Encourage PO intake.      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 Needs to be repeated with FSGS protocol given unknown native disease   - Date DSA Last Checked:Nov/2020  Latest DSA: final XM pending   - BK Viremia: Not checked post transplant   - Kidney Tx Biopsy: No    # Hep B core positive: DNA is negative .    # Immunosuppression: Tacrolimus immediate release (goal 8-10) and Mycophenolate mofetil (dose 750 mg every 12 hours)   - Changes: No, tac increased to 4 mg BID, follow up tacrolimus check.     # Infection Prophylaxis:   - PJP: Sulfa/TMP (Bactrim)  - CMV: Valcyte x3m , dose adjustment per eGFR is needed down the road.     # Hypertension: Controlled to low normal BP.           Goal BP: < 150/90              - Volume status: euvolemic             EDW ~ unknown              - Changes: - Reduce the dose of coreg to 3.125 mg BID.    # Elevated Blood Glucose: Glucose generally running ~ 140-160              - Management as per primary team.     # Anemia in Chronic Renal Disease: Hgb: slow trend down, continue to monitor      JESSI: No              - Iron studies: Not checked recently     # Mineral Bone Disorder:   - Secondary renal hyperparathyroidism; PTH level: Significantly elevated (601-1200 pg/ml)        On treatment: likely had been on treatment while on HD  - Vitamin D; level: Not checked recently        On supplement: No  - Calcium; level: Normal        On supplement: No  - Phosphorus; level: High       On supplement: No     # Electrolytes:   - Potassium; level: Now normal      On supplement: No  -  Magnesium; level: Normal        On supplement: Yes  - Bicarbonate; level: Normal        On supplement: No       # Transplant History:  Etiology of Kidney Failure: Unknown etiology (no kidney biopsy)  Tx: DDKT  Transplant: 2020 (Kidney)  Donor Type: Donation after Circulatory Death           Donor Class:   Crossmatch at time of Tx: negative  Significant changes in immunosuppression: None  Significant transplant-related complications: None    Recommendations were communicated to the primary team verbally.    Seen and discussed with Dr. John Crespo MD       Interval History   Creatinine continued to trend down. UOP is good. No acute complaints. BP remains on the low normal range despite reducing coreg and amlodipine by 50 % and discontinuing lasix.     Review of Systems   4 point ROS was obtained and negative except as noted in the Interval History.    MEDICATIONS:    amLODIPine  5 mg Oral Daily     aspirin  81 mg Oral Daily     atorvastatin  10 mg Oral Daily     carvedilol  3.125 mg Oral BID     magnesium oxide  400 mg Oral Daily with lunch     mycophenolate  750 mg Oral BID IS     pantoprazole  40 mg Oral QAM AC     polyethylene glycol  17 g Oral Daily     senna-docusate  1 tablet Oral BID    Or     senna-docusate  2 tablet Oral BID     sodium chloride (PF)  10 mL Intracatheter Q8H     sulfamethoxazole-trimethoprim  1 tablet Oral Once per day on      tacrolimus  4 mg Oral BID IS     valGANciclovir  450 mg Oral Twice Weekly         Physical Exam   Temp  Av.4  F (36.9  C)  Min: 97.1  F (36.2  C)  Max: 99.1  F (37.3  C)  Arterial Line BP  Min: 83/44  Max: 109/54  Arterial Line MAP (mmHg)  Av.4 mmHg  Min: 62 mmHg  Max: 77 mmHg      Pulse  Av.4  Min: 87  Max: 109 Resp  Avg: 15.6  Min: 12  Max: 20  SpO2  Av.1 %  Min: 94 %  Max: 100 %    CVP (mmHg): 8 mmHg(Order to stop CVP monitoring. )/68 (BP Location: Right arm)   Pulse 73   Temp 98.1  F (36.7  C) (Oral)   Resp  "16   Ht 1.6 m (5' 3\")   Wt 51 kg (112 lb 7 oz)   SpO2 100%   BMI 19.92 kg/m     Date 11/14/20 0700 - 11/15/20 0659   Shift 5827-8132 8105-9081 4552-9555 24 Hour Total   INTAKE   P.O. 240   240   Shift Total(mL/kg) 240(4.58)   240(4.58)   OUTPUT   Urine 700   700   Shift Total(mL/kg) 700(13.36)   700(13.36)   Weight (kg) 52.4 52.4 52.4 52.4      Admit Weight: 52 kg (114 lb 9.6 oz)     GENERAL APPEARANCE: alert and no distress  HENT: mouth without ulcers or lesions  RESP: no audible wheeze  CV:  normal rate  EDEMA: no LE edema bilaterally  ABDOMEN: soft, nondistended, nontender  MS: extremities normal - no gross deformities noted, no evidence of inflammation in joints, no muscle tenderness  SKIN: no rash    Data   All labs reviewed by me.  CMP  Recent Labs   Lab 11/19/20  0645 11/18/20  0547 11/17/20  0602 11/16/20  0527 11/12/20  1500 11/12/20  1500    140 139 140   < > 136   POTASSIUM 3.8 3.6 3.4 3.5   < > 3.9   CHLORIDE 109 108 106 108   < > 97   CO2 23 25 24 23   < > 29   ANIONGAP 9 7 10 9   < > 11   GLC 90 90 92 81   < > 92   BUN 43* 50* 54* 58*   < > 28   CR 3.61* 4.47* 5.66* 6.66*   < > 8.73*   GFRESTIMATED 15* 11* 8* 7*   < > 5*   GFRESTBLACK 17* 13* 10* 8*   < > 6*   HOLLIS 9.2 9.2 8.8 8.3*   < > 9.5   MAG 1.8 1.8 1.8 1.7   < >  --    PHOS 4.4 4.6* 4.3 4.7*   < >  --    PROTTOTAL  --   --   --   --   --  8.4   ALBUMIN  --   --   --   --   --  3.8   BILITOTAL  --   --   --   --   --  0.5   ALKPHOS  --   --   --   --   --  142   AST  --   --   --   --   --  19   ALT  --   --   --   --   --  18    < > = values in this interval not displayed.     CBC  Recent Labs   Lab 11/19/20  0645 11/18/20  0547 11/17/20  0602 11/16/20  0527   HGB 7.3* 7.1* 7.2* 7.5*   WBC 5.0 3.5* 4.1 5.4   RBC 2.37* 2.29* 2.29* 2.41*   HCT 22.9* 22.0* 22.1* 23.6*   MCV 97 96 97 98   MCH 30.8 31.0 31.4 31.1   MCHC 31.9 32.3 32.6 31.8   RDW 14.4 14.4 14.6 14.7    179 177 163     INR  Recent Labs   Lab 11/12/20  6959 " 11/12/20  1500   INR 1.01 0.92   PTT 33 34     ABGNo lab results found in last 7 days.   Urine Studies  Recent Labs   Lab Test 11/16/20  1500 11/15/20  1720 11/13/20  0300 01/23/20  2300   COLOR Light Yellow Light Yellow Yellow Light Yellow   APPEARANCE Clear Clear Clear Slightly Cloudy   URINEGLC Negative Negative 30* 30*   URINEBILI Negative Negative Negative Negative   URINEKETONE Negative Negative Negative Negative   SG 1.010 1.007 1.010 1.003   UBLD Moderate* Small* Large* Trace*   URINEPH 5.0 6.0 7.0 8.0*   PROTEIN 10* Negative 100* 30*   NITRITE Negative Negative Negative Negative   LEUKEST Negative Negative Negative Large*   RBCU 25* 1 >182* 2   WBCU 1 1 69* 48*     Recent Labs   Lab Test 11/13/20  0300 01/23/20  2300 04/22/19  2340   UTPG 7.43* 5.08* 6.67*     PTH  No lab results found.  Iron Studies  No lab results found.    IMAGING:  All imaging studies reviewed by me.       James Crespo MD   Transplant Nephrology Fellow  11/19/2020 at 12:39 PM    Patient was seen and evaluated by me, Emile Lopez MD. I have reviewed the note and agree with the the plan of care as documented by the fellow.

## 2020-11-19 NOTE — PROGRESS NOTES
Care Management Discharge Note    Discharge Date: 11/19/20       Discharge Disposition:   ARU    Discharge Services:      Discharge DME:  When discharged from ARU, may need a WC--I called  Home medical and they rent them for 1 week $29 and month $93  Pt was going to stay with Uncle in [facility] until ATC clinic appointments are completed.    Discharge Transportation: family or friend will provide    Private pay costs discussed: Not applicable    PAS Confirmation Code:    Patient/family educated on Medicare website which has current facility and service quality ratings:      Education Provided on the Discharge Plan:    Persons Notified of Discharge Plans: Felecia Lima coordinating  ARU.  Patient/Family in Agreement with the Plan:      Handoff Referral Completed: No--To ARU enedelia will handdoff to OPCC: Yaz Villa    Additional Information:  Pt speaks Patricia Wang RN

## 2020-11-19 NOTE — PLAN OF CARE
Physical Therapy Discharge Summary    Reason for therapy discharge:    Discharged to acute rehabilitation facility.    Progress towards therapy goal(s). See goals on Care Plan in [facility] electronic health record for goal details.  Goals partially met.  Barriers to achieving goals:   limited tolerance for therapy and discharge from facility.    Therapy recommendation(s):    Continued therapy is recommended.  Rationale/Recommendations:  pt high falls risk 2/2 R LE weakness, not safe to discharge home. Pt will need cont skilled PT to progress strength, activity tolerance, gait and IND with mobility prior to safe discharge home.

## 2020-11-19 NOTE — PLAN OF CARE
DISCHARGE:  Patient with orders to discharge to ARU    Education Provided:   Med Card updated  Lab Book updated  Handouts transplant book/ transplant meds  Specialty Pharmacy completed  LDAs R arm PICC will be removed when order is in    [facility] this writer called and give report to RN. Pt is ELIZAEBTH/Patricia Wang speaking and she is aware of discharge. BM this AM

## 2020-11-19 NOTE — PLAN OF CARE
"/77 (BP Location: Right arm)   Pulse 82   Temp 98  F (36.7  C) (Oral)   Resp 12   Ht 1.6 m (5' 3\")   Wt 54 kg (119 lb 1.6 oz)   SpO2 100%   BMI 21.10 kg/m      2962-3630: Pt admitted for DDKT 11/12 d/t unknown etiology, POD#6. AVSS on RA. Pt is Patricia speaking, iPad  used for interactions.  Neuro: WDL ex RLE weakness thought to be d/t femoral nerve injury/retraction during surgery - Neuro consulted today with no new findings/suggestions, said since it cont to improve it will likely be healed in the next few weeks to months. Bed alarm on for safety, but pt has been better about calling as the evening's gone on.  Cardiac: WDL.  Resp: WDL, cont encouraging IS use.  GI/: 550 ml yellow UOP. LBM yesterday, given sched Senna. Pt c/o gas pain, given Simethicone x1.  Diet/Appetite: On reg diet with minimal appetite, declined eating anything this shift. No nausea.  Endocrine: NA.  Skin: R hockey stick incision stapled, TARAH, CDI. Skin otherwise intact.  Access: DL R PICC SLd. L + AVF.  Drains: NA.  Activity: Up SBA + W, has been steady this shift.  Pain: Only c/o gas pain, given Simethicone x1.  Plan: Med card and lab book updated this shift. Crt cont to improve slowly, hgb slowly trending down. Txp education complete. Likely discharge to ARU in the next day or two, SW involved. Pt does not want to go to ARU, cont educating. Will continue with plan of care and notify team of any changes.?    "

## 2020-11-19 NOTE — PHARMACY-ADMISSION MEDICATION HISTORY
Solid Organ Transplant Recipient Prior to Discharge Note    45 year old female s/p DDKT transplant on 11/12/2020.    Tacrolimus started 11/13/2020. Have been following tacrolimus levels and still not at goal after two subsequent dose increases. Recommend checking next level 11/20/2020 and adjust as needed.    Tacrolimus (Goal: 8-10 micrograms/L)  11/17: Level < 3 ug/L(12.2h trough); inc to 3 BID prior to lvl being back   11/18: Level < 3 ug/L (12h trough); inc to 4 BID    CMV ppx: Valcyte goal 900 mg daily   - Currently at 450 mg twice weekly dosed based on renal function    - Monitor renal function and adjust dose to goal as CrCl improves     PJP ppx: Bactrim goal 400-80 mg daily   - Currently at 400-80 mg three times weekly dose based on renal function    - Monitor renal function and adjust dose to goal as CrCl improves     Pharmacy has monitored for medication interactions and immunosuppression levels in conjunction with the multidisciplinary team. In anticipation for discharge, medication therapy needs have been addressed daily throughout the current admission via multidisciplinary rounds and/or discussions, order verification, daily clinical pharmacy review, and communication with prescribers.  Yaz Garcia, PharmD  PGY-1 Pharmacy Resident

## 2020-11-19 NOTE — PROGRESS NOTES
Transplant Social Work Service Discharge Note      Patient Name:  Paolo Larkin     Anticipated Discharge Date: 11/19/20 at 1315 via [employer] [facility]    Discharge Disposition:  ARU, 5th Floor    Plan for 24 hour care for immediate post transplant period: ARU Staff will care for patient    If not local, plans for short term stay:  Patient plans to stay with family in [place], [place] and receive help from her friend Michael's daughter    Additional Services/Equipment Arranged: RNCC will set up appropriate home care and ATC visits once discharged from ARU    Persons notified of above discharge plan:  Patient, treatment team, nursing staff,  ARU, [employer] [facility]    Patient / Family response to discharge plan:  Agreeable    Education and resources provided by SW at discharge: Social Work role inpatient setting, 2728 Medicare Form, [employer] Donor information, and outpatient social work contact information    Discussed anticipated pharmacy out of pocket costs: YES    Provided [employer] Donor Letter Writing packet : YES    Plan: Patient will discharge to  ARU at 1315 via [employer] [facility]. Patient will then attend ATC and will have home care set upon discharge. IMM given to patient via phone with Patricia strong. Copy emailed to HIM for filing in her electronic chart. Patient declined a copy. No further SW needs identified at discharge.    CHEN Da Silva, JESUS  7A   Ph: [phone]  Pager: [phone]

## 2020-11-19 NOTE — PLAN OF CARE
Paw's vital signs stable. Medicated x1 with tylenol for c/o pain. Appeared to have adequate relief. Up to bedside commode x3 voiding adequate justin urine. Abdominal incision with staples clean,dry and intact and open to air. Will continue to monitor and report any significant changes.

## 2020-11-19 NOTE — H&P
[facility]   Acute Rehabilitation Unit  Admission History and Physical    CHIEF COMPLAINT   Right lower extremity weakness    HISTORY OF PRESENT ILLNESS  Paolo Larkin is a 45 year old Patricia-speaking female with history of 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 Two previously attempted kidney transplants (2019, 2020) aborted due to hypotension and suspected anaphylaxis. Patient is now s/p right  donor kidney transplant on 20.  Hospital stay complicated by right lower extremity weakness and numbness (suspected acute femoral neuropathy), mild steroid-induced hyperglycemia, hyperkalemia.  Neurology consulted regarding weakness during acute hospitalization, who suspected likely due to femoral nerve injury during transplant surgery.    During acute hospitalization, patient was seen and evaluated by PT, who recommended that patient would benefit from ongoing therapies in the acute inpatient rehabilitation setting.      In review of the therapy notes, patient is noted to have impaired right lower extremity strength, gait, balance, and activity tolerance.  She is currently requiring supervision for bed mobility, mod assist for sit to stand transfers, and ambulating with lift pants/portable lift and mod assist to correct right knee buckling.  Climbing 4 stairs with min A x2.  Goals for mod I with mobility and ADLs.     On arrival to rehab unit, patient was interviewed with assistance from video  and reports she is doing well.  She denies pain, other than generalized abdominal discomfort/fullness after eating. She denies nausea/vomiting, and had small bowel movement this morning.  She also notes decreased sense of taste.  She also complains of urinary urgency, but denies dysuria.  She denies any current numbness/tingling but reported  "diminished sensation in RLE during exam.  She states she had some difficulty sleeping last night due to \"feeling full\".      PAST MEDICAL HISTORY   Reviewed and updated in Epic.  Past Medical History:   Diagnosis Date     Anaphylactic reaction 2019    Shortly after OR induction     Anaphylactic reaction 2020    Shortly after OR induction     Anemia in chronic kidney disease      End stage kidney disease (H)      HELLP syndrome      Hepatitis B      HTN (hypertension)      Ischemic cardiomyopathy     With preserved EF preserved EF and septal wall hypokinesis     MI (myocardial infarction) (H)     Vasospastic     Proteinuria      Red blood cell antibody positive      Secondary hyperparathyroidism (H)      Thrombocytopaenia        SURGICAL HISTORY  Reviewed and updated in Epic.  Past Surgical History:   Procedure Laterality Date     BENCH KIDNEY Right 2020    Procedure: BACKBENCH PREPARATION RIGHT  KIDNEY AND RIGHT CHEST TUBE PLACEMENT.;  Surgeon: Armando Faustin MD;  Location: [facility] OR      SECTION  2012     CREATE FISTULA ARTERIOVENOUS UPPER EXTREMITY       CV CORONARY ANGIOGRAM N/A 2019    Procedure: CV CORONARY ANGIOGRAM;  Surgeon: Sabas Tilley MD;  Location: [facility] HEART CARDIAC CATH LAB     DILATION AND CURETTAGE, HYSTEROSCOPY WITH ULTRASOUND GUIDANCE  2019     INSERT CHEST TUBE  2020    Procedure: Insert chest tube;  Surgeon: Armando Faustin MD;  Location: [facility] OR     PICC DOUBLE LUMEN PLACEMENT Right 2020    5FR DL PICC. Length 37cm (3cm out). Tip at low SVC.     TRANSPLANT KIDNEY RECIPIENT  DONOR Right 2020    Procedure: TRANSPLANT, KIDNEY, RECIPIENT,  DONOR;  Surgeon: Dayne Marcelino MD;  Location: [facility] OR       SOCIAL HISTORY  Reviewed and updated in Epic.  Marital Status:   Living situation: Lives in a house with  and 2 children (ages 8 and 25) in [facility].  No stairs.  Plans to stay in [place] with " friend for 2-3 months given s/p renal transplant.  2 steps to enter, none inside.  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  Family History   Problem Relation Age of Onset     Kidney Disease No family hx of      Heart Disease No family hx of          PRIOR FUNCTIONAL HISTORY   Pt was independent with all ADLs/IADLs, transfers, mobility and gait. Right hand-dominant; drives.     MEDICATIONS  Scheduled meds  Medications Prior to Admission   Medication Sig Dispense Refill Last Dose     acetaminophen (TYLENOL) 325 MG tablet Take 325-650 mg by mouth every 4 hours as needed for mild pain or fever    11/19/2020 at 0141     amLODIPine (NORVASC) 5 MG tablet Take 1 tablet (5 mg) by mouth daily   11/18/2020 at 0814     aspirin (ASA) 81 MG chewable tablet Take 1 tablet (81 mg) by mouth daily   11/19/2020 at 0812     atorvastatin (LIPITOR) 10 MG tablet Take 1 tablet (10 mg) by mouth daily   11/19/2020 at 0812     carvedilol (COREG) 3.125 MG tablet Take 1 tablet (3.125 mg) by mouth 2 times daily   11/18/2020 at 0815     magnesium oxide (MAG-OX) 400 MG tablet Take 1 tablet (400 mg) by mouth daily (with lunch)   11/19/2020 at 1320     mycophenolate (GENERIC EQUIVALENT) 250 MG capsule Take 3 capsules (750 mg) by mouth 2 times daily   11/19/2020 at 0812     pantoprazole (PROTONIX) 40 MG EC tablet Take 1 tablet (40 mg) by mouth every morning (before breakfast)   11/19/2020     senna-docusate (SENOKOT-S/PERICOLACE) 8.6-50 MG tablet Take 1 tablet by mouth 2 times daily as needed for constipation   11/19/2020     sulfamethoxazole-trimethoprim (BACTRIM) 400-80 MG tablet Take 1 tablet by mouth three times a week   11/18/2020 at 0823     tacrolimus (GENERIC EQUIVALENT) 1 MG capsule Take 4 capsules (4 mg) by mouth 2 times daily   11/18/2020 at 1303     valGANciclovir (VALCYTE) 450 MG tablet  "Take 1 tablet (450 mg) by mouth twice a week   11/19/2020 at 1000       ALLERGIES     Allergies   Allergen Reactions     Blood Transfusion Related (Informational Only)      Patient has a history of a clinically significant antibody against RBC antigens.  A delay in compatible RBCs may occur.     Cephalosporins Anaphylaxis     Per [facility] allergist report: positive skin intradermal tests to Cefazolin and Ceftazidime, but NOT to Penicillin G, Piperacillin and Meropenem      Chlorhexidine Anaphylaxis     Several times during anesthesia at initial phase during insertion of I.v. line and after disinfection with Chlorhexidine drop of blood pressure.  In Prick and as well intradermal tests clear positive reactions to Chlorhexidine (particularly in intradermal test to 0.0002% Chlorhexidine papule of 1cm and Erythema of 2.5cm). AVOID in future Chlorhexidine     Heparin Flush          REVIEW OF SYSTEMS  A 10 point ROS was performed and negative unless otherwise noted in HPI.       PHYSICAL EXAM  VITAL SIGNS:  /77   Pulse 80   Temp 97.9  F (36.6  C) (Oral)   Resp 20   Wt 52.2 kg (115 lb)   SpO2 100%   BMI 20.37 kg/m    BMI:  Estimated body mass index is 20.37 kg/m  as calculated from the following:    Height as of 11/12/20: 1.6 m (5' 3\").    Weight as of this encounter: 52.2 kg (115 lb).     General: alert, in NAD  HEENT: moist mucous membranes  Pulmonary: non-labored on room air, lungs CTA bilaterally  Cardiovascular: RRR  Abdominal: soft, non-tender; incision well-approximated, no discharge or edema, mild erythema, staples present  Extremities: warm, well perfused, no edema in bilateral lower extremities  MSK/neuro:   Mental Status:  alert and oriented   Cranial Nerves: grossly normal    Sensory: Hypoesthesia at right medial knee and anterior thigh.  Normal to light touch in bilateral upper extremities and left lower extremity.   Strength:    SF  EF  EE  WE  G  I  HF  KE  DF  EHL  PF   R "  5 5 5 5 5 5 3 1-2 5 5 5    L  5 5 5 5 5 5 5 5 5 5 5    Abnormal movements: None    Speech: no concerns reported per    Cognition: intact   Gait: not tested  Skin: surgical incision on right abdomen CDI with staples in place      LABS  Lab Results   Component Value Date    WBC 5.0 11/19/2020     Lab Results   Component Value Date    RBC 2.37 11/19/2020     Lab Results   Component Value Date    HGB 7.3 11/19/2020     Lab Results   Component Value Date    HCT 22.9 11/19/2020     Lab Results   Component Value Date    MCV 97 11/19/2020     Lab Results   Component Value Date    MCH 30.8 11/19/2020     Lab Results   Component Value Date    MCHC 31.9 11/19/2020     Lab Results   Component Value Date    RDW 14.4 11/19/2020     Lab Results   Component Value Date     11/19/2020     Last Comprehensive Metabolic Panel:  Sodium   Date Value Ref Range Status   11/19/2020 141 133 - 144 mmol/L Final     Potassium   Date Value Ref Range Status   11/19/2020 3.8 3.4 - 5.3 mmol/L Final     Chloride   Date Value Ref Range Status   11/19/2020 109 94 - 109 mmol/L Final     Carbon Dioxide   Date Value Ref Range Status   11/19/2020 23 20 - 32 mmol/L Final     Anion Gap   Date Value Ref Range Status   11/19/2020 9 3 - 14 mmol/L Final     Glucose   Date Value Ref Range Status   11/19/2020 90 70 - 99 mg/dL Final     Urea Nitrogen   Date Value Ref Range Status   11/19/2020 43 (H) 7 - 30 mg/dL Final     Creatinine   Date Value Ref Range Status   11/19/2020 3.61 (H) 0.52 - 1.04 mg/dL Final     GFR Estimate   Date Value Ref Range Status   11/19/2020 15 (L) >60 mL/min/[1.73_m2] Final     Comment:     Non  GFR Calc  Starting 12/18/2018, serum creatinine based estimated GFR (eGFR) will be   calculated using the Chronic Kidney Disease Epidemiology Collaboration   (CKD-EPI) equation.       Calcium   Date Value Ref Range Status   11/19/2020 9.2 8.5 - 10.1 mg/dL Final         IMPRESSION/PLAN:  Paolo Larkin is a 45 year  old with CKD of unknown etiology, s/p right kidney transplant 11/12, with post-operative complication of right femoral neuropathy.  Admitted to acute inpatient rehab for ongoing rehabilitation and medical management on 11/19/20.      Admission to acute inpatient rehab right femoral neuropathy.    Impairment group code: 03.9      1. PT, OT 90 minutes of each on a daily basis, in addition to rehab nursing and close management of physiatrist.      2. Impairment of ADL's: Hospitalization with decreased strength, activity tolerance leading to impaired ability to complete ADLs.  Goal for mod I with ADLs.    3. Impairment of mobility:  Hospitalization with decreased strength, activity tolerance leading to impaired ability to complete ADLs.  Goal for mod I with ADLs.      4. Medical Conditions  Right lower extremity weakness, suspected femoral neuropathy: most likely 2/2 femoral nerve injury/stretch during transplant surgery 11/12.  Neurology consulted during acute hospitalization with no recommendations for immediate intervention or imaging, gradual improvement over weeks to months expected.  Gradually improving, now able to bear weight.  Ongoing buckling in right knee.  Currently impacting her ability to safely and independently transfer, ambulate, and perform ADLs.  - PT/OT for compensatory strategies for gait, mobility, ADLs, strengthening, and to address home safety concerns  - Consider referral to neurology for EMG if incomplete recovery over next several months    S/p right DDKT 11/12/20 due to CKD of unknown etiology: Cr did improve and was downtrending to 3.61 by admit to rehab unit. Hyperkalemia initially post-op, which was treated with medical management and now resolved. Good urine output. Moreno removed POD3, voiding without retention.  Immunosuppression: Cellcept 750 mg BID, tacrolimus goal 8-10.   Viral prophylaxis: Valycte x 3 months, Bactrim indefinitely  Labs: will monitor BMP, CBC, tacrolimus level every  Mon/Thurs  - Dose increase tacrolimus 11/19, will repeat level 11/20  - Continue ASA 81mg, atorvastatin 10mg, magnesium oxide 400mg qday  - Staples to be removed 3 weeks post-op  - Appreciate ongoing support from hospitalist/transplant service  - Transplant coordinator: Yaz Nicolerasheed [phone]     Anemia of CKD: Hgb with slow trend downward, though slight improvement to Hgb 7.3 at discharge  - CBC every Mon/Thurs     Hypertension.  PTA on amlodipine 10mg qday.  Goal BP: <150/90.  Lasix discontinued, dose reductions in coreg and amlodipine during hospitalization due to hypotension.  BP remains low normal range.  - Currently on amlodipine 5mg daily and coreg reduced to 3.125mg BID per nephrology.  - Continue to monitor and titrate medications as appropriate  - Appreciate ongoing hospitalist support    5. Adjustment to disability:  Monitor mood  6. FEN: regular diet, encourage fluids  7. Bowel: continent, monitor for constipation, has PRN bowel meds available  8. Bladder: continent, monitor ongoing urinary urgency, will check PVRs. UA unremarkable on 11/16  9. DVT Prophylaxis: mechanical   10. GI Prophylaxis: Protonix 40mg qday  11. Code: full  12. Disposition: aunt & uncle's home  13. ELOS:  10 days.  14. Rehab prognosis:  good  15. Follow up Appointments on Discharge: primary care, transplant nephrology        Seen and discussed with Dr. Ciara Tubbs, PM&R staff physician     Jeniffer Grigsby PA-C  Rehab Service      Physician Attestation   I, Ciara Tubbs, saw and evaluated Paolo Larkin as part of a shared visit.  I have reviewed and discussed with the advanced practice provider their history, physical and plan.    I personally reviewed the vital signs, medications, labs and imaging.    My key history or physical exam findings:   Key management decisions made by me:   Paolo Larkin is a 46 yo female who was admitted on 11/12 for right kidney transplant. Her surgery was complicated by right femoral neuropathy  resulting in RLE weakness and sensory loss. Other comorbid medical conditions include HTN, cardiomyopathy with preserved EF and h/o MI, and HELLP.     She was I with all aspects of her life prior to admission. Current requires mod A for transfers, mod A and lift pants for ambulation and min A of 2 for stairs. Anticipate improvement to mod I level with mobility and ADLs. May need supervision with stairs and some iADLs initially after discharge.     Will benefit from intensive rehabilitation including 90 minutes each of PT and OT, rehabilitation nursing, and close management by physiatry. Good medical and rehab prognosis. Estimated length of stay is 10 days.       [facility]  Date of Service (when I saw the patient): 11/19/20

## 2020-11-19 NOTE — CONSULTS
[facility]   Consult Note - Hospitalist Service     Date of Admission:  11/19/2020  Consult Requested by: PM&R   Reason for Consult: Medical co-management    Assessment & Plan   Paolo Larkin is a 45 year old female admitted on 11/19/2020 to ARU for ongoing rehabilitation. She has a PMHx ESKD of unknown etiology on dialysis since 2012, HTN, vasospastic MI 2012, ischemic cardiomyopathy (preserved EF, normal coronaries, and septal wall hypokinesis), HELLP, HBV, and a known RBC antibody.  She is s/p DDKT 11/12/20 with no ureteral stent. Hospitalization was complicated with femoral neuropathy with RLE weakness, she was evaluated by Neurology.   Patient was admitted to the ARU, no acute events overnight, she is hemodynamically stable and afebrile. Cr is trending down, she is having good urine output.     # DDKT  -Transplant team following the patient.   -Proteinuria: 7.4g/g prior to transplant. Needs to be repeated with FSGS protocol given unknown native disease.   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 Cr 3. Incision is C/D/I.     # Hep B core positive  -DNA is negative.     # Immunosuppression   -Tacrolimus immediate release (goal 8-10) and Mycophenolate mofetil (dose 750 mg every 12 hours)  -Transplant service managing tacrolimus dose.   -Tacrolimus level 4.6 today.      # Infection Prophylaxis   - PJP: Sulfa/TMP (Bactrim)  - CMV: Valcyte x3m      # Hypertension   -Controlled to low normal BP.             -Goal BP: < 150/90  -Volume status: euvolemic            -Continue on amlodipine to 5 mg and coreg to 6.25 mg BID     # Elevated Blood Glucose:  -Continue monitoring BG.      # Anemia in Chronic Renal Disease  -Continue monitoring.       The patient's care was discussed with the Primary team.    Woody Santa MD  [facility]   Pager #  [phone]    ______________________________________________________________________    Chief Complaint   Weakness     Unable to obtain a history from the patient, she speaks Patricia,  not available during my evaluation.      History of Present Illness   Paolo Larkin is a 45 year old female admitted on 11/19/2020 to ARU for ongoing rehabilitation. She has a PMHx ESKD of unknown etiology on dialysis since 2012, HTN, vasospastic MI 2012, ischemic cardiomyopathy (preserved EF, normal coronaries, and septal wall hypokinesis), HELLP, HBV, and a known RBC antibody.  She is s/p DDKT 11/12/20 with no ureteral stent. Hospitalization was complicated with femoral neuropathy with RLE weakness, she was evaluated by Neurology.   Patient was admitted to the ARU, no acute events overnight, she is hemodynamically stable and afebrile. Cr is trending down, she is having good urine output. She appears comfortable.     Review of Systems   Review of systems not obtained due to patient factors - language barrier    Past Medical History    I have reviewed this patient's medical history and updated it with pertinent information if needed.   Past Medical History:   Diagnosis Date     Anaphylactic reaction 04/2019    Shortly after OR induction     Anaphylactic reaction 01/2020    Shortly after OR induction     Anemia in chronic kidney disease      End stage kidney disease (H)      HELLP syndrome      Hepatitis B      HTN (hypertension)      Ischemic cardiomyopathy     With preserved EF preserved EF and septal wall hypokinesis     MI (myocardial infarction) (H) 2012    Vasospastic     Proteinuria      Red blood cell antibody positive      Secondary hyperparathyroidism (H)      Thrombocytopaenia        Past Surgical History   I have reviewed this patient's surgical history and updated it with pertinent information if needed.  Past Surgical History:   Procedure Laterality Date     BENCH KIDNEY Right 01/24/2020    Procedure: BACKBENCH  PREPARATION RIGHT  KIDNEY AND RIGHT CHEST TUBE PLACEMENT.;  Surgeon: Armando Faustin MD;  Location: [facility] OR      SECTION  2012     CREATE FISTULA ARTERIOVENOUS UPPER EXTREMITY       CV CORONARY ANGIOGRAM N/A 2019    Procedure: CV CORONARY ANGIOGRAM;  Surgeon: Sabas Tilley MD;  Location:  [facility] CARDIAC CATH LAB     DILATION AND CURETTAGE, HYSTEROSCOPY WITH ULTRASOUND GUIDANCE  2019     INSERT CHEST TUBE  2020    Procedure: Insert chest tube;  Surgeon: Armando Faustin MD;  Location: [facility] OR     PICC DOUBLE LUMEN PLACEMENT Right 2020    5FR DL PICC. Length 37cm (3cm out). Tip at low SVC.     TRANSPLANT KIDNEY RECIPIENT  DONOR Right 2020    Procedure: TRANSPLANT, KIDNEY, RECIPIENT,  DONOR;  Surgeon: Dayne Marcelino MD;  Location: [facility] OR       Social History   I have reviewed this patient's social history and updated it with pertinent information if needed.  Social History     Tobacco Use     Smoking status: Never Smoker     Smokeless tobacco: Never Used   Substance Use Topics     Alcohol use: No     Drug use: No       Family History   I have reviewed this patient's family history and updated it with pertinent information if needed.   Family History   Problem Relation Age of Onset     Kidney Disease No family hx of      Heart Disease No family hx of        Medications   Current Facility-Administered Medications   Medication     acetaminophen (TYLENOL) tablet 325-650 mg     amLODIPine (NORVASC) tablet 5 mg     aspirin (ASA) chewable tablet 81 mg     atorvastatin (LIPITOR) tablet 10 mg     carvedilol (COREG) tablet 3.125 mg     magnesium oxide (MAG-OX) tablet 400 mg     mycophenolate (GENERIC EQUIVALENT) capsule 750 mg     pantoprazole (PROTONIX) EC tablet 40 mg     senna-docusate (SENOKOT-S/PERICOLACE) 8.6-50 MG per tablet 1 tablet     sulfamethoxazole-trimethoprim (BACTRIM) 400-80 MG per tablet 1 tablet     tacrolimus (GENERIC EQUIVALENT) capsule  6 mg     [START ON 11/23/2020] valGANciclovir (VALCYTE) tablet 450 mg       Allergies   Allergies   Allergen Reactions     Blood Transfusion Related (Informational Only)      Patient has a history of a clinically significant antibody against RBC antigens.  A delay in compatible RBCs may occur.     Cephalosporins Anaphylaxis     Per [facility] allergist report: positive skin intradermal tests to Cefazolin and Ceftazidime, but NOT to Penicillin G, Piperacillin and Meropenem      Chlorhexidine Anaphylaxis     Several times during anesthesia at initial phase during insertion of I.v. line and after disinfection with Chlorhexidine drop of blood pressure.  In Prick and as well intradermal tests clear positive reactions to Chlorhexidine (particularly in intradermal test to 0.0002% Chlorhexidine papule of 1cm and Erythema of 2.5cm). AVOID in future Chlorhexidine     Heparin Flush        Physical Exam   Vital Signs: Temp: 97.4  F (36.3  C)                  Weight: 112 lbs 3.2 oz    General Appearance: Alert, room air, no acute distress.   Eyes: Pupils equal and reactive to light.   HEENT: Oral mucosa moist.   Respiratory: Normal respiratory effort, clear lungs, no crackles or wheezing.   Cardiovascular: RRR, no murmur, no peripheral edema.   GI: Flat, soft, + BS, diffuse tenderness to palpation, no rebound or guarding, incision C/D/I   Genitourinary: Deferred  Skin: No rash.   Musculoskeletal: No swollen joints.   Neurologic: Alert, interactive, no focal deficits.   Psychiatric: Mood appears stable.     Data   I personally reviewed no images or EKG's today.  Results for orders placed or performed during the hospital encounter of 11/19/20 (from the past 24 hour(s))   Social Work IP Consult    Narrative    Ava Drummond, SUSANNESW     11/20/2020 12:54 PM   11/20/20 1000   Living Arrangements   People in home child(stephanie), dependent;spouse   Able to Return to Prior Arrangements yes   Living Arrangement Comments Plan to D/C to aunt and  uncle home in   [place] for additional support. Home set-up unknown.    Home Safety   Patient Feels Safe Living in Home? yes   CM/SW Discharge Planning   Patient/Family Anticipates Transition to family members'   home;home with help/services   Concerns to be Addressed no discharge needs identified;denies   needs/concerns at this time   Concerns Comments None reported at this time    Patient/family educated on Medicare website which has current   facility and service quality ratings no   Transportation Anticipated family or friend will provide   Anticipated Discharge Disposition (CM/SW) Home Care;Outpatient   Rehab (PT, OT, SLP, Cardiac or Pulmonary)   Patient/Family in Agreement with Plan yes   Disposition Comments Good support from extended family and   immediate family        Social Work: Initial Assessment with Discharge Plan    Patient Name: Paolo Larkin  : 1975  Age: 45 year old  MRN: [id number]  Completed assessment with: Patient with  and chart   review   Admitted to ARU: 2020     Presenting Information   Date of SW assessment: 2020  Health Care Directive: Health Care Directive Agent (if patient   not able to make decisions)  Primary Health Care Agent: Patient/self   Secondary Health Care Agent: Pt spouse Irene MIRANDA PH: [phone]  Living Situation: Lives in a apartment with  and 2   children (ages 8 and 25) in [facility].  4 ASTON per admissions   PAS screen but pt H&P report no stairs. Plans to stay in [place]   with friend for 2-3 months given s/p renal transplant.  2 steps   to enter, none inside.  Previous Functional Status: Pt was independent with all   ADLs/IADLs, transfers, mobility and gait. Right hand-dominant;   drives.   DME available: See therapy eval   Patient and family understanding of hospitalization: Appropriate   Cultural/Language/Spiritual Considerations:  woman,   Patricia-speaking and Esthela-ann-marie.       Physical Health  Reason for  admission:  Paolo Larkin is a 45 year   old Patricia-speaking female with history of ESKD of unknown   etiology on dialysis since , HTN, vasospastic MI ,   ischemic cardiomyopathy with preserved EF, HELLP, HBV, and a   known RBC antibody. Two previously attempted kidney transplants   (2019, 2020) aborted due to hypotension and suspected   anaphylaxis. Patient is now s/p right  donor kidney   transplant on 20.  Hospital stay complicated by right lower   extremity weakness and numbness (suspected acute femoral   neuropathy), mild steroid-induced hyperglycemia, hyperkalemia.    Neurology consulted regarding weakness during acute   hospitalization, who suspected likely due to femoral nerve injury   during transplant surgery.    Provider Information   Primary Care Physician:Lorna Clancy   [address] [place] [address]  PH: [phone]  : Transplant SWer--Khadijah Rowell [facility]    Mental Health/Chemical Dependency:   Diagnosis: None reported.   Alcohol/Tobacco/Narcotis: None reported   Support/Services in Place: None reported at this time   Services Needed/Recommended: Supportive services available by   consult (Health Psychology and [place] services)   Sexuality/Intimacy: Not discussed     Support System  Marital Status:  for about 8 years.  is working and   will have to care for dependant child at discharge.   Family support: Two children, one an adult and one an dependant.   Aunt and uncle who will assist pt at discharge and where pt will   discharge to in [place], [place]. Nieces and nephews. Also support   from aunt's friend and other relatives/friends. Sister, Michael Larkin in [place] and a younger brother in [place].  Other support available: See above     Community Resources  Current in home services: Unclear.   Previous services: Unclear. HD PTA.     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    Income Source:  income and SSDI   Education: 7th grade education in [employer].  Financial Concerns:  None reported   Insurance: Medicare and BCBS ANTHEM       Discharge Plan   Patient and family discharge goal: Home with HHC vs OP pending   progress. Assist from pt family.   Provided Education on discharge plan: Yes  Patient agreeable to discharge plan:  Yes  Provided education and attained signature for Medicare IM and IRF   Patient Rights and Privacy Information provided to patient :   Yes-verbal consent over the phone   Provided patient with [facility] Resources:   N/A   Barriers to discharge: None identified at this time     Discharge Recommendations   Disposition: See above   Transportation Needs: Family assistance   Name of Transportation Company and Phone: N/A     Additional comments   ELOS 10 days. Per H&P plan to discharge to pt's aunt and uncle's   home in [place]. Per pt chart, pt has D/C'd to his aunt and   uncle's house after previous hospital admissions and transplant.   No documentation of HHC or OP in pt chart. . Will need to confirm   address. RN CC also following pt care and will assist with   discharge needs.     Please invite to Care Conference:  Check with pt, anticipate , aunt and uncle??     Ava Drummond, MALIHA, [facility] Acute Rehab Unit   Phone: [phone]  I   Pager: [phone]         Tacrolimus level   Result Value Ref Range    Tacrolimus Last Dose Not Provided     Tacrolimus Level 4.6 (L) 5.0 - 15.0 ug/L   Basic metabolic panel   Result Value Ref Range    Sodium 139 133 - 144 mmol/L    Potassium 4.1 3.4 - 5.3 mmol/L    Chloride 110 (H) 94 - 109 mmol/L    Carbon Dioxide 24 20 - 32 mmol/L    Anion Gap 5 3 - 14 mmol/L    Glucose 87 70 - 99 mg/dL    Urea Nitrogen 40 (H) 7 - 30 mg/dL    Creatinine 3.03 (H) 0.52 - 1.04 mg/dL    GFR Estimate 18 (L) >60 mL/min/[1.73_m2]    GFR Estimate If Black 21 (L) >60 mL/min/[1.73_m2]    Calcium 8.8  8.5 - 10.1 mg/dL   CBC with platelets   Result Value Ref Range    WBC 5.7 4.0 - 11.0 10e9/L    RBC Count 2.49 (L) 3.8 - 5.2 10e12/L    Hemoglobin 7.7 (L) 11.7 - 15.7 g/dL    Hematocrit 23.4 (L) 35.0 - 47.0 %    MCV 94 78 - 100 fl    MCH 30.9 26.5 - 33.0 pg    MCHC 32.9 31.5 - 36.5 g/dL    RDW 14.4 10.0 - 15.0 %    Platelet Count 278 150 - 450 10e9/L     Most Recent 3 CBC's:  Recent Labs   Lab Test 11/20/20  0611 11/19/20  0645 11/18/20  0547   WBC 5.7 5.0 3.5*   HGB 7.7* 7.3* 7.1*   MCV 94 97 96    232 179

## 2020-11-20 ENCOUNTER — APPOINTMENT (OUTPATIENT)
Dept: PHYSICAL THERAPY | Facility: CLINIC | Age: 45
End: 2020-11-20
Payer: MEDICARE

## 2020-11-20 ENCOUNTER — APPOINTMENT (OUTPATIENT)
Dept: OCCUPATIONAL THERAPY | Facility: CLINIC | Age: 45
End: 2020-11-20
Payer: MEDICARE

## 2020-11-20 LAB
ANION GAP SERPL CALCULATED.3IONS-SCNC: 5 MMOL/L (ref 3–14)
BUN SERPL-MCNC: 40 MG/DL (ref 7–30)
CALCIUM SERPL-MCNC: 8.8 MG/DL (ref 8.5–10.1)
CHLORIDE SERPL-SCNC: 110 MMOL/L (ref 94–109)
CO2 SERPL-SCNC: 24 MMOL/L (ref 20–32)
CREAT SERPL-MCNC: 3.03 MG/DL (ref 0.52–1.04)
ERYTHROCYTE [DISTWIDTH] IN BLOOD BY AUTOMATED COUNT: 14.4 % (ref 10–15)
GFR SERPL CREATININE-BSD FRML MDRD: 18 ML/MIN/{1.73_M2}
GLUCOSE SERPL-MCNC: 87 MG/DL (ref 70–99)
HCT VFR BLD AUTO: 23.4 % (ref 35–47)
HGB BLD-MCNC: 7.7 G/DL (ref 11.7–15.7)
MCH RBC QN AUTO: 30.9 PG (ref 26.5–33)
MCHC RBC AUTO-ENTMCNC: 32.9 G/DL (ref 31.5–36.5)
MCV RBC AUTO: 94 FL (ref 78–100)
PLATELET # BLD AUTO: 278 10E9/L (ref 150–450)
POTASSIUM SERPL-SCNC: 4.1 MMOL/L (ref 3.4–5.3)
RBC # BLD AUTO: 2.49 10E12/L (ref 3.8–5.2)
SODIUM SERPL-SCNC: 139 MMOL/L (ref 133–144)
TACROLIMUS BLD-MCNC: 4.6 UG/L (ref 5–15)
TME LAST DOSE: ABNORMAL H
WBC # BLD AUTO: 5.7 10E9/L (ref 4–11)

## 2020-11-20 PROCEDURE — 250N000013 HC RX MED GY IP 250 OP 250 PS 637: Performed by: PHYSICAL MEDICINE & REHABILITATION

## 2020-11-20 PROCEDURE — 97535 SELF CARE MNGMENT TRAINING: CPT | Mod: GO | Performed by: STUDENT IN AN ORGANIZED HEALTH CARE EDUCATION/TRAINING PROGRAM

## 2020-11-20 PROCEDURE — 85027 COMPLETE CBC AUTOMATED: CPT | Performed by: INTERNAL MEDICINE

## 2020-11-20 PROCEDURE — 250N000012 HC RX MED GY IP 250 OP 636 PS 637: Performed by: NURSE PRACTITIONER

## 2020-11-20 PROCEDURE — 97116 GAIT TRAINING THERAPY: CPT | Mod: GP

## 2020-11-20 PROCEDURE — 97110 THERAPEUTIC EXERCISES: CPT | Mod: GO | Performed by: STUDENT IN AN ORGANIZED HEALTH CARE EDUCATION/TRAINING PROGRAM

## 2020-11-20 PROCEDURE — 36415 COLL VENOUS BLD VENIPUNCTURE: CPT | Performed by: PHYSICIAN ASSISTANT

## 2020-11-20 PROCEDURE — 80197 ASSAY OF TACROLIMUS: CPT | Performed by: PHYSICIAN ASSISTANT

## 2020-11-20 PROCEDURE — 99232 SBSQ HOSP IP/OBS MODERATE 35: CPT | Performed by: PHYSICAL MEDICINE & REHABILITATION

## 2020-11-20 PROCEDURE — 97530 THERAPEUTIC ACTIVITIES: CPT | Mod: GP

## 2020-11-20 PROCEDURE — 99233 SBSQ HOSP IP/OBS HIGH 50: CPT | Performed by: PHYSICIAN ASSISTANT

## 2020-11-20 PROCEDURE — 250N000013 HC RX MED GY IP 250 OP 250 PS 637: Performed by: INTERNAL MEDICINE

## 2020-11-20 PROCEDURE — 250N000012 HC RX MED GY IP 250 OP 636 PS 637: Performed by: INTERNAL MEDICINE

## 2020-11-20 PROCEDURE — 128N000003 HC R&B REHAB

## 2020-11-20 PROCEDURE — 80048 BASIC METABOLIC PNL TOTAL CA: CPT | Performed by: INTERNAL MEDICINE

## 2020-11-20 PROCEDURE — 97162 PT EVAL MOD COMPLEX 30 MIN: CPT | Mod: GP

## 2020-11-20 PROCEDURE — 97165 OT EVAL LOW COMPLEX 30 MIN: CPT | Mod: GO | Performed by: STUDENT IN AN ORGANIZED HEALTH CARE EDUCATION/TRAINING PROGRAM

## 2020-11-20 PROCEDURE — 97530 THERAPEUTIC ACTIVITIES: CPT | Mod: GO | Performed by: STUDENT IN AN ORGANIZED HEALTH CARE EDUCATION/TRAINING PROGRAM

## 2020-11-20 RX ORDER — SULFAMETHOXAZOLE AND TRIMETHOPRIM 400; 80 MG/1; MG/1
1 TABLET ORAL
Qty: 13 TABLET | Refills: 0 | Status: SHIPPED | OUTPATIENT
Start: 2020-11-20 | End: 2020-11-23

## 2020-11-20 RX ORDER — TACROLIMUS 1 MG/1
6 CAPSULE ORAL 2 TIMES DAILY
Qty: 360 CAPSULE | Refills: 0 | Status: SHIPPED | OUTPATIENT
Start: 2020-11-20 | End: 2020-11-23

## 2020-11-20 RX ORDER — ATORVASTATIN CALCIUM 10 MG/1
10 TABLET, FILM COATED ORAL DAILY
Qty: 30 TABLET | Refills: 0 | Status: SHIPPED | OUTPATIENT
Start: 2020-11-20 | End: 2020-12-03

## 2020-11-20 RX ORDER — PANTOPRAZOLE SODIUM 40 MG/1
40 TABLET, DELAYED RELEASE ORAL
Qty: 30 TABLET | Refills: 0 | Status: SHIPPED | OUTPATIENT
Start: 2020-11-20 | End: 2020-12-03

## 2020-11-20 RX ORDER — CARVEDILOL 3.12 MG/1
3.12 TABLET ORAL 2 TIMES DAILY
Qty: 60 TABLET | Refills: 0 | Status: SHIPPED | OUTPATIENT
Start: 2020-11-20 | End: 2020-11-24

## 2020-11-20 RX ORDER — AMLODIPINE BESYLATE 5 MG/1
5 TABLET ORAL DAILY
Qty: 30 TABLET | Refills: 0 | Status: SHIPPED | OUTPATIENT
Start: 2020-11-20 | End: 2020-12-03

## 2020-11-20 RX ORDER — MYCOPHENOLATE MOFETIL 250 MG/1
750 CAPSULE ORAL 2 TIMES DAILY
Qty: 180 CAPSULE | Refills: 0 | Status: SHIPPED | OUTPATIENT
Start: 2020-11-20 | End: 2020-12-03

## 2020-11-20 RX ORDER — MAGNESIUM OXIDE 400 MG/1
400 TABLET ORAL
Qty: 30 TABLET | Refills: 0 | Status: SHIPPED | OUTPATIENT
Start: 2020-11-20 | End: 2020-12-03

## 2020-11-20 RX ORDER — ASPIRIN 81 MG/1
81 TABLET, CHEWABLE ORAL DAILY
Qty: 30 TABLET | Refills: 0 | Status: SHIPPED | OUTPATIENT
Start: 2020-11-20 | End: 2020-12-03

## 2020-11-20 RX ORDER — VALGANCICLOVIR 450 MG/1
450 TABLET, FILM COATED ORAL
Qty: 10 TABLET | Refills: 0 | Status: SHIPPED | OUTPATIENT
Start: 2020-11-23 | End: 2020-11-30

## 2020-11-20 RX ADMIN — ATORVASTATIN CALCIUM 10 MG: 10 TABLET, FILM COATED ORAL at 09:38

## 2020-11-20 RX ADMIN — MYCOPHENOLATE MOFETIL 750 MG: 250 CAPSULE ORAL at 09:34

## 2020-11-20 RX ADMIN — SULFAMETHOXAZOLE AND TRIMETHOPRIM 1 TABLET: 400; 80 TABLET ORAL at 09:33

## 2020-11-20 RX ADMIN — PANTOPRAZOLE SODIUM 40 MG: 40 TABLET, DELAYED RELEASE ORAL at 06:14

## 2020-11-20 RX ADMIN — TACROLIMUS 6 MG: 5 CAPSULE ORAL at 18:56

## 2020-11-20 RX ADMIN — AMLODIPINE BESYLATE 5 MG: 5 TABLET ORAL at 09:33

## 2020-11-20 RX ADMIN — Medication 400 MG: at 12:13

## 2020-11-20 RX ADMIN — ASPIRIN 81 MG CHEWABLE TABLET 81 MG: 81 TABLET CHEWABLE at 09:33

## 2020-11-20 RX ADMIN — ACETAMINOPHEN 650 MG: 325 TABLET, FILM COATED ORAL at 19:02

## 2020-11-20 RX ADMIN — MYCOPHENOLATE MOFETIL 750 MG: 250 CAPSULE ORAL at 18:53

## 2020-11-20 RX ADMIN — CARVEDILOL 3.12 MG: 3.12 TABLET, FILM COATED ORAL at 09:33

## 2020-11-20 RX ADMIN — CARVEDILOL 3.12 MG: 3.12 TABLET, FILM COATED ORAL at 21:28

## 2020-11-20 RX ADMIN — ACETAMINOPHEN 650 MG: 325 TABLET, FILM COATED ORAL at 00:33

## 2020-11-20 RX ADMIN — TACROLIMUS 4 MG: 1 CAPSULE ORAL at 09:35

## 2020-11-20 NOTE — PROGRESS NOTES
20 2415   Quick Adds   Type of Visit Initial PT Evaluation       Present yes   Language Patricia  (iPad)   Living Environment   People in home spouse;child(stephanie), dependent;child(stephanie), adult   Current Living Arrangements apartment   Home Accessibility stairs to enter home   Transportation Anticipated family or friend will provide   Living Environment Comments Pt lives in [place], [place]. Her discharge plan to go to a friend's house in [place] that has 2-3 stairs to enter with B rail. It is one story home. Current plan is to stay there indefinitely, pt is unsure if she will ever return to [place]. Pt has a spouse and two kids (8 and 24 y/o) that will be staying with her   Self-Care   Usual Activity Tolerance good   Current Activity Tolerance fair   Regular Exercise Yes  (walks outside frequently)   Equipment Currently Used at Home none   Activity/Exercise/Self-Care Comment IND at baseline, reports would be able to tolerate doing errands, etc fairly well. Pt did housework and cleaning reguarly   Disability/Function   Hearing Difficulty or Deaf no   Wear Glasses or Blind no   Concentrating, Remembering or Making Decisions Difficulty no   Difficulty Communicating no   Difficulty Eating/Swallowing no   Walking or Climbing Stairs Difficulty no   Dressing/Bathing Difficulty no   Toileting no   Doing Errands Independently Difficulty (such as shopping) no   Fall history within last six months no   Change in Functional Status Since Onset of Current Illness/Injury yes   General Information   Onset of Illness/Injury or Date of Surgery 20   Referring Physician Dr. Yoon   Patient/Family Therapy Goals Statement (PT) Get R leg strength back, return to independence   Pertinent History of Current Problem (include personal factors and/or comorbidities that impact the POC) ESKD of unknown etiology on dialysis since , MI in . S/p right  donor kidney transplant on 20.  [facility]  stay complicated by right lower extremity weakness and numbness suspected likely due to femoral nerve injury during transplant surgery.   Existing Precautions/Restrictions Fall, abdominal   Cognition   Orientation Status (Cognition) oriented x 4   Affect/Mental Status (Cognition) WNL   Follows Commands (Cognition) WNL   Pain Assessment   Patient Currently in Pain Yes, see Vital Sign flowsheet  (at surgery site. Also experiencing abdominal pain when eatin)   Integumentary/Edema   Integumentary/Edema no deficits were identifed   Posture    Posture Not impaired   Range of Motion (ROM)   ROM Quick Adds ROM WFL   ROM Comment Some increased abdominal pain with active hip flexion, but functional ROM   MMT: Hip, Rehab Eval   Hip Flexion - Left Side (5/5) normal,left   Hip ABduction - Left Side (5/5) normal,left   Hip Flexion - Right Side (4-/5) good minus, right   Hip ABduction - Right Side (4-/5) good minus, right   MMT: Knee, Rehab Eval   Knee Flexion - Left Side (5/5) normal,left   Knee Extension - Left Side (5/5) normal,left   Knee Flexion - Right Side (3+/5) fair plus, right   Knee Extension - Right Side (5/5) normal,right   MMT: Ankle, Rehab Eval   Ankle Dorsiflexion - Right Side 5/5) normal,right   Ankle Dorsiflexion - Left Side 5/5) normal,left   ARC Assessment Only   Acute Rehab Functional Assessment See IP Rehab Daily Documentation Flowsheet for Functional Mobility/ADL Assessment   Balance   Balance Comments Good static balance. No deficits NBOS or eyes closed. CGA for dynamic balance due to risk of buckle   Sensory Examination   Sensory Perception Comments LT intact throughout, although diminished L3 distribution on R ride. No proprioceptive deficits   Coordination   Coordination no deficits were identified   Clinical Impression   Criteria for Skilled Therapeutic Intervention yes, treatment indicated   PT Diagnosis (PT) Impaired functional mobility   Influenced by the following impairments R femoral neuropathy,  pain, deconditioning 2/2 acute stay   Functional limitations due to impairments Decreased IND with transfer, gait, stairs   Clinical Presentation Evolving/Changing   Clinical Presentation Rationale Clincial judgement, extent of RLE deficits evolving   Clinical Decision Making (Complexity) moderate complexity   Therapy Frequency (PT) Daily  (x60-90 minutes)   Predicted Duration of Therapy Intervention (days/wks) 3 days   Planned Therapy Interventions (PT) bed mobility training;balance training;gait training;home exercise program;stair training;strengthening;transfer training;patient/family education;neuromuscular re-education;E-stim   Anticipated Equipment Needs at Discharge (PT) walker, rolling;shower chair   Risk & Benefits of therapy have been explained evaluation/treatment results reviewed;care plan/treatment goals reviewed;risks/benefits reviewed;current/potential barriers reviewed;participants voiced agreement with care plan;participants included;patient   Clinical Impression Comments Pt admitted due to R femoral neuropathy causing fall concerns in fall concerns during acute stay. However, this appears to be rapidly improving and only mildly impacting mobility at this time. Anticipate pt will be able to discharge mod-I in 1-2 days, pending progress may not need AD at discharge   Total Evaluation Time   Total Evaluation Time (Minutes) 30

## 2020-11-20 NOTE — PLAN OF CARE
Discharge Planner Post-Acute Rehab OT:     Discharge Plan: home with assist    Precautions: fall, abdominal, interpretor pt, Patricia language    Current Status:  ADLs: SBA/CGA for ADL, CGA for ambulation with walker and transfers.   IADLs: not tested, pt will have family assist  Vision/Cognition: WFL    Assessment: Eval completed and pt was SBA/CGA for ADL using walker. Per discussion with PT pt is walking very well with the walker. Initially thought 1 week however after second session it is apparent that the pt can go home even sooner with family support. Will do shower assessment tomorrow and assess for MOD I in the room with possible discharge MONDay.     Other Barriers to Discharge (DME, Family Training, etc): none

## 2020-11-20 NOTE — PLAN OF CARE
FOCUS/GOAL  Bladder management, Pain management, and Wound care management    ASSESSMENT, INTERVENTIONS AND CONTINUING PLAN FOR GOAL:  Patient A&O x4. Patricia speaking, interp utilized. Pt upgraded to Mod I. Continent of bladder. LBM 11/19. No complaints of pain. Incision WDL and TARAH. Continue w/ plan of care.

## 2020-11-20 NOTE — PROGRESS NOTES
"  [facility]   Acute Rehabilitation Unit  Daily progress note    INTERVAL HISTORY  Paolo Larkin was seen and examined at bedside.  She was interviewed with the assistance of .  She reports that she is feeling \"much better this morning\" and \"getting better slowly\".  She complains of some ongoing abdominal pain, especially after eating.  She also notes mild incisional pain when she is active.  She denies any nausea, vomiting and reports having a small, though regular bowel movements.  She denies any difficulty breathing.    Undergoing therapy evaluations today.      MEDICATIONS    amLODIPine  5 mg Oral Daily     aspirin  81 mg Oral Daily     atorvastatin  10 mg Oral Daily     carvedilol  3.125 mg Oral BID     magnesium oxide  400 mg Oral Daily with lunch     mycophenolate  750 mg Oral BID IS     pantoprazole  40 mg Oral QAM AC     sulfamethoxazole-trimethoprim  1 tablet Oral Once per day on Mon Wed Fri     tacrolimus  4 mg Oral BID IS     [START ON 11/23/2020] valGANciclovir  450 mg Oral Once per day on Mon Thu        acetaminophen, senna-docusate     PHYSICAL EXAM  BP (!) 142/83 (BP Location: Right arm)   Pulse 86   Temp 97.4  F (36.3  C) (Oral)   Resp 20   Wt 50.9 kg (112 lb 3.2 oz)   SpO2 100%   BMI 19.88 kg/m    Gen: awake, alert, seated upright in chair  HEENT: moist mucous membranes  Pulm: non-labored breathing on room air  Ext: no edema in bilateral lower extremities  Neuro/MSK: responding appropriately through   Strength: R: HF 3/5, KE 2/5, DF 5/5, PF 5/5                  L HF 5/5, KE 5/5, DF 5/5, PF 5/5    LABS  Lab Results   Component Value Date    WBC 5.7 11/20/2020     Lab Results   Component Value Date    RBC 2.49 11/20/2020     Lab Results   Component Value Date    HGB 7.7 11/20/2020     Lab Results   Component Value Date    HCT 23.4 11/20/2020     Lab Results   Component Value Date    MCV 94 11/20/2020     Lab Results   Component Value " Date    MCH 30.9 11/20/2020     Lab Results   Component Value Date    MCHC 32.9 11/20/2020     Lab Results   Component Value Date    RDW 14.4 11/20/2020     Lab Results   Component Value Date     11/20/2020     Last Comprehensive Metabolic Panel:  Sodium   Date Value Ref Range Status   11/20/2020 139 133 - 144 mmol/L Final     Potassium   Date Value Ref Range Status   11/20/2020 4.1 3.4 - 5.3 mmol/L Final     Chloride   Date Value Ref Range Status   11/20/2020 110 (H) 94 - 109 mmol/L Final     Carbon Dioxide   Date Value Ref Range Status   11/20/2020 24 20 - 32 mmol/L Final     Anion Gap   Date Value Ref Range Status   11/20/2020 5 3 - 14 mmol/L Final     Glucose   Date Value Ref Range Status   11/20/2020 87 70 - 99 mg/dL Final     Urea Nitrogen   Date Value Ref Range Status   11/20/2020 40 (H) 7 - 30 mg/dL Final     Creatinine   Date Value Ref Range Status   11/20/2020 3.03 (H) 0.52 - 1.04 mg/dL Final     GFR Estimate   Date Value Ref Range Status   11/20/2020 18 (L) >60 mL/min/[1.73_m2] Final     Comment:     Non  GFR Calc  Starting 12/18/2018, serum creatinine based estimated GFR (eGFR) will be   calculated using the Chronic Kidney Disease Epidemiology Collaboration   (CKD-EPI) equation.       Calcium   Date Value Ref Range Status   11/20/2020 8.8 8.5 - 10.1 mg/dL Final       Rehabilitation - continue comprehensive acute inpatient rehabilitation program with multidisciplinary approach including therapies, rehab nursing, and physiatry following. See interval history for updates.      ASSESSMENT AND PLAN  Paolo Larkin is a 45 year old female with CKD of unknown etiology, s/p right kidney transplant 11/12, with post-operative complication of right femoral neuropathy.  Admitted to acute inpatient rehab for ongoing rehabilitation and medical management on 11/19/20.    Right lower extremity weakness, suspected femoral neuropathy: most likely 2/2 femoral nerve injury/stretch during transplant  surgery 11/12.  Neurology consulted during acute hospitalization with no recommendations for immediate intervention or imaging, gradual improvement over weeks to months expected.  Gradually improving, now able to bear weight.  Ongoing buckling in right knee.  Currently impacting her ability to safely and independently transfer, ambulate, and perform ADLs.  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 Hyperkalemia initially post-op, which was treated with medical management and now resolved. Good urine output. Moreno removed POD3, voiding without retention.  Cr 3.03 on 11/20.  Immunosuppression: Cellcept 750 mg BID, tacrolimus goal 8-10.   Viral prophylaxis: Valycte x 3 months, Bactrim indefinitely  Labs: will monitor BMP, CBC, tacrolimus level every Mon/Thurs  - Dose increase tacrolimus 11/19, repeat level in process 11/20  - Continue ASA 81mg, atorvastatin 10mg, magnesium oxide 400mg qday  - Staples to be removed 3 weeks post-op  - Appreciate ongoing support from hospitalist/transplant service  - Transplant coordinator: Yaz Villa [phone]     Anemia of CKD: Hgb with slow trend downward, though slight improvement to Hgb 7.3 at discharge  - CBC every Mon/Thurs  - Improving with Hgb 7.7 on 11/20     Hypertension.  PTA on amlodipine 10mg qday.  Goal BP: <150/90.  Lasix discontinued, dose reductions in coreg and amlodipine during hospitalization due to hypotension.  BP remains low normal range.  - Currently on amlodipine 5mg daily and coreg reduced to 3.125mg BID per nephrology.  - Continue to monitor and titrate medications as appropriate  - Appreciate ongoing hospitalist support      1. Adjustment to disability:  Monitor  mood  2. FEN: regular diet, thin liquids  3. Bowel: continent, mild constipation, PRN meds available  4. Bladder: continent, monitor ongoing urinary urgency, PVRs x2 <100, UA unremarkable on 11/16  5. DVT Prophylaxis: mechanical  6. GI Prophylaxis: Protonix 40mg qday  7. Code: full  8. Disposition: home  9. ELOS: 10 days  10. Follow up Appointments on Discharge: primary care, transplant nephrology        Patient seen and discussed with Dr. Brian Yoon  PM&R Staff Physician    Jeniffer Grigsby PA-C  Physical Medicine & Rehabilitation

## 2020-11-20 NOTE — PLAN OF CARE
Discharge Planner Post-Acute Rehab PT:     Discharge Plan: Friend's house in [place] with her family due to needs to be near [facility] due to recent kidney transplant    Precautions: Abdominal    Current Status:  Transfer: Mod-I  Gait: Mod-I with FWW, tolerating 500'+. SBA without AD for safety due to reporting buckling prior to this admission  Stairs: SBA with B rail  Balance: Will plan FGA. At this time mobilizing very well with FWW    Assessment:  Pt mobilizing very well at eval. Discussed with provider about discharging over the weekend based on eval performance. Pending progress and FGA, may not need to be walker based at discharge. Okay for mod-I in room with walker.    Other Barriers to Discharge (DME, Family Training, etc): Walker at discharge pending progress

## 2020-11-20 NOTE — PROGRESS NOTES
"   20 0655   Quick Adds   Type of Visit Initial Occupational Therapy Evaluation       Present yes   Language lana   Living Environment   People in home spouse   Current Living Arrangements apartment   Living Environment Comments Pt lives in a house with 2 ASTON and no stairs within, tub shower and no DME. Pt is staying local at disacharge but unsure of setup there. Pt has spouse and 8 yr old and 25 yr old children   Self-Care   Usual Activity Tolerance good   Current Activity Tolerance fair   Equipment Currently Used at Home none   Activity/Exercise/Self-Care Comment IND at baseline, homemaker    Disability/Function   Hearing Difficulty or Deaf no   Wear Glasses or Blind no   Concentrating, Remembering or Making Decisions Difficulty no   Difficulty Communicating no   Difficulty Eating/Swallowing no   Walking or Climbing Stairs Difficulty no   Dressing/Bathing Difficulty no   Toileting no   Doing Errands Independently Difficulty (such as shopping) yes   Fall history within last six months no   Change in Functional Status Since Onset of Current Illness/Injury yes   General Information   Onset of Illness/Injury or Date of Surgery 20   Referring Physician Jeniffer Hunter PAC   Patient/Family Therapy Goal Statement (OT) to be ableto walk and get better   Additional Occupational Profile Info/Pertinent History of Current Problem Per chart \"history of 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 Two previously attempted kidney transplants (2019, 2020) aborted due to hypotension and suspected anaphylaxis. Patient is now s/p right  donor kidney transplant on 20.  Hospital stay complicated by right lower extremity weakness and numbness (suspected acute femoral neuropathy), mild steroid-induced hyperglycemia, hyperkalemia.  Neurology consulted regarding weakness " "during acute hospitalization, who suspected likely due to femoral nerve injury during transplant surgery.\"   Existing Precautions/Restrictions fall;abdominal   Limitations/Impairments insensate body part;other (see comments)   Cognitive Status Examination   Cognitive Status Comments WFL   Visual Perception   Impact of Vision Impairment on Function (Vision) WFL   Sensory   Sensory Comments right knee numbness   Pain Assessment   Patient Currently in Pain Yes, see Vital Sign flowsheet   Range of Motion Comprehensive   Comment, General Range of Motion BUE WFL   Strength Comprehensive (MMT)   Comment, General Manual Muscle Testing (MMT) Assessment BUE WFL, RLE weakness but overall deconditioned   Coordination   Coordination Comments WFL   ARC Assessment Only   Acute Rehab Functional Assessment See IP Rehab Daily Documentation Flowsheet for Functional Mobility/ADL Assessment   Clinical Impression   Criteria for Skilled Therapeutic Interventions Met (OT) yes   OT Diagnosis ADL and functional mobility deficits    OT Problem List-Impairments impacting ADL activity tolerance impaired;mobility;strength;sensation;pain;post-surgical precautions   ADL comments/analysis ADL and functional mobility deficits    Assessment of Occupational Performance 5 or more Performance Deficits   Identified Performance Deficits deficits impact independence with ADL, IADL andf mobility   Planned Therapy Interventions (OT) ADL retraining;IADL retraining;transfer training;progressive activity/exercise   Clinical Decision Making Complexity (OT) low complexity   Therapy Frequency (OT) Daily   Predicted Duration of Therapy 1 week   Anticipated Equipment Needs Upon Discharge (OT)   (TBD)   Risks and Benefits of Treatment have been explained. Yes   Patient, Family & other staff in agreement with plan of care Yes   Total Evaluation Time (Minutes)   Total Evaluation Time (Minutes) 20     " Statement Selected

## 2020-11-20 NOTE — PLAN OF CARE
"FOCUS/GOAL  Medication management, Equipment/Assistive devices, and Safety management    ASSESSMENT, INTERVENTIONS AND CONTINUING PLAN FOR GOAL:    Pt is alert and oriented by 4. Admitted this evening post kidney transplant. Pt is Patricia speaking and requires  for all communication except for basic commands such as \"bathroom\". Is an assist of one with a walker and gait belt for all transfers, RLE weak and loss of sensation. Continent of bowel and bladder with the use of the toilet. No complaints of pain.     Skin check completed, Pt has large incision site open to air, on LL abdomen, closed with staples. Slight erythema around the site. Other than incision pts skin is intact.     Pt stated understanding of call light system and when to call for nursing staff. On alarms, can be impulsive. Continue with plan of care.     "

## 2020-11-20 NOTE — CONSULTS
20 1000   Living Arrangements   People in home child(stephanie), dependent;spouse   Able to Return to Prior Arrangements yes   Living Arrangement Comments Plan to D/C to aunt and uncle home in [place] for additional support. Home set-up unknown.    Home Safety   Patient Feels Safe Living in Home? yes   CM/SW Discharge Planning   Patient/Family Anticipates Transition to family members' home;home with help/services   Concerns to be Addressed no discharge needs identified;denies needs/concerns at this time   Concerns Comments None reported at this time    Patient/family educated on Medicare website which has current facility and service quality ratings no   Transportation Anticipated family or friend will provide   Anticipated Discharge Disposition (CM/SW) Home Care;Outpatient Rehab (PT, OT, SLP, Cardiac or Pulmonary)   Patient/Family in Agreement with Plan yes   Disposition Comments Good support from extended family and immediate family        Social Work: Initial Assessment with Discharge Plan    Patient Name: Paolo Larkin  : 1975  Age: 45 year old  MRN: [id number]  Completed assessment with: Patient with  and chart review   Admitted to ARU: 2020     Presenting Information   Date of SW assessment: 2020  Health Care Directive: Health Care Directive Agent (if patient not able to make decisions)  Primary Health Care Agent: Patient/self   Secondary Health Care Agent: Pt spouse Irene MIRANDA PH: [phone]  Living Situation: Lives in a apartment with  and 2 children (ages 8 and 25) in [facility].  4 ASTON per admissions PAS screen but pt H&P report no stairs. Plans to stay in [place] with friend for 2-3 months given s/p renal transplant.  2 steps to enter, none inside.  Previous Functional Status: Pt was independent with all ADLs/IADLs, transfers, mobility and gait. Right hand-dominant; drives.   DME available: See therapy eval   Patient and family understanding of  hospitalization: Appropriate   Cultural/Language/Spiritual Considerations:  woman, Patricia-speaking and Esthela-ann-marie.       Physical Health  Reason for admission:  Paolo Larkin is a 45 year old Patricia-speaking female with history of 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 Two previously attempted kidney transplants (2019, 2020) aborted due to hypotension and suspected anaphylaxis. Patient is now s/p right  donor kidney transplant on 20.  Hospital stay complicated by right lower extremity weakness and numbness (suspected acute femoral neuropathy), mild steroid-induced hyperglycemia, hyperkalemia.  Neurology consulted regarding weakness during acute hospitalization, who suspected likely due to femoral nerve injury during transplant surgery.    Provider Information   Primary Care Physician:Lorna Clancy   [address] [place] [id number]  PH: [phone]  : Transplant SWer--Khadijah Row[facility]    Mental Health/Chemical Dependency:   Diagnosis: None reported.   Alcohol/Tobacco/Narcotis: None reported   Support/Services in Place: None reported at this time   Services Needed/Recommended: Supportive services available by consult (Health Psychology and [facility] services)   Sexuality/Intimacy: Not discussed     Support System  Marital Status:  for about 8 years.  is working and will have to care for dependant child at discharge.   Family support: Two children, one an adult and one an dependant. Aunt and uncle who will assist pt at discharge and where pt will discharge to in [place], [place]. Nieces and nephews. Also support from aunt's friend and other relatives/friends. Sister, Michael Larkin in [place] and a younger brother in [place].  Other support available: See above     Community Resources  Current in home services: Unclear.   Previous services:  Unclear. HD PTA.     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   Income Source:  income and SSDI   Education: 7th grade education in [employer].  Financial Concerns:  None reported   Insurance: Medicare and BCBS ANTHEM       Discharge Plan   Patient and family discharge goal: Home with HHC vs OP pending progress. Assist from pt family.   Provided Education on discharge plan: Yes  Patient agreeable to discharge plan:  Yes  Provided education and attained signature for Medicare IM and IRF Patient Rights and Privacy Information provided to patient : Yes-verbal consent over the phone   Provided patient with [facility] Resources: N/A   Barriers to discharge: None identified at this time     Discharge Recommendations   Disposition: See above   Transportation Needs: Family assistance   Name of Transportation Company and Phone: N/A     Additional comments   ELOS 10 days. Per H&P plan to discharge to pt's aunt and uncle's home in [place]. Per pt chart, pt has D/C'd to his aunt and uncle's house after previous hospital admissions and transplant. No documentation of HHC or OP in pt chart. . Will need to confirm address. RN CC also following pt care and will assist with discharge needs.     Please invite to Care Conference:  Check with pt, anticipate , aunt and uncle??     MALIHA Garza, [facility] Acute Rehab Unit   Phone: [phone]  I   Pager: [phone]

## 2020-11-20 NOTE — PROGRESS NOTES
Immunosuppression management:    Paolo Larkin is a 44 year old Patricia-speaking female with history of ESKD of unknown etiology on dialysis since , HTN who is now s/p  donor kidney transplant on 20, no ureteral stent placed.     DDKTx 20: SCr 3, continue to trend down.     Immunosuppression:  Tacrolimus 4mg BID, Level 4.6 today (12hr) increase to 6mg BID to titrate to a goal of 8-10.  Mycophenelate 750mg BID    Viral prophylaxis with (Valycte x 3 months), pneumocystis (bactrim indefinitely)      Reba Almanzar, NP  [phone]

## 2020-11-20 NOTE — PROGRESS NOTES
[facility]   Acute Rehabilitation Unit  Daily progress note    INTERVAL HISTORY  No acute events overnight.  This morning the patient has no new concerns or complaints, but would like to have a shower today.  Has some pain at the surgical incision site, and intermittent mild upper abdominal pain.  Is having small bowel movements, but says this is because she is not eating very much.  Denies shortness of breath or chest pain.  R leg strength is improving.  Functionally, therapy evaluations underway.        MEDICATIONS  Scheduled:    amLODIPine  5 mg Oral Daily     aspirin  81 mg Oral Daily     atorvastatin  10 mg Oral Daily     carvedilol  3.125 mg Oral BID     magnesium oxide  400 mg Oral Daily with lunch     mycophenolate  750 mg Oral BID IS     pantoprazole  40 mg Oral QAM AC     sulfamethoxazole-trimethoprim  1 tablet Oral Once per day on Mon Wed Fri     tacrolimus  4 mg Oral BID IS     [START ON 11/23/2020] valGANciclovir  450 mg Oral Once per day on Mon Thu        PRN:  acetaminophen, senna-docusate       PHYSICAL EXAM  Patient Vitals for the past 24 hrs:   BP Temp Temp src Pulse Resp SpO2 Weight   11/20/20 0928 (!) 142/83 -- -- 86 -- -- --   11/20/20 0813 109/67 97.4  F (36.3  C) Oral 78 20 100 % --   11/20/20 0615 -- -- -- -- -- -- 50.9 kg (112 lb 3.2 oz)   11/20/20 0423 134/82 97.3  F (36.3  C) Oral 79 18 100 % --   11/20/20 0050 127/77 97.9  F (36.6  C) Oral 80 20 100 % --   11/19/20 2300 129/85 98.6  F (37  C) Oral 85 20 100 % --   11/19/20 2038 (!) 143/71 -- -- 77 -- -- --   11/19/20 1926 (!) 137/90 97.9  F (36.6  C) Oral 88 24 100 % --   11/19/20 1400 131/87 93.3  F (34.1  C) Oral 79 20 100 % 52.2 kg (115 lb)       GEN: NAD, pleasant and cooperative  HEENT: NC/AT  CVS: RRR, S1+S2, no m/r/g  PULM: CTA b/l, no w/r/r  ABD: Soft, NT, ND, bowel sounds present  EXT: No LE edema or calf tenderness b/l  Neuro: Answers appropriately, follows commands using Patricia .   MMT 3/5 to R HF and R KE.    LABS  CBC RESULTS:   Recent Labs   Lab Test 11/20/20  0611   WBC 5.7   RBC 2.49*   HGB 7.7*   HCT 23.4*   MCV 94   MCH 30.9   MCHC 32.9   RDW 14.4          Last Comprehensive Metabolic Panel:  Sodium   Date Value Ref Range Status   11/20/2020 139 133 - 144 mmol/L Final     Potassium   Date Value Ref Range Status   11/20/2020 4.1 3.4 - 5.3 mmol/L Final     Chloride   Date Value Ref Range Status   11/20/2020 110 (H) 94 - 109 mmol/L Final     Carbon Dioxide   Date Value Ref Range Status   11/20/2020 24 20 - 32 mmol/L Final     Anion Gap   Date Value Ref Range Status   11/20/2020 5 3 - 14 mmol/L Final     Glucose   Date Value Ref Range Status   11/20/2020 87 70 - 99 mg/dL Final     Urea Nitrogen   Date Value Ref Range Status   11/20/2020 40 (H) 7 - 30 mg/dL Final     Creatinine   Date Value Ref Range Status   11/20/2020 3.03 (H) 0.52 - 1.04 mg/dL Final     GFR Estimate   Date Value Ref Range Status   11/20/2020 18 (L) >60 mL/min/[1.73_m2] Final     Comment:     Non  GFR Calc  Starting 12/18/2018, serum creatinine based estimated GFR (eGFR) will be   calculated using the Chronic Kidney Disease Epidemiology Collaboration   (CKD-EPI) equation.       Calcium   Date Value Ref Range Status   11/20/2020 8.8 8.5 - 10.1 mg/dL Final       Recent Labs   Lab 11/20/20  0611 11/19/20  0645 11/18/20  0547 11/17/20  0602 11/16/20  0527 11/15/20  0627 11/14/20  0240 11/14/20  0240 11/14/20  0108 11/13/20  2354 11/13/20  2248 11/13/20  2219 11/13/20  2149   GLC 87 90 90 92 81 88   < >  --   --   --   --   --   --    BGM  --   --   --   --   --   --   --  156* 148* 172* 219* 231* 116*    < > = values in this interval not displayed.         IMPRESSION/PLAN:  Paolo Larkin is a 45 year old with CKD of unknown etiology, s/p right kidney transplant 11/12, with post-operative complication of right femoral neuropathy.  Admitted to acute inpatient rehab for ongoing rehabilitation and medical  management on 11/19/20.        Admission to acute inpatient rehab right femoral neuropathy.    Impairment group code: 03.9        1. PT, OT 90 minutes of each on a daily basis, in addition to rehab nursing and close management of physiatrist.       2. Impairment of ADL's: Hospitalization with decreased strength, activity tolerance leading to impaired ability to complete ADLs.  Goal for mod I with ADLs.     3. Impairment of mobility:  Hospitalization with decreased strength, activity tolerance leading to impaired ability to complete ADLs.  Goal for mod I with ADLs.        4. Medical Conditions  Right lower extremity weakness, suspected femoral neuropathy: most likely 2/2 femoral nerve injury/stretch during transplant surgery 11/12.  Neurology consulted during acute hospitalization with no recommendations for immediate intervention or imaging, gradual improvement over weeks to months expected.  Gradually improving, now able to bear weight.  Ongoing buckling in right knee.  Currently impacting her ability to safely and independently transfer, ambulate, and perform ADLs.  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 3.03 on check 11/20.  Hyperkalemia initially post-op, which was treated with medical management and now resolved. Good urine output. Moreno removed POD3, voiding without retention.  Immunosuppression: Cellcept 750 mg BID, tacrolimus goal 8-10.    Viral prophylaxis: Valycte x 3 months, Bactrim indefinitely  Labs: will monitor BMP, CBC, tacrolimus level every Mon/Thurs  - Dose increase tacrolimus 11/19, Repeat level 4.6 this morning (goal 8-10)  - Continue ASA 81mg, atorvastatin 10mg, magnesium oxide 400mg qday  - Staples to be  removed 3 weeks post-op  - Appreciate ongoing support from hospitalist/transplant service  - Transplant coordinator: Yaz Villa [phone]     Anemia of CKD: Hgb with slow trend downward, though slight improvement to Hgb 7.3 at discharge.  Hgb 7.7 this morning.  - CBC every Mon/Thurs     Hypertension.  PTA on amlodipine 10mg qday.  Goal BP: <150/90.  Lasix discontinued, dose reductions in coreg and amlodipine during hospitalization due to hypotension.  BP remains low normal range.  - Currently on amlodipine 5mg daily and coreg reduced to 3.125mg BID per nephrology.  - Continue to monitor and titrate medications as appropriate  - Appreciate ongoing hospitalist support     5. Adjustment to disability:  Monitor mood  6. FEN: regular diet, encourage fluids  7. Bowel: continent, monitor for constipation, has PRN bowel meds available  8. Bladder: continent, monitor ongoing urinary urgency.  PVRs x3 upon admission were acceptable, may check PRN only.  UA unremarkable on 11/16  9. DVT Prophylaxis: mechanical   10. GI Prophylaxis: Protonix 40mg qday  11. Code: full  12. Disposition: aunt & uncle's home  13. ELOS:  10 days.  14. Rehab prognosis:  good  15. Follow up Appointments on Discharge: primary care, transplant nephrology       Brian Yoon MD  Department of Rehabilitation Medicine      Time Spent on this Encounter   I, Brian Yoon, spent a total of 25 minutes face-to-face or managing the care of Paolo Larkin. Over 50% of my time on the unit was spent counseling the patient and coordinating care. See note for details.

## 2020-11-20 NOTE — PLAN OF CARE
FOCUS/GOAL  Bowel management, Bladder management, Pain management, Mobility, Skin integrity, Cognition/Memory/Judgment/Problem solving, and Safety management    ASSESSMENT, INTERVENTIONS AND CONTINUING PLAN FOR GOAL:  Pt is alert and oriented, Patricia speaking with minimal English.VSS. Continent of bladder, voiding without difficulty using toilet. PVRs completed this shift. Continent of bowel, no BM this shift. Passing flatus. Up CGA with walker to the bathroom. Pt needs assistance getting RLE up into the bed. Skin intact with R lower abdominal incision closed with staples, some erythema noted. Reported upper abdominal pain x 1, requested and received Tylenol with good affect.  Appeared to be sleeping well during rounds. Uses call light appropriately, able to make needs known. Bed alarm on for safety. Will continue with POC.

## 2020-11-21 ENCOUNTER — APPOINTMENT (OUTPATIENT)
Dept: PHYSICAL THERAPY | Facility: CLINIC | Age: 45
End: 2020-11-21
Payer: MEDICARE

## 2020-11-21 ENCOUNTER — TELEPHONE (OUTPATIENT)
Dept: TRANSPLANT | Facility: CLINIC | Age: 45
End: 2020-11-21

## 2020-11-21 ENCOUNTER — APPOINTMENT (OUTPATIENT)
Dept: OCCUPATIONAL THERAPY | Facility: CLINIC | Age: 45
End: 2020-11-21
Payer: MEDICARE

## 2020-11-21 LAB
ANION GAP SERPL CALCULATED.3IONS-SCNC: 4 MMOL/L (ref 3–14)
BUN SERPL-MCNC: 36 MG/DL (ref 7–30)
CALCIUM SERPL-MCNC: 9.3 MG/DL (ref 8.5–10.1)
CHLORIDE SERPL-SCNC: 107 MMOL/L (ref 94–109)
CO2 SERPL-SCNC: 26 MMOL/L (ref 20–32)
CREAT SERPL-MCNC: 2.79 MG/DL (ref 0.52–1.04)
ERYTHROCYTE [DISTWIDTH] IN BLOOD BY AUTOMATED COUNT: 14.6 % (ref 10–15)
GFR SERPL CREATININE-BSD FRML MDRD: 20 ML/MIN/{1.73_M2}
GLUCOSE SERPL-MCNC: 129 MG/DL (ref 70–99)
HCT VFR BLD AUTO: 25.3 % (ref 35–47)
HGB BLD-MCNC: 8.3 G/DL (ref 11.7–15.7)
MCH RBC QN AUTO: 31.2 PG (ref 26.5–33)
MCHC RBC AUTO-ENTMCNC: 32.8 G/DL (ref 31.5–36.5)
MCV RBC AUTO: 95 FL (ref 78–100)
PLATELET # BLD AUTO: 386 10E9/L (ref 150–450)
POTASSIUM SERPL-SCNC: 4.5 MMOL/L (ref 3.4–5.3)
RBC # BLD AUTO: 2.66 10E12/L (ref 3.8–5.2)
SODIUM SERPL-SCNC: 137 MMOL/L (ref 133–144)
WBC # BLD AUTO: 6.8 10E9/L (ref 4–11)

## 2020-11-21 PROCEDURE — 36415 COLL VENOUS BLD VENIPUNCTURE: CPT | Performed by: INTERNAL MEDICINE

## 2020-11-21 PROCEDURE — 97535 SELF CARE MNGMENT TRAINING: CPT | Mod: GO

## 2020-11-21 PROCEDURE — 250N000012 HC RX MED GY IP 250 OP 636 PS 637: Performed by: INTERNAL MEDICINE

## 2020-11-21 PROCEDURE — 99231 SBSQ HOSP IP/OBS SF/LOW 25: CPT | Mod: GC | Performed by: PHYSICAL MEDICINE & REHABILITATION

## 2020-11-21 PROCEDURE — 97110 THERAPEUTIC EXERCISES: CPT | Mod: GP

## 2020-11-21 PROCEDURE — 97530 THERAPEUTIC ACTIVITIES: CPT | Mod: GP

## 2020-11-21 PROCEDURE — 80048 BASIC METABOLIC PNL TOTAL CA: CPT | Performed by: INTERNAL MEDICINE

## 2020-11-21 PROCEDURE — 250N000013 HC RX MED GY IP 250 OP 250 PS 637: Performed by: PHYSICAL MEDICINE & REHABILITATION

## 2020-11-21 PROCEDURE — 250N000012 HC RX MED GY IP 250 OP 636 PS 637: Performed by: NURSE PRACTITIONER

## 2020-11-21 PROCEDURE — 97110 THERAPEUTIC EXERCISES: CPT | Mod: GO

## 2020-11-21 PROCEDURE — 97116 GAIT TRAINING THERAPY: CPT | Mod: GP

## 2020-11-21 PROCEDURE — 85027 COMPLETE CBC AUTOMATED: CPT | Performed by: INTERNAL MEDICINE

## 2020-11-21 PROCEDURE — 128N000003 HC R&B REHAB

## 2020-11-21 PROCEDURE — 250N000013 HC RX MED GY IP 250 OP 250 PS 637: Performed by: INTERNAL MEDICINE

## 2020-11-21 RX ADMIN — ATORVASTATIN CALCIUM 10 MG: 10 TABLET, FILM COATED ORAL at 09:09

## 2020-11-21 RX ADMIN — ACETAMINOPHEN 650 MG: 325 TABLET, FILM COATED ORAL at 01:09

## 2020-11-21 RX ADMIN — ACETAMINOPHEN 650 MG: 325 TABLET, FILM COATED ORAL at 16:40

## 2020-11-21 RX ADMIN — AMLODIPINE BESYLATE 5 MG: 5 TABLET ORAL at 09:08

## 2020-11-21 RX ADMIN — CARVEDILOL 3.12 MG: 3.12 TABLET, FILM COATED ORAL at 09:08

## 2020-11-21 RX ADMIN — MYCOPHENOLATE MOFETIL 750 MG: 250 CAPSULE ORAL at 17:56

## 2020-11-21 RX ADMIN — CARVEDILOL 3.12 MG: 3.12 TABLET, FILM COATED ORAL at 21:38

## 2020-11-21 RX ADMIN — TACROLIMUS 6 MG: 5 CAPSULE ORAL at 17:56

## 2020-11-21 RX ADMIN — PANTOPRAZOLE SODIUM 40 MG: 40 TABLET, DELAYED RELEASE ORAL at 06:35

## 2020-11-21 RX ADMIN — ASPIRIN 81 MG CHEWABLE TABLET 81 MG: 81 TABLET CHEWABLE at 09:08

## 2020-11-21 RX ADMIN — TACROLIMUS 6 MG: 5 CAPSULE ORAL at 09:08

## 2020-11-21 RX ADMIN — MYCOPHENOLATE MOFETIL 750 MG: 250 CAPSULE ORAL at 09:08

## 2020-11-21 RX ADMIN — Medication 400 MG: at 12:39

## 2020-11-21 NOTE — DISCHARGE INSTRUCTIONS
Follow up appointments    - Follow up with primary care provider in 7-10 days, NOEL Chao  Please call [phone] to schedule your follow up appointment with Lorna Clancy.    Address  [facility]                          Clinics and Surgery [facility]                          Floor 4                          [address] [place] [address]  Phone   [phone]    - Follow up with transplant nephrology on Monday, November 23rd.  You are scheduled on Monday, November 23rd at 6:30 AM for labs and follow up appointment at 7:00 AM. Please plan on being at the clinic for a couple hours for education and follow up appointments. Please bring your medications, discharge paperwork, transplant record book. You are allowed 1 visitor for your first appointment. Do not take your anti-rejection medication prior to your lab draw the morning of your appointment.    Transplant coordinator: Yaz Villa, [phone]    Address  [facility]                          Clinics and Surgery [place] [facility] [place]                          Floor 1 - labs                          Floor 2 - appointment                          [address] [place] [id number]  Phone   [phone]

## 2020-11-21 NOTE — PROGRESS NOTES
[facility]   Acute Rehabilitation Unit  Daily progress note    INTERVAL HISTORY  Seen sitting up in bed, now planning for discharge home tomorrow am. Medications reviewed, follow up plan reviewed. Follow up transplant appointment scheduled for Monday 11/23.  Will review medications again tomorrow per nursing and discharge home.      ADLs: Distant supervision for safety using walker when going to shower room. Distant supervision for safety using walker for shower transfer and LB bathing tasks. Ok for Mod I in patient room.   IADLs: TBA  Vision/Cognition: VC for abdominal precautions, walker safety     PT: mobilizing very well at eval. Discussed with provider about discharging over the weekend based on eval performance. Pending progress and FGA, may not need to be walker based at discharge. Okay for mod-I in room with walker.    MEDICATIONS  Scheduled:    amLODIPine  5 mg Oral Daily     aspirin  81 mg Oral Daily     atorvastatin  10 mg Oral Daily     carvedilol  3.125 mg Oral BID     magnesium oxide  400 mg Oral Daily with lunch     mycophenolate  750 mg Oral BID IS     pantoprazole  40 mg Oral QAM AC     sulfamethoxazole-trimethoprim  1 tablet Oral Once per day on Mon Wed Fri     tacrolimus  6 mg Oral BID IS     [START ON 11/23/2020] valGANciclovir  450 mg Oral Once per day on Mon Thu        PRN:  acetaminophen, senna-docusate       PHYSICAL EXAM  Patient Vitals for the past 24 hrs:   BP Temp Temp src Pulse Resp SpO2   11/21/20 0904 129/76 97.5  F (36.4  C) Oral 87 18 100 %   11/20/20 2126 134/82 97.6  F (36.4  C) Oral 87 18 100 %   11/20/20 1608 127/79 98.5  F (36.9  C) Oral 84 18 100 %       GEN: NAD, pleasant and cooperative  HEENT: NC/AT  RESP:non labored on room air  EXTREMITIES: warm dry without edema  Neuro: Answers appropriately, follows commands using Patricia .      LABS  CBC RESULTS:   Recent Labs   Lab Test 11/20/20  0611   WBC 5.7   RBC 2.49*   HGB 7.7*    HCT 23.4*   MCV 94   MCH 30.9   MCHC 32.9   RDW 14.4          Last Comprehensive Metabolic Panel:  Sodium   Date Value Ref Range Status   11/21/2020 137 133 - 144 mmol/L Final     Potassium   Date Value Ref Range Status   11/21/2020 4.5 3.4 - 5.3 mmol/L Final     Chloride   Date Value Ref Range Status   11/21/2020 107 94 - 109 mmol/L Final     Carbon Dioxide   Date Value Ref Range Status   11/21/2020 26 20 - 32 mmol/L Final     Anion Gap   Date Value Ref Range Status   11/21/2020 4 3 - 14 mmol/L Final     Glucose   Date Value Ref Range Status   11/21/2020 129 (H) 70 - 99 mg/dL Final     Urea Nitrogen   Date Value Ref Range Status   11/21/2020 36 (H) 7 - 30 mg/dL Final     Creatinine   Date Value Ref Range Status   11/21/2020 2.79 (H) 0.52 - 1.04 mg/dL Final     GFR Estimate   Date Value Ref Range Status   11/21/2020 20 (L) >60 mL/min/[1.73_m2] Final     Comment:     Non  GFR Calc  Starting 12/18/2018, serum creatinine based estimated GFR (eGFR) will be   calculated using the Chronic Kidney Disease Epidemiology Collaboration   (CKD-EPI) equation.       Calcium   Date Value Ref Range Status   11/21/2020 9.3 8.5 - 10.1 mg/dL Final       Recent Labs   Lab 11/21/20  1011 11/20/20  0611 11/19/20  0645 11/18/20  0547 11/17/20  0602 11/16/20  0527   * 87 90 90 92 81         IMPRESSION/PLAN:  Paolo Larkin is a 45 year old with CKD of unknown etiology, s/p right kidney transplant 11/12, with post-operative complication of right femoral neuropathy.  Admitted to acute inpatient rehab for ongoing rehabilitation and medical management on 11/19/20.        Admission to acute inpatient rehab right femoral neuropathy.    Impairment group code: 03.9        1. PT, OT 90 minutes of each on a daily basis, in addition to rehab nursing and close management of physiatrist.       2. Impairment of ADL's: Hospitalization with decreased strength, activity tolerance leading to impaired ability to complete ADLs.   Goal for mod I with ADLs.     3. Impairment of mobility:  Hospitalization with decreased strength, activity tolerance leading to impaired ability to complete ADLs.  Goal for mod I with ADLs.        4. Medical Conditions  Right lower extremity weakness, suspected femoral neuropathy: most likely 2/2 femoral nerve injury/stretch during transplant surgery 11/12.  Neurology consulted during acute hospitalization with no recommendations for immediate intervention or imaging, gradual improvement over weeks to months expected.  Gradually improving, now able to bear weight.  Ongoing buckling in right knee.  Currently impacting her ability to safely and independently transfer, ambulate, and perform ADLs.  - PT/OT   -with significant improvement in mobility during short ARU admission.      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  Cr 2.79 with ongoing recovery. 11/21 Hyperkalemia initially post-op, which was treated with medical management and now resolved. Good urine output. Moreno removed POD3, voiding without retention.  Immunosuppression: Cellcept 750 mg BID, tacrolimus goal 8-10.    Viral prophylaxis: Valycte x 3 months, Bactrim indefinitely  Labs: will monitor BMP, CBC, tacrolimus level every Mon/Thurs  - Tacrolimus dose increased 11/20.   -follow up apt 11/23.   - Continue ASA 81mg, atorvastatin 10mg, magnesium oxide 400mg qday  - Staples to be removed 3 weeks post-op  - Appreciate ongoing support from hospitalist/transplant service  - Transplant coordinator: Yaz Villa [phone]     Anemia of CKD: Hgb with slow trend downward, though slight improvement to Hgb 7.3 at discharge.  Hgb 8.3 11/21.   -follow up with transplant team as scheduled 11/23     Hypertension.  PTA on amlodipine 10mg qday.  Goal BP: <150/90.  Lasix discontinued, dose reductions in coreg and amlodipine during hospitalization due to hypotension.  BP remains low  normal range.  - Currently on amlodipine 5mg daily and coreg 3.125mg BID  - Continue to monitor and titrate medications as appropriate  - Appreciate ongoing hospitalist support     5. Adjustment to disability:  Monitor mood  6. FEN: regular diet, encourage fluids  7. Bowel: continent, monitor for constipation, has PRN bowel meds available  8. Bladder: continent, monitor ongoing urinary urgency.  PVRs x3 upon admission were acceptable, may check PRN only.  UA unremarkable on 11/16  9. DVT Prophylaxis: mechanical   10. GI Prophylaxis: Protonix 40mg qday  11. Code: full  12. Disposition: aunt & uncle's home  13. ELOS:  discharge home 11/23.   14. Rehab prognosis:  good  15. Follow up Appointments on Discharge: primary care, transplant nephrology       Kitty Miles PA-C  PM&R

## 2020-11-21 NOTE — PLAN OF CARE
Patient is alert and oriented x4. Able to communicate needs. VSS. received prn tylenol for headache with good effect. Denied sob, denied CP, denied fever or chills. Did not want to eat dinner. Pt said she was still full from lunch. Pt had snack. Mod I in room with walker. continent of bowel and bladder on toilet. LBM today per pt report. Transplant incision site to right side of abdomen CDI, TARAH.  continent of BB on toilet. LBM today per pt report. No other care concern. Continue with POC.

## 2020-11-21 NOTE — PLAN OF CARE
FOCUS/GOAL  Bowel management, Bladder management, and Pain management    ASSESSMENT, INTERVENTIONS AND CONTINUING PLAN FOR GOAL:    Pt is alert and oriented x4, using call light appropriately. C/o incisional pain prn Tylenol 650 mg was given with good effect.  Mod I in the with FWW, slept well during the overnight. Continue with plan of care.

## 2020-11-21 NOTE — PROGRESS NOTES
Individualized Overall Plan Of Care (IOPOC)      Rehab diagnosis/Impairment Group Code: Neurologic conditions 03.9 other neurologic; acute r le femoral neuropathy following kidney transplant  Femoral neuropathy of right lower extremity     Expected functional outcome: goal is to achieve a level of mod I     Clinical Impression Comments: Pt admitted due to R femoral neuropathy causing fall concerns in fall concerns during acute stay. However, this appears to be rapidly improving and only mildly impacting mobility at this time. Anticipate pt will be able to discharge mod-I in 1-2 days, pending progress may not need AD at discharge  Mobility:    ADL: to be independent     Communication/Cognition/Swallow:   regular with thins    Intensity of therapy:   PT 90 minutes 7 days per week 7 days  OT 90 minutes 7 days per week 7 days        Education transplant         Medical Prognosis: good       Physician summary statement: goal is to achieve a level of mod I for safe discharge home     Discharge destination: prior home  Discharge rehabilitation needs: home care, PT and OT      Estimated length of stay: 5 - 7 days       Rehabilitation Physician Celio Leija DO

## 2020-11-21 NOTE — PLAN OF CARE
Discharge Planner Post-Acute Rehab OT:     Discharge Plan: home with assist    Precautions: fall, abdominal, lana interpretor    Current Status:  ADLs: Distant supervision for safety using walker when going to shower room. Distant supervision for safety using walker for shower transfer and LB bathing tasks. Ok for Mod I in patient room.   IADLs: TBA  Vision/Cognition: VC for abdominal precautions, walker safety    Assessment: Will assess for safety and independ with IADL tasks using walker. Anticipate ok for discharge home with assist on Monday.    Other Barriers to Discharge (DME, Family Training, etc): none

## 2020-11-21 NOTE — PLAN OF CARE
Discharge Planner OT   Patient plan for discharge: discharge home with assist  Current status: Distant supervision for safety using walker when going to shower room. Distant supervision for safety using walker for shower transfer and LB bathing tasks. VC for walker safety. Ok for Mod I in patient room.   Barriers to return to prior living situation: None  Recommendations for discharge: discharge home with assist  Rationale for recommendations: Progressing well towards LTG; Will have family assist upon discharge home.       Entered by: Deep Ratliff 11/21/2020 3:27 PM

## 2020-11-21 NOTE — DISCHARGE SUMMARY
[facility]   Acute Rehabilitation Unit  Discharge summary     Date of Admission: 11/19/2020  Date of Discharge: 11/22/2020  Disposition: home  Primary Care Physician: Lorna Clancy  Attending physician: Alfred Yoon MD        DISCHARGE DIAGNOSIS  S/p kidney transplant  RLE weakness- improved  Anemia of CKD  HTN      BRIEF SUMMARY  Paolo Larkin is a 45 year old with CKD of unknown etiology, s/p right kidney transplant 11/12, with post-operative complication of right femoral neuropathy.  Admitted to acute inpatient rehab for ongoing rehabilitation and medical management on 11/19/20.    REHABILITATION COURSE    Patient plan for discharge: discharge home with assist  Current status: Distant supervision for safety using walker when going to shower room. Distant supervision for safety using walker for shower transfer and LB bathing tasks. VC for walker safety. Ok for Mod I in patient room.   Barriers to return to prior living situation: None  Recommendations for discharge: discharge home with assist  Rationale for recommendations: Progressing well towards LTG; Will have family assist upon discharge.       MEDICAL COURSE  Right lower extremity weakness, suspected femoral neuropathy: most likely 2/2 femoral nerve injury/stretch during transplant surgery 11/12.  Neurology consulted during acute hospitalization with no recommendations for immediate intervention or imaging, gradual improvement over weeks to months expected.  Gradually improving, now able to bear weight.  Ongoing buckling in right knee.  Currently impacting her ability to safely and independently transfer, ambulate, and perform ADLs.  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  Cr 2.79 with ongoing recovery. 11/21 Hyperkalemia  initially post-op, which was treated with medical management and now resolved. Good urine output. Moreno removed POD3, voiding without retention.  Immunosuppression: Cellcept 750 mg BID, tacrolimus goal 8-10.    Viral prophylaxis: Valycte x 3 months, Bactrim indefinitely  Labs: will monitor BMP, CBC, tacrolimus level every Mon/Thurs  - Tacrolimus dose increased 11/20.   -follow up apt with transplant 11/23.   - Continue ASA 81mg, atorvastatin 10mg, magnesium oxide 400mg qday  - Staples to be removed 3 weeks post-op  - Transplant coordinator: Yaz Villa [phone]     Anemia of CKD: Hgb with slow trend downward, though slight improvement to Hgb 7.3 at discharge.  Hgb 8.3 11/21.   -follow up with transplant team as scheduled 11/23     Hypertension.  PTA on amlodipine 10mg qday.  Goal BP: <150/90.  Lasix discontinued, dose reductions in coreg and amlodipine during hospitalization due to hypotension.  BP remains low normal range.  - Currently on amlodipine 5mg daily and coreg 3.125mg BID    DISCHARGE MEDICATIONS  Current Discharge Medication List      CONTINUE these medications which have CHANGED    Details   amLODIPine (NORVASC) 5 MG tablet Take 1 tablet (5 mg) by mouth daily  Qty: 30 tablet, Refills: 0    Associated Diagnoses: Renovascular hypertension      aspirin (ASA) 81 MG chewable tablet Take 1 tablet (81 mg) by mouth daily  Qty: 30 tablet, Refills: 0    Associated Diagnoses: Kidney transplanted      atorvastatin (LIPITOR) 10 MG tablet Take 1 tablet (10 mg) by mouth daily  Qty: 30 tablet, Refills: 0    Associated Diagnoses: Kidney transplanted      carvedilol (COREG) 3.125 MG tablet Take 1 tablet (3.125 mg) by mouth 2 times daily  Qty: 60 tablet, Refills: 0    Associated Diagnoses: Renovascular hypertension      magnesium oxide (MAG-OX) 400 MG tablet Take 1 tablet (400 mg) by mouth daily (with lunch)  Qty: 30 tablet, Refills: 0    Associated Diagnoses: Kidney transplanted      mycophenolate (GENERIC  EQUIVALENT) 250 MG capsule Take 3 capsules (750 mg) by mouth 2 times daily  Qty: 180 capsule, Refills: 0    Associated Diagnoses: Kidney transplanted      pantoprazole (PROTONIX) 40 MG EC tablet Take 1 tablet (40 mg) by mouth every morning (before breakfast)  Qty: 30 tablet, Refills: 0    Associated Diagnoses: Kidney transplanted      sulfamethoxazole-trimethoprim (BACTRIM) 400-80 MG tablet Take 1 tablet by mouth three times a week  Qty: 13 tablet, Refills: 0    Associated Diagnoses: Kidney transplanted      tacrolimus (GENERIC EQUIVALENT) 1 MG capsule Take 6 capsules (6 mg) by mouth 2 times daily  Qty: 360 capsule, Refills: 0    Associated Diagnoses: Kidney transplant recipient      valGANciclovir (VALCYTE) 450 MG tablet Take 1 tablet (450 mg) by mouth twice a week  Qty: 10 tablet, Refills: 0    Associated Diagnoses: Kidney transplanted         CONTINUE these medications which have NOT CHANGED    Details   acetaminophen (TYLENOL) 325 MG tablet Take 325-650 mg by mouth every 4 hours as needed for mild pain or fever       senna-docusate (SENOKOT-S/PERICOLACE) 8.6-50 MG tablet Take 1 tablet by mouth 2 times daily as needed for constipation    Associated Diagnoses: Kidney transplanted               DISCHARGE INSTRUCTIONS AND FOLLOW UP  Discharge Procedure Orders   Reason for your hospital stay   Order Comments: Admitted for rehabilitation following kidney transplant with right femoral nerve injury.     Adult [facility] Follow-up and recommended labs and tests   Order Comments: Follow up 11/23 with kidney transplant service. For labs and post transplant follow up.    Appointments on [place] and/or [facility] (with [facility] or [facility] provider or service). Call [phone] if you haven't heard regarding these appointments within 7 days of discharge.     Activity   Order Comments: Your activity upon discharge: activity as tolerated per transplant recommendations     Order Specific Question Answer Comments   Is discharge  order? Yes      Wound care and dressings   Order Comments: Instructions to care for your wound at home: as directed keep incision clean and dry ok to cleanse no soaking/ no scrubbing.     Full Code     Order Specific Question Answer Comments   Code status determined by: Discussion with patient/ legal decision maker      Diet   Order Comments: Follow this diet upon discharge: Orders Placed This Encounter      Combination Diet Regular Diet Adult     Order Specific Question Answer Comments   Is discharge order? Yes           PHYSICAL EXAMINATION    Most recent Vital Signs:   Vitals:    11/20/20 0928 11/20/20 1608 11/20/20 2126 11/21/20 0904   BP: (!) 142/83 127/79 134/82 129/76   BP Location: Right arm Right arm Right arm Right arm   Pulse: 86 84 87 87   Resp:  18 18 18   Temp:  98.5  F (36.9  C) 97.6  F (36.4  C) 97.5  F (36.4  C)   TempSrc:  Oral Oral Oral   SpO2:  100% 100% 100%   Weight:           GEN: NAD, pleasant and cooperative  HEENT: NC/AT  RESP:non labored on room air  EXTREMITIES: warm dry without edema  Neuro: Answers appropriately, follows commands using Patricia .    SKIN: Incision cdi with staples some erythema but no induration or discharge around staples.       35 minutes spent in discharge, including >50% in counseling and coordination of care, medication review and plan of care recommended on follow up.       Discharge summary was forwarded to Lorna Clancy (PCP) at the time of discharge, so as to bridge from hospital to outpatient care.     It was our pleasure to care for Paolo Larkin during this hospitalization. Please do not hesitate to contact me should there be questions regarding the hospital course or discharge plan.          Kitty Miles PA-C  Physical Medicine and Rehabilitation       Physician Attestation   I, Celio Leija, was present with the physician assistant who participated in the service and in the documentation of the note.  I have verified the history  and personally performed the physical exam and medical decision making.  I agree with the assessment and plan of care as documented in the note.      I personally reviewed vital signs, medications and labs.    Stable for discharge home and outpatient follow-up and close monitoring per primary team.     Celio Leija,   Date of Service (when I saw the patient): 11/22/20      I spent a total of 35 minutes face to face and coordinating care of Paolo Vincent Trae. Over 50% of my time on the unit was spent counseling the patient and /or coordinating care regarding s/p kidney transplant.

## 2020-11-21 NOTE — TELEPHONE ENCOUNTER
RAFFAELE Tang at rehab paged to determine if can make an appt for patient for Monday. Requested Kitty to contact [facility] as they have a  available but they may be closed until tomorrow.

## 2020-11-21 NOTE — PLAN OF CARE
Pt is alert and oriented x4. Able to communicate needs. Uses call light appropriately. Pt was sad and tearful early this morning.  service utilized. Pt reported she mises her home ([facility]), her parents and her family back home. Pt said she only has friends here, not immediate family. Pt also wanted to eat her cultur food and stated, she is tired of the hospital food. This writer assisted pt to order food that are close to her cultural food and  food that pt wanted to eat today. Active listening and support provided. frequent rounds provided. Pt verbalized feeling better when writer checked on pt few minutes later.  Pt ate over 75 % of breakfast and 75% of lunch. Pt noted to be perked up through out the shift. Provider also notified.  RAFFAELE Tang was in talking to pt at bedside. VSS. Denied pain, denied Sob, denied CP. Transplant site to right front abd CDI, TARAH. MOd I in room with walker. continent of BB on toilet. LBM on 11/20/20. Plan to discharge on 11/22/20. No other care concern. Call light with in reach. Continue to monitor.

## 2020-11-21 NOTE — PLAN OF CARE
Physical Therapy Discharge Summary    Reason for therapy discharge:    All goals and outcomes met, no further needs identified.    Progress towards therapy goal(s). See goals on Care Plan in Epic electronic health record for goal details.  Goals met    Therapy recommendation(s):    Continue home exercise program.    Summary - pt mod I in room with FWW, ambulating SBA-independent with PT during therapy today. No DME needed at discharge.    Outcome Measures:  6MWT -  11/21 - 1200 ft, no rest breaks, no assistive device.

## 2020-11-22 VITALS
SYSTOLIC BLOOD PRESSURE: 114 MMHG | DIASTOLIC BLOOD PRESSURE: 71 MMHG | OXYGEN SATURATION: 100 % | WEIGHT: 112.2 LBS | BODY MASS INDEX: 19.88 KG/M2 | TEMPERATURE: 97.9 F | RESPIRATION RATE: 18 BRPM | HEART RATE: 79 BPM

## 2020-11-22 PROCEDURE — 250N000012 HC RX MED GY IP 250 OP 636 PS 637: Performed by: NURSE PRACTITIONER

## 2020-11-22 PROCEDURE — 250N000012 HC RX MED GY IP 250 OP 636 PS 637: Performed by: INTERNAL MEDICINE

## 2020-11-22 PROCEDURE — 250N000013 HC RX MED GY IP 250 OP 250 PS 637: Performed by: PHYSICAL MEDICINE & REHABILITATION

## 2020-11-22 PROCEDURE — 250N000013 HC RX MED GY IP 250 OP 250 PS 637: Performed by: INTERNAL MEDICINE

## 2020-11-22 PROCEDURE — 99239 HOSP IP/OBS DSCHRG MGMT >30: CPT | Mod: GC | Performed by: PHYSICAL MEDICINE & REHABILITATION

## 2020-11-22 RX ADMIN — TACROLIMUS 6 MG: 5 CAPSULE ORAL at 08:09

## 2020-11-22 RX ADMIN — ATORVASTATIN CALCIUM 10 MG: 10 TABLET, FILM COATED ORAL at 08:11

## 2020-11-22 RX ADMIN — Medication 400 MG: at 11:11

## 2020-11-22 RX ADMIN — AMLODIPINE BESYLATE 5 MG: 5 TABLET ORAL at 08:10

## 2020-11-22 RX ADMIN — ASPIRIN 81 MG CHEWABLE TABLET 81 MG: 81 TABLET CHEWABLE at 08:09

## 2020-11-22 RX ADMIN — MYCOPHENOLATE MOFETIL 750 MG: 250 CAPSULE ORAL at 08:08

## 2020-11-22 RX ADMIN — PANTOPRAZOLE SODIUM 40 MG: 40 TABLET, DELAYED RELEASE ORAL at 06:59

## 2020-11-22 RX ADMIN — CARVEDILOL 3.12 MG: 3.12 TABLET, FILM COATED ORAL at 08:10

## 2020-11-22 NOTE — PROGRESS NOTES
FOCUS/GOAL  Medication management, Pain management, Medical management, Discharge planning, Mobility and Safety management    ASSESSMENT, INTERVENTIONS AND CONTINUING PLAN FOR GOAL:  Patient is A&O x4, interpretor utilized for assessment and discharge teaching. Denies chest pain, SOB, numbness, tingling, lightheadeness, and dizziness. Drinking well and voiding spontaneously without difficulties, continent of bowel and bladder, LBM 11/21 per pt report. Pt is MOD I with walker in her room. Complained of back pain this morning but declined taking PRN tylenol. Self repositioned and turned in bed, able to use call light appropriately and make needs known.  Pt. discharged to home, and left with personal belongings. Pt. received complete discharge paperwork and discharge medications as filled by discharge pharmacy. Pt. was given times of last dose for all discharge medications in writing on discharge medication sheets. Discharge teaching included medication, pain management, activity, abdominal incision care, and signs and symptoms of infection. Pt. is aware to follow up with upcoming appointement. Pt. had no further questions at the time of discharge and no unmet needs were identified.

## 2020-11-22 NOTE — PROGRESS NOTES
NA brought patient down for discharge to family. Family not at front door and on their way, soon to arrive.  Patient became very upset and per NA went to turn around and tripped over step outside.  Slight abrasion on right knee.  Charge RN went outside to assess situation.  Very small abrasion on right knee, MD aware with no other injuries noted.  RN cleansed areas with wound cleanser and put small bandage over the area.  Patient stated she felt fine and no other injuries were apparent from fall. Patient assisted into car with stand by assistance from NA to get in the car safely.  No other issues and now discharge from unit is complete.

## 2020-11-22 NOTE — PLAN OF CARE
FOCUS/GOAL  Pain management, Wound care management, and Skin integrity    ASSESSMENT, INTERVENTIONS AND CONTINUING PLAN FOR GOAL:  Pt alert and oriented x4. Uses call light approp to let needs known.  service utilized to address potential concerns with discharge. Pt reports only concern is with incision site. Reports Tylenol for incisional pain has temporary relief; heat pack on top of clothing; effective. pt refused ice pack to site. Pt had refused further tylenol dose. Incision site TARAH, staples intact with small erythema noted. No drainage, odor, warmth noted, pt afebrile; sticky note left for provider. Cont of B&B; last BM 11/21/20. Mod I with 4WW in room. Plan to discharge 11/22/20.

## 2020-11-22 NOTE — PLAN OF CARE
FOCUS/GOAL  Bowel management, Bladder management, and Pain management    ASSESSMENT, INTERVENTIONS AND CONTINUING PLAN FOR GOAL:    Pt is alert and oriented x4, using call light appropriately. Pt denies pain. Rt abdomen surgical site is clean, dry and intact with staples. Mod I in the with FWW, slept well during the overnight. Pt plans to charge to home today. Continue with plan of care.

## 2020-11-23 ENCOUNTER — INFUSION THERAPY VISIT (OUTPATIENT)
Dept: INFUSION THERAPY | Facility: CLINIC | Age: 45
End: 2020-11-23
Attending: INTERNAL MEDICINE
Payer: MEDICARE

## 2020-11-23 ENCOUNTER — OFFICE VISIT (OUTPATIENT)
Dept: INFUSION THERAPY | Facility: CLINIC | Age: 45
End: 2020-11-23
Attending: NURSE PRACTITIONER
Payer: MEDICARE

## 2020-11-23 ENCOUNTER — TELEPHONE (OUTPATIENT)
Dept: TRANSPLANT | Facility: CLINIC | Age: 45
End: 2020-11-23

## 2020-11-23 VITALS
TEMPERATURE: 98.3 F | HEART RATE: 100 BPM | SYSTOLIC BLOOD PRESSURE: 117 MMHG | BODY MASS INDEX: 20.11 KG/M2 | DIASTOLIC BLOOD PRESSURE: 82 MMHG | OXYGEN SATURATION: 100 % | RESPIRATION RATE: 14 BRPM | WEIGHT: 113.5 LBS

## 2020-11-23 DIAGNOSIS — Z79.899 ENCOUNTER FOR LONG-TERM CURRENT USE OF MEDICATION: ICD-10-CM

## 2020-11-23 DIAGNOSIS — Z48.298 AFTERCARE FOLLOWING ORGAN TRANSPLANT: ICD-10-CM

## 2020-11-23 DIAGNOSIS — E55.9 VITAMIN D DEFICIENCY: ICD-10-CM

## 2020-11-23 DIAGNOSIS — N18.4 CKD (CHRONIC KIDNEY DISEASE) STAGE 4, GFR 15-29 ML/MIN (H): ICD-10-CM

## 2020-11-23 DIAGNOSIS — N18.9 ANEMIA OF CHRONIC RENAL FAILURE, UNSPECIFIED CKD STAGE: ICD-10-CM

## 2020-11-23 DIAGNOSIS — D63.1 ANEMIA OF CHRONIC RENAL FAILURE, UNSPECIFIED CKD STAGE: ICD-10-CM

## 2020-11-23 DIAGNOSIS — Z94.0 KIDNEY TRANSPLANT RECIPIENT: Primary | ICD-10-CM

## 2020-11-23 DIAGNOSIS — I15.1 HTN, KIDNEY TRANSPLANT RELATED: ICD-10-CM

## 2020-11-23 DIAGNOSIS — Z48.298 AFTERCARE FOLLOWING ORGAN TRANSPLANT: Primary | ICD-10-CM

## 2020-11-23 DIAGNOSIS — D63.1 ANEMIA IN STAGE 4 CHRONIC KIDNEY DISEASE (H): ICD-10-CM

## 2020-11-23 DIAGNOSIS — N25.81 SECONDARY RENAL HYPERPARATHYROIDISM (H): ICD-10-CM

## 2020-11-23 DIAGNOSIS — Z94.0 HTN, KIDNEY TRANSPLANT RELATED: ICD-10-CM

## 2020-11-23 DIAGNOSIS — N18.4 ANEMIA IN STAGE 4 CHRONIC KIDNEY DISEASE (H): ICD-10-CM

## 2020-11-23 DIAGNOSIS — Z94.0 KIDNEY TRANSPLANTED: ICD-10-CM

## 2020-11-23 DIAGNOSIS — Z94.0 KIDNEY REPLACED BY TRANSPLANT: Primary | ICD-10-CM

## 2020-11-23 DIAGNOSIS — Z94.0 KIDNEY TRANSPLANT RECIPIENT: ICD-10-CM

## 2020-11-23 DIAGNOSIS — Z94.0 KIDNEY REPLACED BY TRANSPLANT: ICD-10-CM

## 2020-11-23 LAB
ANION GAP SERPL CALCULATED.3IONS-SCNC: 6 MMOL/L (ref 3–14)
BASOPHILS # BLD AUTO: 0 10E9/L (ref 0–0.2)
BASOPHILS NFR BLD AUTO: 0.4 %
BUN SERPL-MCNC: 34 MG/DL (ref 7–30)
CALCIUM SERPL-MCNC: 9.5 MG/DL (ref 8.5–10.1)
CHLORIDE SERPL-SCNC: 106 MMOL/L (ref 94–109)
CO2 SERPL-SCNC: 25 MMOL/L (ref 20–32)
CREAT SERPL-MCNC: 2.41 MG/DL (ref 0.52–1.04)
DIFFERENTIAL METHOD BLD: ABNORMAL
EOSINOPHIL # BLD AUTO: 0.2 10E9/L (ref 0–0.7)
EOSINOPHIL NFR BLD AUTO: 2.7 %
ERYTHROCYTE [DISTWIDTH] IN BLOOD BY AUTOMATED COUNT: 13.9 % (ref 10–15)
GFR SERPL CREATININE-BSD FRML MDRD: 24 ML/MIN/{1.73_M2}
GLUCOSE SERPL-MCNC: 100 MG/DL (ref 70–99)
HBV CORE AB SERPL QL IA: REACTIVE
HBV SURFACE AB SERPL IA-ACNC: 26.57 M[IU]/ML
HCT VFR BLD AUTO: 24.4 % (ref 35–47)
HGB BLD-MCNC: 7.7 G/DL (ref 11.7–15.7)
IMM GRANULOCYTES # BLD: 0 10E9/L (ref 0–0.4)
IMM GRANULOCYTES NFR BLD: 0.6 %
LYMPHOCYTES # BLD AUTO: 0.2 10E9/L (ref 0.8–5.3)
LYMPHOCYTES NFR BLD AUTO: 3 %
MAGNESIUM SERPL-MCNC: 1.6 MG/DL (ref 1.6–2.3)
MCH RBC QN AUTO: 30.7 PG (ref 26.5–33)
MCHC RBC AUTO-ENTMCNC: 31.6 G/DL (ref 31.5–36.5)
MCV RBC AUTO: 97 FL (ref 78–100)
MONOCYTES # BLD AUTO: 0.4 10E9/L (ref 0–1.3)
MONOCYTES NFR BLD AUTO: 6.1 %
MYCOPHENOLATE SERPL LC/MS/MS-MCNC: 3.12 MG/L (ref 1–3.5)
MYCOPHENOLATE-G SERPL LC/MS/MS-MCNC: 191.9 MG/L (ref 30–95)
NEUTROPHILS # BLD AUTO: 6.2 10E9/L (ref 1.6–8.3)
NEUTROPHILS NFR BLD AUTO: 87.2 %
NRBC # BLD AUTO: 0 10*3/UL
NRBC BLD AUTO-RTO: 0 /100
PHOSPHATE SERPL-MCNC: 4 MG/DL (ref 2.5–4.5)
PLATELET # BLD AUTO: 439 10E9/L (ref 150–450)
POTASSIUM SERPL-SCNC: 4.6 MMOL/L (ref 3.4–5.3)
RBC # BLD AUTO: 2.51 10E12/L (ref 3.8–5.2)
SODIUM SERPL-SCNC: 136 MMOL/L (ref 133–144)
TACROLIMUS BLD-MCNC: 4.5 UG/L (ref 5–15)
TME LAST DOSE: 2000 H
TME LAST DOSE: 2000 H
WBC # BLD AUTO: 7.1 10E9/L (ref 4–11)

## 2020-11-23 PROCEDURE — 80048 BASIC METABOLIC PNL TOTAL CA: CPT | Performed by: PATHOLOGY

## 2020-11-23 PROCEDURE — 99214 OFFICE O/P EST MOD 30 MIN: CPT | Mod: GC | Performed by: INTERNAL MEDICINE

## 2020-11-23 PROCEDURE — 36415 COLL VENOUS BLD VENIPUNCTURE: CPT | Performed by: PATHOLOGY

## 2020-11-23 PROCEDURE — 83735 ASSAY OF MAGNESIUM: CPT | Performed by: PATHOLOGY

## 2020-11-23 PROCEDURE — 85025 COMPLETE CBC W/AUTO DIFF WBC: CPT | Performed by: PATHOLOGY

## 2020-11-23 PROCEDURE — 80180 DRUG SCRN QUAN MYCOPHENOLATE: CPT | Performed by: PATHOLOGY

## 2020-11-23 PROCEDURE — G0463 HOSPITAL OUTPT CLINIC VISIT: HCPCS

## 2020-11-23 PROCEDURE — 84100 ASSAY OF PHOSPHORUS: CPT | Performed by: PATHOLOGY

## 2020-11-23 PROCEDURE — 80197 ASSAY OF TACROLIMUS: CPT | Performed by: PATHOLOGY

## 2020-11-23 PROCEDURE — 87799 DETECT AGENT NOS DNA QUANT: CPT | Performed by: PATHOLOGY

## 2020-11-23 RX ORDER — SULFAMETHOXAZOLE AND TRIMETHOPRIM 400; 80 MG/1; MG/1
1 TABLET ORAL DAILY
Qty: 13 TABLET | Refills: 0 | COMMUNITY
Start: 2020-11-23 | End: 2020-11-30

## 2020-11-23 RX ORDER — TACROLIMUS 1 MG/1
8 CAPSULE ORAL 2 TIMES DAILY
Qty: 480 CAPSULE | Refills: 11 | Status: SHIPPED | OUTPATIENT
Start: 2020-11-23 | End: 2020-11-25

## 2020-11-23 ASSESSMENT — PAIN SCALES - GENERAL: PAINLEVEL: MODERATE PAIN (4)

## 2020-11-23 NOTE — TELEPHONE ENCOUNTER
ISSUE:   Tacrolimus IR level 4.5 on 11/23/2020, goal 8-10, dose 6 mg BID (was increased in clinic this AM before lab resulted).    PLAN:   Please call patient and have her increase Tacrolimus dose to 8 mg BID and repeat labs tomorrow.

## 2020-11-23 NOTE — TELEPHONE ENCOUNTER
Spoke to patient regarding:  Tacrolimus IR level 4.5 on 11/23/2020, goal 8-10, dose 6 mg BID (was increased in clinic this AM before lab resulted).     Patient verbalizes understanding to increase Tacrolimus dose to 8 mg BID and repeat labs tomorrow.

## 2020-11-23 NOTE — TELEPHONE ENCOUNTER
Paolo Larkin is a post kidney transplant patient who discharged on 11/22/20.    Patient will use local pharmacy or other mail order for outpatient medications.       Nasreen Astorga, PharmD  [facility]  November 23, 2020

## 2020-11-23 NOTE — PROGRESS NOTES
ACUTE TRANSPLANT NEPHROLOGY VISIT    Assessment & Plan   # DDKT: Trend down   - Baseline Creatinine: ~ TBD   - Proteinuria: 7.4g/g prior to transplant. Will follow UPCR via FSGS protocol given unknown native disease   - Date DSA Last Checked: Nov/2020      Latest DSA: No   - BK Viremia: Not checked post transplant   - Kidney Tx Biopsy: No    # Immunosuppression: Tacrolimus immediate release (goal 8-10) and Mycophenolate mofetil (dose 750 mg every 12 hours)   - Induction with Recent Transplant:  Anti-thymocyte Globulin-Rabbit (Thymoglobulin) 5.8mg/kg  Methylprenisolone (Solumedrol)   - Changes: Not at this time - unfortunately, did not have tac levels checked while in acute rehab    # Infection Prophylaxis:   - PJP: Sulfa/TMP (Bactrim)  - CMV: Valcyte 1 tab 2x/wk    # Hypertension: Controlled;  Goal BP: < 150/90   - Volume status: Euvolemic     - Changes: No     # Anemia in Chronic Renal Disease: Hgb: Stable      JESSI: No   - Iron studies: Not checked recently    # Mineral Bone Disorder:   - Secondary renal hyperparathyroidism; PTH level: Significantly elevated (601-1200 pg/ml) - last checked pre-transplant        On treatment: None  - Vitamin D; level: Not checked recently        On supplement: No  - Calcium; level: Normal        On supplement: No  - Phosphorus; level: Normal        On supplement: No    # Electrolytes:   - Potassium; level: Normal        On supplement: No  - Magnesium; level: Normal        On supplement: No  - Bicarbonate; level: Normal        On supplement: No    # Right Lower Extremity weakness, suspected femoral neuropathy: Developed RLE weakness post-op, felt to be most likely 2/2 femoral nerve injury/stretch during transplant surgery. Had 3 day acute rehab stay post-hospitalization and strength is improving.    # Medical Compliance: Yes    # Transplant History:  Etiology of Kidney Failure: Unknown etiology  Tx: DDKT  Transplant: 11/12/2020 (Kidney)  Donor Type: Donation after Circulatory Death   Donor Class:   Crossmatch at time of Tx: negative  DSA at time of Tx: No  Significant changes in immunosuppression: None  CMV IgG Ab High Risk Discordance (D+/R-): No  EBV IgG Ab High Risk Discordance (D+/R-): No  Significant transplant-related complications: None    Transplant Office Phone Number: [phone]    Assessment and plan was discussed with the patient and she voiced her understanding and agreement.    Return visit: Return in about 1 day (around 11/24/2020).    Mary Denise MD     Attestation:  This patient has been seen and evaluated by me, Sergio Jacob MD.  I have reviewed the note and agree with plan of care as documented by the fellow.       Chief Complaint   Ms. Larkin is a 45 year old here for hospital follow up after kidney transplant.     History of Present Illness    Ms Larkin presents after discharge from acute rehab on 11/22. She was discharged from the hospital to rehab on 11/19 and required acute rehab due to RLE weakness felt likely due to femoral neuropathy. Unfortunately, she did not have any drug level monitoring labs while in acute rehab. She currently feels well with no specific concerns. Her strength in her leg is improving, though she still feels more fatigued than baseline. Appetite has been good and she's eating and drinking well. No nausea or vomiting. She's having regular bowel movements. Post surgical pain is well controlled.    Recent Hospitalizations:  [x] No [] Yes    New Medical Issues: [x] No [] Yes    Decreased energy: [] No [x] Yes Mild, improving.   Chest pain or SOB with exertion:  [x] No [] Yes    Appetite change or weight change: [x] No [] Yes    Nausea, vomiting or diarrhea:  [x] No [] Yes    Fever, sweats or chills: [x] No [] Yes    Leg swelling: [x] No [] Yes      Home BP: Not checked    Review of Systems   A comprehensive review of systems was obtained and negative, except as noted in the HPI or PMH.    Problem List   Patient Active Problem List  2GUL   Diagnosis     Anemia in chronic renal disease     HTN, kidney transplant related     Secondary renal hyperparathyroidism (H)     Coronary artery disease involving native coronary artery of native heart without angina pectoris     Status post coronary angiogram     NSTEMI (non-ST elevated myocardial infarction) (H)     Vitamin D deficiency     Pneumothorax     Kidney replaced by transplant     Femoral neuropathy, right     Femoral neuropathy of right lower extremity     Aftercare following organ transplant     CKD (chronic kidney disease) stage 4, GFR 15-29 ml/min (H)       Social History   Social History     Tobacco Use     Smoking status: Never Smoker     Smokeless tobacco: Never Used   Substance Use Topics     Alcohol use: No     Drug use: No       Allergies   Allergies   Allergen Reactions     Blood Transfusion Related (Informational Only)      Patient has a history of a clinically significant antibody against RBC antigens.  A delay in compatible RBCs may occur.     Cephalosporins Anaphylaxis     Per [facility] allergist report: positive skin intradermal tests to Cefazolin and Ceftazidime, but NOT to Penicillin G, Piperacillin and Meropenem      Chlorhexidine Anaphylaxis     Several times during anesthesia at initial phase during insertion of I.v. line and after disinfection with Chlorhexidine drop of blood pressure.  In Prick and as well intradermal tests clear positive reactions to Chlorhexidine (particularly in intradermal test to 0.0002% Chlorhexidine papule of 1cm and Erythema of 2.5cm). AVOID in future Chlorhexidine     Heparin Flush        Medications   Current Outpatient Medications   Medication Sig     acetaminophen (TYLENOL) 325 MG tablet Take 325-650 mg by mouth every 4 hours as needed for mild pain or fever      amLODIPine (NORVASC) 5 MG tablet Take 1 tablet (5 mg) by mouth daily     aspirin (ASA) 81 MG chewable tablet Take 1 tablet (81 mg) by mouth daily     atorvastatin (LIPITOR) 10 MG tablet Take 1  002121/2GUL tablet (10 mg) by mouth daily     carvedilol (COREG) 3.125 MG tablet Take 1 tablet (3.125 mg) by mouth 2 times daily     magnesium oxide (MAG-OX) 400 MG tablet Take 1 tablet (400 mg) by mouth daily (with lunch)     mycophenolate (GENERIC EQUIVALENT) 250 MG capsule Take 3 capsules (750 mg) by mouth 2 times daily     pantoprazole (PROTONIX) 40 MG EC tablet Take 1 tablet (40 mg) by mouth every morning (before breakfast)     senna-docusate (SENOKOT-S/PERICOLACE) 8.6-50 MG tablet Take 1 tablet by mouth 2 times daily as needed for constipation     sulfamethoxazole-trimethoprim (BACTRIM) 400-80 MG tablet Take 1 tablet by mouth three times a week     tacrolimus (GENERIC EQUIVALENT) 1 MG capsule Take 6 capsules (6 mg) by mouth 2 times daily     valGANciclovir (VALCYTE) 450 MG tablet Take 1 tablet (450 mg) by mouth twice a week     No current facility-administered medications for this visit.      There are no discontinued medications.    Physical Exam   Vital Signs: There were no vitals taken for this visit.    GENERAL APPEARANCE: alert and no distress  HENT: mouth without ulcers or lesions  LYMPHATICS: no cervical or supraclavicular nodes  RESP: lungs clear to auscultation - no rales, rhonchi or wheezes  CV: regular rhythm, normal rate, no rub, no murmur  EDEMA: no LE edema bilaterally  ABDOMEN: soft, nondistended, nontender, bowel sounds normal  MS: extremities normal - no gross deformities noted, no evidence of inflammation in joints, no muscle tenderness  SKIN: no rash  KIDNEY TX: incision closed with staples, c/d/i    Data     Renal Latest Ref Rng & Units 11/23/2020 11/21/2020 11/20/2020   Na 133 - 144 mmol/L 136 137 139   K 3.4 - 5.3 mmol/L 4.6 4.5 4.1   Cl 94 - 109 mmol/L 106 107 110(H)   CO2 20 - 32 mmol/L 25 26 24   BUN 7 - 30 mg/dL 34(H) 36(H) 40(H)   Cr 0.52 - 1.04 mg/dL 2.41(H) 2.79(H) 3.03(H)   Glucose 70 - 99 mg/dL 100(H) 129(H) 87   Ca  8.5 - 10.1 mg/dL 9.5 9.3 8.8   Mg 1.6 - 2.3 mg/dL 1.6 - -     Bone Health  Latest Ref Rng & Units 11/23/2020 11/19/2020 11/18/2020   Phos 2.5 - 4.5 mg/dL 4.0 4.4 4.6(H)     Heme Latest Ref Rng & Units 11/23/2020 11/21/2020 11/20/2020   WBC 4.0 - 11.0 10e9/L 7.1 6.8 5.7   Hgb 11.7 - 15.7 g/dL 7.7(L) 8.3(L) 7.7(L)   Plt 150 - 450 10e9/L 439 386 278   ABSOLUTE NEUTROPHIL 1.6 - 8.3 10e9/L 6.2 - -   ABSOLUTE LYMPHOCYTES 0.8 - 5.3 10e9/L 0.2(L) - -   ABSOLUTE MONOCYTES 0.0 - 1.3 10e9/L 0.4 - -   ABSOLUTE EOSINOPHILS 0.0 - 0.7 10e9/L 0.2 - -   ABSOLUTE BASOPHILS 0.0 - 0.2 10e9/L 0.0 - -   ABS IMMATURE GRANULOCYTES 0 - 0.4 10e9/L 0.0 - -   ABSOLUTE NUCLEATED RBC - 0.0 - -     Liver Latest Ref Rng & Units 11/12/2020 5/14/2020 1/24/2020   AP 40 - 150 U/L 142 - 125   TBili 0.2 - 1.3 mg/dL 0.5 - 0.3   DBili 0.0 - 0.2 mg/dL - - <0.1   ALT 0 - 50 U/L 18 12 18   AST 0 - 45 U/L 19 - 22   Tot Protein 6.8 - 8.8 g/dL 8.4 - 6.3(L)   Albumin 3.4 - 5.0 g/dL 3.8 - 2.8(L)     Pancreas Latest Ref Rng & Units 11/12/2020 1/24/2020 1/23/2020   A1C 0 - 5.6 % 4.9 Canceled, Test credited Canceled, Test credited        [facility] Tx Virology Latest Ref Rng & Units 11/14/2020 1/23/2020 4/22/2019   EBV VCA IGG ANTIBODY U/mL - - -   EBV CAPSID ANTIBODY IGG 0.0 - 0.8 AI - 1.8(H) 1.0(H)   Hep B Core NR:Nonreactive Reactive(AA) - -   Hep B Surf - - - -   HIV 1&2 NEG - - -        Recent Labs   Lab Test 11/17/20  0602 11/18/20  0547 11/20/20  0611   DOSTAC Not Provided Not Provided Not Provided   TACROL <3.0* <3.0* 4.6*

## 2020-11-23 NOTE — LETTER
2020         RE: Paolo Larkin  [address]  [facility] [address]        Dear Colleague,    Thank you for referring your patient, Paolo Larkin, to the [facility]. Please see a copy of my visit note below.    Transplant Surgery Progress Note    Transplants:  2020 (Kidney);       S: Paolo Larkin is a 45 year old Patricia-speaking female with history of ESKD of unknown etiology on dialysis since , HTN, vasospastic MI , ischemic cardiomyopathy (preserved EF, normal coronaries, and septal wall hypokinesis), HELLP, HBV, and a known RBC antibody. Patient has undergone attempted kidney transplant twice (2019, 2020), and each time the case was aborted due to hypotension and suspected anaphylaxis. A pre-op plan was made with anesthesia and Dr. Palmer (Allergist). She is now s/p  donor kidney transplant 20 with Dr. Marcelino.     No fevers, nausea or vomiting    No urinary symptoms.        Transplant History:    Transplant Type:  DDKT  Donor Type: Donation after Circulatory Death    Transplant Date:  2020 (Kidney)   Ureteral Stent:  No   Crossmatch:  negative   DSA at Tx:  No  Baseline Cr: TBD   DeNovo DSA: No    Acute Rejection Hx:  No    Present Maintenance Immunosuppression:  Tacrolimus and Mycophenolate mofetil    CMV IgG Ab Discordance:  No  EBV IgG Ab Discordance:  No    BK Viremia:  No  EBV Viremia:  No    Transplant Coordinator: Yaz Villa     Transplant Office Phone Number: [phone]     Immunosuppressant Medications     Immunosuppressive Agents Disp Start End     mycophenolate (GENERIC EQUIVALENT) 250 MG capsule    180 capsule 12/10/2020     Sig - Route: Take 3 capsules (750 mg) by mouth 2 times daily - Oral    Class: E-Prescribe    Notes to Pharmacy: TXP DT 2020 (Kidney) TXP Dischg DT 2020 DX Kidney replaced by transplant Z94.0 [facility]      tacrolimus (GENERIC EQUIVALENT) 1 MG capsule    420 capsule 12/14/2020     Sig - Route: Take 7 capsules (7 mg) by mouth 2 times daily - Oral    Class: E-Prescribe          Possible Immunosuppression-related side effects:   []             headache  []             vivid dreams  []             irritability  []             cognitive difficuties  []             fine tremor  []             nausea  []             diarrhea  []             neuropathy      []             edema  []             renal calcineurin toxicity  []             hyperkalemia  []             post-transplant diabetes  []             decreased appetite  []             increased appetite  []             other:  []             none    Prescription Medications as of 12/15/2020       Rx Number Disp Refills Start End Last Dispensed Date Next Fill Date Owning Pharmacy    acetaminophen (TYLENOL) 325 MG tablet        [facility]  [place] [address]    Sig: Take 325-650 mg by mouth every 4 hours as needed for mild pain or fever     Class: Historical    Route: Oral    amLODIPine (NORVASC) 5 MG tablet  30 tablet 11 12/3/2020    [facility]  [place] [address]    Sig: Take 1 tablet (5 mg) by mouth daily    Class: E-Prescribe    Route: Oral    aspirin (ASA) 81 MG chewable tablet  30 tablet 11 12/3/2020    [facility] [address]    Sig: Take 1 tablet (81 mg) by mouth daily    Class: E-Prescribe    Route: Oral    atorvastatin (LIPITOR) 10 MG tablet  30 tablet 3 12/3/2020    [facility]  [place] [address]    Sig: Take 1 tablet (10 mg) by mouth daily    Class: E-Prescribe    Route: Oral    magnesium oxide (MAG-OX) 400 MG tablet  30 tablet 3 12/3/2020    [facility]  [place] [address]    Sig: Take 1 tablet (400 mg) by mouth daily (with lunch)    Class: E-Prescribe     Route: Oral    mycophenolate (GENERIC EQUIVALENT) 250 MG capsule  180 capsule 11 12/10/2020    [facility] [place]  [address] [place]    Sig: Take 3 capsules (750 mg) by mouth 2 times daily    Class: E-Prescribe    Notes to Pharmacy: TXP DT 11/12/2020 (Kidney) TXP Dischg DT 11/19/2020 DX Kidney replaced by transplant Z94.0 TX [facility]    Route: Oral    ondansetron (ZOFRAN-ODT) 4 MG ODT tab  30 tablet 0 11/24/2020    [facility] [address]    Sig: Take 1 tablet (4 mg) by mouth every 6 hours as needed for nausea    Class: E-Prescribe    Route: Oral    pantoprazole (PROTONIX) 40 MG EC tablet  30 tablet 1 12/3/2020    [facility] [place] [address]    Sig: Take 1 tablet (40 mg) by mouth every morning (before breakfast)    Class: E-Prescribe    Route: Oral    senna-docusate (SENOKOT-S/PERICOLACE) 8.6-50 MG tablet  60 tablet 0 11/30/2020    [address]    Sig: Take 1 tablet by mouth 2 times daily as needed for constipation    Class: E-Prescribe    Route: Oral    sodium bicarbonate 650 MG tablet  60 tablet 11 12/9/2020    [facility] [place] [address]    Sig: Take 1 tablet (650 mg) by mouth 2 times daily    Class: E-Prescribe    Route: Oral    sulfamethoxazole-trimethoprim (BACTRIM) 400-80 MG tablet  30 tablet 11 12/3/2020    [facility] [place] [address]    Sig: Take 1 tablet by mouth daily    Class: E-Prescribe    Route: Oral    tacrolimus (GENERIC EQUIVALENT) 1 MG capsule  420 capsule 11 12/14/2020    [facility] [place] [place] [facility] [address]    Sig: Take 7 capsules (7 mg) by mouth 2 times daily    Class: E-Prescribe    Route: Oral    valGANciclovir (VALCYTE) 450 MG tablet   30 tablet 0 12/3/2020    [facility] [place] - [address] [place] [address]    Sig: Take 1 tablet (450 mg) by mouth every other day Stop taking on 2/12/2021    Class: E-Prescribe    Route: Oral          O:      General Appearance: in no apparent distress.   Skin: Normal, no rashes or jaundice  Heart: regular rate and rhythm, normal S1 and S2  Lungs: easy respirations, no audible wheezing.  Abdomen: flat, The wound is dry and intact, without hernia. The abdomen is non-tender. The kidney graft is not tender.  There is no ascites.  Extremities: Tremor absent.   Edema: absent.     Transplant Immunosuppression Labs Latest Ref Rng & Units 11/30/2020 11/27/2020 11/25/2020 11/24/2020 11/23/2020   Tacro Level 5.0 - 15.0 ug/L 9.5 8.8 6.6 8.2 4.5(L)   Tacro Level - 1,900 Not Provided 2,000 0700pm 2,000   Creat 0.52 - 1.04 mg/dL 1.63(H) 1.93(H) 2.21(H) 2.22(H) 2.41(H)   BUN 7 - 30 mg/dL 20 22 30 32(H) 34(H)   WBC 4.0 - 11.0 10e9/L 5.3 7.4 9.5 9.2 7.1   Neutrophil % - - 91.9 91.4 87.2   ANEU 1.6 - 8.3 10e9/L - - 8.7(H) 8.4(H) 6.2       Chemistries:   Recent Labs   Lab Test 11/30/20  0652   BUN 20   CR 1.63*   GFRESTIMATED 38*   GLC 86     Lab Results   Component Value Date    A1C 4.9 11/12/2020     Recent Labs   Lab Test 11/12/20  1500   ALBUMIN 3.8   BILITOTAL 0.5   ALKPHOS 142   AST 19   ALT 18     Urine Studies:  Recent Labs   Lab Test 11/16/20  1500   COLOR Light Yellow   APPEARANCE Clear   URINEGLC Negative   URINEBILI Negative   URINEKETONE Negative   SG 1.010   UBLD Moderate*   URINEPH 5.0   PROTEIN 10*   NITRITE Negative   LEUKEST Negative   RBCU 25*   WBCU 1     Recent Labs   Lab Test 11/24/20  0719 11/13/20  0300   UTPG 0.28* 7.43*     Hematology:   Recent Labs   Lab Test 11/30/20  0652 11/27/20  0707 11/25/20  0642   HGB 6.9* 7.1* 7.1*    480* 448   WBC 5.3 7.4 9.5     Coags:   Recent Labs   Lab Test 11/12/20  2217 11/12/20  1500   INR 1.01 0.92     HLA antibodies:   SA1 Hi Risk Gricelda   Date  Value Ref Range Status   11/12/2020 B:37  Final     SA1 Mod Risk Gricelda   Date Value Ref Range Status   11/12/2020 B:45  Final     SA2 Hi Risk Gricelda   Date Value Ref Range Status   11/12/2020 No Specificity Defined  Final     SA2 Mod Risk Gricelda   Date Value Ref Range Status   11/12/2020 None  Final       Assessment: Paolo Vincent Trae is doing fairly well s/p DDKT:  Issues we addressed during her visit include:    Plan:    1. Graft function: stabel  2. Immunosuppression Management: No change MMF and prograf .  Complexity of management:Medium.  Contributing factors: anemia  3. Incision:  Healing well  Followup: as directed    Total Time: 20 min,   Counselling Time: 10 min.            Again, thank you for allowing me to participate in the care of your patient.        Sincerely,        Apple Bowen NP

## 2020-11-23 NOTE — LETTER
11/23/2020         RE: Paolo Nguyenoo  [address]  [facility] [facility] [address]        Dear Colleague,    Thank you for referring your patient, Paolo Larkin, to the [facility]. Please see a copy of my visit note below.    ACUTE TRANSPLANT NEPHROLOGY VISIT    Assessment & Plan   # DDKT: Trend down   - Baseline Creatinine: ~ TBD   - Proteinuria: 7.4g/g prior to transplant. Will follow UPCR via FSGS protocol given unknown native disease   - Date DSA Last Checked: Nov/2020      Latest DSA: No   - BK Viremia: Not checked post transplant   - Kidney Tx Biopsy: No    # Immunosuppression: Tacrolimus immediate release (goal 8-10) and Mycophenolate mofetil (dose 750 mg every 12 hours)   - Induction with Recent Transplant:  Anti-thymocyte Globulin-Rabbit (Thymoglobulin) 5.8mg/kg  Methylprenisolone (Solumedrol)   - Changes: Not at this time - unfortunately, did not have tac levels checked while in acute rehab    # Infection Prophylaxis:   - PJP: Sulfa/TMP (Bactrim)  - CMV: Valcyte 1 tab 2x/wk    # Hypertension: Controlled;  Goal BP: < 150/90   - Volume status: Euvolemic     - Changes: No     # Anemia in Chronic Renal Disease: Hgb: Stable      JESSI: No   - Iron studies: Not checked recently    # Mineral Bone Disorder:   - Secondary renal hyperparathyroidism; PTH level: Significantly elevated (601-1200 pg/ml) - last checked pre-transplant        On treatment: None  - Vitamin D; level: Not checked recently        On supplement: No  - Calcium; level: Normal        On supplement: No  - Phosphorus; level: Normal        On supplement: No    # Electrolytes:   - Potassium; level: Normal        On supplement: No  - Magnesium; level: Normal        On supplement: No  - Bicarbonate; level: Normal        On supplement: No    # Right Lower Extremity weakness, suspected femoral neuropathy: Developed RLE weakness post-op, felt to be most likely 2/2 femoral nerve injury/stretch during transplant  surgery. Had 3 day acute rehab stay post-hospitalization and strength is improving.    # Medical Compliance: Yes    # Transplant History:  Etiology of Kidney Failure: Unknown etiology  Tx: DDKT  Transplant: 11/12/2020 (Kidney)  Donor Type: Donation after Circulatory Death  Donor Class:   Crossmatch at time of Tx: negative  DSA at time of Tx: No  Significant changes in immunosuppression: None  CMV IgG Ab High Risk Discordance (D+/R-): No  EBV IgG Ab High Risk Discordance (D+/R-): No  Significant transplant-related complications: None    Transplant Office Phone Number: [phone]    Assessment and plan was discussed with the patient and she voiced her understanding and agreement.    Return visit: Return in about 1 day (around 11/24/2020).    Mary Denise MD     Attestation:  This patient has been seen and evaluated by me, Sergio Jacob MD.  I have reviewed the note and agree with plan of care as documented by the fellow.       Chief Complaint   Ms. Larkin is a 45 year old here for hospital follow up after kidney transplant.     History of Present Illness    Ms Larkin presents after discharge from acute rehab on 11/22. She was discharged from the hospital to rehab on 11/19 and required acute rehab due to RLE weakness felt likely due to femoral neuropathy. Unfortunately, she did not have any drug level monitoring labs while in acute rehab. She currently feels well with no specific concerns. Her strength in her leg is improving, though she still feels more fatigued than baseline. Appetite has been good and she's eating and drinking well. No nausea or vomiting. She's having regular bowel movements. Post surgical pain is well controlled.    Recent Hospitalizations:  [x] No [] Yes    New Medical Issues: [x] No [] Yes    Decreased energy: [] No [x] Yes Mild, improving.   Chest pain or SOB with exertion:  [x] No [] Yes    Appetite change or weight change: [x] No [] Yes    Nausea, vomiting or diarrhea:  [x] No []  Yes    Fever, sweats or chills: [x] No [] Yes    Leg swelling: [x] No [] Yes      Home BP: Not checked    Review of Systems   A comprehensive review of systems was obtained and negative, except as noted in the HPI or PMH.    Problem List   Patient Active Problem List   Diagnosis     Anemia in chronic renal disease     HTN, kidney transplant related     Secondary renal hyperparathyroidism (H)     Coronary artery disease involving native coronary artery of native heart without angina pectoris     Status post coronary angiogram     NSTEMI (non-ST elevated myocardial infarction) (H)     Vitamin D deficiency     Pneumothorax     Kidney replaced by transplant     Femoral neuropathy, right     Femoral neuropathy of right lower extremity     Aftercare following organ transplant     CKD (chronic kidney disease) stage 4, GFR 15-29 ml/min (H)       Social History   Social History     Tobacco Use     Smoking status: Never Smoker     Smokeless tobacco: Never Used   Substance Use Topics     Alcohol use: No     Drug use: No       Allergies   Allergies   Allergen Reactions     Blood Transfusion Related (Informational Only)      Patient has a history of a clinically significant antibody against RBC antigens.  A delay in compatible RBCs may occur.     Cephalosporins Anaphylaxis     Per [facility] allergist report: positive skin intradermal tests to Cefazolin and Ceftazidime, but NOT to Penicillin G, Piperacillin and Meropenem      Chlorhexidine Anaphylaxis     Several times during anesthesia at initial phase during insertion of I.v. line and after disinfection with Chlorhexidine drop of blood pressure.  In Prick and as well intradermal tests clear positive reactions to Chlorhexidine (particularly in intradermal test to 0.0002% Chlorhexidine papule of 1cm and Erythema of 2.5cm). AVOID in future Chlorhexidine     Heparin Flush        Medications   Current Outpatient Medications   Medication Sig     acetaminophen (TYLENOL) 325 MG tablet  Take 325-650 mg by mouth every 4 hours as needed for mild pain or fever      amLODIPine (NORVASC) 5 MG tablet Take 1 tablet (5 mg) by mouth daily     aspirin (ASA) 81 MG chewable tablet Take 1 tablet (81 mg) by mouth daily     atorvastatin (LIPITOR) 10 MG tablet Take 1 tablet (10 mg) by mouth daily     carvedilol (COREG) 3.125 MG tablet Take 1 tablet (3.125 mg) by mouth 2 times daily     magnesium oxide (MAG-OX) 400 MG tablet Take 1 tablet (400 mg) by mouth daily (with lunch)     mycophenolate (GENERIC EQUIVALENT) 250 MG capsule Take 3 capsules (750 mg) by mouth 2 times daily     pantoprazole (PROTONIX) 40 MG EC tablet Take 1 tablet (40 mg) by mouth every morning (before breakfast)     senna-docusate (SENOKOT-S/PERICOLACE) 8.6-50 MG tablet Take 1 tablet by mouth 2 times daily as needed for constipation     sulfamethoxazole-trimethoprim (BACTRIM) 400-80 MG tablet Take 1 tablet by mouth three times a week     tacrolimus (GENERIC EQUIVALENT) 1 MG capsule Take 6 capsules (6 mg) by mouth 2 times daily     valGANciclovir (VALCYTE) 450 MG tablet Take 1 tablet (450 mg) by mouth twice a week     No current facility-administered medications for this visit.      There are no discontinued medications.    Physical Exam   Vital Signs: There were no vitals taken for this visit.    GENERAL APPEARANCE: alert and no distress  HENT: mouth without ulcers or lesions  LYMPHATICS: no cervical or supraclavicular nodes  RESP: lungs clear to auscultation - no rales, rhonchi or wheezes  CV: regular rhythm, normal rate, no rub, no murmur  EDEMA: no LE edema bilaterally  ABDOMEN: soft, nondistended, nontender, bowel sounds normal  MS: extremities normal - no gross deformities noted, no evidence of inflammation in joints, no muscle tenderness  SKIN: no rash  KIDNEY TX: incision closed with staples, c/d/i    Data     Renal Latest Ref Rng & Units 11/23/2020 11/21/2020 11/20/2020   Na 133 - 144 mmol/L 136 137 139   K 3.4 - 5.3 mmol/L 4.6 4.5 4.1    Cl 94 - 109 mmol/L 106 107 110(H)   CO2 20 - 32 mmol/L 25 26 24   BUN 7 - 30 mg/dL 34(H) 36(H) 40(H)   Cr 0.52 - 1.04 mg/dL 2.41(H) 2.79(H) 3.03(H)   Glucose 70 - 99 mg/dL 100(H) 129(H) 87   Ca  8.5 - 10.1 mg/dL 9.5 9.3 8.8   Mg 1.6 - 2.3 mg/dL 1.6 - -     Bone Health Latest Ref Rng & Units 11/23/2020 11/19/2020 11/18/2020   Phos 2.5 - 4.5 mg/dL 4.0 4.4 4.6(H)     Heme Latest Ref Rng & Units 11/23/2020 11/21/2020 11/20/2020   WBC 4.0 - 11.0 10e9/L 7.1 6.8 5.7   Hgb 11.7 - 15.7 g/dL 7.7(L) 8.3(L) 7.7(L)   Plt 150 - 450 10e9/L 439 386 278   ABSOLUTE NEUTROPHIL 1.6 - 8.3 10e9/L 6.2 - -   ABSOLUTE LYMPHOCYTES 0.8 - 5.3 10e9/L 0.2(L) - -   ABSOLUTE MONOCYTES 0.0 - 1.3 10e9/L 0.4 - -   ABSOLUTE EOSINOPHILS 0.0 - 0.7 10e9/L 0.2 - -   ABSOLUTE BASOPHILS 0.0 - 0.2 10e9/L 0.0 - -   ABS IMMATURE GRANULOCYTES 0 - 0.4 10e9/L 0.0 - -   ABSOLUTE NUCLEATED RBC - 0.0 - -     Liver Latest Ref Rng & Units 11/12/2020 5/14/2020 1/24/2020   AP 40 - 150 U/L 142 - 125   TBili 0.2 - 1.3 mg/dL 0.5 - 0.3   DBili 0.0 - 0.2 mg/dL - - <0.1   ALT 0 - 50 U/L 18 12 18   AST 0 - 45 U/L 19 - 22   Tot Protein 6.8 - 8.8 g/dL 8.4 - 6.3(L)   Albumin 3.4 - 5.0 g/dL 3.8 - 2.8(L)     Pancreas Latest Ref Rng & Units 11/12/2020 1/24/2020 1/23/2020   A1C 0 - 5.6 % 4.9 Canceled, Test credited Canceled, Test credited        [facility] Txp Virology Latest Ref Rng & Units 11/14/2020 1/23/2020 4/22/2019   EBV VCA IGG ANTIBODY U/mL - - -   EBV CAPSID ANTIBODY IGG 0.0 - 0.8 AI - 1.8(H) 1.0(H)   Hep B Core NR:Nonreactive Reactive(AA) - -   Hep B Surf - - - -   HIV 1&2 NEG - - -        Recent Labs   Lab Test 11/17/20  0602 11/18/20  0547 11/20/20  0611   DOSTAC Not Provided Not Provided Not Provided   TACROL <3.0* <3.0* 4.6*             Again, thank you for allowing me to participate in the care of your patient.        Sincerely,        Sergio Jacob MD

## 2020-11-23 NOTE — LETTER
11/23/2020         RE: Paolo Vincent Trae  [address]  [facility] [address]      ACUTE TRANSPLANT NEPHROLOGY VISIT    Assessment & Plan   # DDKT: Trend down   - Baseline Creatinine: ~ TBD   - Proteinuria: 7.4g/g prior to transplant. Will follow UPCR via FSGS protocol given unknown native disease   - Date DSA Last Checked: Nov/2020      Latest DSA: No   - BK Viremia: Not checked post transplant   - Kidney Tx Biopsy: No    # Immunosuppression: Tacrolimus immediate release (goal 8-10) and Mycophenolate mofetil (dose 750 mg every 12 hours)   - Induction with Recent Transplant:  Anti-thymocyte Globulin-Rabbit (Thymoglobulin) 5.8mg/kg  Methylprenisolone (Solumedrol)   - Changes: Not at this time - unfortunately, did not have tac levels checked while in acute rehab    # Infection Prophylaxis:   - PJP: Sulfa/TMP (Bactrim)  - CMV: Valcyte 1 tab 2x/wk    # Hypertension: Controlled;  Goal BP: < 150/90   - Volume status: Euvolemic     - Changes: No     # Anemia in Chronic Renal Disease: Hgb: Stable      JESSI: No   - Iron studies: Not checked recently    # Mineral Bone Disorder:   - Secondary renal hyperparathyroidism; PTH level: Significantly elevated (601-1200 pg/ml) - last checked pre-transplant        On treatment: None  - Vitamin D; level: Not checked recently        On supplement: No  - Calcium; level: Normal        On supplement: No  - Phosphorus; level: Normal        On supplement: No    # Electrolytes:   - Potassium; level: Normal        On supplement: No  - Magnesium; level: Normal        On supplement: No  - Bicarbonate; level: Normal        On supplement: No    # Right Lower Extremity weakness, suspected femoral neuropathy: Developed RLE weakness post-op, felt to be most likely 2/2 femoral nerve injury/stretch during transplant surgery. Had 3 day acute rehab stay post-hospitalization and strength is improving.    # Medical Compliance: Yes    # Transplant History:  Etiology of Kidney Failure: Unknown  etiology  Tx: DDKT  Transplant: 11/12/2020 (Kidney)  Donor Type: Donation after Circulatory Death  Donor Class:   Crossmatch at time of Tx: negative  DSA at time of Tx: No  Significant changes in immunosuppression: None  CMV IgG Ab High Risk Discordance (D+/R-): No  EBV IgG Ab High Risk Discordance (D+/R-): No  Significant transplant-related complications: None    Transplant Office Phone Number: [phone]    Assessment and plan was discussed with the patient and she voiced her understanding and agreement.    Return visit: Return in about 1 day (around 11/24/2020).    Mary Denise MD     Attestation:  This patient has been seen and evaluated by me, Sergio Jacob MD.  I have reviewed the note and agree with plan of care as documented by the fellow.       Chief Complaint   Ms. Larkin is a 45 year old here for hospital follow up after kidney transplant.     History of Present Illness    Ms Larkin presents after discharge from acute rehab on 11/22. She was discharged from the hospital to rehab on 11/19 and required acute rehab due to RLE weakness felt likely due to femoral neuropathy. Unfortunately, she did not have any drug level monitoring labs while in acute rehab. She currently feels well with no specific concerns. Her strength in her leg is improving, though she still feels more fatigued than baseline. Appetite has been good and she's eating and drinking well. No nausea or vomiting. She's having regular bowel movements. Post surgical pain is well controlled.    Recent Hospitalizations:  [x] No [] Yes    New Medical Issues: [x] No [] Yes    Decreased energy: [] No [x] Yes Mild, improving.   Chest pain or SOB with exertion:  [x] No [] Yes    Appetite change or weight change: [x] No [] Yes    Nausea, vomiting or diarrhea:  [x] No [] Yes    Fever, sweats or chills: [x] No [] Yes    Leg swelling: [x] No [] Yes      Home BP: Not checked    Review of Systems   A comprehensive review of systems was obtained  and negative, except as noted in the HPI or PMH.    Problem List   Patient Active Problem List   Diagnosis     Anemia in chronic renal disease     HTN, kidney transplant related     Secondary renal hyperparathyroidism (H)     Coronary artery disease involving native coronary artery of native heart without angina pectoris     Status post coronary angiogram     NSTEMI (non-ST elevated myocardial infarction) (H)     Vitamin D deficiency     Pneumothorax     Kidney replaced by transplant     Femoral neuropathy, right     Femoral neuropathy of right lower extremity     Aftercare following organ transplant     CKD (chronic kidney disease) stage 4, GFR 15-29 ml/min (H)       Social History   Social History     Tobacco Use     Smoking status: Never Smoker     Smokeless tobacco: Never Used   Substance Use Topics     Alcohol use: No     Drug use: No       Allergies   Allergies   Allergen Reactions     Blood Transfusion Related (Informational Only)      Patient has a history of a clinically significant antibody against RBC antigens.  A delay in compatible RBCs may occur.     Cephalosporins Anaphylaxis     Per [facility] allergist report: positive skin intradermal tests to Cefazolin and Ceftazidime, but NOT to Penicillin G, Piperacillin and Meropenem      Chlorhexidine Anaphylaxis     Several times during anesthesia at initial phase during insertion of I.v. line and after disinfection with Chlorhexidine drop of blood pressure.  In Prick and as well intradermal tests clear positive reactions to Chlorhexidine (particularly in intradermal test to 0.0002% Chlorhexidine papule of 1cm and Erythema of 2.5cm). AVOID in future Chlorhexidine     Heparin Flush        Medications   Current Outpatient Medications   Medication Sig     acetaminophen (TYLENOL) 325 MG tablet Take 325-650 mg by mouth every 4 hours as needed for mild pain or fever      amLODIPine (NORVASC) 5 MG tablet Take 1 tablet (5 mg) by mouth daily     aspirin (ASA) 81 MG  chewable tablet Take 1 tablet (81 mg) by mouth daily     atorvastatin (LIPITOR) 10 MG tablet Take 1 tablet (10 mg) by mouth daily     carvedilol (COREG) 3.125 MG tablet Take 1 tablet (3.125 mg) by mouth 2 times daily     magnesium oxide (MAG-OX) 400 MG tablet Take 1 tablet (400 mg) by mouth daily (with lunch)     mycophenolate (GENERIC EQUIVALENT) 250 MG capsule Take 3 capsules (750 mg) by mouth 2 times daily     pantoprazole (PROTONIX) 40 MG EC tablet Take 1 tablet (40 mg) by mouth every morning (before breakfast)     senna-docusate (SENOKOT-S/PERICOLACE) 8.6-50 MG tablet Take 1 tablet by mouth 2 times daily as needed for constipation     sulfamethoxazole-trimethoprim (BACTRIM) 400-80 MG tablet Take 1 tablet by mouth three times a week     tacrolimus (GENERIC EQUIVALENT) 1 MG capsule Take 6 capsules (6 mg) by mouth 2 times daily     valGANciclovir (VALCYTE) 450 MG tablet Take 1 tablet (450 mg) by mouth twice a week     No current facility-administered medications for this visit.      There are no discontinued medications.    Physical Exam   Vital Signs: There were no vitals taken for this visit.    GENERAL APPEARANCE: alert and no distress  HENT: mouth without ulcers or lesions  LYMPHATICS: no cervical or supraclavicular nodes  RESP: lungs clear to auscultation - no rales, rhonchi or wheezes  CV: regular rhythm, normal rate, no rub, no murmur  EDEMA: no LE edema bilaterally  ABDOMEN: soft, nondistended, nontender, bowel sounds normal  MS: extremities normal - no gross deformities noted, no evidence of inflammation in joints, no muscle tenderness  SKIN: no rash  KIDNEY TX: incision closed with staples, c/d/i    Data     Renal Latest Ref Rng & Units 11/23/2020 11/21/2020 11/20/2020   Na 133 - 144 mmol/L 136 137 139   K 3.4 - 5.3 mmol/L 4.6 4.5 4.1   Cl 94 - 109 mmol/L 106 107 110(H)   CO2 20 - 32 mmol/L 25 26 24   BUN 7 - 30 mg/dL 34(H) 36(H) 40(H)   Cr 0.52 - 1.04 mg/dL 2.41(H) 2.79(H) 3.03(H)   Glucose 70 - 99  mg/dL 100(H) 129(H) 87   Ca  8.5 - 10.1 mg/dL 9.5 9.3 8.8   Mg 1.6 - 2.3 mg/dL 1.6 - -     Bone Health Latest Ref Rng & Units 11/23/2020 11/19/2020 11/18/2020   Phos 2.5 - 4.5 mg/dL 4.0 4.4 4.6(H)     Heme Latest Ref Rng & Units 11/23/2020 11/21/2020 11/20/2020   WBC 4.0 - 11.0 10e9/L 7.1 6.8 5.7   Hgb 11.7 - 15.7 g/dL 7.7(L) 8.3(L) 7.7(L)   Plt 150 - 450 10e9/L 439 386 278   ABSOLUTE NEUTROPHIL 1.6 - 8.3 10e9/L 6.2 - -   ABSOLUTE LYMPHOCYTES 0.8 - 5.3 10e9/L 0.2(L) - -   ABSOLUTE MONOCYTES 0.0 - 1.3 10e9/L 0.4 - -   ABSOLUTE EOSINOPHILS 0.0 - 0.7 10e9/L 0.2 - -   ABSOLUTE BASOPHILS 0.0 - 0.2 10e9/L 0.0 - -   ABS IMMATURE GRANULOCYTES 0 - 0.4 10e9/L 0.0 - -   ABSOLUTE NUCLEATED RBC - 0.0 - -     Liver Latest Ref Rng & Units 11/12/2020 5/14/2020 1/24/2020   AP 40 - 150 U/L 142 - 125   TBili 0.2 - 1.3 mg/dL 0.5 - 0.3   DBili 0.0 - 0.2 mg/dL - - <0.1   ALT 0 - 50 U/L 18 12 18   AST 0 - 45 U/L 19 - 22   Tot Protein 6.8 - 8.8 g/dL 8.4 - 6.3(L)   Albumin 3.4 - 5.0 g/dL 3.8 - 2.8(L)     Pancreas Latest Ref Rng & Units 11/12/2020 1/24/2020 1/23/2020   A1C 0 - 5.6 % 4.9 Canceled, Test credited Canceled, Test credited        [facility] Txp Virology Latest Ref Rng & Units 11/14/2020 1/23/2020 4/22/2019   EBV VCA IGG ANTIBODY U/mL - - -   EBV CAPSID ANTIBODY IGG 0.0 - 0.8 AI - 1.8(H) 1.0(H)   Hep B Core NR:Nonreactive Reactive(AA) - -   Hep B Surf - - - -   HIV 1&2 NEG - - -        Recent Labs   Lab Test 11/17/20  0602 11/18/20  0547 11/20/20  0611   DOSTAC Not Provided Not Provided Not Provided   TACROL <3.0* <3.0* 4.6*                 Sergio Jacob MD

## 2020-11-23 NOTE — PATIENT INSTRUCTIONS
Dear Paolo Larkin    Thank you for choosing [facility] ([facility]) for your transplant cares.  The following information is a summary of our appointment as well as important reminders.      Please make sure your phone is available today because I will call to update you with your anti-rejection drug levels and possibly make changes to your anti-rejection dosages., Please refer to your hospital discharge instructions for details on home care services, future appointments, phone numbers, and diet/activity levels.    We look forward in seeing you on your next appointment here at [facility] ([facility]).  Please don t hesitate to call us at [phone] to reschedule any of your appointments or to speak with one of the [facility] registered nurses.  It was a pleasure taking care of you today.    Sincerely,    [facility]  [address] [place] [address]  [id number]  Phone:  [phone]    1.  Return tomorrow Tuesday (11/24/20) at 630am 1st floor lab, then to 2nd floor [facility] for labs and assessment, seen by nephrology doctor at 7am    2.  Take magnesium at 11am today, take rest of medications tonight at 7pm, do not take any meds tomorrow morning until you arrive at our clinic at 7am (do not take before labs are drawn at 630am)    3.  Drink 2-3 liters ( oz) water daily    4.  Bring all medications, small med cases I set up, discharge paperwork (everything you brought today)

## 2020-11-23 NOTE — PROGRESS NOTES
"Paolo Larkin came to [facility] today for a lab and assess following a kidney transplant on 11/12/20. Patricia  used during [facility] visit.      Discharge date: 11/22/20  Transplant coordinator: Yaz Villa  Phone number patient can be reached at: [phone]      Physical Assessment:  See physical assessment located under \"Document Flowsheets\".  Incision site: CDI, staples. Small amount of redness at site of staples (reviewed by provider)   Lines: NA  Moreno: NA  Urine clarity: light yellow. Denies blood in urine.   Hydration: patient states she has drank about 19 ounces of water over the past 24 hours. Encouraged & educated patient to push fluids. Discussed intake goal.   Nutrition: eating small meals. Denies nausea/vomitting.   Last BM: 11/22/20 - soft, formed   Pain: low back, right leg, and incision to right lower abdomen. Rating 4/10. Tolerable per patient. Not taking anything for pain.    Labs drawn by [facility] staff: No    Plan of care for today:   1. Labs first floor  2. Assessment and education, seen by Dr. Jacob and fellow.   3. Patient didn't come with BP cuff or medication box. Meds set up for 24 hours, will complete medication box tomorrow.     Medication changes:    1. Bactrim changed to once daily   2. Verified with  Carvedilol to be taken 3.125mg twice daily   3. Prograf increased from 4mg to  6mg every 12 hours  Above changes updated on patient's medication card    Medications administered: none      Patient education:    The following teaching topics were addressed: Importance of drinking 2L of non-caffeinated fluids daily, Incisional care, Signs/symptoms of infection, Good handwashing, Medications (purposes, doses and times of administration), 7A discharge check list and Plan of care   Patient and Niece Ler verbalized understanding and all questions answered.    Drug level:  Patient took 4mg of prograf last evening at 2000, patient took 6mg after lab this morning (trough this morning was 11 " hours). Patient was instructed to take prograf at 1900 tonight so trough will be 12 hours tomorrow. Care coordinator to follow up with the result.    Face to face time: 60 minutes   Discharge Plan    Pt will follow up with first floor lab at 0630 and second floor [facility] tomorrow.  Discharge instructions reviewed with patient: YES  Patient/Representative verbalized understanding, all questions answered: YES    Discharged from unit at 1000 with whom: Keyannamartha Ler to home.    Margareth La, RN, RN

## 2020-11-23 NOTE — PROGRESS NOTES
"Paolo Larkin came to [facility] today for a lab and assess following a kidney transplant on 11/12/20. Patricia  used during [facility] visit.      Discharge date: 11/22/20  Transplant coordinator: Yaz Villa  Phone number patient can be reached at: [phone]      Physical Assessment:  See physical assessment located under \"Document Flowsheets\".  Incision site: CDI, staples. Small amount of redness at site of staples (reviewed by provider)   Lines: NA  Moreno: NA  Urine clarity: light yellow. Denies blood in urine.   Hydration: patient states she has drank about 19 ounces of water over the past 24 hours. Encouraged & educated patient to push fluids. Discussed intake goal.   Nutrition: eating small meals. Denies nausea/vomitting.   Last BM: 11/22/20 - soft, formed   Pain: low back, right leg, and incision to right lower abdomen. Rating 4/10. Tolerable per patient. Not taking anything for pain.    Labs drawn by [facility] staff: No    Plan of care for today:   1. Labs first floor  2. Assessment and education, seen by Dr. Jacob and fellow.   3. Patient didn't come with BP cuff or medication box. Meds set up for 24 hours, will complete medication box tomorrow.     Medication changes:    1. Bactrim changed to once daily   2. Verified with  Carvedilol to be taken 3.125mg twice daily   3. Prograf increased from 4mg to  6mg every 12 hours  Above changes updated on patient's medication card    Medications administered: none      Patient education:    The following teaching topics were addressed: Importance of drinking 2L of non-caffeinated fluids daily, Incisional care, Signs/symptoms of infection, Good handwashing, Medications (purposes, doses and times of administration), 7A discharge check list and Plan of care   Patient and Niece Ler verbalized understanding and all questions answered.    Drug level:  Patient took 4mg of prograf last evening at 2000, patient took 6mg after lab this morning (trough this morning was 11 " hours). Patient was instructed to take prograf at 1900 tonight so trough will be 12 hours tomorrow. Care coordinator to follow up with the result.    Face to face time: 60 minutes   Discharge Plan    Pt will follow up with first floor lab at 0630 and second floor [facility] tomorrow.  Discharge instructions reviewed with patient: YES  Patient/Representative verbalized understanding, all questions answered: YES    Discharged from unit at 1000 with whom: Keyannamartha Ler to home.    Margareth La, RN, RN

## 2020-11-23 NOTE — LETTER
"    11/23/2020         RE: Paolo Larkin  [address]  [facility] [place] [facility] [address]        Dear Colleague,    Thank you for referring your patient, Paolo Larkin, to the [facility]. Please see a copy of my visit note below.    Paolo Larkin came to [facility] today for a lab and assess following a kidney transplant on 11/12/20. Patricia  used during [facility] visit.      Discharge date: 11/22/20  Transplant coordinator: Yaz Villa  Phone number patient can be reached at: [phone]      Physical Assessment:  See physical assessment located under \"Document Flowsheets\".  Incision site: CDI, staples. Small amount of redness at site of staples (reviewed by provider)   Lines: NA  Moreno: NA  Urine clarity: light yellow. Denies blood in urine.   Hydration: patient states she has drank about 19 ounces of water over the past 24 hours. Encouraged & educated patient to push fluids. Discussed intake goal.   Nutrition: eating small meals. Denies nausea/vomitting.   Last BM: 11/22/20 - soft, formed   Pain: low back, right leg, and incision to right lower abdomen. Rating 4/10. Tolerable per patient. Not taking anything for pain.    Labs drawn by [facility] staff: No    Plan of care for today:   1. Labs first floor  2. Assessment and education, seen by Dr. Jacob and fellow.   3. Patient didn't come with BP cuff or medication box. Meds set up for 24 hours, will complete medication box tomorrow.     Medication changes:    1. Bactrim changed to once daily   2. Verified with  Carvedilol to be taken 3.125mg twice daily   3. Prograf increased from 4mg to  6mg every 12 hours  Above changes updated on patient's medication card    Medications administered: none      Patient education:    The following teaching topics were addressed: Importance of drinking 2L of non-caffeinated fluids daily, Incisional care, Signs/symptoms of infection, Good handwashing, Medications (purposes, doses and " times of administration), 7A discharge check list and Plan of care   Patient and Ludmila Mas verbalized understanding and all questions answered.    Drug level:  Patient took 4mg of prograf last evening at 2000, patient took 6mg after lab this morning (trough this morning was 11 hours). Patient was instructed to take prograf at 1900 tonight so trough will be 12 hours tomorrow. Care coordinator to follow up with the result.    Face to face time: 60 minutes   Discharge Plan    Pt will follow up with first floor lab at 0630 and second floor [facility] tomorrow.  Discharge instructions reviewed with patient: YES  Patient/Representative verbalized understanding, all questions answered: YES    Discharged from unit at 1000 with whom: Ludmila Mas to home.    Margareth La, RN, RN         Again, thank you for allowing me to participate in the care of your patient.        Sincerely,        VIDEO,

## 2020-11-24 ENCOUNTER — TELEPHONE (OUTPATIENT)
Dept: TRANSPLANT | Facility: CLINIC | Age: 45
End: 2020-11-24

## 2020-11-24 ENCOUNTER — OFFICE VISIT (OUTPATIENT)
Dept: INFUSION THERAPY | Facility: CLINIC | Age: 45
End: 2020-11-24
Payer: MEDICARE

## 2020-11-24 ENCOUNTER — INFUSION THERAPY VISIT (OUTPATIENT)
Dept: INFUSION THERAPY | Facility: CLINIC | Age: 45
End: 2020-11-24
Payer: MEDICARE

## 2020-11-24 VITALS
DIASTOLIC BLOOD PRESSURE: 81 MMHG | HEART RATE: 86 BPM | WEIGHT: 113.7 LBS | RESPIRATION RATE: 18 BRPM | BODY MASS INDEX: 20.14 KG/M2 | OXYGEN SATURATION: 100 % | TEMPERATURE: 98.4 F | SYSTOLIC BLOOD PRESSURE: 124 MMHG

## 2020-11-24 DIAGNOSIS — Z94.0 KIDNEY REPLACED BY TRANSPLANT: Primary | ICD-10-CM

## 2020-11-24 DIAGNOSIS — N25.81 SECONDARY RENAL HYPERPARATHYROIDISM (H): ICD-10-CM

## 2020-11-24 DIAGNOSIS — E55.9 VITAMIN D DEFICIENCY: ICD-10-CM

## 2020-11-24 DIAGNOSIS — N18.9 ANEMIA OF CHRONIC RENAL FAILURE, UNSPECIFIED CKD STAGE: ICD-10-CM

## 2020-11-24 DIAGNOSIS — Z48.298 AFTERCARE FOLLOWING ORGAN TRANSPLANT: ICD-10-CM

## 2020-11-24 DIAGNOSIS — N18.4 ANEMIA IN STAGE 4 CHRONIC KIDNEY DISEASE (H): ICD-10-CM

## 2020-11-24 DIAGNOSIS — Z79.899 ENCOUNTER FOR LONG-TERM CURRENT USE OF MEDICATION: ICD-10-CM

## 2020-11-24 DIAGNOSIS — Z94.0 KIDNEY TRANSPLANTED: ICD-10-CM

## 2020-11-24 DIAGNOSIS — Z94.0 KIDNEY TRANSPLANT RECIPIENT: Primary | ICD-10-CM

## 2020-11-24 DIAGNOSIS — R11.0 NAUSEA: ICD-10-CM

## 2020-11-24 DIAGNOSIS — Z94.0 KIDNEY REPLACED BY TRANSPLANT: ICD-10-CM

## 2020-11-24 DIAGNOSIS — D63.1 ANEMIA OF CHRONIC RENAL FAILURE, UNSPECIFIED CKD STAGE: ICD-10-CM

## 2020-11-24 DIAGNOSIS — D63.1 ANEMIA IN STAGE 4 CHRONIC KIDNEY DISEASE (H): ICD-10-CM

## 2020-11-24 DIAGNOSIS — N18.4 CKD (CHRONIC KIDNEY DISEASE) STAGE 4, GFR 15-29 ML/MIN (H): ICD-10-CM

## 2020-11-24 DIAGNOSIS — I15.1 HTN, KIDNEY TRANSPLANT RELATED: ICD-10-CM

## 2020-11-24 DIAGNOSIS — Z94.0 HTN, KIDNEY TRANSPLANT RELATED: ICD-10-CM

## 2020-11-24 LAB
ANION GAP SERPL CALCULATED.3IONS-SCNC: 7 MMOL/L (ref 3–14)
BASOPHILS # BLD AUTO: 0 10E9/L (ref 0–0.2)
BASOPHILS NFR BLD AUTO: 0.3 %
BUN SERPL-MCNC: 32 MG/DL (ref 7–30)
CALCIUM SERPL-MCNC: 9.8 MG/DL (ref 8.5–10.1)
CHLORIDE SERPL-SCNC: 108 MMOL/L (ref 94–109)
CO2 SERPL-SCNC: 23 MMOL/L (ref 20–32)
CREAT SERPL-MCNC: 2.22 MG/DL (ref 0.52–1.04)
CREAT UR-MCNC: 80 MG/DL
DEPRECATED CALCIDIOL+CALCIFEROL SERPL-MC: 16 UG/L (ref 20–75)
DIFFERENTIAL METHOD BLD: ABNORMAL
EOSINOPHIL # BLD AUTO: 0.2 10E9/L (ref 0–0.7)
EOSINOPHIL NFR BLD AUTO: 2 %
ERYTHROCYTE [DISTWIDTH] IN BLOOD BY AUTOMATED COUNT: 14 % (ref 10–15)
FERRITIN SERPL-MCNC: 1120 NG/ML (ref 12–150)
GFR SERPL CREATININE-BSD FRML MDRD: 26 ML/MIN/{1.73_M2}
GLUCOSE SERPL-MCNC: 99 MG/DL (ref 70–99)
HCT VFR BLD AUTO: 23 % (ref 35–47)
HGB BLD-MCNC: 7.4 G/DL (ref 11.7–15.7)
IMM GRANULOCYTES # BLD: 0 10E9/L (ref 0–0.4)
IMM GRANULOCYTES NFR BLD: 0.4 %
IRON SATN MFR SERPL: 10 % (ref 15–46)
IRON SERPL-MCNC: 21 UG/DL (ref 35–180)
LYMPHOCYTES # BLD AUTO: 0.2 10E9/L (ref 0.8–5.3)
LYMPHOCYTES NFR BLD AUTO: 2.3 %
MAGNESIUM SERPL-MCNC: 1.9 MG/DL (ref 1.6–2.3)
MCH RBC QN AUTO: 31.2 PG (ref 26.5–33)
MCHC RBC AUTO-ENTMCNC: 32.2 G/DL (ref 31.5–36.5)
MCV RBC AUTO: 97 FL (ref 78–100)
MONOCYTES # BLD AUTO: 0.3 10E9/L (ref 0–1.3)
MONOCYTES NFR BLD AUTO: 3.6 %
NEUTROPHILS # BLD AUTO: 8.4 10E9/L (ref 1.6–8.3)
NEUTROPHILS NFR BLD AUTO: 91.4 %
NRBC # BLD AUTO: 0 10*3/UL
NRBC BLD AUTO-RTO: 0 /100
PHOSPHATE SERPL-MCNC: 3.8 MG/DL (ref 2.5–4.5)
PLATELET # BLD AUTO: 491 10E9/L (ref 150–450)
POTASSIUM SERPL-SCNC: 4.6 MMOL/L (ref 3.4–5.3)
PROT UR-MCNC: 0.22 G/L
PROT/CREAT 24H UR: 0.28 G/G CR (ref 0–0.2)
PTH-INTACT SERPL-MCNC: 130 PG/ML (ref 18–80)
RBC # BLD AUTO: 2.37 10E12/L (ref 3.8–5.2)
SODIUM SERPL-SCNC: 138 MMOL/L (ref 133–144)
TACROLIMUS BLD-MCNC: 8.2 UG/L (ref 5–15)
TIBC SERPL-MCNC: 205 UG/DL (ref 240–430)
TME LAST DOSE: NORMAL H
WBC # BLD AUTO: 9.2 10E9/L (ref 4–11)

## 2020-11-24 PROCEDURE — 85025 COMPLETE CBC W/AUTO DIFF WBC: CPT | Performed by: INTERNAL MEDICINE

## 2020-11-24 PROCEDURE — 84156 ASSAY OF PROTEIN URINE: CPT | Performed by: INTERNAL MEDICINE

## 2020-11-24 PROCEDURE — 80197 ASSAY OF TACROLIMUS: CPT | Performed by: PATHOLOGY

## 2020-11-24 PROCEDURE — 258N000003 HC RX IP 258 OP 636: Performed by: INTERNAL MEDICINE

## 2020-11-24 PROCEDURE — 96360 HYDRATION IV INFUSION INIT: CPT

## 2020-11-24 PROCEDURE — 83550 IRON BINDING TEST: CPT | Performed by: INTERNAL MEDICINE

## 2020-11-24 PROCEDURE — 96361 HYDRATE IV INFUSION ADD-ON: CPT

## 2020-11-24 PROCEDURE — 83735 ASSAY OF MAGNESIUM: CPT | Performed by: INTERNAL MEDICINE

## 2020-11-24 PROCEDURE — 36415 COLL VENOUS BLD VENIPUNCTURE: CPT | Performed by: PATHOLOGY

## 2020-11-24 PROCEDURE — 99214 OFFICE O/P EST MOD 30 MIN: CPT | Mod: GC | Performed by: INTERNAL MEDICINE

## 2020-11-24 PROCEDURE — 83540 ASSAY OF IRON: CPT | Performed by: INTERNAL MEDICINE

## 2020-11-24 PROCEDURE — 83970 ASSAY OF PARATHORMONE: CPT | Performed by: INTERNAL MEDICINE

## 2020-11-24 PROCEDURE — 84100 ASSAY OF PHOSPHORUS: CPT | Performed by: INTERNAL MEDICINE

## 2020-11-24 PROCEDURE — 82306 VITAMIN D 25 HYDROXY: CPT | Performed by: INTERNAL MEDICINE

## 2020-11-24 PROCEDURE — 82728 ASSAY OF FERRITIN: CPT | Performed by: INTERNAL MEDICINE

## 2020-11-24 PROCEDURE — 36415 COLL VENOUS BLD VENIPUNCTURE: CPT | Performed by: INTERNAL MEDICINE

## 2020-11-24 PROCEDURE — G0463 HOSPITAL OUTPT CLINIC VISIT: HCPCS | Mod: 25

## 2020-11-24 PROCEDURE — 80048 BASIC METABOLIC PNL TOTAL CA: CPT | Performed by: INTERNAL MEDICINE

## 2020-11-24 RX ORDER — ONDANSETRON 4 MG/1
4 TABLET, ORALLY DISINTEGRATING ORAL EVERY 6 HOURS PRN
Qty: 30 TABLET | Refills: 0 | Status: SHIPPED | OUTPATIENT
Start: 2020-11-24 | End: 2021-01-04

## 2020-11-24 RX ADMIN — SODIUM CHLORIDE 500 ML: 9 INJECTION, SOLUTION INTRAVENOUS at 08:52

## 2020-11-24 RX ADMIN — SODIUM CHLORIDE 500 ML: 9 INJECTION, SOLUTION INTRAVENOUS at 08:07

## 2020-11-24 ASSESSMENT — PAIN SCALES - GENERAL: PAINLEVEL: MILD PAIN (3)

## 2020-11-24 NOTE — LETTER
"    11/24/2020         RE: Paolo Larkin  [address]  [place] [address]        Dear Colleague,    Thank you for referring your patient, Paolo Larkin, to the [facility]. Please see a copy of my visit note below.    Paolo Larkin came to [facility] today for a lab and assess following a kidney transplant on 11/12/20.      Discharge date: 11/22/20  Transplant coordinator: Yaz Hernandez RN  Phone number patient can be reached at: [phone]      Physical Assessment:  See physical assessment located under \"Document Flowsheets\".  Incision site: CDI, staples  Lines: NA  Moreno: NA  Urine clarity: Urinating frequently. Yellow, darker than yesterday per patient.  Hydration: Patient estimates drinking about 19 ounces of water over the past 24 hours. Patient states she becomes nauseous if she drinks too much.   Nutrition: eating small meals   Last BM: 11/24/20, medium, soft  Pain: 3/10. Pain to incision, low back, and right leg heaviness. Patient states pain is tolerable, not taking medication for pain at this time.     Labs drawn by [facility] staff Yes    Plan of care for today:   1. Fill medication box for the week  2. Tested home BP cuff     Medication changes:   1. Discontinued carvedilol. Patient educated and medication removed from medication box.   2. Add scheduled zofran q6h. Patient aware and mediation added to medication box.     Medications administered:   Administrations This Visit     0.9% sodium chloride BOLUS     Admin Date  11/24/2020 Action  New Bag Dose  500 mL Rate  700 mL/hr Route  Intravenous Administered By  Huyen Stuart RN           Admin Date  11/24/2020 Action  New Bag Dose  500 mL Rate  800 mL/hr Route  Intravenous Administered By  Margareth La, KULWANT                Patient education:    The following teaching topics were addressed: Importance of drinking 2L of non-caffeinated fluids daily, Incisional care, Signs/symptoms of infection, Good " handwashing, Medications (purposes, doses and times of administration) and Plan of care   Patient verbalized understanding and all questions answered via phone  (Patricia).    Drug level:  Patient took 6mg of prograf last evening at 1900.  Care coordinator to follow up with the result.    Face to face time: 60  Discharge Plan  Pt will follow up with [facility] and first floor lab tomorrow, 11/25/20  Discharge instructions reviewed with patient: YES  Patient/Representative verbalized understanding, all questions answered: YES (via )    Discharged from unit at 1000 with whom: self to home.    Huyen Stuart, RN, RN         Again, thank you for allowing me to participate in the care of your patient.        Sincerely,        VIDEO,

## 2020-11-24 NOTE — LETTER
11/24/2020         RE: Paolo Larkin  [address]  [place] [address]        Dear Colleague,    Thank you for referring your patient, Paolo Larkin, to the [facility]. Please see a copy of my visit note below.    ACUTE TRANSPLANT NEPHROLOGY VISIT    Assessment & Plan   # DDKT: Trend down, Cr 2.22 down from 2.41   - Baseline Creatinine: ~ TBD   - Proteinuria: Minimal (0.2-0.5 grams) (7.4g/g prior to transplant, will follow UPCR via FSGS protocol due to unknown native disease)   - Date DSA Last Checked: Nov/2020      Latest DSA: No   - BK Viremia: Not checked post transplant   - Kidney Tx Biopsy: No    # Immunosuppression: Tacrolimus immediate release (goal 8-10) and Mycophenolate mofetil (dose 750 mg every 12 hours)   - Induction with Recent Transplant:  Anti-thymocyte Globulin-Rabbit (Thymoglobulin) 5.8mg/kg  Methylprenisolone (Solumedrol)   - Changes: Yes - tac increased to 8mg BID due to low level    # Infection Prophylaxis:   - PJP: Sulfa/TMP (Bactrim)  - CMV: Valcyte 1 tab 2x/wk    # Hypertension: Orthostatic;  Goal BP: < 150/90   - Volume status: Hypovolemic     - Changes: Yes - give 1L IVF today, stop carvedilol     # Anemia in Chronic Renal Disease: Hgb: Stable      JESSI: No   - Iron studies: Low iron saturation, but high ferritin - hold on supplementation for now    # Mineral Bone Disorder:   - Secondary renal hyperparathyroidism; PTH level: Significantly elevated (601-1200 pg/ml) - last checked pre-transplant        On treatment: None  - Vitamin D; level: Not checked recently        On supplement: No  - Calcium; level: Normal        On supplement: No  - Phosphorus; level: Normal        On supplement: No    # Electrolytes:   - Potassium; level: Normal        On supplement: No  - Magnesium; level: Normal        On supplement: No  - Bicarbonate; level: Normal        On supplement: No    # Right Lower Extremity weakness, suspected femoral neuropathy:  Developed RLE weakness post-op, felt to be most likely 2/2 femoral nerve injury/stretch during transplant surgery. Had 3 day acute rehab stay post-hospitalization and strength is improving.    # Nausea: increased nausea with fluids leading to decreased PO intake and orthostasis   - Start Zofran ODT q6h for 2 days, then PRN    # Medical Compliance: Yes    # Transplant History:  Etiology of Kidney Failure: Unknown etiology  Tx: DDKT  Transplant: 11/12/2020 (Kidney)  Donor Type: Donation after Circulatory Death  Donor Class:   Crossmatch at time of Tx: negative  DSA at time of Tx: No  Significant changes in immunosuppression: None  CMV IgG Ab High Risk Discordance (D+/R-): No  EBV IgG Ab High Risk Discordance (D+/R-): No  Significant transplant-related complications: None    Transplant Office Phone Number: [phone]    Assessment and plan was discussed with the patient and she voiced her understanding and agreement.    Return visit: Return in about 1 day (around 11/25/2020).    Mary Denise MD     Attestation:  This patient has been seen and evaluated by me, Sergio Jacob MD.  I have reviewed the note and agree with plan of care as documented by the fellow.       Chief Complaint   Ms. Larkin is a 45 year old here for hospital follow up after kidney transplant.     History of Present Illness   Poor PO intake due to nausea. She is only eating tiny meals and has had about 20oz of fluids each of the last 2 days. She has not vomited. She has used Zofran in the past to help with nausea. Her leg strength is slowly improving. She otherwise feels well.    Recent Hospitalizations:  [x] No [] Yes    New Medical Issues: [x] No [] Yes    Decreased energy: [x] No [] Yes    Chest pain or SOB with exertion:  [x] No [] Yes    Appetite change or weight change: [x] No [] Yes    Nausea, vomiting or diarrhea:  [] No [x] Yes Nausea with eating and drinking   Fever, sweats or chills: [x] No [] Yes    Leg swelling: [x] No []  Yes      Home BP: Not checked    Review of Systems   A comprehensive review of systems was obtained and negative, except as noted in the HPI or PMH.    Problem List   Patient Active Problem List   Diagnosis     Anemia in chronic renal disease     HTN, kidney transplant related     Secondary renal hyperparathyroidism (H)     Coronary artery disease involving native coronary artery of native heart without angina pectoris     Status post coronary angiogram     NSTEMI (non-ST elevated myocardial infarction) (H)     Vitamin D deficiency     Pneumothorax     Kidney replaced by transplant     Femoral neuropathy, right     Femoral neuropathy of right lower extremity     Aftercare following organ transplant     CKD (chronic kidney disease) stage 4, GFR 15-29 ml/min (H)       Social History   Social History     Tobacco Use     Smoking status: Never Smoker     Smokeless tobacco: Never Used   Substance Use Topics     Alcohol use: No     Drug use: No       Allergies   Allergies   Allergen Reactions     Blood Transfusion Related (Informational Only)      Patient has a history of a clinically significant antibody against RBC antigens.  A delay in compatible RBCs may occur.     Cephalosporins Anaphylaxis     Per [facility] allergist report: positive skin intradermal tests to Cefazolin and Ceftazidime, but NOT to Penicillin G, Piperacillin and Meropenem      Chlorhexidine Anaphylaxis     Several times during anesthesia at initial phase during insertion of I.v. line and after disinfection with Chlorhexidine drop of blood pressure.  In Prick and as well intradermal tests clear positive reactions to Chlorhexidine (particularly in intradermal test to 0.0002% Chlorhexidine papule of 1cm and Erythema of 2.5cm). AVOID in future Chlorhexidine     Heparin Flush        Medications   Current Outpatient Medications   Medication Sig     acetaminophen (TYLENOL) 325 MG tablet Take 325-650 mg by mouth every 4 hours as needed for mild pain or fever       amLODIPine (NORVASC) 5 MG tablet Take 1 tablet (5 mg) by mouth daily     aspirin (ASA) 81 MG chewable tablet Take 1 tablet (81 mg) by mouth daily     atorvastatin (LIPITOR) 10 MG tablet Take 1 tablet (10 mg) by mouth daily     carvedilol (COREG) 3.125 MG tablet Take 1 tablet (3.125 mg) by mouth 2 times daily     magnesium oxide (MAG-OX) 400 MG tablet Take 1 tablet (400 mg) by mouth daily (with lunch)     mycophenolate (GENERIC EQUIVALENT) 250 MG capsule Take 3 capsules (750 mg) by mouth 2 times daily     pantoprazole (PROTONIX) 40 MG EC tablet Take 1 tablet (40 mg) by mouth every morning (before breakfast)     senna-docusate (SENOKOT-S/PERICOLACE) 8.6-50 MG tablet Take 1 tablet by mouth 2 times daily as needed for constipation     sulfamethoxazole-trimethoprim (BACTRIM) 400-80 MG tablet Take 1 tablet by mouth daily     tacrolimus (GENERIC EQUIVALENT) 1 MG capsule Take 8 capsules (8 mg) by mouth 2 times daily     valGANciclovir (VALCYTE) 450 MG tablet Take 1 tablet (450 mg) by mouth twice a week     No current facility-administered medications for this visit.      There are no discontinued medications.    Physical Exam   Vital Signs: There were no vitals taken for this visit.    GENERAL APPEARANCE: alert and no distress  HENT: mouth without ulcers or lesions  LYMPHATICS: no cervical or supraclavicular nodes  RESP: lungs clear to auscultation - no rales, rhonchi or wheezes  CV: regular rhythm, normal rate, no rub, no murmur  EDEMA: no LE edema bilaterally  ABDOMEN: soft, nondistended, nontender, bowel sounds normal  MS: extremities normal - no gross deformities noted, no evidence of inflammation in joints, no muscle tenderness  SKIN: no rash  KIDNEY TX: incision closed with staples, c/d/i    Data     Renal Latest Ref Rng & Units 11/23/2020 11/21/2020 11/20/2020   Na 133 - 144 mmol/L 136 137 139   K 3.4 - 5.3 mmol/L 4.6 4.5 4.1   Cl 94 - 109 mmol/L 106 107 110(H)   CO2 20 - 32 mmol/L 25 26 24   BUN 7 - 30 mg/dL 34(H)  36(H) 40(H)   Cr 0.52 - 1.04 mg/dL 2.41(H) 2.79(H) 3.03(H)   Glucose 70 - 99 mg/dL 100(H) 129(H) 87   Ca  8.5 - 10.1 mg/dL 9.5 9.3 8.8   Mg 1.6 - 2.3 mg/dL 1.6 - -     Bone Health Latest Ref Rng & Units 11/23/2020 11/19/2020 11/18/2020   Phos 2.5 - 4.5 mg/dL 4.0 4.4 4.6(H)     Heme Latest Ref Rng & Units 11/23/2020 11/21/2020 11/20/2020   WBC 4.0 - 11.0 10e9/L 7.1 6.8 5.7   Hgb 11.7 - 15.7 g/dL 7.7(L) 8.3(L) 7.7(L)   Plt 150 - 450 10e9/L 439 386 278   ABSOLUTE NEUTROPHIL 1.6 - 8.3 10e9/L 6.2 - -   ABSOLUTE LYMPHOCYTES 0.8 - 5.3 10e9/L 0.2(L) - -   ABSOLUTE MONOCYTES 0.0 - 1.3 10e9/L 0.4 - -   ABSOLUTE EOSINOPHILS 0.0 - 0.7 10e9/L 0.2 - -   ABSOLUTE BASOPHILS 0.0 - 0.2 10e9/L 0.0 - -   ABS IMMATURE GRANULOCYTES 0 - 0.4 10e9/L 0.0 - -   ABSOLUTE NUCLEATED RBC - 0.0 - -     Liver Latest Ref Rng & Units 11/12/2020 5/14/2020 1/24/2020   AP 40 - 150 U/L 142 - 125   TBili 0.2 - 1.3 mg/dL 0.5 - 0.3   DBili 0.0 - 0.2 mg/dL - - <0.1   ALT 0 - 50 U/L 18 12 18   AST 0 - 45 U/L 19 - 22   Tot Protein 6.8 - 8.8 g/dL 8.4 - 6.3(L)   Albumin 3.4 - 5.0 g/dL 3.8 - 2.8(L)     Pancreas Latest Ref Rng & Units 11/12/2020 1/24/2020 1/23/2020   A1C 0 - 5.6 % 4.9 Canceled, Test credited Canceled, Test credited        [facility] Txp Virology Latest Ref Rng & Units 11/14/2020 1/23/2020 4/22/2019   EBV VCA IGG ANTIBODY U/mL - - -   EBV CAPSID ANTIBODY IGG 0.0 - 0.8 AI - 1.8(H) 1.0(H)   Hep B Core NR:Nonreactive Reactive(AA) - -   Hep B Surf - - - -   HIV 1&2 NEG - - -        Recent Labs   Lab Test 11/18/20  0547 11/20/20  0611 11/23/20  0651   DOSTAC Not Provided Not Provided 2,000   TACROL <3.0* 4.6* 4.5*     Recent Labs   Lab Test 11/23/20  0651   DOSMPA 2,000   MPACID 3.12   MPAG 191.9*       Again, thank you for allowing me to participate in the care of your patient.        Sincerely,        Sergio Jacob MD

## 2020-11-24 NOTE — PATIENT INSTRUCTIONS
Dear Paolo Larkin    Thank you for choosing [facility] ([facility]) for your transplant cares.  The following information is a summary of our appointment as well as important reminders.      Please make sure your phone is available today because I will call to update you with your anti-rejection drug levels and possibly make changes to your anti-rejection dosages., Please refer to your hospital discharge instructions for details on home care services, future appointments, phone numbers, and diet/activity levels.    We look forward in seeing you on your next appointment here at [facility] ([facility]).  Please don t hesitate to call us at [phone] to reschedule any of your appointments or to speak with one of the [facility] registered nurses.  It was a pleasure taking care of you today.    Sincerely,    [facility]  [address] [place] [address]  [address]  Phone:  [phone]    1.  Return to 1st floor lab and [facility] on Wednesday 11/25 at 630am and 7am tomorrow  2.  Continue to try to drink 2-3 liters ( oz) water or non-caffeinated fluids, very very important for kidney to start working  3.  Eat small amounts of food, more often during day as nausea  4.  Ondanestron (zofran) 4 mg has been added to your medication box (small white round pill) take at 3pm today and with your 7pm meds, it goes under the tongue

## 2020-11-24 NOTE — LETTER
11/24/2020         RE: Paolo Vincent Trae  [address]  [place] [address]      ACUTE TRANSPLANT NEPHROLOGY VISIT    Assessment & Plan   # DDKT: Trend down, Cr 2.22 down from 2.41   - Baseline Creatinine: ~ TBD   - Proteinuria: Minimal (0.2-0.5 grams) (7.4g/g prior to transplant, will follow UPCR via FSGS protocol due to unknown native disease)   - Date DSA Last Checked: Nov/2020      Latest DSA: No   - BK Viremia: Not checked post transplant   - Kidney Tx Biopsy: No    # Immunosuppression: Tacrolimus immediate release (goal 8-10) and Mycophenolate mofetil (dose 750 mg every 12 hours)   - Induction with Recent Transplant:  Anti-thymocyte Globulin-Rabbit (Thymoglobulin) 5.8mg/kg  Methylprenisolone (Solumedrol)   - Changes: Yes - tac increased to 8mg BID due to low level    # Infection Prophylaxis:   - PJP: Sulfa/TMP (Bactrim)  - CMV: Valcyte 1 tab 2x/wk    # Hypertension: Orthostatic;  Goal BP: < 150/90   - Volume status: Hypovolemic     - Changes: Yes - give 1L IVF today, stop carvedilol     # Anemia in Chronic Renal Disease: Hgb: Stable      JESSI: No   - Iron studies: Low iron saturation, but high ferritin - hold on supplementation for now    # Mineral Bone Disorder:   - Secondary renal hyperparathyroidism; PTH level: Significantly elevated (601-1200 pg/ml) - last checked pre-transplant        On treatment: None  - Vitamin D; level: Not checked recently        On supplement: No  - Calcium; level: Normal        On supplement: No  - Phosphorus; level: Normal        On supplement: No    # Electrolytes:   - Potassium; level: Normal        On supplement: No  - Magnesium; level: Normal        On supplement: No  - Bicarbonate; level: Normal        On supplement: No    # Right Lower Extremity weakness, suspected femoral neuropathy: Developed RLE weakness post-op, felt to be most likely 2/2 femoral nerve injury/stretch during transplant surgery. Had 3 day acute rehab stay post-hospitalization and strength is  improving.    # Nausea: increased nausea with fluids leading to decreased PO intake and orthostasis   - Start Zofran ODT q6h for 2 days, then PRN    # Medical Compliance: Yes    # Transplant History:  Etiology of Kidney Failure: Unknown etiology  Tx: DDKT  Transplant: 11/12/2020 (Kidney)  Donor Type: Donation after Circulatory Death  Donor Class:   Crossmatch at time of Tx: negative  DSA at time of Tx: No  Significant changes in immunosuppression: None  CMV IgG Ab High Risk Discordance (D+/R-): No  EBV IgG Ab High Risk Discordance (D+/R-): No  Significant transplant-related complications: None    Transplant Office Phone Number: [phone]    Assessment and plan was discussed with the patient and she voiced her understanding and agreement.    Return visit: Return in about 1 day (around 11/25/2020).    Mary Denise MD     Attestation:  This patient has been seen and evaluated by me, Sergio Jacob MD.  I have reviewed the note and agree with plan of care as documented by the fellow.       Chief Complaint   Ms. Larkin is a 45 year old here for hospital follow up after kidney transplant.     History of Present Illness   Poor PO intake due to nausea. She is only eating tiny meals and has had about 20oz of fluids each of the last 2 days. She has not vomited. She has used Zofran in the past to help with nausea. Her leg strength is slowly improving. She otherwise feels well.    Recent Hospitalizations:  [x] No [] Yes    New Medical Issues: [x] No [] Yes    Decreased energy: [x] No [] Yes    Chest pain or SOB with exertion:  [x] No [] Yes    Appetite change or weight change: [x] No [] Yes    Nausea, vomiting or diarrhea:  [] No [x] Yes Nausea with eating and drinking   Fever, sweats or chills: [x] No [] Yes    Leg swelling: [x] No [] Yes      Home BP: Not checked    Review of Systems   A comprehensive review of systems was obtained and negative, except as noted in the HPI or PMH.    Problem List   Patient  Active Problem List   Diagnosis     Anemia in chronic renal disease     HTN, kidney transplant related     Secondary renal hyperparathyroidism (H)     Coronary artery disease involving native coronary artery of native heart without angina pectoris     Status post coronary angiogram     NSTEMI (non-ST elevated myocardial infarction) (H)     Vitamin D deficiency     Pneumothorax     Kidney replaced by transplant     Femoral neuropathy, right     Femoral neuropathy of right lower extremity     Aftercare following organ transplant     CKD (chronic kidney disease) stage 4, GFR 15-29 ml/min (H)       Social History   Social History     Tobacco Use     Smoking status: Never Smoker     Smokeless tobacco: Never Used   Substance Use Topics     Alcohol use: No     Drug use: No       Allergies   Allergies   Allergen Reactions     Blood Transfusion Related (Informational Only)      Patient has a history of a clinically significant antibody against RBC antigens.  A delay in compatible RBCs may occur.     Cephalosporins Anaphylaxis     Per [facility] allergist report: positive skin intradermal tests to Cefazolin and Ceftazidime, but NOT to Penicillin G, Piperacillin and Meropenem      Chlorhexidine Anaphylaxis     Several times during anesthesia at initial phase during insertion of I.v. line and after disinfection with Chlorhexidine drop of blood pressure.  In Prick and as well intradermal tests clear positive reactions to Chlorhexidine (particularly in intradermal test to 0.0002% Chlorhexidine papule of 1cm and Erythema of 2.5cm). AVOID in future Chlorhexidine     Heparin Flush        Medications   Current Outpatient Medications   Medication Sig     acetaminophen (TYLENOL) 325 MG tablet Take 325-650 mg by mouth every 4 hours as needed for mild pain or fever      amLODIPine (NORVASC) 5 MG tablet Take 1 tablet (5 mg) by mouth daily     aspirin (ASA) 81 MG chewable tablet Take 1 tablet (81 mg) by mouth daily     atorvastatin (LIPITOR)  10 MG tablet Take 1 tablet (10 mg) by mouth daily     carvedilol (COREG) 3.125 MG tablet Take 1 tablet (3.125 mg) by mouth 2 times daily     magnesium oxide (MAG-OX) 400 MG tablet Take 1 tablet (400 mg) by mouth daily (with lunch)     mycophenolate (GENERIC EQUIVALENT) 250 MG capsule Take 3 capsules (750 mg) by mouth 2 times daily     pantoprazole (PROTONIX) 40 MG EC tablet Take 1 tablet (40 mg) by mouth every morning (before breakfast)     senna-docusate (SENOKOT-S/PERICOLACE) 8.6-50 MG tablet Take 1 tablet by mouth 2 times daily as needed for constipation     sulfamethoxazole-trimethoprim (BACTRIM) 400-80 MG tablet Take 1 tablet by mouth daily     tacrolimus (GENERIC EQUIVALENT) 1 MG capsule Take 8 capsules (8 mg) by mouth 2 times daily     valGANciclovir (VALCYTE) 450 MG tablet Take 1 tablet (450 mg) by mouth twice a week     No current facility-administered medications for this visit.      There are no discontinued medications.    Physical Exam   Vital Signs: There were no vitals taken for this visit.    GENERAL APPEARANCE: alert and no distress  HENT: mouth without ulcers or lesions  LYMPHATICS: no cervical or supraclavicular nodes  RESP: lungs clear to auscultation - no rales, rhonchi or wheezes  CV: regular rhythm, normal rate, no rub, no murmur  EDEMA: no LE edema bilaterally  ABDOMEN: soft, nondistended, nontender, bowel sounds normal  MS: extremities normal - no gross deformities noted, no evidence of inflammation in joints, no muscle tenderness  SKIN: no rash  KIDNEY TX: incision closed with staples, c/d/i    Data     Renal Latest Ref Rng & Units 11/23/2020 11/21/2020 11/20/2020   Na 133 - 144 mmol/L 136 137 139   K 3.4 - 5.3 mmol/L 4.6 4.5 4.1   Cl 94 - 109 mmol/L 106 107 110(H)   CO2 20 - 32 mmol/L 25 26 24   BUN 7 - 30 mg/dL 34(H) 36(H) 40(H)   Cr 0.52 - 1.04 mg/dL 2.41(H) 2.79(H) 3.03(H)   Glucose 70 - 99 mg/dL 100(H) 129(H) 87   Ca  8.5 - 10.1 mg/dL 9.5 9.3 8.8   Mg 1.6 - 2.3 mg/dL 1.6 - -     Bone  Health Latest Ref Rng & Units 11/23/2020 11/19/2020 11/18/2020   Phos 2.5 - 4.5 mg/dL 4.0 4.4 4.6(H)     Heme Latest Ref Rng & Units 11/23/2020 11/21/2020 11/20/2020   WBC 4.0 - 11.0 10e9/L 7.1 6.8 5.7   Hgb 11.7 - 15.7 g/dL 7.7(L) 8.3(L) 7.7(L)   Plt 150 - 450 10e9/L 439 386 278   ABSOLUTE NEUTROPHIL 1.6 - 8.3 10e9/L 6.2 - -   ABSOLUTE LYMPHOCYTES 0.8 - 5.3 10e9/L 0.2(L) - -   ABSOLUTE MONOCYTES 0.0 - 1.3 10e9/L 0.4 - -   ABSOLUTE EOSINOPHILS 0.0 - 0.7 10e9/L 0.2 - -   ABSOLUTE BASOPHILS 0.0 - 0.2 10e9/L 0.0 - -   ABS IMMATURE GRANULOCYTES 0 - 0.4 10e9/L 0.0 - -   ABSOLUTE NUCLEATED RBC - 0.0 - -     Liver Latest Ref Rng & Units 11/12/2020 5/14/2020 1/24/2020   AP 40 - 150 U/L 142 - 125   TBili 0.2 - 1.3 mg/dL 0.5 - 0.3   DBili 0.0 - 0.2 mg/dL - - <0.1   ALT 0 - 50 U/L 18 12 18   AST 0 - 45 U/L 19 - 22   Tot Protein 6.8 - 8.8 g/dL 8.4 - 6.3(L)   Albumin 3.4 - 5.0 g/dL 3.8 - 2.8(L)     Pancreas Latest Ref Rng & Units 11/12/2020 1/24/2020 1/23/2020   A1C 0 - 5.6 % 4.9 Canceled, Test credited Canceled, Test credited        [facility] Txp Virology Latest Ref Rng & Units 11/14/2020 1/23/2020 4/22/2019   EBV VCA IGG ANTIBODY U/mL - - -   EBV CAPSID ANTIBODY IGG 0.0 - 0.8 AI - 1.8(H) 1.0(H)   Hep B Core NR:Nonreactive Reactive(AA) - -   Hep B Surf - - - -   HIV 1&2 NEG - - -        Recent Labs   Lab Test 11/18/20  0547 11/20/20  0611 11/23/20  0651   DOSTAC Not Provided Not Provided 2,000   TACROL <3.0* 4.6* 4.5*     Recent Labs   Lab Test 11/23/20  0651   DOSMPA 2,000   MPACID 3.12   MPAG 191.9*         Sergio Jacob MD

## 2020-11-24 NOTE — PROGRESS NOTES
"Paolo Larkin came to [facility] today for a lab and assess following a kidney transplant on 11/12/20.      Discharge date: 11/22/20  Transplant coordinator: Yaz Hernandez, RN  Phone number patient can be reached at: [phone]      Physical Assessment:  See physical assessment located under \"Document Flowsheets\".  Incision site: CDI, staples  Lines: NA  [place]: NA  Urine clarity: Urinating frequently. Yellow, darker than yesterday per patient.  Hydration: Patient estimates drinking about 19 ounces of water over the past 24 hours. Patient states she becomes nauseous if she drinks too much.   Nutrition: eating small meals   Last BM: 11/24/20, medium, soft  Pain: 3/10. Pain to incision, low back, and right leg heaviness. Patient states pain is tolerable, not taking medication for pain at this time.     Labs drawn by [facility] staff Yes    Plan of care for today:   1. Fill medication box for the week  2. Tested home BP cuff     Medication changes:   1. Discontinued carvedilol. Patient educated and medication removed from medication box.   2. Add scheduled zofran q6h. Patient aware and mediation added to medication box.     Medications administered:   Administrations This Visit     0.9% sodium chloride BOLUS     Admin Date  11/24/2020 Action  New Bag Dose  500 mL Rate  700 mL/hr Route  Intravenous Administered By  Huyen Stuart RN           Admin Date  11/24/2020 Action  New Bag Dose  500 mL Rate  800 mL/hr Route  Intravenous Administered By  Margareth La RN                Patient education:    The following teaching topics were addressed: Importance of drinking 2L of non-caffeinated fluids daily, Incisional care, Signs/symptoms of infection, Good handwashing, Medications (purposes, doses and times of administration) and Plan of care   Patient verbalized understanding and all questions answered via phone  (Patricia).    Drug level:  Patient took 6mg of prograf last evening at 1900.  Care coordinator to follow up " with the result.    Face to face time: 60  Discharge Plan  Pt will follow up with [facility] and first floor lab tomorrow, 11/25/20  Discharge instructions reviewed with patient: YES  Patient/Representative verbalized understanding, all questions answered: YES (via )    Discharged from unit at 1000 with whom: self to home.    Huyen Stuart, RN, RN

## 2020-11-24 NOTE — TELEPHONE ENCOUNTER
Patient to follow up in [facility] tomorrow (Wednesday). Lives in [place], [place] but staying with family in [place]. Home care not set up prior to discharge from /[facility].     Spoke to patient with ADELINE Gomez . States she is feeling well, very excited about her new transplant.   Is planning to go back home to [place], [place] once she is cleared by nephrologist. Is interested in home care, especially for assistance with medication management. States she does not feel comfortable setting up her own medications yet but has family to assist if needed.     Patient has staples over incision. No ureteral stent per op notes.   Needs all post-transplant appointments. Message sent to SOT schedulers.     Paw has BP cuff and thermometer. Is taking twice daily and recording in lab book. Has a scale at home in [place] but not currently in [place].   Preferred pharmacy:[facility].     Tac at goal 8-10, no dose adjustment at this time.

## 2020-11-24 NOTE — PROGRESS NOTES
ACUTE TRANSPLANT NEPHROLOGY VISIT    Assessment & Plan   # DDKT: Trend down, Cr 2.22 down from 2.41   - Baseline Creatinine: ~ TBD   - Proteinuria: Minimal (0.2-0.5 grams) (7.4g/g prior to transplant, will follow UPCR via FSGS protocol due to unknown native disease)   - Date DSA Last Checked: Nov/2020      Latest DSA: No   - BK Viremia: Not checked post transplant   - Kidney Tx Biopsy: No    # Immunosuppression: Tacrolimus immediate release (goal 8-10) and Mycophenolate mofetil (dose 750 mg every 12 hours)   - Induction with Recent Transplant:  Anti-thymocyte Globulin-Rabbit (Thymoglobulin) 5.8mg/kg  Methylprenisolone (Solumedrol)   - Changes: Yes - tac increased to 8mg BID due to low level    # Infection Prophylaxis:   - PJP: Sulfa/TMP (Bactrim)  - CMV: Valcyte 1 tab 2x/wk    # Hypertension: Orthostatic;  Goal BP: < 150/90   - Volume status: Hypovolemic     - Changes: Yes - give 1L IVF today, stop carvedilol     # Anemia in Chronic Renal Disease: Hgb: Stable      JESSI: No   - Iron studies: Low iron saturation, but high ferritin - hold on supplementation for now    # Mineral Bone Disorder:   - Secondary renal hyperparathyroidism; PTH level: Significantly elevated (601-1200 pg/ml) - last checked pre-transplant        On treatment: None  - Vitamin D; level: Not checked recently        On supplement: No  - Calcium; level: Normal        On supplement: No  - Phosphorus; level: Normal        On supplement: No    # Electrolytes:   - Potassium; level: Normal        On supplement: No  - Magnesium; level: Normal        On supplement: No  - Bicarbonate; level: Normal        On supplement: No    # Right Lower Extremity weakness, suspected femoral neuropathy: Developed RLE weakness post-op, felt to be most likely 2/2 femoral nerve injury/stretch during transplant surgery. Had 3 day acute rehab stay post-hospitalization and strength is improving.    # Nausea: increased nausea with fluids leading to decreased PO intake and  orthostasis   - Start Zofran ODT q6h for 2 days, then PRN    # Medical Compliance: Yes    # Transplant History:  Etiology of Kidney Failure: Unknown etiology  Tx: DDKT  Transplant: 11/12/2020 (Kidney)  Donor Type: Donation after Circulatory Death  Donor Class:   Crossmatch at time of Tx: negative  DSA at time of Tx: No  Significant changes in immunosuppression: None  CMV IgG Ab High Risk Discordance (D+/R-): No  EBV IgG Ab High Risk Discordance (D+/R-): No  Significant transplant-related complications: None    Transplant Office Phone Number: [phone]    Assessment and plan was discussed with the patient and she voiced her understanding and agreement.    Return visit: Return in about 1 day (around 11/25/2020).    Mary Denise MD     Attestation:  This patient has been seen and evaluated by me, Sergio Jacob MD.  I have reviewed the note and agree with plan of care as documented by the fellow.       Chief Complaint   Ms. Larkin is a 45 year old here for hospital follow up after kidney transplant.     History of Present Illness   Poor PO intake due to nausea. She is only eating tiny meals and has had about 20oz of fluids each of the last 2 days. She has not vomited. She has used Zofran in the past to help with nausea. Her leg strength is slowly improving. She otherwise feels well.    Recent Hospitalizations:  [x] No [] Yes    New Medical Issues: [x] No [] Yes    Decreased energy: [x] No [] Yes    Chest pain or SOB with exertion:  [x] No [] Yes    Appetite change or weight change: [x] No [] Yes    Nausea, vomiting or diarrhea:  [] No [x] Yes Nausea with eating and drinking   Fever, sweats or chills: [x] No [] Yes    Leg swelling: [x] No [] Yes      Home BP: Not checked    Review of Systems   A comprehensive review of systems was obtained and negative, except as noted in the HPI or PMH.    Problem List   Patient Active Problem List   Diagnosis     Anemia in chronic renal disease     HTN, kidney  transplant related     Secondary renal hyperparathyroidism (H)     Coronary artery disease involving native coronary artery of native heart without angina pectoris     Status post coronary angiogram     NSTEMI (non-ST elevated myocardial infarction) (H)     Vitamin D deficiency     Pneumothorax     Kidney replaced by transplant     Femoral neuropathy, right     Femoral neuropathy of right lower extremity     Aftercare following organ transplant     CKD (chronic kidney disease) stage 4, GFR 15-29 ml/min (H)       Social History   Social History     Tobacco Use     Smoking status: Never Smoker     Smokeless tobacco: Never Used   Substance Use Topics     Alcohol use: No     Drug use: No       Allergies   Allergies   Allergen Reactions     Blood Transfusion Related (Informational Only)      Patient has a history of a clinically significant antibody against RBC antigens.  A delay in compatible RBCs may occur.     Cephalosporins Anaphylaxis     Per [facility] allergist report: positive skin intradermal tests to Cefazolin and Ceftazidime, but NOT to Penicillin G, Piperacillin and Meropenem      Chlorhexidine Anaphylaxis     Several times during anesthesia at initial phase during insertion of I.v. line and after disinfection with Chlorhexidine drop of blood pressure.  In Prick and as well intradermal tests clear positive reactions to Chlorhexidine (particularly in intradermal test to 0.0002% Chlorhexidine papule of 1cm and Erythema of 2.5cm). AVOID in future Chlorhexidine     Heparin Flush        Medications   Current Outpatient Medications   Medication Sig     acetaminophen (TYLENOL) 325 MG tablet Take 325-650 mg by mouth every 4 hours as needed for mild pain or fever      amLODIPine (NORVASC) 5 MG tablet Take 1 tablet (5 mg) by mouth daily     aspirin (ASA) 81 MG chewable tablet Take 1 tablet (81 mg) by mouth daily     atorvastatin (LIPITOR) 10 MG tablet Take 1 tablet (10 mg) by mouth daily     carvedilol (COREG) 3.125 MG  tablet Take 1 tablet (3.125 mg) by mouth 2 times daily     magnesium oxide (MAG-OX) 400 MG tablet Take 1 tablet (400 mg) by mouth daily (with lunch)     mycophenolate (GENERIC EQUIVALENT) 250 MG capsule Take 3 capsules (750 mg) by mouth 2 times daily     pantoprazole (PROTONIX) 40 MG EC tablet Take 1 tablet (40 mg) by mouth every morning (before breakfast)     senna-docusate (SENOKOT-S/PERICOLACE) 8.6-50 MG tablet Take 1 tablet by mouth 2 times daily as needed for constipation     sulfamethoxazole-trimethoprim (BACTRIM) 400-80 MG tablet Take 1 tablet by mouth daily     tacrolimus (GENERIC EQUIVALENT) 1 MG capsule Take 8 capsules (8 mg) by mouth 2 times daily     valGANciclovir (VALCYTE) 450 MG tablet Take 1 tablet (450 mg) by mouth twice a week     No current facility-administered medications for this visit.      There are no discontinued medications.    Physical Exam   Vital Signs: There were no vitals taken for this visit.    GENERAL APPEARANCE: alert and no distress  HENT: mouth without ulcers or lesions  LYMPHATICS: no cervical or supraclavicular nodes  RESP: lungs clear to auscultation - no rales, rhonchi or wheezes  CV: regular rhythm, normal rate, no rub, no murmur  EDEMA: no LE edema bilaterally  ABDOMEN: soft, nondistended, nontender, bowel sounds normal  MS: extremities normal - no gross deformities noted, no evidence of inflammation in joints, no muscle tenderness  SKIN: no rash  KIDNEY TX: incision closed with staples, c/d/i    Data     Renal Latest Ref Rng & Units 11/23/2020 11/21/2020 11/20/2020   Na 133 - 144 mmol/L 136 137 139   K 3.4 - 5.3 mmol/L 4.6 4.5 4.1   Cl 94 - 109 mmol/L 106 107 110(H)   CO2 20 - 32 mmol/L 25 26 24   BUN 7 - 30 mg/dL 34(H) 36(H) 40(H)   Cr 0.52 - 1.04 mg/dL 2.41(H) 2.79(H) 3.03(H)   Glucose 70 - 99 mg/dL 100(H) 129(H) 87   Ca  8.5 - 10.1 mg/dL 9.5 9.3 8.8   Mg 1.6 - 2.3 mg/dL 1.6 - -     Bone Health Latest Ref Rng & Units 11/23/2020 11/19/2020 11/18/2020   Phos 2.5 - 4.5  mg/dL 4.0 4.4 4.6(H)     Heme Latest Ref Rng & Units 11/23/2020 11/21/2020 11/20/2020   WBC 4.0 - 11.0 10e9/L 7.1 6.8 5.7   Hgb 11.7 - 15.7 g/dL 7.7(L) 8.3(L) 7.7(L)   Plt 150 - 450 10e9/L 439 386 278   ABSOLUTE NEUTROPHIL 1.6 - 8.3 10e9/L 6.2 - -   ABSOLUTE LYMPHOCYTES 0.8 - 5.3 10e9/L 0.2(L) - -   ABSOLUTE MONOCYTES 0.0 - 1.3 10e9/L 0.4 - -   ABSOLUTE EOSINOPHILS 0.0 - 0.7 10e9/L 0.2 - -   ABSOLUTE BASOPHILS 0.0 - 0.2 10e9/L 0.0 - -   ABS IMMATURE GRANULOCYTES 0 - 0.4 10e9/L 0.0 - -   ABSOLUTE NUCLEATED RBC - 0.0 - -     Liver Latest Ref Rng & Units 11/12/2020 5/14/2020 1/24/2020   AP 40 - 150 U/L 142 - 125   TBili 0.2 - 1.3 mg/dL 0.5 - 0.3   DBili 0.0 - 0.2 mg/dL - - <0.1   ALT 0 - 50 U/L 18 12 18   AST 0 - 45 U/L 19 - 22   Tot Protein 6.8 - 8.8 g/dL 8.4 - 6.3(L)   Albumin 3.4 - 5.0 g/dL 3.8 - 2.8(L)     Pancreas Latest Ref Rng & Units 11/12/2020 1/24/2020 1/23/2020   A1C 0 - 5.6 % 4.9 Canceled, Test credited Canceled, Test credited        [facility] T[facility] Virology Latest Ref Rng & Units 11/14/2020 1/23/2020 4/22/2019   EBV VCA IGG ANTIBODY U/mL - - -   EBV CAPSID ANTIBODY IGG 0.0 - 0.8 AI - 1.8(H) 1.0(H)   Hep B Core NR:Nonreactive Reactive(AA) - -   Hep B Surf - - - -   HIV 1&2 NEG - - -        Recent Labs   Lab Test 11/18/20  0547 11/20/20  0611 11/23/20  0651   DOSTAC Not Provided Not Provided 2,000   TACROL <3.0* 4.6* 4.5*     Recent Labs   Lab Test 11/23/20 0651   DOSMPA 2,000   MPACID 3.12   MPAG 191.9*

## 2020-11-24 NOTE — TELEPHONE ENCOUNTER
Provider Call: General  Route to LPN    Reason for call: Wondering what the plans on discharge- setting up meds  Pt is from out of town     Call back needed? Yes    Return Call Needed  Same as documented in contacts section  When to return call?: Greater than one day: Route standard priority

## 2020-11-25 ENCOUNTER — TELEPHONE (OUTPATIENT)
Dept: TRANSPLANT | Facility: CLINIC | Age: 45
End: 2020-11-25

## 2020-11-25 ENCOUNTER — ANCILLARY PROCEDURE (OUTPATIENT)
Dept: ULTRASOUND IMAGING | Facility: CLINIC | Age: 45
End: 2020-11-25
Attending: INTERNAL MEDICINE
Payer: MEDICARE

## 2020-11-25 ENCOUNTER — OFFICE VISIT (OUTPATIENT)
Dept: INFUSION THERAPY | Facility: CLINIC | Age: 45
End: 2020-11-25
Payer: MEDICARE

## 2020-11-25 ENCOUNTER — INFUSION THERAPY VISIT (OUTPATIENT)
Dept: INFUSION THERAPY | Facility: CLINIC | Age: 45
End: 2020-11-25
Attending: INTERNAL MEDICINE
Payer: MEDICARE

## 2020-11-25 ENCOUNTER — APPOINTMENT (OUTPATIENT)
Dept: LAB | Facility: CLINIC | Age: 45
End: 2020-11-25
Attending: INTERNAL MEDICINE
Payer: MEDICARE

## 2020-11-25 VITALS
OXYGEN SATURATION: 100 % | BODY MASS INDEX: 20.69 KG/M2 | TEMPERATURE: 98.5 F | HEART RATE: 90 BPM | WEIGHT: 116.8 LBS | RESPIRATION RATE: 16 BRPM | DIASTOLIC BLOOD PRESSURE: 85 MMHG | SYSTOLIC BLOOD PRESSURE: 132 MMHG

## 2020-11-25 DIAGNOSIS — N18.4 CKD (CHRONIC KIDNEY DISEASE) STAGE 4, GFR 15-29 ML/MIN (H): ICD-10-CM

## 2020-11-25 DIAGNOSIS — Z94.0 KIDNEY REPLACED BY TRANSPLANT: Primary | ICD-10-CM

## 2020-11-25 DIAGNOSIS — Z94.0 KIDNEY TRANSPLANT RECIPIENT: ICD-10-CM

## 2020-11-25 DIAGNOSIS — D63.1 ANEMIA IN STAGE 4 CHRONIC KIDNEY DISEASE (H): ICD-10-CM

## 2020-11-25 DIAGNOSIS — N25.81 SECONDARY RENAL HYPERPARATHYROIDISM (H): ICD-10-CM

## 2020-11-25 DIAGNOSIS — E55.9 VITAMIN D DEFICIENCY: ICD-10-CM

## 2020-11-25 DIAGNOSIS — Z94.0 HTN, KIDNEY TRANSPLANT RELATED: ICD-10-CM

## 2020-11-25 DIAGNOSIS — Z48.298 AFTERCARE FOLLOWING ORGAN TRANSPLANT: ICD-10-CM

## 2020-11-25 DIAGNOSIS — N18.4 ANEMIA IN STAGE 4 CHRONIC KIDNEY DISEASE (H): ICD-10-CM

## 2020-11-25 DIAGNOSIS — Z94.0 KIDNEY TRANSPLANT RECIPIENT: Primary | ICD-10-CM

## 2020-11-25 DIAGNOSIS — I15.1 HTN, KIDNEY TRANSPLANT RELATED: ICD-10-CM

## 2020-11-25 LAB
ANION GAP SERPL CALCULATED.3IONS-SCNC: 8 MMOL/L (ref 3–14)
BASOPHILS # BLD AUTO: 0 10E9/L (ref 0–0.2)
BASOPHILS NFR BLD AUTO: 0.3 %
BKV DNA # SPEC NAA+PROBE: NORMAL COPIES/ML
BKV DNA SPEC NAA+PROBE-LOG#: NORMAL LOG COPIES/ML
BUN SERPL-MCNC: 30 MG/DL (ref 7–30)
CALCIUM SERPL-MCNC: 9.6 MG/DL (ref 8.5–10.1)
CHLORIDE SERPL-SCNC: 106 MMOL/L (ref 94–109)
CO2 SERPL-SCNC: 24 MMOL/L (ref 20–32)
CREAT SERPL-MCNC: 2.21 MG/DL (ref 0.52–1.04)
DIFFERENTIAL METHOD BLD: ABNORMAL
EOSINOPHIL # BLD AUTO: 0.2 10E9/L (ref 0–0.7)
EOSINOPHIL NFR BLD AUTO: 2 %
ERYTHROCYTE [DISTWIDTH] IN BLOOD BY AUTOMATED COUNT: 13.8 % (ref 10–15)
GFR SERPL CREATININE-BSD FRML MDRD: 26 ML/MIN/{1.73_M2}
GLUCOSE SERPL-MCNC: 98 MG/DL (ref 70–99)
HCT VFR BLD AUTO: 22.3 % (ref 35–47)
HGB BLD-MCNC: 7.1 G/DL (ref 11.7–15.7)
IMM GRANULOCYTES # BLD: 0 10E9/L (ref 0–0.4)
IMM GRANULOCYTES NFR BLD: 0.2 %
LYMPHOCYTES # BLD AUTO: 0.2 10E9/L (ref 0.8–5.3)
LYMPHOCYTES NFR BLD AUTO: 2.2 %
MAGNESIUM SERPL-MCNC: 1.7 MG/DL (ref 1.6–2.3)
MCH RBC QN AUTO: 30.9 PG (ref 26.5–33)
MCHC RBC AUTO-ENTMCNC: 31.8 G/DL (ref 31.5–36.5)
MCV RBC AUTO: 97 FL (ref 78–100)
MONOCYTES # BLD AUTO: 0.3 10E9/L (ref 0–1.3)
MONOCYTES NFR BLD AUTO: 3.4 %
NEUTROPHILS # BLD AUTO: 8.7 10E9/L (ref 1.6–8.3)
NEUTROPHILS NFR BLD AUTO: 91.9 %
NRBC # BLD AUTO: 0 10*3/UL
NRBC BLD AUTO-RTO: 0 /100
PHOSPHATE SERPL-MCNC: 4 MG/DL (ref 2.5–4.5)
PLATELET # BLD AUTO: 448 10E9/L (ref 150–450)
POTASSIUM SERPL-SCNC: 4.4 MMOL/L (ref 3.4–5.3)
RADIOLOGIST FLAGS: ABNORMAL
RBC # BLD AUTO: 2.3 10E12/L (ref 3.8–5.2)
SODIUM SERPL-SCNC: 138 MMOL/L (ref 133–144)
SPECIMEN SOURCE: NORMAL
TACROLIMUS BLD-MCNC: 6.6 UG/L (ref 5–15)
TME LAST DOSE: 2000 H
WBC # BLD AUTO: 9.5 10E9/L (ref 4–11)

## 2020-11-25 PROCEDURE — 84100 ASSAY OF PHOSPHORUS: CPT | Performed by: INTERNAL MEDICINE

## 2020-11-25 PROCEDURE — 99214 OFFICE O/P EST MOD 30 MIN: CPT | Mod: GC | Performed by: INTERNAL MEDICINE

## 2020-11-25 PROCEDURE — 80197 ASSAY OF TACROLIMUS: CPT | Performed by: INTERNAL MEDICINE

## 2020-11-25 PROCEDURE — 36415 COLL VENOUS BLD VENIPUNCTURE: CPT | Performed by: INTERNAL MEDICINE

## 2020-11-25 PROCEDURE — 76776 US EXAM K TRANSPL W/DOPPLER: CPT | Mod: GC | Performed by: RADIOLOGY

## 2020-11-25 PROCEDURE — G0463 HOSPITAL OUTPT CLINIC VISIT: HCPCS

## 2020-11-25 PROCEDURE — 80048 BASIC METABOLIC PNL TOTAL CA: CPT | Performed by: INTERNAL MEDICINE

## 2020-11-25 PROCEDURE — 85025 COMPLETE CBC W/AUTO DIFF WBC: CPT | Performed by: INTERNAL MEDICINE

## 2020-11-25 PROCEDURE — 83735 ASSAY OF MAGNESIUM: CPT | Performed by: INTERNAL MEDICINE

## 2020-11-25 RX ORDER — TACROLIMUS 1 MG/1
8 CAPSULE ORAL 2 TIMES DAILY
Qty: 480 CAPSULE | Refills: 11 | Status: SHIPPED | OUTPATIENT
Start: 2020-11-25 | End: 2020-12-03

## 2020-11-25 RX ORDER — TACROLIMUS 1 MG/1
9 CAPSULE ORAL 2 TIMES DAILY
Qty: 540 CAPSULE | Refills: 11 | Status: SHIPPED | OUTPATIENT
Start: 2020-11-25 | End: 2020-11-25

## 2020-11-25 NOTE — LETTER
Patient Information        Patient Name   Paolo Larkin ([id number]) Sex   Female    1975         Patient Demographics        Address   [address] [place] [id number] Phone   [phone] (Home)   NONE (Work)   [phone] (Mobile)         Patient Ethnicity & Race        Ethnic Group Patient Race       Patricia Pickering         PCP and Center    Primary Care Provider  Phone Center     Lorna Clancy [phone] [address] [facility] [address]         Emergency Contact(s)        Name Relation Home Work Mobile        Dakamran, Michael Friend   [phone]         KA,JAW Spouse [phone]

## 2020-11-25 NOTE — TELEPHONE ENCOUNTER
ISSUE:   Tacrolimus IR level 6.6 on 11/25, goal 8-10, dose 8 mg BID.    PLAN:   Please call patient and confirm this was an accurate 12-hour trough. Verify Tacrolimus IR dose 8 mg BID. Confirm no new medications or illness. Confirm no missed doses. If accurate trough and accurate dose, increase Tacrolimus IR dose to 9 mg BID and repeat labs in 2 days (on Friday)     OUTCOME:   Spoke with patient she states she is only taking 6 mg bid. Med card and pill box both reflect 6 mg bid. Patient instructed to increase tacrolimus to 8 mg bid and repeat level on Friday. Patient verbalized understanding. RNCC instructed patient to adjust both her medication card and her med box.

## 2020-11-25 NOTE — TELEPHONE ENCOUNTER
Post Kidney and Pancreas Transplant Team Conference  Date: 11/25/2020  Transplant Coordinator: Yaz Villa     Attendees:  [x]  Dr. Jacob  [x] Tina Viveros LPN     [x]  Dr. Lopez [x] Ashli Tejeda RN [] Alayna Lepe LPN   []  Dr. Stephenson [] Yoanna Estes, RN    [x]  Dr. Sands [] Rosa M Winston RN [] Cedric Barrera, PharmD   [] Dr. Mccall [x] Brandi Kruger, KULWANT    [] Dr. Marcelino [] Rafat Painter RN    [] Dr. Wood [] Alexandria Cesar RN    [x] Dr. Mcnamara [] Radha Mendoza RN    []  Dr. Saavedra [] Blank Anderson, KULWANT    [] Surgery Fellow [x] Yaz Villa RN    [] Apple Bowen NP [] Lesly Car RN        Verbal Plan Read Back:   Find home care near patients home town  Recommend patient stay nearby and have labs completed at the [facility] on Friday    Routed to RN Coordinator   Tina Viveros LPN

## 2020-11-25 NOTE — PATIENT INSTRUCTIONS
Dear Paolo Larkin    Thank you for choosing [facility] ([facility]) for your transplant cares.  The following information is a summary of our appointment as well as important reminders.      Please make sure your phone is available today. Your coordinator will call to update you with your anti-rejection drug levels and possibly make changes to your anti-rejection dosages.    We look forward in seeing you on your next appointment here at [facility] ([facility]).  Please don t hesitate to call us at [phone] to reschedule any of your appointments or to speak with one of the [facility] registered nurses.  It was a pleasure taking care of you today.    Sincerely,    [facility]  Specialty Infusion & Procedure Center  [address] [place] [address]  [id number]  Phone:  [phone]    1. Return to clinic and surgery center today at 3:00pm for ultrasound.  2. Return to first floor lab at 6:30am on 11/27 and appointment on second floor [facility] after lab for assessment.       Call if:   BP >160 /90 or <105 / 65   Temp >100.5   Weight gain / loss of >4lbs in 24hours   HR >100 or <60.     Contact Information:   Transplant Coordinator: Yaz Villa RN  Transplant Office: [phone] Monday - Friday, 7-898p, not open weekend or holidays  [facility]: [phone] Ask for physician or RN coordinator on call for adult kidney transplant.

## 2020-11-25 NOTE — PROGRESS NOTES
"Paolo Larkin came to [facility] today for a lab and assess following a kidney transplant on 11/12/20.      Discharge date: 11/22/20  Transplant coordinator: Yaz Villa  Phone number patient can be reached at: [phone]      Physical Assessment:  See physical assessment located under \"Document Flowsheets\".  Incision site: CDI, staples. Small amount of redness along site of staples.  Lines: NA  [place]: NA  Urine clarity: clear, going frequently  Hydration: patient reports drinking 1-2L of fluids over the past 24 hours  Nutrition: eating small meals, nausea improved  Last BM: 11/25/20, soft, small amount  Pain: 8/10 to incision    Labs drawn by [facility] staff No    Plan of care for today:   First floor labs, assessment in [facility], seen by nephrology providers. Patient to return for ultrasound at 1500 today due to right flank pain. Patient then to return to first floor lab and [facility] for [facility] on 11/27 and 11/30. Scheduled zofran q6h added to medication box through Thursday. This is to be readdressed on Friday.     Medication changes: Scheduled zofran q6h added to medication box through Thursday. This is to be readdressed on Friday.     Medications administered: None      Patient education:    The following teaching topics were addressed: Importance of drinking 2L of non-caffeinated fluids daily, Incisional care, Signs/symptoms of infection, Good handwashing, Medications (purposes, doses and times of administration), Phone numbers to call with concenrs (Transplant coordinator, Unit 6- and [facility]) and Plan of care   Patient verbalized understanding and all questions answered via .    Drug level:  Patient took 6mg of prograf last evening at 1900.  Care coordinator to follow up with the result.    Face to face time: 45 minutes  Discharge Plan    Pt will follow up with ultrasound today and first floor lab and [facility] on Friday and Monday.  Discharge instructions reviewed with patient: YES  Patient/Representative " verbalized understanding, all questions answered: YES    Discharged from unit at 0930 with whom: self to home ([facility] home in [place] [facility]).    Margareth La, RN, RN

## 2020-11-25 NOTE — LETTER
11/25/2020         RE: Paolo Vincent Trae  [address]  [place] [address]      ACUTE TRANSPLANT NEPHROLOGY VISIT    Assessment & Plan   # DDKT: Stable, Cr stable ~2.2 today but had been trending down in days prior. With new abdominal pain, will repeat US to evaluate perinephric hematoma   - Baseline Creatinine: ~ TBD   - Proteinuria: Minimal (0.2-0.5 grams) (7.4g/g prior to transplant, will follow UPCR via FSGS protocol due to unknown native disease)   - Date DSA Last Checked: Nov/2020      Latest DSA: No   - BK Viremia: Not checked post transplant   - Kidney Tx Biopsy: No    # Immunosuppression: Tacrolimus immediate release (goal 8-10) and Mycophenolate mofetil (dose 750 mg every 12 hours)   - Induction with Recent Transplant:  Anti-thymocyte Globulin-Rabbit (Thymoglobulin) 5.8mg/kg  Methylprenisolone (Solumedrol)   - Changes: No    # Infection Prophylaxis:   - PJP: Sulfa/TMP (Bactrim)  - CMV: Valcyte 1 tab 2x/wk    # Hypertension: Controlled;  Goal BP: < 150/90   - Volume status: Euvolemic     - Changes: No     # Anemia in Chronic Renal Disease: Hgb: Stable      JESSI: No   - Iron studies: Low iron saturation, but high ferritin - hold on supplementation for now    # Mineral Bone Disorder:   - Secondary renal hyperparathyroidism; PTH level: Minimally elevated ( pg/ml)   On treatment: None  - Vitamin D; level: Low        On supplement: No  - Calcium; level: Normal        On supplement: No  - Phosphorus; level: Normal        On supplement: No    # Electrolytes:   - Potassium; level: Normal        On supplement: No  - Magnesium; level: Normal        On supplement: No  - Bicarbonate; level: Normal        On supplement: No    # Right abdominal pain: developed new, acute pain overnight 11/24-11/25 in the right lower abdomen/right flank area. She had a perinephric hematoma on previous US (11/13), which if enlarging could be contributing to her pain. Her Cr also stopped trending down today and plateaued.  Will obtain US.    - Transplant kidney US today to evaluate for hematoma     # Right Lower Extremity weakness, suspected femoral neuropathy: Developed RLE weakness post-op, felt to be most likely 2/2 femoral nerve injury/stretch during transplant surgery. Had 3 day acute rehab stay post-hospitalization and strength is improving.    # Nausea: improving with Zofran.    - Continue Zofran ODT q6h for another 2 days, then PRN    # Medical Compliance: Yes    # Transplant History:  Etiology of Kidney Failure: Unknown etiology  Tx: DDKT  Transplant: 11/12/2020 (Kidney)  Donor Type: Donation after Circulatory Death  Donor Class:   Crossmatch at time of Tx: negative  DSA at time of Tx: No  Significant changes in immunosuppression: None  CMV IgG Ab High Risk Discordance (D+/R-): No  EBV IgG Ab High Risk Discordance (D+/R-): No  Significant transplant-related complications: None    Transplant Office Phone Number: [phone]    Assessment and plan was discussed with the patient and she voiced her understanding and agreement.    Return visit: Return in about 2 days (around 11/27/2020).    Mary Denise MD     Attestation:  This patient has been seen and evaluated by me, Sergio Jacob MD.  I have reviewed the note and agree with plan of care as documented by the fellow.       Chief Complaint   Ms. Larkin is a 45 year old here for hospital follow up after kidney transplant.     History of Present Illness   Overnight, developed an achey/stabbing pain in her right lower abdomen radiating to the right flank area. Describes the pain as 8/10 when most severe. Not worse with movement or walking. The pain improves a little bit when she holds light pressure over the area. She had not taken anything for the pain because she wasn't sure what she could take, but she did take a dose of tylenol after arriving here today. No fevers or chills. She otherwise feels okay. Her nausea is much improved with the Zofran and she was able to  eat many small meals yesterday.     Recent Hospitalizations:  [x] No [] Yes    New Medical Issues: [] No [x] Yes Right sided abdominal/flank pain   Decreased energy: [x] No [] Yes    Chest pain or SOB with exertion:  [x] No [] Yes    Appetite change or weight change: [x] No [] Yes    Nausea, vomiting or diarrhea:  [] No [x] Yes Nausea improving with Zofran   Fever, sweats or chills: [x] No [] Yes    Leg swelling: [x] No [] Yes      Home BP: Not checked    Review of Systems   A comprehensive review of systems was obtained and negative, except as noted in the HPI or PMH.    Problem List   Patient Active Problem List   Diagnosis     Anemia in chronic renal disease     HTN, kidney transplant related     Secondary renal hyperparathyroidism (H)     Coronary artery disease involving native coronary artery of native heart without angina pectoris     Status post coronary angiogram     NSTEMI (non-ST elevated myocardial infarction) (H)     Vitamin D deficiency     Pneumothorax     Kidney replaced by transplant     Femoral neuropathy, right     Femoral neuropathy of right lower extremity     Aftercare following organ transplant     CKD (chronic kidney disease) stage 4, GFR 15-29 ml/min (H)       Social History   Social History     Tobacco Use     Smoking status: Never Smoker     Smokeless tobacco: Never Used   Substance Use Topics     Alcohol use: No     Drug use: No       Allergies   Allergies   Allergen Reactions     Blood Transfusion Related (Informational Only)      Patient has a history of a clinically significant antibody against RBC antigens.  A delay in compatible RBCs may occur.     Cephalosporins Anaphylaxis     Per [facility] allergist report: positive skin intradermal tests to Cefazolin and Ceftazidime, but NOT to Penicillin G, Piperacillin and Meropenem      Chlorhexidine Anaphylaxis     Several times during anesthesia at initial phase during insertion of I.v. line and after disinfection with Chlorhexidine drop of  blood pressure.  In Prick and as well intradermal tests clear positive reactions to Chlorhexidine (particularly in intradermal test to 0.0002% Chlorhexidine papule of 1cm and Erythema of 2.5cm). AVOID in future Chlorhexidine     Heparin Flush        Medications   Current Outpatient Medications   Medication Sig     acetaminophen (TYLENOL) 325 MG tablet Take 325-650 mg by mouth every 4 hours as needed for mild pain or fever      amLODIPine (NORVASC) 5 MG tablet Take 1 tablet (5 mg) by mouth daily     aspirin (ASA) 81 MG chewable tablet Take 1 tablet (81 mg) by mouth daily     atorvastatin (LIPITOR) 10 MG tablet Take 1 tablet (10 mg) by mouth daily     magnesium oxide (MAG-OX) 400 MG tablet Take 1 tablet (400 mg) by mouth daily (with lunch)     mycophenolate (GENERIC EQUIVALENT) 250 MG capsule Take 3 capsules (750 mg) by mouth 2 times daily     ondansetron (ZOFRAN-ODT) 4 MG ODT tab Take 1 tablet (4 mg) by mouth every 6 hours as needed for nausea     pantoprazole (PROTONIX) 40 MG EC tablet Take 1 tablet (40 mg) by mouth every morning (before breakfast)     senna-docusate (SENOKOT-S/PERICOLACE) 8.6-50 MG tablet Take 1 tablet by mouth 2 times daily as needed for constipation     sulfamethoxazole-trimethoprim (BACTRIM) 400-80 MG tablet Take 1 tablet by mouth daily     tacrolimus (GENERIC EQUIVALENT) 1 MG capsule Take 8 capsules (8 mg) by mouth 2 times daily     valGANciclovir (VALCYTE) 450 MG tablet Take 1 tablet (450 mg) by mouth twice a week     No current facility-administered medications for this visit.      There are no discontinued medications.    Physical Exam   Vital Signs: There were no vitals taken for this visit.    GENERAL APPEARANCE: alert and no distress  HENT: mouth without ulcers or lesions  LYMPHATICS: no cervical or supraclavicular nodes  RESP: lungs clear to auscultation - no rales, rhonchi or wheezes  CV: regular rhythm, normal rate, no rub, no murmur  EDEMA: no LE edema bilaterally  ABDOMEN: soft,  nondistended, nontender, bowel sounds normal; no skin changes over right flank. No tenderness to palpation over right flank where patient reports pain.  MS: extremities normal - no gross deformities noted, no evidence of inflammation in joints, no muscle tenderness  SKIN: no rash  KIDNEY TX: incision closed with staples, c/d/i    Data     Renal Latest Ref Rng & Units 11/25/2020 11/24/2020 11/23/2020   Na 133 - 144 mmol/L 138 138 136   K 3.4 - 5.3 mmol/L 4.4 4.6 4.6   Cl 94 - 109 mmol/L 106 108 106   CO2 20 - 32 mmol/L 24 23 25   BUN 7 - 30 mg/dL 30 32(H) 34(H)   Cr 0.52 - 1.04 mg/dL 2.21(H) 2.22(H) 2.41(H)   Glucose 70 - 99 mg/dL 98 99 100(H)   Ca  8.5 - 10.1 mg/dL 9.6 9.8 9.5   Mg 1.6 - 2.3 mg/dL 1.7 1.9 1.6     Bone Health Latest Ref Rng & Units 11/25/2020 11/24/2020 11/23/2020   Phos 2.5 - 4.5 mg/dL 4.0 3.8 4.0   PTHi 18 - 80 pg/mL - 130(H) -   Vit D Def 20 - 75 ug/L - 16(L) -     Heme Latest Ref Rng & Units 11/25/2020 11/24/2020 11/23/2020   WBC 4.0 - 11.0 10e9/L 9.5 9.2 7.1   Hgb 11.7 - 15.7 g/dL 7.1(L) 7.4(L) 7.7(L)   Plt 150 - 450 10e9/L 448 491(H) 439   ABSOLUTE NEUTROPHIL 1.6 - 8.3 10e9/L 8.7(H) 8.4(H) 6.2   ABSOLUTE LYMPHOCYTES 0.8 - 5.3 10e9/L 0.2(L) 0.2(L) 0.2(L)   ABSOLUTE MONOCYTES 0.0 - 1.3 10e9/L 0.3 0.3 0.4   ABSOLUTE EOSINOPHILS 0.0 - 0.7 10e9/L 0.2 0.2 0.2   ABSOLUTE BASOPHILS 0.0 - 0.2 10e9/L 0.0 0.0 0.0   ABS IMMATURE GRANULOCYTES 0 - 0.4 10e9/L 0.0 0.0 0.0   ABSOLUTE NUCLEATED RBC - 0.0 0.0 0.0     Liver Latest Ref Rng & Units 11/12/2020 5/14/2020 1/24/2020   AP 40 - 150 U/L 142 - 125   TBili 0.2 - 1.3 mg/dL 0.5 - 0.3   DBili 0.0 - 0.2 mg/dL - - <0.1   ALT 0 - 50 U/L 18 12 18   AST 0 - 45 U/L 19 - 22   Tot Protein 6.8 - 8.8 g/dL 8.4 - 6.3(L)   Albumin 3.4 - 5.0 g/dL 3.8 - 2.8(L)     Pancreas Latest Ref Rng & Units 11/12/2020 1/24/2020 1/23/2020   A1C 0 - 5.6 % 4.9 Canceled, Test credited Canceled, Test credited     Iron studies Latest Ref Rng & Units 11/24/2020   Iron 35 - 180 ug/dL 21(L)    Iron sat 15 - 46 % 10(L)   Ferritin 12 - 150 ng/mL 1,120(H)     [facility] Txp Virology Latest Ref Rng & Units 11/23/2020 11/14/2020 1/23/2020   BK Spec - Plasma - -   BK Res BKNEG:BK Virus DNA Not Detected copies/mL BK Virus DNA Not Detected - -   BK Log <2.7 Log copies/mL Not Calculated - -   EBV VCA IGG ANTIBODY U/mL - - -   EBV CAPSID ANTIBODY IGG 0.0 - 0.8 AI - - 1.8(H)   Hep B Core NR:Nonreactive - Reactive(AA) -   Hep B Surf - - - -   HIV 1&2 NEG - - -        Recent Labs   Lab Test 11/20/20  0611 11/23/20  0651 11/24/20  0723   DOSTAC Not Provided 2,000 0700pm   TACROL 4.6* 4.5* 8.2     Recent Labs   Lab Test 11/23/20  0651   DOSMPA 2,000   MPACID 3.12   MPAG 191.9*         Sergio Jacob MD

## 2020-11-25 NOTE — TELEPHONE ENCOUNTER
Spoke with patient who does not have a preference on home care agency. RNCC called [facility] (phone #[phone]) who are available to accept patient and start home care early next week. Will need the following items faxed to their agency (fax# [phone]):  -face-to-face  -home care orders, including home PT  -discharge orders   -lab orders   -face sheet     Home care agency requires patient to have PCP, spoke with patient states she does not have PCP, only a local nephrologist in [place]. Requested patient establish PCP, patient verbalized understanding and will call for an appointment next week.     Patient to follow up in ATC on Friday and Monday, plan to discharge home next week.

## 2020-11-25 NOTE — LETTER
Patient Information        Patient Name   Paolo Larkin ([id number]) Sex   Female    1975         Patient Demographics        Address   [address] [place] [address] Phone   [phone] (Home)   NONE (Work)   [phone] (Mobile)         Patient Ethnicity & Race        Ethnic Group Patient Race       Patricia Pickering         PCP and Center    Primary Care Provider  Phone Center     Lorna Clancy [phone] [address] [facility] [address]         Emergency Contact(s)        Name Relation Home Work Mobile        Dakamran, Michael Friend   [phone]         KA,JAW Spouse [phone]

## 2020-11-25 NOTE — LETTER
OUTPATIENT LABORATORY TEST ORDER    Patient: Paolo BUSCH Trae     Transplant Date: 11/12/2020  YOB: 1975    Ordered By: Dr. Dayne Marcelino  MRN: [id number]     Issued Date/Time: 11/25/20  4:47 PM         Expiration Date: 11/12/2021    Diagnosis: Kidney Transplant (ICD-10 Z94.0)    Aftercare following organ transplant (ICD-10 Z48.288)    Long term use of medications (ICD-10 Z79.899)    Lab results to be available on the same day drawn    Patient should release information to the [facility].     Please fax all results to [phone]    Critical Labs to be paged to        First 8 weeks post-transplant (11/12/2020 - 1/12/2021)  Labs 2x/week (Monday and Thursday)    CBC with platelets    Basic Metabolic Panel (Sodium, Potassium, Chloride, Creatinine, CO2, Urea Nitrogen, glucose, Calcium)    Tacrolimus/Prograf/ drug level  Labs weekly (Monday)    Phosphorus, magnesium  Biweekly    Mycophenolic Acid  Monthly    BK PCR QT    Months 2-4 post-transplant (1/13/2021 - 3/13/2021)  Labs 1x weekly (Monday or Tuesday)    CBC with platelets    Basic Metabolic Panel (Sodium, Potassium, Chloride, Creatinine, CO2, Urea Nitrogen, glucose, Calcium)    Tacrolimus/Prograf/ drug level  Labs monthly     Phosphorus, magnesium    BK (Polyoma Virus) PCR Quantitative/Plasma  **At month 3 only (2/12/2021)    Allosure (kit will be mailed to lab by [facility])            Months 4-7 post-transplant (3/14/2021 - 6/14/2021)  Labs every other week    CBC with platelets    Basic Metabolic Panel (Sodium, Potassium, Chloride, Creatinine, CO2, Urea Nitrogen, glucose, Calcium)    Tacrolimus/Prograf/ drug level  Monthly    Phosphorus, magnesium    BK (Polyoma Virus) PCR Quantitative/Plasma    Months 7-12 post-transplant (6/15/2021 - 11/12/2021)  Monthly    CBC with platelets    Basic Metabolic Panel (Sodium, Potassium, Chloride, Creatinine, CO2, Urea Nitrogen,  glucose, Calcium)    Tacrolimus/Prograf/ drug level    BK (Polyoma Virus) PCR Quantitative/Plasma    At 1 month post-transplant (Due: 12/12/2020)    DSA PRA ( patient to supply )     Urine protein/creatinine    Allosure    At 2 months post-transplant (Due: 1/12/2021)     Urine protein/creatinine    Allosure (kit will be mailed to lab by [facility])    At 4 months post-transplant  (Due: 3/12/2021)    DSA PRA    PTH    Urine protein/creatinine    Allosure(kit will be mailed to lab by [facility])    At 6 months post-transplant (Due: 5/12/2021)    LFTs    Hemoglobin A1c    Uric Acid    Lipid panel    Urine protein/creatinine    Allosure (kit to be mailed to [facility])     DSA PRA (mailers provided by patient)     At 9 months post-transplant (Due: 8/12/2021)     Urine protein/creatinine    Allosure( kit will be mailed to lab by [facility])    At 12 months post-transplant (Fasting labs) Due: 11/12/2021     LFTs    Hemoglobin A1c    Uric Acid    Lipid panel    Urine protein/creatinine    DSA PRA( patient     PTH    Vitamin D    Allosure (kit will be mailed to lab by [facility])        If you have any questions please call the Transplant Center at [phone] option 5. All lab results should be faxed to [phone]      .

## 2020-11-25 NOTE — LETTER
11/25/2020         RE: Paolo Larkin  [address]  [place] [address]        Dear Colleague,    Thank you for referring your patient, Paolo Larkin, to the [facility]. Please see a copy of my visit note below.    ACUTE TRANSPLANT NEPHROLOGY VISIT    Assessment & Plan   # DDKT: Stable, Cr stable ~2.2 today but had been trending down in days prior. With new abdominal pain, will repeat US to evaluate perinephric hematoma   - Baseline Creatinine: ~ TBD   - Proteinuria: Minimal (0.2-0.5 grams) (7.4g/g prior to transplant, will follow UPCR via FSGS protocol due to unknown native disease)   - Date DSA Last Checked: Nov/2020      Latest DSA: No   - BK Viremia: Not checked post transplant   - Kidney Tx Biopsy: No    # Immunosuppression: Tacrolimus immediate release (goal 8-10) and Mycophenolate mofetil (dose 750 mg every 12 hours)   - Induction with Recent Transplant:  Anti-thymocyte Globulin-Rabbit (Thymoglobulin) 5.8mg/kg  Methylprenisolone (Solumedrol)   - Changes: No    # Infection Prophylaxis:   - PJP: Sulfa/TMP (Bactrim)  - CMV: Valcyte 1 tab 2x/wk    # Hypertension: Controlled;  Goal BP: < 150/90   - Volume status: Euvolemic     - Changes: No     # Anemia in Chronic Renal Disease: Hgb: Stable      JESSI: No   - Iron studies: Low iron saturation, but high ferritin - hold on supplementation for now    # Mineral Bone Disorder:   - Secondary renal hyperparathyroidism; PTH level: Minimally elevated ( pg/ml)   On treatment: None  - Vitamin D; level: Low        On supplement: No  - Calcium; level: Normal        On supplement: No  - Phosphorus; level: Normal        On supplement: No    # Electrolytes:   - Potassium; level: Normal        On supplement: No  - Magnesium; level: Normal        On supplement: No  - Bicarbonate; level: Normal        On supplement: No    # Right abdominal pain: developed new, acute pain overnight 11/24-11/25 in the right lower  abdomen/right flank area. She had a perinephric hematoma on previous US (11/13), which if enlarging could be contributing to her pain. Her Cr also stopped trending down today and plateaued. Will obtain US.    - Transplant kidney US today to evaluate for hematoma     # Right Lower Extremity weakness, suspected femoral neuropathy: Developed RLE weakness post-op, felt to be most likely 2/2 femoral nerve injury/stretch during transplant surgery. Had 3 day acute rehab stay post-hospitalization and strength is improving.    # Nausea: improving with Zofran.    - Continue Zofran ODT q6h for another 2 days, then PRN    # Medical Compliance: Yes    # Transplant History:  Etiology of Kidney Failure: Unknown etiology  Tx: DDKT  Transplant: 11/12/2020 (Kidney)  Donor Type: Donation after Circulatory Death  Donor Class:   Crossmatch at time of Tx: negative  DSA at time of Tx: No  Significant changes in immunosuppression: None  CMV IgG Ab High Risk Discordance (D+/R-): No  EBV IgG Ab High Risk Discordance (D+/R-): No  Significant transplant-related complications: None    Transplant Office Phone Number: [phone]    Assessment and plan was discussed with the patient and she voiced her understanding and agreement.    Return visit: Return in about 2 days (around 11/27/2020).    Mary Denise MD     Attestation:  This patient has been seen and evaluated by me, Sergio Jacob MD.  I have reviewed the note and agree with plan of care as documented by the fellow.       Chief Complaint   Ms. Larkin is a 45 year old here for hospital follow up after kidney transplant.     History of Present Illness   Overnight, developed an achey/stabbing pain in her right lower abdomen radiating to the right flank area. Describes the pain as 8/10 when most severe. Not worse with movement or walking. The pain improves a little bit when she holds light pressure over the area. She had not taken anything for the pain because she wasn't sure what  she could take, but she did take a dose of tylenol after arriving here today. No fevers or chills. She otherwise feels okay. Her nausea is much improved with the Zofran and she was able to eat many small meals yesterday.     Recent Hospitalizations:  [x] No [] Yes    New Medical Issues: [] No [x] Yes Right sided abdominal/flank pain   Decreased energy: [x] No [] Yes    Chest pain or SOB with exertion:  [x] No [] Yes    Appetite change or weight change: [x] No [] Yes    Nausea, vomiting or diarrhea:  [] No [x] Yes Nausea improving with Zofran   Fever, sweats or chills: [x] No [] Yes    Leg swelling: [x] No [] Yes      Home BP: Not checked    Review of Systems   A comprehensive review of systems was obtained and negative, except as noted in the HPI or PMH.    Problem List   Patient Active Problem List   Diagnosis     Anemia in chronic renal disease     HTN, kidney transplant related     Secondary renal hyperparathyroidism (H)     Coronary artery disease involving native coronary artery of native heart without angina pectoris     Status post coronary angiogram     NSTEMI (non-ST elevated myocardial infarction) (H)     Vitamin D deficiency     Pneumothorax     Kidney replaced by transplant     Femoral neuropathy, right     Femoral neuropathy of right lower extremity     Aftercare following organ transplant     CKD (chronic kidney disease) stage 4, GFR 15-29 ml/min (H)       Social History   Social History     Tobacco Use     Smoking status: Never Smoker     Smokeless tobacco: Never Used   Substance Use Topics     Alcohol use: No     Drug use: No       Allergies   Allergies   Allergen Reactions     Blood Transfusion Related (Informational Only)      Patient has a history of a clinically significant antibody against RBC antigens.  A delay in compatible RBCs may occur.     Cephalosporins Anaphylaxis     Per [facility] allergist report: positive skin intradermal tests to Cefazolin and Ceftazidime, but NOT to Penicillin G,  Piperacillin and Meropenem      Chlorhexidine Anaphylaxis     Several times during anesthesia at initial phase during insertion of I.v. line and after disinfection with Chlorhexidine drop of blood pressure.  In Prick and as well intradermal tests clear positive reactions to Chlorhexidine (particularly in intradermal test to 0.0002% Chlorhexidine papule of 1cm and Erythema of 2.5cm). AVOID in future Chlorhexidine     Heparin Flush        Medications   Current Outpatient Medications   Medication Sig     acetaminophen (TYLENOL) 325 MG tablet Take 325-650 mg by mouth every 4 hours as needed for mild pain or fever      amLODIPine (NORVASC) 5 MG tablet Take 1 tablet (5 mg) by mouth daily     aspirin (ASA) 81 MG chewable tablet Take 1 tablet (81 mg) by mouth daily     atorvastatin (LIPITOR) 10 MG tablet Take 1 tablet (10 mg) by mouth daily     magnesium oxide (MAG-OX) 400 MG tablet Take 1 tablet (400 mg) by mouth daily (with lunch)     mycophenolate (GENERIC EQUIVALENT) 250 MG capsule Take 3 capsules (750 mg) by mouth 2 times daily     ondansetron (ZOFRAN-ODT) 4 MG ODT tab Take 1 tablet (4 mg) by mouth every 6 hours as needed for nausea     pantoprazole (PROTONIX) 40 MG EC tablet Take 1 tablet (40 mg) by mouth every morning (before breakfast)     senna-docusate (SENOKOT-S/PERICOLACE) 8.6-50 MG tablet Take 1 tablet by mouth 2 times daily as needed for constipation     sulfamethoxazole-trimethoprim (BACTRIM) 400-80 MG tablet Take 1 tablet by mouth daily     tacrolimus (GENERIC EQUIVALENT) 1 MG capsule Take 8 capsules (8 mg) by mouth 2 times daily     valGANciclovir (VALCYTE) 450 MG tablet Take 1 tablet (450 mg) by mouth twice a week     No current facility-administered medications for this visit.      There are no discontinued medications.    Physical Exam   Vital Signs: There were no vitals taken for this visit.    GENERAL APPEARANCE: alert and no distress  HENT: mouth without ulcers or lesions  LYMPHATICS: no cervical or  supraclavicular nodes  RESP: lungs clear to auscultation - no rales, rhonchi or wheezes  CV: regular rhythm, normal rate, no rub, no murmur  EDEMA: no LE edema bilaterally  ABDOMEN: soft, nondistended, nontender, bowel sounds normal; no skin changes over right flank. No tenderness to palpation over right flank where patient reports pain.  MS: extremities normal - no gross deformities noted, no evidence of inflammation in joints, no muscle tenderness  SKIN: no rash  KIDNEY TX: incision closed with staples, c/d/i    Data     Renal Latest Ref Rng & Units 11/25/2020 11/24/2020 11/23/2020   Na 133 - 144 mmol/L 138 138 136   K 3.4 - 5.3 mmol/L 4.4 4.6 4.6   Cl 94 - 109 mmol/L 106 108 106   CO2 20 - 32 mmol/L 24 23 25   BUN 7 - 30 mg/dL 30 32(H) 34(H)   Cr 0.52 - 1.04 mg/dL 2.21(H) 2.22(H) 2.41(H)   Glucose 70 - 99 mg/dL 98 99 100(H)   Ca  8.5 - 10.1 mg/dL 9.6 9.8 9.5   Mg 1.6 - 2.3 mg/dL 1.7 1.9 1.6     Bone Health Latest Ref Rng & Units 11/25/2020 11/24/2020 11/23/2020   Phos 2.5 - 4.5 mg/dL 4.0 3.8 4.0   PTHi 18 - 80 pg/mL - 130(H) -   Vit D Def 20 - 75 ug/L - 16(L) -     Heme Latest Ref Rng & Units 11/25/2020 11/24/2020 11/23/2020   WBC 4.0 - 11.0 10e9/L 9.5 9.2 7.1   Hgb 11.7 - 15.7 g/dL 7.1(L) 7.4(L) 7.7(L)   Plt 150 - 450 10e9/L 448 491(H) 439   ABSOLUTE NEUTROPHIL 1.6 - 8.3 10e9/L 8.7(H) 8.4(H) 6.2   ABSOLUTE LYMPHOCYTES 0.8 - 5.3 10e9/L 0.2(L) 0.2(L) 0.2(L)   ABSOLUTE MONOCYTES 0.0 - 1.3 10e9/L 0.3 0.3 0.4   ABSOLUTE EOSINOPHILS 0.0 - 0.7 10e9/L 0.2 0.2 0.2   ABSOLUTE BASOPHILS 0.0 - 0.2 10e9/L 0.0 0.0 0.0   ABS IMMATURE GRANULOCYTES 0 - 0.4 10e9/L 0.0 0.0 0.0   ABSOLUTE NUCLEATED RBC - 0.0 0.0 0.0     Liver Latest Ref Rng & Units 11/12/2020 5/14/2020 1/24/2020   AP 40 - 150 U/L 142 - 125   TBili 0.2 - 1.3 mg/dL 0.5 - 0.3   DBili 0.0 - 0.2 mg/dL - - <0.1   ALT 0 - 50 U/L 18 12 18   AST 0 - 45 U/L 19 - 22   Tot Protein 6.8 - 8.8 g/dL 8.4 - 6.3(L)   Albumin 3.4 - 5.0 g/dL 3.8 - 2.8(L)     Pancreas Latest Ref Rng &  Units 11/12/2020 1/24/2020 1/23/2020   A1C 0 - 5.6 % 4.9 Canceled, Test credited Canceled, Test credited     Iron studies Latest Ref Rng & Units 11/24/2020   Iron 35 - 180 ug/dL 21(L)   Iron sat 15 - 46 % 10(L)   Ferritin 12 - 150 ng/mL 1,120(H)     [facility] Txp Virology Latest Ref Rng & Units 11/23/2020 11/14/2020 1/23/2020   BK Spec - Plasma - -   BK Res BKNEG:BK Virus DNA Not Detected copies/mL BK Virus DNA Not Detected - -   BK Log <2.7 Log copies/mL Not Calculated - -   EBV VCA IGG ANTIBODY U/mL - - -   EBV CAPSID ANTIBODY IGG 0.0 - 0.8 AI - - 1.8(H)   Hep B Core NR:Nonreactive - Reactive(AA) -   Hep B Surf - - - -   HIV 1&2 NEG - - -        Recent Labs   Lab Test 11/20/20  0611 11/23/20  0651 11/24/20  0723   DOSTAC Not Provided 2,000 0700pm   TACROL 4.6* 4.5* 8.2     Recent Labs   Lab Test 11/23/20  0651   DOSMPA 2,000   MPACID 3.12   MPAG 191.9*       Again, thank you for allowing me to participate in the care of your patient.        Sincerely,        Sergio Jacob MD

## 2020-11-25 NOTE — PROGRESS NOTES
ACUTE TRANSPLANT NEPHROLOGY VISIT    Assessment & Plan   # DDKT: Stable, Cr stable ~2.2 today but had been trending down in days prior. With new abdominal pain, will repeat US to evaluate perinephric hematoma   - Baseline Creatinine: ~ TBD   - Proteinuria: Minimal (0.2-0.5 grams) (7.4g/g prior to transplant, will follow UPCR via FSGS protocol due to unknown native disease)   - Date DSA Last Checked: Nov/2020      Latest DSA: No   - BK Viremia: Not checked post transplant   - Kidney Tx Biopsy: No    # Immunosuppression: Tacrolimus immediate release (goal 8-10) and Mycophenolate mofetil (dose 750 mg every 12 hours)   - Induction with Recent Transplant:  Anti-thymocyte Globulin-Rabbit (Thymoglobulin) 5.8mg/kg  Methylprenisolone (Solumedrol)   - Changes: No    # Infection Prophylaxis:   - PJP: Sulfa/TMP (Bactrim)  - CMV: Valcyte 1 tab 2x/wk    # Hypertension: Controlled;  Goal BP: < 150/90   - Volume status: Euvolemic     - Changes: No     # Anemia in Chronic Renal Disease: Hgb: Stable      JESSI: No   - Iron studies: Low iron saturation, but high ferritin - hold on supplementation for now    # Mineral Bone Disorder:   - Secondary renal hyperparathyroidism; PTH level: Minimally elevated ( pg/ml)   On treatment: None  - Vitamin D; level: Low        On supplement: No  - Calcium; level: Normal        On supplement: No  - Phosphorus; level: Normal        On supplement: No    # Electrolytes:   - Potassium; level: Normal        On supplement: No  - Magnesium; level: Normal        On supplement: No  - Bicarbonate; level: Normal        On supplement: No    # Right abdominal pain: developed new, acute pain overnight 11/24-11/25 in the right lower abdomen/right flank area. She had a perinephric hematoma on previous US (11/13), which if enlarging could be contributing to her pain. Her Cr also stopped trending down today and plateaued. Will obtain US.    - Transplant kidney US today to evaluate for hematoma     # Right Lower  Extremity weakness, suspected femoral neuropathy: Developed RLE weakness post-op, felt to be most likely 2/2 femoral nerve injury/stretch during transplant surgery. Had 3 day acute rehab stay post-hospitalization and strength is improving.    # Nausea: improving with Zofran.    - Continue Zofran ODT q6h for another 2 days, then PRN    # Medical Compliance: Yes    # Transplant History:  Etiology of Kidney Failure: Unknown etiology  Tx: DDKT  Transplant: 11/12/2020 (Kidney)  Donor Type: Donation after Circulatory Death  Donor Class:   Crossmatch at time of Tx: negative  DSA at time of Tx: No  Significant changes in immunosuppression: None  CMV IgG Ab High Risk Discordance (D+/R-): No  EBV IgG Ab High Risk Discordance (D+/R-): No  Significant transplant-related complications: None    Transplant Office Phone Number: [phone]    Assessment and plan was discussed with the patient and she voiced her understanding and agreement.    Return visit: Return in about 2 days (around 11/27/2020).    Mary Denise MD     Attestation:  This patient has been seen and evaluated by me, Sergio Jacob MD.  I have reviewed the note and agree with plan of care as documented by the fellow.       Chief Complaint   Ms. Larkin is a 45 year old here for hospital follow up after kidney transplant.     History of Present Illness   Overnight, developed an achey/stabbing pain in her right lower abdomen radiating to the right flank area. Describes the pain as 8/10 when most severe. Not worse with movement or walking. The pain improves a little bit when she holds light pressure over the area. She had not taken anything for the pain because she wasn't sure what she could take, but she did take a dose of tylenol after arriving here today. No fevers or chills. She otherwise feels okay. Her nausea is much improved with the Zofran and she was able to eat many small meals yesterday.     Recent Hospitalizations:  [x] No [] Yes    New Medical  Issues: [] No [x] Yes Right sided abdominal/flank pain   Decreased energy: [x] No [] Yes    Chest pain or SOB with exertion:  [x] No [] Yes    Appetite change or weight change: [x] No [] Yes    Nausea, vomiting or diarrhea:  [] No [x] Yes Nausea improving with Zofran   Fever, sweats or chills: [x] No [] Yes    Leg swelling: [x] No [] Yes      Home BP: Not checked    Review of Systems   A comprehensive review of systems was obtained and negative, except as noted in the HPI or PMH.    Problem List   Patient Active Problem List   Diagnosis     Anemia in chronic renal disease     HTN, kidney transplant related     Secondary renal hyperparathyroidism (H)     Coronary artery disease involving native coronary artery of native heart without angina pectoris     Status post coronary angiogram     NSTEMI (non-ST elevated myocardial infarction) (H)     Vitamin D deficiency     Pneumothorax     Kidney replaced by transplant     Femoral neuropathy, right     Femoral neuropathy of right lower extremity     Aftercare following organ transplant     CKD (chronic kidney disease) stage 4, GFR 15-29 ml/min (H)       Social History   Social History     Tobacco Use     Smoking status: Never Smoker     Smokeless tobacco: Never Used   Substance Use Topics     Alcohol use: No     Drug use: No       Allergies   Allergies   Allergen Reactions     Blood Transfusion Related (Informational Only)      Patient has a history of a clinically significant antibody against RBC antigens.  A delay in compatible RBCs may occur.     Cephalosporins Anaphylaxis     Per [facility] allergist report: positive skin intradermal tests to Cefazolin and Ceftazidime, but NOT to Penicillin G, Piperacillin and Meropenem      Chlorhexidine Anaphylaxis     Several times during anesthesia at initial phase during insertion of I.v. line and after disinfection with Chlorhexidine drop of blood pressure.  In Prick and as well intradermal tests clear positive reactions to  Chlorhexidine (particularly in intradermal test to 0.0002% Chlorhexidine papule of 1cm and Erythema of 2.5cm). AVOID in future Chlorhexidine     Heparin Flush        Medications   Current Outpatient Medications   Medication Sig     acetaminophen (TYLENOL) 325 MG tablet Take 325-650 mg by mouth every 4 hours as needed for mild pain or fever      amLODIPine (NORVASC) 5 MG tablet Take 1 tablet (5 mg) by mouth daily     aspirin (ASA) 81 MG chewable tablet Take 1 tablet (81 mg) by mouth daily     atorvastatin (LIPITOR) 10 MG tablet Take 1 tablet (10 mg) by mouth daily     magnesium oxide (MAG-OX) 400 MG tablet Take 1 tablet (400 mg) by mouth daily (with lunch)     mycophenolate (GENERIC EQUIVALENT) 250 MG capsule Take 3 capsules (750 mg) by mouth 2 times daily     ondansetron (ZOFRAN-ODT) 4 MG ODT tab Take 1 tablet (4 mg) by mouth every 6 hours as needed for nausea     pantoprazole (PROTONIX) 40 MG EC tablet Take 1 tablet (40 mg) by mouth every morning (before breakfast)     senna-docusate (SENOKOT-S/PERICOLACE) 8.6-50 MG tablet Take 1 tablet by mouth 2 times daily as needed for constipation     sulfamethoxazole-trimethoprim (BACTRIM) 400-80 MG tablet Take 1 tablet by mouth daily     tacrolimus (GENERIC EQUIVALENT) 1 MG capsule Take 8 capsules (8 mg) by mouth 2 times daily     valGANciclovir (VALCYTE) 450 MG tablet Take 1 tablet (450 mg) by mouth twice a week     No current facility-administered medications for this visit.      There are no discontinued medications.    Physical Exam   Vital Signs: There were no vitals taken for this visit.    GENERAL APPEARANCE: alert and no distress  HENT: mouth without ulcers or lesions  LYMPHATICS: no cervical or supraclavicular nodes  RESP: lungs clear to auscultation - no rales, rhonchi or wheezes  CV: regular rhythm, normal rate, no rub, no murmur  EDEMA: no LE edema bilaterally  ABDOMEN: soft, nondistended, nontender, bowel sounds normal; no skin changes over right flank. No  tenderness to palpation over right flank where patient reports pain.  MS: extremities normal - no gross deformities noted, no evidence of inflammation in joints, no muscle tenderness  SKIN: no rash  KIDNEY TX: incision closed with staples, c/d/i    Data     Renal Latest Ref Rng & Units 11/25/2020 11/24/2020 11/23/2020   Na 133 - 144 mmol/L 138 138 136   K 3.4 - 5.3 mmol/L 4.4 4.6 4.6   Cl 94 - 109 mmol/L 106 108 106   CO2 20 - 32 mmol/L 24 23 25   BUN 7 - 30 mg/dL 30 32(H) 34(H)   Cr 0.52 - 1.04 mg/dL 2.21(H) 2.22(H) 2.41(H)   Glucose 70 - 99 mg/dL 98 99 100(H)   Ca  8.5 - 10.1 mg/dL 9.6 9.8 9.5   Mg 1.6 - 2.3 mg/dL 1.7 1.9 1.6     Bone Health Latest Ref Rng & Units 11/25/2020 11/24/2020 11/23/2020   Phos 2.5 - 4.5 mg/dL 4.0 3.8 4.0   PTHi 18 - 80 pg/mL - 130(H) -   Vit D Def 20 - 75 ug/L - 16(L) -     Heme Latest Ref Rng & Units 11/25/2020 11/24/2020 11/23/2020   WBC 4.0 - 11.0 10e9/L 9.5 9.2 7.1   Hgb 11.7 - 15.7 g/dL 7.1(L) 7.4(L) 7.7(L)   Plt 150 - 450 10e9/L 448 491(H) 439   ABSOLUTE NEUTROPHIL 1.6 - 8.3 10e9/L 8.7(H) 8.4(H) 6.2   ABSOLUTE LYMPHOCYTES 0.8 - 5.3 10e9/L 0.2(L) 0.2(L) 0.2(L)   ABSOLUTE MONOCYTES 0.0 - 1.3 10e9/L 0.3 0.3 0.4   ABSOLUTE EOSINOPHILS 0.0 - 0.7 10e9/L 0.2 0.2 0.2   ABSOLUTE BASOPHILS 0.0 - 0.2 10e9/L 0.0 0.0 0.0   ABS IMMATURE GRANULOCYTES 0 - 0.4 10e9/L 0.0 0.0 0.0   ABSOLUTE NUCLEATED RBC - 0.0 0.0 0.0     Liver Latest Ref Rng & Units 11/12/2020 5/14/2020 1/24/2020   AP 40 - 150 U/L 142 - 125   TBili 0.2 - 1.3 mg/dL 0.5 - 0.3   DBili 0.0 - 0.2 mg/dL - - <0.1   ALT 0 - 50 U/L 18 12 18   AST 0 - 45 U/L 19 - 22   Tot Protein 6.8 - 8.8 g/dL 8.4 - 6.3(L)   Albumin 3.4 - 5.0 g/dL 3.8 - 2.8(L)     Pancreas Latest Ref Rng & Units 11/12/2020 1/24/2020 1/23/2020   A1C 0 - 5.6 % 4.9 Canceled, Test credited Canceled, Test credited     Iron studies Latest Ref Rng & Units 11/24/2020   Iron 35 - 180 ug/dL 21(L)   Iron sat 15 - 46 % 10(L)   Ferritin 12 - 150 ng/mL 1,120(H)     [facility] Txp Virology  Latest Ref Rng & Units 11/23/2020 11/14/2020 1/23/2020   BK Spec - Plasma - -   BK Res BKNEG:BK Virus DNA Not Detected copies/mL BK Virus DNA Not Detected - -   BK Log <2.7 Log copies/mL Not Calculated - -   EBV VCA IGG ANTIBODY U/mL - - -   EBV CAPSID ANTIBODY IGG 0.0 - 0.8 AI - - 1.8(H)   Hep B Core NR:Nonreactive - Reactive(AA) -   Hep B Surf - - - -   HIV 1&2 NEG - - -        Recent Labs   Lab Test 11/20/20  0611 11/23/20  0651 11/24/20  0723   DOSTAC Not Provided 2,000 0700pm   TACROL 4.6* 4.5* 8.2     Recent Labs   Lab Test 11/23/20  0651   DOSMPA 2,000   MPACID 3.12   MPAG 191.9*

## 2020-11-25 NOTE — LETTER
"    11/25/2020         RE: Paolo Larkin  [address]  [place] [address]        Dear Colleague,    Thank you for referring your patient, Paolo Larkin, to the [facility]. Please see a copy of my visit note below.    Paolo Larkin came to [facility] today for a lab and assess following a kidney transplant on 11/12/20.      Discharge date: 11/22/20  Transplant coordinator: Yaz Villa  Phone number patient can be reached at: [phone]      Physical Assessment:  See physical assessment located under \"Document Flowsheets\".  Incision site: CDI, staples. Small amount of redness along site of staples.  Lines: NA  [place]: NA  Urine clarity: clear, going frequently  Hydration: patient reports drinking 1-2L of fluids over the past 24 hours  Nutrition: eating small meals, nausea improved  Last BM: 11/25/20, soft, small amount  Pain: 8/10 to incision    Labs drawn by [facility] staff No    Plan of care for today:   First floor labs, assessment in [facility], seen by nephrology providers. Patient to return for ultrasound at 1500 today due to right flank pain. Patient then to return to first floor lab and [facility] for LBA on 11/27 and 11/30. Scheduled zofran q6h added to medication box through Thursday. This is to be readdressed on Friday.     Medication changes: Scheduled zofran q6h added to medication box through Thursday. This is to be readdressed on Friday.     Medications administered: None      Patient education:    The following teaching topics were addressed: Importance of drinking 2L of non-caffeinated fluids daily, Incisional care, Signs/symptoms of infection, Good handwashing, Medications (purposes, doses and times of administration), Phone numbers to call with concenrs (Transplant coordinator, [address] and [facility]) and Plan of care   Patient verbalized understanding and all questions answered via .    Drug level:  Patient took 6mg of prograf last evening at " 1900.  Care coordinator to follow up with the result.    Face to face time: 45 minutes  Discharge Plan    Pt will follow up with ultrasound today and first floor lab and [facility] on Friday and Monday.  Discharge instructions reviewed with patient: YES  Patient/Representative verbalized understanding, all questions answered: YES    Discharged from unit at 0930 with whom: self to home ([facility] in [facility]).    Margareth La, RN, RN         Again, thank you for allowing me to participate in the care of your patient.        Sincerely,        [facility]

## 2020-11-25 NOTE — TELEPHONE ENCOUNTER
Faxed the following items to home care:  -face-to-face  -home care orders, including home PT  -discharge orders   -lab orders   -face sheet

## 2020-11-27 ENCOUNTER — INFUSION THERAPY VISIT (OUTPATIENT)
Dept: INFUSION THERAPY | Facility: CLINIC | Age: 45
End: 2020-11-27
Payer: MEDICARE

## 2020-11-27 VITALS
SYSTOLIC BLOOD PRESSURE: 138 MMHG | WEIGHT: 115.8 LBS | TEMPERATURE: 98.1 F | DIASTOLIC BLOOD PRESSURE: 71 MMHG | BODY MASS INDEX: 20.51 KG/M2 | OXYGEN SATURATION: 100 % | HEART RATE: 82 BPM

## 2020-11-27 DIAGNOSIS — Z94.0 KIDNEY REPLACED BY TRANSPLANT: Primary | ICD-10-CM

## 2020-11-27 DIAGNOSIS — Z79.899 ENCOUNTER FOR LONG-TERM CURRENT USE OF MEDICATION: ICD-10-CM

## 2020-11-27 DIAGNOSIS — Z94.0 KIDNEY REPLACED BY TRANSPLANT: ICD-10-CM

## 2020-11-27 DIAGNOSIS — Z48.298 AFTERCARE FOLLOWING ORGAN TRANSPLANT: ICD-10-CM

## 2020-11-27 LAB
ANION GAP SERPL CALCULATED.3IONS-SCNC: 5 MMOL/L (ref 3–14)
BUN SERPL-MCNC: 22 MG/DL (ref 7–30)
CALCIUM SERPL-MCNC: 9.6 MG/DL (ref 8.5–10.1)
CHLORIDE SERPL-SCNC: 108 MMOL/L (ref 94–109)
CO2 SERPL-SCNC: 24 MMOL/L (ref 20–32)
CREAT SERPL-MCNC: 1.93 MG/DL (ref 0.52–1.04)
ERYTHROCYTE [DISTWIDTH] IN BLOOD BY AUTOMATED COUNT: 13.9 % (ref 10–15)
GFR SERPL CREATININE-BSD FRML MDRD: 31 ML/MIN/{1.73_M2}
GLUCOSE SERPL-MCNC: 100 MG/DL (ref 70–99)
HCT VFR BLD AUTO: 22.8 % (ref 35–47)
HGB BLD-MCNC: 7.1 G/DL (ref 11.7–15.7)
MAGNESIUM SERPL-MCNC: 1.5 MG/DL (ref 1.6–2.3)
MCH RBC QN AUTO: 30.2 PG (ref 26.5–33)
MCHC RBC AUTO-ENTMCNC: 31.1 G/DL (ref 31.5–36.5)
MCV RBC AUTO: 97 FL (ref 78–100)
PLATELET # BLD AUTO: 480 10E9/L (ref 150–450)
POTASSIUM SERPL-SCNC: 4.4 MMOL/L (ref 3.4–5.3)
RBC # BLD AUTO: 2.35 10E12/L (ref 3.8–5.2)
SODIUM SERPL-SCNC: 137 MMOL/L (ref 133–144)
TACROLIMUS BLD-MCNC: 8.8 UG/L (ref 5–15)
TME LAST DOSE: NORMAL H
WBC # BLD AUTO: 7.4 10E9/L (ref 4–11)

## 2020-11-27 PROCEDURE — 83735 ASSAY OF MAGNESIUM: CPT | Performed by: PATHOLOGY

## 2020-11-27 PROCEDURE — G0463 HOSPITAL OUTPT CLINIC VISIT: HCPCS

## 2020-11-27 PROCEDURE — 36415 COLL VENOUS BLD VENIPUNCTURE: CPT | Performed by: PATHOLOGY

## 2020-11-27 PROCEDURE — 80197 ASSAY OF TACROLIMUS: CPT | Performed by: PATHOLOGY

## 2020-11-27 PROCEDURE — 80048 BASIC METABOLIC PNL TOTAL CA: CPT | Performed by: PATHOLOGY

## 2020-11-27 PROCEDURE — 85027 COMPLETE CBC AUTOMATED: CPT | Performed by: PATHOLOGY

## 2020-11-27 NOTE — LETTER
"    11/27/2020         RE: Paolo Larkin  [address]  [facility] [address]        Dear Colleague,    Thank you for referring your patient, Paolo Larkin, to the [facility]. Please see a copy of my visit note below.    Paolo Larkin came to [facility] today for a lab and assess following a kidney transplant on 11/12/20.      Discharge date: 11/22/20  Transplant coordinator: Yaz Villa  Phone number patient can be reached at: [phone]      Physical Assessment:  See physical assessment located under \"Document Flowsheets\".  Incision site: CDI, staples.  Lines: NA  Moreno: NA  Urine clarity: clear, going frequently  Hydration: patient reports drinking 4L of fluids over the past 24 hours  Nutrition: eating small meals, nausea improved but still present intermittently. Pt reports she has antiemetic and knows to take it as needed.   Last BM: regular per pt- denies diarrhea or constipation  Pain: 5/10 Pt reports pain is \"much better\" than it was. Reports incisional pain is very minimal and back pain is what bothers her the most; taking tylenol PRN    Labs drawn by [facility] staff No    Plan of care for today:   Labs drawn on first floor and pt assessed in [facility]. Medications and med card reviewed with pt via . Labs and vitals reviewed via telephone with Dr. Lopez. No new orders received. Pt to follow up Monday as previously discussed.   Pt requesting letter to 's employer re:missing more work to care for pt. Writer reached out to coordinator Yaz who provided a generic letter but states pt's  will need to ask employer for [place] paper work for the future.     Medication changes: none    Medications administered: None      Patient education:    The following teaching topics were addressed: Importance of drinking 2L of non-caffeinated fluids daily, Incisional care, Signs/symptoms of infection, Good handwashing, Medications (purposes, doses and times of " administration), Phone numbers to call with concenrs (Transplant coordinator, [facility] and [facility]) and Plan of care   Patient verbalized understanding and all questions answered via .    Drug level:  Drug level to be followed by coordinator.     Face to face time: 45 minutes  Discharge Plan    Pt will follow up with [facility] Monday for LBA.   Discharge instructions reviewed with patient: YES  Patient/Representative verbalized understanding, all questions answered: YES    Discharged from unit at 0930 with whom: self to home (pt reports staying with       Debbi Cortez RN            Again, thank you for allowing me to participate in the care of your patient.        Sincerely,        VIDEO,

## 2020-11-27 NOTE — PROGRESS NOTES
"Paolo Larkin came to [facility] today for a lab and assess following a kidney transplant on 11/12/20.      Discharge date: 11/22/20  Transplant coordinator: Yaz Villa  Phone number patient can be reached at: [phone]      Physical Assessment:  See physical assessment located under \"Document Flowsheets\".  Incision site: CDI, staples.  Lines: NA  Moreno: NA  Urine clarity: clear, going frequently  Hydration: patient reports drinking 4L of fluids over the past 24 hours  Nutrition: eating small meals, nausea improved but still present intermittently. Pt reports she has antiemetic and knows to take it as needed.   Last BM: regular per pt- denies diarrhea or constipation  Pain: 5/10 Pt reports pain is \"much better\" than it was. Reports incisional pain is very minimal and back pain is what bothers her the most; taking tylenol PRN    Labs drawn by [facility] staff No    Plan of care for today:   Labs drawn on first floor and pt assessed in [facility]. Medications and med card reviewed with pt via . Labs and vitals reviewed via telephone with Dr. Lopez. No new orders received. Pt to follow up Monday as previously discussed.   Pt requesting letter to 's employer re:missing more work to care for pt. Writer reached out to coordinator Yaz who provided a generic letter but states pt's  will need to ask employer for [facility] paper work for the future.     Medication changes: none    Medications administered: None      Patient education:    The following teaching topics were addressed: Importance of drinking 2L of non-caffeinated fluids daily, Incisional care, Signs/symptoms of infection, Good handwashing, Medications (purposes, doses and times of administration), Phone numbers to call with concenrs (Transplant coordinator, [facility] and [facility]) and Plan of care   Patient verbalized understanding and all questions answered via .    Drug level:  Drug level to be followed by coordinator.     Face to " face time: 45 minutes  Discharge Plan    Pt will follow up with [facility] Monday for LBA.   Discharge instructions reviewed with patient: YES  Patient/Representative verbalized understanding, all questions answered: YES    Discharged from unit at 0930 with whom: self to home (pt reports staying with       Debbi Cortez RN

## 2020-11-29 PROBLEM — Z76.82 PRE-KIDNEY TRANSPLANT, LISTED: Status: RESOLVED | Noted: 2020-01-23 | Resolved: 2020-11-29

## 2020-11-29 PROBLEM — Z48.298 AFTERCARE FOLLOWING ORGAN TRANSPLANT: Status: ACTIVE | Noted: 2020-11-29

## 2020-11-29 PROBLEM — Z76.82 ORGAN TRANSPLANT CANDIDATE: Status: RESOLVED | Noted: 2020-11-12 | Resolved: 2020-11-29

## 2020-11-29 PROBLEM — Z76.82 KIDNEY TRANSPLANT CANDIDATE: Status: RESOLVED | Noted: 2019-04-23 | Resolved: 2020-11-29

## 2020-11-29 PROBLEM — R94.39 ABNORMAL CARDIOVASCULAR STRESS TEST: Status: RESOLVED | Noted: 2019-05-30 | Resolved: 2020-11-29

## 2020-11-29 PROBLEM — I95.9 HYPOTENSION: Status: RESOLVED | Noted: 2019-04-24 | Resolved: 2020-11-29

## 2020-11-29 PROBLEM — N18.4 CKD (CHRONIC KIDNEY DISEASE) STAGE 4, GFR 15-29 ML/MIN (H): Status: ACTIVE | Noted: 2020-11-29

## 2020-11-29 PROBLEM — Z94.0 KIDNEY TRANSPLANT RECIPIENT: Status: RESOLVED | Noted: 2019-04-22 | Resolved: 2020-11-29

## 2020-11-30 ENCOUNTER — OFFICE VISIT (OUTPATIENT)
Dept: INFUSION THERAPY | Facility: CLINIC | Age: 45
End: 2020-11-30
Payer: MEDICARE

## 2020-11-30 ENCOUNTER — TELEPHONE (OUTPATIENT)
Dept: TRANSPLANT | Facility: CLINIC | Age: 45
End: 2020-11-30

## 2020-11-30 ENCOUNTER — INFUSION THERAPY VISIT (OUTPATIENT)
Dept: INFUSION THERAPY | Facility: CLINIC | Age: 45
End: 2020-11-30
Attending: INTERNAL MEDICINE
Payer: MEDICARE

## 2020-11-30 VITALS
SYSTOLIC BLOOD PRESSURE: 134 MMHG | BODY MASS INDEX: 20.3 KG/M2 | HEART RATE: 84 BPM | DIASTOLIC BLOOD PRESSURE: 84 MMHG | TEMPERATURE: 98.6 F | WEIGHT: 114.6 LBS

## 2020-11-30 DIAGNOSIS — Z48.298 AFTERCARE FOLLOWING ORGAN TRANSPLANT: ICD-10-CM

## 2020-11-30 DIAGNOSIS — Z94.0 KIDNEY REPLACED BY TRANSPLANT: ICD-10-CM

## 2020-11-30 DIAGNOSIS — Z48.298 AFTERCARE FOLLOWING ORGAN TRANSPLANT: Primary | ICD-10-CM

## 2020-11-30 DIAGNOSIS — Z94.0 KIDNEY TRANSPLANTED: Primary | ICD-10-CM

## 2020-11-30 DIAGNOSIS — N18.5 ANEMIA DUE TO STAGE 5 CHRONIC KIDNEY DISEASE, NOT ON CHRONIC DIALYSIS (H): ICD-10-CM

## 2020-11-30 DIAGNOSIS — Z94.0 KIDNEY TRANSPLANTED: ICD-10-CM

## 2020-11-30 DIAGNOSIS — Z94.0 HTN, KIDNEY TRANSPLANT RELATED: ICD-10-CM

## 2020-11-30 DIAGNOSIS — Z79.899 ENCOUNTER FOR LONG-TERM CURRENT USE OF MEDICATION: ICD-10-CM

## 2020-11-30 DIAGNOSIS — D63.1 ANEMIA DUE TO STAGE 5 CHRONIC KIDNEY DISEASE, NOT ON CHRONIC DIALYSIS (H): ICD-10-CM

## 2020-11-30 DIAGNOSIS — I15.1 HTN, KIDNEY TRANSPLANT RELATED: ICD-10-CM

## 2020-11-30 LAB
ABO + RH BLD: NORMAL
ABO + RH BLD: NORMAL
ANION GAP SERPL CALCULATED.3IONS-SCNC: 7 MMOL/L (ref 3–14)
BLD GP AB SCN SERPL QL: NORMAL
BLD PROD TYP BPU: NORMAL
BLD PROD TYP BPU: NORMAL
BLD UNIT ID BPU: 0
BLOOD BANK CMNT PATIENT-IMP: NORMAL
BLOOD PRODUCT CODE: NORMAL
BPU ID: NORMAL
BUN SERPL-MCNC: 20 MG/DL (ref 7–30)
CALCIUM SERPL-MCNC: 9.6 MG/DL (ref 8.5–10.1)
CHLORIDE SERPL-SCNC: 112 MMOL/L (ref 94–109)
CO2 SERPL-SCNC: 22 MMOL/L (ref 20–32)
CREAT SERPL-MCNC: 1.63 MG/DL (ref 0.52–1.04)
ERYTHROCYTE [DISTWIDTH] IN BLOOD BY AUTOMATED COUNT: 13.5 % (ref 10–15)
GFR SERPL CREATININE-BSD FRML MDRD: 38 ML/MIN/{1.73_M2}
GLUCOSE SERPL-MCNC: 86 MG/DL (ref 70–99)
HCT VFR BLD AUTO: 22.2 % (ref 35–47)
HGB BLD-MCNC: 6.9 G/DL (ref 11.7–15.7)
MCH RBC QN AUTO: 30.7 PG (ref 26.5–33)
MCHC RBC AUTO-ENTMCNC: 31.1 G/DL (ref 31.5–36.5)
MCV RBC AUTO: 99 FL (ref 78–100)
MYCOPHENOLATE SERPL LC/MS/MS-MCNC: 2.21 MG/L (ref 1–3.5)
MYCOPHENOLATE-G SERPL LC/MS/MS-MCNC: 119.7 MG/L (ref 30–95)
NUM BPU REQUESTED: 1
PLATELET # BLD AUTO: 436 10E9/L (ref 150–450)
POTASSIUM SERPL-SCNC: 4.5 MMOL/L (ref 3.4–5.3)
RBC # BLD AUTO: 2.25 10E12/L (ref 3.8–5.2)
SODIUM SERPL-SCNC: 141 MMOL/L (ref 133–144)
SPECIMEN EXP DATE BLD: NORMAL
TACROLIMUS BLD-MCNC: 9.5 UG/L (ref 5–15)
TME LAST DOSE: 1900 H
TME LAST DOSE: 1900 H
TRANSFUSION STATUS PATIENT QL: NORMAL
TRANSFUSION STATUS PATIENT QL: NORMAL
WBC # BLD AUTO: 5.3 10E9/L (ref 4–11)

## 2020-11-30 PROCEDURE — G0463 HOSPITAL OUTPT CLINIC VISIT: HCPCS

## 2020-11-30 PROCEDURE — G0463 HOSPITAL OUTPT CLINIC VISIT: HCPCS | Mod: 25

## 2020-11-30 PROCEDURE — 85027 COMPLETE CBC AUTOMATED: CPT | Performed by: PATHOLOGY

## 2020-11-30 PROCEDURE — 86902 BLOOD TYPE ANTIGEN DONOR EA: CPT | Performed by: INTERNAL MEDICINE

## 2020-11-30 PROCEDURE — 86922 COMPATIBILITY TEST ANTIGLOB: CPT | Performed by: INTERNAL MEDICINE

## 2020-11-30 PROCEDURE — 36415 COLL VENOUS BLD VENIPUNCTURE: CPT | Performed by: PATHOLOGY

## 2020-11-30 PROCEDURE — 99214 OFFICE O/P EST MOD 30 MIN: CPT | Mod: GC | Performed by: INTERNAL MEDICINE

## 2020-11-30 PROCEDURE — 80180 DRUG SCRN QUAN MYCOPHENOLATE: CPT | Performed by: PATHOLOGY

## 2020-11-30 PROCEDURE — 86850 RBC ANTIBODY SCREEN: CPT | Performed by: INTERNAL MEDICINE

## 2020-11-30 PROCEDURE — 80197 ASSAY OF TACROLIMUS: CPT | Performed by: PATHOLOGY

## 2020-11-30 PROCEDURE — 86900 BLOOD TYPING SEROLOGIC ABO: CPT | Performed by: INTERNAL MEDICINE

## 2020-11-30 PROCEDURE — P9016 RBC LEUKOCYTES REDUCED: HCPCS | Performed by: INTERNAL MEDICINE

## 2020-11-30 PROCEDURE — 80048 BASIC METABOLIC PNL TOTAL CA: CPT | Performed by: PATHOLOGY

## 2020-11-30 PROCEDURE — 36430 TRANSFUSION BLD/BLD COMPNT: CPT

## 2020-11-30 PROCEDURE — 86901 BLOOD TYPING SEROLOGIC RH(D): CPT | Performed by: INTERNAL MEDICINE

## 2020-11-30 RX ORDER — SULFAMETHOXAZOLE AND TRIMETHOPRIM 400; 80 MG/1; MG/1
1 TABLET ORAL DAILY
Qty: 30 TABLET | Refills: 0 | Status: SHIPPED | OUTPATIENT
Start: 2020-11-30 | End: 2020-12-03

## 2020-11-30 RX ORDER — HEPARIN SODIUM,PORCINE 10 UNIT/ML
5 VIAL (ML) INTRAVENOUS
Status: CANCELLED
Start: 2020-11-30

## 2020-11-30 RX ORDER — HEPARIN SODIUM (PORCINE) LOCK FLUSH IV SOLN 100 UNIT/ML 100 UNIT/ML
5 SOLUTION INTRAVENOUS
Status: CANCELLED
Start: 2020-11-30

## 2020-11-30 RX ORDER — AMOXICILLIN 250 MG
1 CAPSULE ORAL 2 TIMES DAILY PRN
Qty: 60 TABLET | Refills: 0 | Status: SHIPPED | OUTPATIENT
Start: 2020-11-30 | End: 2021-01-04

## 2020-11-30 RX ORDER — SULFAMETHOXAZOLE AND TRIMETHOPRIM 400; 80 MG/1; MG/1
1 TABLET ORAL DAILY
Qty: 13 TABLET | Refills: 0 | Status: SHIPPED | OUTPATIENT
Start: 2020-11-30 | End: 2020-11-30

## 2020-11-30 RX ORDER — VALGANCICLOVIR 450 MG/1
450 TABLET, FILM COATED ORAL EVERY OTHER DAY
Qty: 30 TABLET | Refills: 0 | Status: SHIPPED | OUTPATIENT
Start: 2020-11-30 | End: 2020-12-03

## 2020-11-30 NOTE — LETTER
PHYSICIAN ORDERS      DATE & TIME ISSUED: 2020 9:49 AM  PATIENT NAME: Paolo Larkin   : 1975     Documentation of Face to Face and Certification for Home Health Services    I certify that patient: Paolo Larkin is under my care and that I, or a nurse practitioner or physician's assistant working with me, had a face-to-face encounter that meets the physician face-to-face encounter requirements with this patient on: 2020.    This encounter with the patient was in whole, or in part, for the following medical condition, which is the primary reason for home health care: kidney transplant .    I certify that, based on my findings, the following services are medically necessary home health services: Nursing and Physical Therapy.    My clinical findings support the need for the above services because: Nurse is needed: For complex aftercare of surgical procedures because the patient needs instruction and cannot perform care on their own due to: new surgical incision and complex medical care required after kidney transplant . and To assess compentency  after changes in medications or other medical regimen.. and Physical Therapy Services are needed to assess and treat the following functional impairments: generlaized weakness following organ transplant .    Further, I certify that my clinical findings support that this patient is homebound (i.e. absences from home require considerable and taxing effort and are for medical reasons or [employer] services or infrequently or of short duration when for other reasons) because: Leaving home is medically contraindicated for the following reason(s): Infection risk / immunocompromised state where it is safer for them to receive services in the home...    Based on the above findings. I certify that this patient is confined to the home and needs intermittent skilled nursing care, physical therapy and/or speech therapy.  The patient is under my care, and I have  initiated the establishment of the plan of care.  This patient will be followed by a physician who will periodically review the plan of care.  Physician/Provider to provide follow up care: Lorna Clancy    Attending hospital physician (the Medicare certified [facility] provider): Dayne Marcelino MD  Physician Signature: See electronic signature associated with these discharge orders.  Date: 12/1/2020    .

## 2020-11-30 NOTE — LETTER
"    2020         RE: Paolo Larkin  [address]  [facility] [address]        Dear Colleague,    Thank you for referring your patient, Paolo Larkin, to the [facility]. Please see a copy of my visit note below.    Paolo Larkin came to [facility] today for a lab and assess following a kidney transplant on 20.      Discharge date: 20  Transplant coordinator: Yaz Villa  Phone number patient can be reached at: [phone]    Physical Assessment:  See physical assessment located under \"Document Flowsheets\".  Incision site: staples,   Urine clarity: light yellow, no specks of red  Hydration: pt stated drinking a lot, yes states is drinking more than 2 liters for sure  Nutrition: eating small amounts at a time throughout the day   Last BM: small amount hard, states constipated, pt has not any senna will get today in pharmacy  Pain: pain in low back, upper back, uncomfortable walking with leg   Labs drawn by [facility] staff No    Plan of care for today:   1.  Labs and assessment  2.  Patient seen by nephrology  3.  Hgb 6.9, patient given 1 unit PRBC's    Medication changes: none    Patient education:  The following teaching topics were addressed: Importance of drinking 2L of non-caffeinated fluids daily, Incisional care, Signs/symptoms of infection, Good handwashing and Medications (purposes, doses and times of administration)   Patient verbalized understanding with Patricia  and all questions answered.    Drug level:  Patient took 8 mg of tacrolimus/prograf last evening at 2000.  Care coordinator to follow up with the result.  Face to face time: 60 minutes    Blood Product Transfusion Nursing Note:  Paolo Larkin presents today to [facility] for a 1 unit PRBC's transfusion.  During today's [facility] appointment orders from Dr. Sands were completed.    Progress note:  ID verified by name and .  Assessment completed.  Vitals were stable throughout time in " [facility].    verbal and printed education given to patient/representative regarding transfusion and possible side effects.  Patient/representative verbalized understanding.     present during visit today: Yes, Language: Patricia, phone  used.   All pertinent labs reviewed prior to infusion: YES  Date of consent or authorization: 11/30/2020  Patient tolerated the procedure well  Transfusion given over approximately  2 hours     Discharge Plan  Plan for patient is not known by writer at time of this note, Dr. Sands wanted patient to get labs tomorrow, messages sent to Yaz NGUYEN RN coordinator.  [facility] not involved due to patient living in [place], [place] and staying at [facility]  Discharge instructions reviewed with patient: YES  Patient/Representative verbalized understanding, all questions answered: YES    Discharged from unit at 1330 with whom: self to [facility].      Huyen Stuart RN    /72   Pulse 78   Temp 98.1  F (36.7  C) (Oral)   Wt 52 kg (114 lb 9.6 oz)   BMI 20.30 kg/m              DATE:  11/30/2020   TIME OF RECEIPT FROM LAB:  0710  LAB TEST:  Hgb  LAB VALUE:  6.9  RESULTS GIVEN WITH READ-BACK TO (PROVIDER):  Sergio Jacob  TIME LAB VALUE REPORTED TO PROVIDER:   Value will be reported to provider upon their arrival to [facility]. Patient also went up to nephrology clinic directly after lab so provider will have looked at labs then as well      Again, thank you for allowing me to participate in the care of your patient.        Sincerely,        [facility]

## 2020-11-30 NOTE — PROGRESS NOTES
"Paolo Larkin came to [facility] today for a lab and assess following a kidney transplant on 20.      Discharge date: 20  Transplant coordinator: Yaz Villa  Phone number patient can be reached at: [phone]    Physical Assessment:  See physical assessment located under \"Document Flowsheets\".  Incision site: staples,   Urine clarity: light yellow, no specks of red  Hydration: pt stated drinking a lot, yes states is drinking more than 2 liters for sure  Nutrition: eating small amounts at a time throughout the day   Last BM: small amount hard, states constipated, pt has not any senna will get today in pharmacy  Pain: pain in low back, upper back, uncomfortable walking with leg   Labs drawn by [facility] staff No    Plan of care for today:   1.  Labs and assessment  2.  Patient seen by nephrology  3.  Hgb 6.9, patient given 1 unit PRBC's    Medication changes: none    Patient education:  The following teaching topics were addressed: Importance of drinking 2L of non-caffeinated fluids daily, Incisional care, Signs/symptoms of infection, Good handwashing and Medications (purposes, doses and times of administration)   Patient verbalized understanding with Patricia  and all questions answered.    Drug level:  Patient took 8 mg of tacrolimus/prograf last evening at .  Care coordinator to follow up with the result.  Face to face time: 60 minutes    Blood Product Transfusion Nursing Note:  Paolo Larkin presents today to [facility] for a 1 unit PRBC's transfusion.  During today's [facility] appointment orders from Dr. Sands were completed.    Progress note:  ID verified by name and .  Assessment completed.  Vitals were stable throughout time in [facility].    verbal and printed education given to patient/representative regarding transfusion and possible side effects.  Patient/representative verbalized understanding.     present during visit today: Yes, Language: Patricia, phone  used.   All pertinent " labs reviewed prior to infusion: YES  Date of consent or authorization: 11/30/2020  Patient tolerated the procedure well  Transfusion given over approximately  2 hours     Discharge Plan  Plan for patient is not known by writer at time of this note, Dr. Sands wanted patient to get labs tomorrow, messages sent to Yaz NGUYEN RN coordinator.  [facility] not involved due to patient living in [place], [place] and staying at [facility]  Discharge instructions reviewed with patient: YES  Patient/Representative verbalized understanding, all questions answered: YES    Discharged from unit at 1330 with whom: self to [facility].      Huyen Stuart RN    /72   Pulse 78   Temp 98.1  F (36.7  C) (Oral)   Wt 52 kg (114 lb 9.6 oz)   BMI 20.30 kg/m

## 2020-11-30 NOTE — PROGRESS NOTES
DATE:  11/30/2020   TIME OF RECEIPT FROM LAB:  0710  LAB TEST:  Hgb  LAB VALUE:  6.9  RESULTS GIVEN WITH READ-BACK TO (PROVIDER):  Sergio Jacob  TIME LAB VALUE REPORTED TO PROVIDER:   Value will be reported to provider upon their arrival to [facility]. Patient also went up to nephrology clinic directly after lab so provider will have looked at labs then as well

## 2020-11-30 NOTE — PATIENT INSTRUCTIONS
Dear Paolo Larkin    Thank you for choosing [facility] ([facility]) for your transplant cares.  The following information is a summary of our appointment as well as important reminders.      Please make sure your phone is available today because I will call to update you with your anti-rejection drug levels and possibly make changes to your anti-rejection dosages., Please refer to your hospital discharge instructions for details on home care services, future appointments, phone numbers, and diet/activity levels.    We look forward in seeing you on your next appointment here at [facility] ([facility]).  Please don t hesitate to call us at [phone] to reschedule any of your appointments or to speak with one of the [facility] registered nurses.  It was a pleasure taking care of you today.    1.   medications downstairs today before you leave  2.  Pharmacy stated the valcyte cannot be picked up until 12/2  3.  Was the FMLA paperwork from your husbands employer faxed to transplant office?    Sincerely,    [facility]  [address] [place] [address]  [id number]  Phone:  [phone]            HOME CARE FOR PATIENTS RECEIVING BLOOD PRODUCTS    You have just received a blood product.  During the next 48 hours please be aware of the following symptoms of a blood product reaction.    The possible symptoms of a blood reaction are:      Chills    Fever temperature above 100.0 orally    Headache    Nausea    Persistent non-productive cough    Hives    Itching    Facial swelling or flushing    Difficulty breathing or wheezing    Bloody urine      If you experience any of these symptoms contact:    [phone]  Emergency Room    [phone]  Transplant Center  7:00 am - 6:30 pm     If you do not live locally you should contact your local physician.

## 2020-11-30 NOTE — LETTER
11/30/2020         RE: Paolo Larkin  [address]  [place] [address]        Dear Colleague,    Thank you for referring your patient, Paolo Larkin, to the [facility]. Please see a copy of my visit note below.    ACUTE TRANSPLANT NEPHROLOGY VISIT    Assessment & Plan   # DDKT: Stable, Cr improving              - Baseline Creatinine: ~ TBD              - Proteinuria: Minimal (0.2-0.5 grams) (7.4g/g prior to transplant, will follow UPCR via FSGS protocol due to unknown native disease)              - Date DSA Last Checked: Nov/2020      Latest DSA: No              - BK Viremia: No              - Kidney Tx Biopsy: No     # Immunosuppression: Tacrolimus immediate release (goal 8-10) and Mycophenolate mofetil (dose 750 mg every 12 hours)              - Induction with Recent Transplant:  Anti-thymocyte Globulin-Rabbit (Thymoglobulin) 5.8mg/kg  Methylprenisolone (Solumedrol)              - Changes: No     # Infection Prophylaxis:   - PJP: Sulfa/TMP (Bactrim), refilled  - CMV: Valcyte 450 mg daily     # Hypertension: Controlled;   Goal BP: < 150/90              - Volume status: Euvolemic                              - Changes: No      # Anemia in Chronic Renal Disease: Hgb: Slightly declining      JESSI: Yes, to start on 12/1              - Iron studies: Low iron saturation, but high ferritin - hold on supplementation for now, transfusion and JESSI as below   -1U PRBC on 11/30, darbe 40mcg subcutaneous on 12/1     # Mineral Bone Disorder:   - Secondary renal hyperparathyroidism; PTH level: Minimally elevated ( pg/ml)   On treatment: None  - Vitamin D; level: Low        On supplement: No  - Calcium; level: Normal        On supplement: No  - Phosphorus; level: Normal        On supplement: No     # Electrolytes:   - Potassium; level: Normal        On supplement: No  - Magnesium; level: Low        On supplement: Yes  - Bicarbonate; level: Normal        On  supplement: No     # Right abdominal pain: developed new, acute pain overnight 11/24-11/25 in the right lower abdomen/right flank area.   US 11/12 showed 6.3 cm hematoma along superior pole  US 11/25 showed new mild hydronephrosis and 2 hematomas: new 7.4 cm along anterior aspect of mid-lower pole and 4.7 cm along upper pole. Hbg grossly stable at ~7 over past few days.   - Hgb 6.9 today and will transfuse 1 unit and also give darbe 40 mcg on 12/1     # Right Lower Extremity weakness, suspected femoral neuropathy: Developed RLE weakness post-op, felt to be most likely 2/2 femoral nerve injury/stretch during transplant surgery. Had 3 day acute rehab stay post-hospitalization and strength is improving. Able to walk now     # Nausea: improving with Zofran.               - Zofran PRN    # Medical Compliance: Yes     # Transplant History:  Etiology of Kidney Failure: Unknown etiology  Tx: DDKT  Transplant: 11/12/2020 (Kidney)  Donor Type: Donation after Circulatory Death           Donor Class:   Crossmatch at time of Tx: negative  DSA at time of Tx: No  Significant changes in immunosuppression: None  CMV IgG Ab High Risk Discordance (D+/R-): No  EBV IgG Ab High Risk Discordance (D+/R-): No  Significant transplant-related complications: None    Transplant Office Phone Number: [phone]    Assessment and plan was discussed with the patient and she voiced her understanding and agreement.    Return visit: No follow-ups on file. 1m post transplant    Xenia Barrera MD     Physician Attestation   I, Edgardo Sands MD, personally examined and evaluated this patient.  I discussed the patient with the resident/fellow and care team, and agree with the assessment and plan of care as documented in the note on 11/30/20 .      I personally reviewed vital signs, medications, labs and imaging.  Edgardo Sands MD  Date of Service (when I saw the patient): 11/30/20          Chief Complaint   Ms. Larkin is a 45 year old here for routine  follow up.     History of Present Illness   Seen in [facility]. Patient noted doing well. Notes abdominal pain and leg weakness are both getting better and she is able to walk now. Denies nausea/vomiting/diarrhea, SOB, chest pain, fevers/chills. Has been hydrating well; drinking juice, milk etc.     Interview performed with assistance from Patricia     Recent Hospitalizations:  [x] No [] Yes    New Medical Issues: [x] No [] Yes    Decreased energy: [x] No [] Yes    Chest pain or SOB with exertion:  [x] No [] Yes    Appetite change or weight change: [x] No [] Yes    Nausea, vomiting or diarrhea:  [x] No [] Yes    Fever, sweats or chills: [x] No [] Yes    Leg swelling: [x] No [] Yes        Review of Systems   A comprehensive review of systems was obtained and negative, except as noted in the HPI or PMH.    Problem List   Patient Active Problem List   Diagnosis     Anemia in chronic renal disease     HTN, kidney transplant related     Secondary renal hyperparathyroidism (H)     Coronary artery disease involving native coronary artery of native heart without angina pectoris     Status post coronary angiogram     NSTEMI (non-ST elevated myocardial infarction) (H)     Vitamin D deficiency     Pneumothorax     Kidney replaced by transplant     Femoral neuropathy, right     Femoral neuropathy of right lower extremity     Aftercare following organ transplant     CKD (chronic kidney disease) stage 4, GFR 15-29 ml/min (H)     Anemia due to stage 5 chronic kidney disease, not on chronic dialysis (H)       Social History   Social History     Tobacco Use     Smoking status: Never Smoker     Smokeless tobacco: Never Used   Substance Use Topics     Alcohol use: No     Drug use: No       Allergies   Allergies   Allergen Reactions     Blood Transfusion Related (Informational Only)      Patient has a history of a clinically significant antibody against RBC antigens.  A delay in compatible RBCs may occur.     Cephalosporins  Anaphylaxis     Per [facility] allergist report: positive skin intradermal tests to Cefazolin and Ceftazidime, but NOT to Penicillin G, Piperacillin and Meropenem      Chlorhexidine Anaphylaxis     Several times during anesthesia at initial phase during insertion of I.v. line and after disinfection with Chlorhexidine drop of blood pressure.  In Prick and as well intradermal tests clear positive reactions to Chlorhexidine (particularly in intradermal test to 0.0002% Chlorhexidine papule of 1cm and Erythema of 2.5cm). AVOID in future Chlorhexidine     Heparin Flush        Medications   Current Outpatient Medications   Medication Sig     sulfamethoxazole-trimethoprim (BACTRIM) 400-80 MG tablet Take 1 tablet by mouth daily     acetaminophen (TYLENOL) 325 MG tablet Take 325-650 mg by mouth every 4 hours as needed for mild pain or fever      amLODIPine (NORVASC) 5 MG tablet Take 1 tablet (5 mg) by mouth daily     aspirin (ASA) 81 MG chewable tablet Take 1 tablet (81 mg) by mouth daily     atorvastatin (LIPITOR) 10 MG tablet Take 1 tablet (10 mg) by mouth daily     magnesium oxide (MAG-OX) 400 MG tablet Take 1 tablet (400 mg) by mouth daily (with lunch)     mycophenolate (GENERIC EQUIVALENT) 250 MG capsule Take 3 capsules (750 mg) by mouth 2 times daily     ondansetron (ZOFRAN-ODT) 4 MG ODT tab Take 1 tablet (4 mg) by mouth every 6 hours as needed for nausea     pantoprazole (PROTONIX) 40 MG EC tablet Take 1 tablet (40 mg) by mouth every morning (before breakfast)     senna-docusate (SENOKOT-S/PERICOLACE) 8.6-50 MG tablet Take 1 tablet by mouth 2 times daily as needed for constipation     tacrolimus (GENERIC EQUIVALENT) 1 MG capsule Take 8 capsules (8 mg) by mouth 2 times daily     valGANciclovir (VALCYTE) 450 MG tablet Take 1 tablet (450 mg) by mouth every other day     Current Facility-Administered Medications   Medication     0.9% sodium chloride BOLUS     darbepoetin teo-polysorbate (ARANESP) injection 40 mcg      Facility-Administered Medications Ordered in Other Visits   Medication     sodium chloride (PF) 0.9% PF flush 3-20 mL     Medications Discontinued During This Encounter   Medication Reason     sulfamethoxazole-trimethoprim (BACTRIM) 400-80 MG tablet        Physical Exam   Vital Signs: There were no vitals taken for this visit.    GENERAL APPEARANCE: alert and no distress  HENT: mouth without ulcers or lesions  RESP: breathing non-labored  CV: regular rhythm, normal rate  EDEMA: no LE edema bilaterally  ABDOMEN: soft, nondistended, nontender, surgical site clean/dry  MS: extremities normal - no gross deformities noted, no evidence of inflammation in joints  SKIN: no rash    Data     Renal Latest Ref Rng & Units 11/27/2020 11/25/2020 11/24/2020   Na 133 - 144 mmol/L 137 138 138   K 3.4 - 5.3 mmol/L 4.4 4.4 4.6   Cl 94 - 109 mmol/L 108 106 108   CO2 20 - 32 mmol/L 24 24 23   BUN 7 - 30 mg/dL 22 30 32(H)   Cr 0.52 - 1.04 mg/dL 1.93(H) 2.21(H) 2.22(H)   Glucose 70 - 99 mg/dL 100(H) 98 99   Ca  8.5 - 10.1 mg/dL 9.6 9.6 9.8   Mg 1.6 - 2.3 mg/dL 1.5(L) 1.7 1.9     Bone Health Latest Ref Rng & Units 11/25/2020 11/24/2020 11/23/2020   Phos 2.5 - 4.5 mg/dL 4.0 3.8 4.0   PTHi 18 - 80 pg/mL - 130(H) -   Vit D Def 20 - 75 ug/L - 16(L) -     Heme Latest Ref Rng & Units 11/30/2020 11/27/2020 11/25/2020   WBC 4.0 - 11.0 10e9/L 5.3 7.4 9.5   Hgb 11.7 - 15.7 g/dL 6.9(LL) 7.1(L) 7.1(L)   Plt 150 - 450 10e9/L 436 480(H) 448   ABSOLUTE NEUTROPHIL 1.6 - 8.3 10e9/L - - 8.7(H)   ABSOLUTE LYMPHOCYTES 0.8 - 5.3 10e9/L - - 0.2(L)   ABSOLUTE MONOCYTES 0.0 - 1.3 10e9/L - - 0.3   ABSOLUTE EOSINOPHILS 0.0 - 0.7 10e9/L - - 0.2   ABSOLUTE BASOPHILS 0.0 - 0.2 10e9/L - - 0.0   ABS IMMATURE GRANULOCYTES 0 - 0.4 10e9/L - - 0.0   ABSOLUTE NUCLEATED RBC - - - 0.0     Liver Latest Ref Rng & Units 11/12/2020 5/14/2020 1/24/2020   AP 40 - 150 U/L 142 - 125   TBili 0.2 - 1.3 mg/dL 0.5 - 0.3   DBili 0.0 - 0.2 mg/dL - - <0.1   ALT 0 - 50 U/L 18 12 18   AST  0 - 45 U/L 19 - 22   Tot Protein 6.8 - 8.8 g/dL 8.4 - 6.3(L)   Albumin 3.4 - 5.0 g/dL 3.8 - 2.8(L)     Pancreas Latest Ref Rng & Units 11/12/2020 1/24/2020 1/23/2020   A1C 0 - 5.6 % 4.9 Canceled, Test credited Canceled, Test credited     Iron studies Latest Ref Rng & Units 11/24/2020   Iron 35 - 180 ug/dL 21(L)   Iron sat 15 - 46 % 10(L)   Ferritin 12 - 150 ng/mL 1,120(H)     [facility] Txp Virology Latest Ref Rng & Units 11/23/2020 11/14/2020 1/23/2020   BK Spec - Plasma - -   BK Res BKNEG:BK Virus DNA Not Detected copies/mL BK Virus DNA Not Detected - -   BK Log <2.7 Log copies/mL Not Calculated - -   EBV VCA IGG ANTIBODY U/mL - - -   EBV CAPSID ANTIBODY IGG 0.0 - 0.8 AI - - 1.8(H)   Hep B Core NR:Nonreactive - Reactive(AA) -   Hep B Surf - - - -   HIV 1&2 NEG - - -        Recent Labs   Lab Test 11/24/20  0723 11/25/20  0643 11/27/20  0708   DOSTAC 0700pm 2,000 Not Provided   TACROL 8.2 6.6 8.8     Recent Labs   Lab Test 11/23/20  0651   DOSMPA 2,000   MPACID 3.12   MPAG 191.9*       Again, thank you for allowing me to participate in the care of your patient.        Sincerely,        Edgardo Sands MD

## 2020-11-30 NOTE — PROGRESS NOTES
ACUTE TRANSPLANT NEPHROLOGY VISIT    Assessment & Plan   # DDKT: Stable, Cr improving              - Baseline Creatinine: ~ TBD              - Proteinuria: Minimal (0.2-0.5 grams) (7.4g/g prior to transplant, will follow UPCR via FSGS protocol due to unknown native disease)              - Date DSA Last Checked: Nov/2020      Latest DSA: No              - BK Viremia: No              - Kidney Tx Biopsy: No     # Immunosuppression: Tacrolimus immediate release (goal 8-10) and Mycophenolate mofetil (dose 750 mg every 12 hours)              - Induction with Recent Transplant:  Anti-thymocyte Globulin-Rabbit (Thymoglobulin) 5.8mg/kg  Methylprenisolone (Solumedrol)              - Changes: No     # Infection Prophylaxis:   - PJP: Sulfa/TMP (Bactrim), refilled  - CMV: Valcyte 450 mg daily     # Hypertension: Controlled;   Goal BP: < 150/90              - Volume status: Euvolemic                              - Changes: No      # Anemia in Chronic Renal Disease: Hgb: Slightly declining      JESSI: Yes, to start on 12/1              - Iron studies: Low iron saturation, but high ferritin - hold on supplementation for now, transfusion and JESSI as below   -1U PRBC on 11/30, darbe 40mcg subcutaneous on 12/1     # Mineral Bone Disorder:   - Secondary renal hyperparathyroidism; PTH level: Minimally elevated ( pg/ml)   On treatment: None  - Vitamin D; level: Low        On supplement: No  - Calcium; level: Normal        On supplement: No  - Phosphorus; level: Normal        On supplement: No     # Electrolytes:   - Potassium; level: Normal        On supplement: No  - Magnesium; level: Low        On supplement: Yes  - Bicarbonate; level: Normal        On supplement: No     # Right abdominal pain: developed new, acute pain overnight 11/24-11/25 in the right lower abdomen/right flank area.   US 11/12 showed 6.3 cm hematoma along superior pole  US 11/25 showed new mild hydronephrosis and 2 hematomas: new 7.4 cm along anterior aspect of  mid-lower pole and 4.7 cm along upper pole. Hbg grossly stable at ~7 over past few days.   - Hgb 6.9 today and will transfuse 1 unit and also give darbe 40 mcg on 12/1     # Right Lower Extremity weakness, suspected femoral neuropathy: Developed RLE weakness post-op, felt to be most likely 2/2 femoral nerve injury/stretch during transplant surgery. Had 3 day acute rehab stay post-hospitalization and strength is improving. Able to walk now     # Nausea: improving with Zofran.               - Zofran PRN    # Medical Compliance: Yes     # Transplant History:  Etiology of Kidney Failure: Unknown etiology  Tx: DDKT  Transplant: 11/12/2020 (Kidney)  Donor Type: Donation after Circulatory Death           Donor Class:   Crossmatch at time of Tx: negative  DSA at time of Tx: No  Significant changes in immunosuppression: None  CMV IgG Ab High Risk Discordance (D+/R-): No  EBV IgG Ab High Risk Discordance (D+/R-): No  Significant transplant-related complications: None    Transplant Office Phone Number: [phone]    Assessment and plan was discussed with the patient and she voiced her understanding and agreement.    Return visit: No follow-ups on file. 1m post transplant    Xenia Barrera MD     Physician Attestation   I, Edgardo Sands MD, personally examined and evaluated this patient.  I discussed the patient with the resident/fellow and care team, and agree with the assessment and plan of care as documented in the note on 11/30/20 .      I personally reviewed vital signs, medications, labs and imaging.  Edgardo Sands MD  Date of Service (when I saw the patient): 11/30/20          Chief Complaint   Ms. Larkin is a 45 year old here for routine follow up.     History of Present Illness   Seen in [facility]. Patient noted doing well. Notes abdominal pain and leg weakness are both getting better and she is able to walk now. Denies nausea/vomiting/diarrhea, SOB, chest pain, fevers/chills. Has been hydrating well; drinking juice, milk  etc.     Interview performed with assistance from Patricia     Recent Hospitalizations:  [x] No [] Yes    New Medical Issues: [x] No [] Yes    Decreased energy: [x] No [] Yes    Chest pain or SOB with exertion:  [x] No [] Yes    Appetite change or weight change: [x] No [] Yes    Nausea, vomiting or diarrhea:  [x] No [] Yes    Fever, sweats or chills: [x] No [] Yes    Leg swelling: [x] No [] Yes        Review of Systems   A comprehensive review of systems was obtained and negative, except as noted in the HPI or PMH.    Problem List   Patient Active Problem List   Diagnosis     Anemia in chronic renal disease     HTN, kidney transplant related     Secondary renal hyperparathyroidism (H)     Coronary artery disease involving native coronary artery of native heart without angina pectoris     Status post coronary angiogram     NSTEMI (non-ST elevated myocardial infarction) (H)     Vitamin D deficiency     Pneumothorax     Kidney replaced by transplant     Femoral neuropathy, right     Femoral neuropathy of right lower extremity     Aftercare following organ transplant     CKD (chronic kidney disease) stage 4, GFR 15-29 ml/min (H)     Anemia due to stage 5 chronic kidney disease, not on chronic dialysis (H)       Social History   Social History     Tobacco Use     Smoking status: Never Smoker     Smokeless tobacco: Never Used   Substance Use Topics     Alcohol use: No     Drug use: No       Allergies   Allergies   Allergen Reactions     Blood Transfusion Related (Informational Only)      Patient has a history of a clinically significant antibody against RBC antigens.  A delay in compatible RBCs may occur.     Cephalosporins Anaphylaxis     Per [facility] allergist report: positive skin intradermal tests to Cefazolin and Ceftazidime, but NOT to Penicillin G, Piperacillin and Meropenem      Chlorhexidine Anaphylaxis     Several times during anesthesia at initial phase during insertion of I.v. line and after disinfection  with Chlorhexidine drop of blood pressure.  In Prick and as well intradermal tests clear positive reactions to Chlorhexidine (particularly in intradermal test to 0.0002% Chlorhexidine papule of 1cm and Erythema of 2.5cm). AVOID in future Chlorhexidine     Heparin Flush        Medications   Current Outpatient Medications   Medication Sig     sulfamethoxazole-trimethoprim (BACTRIM) 400-80 MG tablet Take 1 tablet by mouth daily     acetaminophen (TYLENOL) 325 MG tablet Take 325-650 mg by mouth every 4 hours as needed for mild pain or fever      amLODIPine (NORVASC) 5 MG tablet Take 1 tablet (5 mg) by mouth daily     aspirin (ASA) 81 MG chewable tablet Take 1 tablet (81 mg) by mouth daily     atorvastatin (LIPITOR) 10 MG tablet Take 1 tablet (10 mg) by mouth daily     magnesium oxide (MAG-OX) 400 MG tablet Take 1 tablet (400 mg) by mouth daily (with lunch)     mycophenolate (GENERIC EQUIVALENT) 250 MG capsule Take 3 capsules (750 mg) by mouth 2 times daily     ondansetron (ZOFRAN-ODT) 4 MG ODT tab Take 1 tablet (4 mg) by mouth every 6 hours as needed for nausea     pantoprazole (PROTONIX) 40 MG EC tablet Take 1 tablet (40 mg) by mouth every morning (before breakfast)     senna-docusate (SENOKOT-S/PERICOLACE) 8.6-50 MG tablet Take 1 tablet by mouth 2 times daily as needed for constipation     tacrolimus (GENERIC EQUIVALENT) 1 MG capsule Take 8 capsules (8 mg) by mouth 2 times daily     valGANciclovir (VALCYTE) 450 MG tablet Take 1 tablet (450 mg) by mouth every other day     Current Facility-Administered Medications   Medication     0.9% sodium chloride BOLUS     darbepoetin teo-polysorbate (ARANESP) injection 40 mcg     Facility-Administered Medications Ordered in Other Visits   Medication     sodium chloride (PF) 0.9% PF flush 3-20 mL     Medications Discontinued During This Encounter   Medication Reason     sulfamethoxazole-trimethoprim (BACTRIM) 400-80 MG tablet        Physical Exam   Vital Signs: There were no  vitals taken for this visit.    GENERAL APPEARANCE: alert and no distress  HENT: mouth without ulcers or lesions  RESP: breathing non-labored  CV: regular rhythm, normal rate  EDEMA: no LE edema bilaterally  ABDOMEN: soft, nondistended, nontender, surgical site clean/dry  MS: extremities normal - no gross deformities noted, no evidence of inflammation in joints  SKIN: no rash    Data     Renal Latest Ref Rng & Units 11/27/2020 11/25/2020 11/24/2020   Na 133 - 144 mmol/L 137 138 138   K 3.4 - 5.3 mmol/L 4.4 4.4 4.6   Cl 94 - 109 mmol/L 108 106 108   CO2 20 - 32 mmol/L 24 24 23   BUN 7 - 30 mg/dL 22 30 32(H)   Cr 0.52 - 1.04 mg/dL 1.93(H) 2.21(H) 2.22(H)   Glucose 70 - 99 mg/dL 100(H) 98 99   Ca  8.5 - 10.1 mg/dL 9.6 9.6 9.8   Mg 1.6 - 2.3 mg/dL 1.5(L) 1.7 1.9     Bone Health Latest Ref Rng & Units 11/25/2020 11/24/2020 11/23/2020   Phos 2.5 - 4.5 mg/dL 4.0 3.8 4.0   PTHi 18 - 80 pg/mL - 130(H) -   Vit D Def 20 - 75 ug/L - 16(L) -     Heme Latest Ref Rng & Units 11/30/2020 11/27/2020 11/25/2020   WBC 4.0 - 11.0 10e9/L 5.3 7.4 9.5   Hgb 11.7 - 15.7 g/dL 6.9(LL) 7.1(L) 7.1(L)   Plt 150 - 450 10e9/L 436 480(H) 448   ABSOLUTE NEUTROPHIL 1.6 - 8.3 10e9/L - - 8.7(H)   ABSOLUTE LYMPHOCYTES 0.8 - 5.3 10e9/L - - 0.2(L)   ABSOLUTE MONOCYTES 0.0 - 1.3 10e9/L - - 0.3   ABSOLUTE EOSINOPHILS 0.0 - 0.7 10e9/L - - 0.2   ABSOLUTE BASOPHILS 0.0 - 0.2 10e9/L - - 0.0   ABS IMMATURE GRANULOCYTES 0 - 0.4 10e9/L - - 0.0   ABSOLUTE NUCLEATED RBC - - - 0.0     Liver Latest Ref Rng & Units 11/12/2020 5/14/2020 1/24/2020   AP 40 - 150 U/L 142 - 125   TBili 0.2 - 1.3 mg/dL 0.5 - 0.3   DBili 0.0 - 0.2 mg/dL - - <0.1   ALT 0 - 50 U/L 18 12 18   AST 0 - 45 U/L 19 - 22   Tot Protein 6.8 - 8.8 g/dL 8.4 - 6.3(L)   Albumin 3.4 - 5.0 g/dL 3.8 - 2.8(L)     Pancreas Latest Ref Rng & Units 11/12/2020 1/24/2020 1/23/2020   A1C 0 - 5.6 % 4.9 Canceled, Test credited Canceled, Test credited     Iron studies Latest Ref Rng & Units 11/24/2020   Iron 35 - 180  ug/dL 21(L)   Iron sat 15 - 46 % 10(L)   Ferritin 12 - 150 ng/mL 1,120(H)     [facility] Txp Virology Latest Ref Rng & Units 11/23/2020 11/14/2020 1/23/2020   BK Spec - Plasma - -   BK Res BKNEG:BK Virus DNA Not Detected copies/mL BK Virus DNA Not Detected - -   BK Log <2.7 Log copies/mL Not Calculated - -   EBV VCA IGG ANTIBODY U/mL - - -   EBV CAPSID ANTIBODY IGG 0.0 - 0.8 AI - - 1.8(H)   Hep B Core NR:Nonreactive - Reactive(AA) -   Hep B Surf - - - -   HIV 1&2 NEG - - -        Recent Labs   Lab Test 11/24/20  0723 11/25/20  0643 11/27/20  0708   DOSTAC 0700pm 2,000 Not Provided   TACROL 8.2 6.6 8.8     Recent Labs   Lab Test 11/23/20  0651   DOSMPA 2,000   MPACID 3.12   MPAG 191.9*

## 2020-12-01 ENCOUNTER — DOCUMENTATION ONLY (OUTPATIENT)
Dept: TRANSPLANT | Facility: CLINIC | Age: 45
End: 2020-12-01

## 2020-12-01 ENCOUNTER — TELEPHONE (OUTPATIENT)
Dept: TRANSPLANT | Facility: CLINIC | Age: 45
End: 2020-12-01

## 2020-12-01 ENCOUNTER — INFUSION THERAPY VISIT (OUTPATIENT)
Dept: INFUSION THERAPY | Facility: CLINIC | Age: 45
End: 2020-12-01
Attending: INTERNAL MEDICINE
Payer: MEDICARE

## 2020-12-01 ENCOUNTER — VIRTUAL VISIT (OUTPATIENT)
Dept: PHARMACY | Facility: CLINIC | Age: 45
End: 2020-12-01
Payer: COMMERCIAL

## 2020-12-01 VITALS
SYSTOLIC BLOOD PRESSURE: 137 MMHG | DIASTOLIC BLOOD PRESSURE: 85 MMHG | TEMPERATURE: 98.2 F | OXYGEN SATURATION: 100 % | HEART RATE: 89 BPM

## 2020-12-01 DIAGNOSIS — D63.1 ANEMIA DUE TO STAGE 5 CHRONIC KIDNEY DISEASE, NOT ON CHRONIC DIALYSIS (H): Primary | ICD-10-CM

## 2020-12-01 DIAGNOSIS — N18.4 ANEMIA IN STAGE 4 CHRONIC KIDNEY DISEASE (H): ICD-10-CM

## 2020-12-01 DIAGNOSIS — D63.1 ANEMIA IN STAGE 4 CHRONIC KIDNEY DISEASE (H): ICD-10-CM

## 2020-12-01 DIAGNOSIS — N18.4 CKD (CHRONIC KIDNEY DISEASE) STAGE 4, GFR 15-29 ML/MIN (H): ICD-10-CM

## 2020-12-01 DIAGNOSIS — Z94.0 KIDNEY TRANSPLANT RECIPIENT: Primary | ICD-10-CM

## 2020-12-01 DIAGNOSIS — N18.5 ANEMIA DUE TO STAGE 5 CHRONIC KIDNEY DISEASE, NOT ON CHRONIC DIALYSIS (H): Primary | ICD-10-CM

## 2020-12-01 PROCEDURE — 99607 MTMS BY PHARM ADDL 15 MIN: CPT | Mod: TEL | Performed by: PHARMACIST

## 2020-12-01 PROCEDURE — 250N000011 HC RX IP 250 OP 636: Performed by: INTERNAL MEDICINE

## 2020-12-01 PROCEDURE — 99605 MTMS BY PHARM NP 15 MIN: CPT | Mod: TEL | Performed by: PHARMACIST

## 2020-12-01 PROCEDURE — 96372 THER/PROPH/DIAG INJ SC/IM: CPT | Performed by: INTERNAL MEDICINE

## 2020-12-01 RX ADMIN — DARBEPOETIN ALFA 40 MCG: 40 INJECTION, SOLUTION INTRAVENOUS; SUBCUTANEOUS at 16:42

## 2020-12-01 ASSESSMENT — PAIN SCALES - GENERAL: PAINLEVEL: MODERATE PAIN (4)

## 2020-12-01 NOTE — PROGRESS NOTES
MTM ENCOUNTER  SUBJECTIVE/OBJECTIVE:                           Paolo Larkin is a 45 year old female called for a transitions of care visit. She was discharged from [facility] on 11/22 for renal transplant.  was present during today's visit.     Reason for visit: initial post transplant.    Allergies/ADRs: Reviewed in chart  Tobacco: She reports that she has never smoked. She has never used smokeless tobacco.  Alcohol: not currently using  Caffeine: no caffeine  Hydration: 3 milk gallons daily  Past Medical History: Reviewed in chart    Medication Adherence/Access: Patient uses pill box(es) and has assistance with medication administration: family member, afshan Rodriguez.  Patient takes medications 3 time(s) per day. 7am, 7pm, 12pm,   Per patient, misses medication 0 times per week.   The patient fills medications at [facility]: NO, fills medications at local pharmacy. [facility]    Renal Transplant: Transplant date: 11/12/20  Estimated Creatinine Clearance: 36.5 mL/min (A) (based on SCr of 1.63 mg/dL (H)).  Tx Coordinator: Yaz iVlla, Using Med Card: Yes  Immunizations: annual flu shot 2020; [id number]:  2011, 2016; Prevnar 13: 2014; TDaP: 2009   ; Shingrix: none, HBV: titers show immunity    Today's Vitals: There were no vitals taken for this visit.      ASSESSMENT:                            Medication Adherence: No issues identified    Renal Transplant: Patient ran out of time before another appointment, we were unable to review meds today.     PLAN:                          Post Discharge Medication Reconciliation Status: unable to reconcile discharge medications due to lack of time.    Following up tomorrow morning to complete med review.    I spent 20 minutes with this patient today. I offer these suggestions for consideration by txp team. A copy of the visit note was provided to the patient's referring provider.    Will follow up tomrrow.    The patient declined a summary of these  recommendations.     Cedric Barrera, PharmD  MTM Pharmacist    Phone: [phone]     Patient consented to a telehealth visit: yes  Telemedicine Visit Details  Type of service:  Telephone visit  Start Time: 3:40 PM  End Time: 4:06 PM  Originating Location (patient location): Home  Distant Location (provider location):  [facility] MEDICATION THERAPY MANAGEMENT  Mode of Communication:  Telephone

## 2020-12-01 NOTE — LETTER
12/1/2020         RE: Paolo Larkin  [address]  [facility] [place] [address]        Dear Colleague,    Thank you for referring your patient, Paolo Larkin, to the [facility] TREATMENT [facility]. Please see a copy of my visit note below.    Nursing Note  Paolo Larkin [facility] for:   Chief Complaint   Patient presents with     Other     Injection epogen     During today's [facility] appointment, orders from Xenia Bradford MD, Dr Sands were completed.  Frequency: once    Progress note:  Patient identification verified by name and date of birth.  Assessment completed.  Vitals recorded in Doc Flowsheets.  Patient was provided with education regarding medication/procedure and possible side effects.  Patient verbalized understanding.     present during visit today: Yes, Language: Patricia.     Treatment Conditions: Non-applicable.    Post Infusion Assessment:  Patient tolerated injection without incident.     Discharge Plan:   Follow up plan of care with: transplant coordinator  Discharge instructions were reviewed with patient.  Patient/representative verbalized understanding of discharge instructions and all questions answered.  Patient discharged [facility] in stable condition.    Huyen Stuart RN    Administrations This Visit     darbepoetin teo-polysorbate (ARANESP) injection 40 mcg     Admin Date  12/01/2020 Action  Given Dose  40 mcg Route  Subcutaneous Administered By  Huyen Stuart RN                /85   Pulse 89   Temp 98.2  F (36.8  C) (Oral)   SpO2 100%           Again, thank you for allowing me to participate in the care of your patient.        Sincerely,        Phone,

## 2020-12-01 NOTE — PROGRESS NOTES
Nursing Note  Paolo Larkin presents [facility] for:   Chief Complaint   Patient presents with     Other     Injection epogen     During [facility]'s [facility] appointment, orders from Xenia Bradford MD, Dr Sands were completed.  Frequency: once    Progress note:  Patient identification verified by name and date of birth.  Assessment completed.  Vitals recorded in Doc Flowsheets.  Patient was provided with education regarding medication/procedure and possible side effects.  Patient verbalized understanding.     present during visit today: Yes, Language: Patricia.     Treatment Conditions: Non-applicable.    Post Infusion Assessment:  Patient tolerated injection without incident.     Discharge Plan:   Follow up plan of care with: transplant coordinator  Discharge instructions were reviewed with patient.  Patient/representative verbalized understanding of discharge instructions and all questions answered.  [facility] discharged [facility] in stable condition.    Huyen Stuart RN    Administrations This Visit     darbepoetin teo-polysorbate (ARANESP) injection 40 mcg     Admin Date  12/01/2020 Action  Given Dose  40 mcg Route  Subcutaneous Administered By  Huyen Stuart RN                /85   Pulse 89   Temp 98.2  F (36.8  C) (Oral)   SpO2 100%

## 2020-12-01 NOTE — TELEPHONE ENCOUNTER
Spoke to Lucia and informed them that we have not received fax at the txp office.  Gave correct fax number and asked if they can refax.

## 2020-12-01 NOTE — TELEPHONE ENCOUNTER
Edgardo Sands MD Hamilton, Susan M., RN; Yaz Villa, RN             Thank you Dasha. Alissa, please arrange for Darbepoetin 40mcg subcutaneous x1 in the coming days please. Thanks      Spoke with patient about home care, will call to make appt with PCP Dr. Kahlil Lloyd at [facility]. Spoke with Rachel at [facility], missing face-to-face, refaxed.   JESSI injection today at 1600 in [facility]. Ok to return home after and repeat labs on Thursday. Patient verbalized understanding.

## 2020-12-01 NOTE — TELEPHONE ENCOUNTER
Patient Call: General  Route to LPN    Reason for call: connect with pts  regarding the pts work letter or form,  From [employer] its urgent     Call back needed? Yes    Return Call Needed  Same as documented in contacts section  When to return call?: Now: [place] RN first, if no answer Page

## 2020-12-02 ENCOUNTER — VIRTUAL VISIT (OUTPATIENT)
Dept: PHARMACY | Facility: CLINIC | Age: 45
End: 2020-12-02
Payer: COMMERCIAL

## 2020-12-02 ENCOUNTER — TELEPHONE (OUTPATIENT)
Dept: TRANSPLANT | Facility: CLINIC | Age: 45
End: 2020-12-02

## 2020-12-02 DIAGNOSIS — R52 PAIN: ICD-10-CM

## 2020-12-02 DIAGNOSIS — K59.00 CONSTIPATION, UNSPECIFIED CONSTIPATION TYPE: ICD-10-CM

## 2020-12-02 DIAGNOSIS — Z94.0 KIDNEY TRANSPLANT RECIPIENT: Primary | ICD-10-CM

## 2020-12-02 PROCEDURE — 99607 MTMS BY PHARM ADDL 15 MIN: CPT | Mod: TEL | Performed by: PHARMACIST

## 2020-12-02 PROCEDURE — 99606 MTMS BY PHARM EST 15 MIN: CPT | Mod: TEL | Performed by: PHARMACIST

## 2020-12-02 NOTE — PATIENT INSTRUCTIONS
Recommendations from today's MTM visit:                                                      1. Increase Bactrim (Sulfa/Trimethoprim) to 1 tablet once every day.      It was great to speak with you today.  I value your experience and would be very thankful for your time with providing feedback on our clinic survey. You may receive a survey via email or text message in the next few days.     Next MTM visit: 2 months    To schedule another MTM appointment, please call the clinic directly or you may call the MTM scheduling line at [phone] or toll-free at [phone].     My Clinical Pharmacist's contact information:                                                      It was a pleasure talking with you today!  Please feel free to contact me with any questions or concerns you have.      Cedric Barrera, PharmD  [place] Pharmacist    Phone: [phone]

## 2020-12-02 NOTE — PROGRESS NOTES
MTM ENCOUNTER  SUBJECTIVE/OBJECTIVE:                           Paolo Larkin is a 45 year old female called for a transitions of care visit. She was discharged from [facility] on 11/22 for kidney transplant.  was present during today's visit.     Reason for visit: initial post txp med review.    Allergies/ADRs: Reviewed in chart  Tobacco: She reports that she has never smoked. She has never used smokeless tobacco.  Alcohol: not currently using  Caffeine: no caffeine  Hydration: 3 milk jug gallons daily  Past Medical History: Reviewed in chart     Medication Adherence/Access: Patient uses pill box(es) and has assistance with medication administration: family member, afshan Rodriguez. Home care set to come out this Thursday to help with med set up as well.  Patient takes medications 3 time(s) per day. 7am, 7pm, 12pm,   Per patient, misses medication 0 times per week.   The patient fills medications at [place]: NO, fills medications at local pharmacy. [place] [facility]  Child bearing potential: yes, discussed MMF risks     Renal Transplant:  Current immunosuppressants include TAC 8mg BID and MMF 750mgmg BID.  Pt reports no side effects  Transplant date: 11/12/20  Estimated Creatinine Clearance: 36.5 mL/min (A) (based on SCr of 1.63 mg/dL (H)).  CMV prophylaxis: CrCl 25 to 39 mL/minute: Valcyte 450 mg every other day (MWF) Treat 3 months post tx   PCP prophylaxis: Bactrim S S TIW  PPI use: Pantoprazole 40mg daily.   Supplements: Mag Oxide 400mg daily ( 2 hours from MMF) .  Tx Coordinator: Alissa Villa,  Using Med Card: Yes  Immunizations: annual flu shot 2020; [id number]:  2011, 2016; Prevnar 13: 2014; TDaP:  2009; Shingrix: none, HBV: last titers showed immunity    Constipation: Pt is taking Senna 1 tablet BID. Having 1 BMs daily    Pain: Pt is taking APAP prn.  Pain is mostly on back and lower back. APAP helps control this.     Today's Vitals: There were no vitals taken for this visit.      ASSESSMENT:                             Medication Adherence: No issues identified    Renal Transplant:  Only discrepency in dosing was Bactrim was increased to to S S once daily. Had patient make this change on her card. CrCl >30 ml/min    Constipation: Stable.     Pain: Stable    PLAN:                          Post Discharge Medication Reconciliation Status: discharge medications reconciled and changed, per note/orders.    Pt to...  1. Increase Bactrim to 1 tablet once daily as directed    I spent 30 minutes with this patient today. I offer these suggestions for consideration by txp team. A copy of the visit note was provided to the patient's referring provider.    Will follow up in 2 months.    The patient was mailed a summary of these recommendations.     Cedric Barrera, PharmD  [facility] Pharmacist    Phone: [phone]     Patient consented to a telehealth visit: yes  Telemedicine Visit Details  Type of service:  Telephone visit  Start Time: 8:09 AM  End Time: 8:39 AM  Originating Location (patient location): [place]  Distant Location (provider location):  [facility] MEDICATION THERAPY MANAGEMENT  Mode of Communication:  Telephone

## 2020-12-02 NOTE — TELEPHONE ENCOUNTER
Post Kidney and Pancreas Transplant Team Conference  Date: 12/2/2020  Transplant Coordinator: Yaz Villa     Attendees:  [x]  Dr. Jacob  [x] Tina Viveros LPN     [x]  Dr. Lopez [x] Ashli Tejeda RN [] Alayna Lepe LPN   [x]  Dr. Stephenson [] Yoanna Estes, KULWNAT    [x]  Dr. Sands [] Rosa M Winston, KULWANT [x] Cedric Barrera, PharmD   [x] Dr. Mccall [x] Brandi Kruger, KULWANT    [] Dr. Marcelnio [] Rafat Painter RN    [x] Dr. Wood [] Alexandria Cesar, KULWANT    [x] Dr. Mcnamara [] Radha Mendoza RN    []  Dr. Saavedra [] Blank Anderson, KULWANT    [] Surgery Fellow [x] Yaz Villa, KULWANT    [x] Apple Bowen, DEREJE [] Lesly Car RN        Verbal Plan Read Back:   In [facility] today, will be going home to [facility] after    Routed to RN Coordinator   Tina Viveros LPN

## 2020-12-02 NOTE — Clinical Note
Reviewed med card with patient. Increased Bactrim to S S once daily as prescribed. Pt was taking three times per week.

## 2020-12-03 ENCOUNTER — TELEPHONE (OUTPATIENT)
Dept: TRANSPLANT | Facility: CLINIC | Age: 45
End: 2020-12-03

## 2020-12-03 DIAGNOSIS — Z94.0 KIDNEY TRANSPLANT RECIPIENT: ICD-10-CM

## 2020-12-03 DIAGNOSIS — I15.0 RENOVASCULAR HYPERTENSION: ICD-10-CM

## 2020-12-03 DIAGNOSIS — Z94.0 KIDNEY TRANSPLANTED: ICD-10-CM

## 2020-12-03 RX ORDER — ATORVASTATIN CALCIUM 10 MG/1
10 TABLET, FILM COATED ORAL DAILY
Qty: 30 TABLET | Refills: 3 | Status: SHIPPED | OUTPATIENT
Start: 2020-12-03 | End: 2021-02-19

## 2020-12-03 RX ORDER — PANTOPRAZOLE SODIUM 40 MG/1
40 TABLET, DELAYED RELEASE ORAL
Qty: 30 TABLET | Refills: 1 | Status: SHIPPED | OUTPATIENT
Start: 2020-12-03 | End: 2020-12-16

## 2020-12-03 RX ORDER — MAGNESIUM OXIDE 400 MG/1
400 TABLET ORAL
Qty: 30 TABLET | Refills: 3 | Status: SHIPPED | OUTPATIENT
Start: 2020-12-03 | End: 2020-12-16

## 2020-12-03 RX ORDER — VALGANCICLOVIR 450 MG/1
450 TABLET, FILM COATED ORAL EVERY OTHER DAY
Qty: 30 TABLET | Refills: 0 | Status: SHIPPED | OUTPATIENT
Start: 2020-12-03 | End: 2021-01-05

## 2020-12-03 RX ORDER — SULFAMETHOXAZOLE AND TRIMETHOPRIM 400; 80 MG/1; MG/1
1 TABLET ORAL DAILY
Qty: 30 TABLET | Refills: 11 | Status: SHIPPED | OUTPATIENT
Start: 2020-12-03 | End: 2021-12-13

## 2020-12-03 RX ORDER — ASPIRIN 81 MG/1
81 TABLET, CHEWABLE ORAL DAILY
Qty: 30 TABLET | Refills: 11 | Status: SHIPPED | OUTPATIENT
Start: 2020-12-03 | End: 2021-12-22

## 2020-12-03 RX ORDER — TACROLIMUS 1 MG/1
8 CAPSULE ORAL 2 TIMES DAILY
Qty: 480 CAPSULE | Refills: 11 | Status: SHIPPED | OUTPATIENT
Start: 2020-12-03 | End: 2020-12-10

## 2020-12-03 RX ORDER — MYCOPHENOLATE MOFETIL 250 MG/1
750 CAPSULE ORAL 2 TIMES DAILY
Qty: 180 CAPSULE | Refills: 11 | Status: SHIPPED | OUTPATIENT
Start: 2020-12-03 | End: 2020-12-10

## 2020-12-03 RX ORDER — AMLODIPINE BESYLATE 5 MG/1
5 TABLET ORAL DAILY
Qty: 30 TABLET | Refills: 11 | Status: SHIPPED | OUTPATIENT
Start: 2020-12-03 | End: 2021-12-14

## 2020-12-03 NOTE — TELEPHONE ENCOUNTER
Estimated Creatinine Clearance: 36.5 mL/min (A) (based on SCr of 1.63 mg/dL (H)). - remain on Valcyte 450 mg EOD.     Spoke with home care RN and verified medications. Patient requests medication refills go to [facility] in [place], [place].

## 2020-12-07 ENCOUNTER — OFFICE VISIT (OUTPATIENT)
Dept: TRANSPLANT | Facility: CLINIC | Age: 45
End: 2020-12-07
Attending: SURGERY
Payer: MEDICARE

## 2020-12-07 VITALS
DIASTOLIC BLOOD PRESSURE: 80 MMHG | WEIGHT: 115 LBS | BODY MASS INDEX: 20.37 KG/M2 | OXYGEN SATURATION: 100 % | HEART RATE: 97 BPM | SYSTOLIC BLOOD PRESSURE: 136 MMHG

## 2020-12-07 DIAGNOSIS — Z94.0 KIDNEY TRANSPLANTED: Primary | ICD-10-CM

## 2020-12-07 PROCEDURE — 99213 OFFICE O/P EST LOW 20 MIN: CPT | Mod: 24 | Performed by: SURGERY

## 2020-12-07 NOTE — PROGRESS NOTES
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 Patient has undergone attempted kidney transplant twice (2019, 2020), and each time the case was aborted due to hypotension and suspected anaphylaxis. A pre-op plan was made with anesthesia and Dr. Palmer (Allergist). She is now s/p  donor kidney transplant 20 with Dr. Marcelino.       No fevers, chills, nausea or vomiting.      No burning with urination, frequency.     Transplant History:    Transplant Type:  DDKT  Donor Type: Donation after Circulatory Death    Transplant Date:  2020 (Kidney)   Ureteral Stent:  No   Crossmatch:  negative   DSA at Tx:  No  Baseline Cr: 1.63   DeNovo DSA: No    Acute Rejection Hx:  No    Present Maintenance Immunosuppression:  Tacrolimus and Mycophenolate mofetil    CMV IgG Ab Discordance:  No  EBV IgG Ab Discordance:  No    BK Viremia:  No  EBV Viremia:  No    Transplant Coordinator: Yaz Villa     Transplant Office Phone Number: [phone]     Immunosuppressant Medications     Immunosuppressive Agents Disp Start End     mycophenolate (GENERIC EQUIVALENT) 250 MG capsule    180 capsule 12/3/2020     Sig - Route: Take 3 capsules (750 mg) by mouth 2 times daily - Oral    Class: E-Prescribe    Notes to Pharmacy: TXP DT 2020 (Kidney) TXP Dischg DT 2020 DX Kidney replaced by transplant Z94.0 TX Center [facility] ([place], [place])     tacrolimus (GENERIC EQUIVALENT) 1 MG capsule    480 capsule 12/3/2020     Sig - Route: Take 8 capsules (8 mg) by mouth 2 times daily - Oral    Class: E-Prescribe    Notes to Pharmacy: TXP DT 2020 (Kidney) TXP  Dischg DT 11/19/2020 DX Kidney replaced by transplant Z94.0 TX Center [facility] ([place], [place])          Possible Immunosuppression-related side effects:   []             headache  []             vivid dreams  []             irritability  []             cognitive difficuties  []             fine tremor  []             nausea  []             diarrhea  []             neuropathy      []             edema  []             renal calcineurin toxicity  []             hyperkalemia  []             post-transplant diabetes  []             decreased appetite  []             increased appetite  []             other:  []             none    Prescription Medications as of 12/7/2020       Rx Number Disp Refills Start End Last Dispensed Date Next Fill Date Owning Pharmacy    acetaminophen (TYLENOL) 325 MG tablet        [facility] [address] [place] [address] [address]    Sig: Take 325-650 mg by mouth every 4 hours as needed for mild pain or fever     Class: Historical    Route: Oral    amLODIPine (NORVASC) 5 MG tablet  30 tablet 11 12/3/2020    [facility]  [place] [address]    Sig: Take 1 tablet (5 mg) by mouth daily    Class: E-Prescribe    Route: Oral    aspirin (ASA) 81 MG chewable tablet  30 tablet 11 12/3/2020    [facility]  [place] [address]    Sig: Take 1 tablet (81 mg) by mouth daily    Class: E-Prescribe    Route: Oral    atorvastatin (LIPITOR) 10 MG tablet  30 tablet 3 12/3/2020    [facility]  [place] [address]    Sig: Take 1 tablet (10 mg) by mouth daily    Class: E-Prescribe    Route: Oral    magnesium oxide (MAG-OX) 400 MG tablet  30 tablet 3 12/3/2020    [facility]  [place] [address]    Sig: Take 1 tablet (400 mg) by mouth daily (with lunch)    Class: E-Prescribe    Route: Oral     mycophenolate (GENERIC EQUIVALENT) 250 MG capsule  180 capsule 11 12/3/2020    [facility]  [place] [address]    Sig: Take 3 capsules (750 mg) by mouth 2 times daily    Class: E-Prescribe    Notes to Pharmacy: TXP DT 11/12/2020 (Kidney) TXP Dischg DT 11/19/2020 DX Kidney replaced by transplant Z94.0 TX [facility] ([place], [place])    Route: Oral    ondansetron (ZOFRAN-ODT) 4 MG ODT tab  30 tablet 0 11/24/2020    [address]    Sig: Take 1 tablet (4 mg) by mouth every 6 hours as needed for nausea    Class: E-Prescribe    Route: Oral    pantoprazole (PROTONIX) 40 MG EC tablet  30 tablet 1 12/3/2020    [facility]  [place] [address]    Sig: Take 1 tablet (40 mg) by mouth every morning (before breakfast)    Class: E-Prescribe    Route: Oral    senna-docusate (SENOKOT-S/PERICOLACE) 8.6-50 MG tablet  60 tablet 0 11/30/2020    [address]    Sig: Take 1 tablet by mouth 2 times daily as needed for constipation    Class: E-Prescribe    Route: Oral    sulfamethoxazole-trimethoprim (BACTRIM) 400-80 MG tablet  30 tablet 11 12/3/2020    [facility]  [place] [address]    Sig: Take 1 tablet by mouth daily    Class: E-Prescribe    Route: Oral    tacrolimus (GENERIC EQUIVALENT) 1 MG capsule  480 capsule 11 12/3/2020    [facility]  [place] [address]    Sig: Take 8 capsules (8 mg) by mouth 2 times daily    Class: E-Prescribe    Notes to Pharmacy: TXP DT 11/12/2020 (Kidney) TXP Dischg DT 11/19/2020 DX Kidney replaced by transplant Z94.0 TX [facility] ([place], [place])    Route: Oral    valGANciclovir (VALCYTE) 450 MG tablet  30 tablet 0 12/3/2020     [facility] - [place], [place] - [address] [place] [address]    Sig: Take 1 tablet (450 mg) by mouth every other day Stop taking on 2/12/2021    Class: E-Prescribe    Route: Oral          O:      General Appearance: in no apparent distress.   Skin: Normal, no rashes or jaundice  Heart: regular rate and rhythm  Lungs: easy respirations, no audible wheezing.  Abdomen: flat, The wound is dry and intact, without hernia. The abdomen is non-tender. The kidney graft is not tender.  There is no ascites.  Extremities: Tremor absent.   Edema: absent. Absent      Transplant Immunosuppression Labs Latest Ref Rng & Units 11/30/2020 11/27/2020 11/25/2020 11/24/2020 11/23/2020   Tacro Level 5.0 - 15.0 ug/L 9.5 8.8 6.6 8.2 4.5(L)   Tacro Level - 1,900 Not Provided 2,000 0700pm 2,000   Creat 0.52 - 1.04 mg/dL 1.63(H) 1.93(H) 2.21(H) 2.22(H) 2.41(H)   BUN 7 - 30 mg/dL 20 22 30 32(H) 34(H)   WBC 4.0 - 11.0 10e9/L 5.3 7.4 9.5 9.2 7.1   Neutrophil % - - 91.9 91.4 87.2   ANEU 1.6 - 8.3 10e9/L - - 8.7(H) 8.4(H) 6.2       Chemistries:   Recent Labs   Lab Test 11/30/20  0652   BUN 20   CR 1.63*   GFRESTIMATED 38*   GLC 86     Lab Results   Component Value Date    A1C 4.9 11/12/2020     Recent Labs   Lab Test 11/12/20  1500   ALBUMIN 3.8   BILITOTAL 0.5   ALKPHOS 142   AST 19   ALT 18     Urine Studies:  Recent Labs   Lab Test 11/16/20  1500   COLOR Light Yellow   APPEARANCE Clear   URINEGLC Negative   URINEBILI Negative   URINEKETONE Negative   SG 1.010   UBLD Moderate*   URINEPH 5.0   PROTEIN 10*   NITRITE Negative   LEUKEST Negative   RBCU 25*   WBCU 1     Recent Labs   Lab Test 11/24/20  0719 11/13/20  0300   UTPG 0.28* 7.43*     Hematology:   Recent Labs   Lab Test 11/30/20  0652 11/27/20  0707 11/25/20  0642   HGB 6.9* 7.1* 7.1*    480* 448   WBC 5.3 7.4 9.5     Coags:   Recent Labs   Lab Test 11/12/20  2217 11/12/20  1500   INR 1.01 0.92     HLA antibodies:   SA1 Hi Risk Gricelda   Date Value Ref Range Status    11/12/2020 B:37  Final     SA1 Mod Risk Gricelda   Date Value Ref Range Status   11/12/2020 B:45  Final     SA2 Hi Risk Gricelda   Date Value Ref Range Status   11/12/2020 No Specificity Defined  Final     SA2 Mod Risk Gricelda   Date Value Ref Range Status   11/12/2020 None  Final       Assessment: Paolo Vincent Trae is doing well s/p DDKT:  Issues we addressed during her visit include:    Plan:    1. Graft function: cr is stable at 1.63  2. Immunosuppression Management: No change continue MMF and prograf .  Complexity of management:Medium.  Contributing factors: anemia  3. Incision: healing well.  Staples removed.    Followup: as directed  Apple Bowen, LANCE     I have reviewed history, examined patient and discussed plan with the fellow/resident/MERVAT.  I concur with the findings in this note.    Immunosuppressive regimen, management and long term risks discussed in detail. Changes, when applicable made as per orders.                 Total Time: 15 min,   Counselling Time: 10 min.

## 2020-12-07 NOTE — LETTER
2020         RE: Paolo Larkin  [address]  [facility] [address]        Dear Colleague,    Thank you for referring your patient, Paolo Larkin, to the [facility]. Please see a copy of my visit note below.    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 Patient has undergone attempted kidney transplant twice (2019, 2020), and each time the case was aborted due to hypotension and suspected anaphylaxis. A pre-op plan was made with anesthesia and Dr. Palmer (Allergist). She is now s/p  donor kidney transplant 20 with Dr. Marcelino.       No fevers, chills, nausea or vomiting.      No burning with urination, frequency.     Transplant History:    Transplant Type:  DDKT  Donor Type: Donation after Circulatory Death    Transplant Date:  2020 (Kidney)   Ureteral Stent:  No   Crossmatch:  negative   DSA at Tx:  No  Baseline Cr: 1.63   DeNovo DSA: No    Acute Rejection Hx:  No    Present Maintenance Immunosuppression:  Tacrolimus and Mycophenolate mofetil    CMV IgG Ab Discordance:  No  EBV IgG Ab Discordance:  No    BK Viremia:  No  EBV Viremia:  No    Transplant Coordinator: Yaz Villa     Transplant Office Phone Number: [phone]     Immunosuppressant Medications     Immunosuppressive Agents Disp Start End     mycophenolate (GENERIC EQUIVALENT) 250 MG capsule    180 capsule 12/3/2020     Sig - Route: Take 3 capsules (750 mg) by mouth 2 times daily - Oral    Class: E-Prescribe    Notes to Pharmacy: TXP DT 2020 (Kidney) TXP Dischg DT 2020 DX Kidney replaced by transplant Z94.0 TX [facility]  [facility] ([place], [place])     tacrolimus (GENERIC EQUIVALENT) 1 MG capsule    480 capsule 12/3/2020     Sig - Route: Take 8 capsules (8 mg) by mouth 2 times daily - Oral    Class: E-Prescribe    Notes to Pharmacy: TXP DT 11/12/2020 (Kidney) TXP Dischg DT 11/19/2020 DX Kidney replaced by transplant Z94.0 TX Center [facility] ([place], [place])          Possible Immunosuppression-related side effects:   []             headache  []             vivid dreams  []             irritability  []             cognitive difficuties  []             fine tremor  []             nausea  []             diarrhea  []             neuropathy      []             edema  []             renal calcineurin toxicity  []             hyperkalemia  []             post-transplant diabetes  []             decreased appetite  []             increased appetite  []             other:  []             none    Prescription Medications as of 12/7/2020       Rx Number Disp Refills Start End Last Dispensed Date Next Fill Date Owning Pharmacy    acetaminophen (TYLENOL) 325 MG tablet        [facility] [place] [address] [address]    Sig: Take 325-650 mg by mouth every 4 hours as needed for mild pain or fever     Class: Historical    Route: Oral    amLODIPine (NORVASC) 5 MG tablet  30 tablet 11 12/3/2020    [facility]  [place] [address] [address]    Sig: Take 1 tablet (5 mg) by mouth daily    Class: E-Prescribe    Route: Oral    aspirin (ASA) 81 MG chewable tablet  30 tablet 11 12/3/2020    [facility]  [place] [address] [address]    Sig: Take 1 tablet (81 mg) by mouth daily    Class: E-Prescribe    Route: Oral    atorvastatin (LIPITOR) 10 MG tablet  30 tablet 3 12/3/2020    [facility]  [place] [address] [address]    Sig: Take 1 tablet (10 mg) by mouth daily    Class: E-Prescribe    Route:  Oral    magnesium oxide (MAG-OX) 400 MG tablet  30 tablet 3 12/3/2020    [facility] [place] [address] [address]    Sig: Take 1 tablet (400 mg) by mouth daily (with lunch)    Class: E-Prescribe    Route: Oral    mycophenolate (GENERIC EQUIVALENT) 250 MG capsule  180 capsule 11 12/3/2020    [facility] [place] [address] [address]    Sig: Take 3 capsules (750 mg) by mouth 2 times daily    Class: E-Prescribe    Notes to Pharmacy: TXP DT 11/12/2020 (Kidney) TXP Dischg DT 11/19/2020 DX Kidney replaced by transplant Z94.0 TX Center [facility] ([place], [place])    Route: Oral    ondansetron (ZOFRAN-ODT) 4 MG ODT tab  30 tablet 0 11/24/2020    [facility] [address]    Sig: Take 1 tablet (4 mg) by mouth every 6 hours as needed for nausea    Class: E-Prescribe    Route: Oral    pantoprazole (PROTONIX) 40 MG EC tablet  30 tablet 1 12/3/2020    [facility]  [place] [address] [address]    Sig: Take 1 tablet (40 mg) by mouth every morning (before breakfast)    Class: E-Prescribe    Route: Oral    senna-docusate (SENOKOT-S/PERICOLACE) 8.6-50 MG tablet  60 tablet 0 11/30/2020    [address]    Sig: Take 1 tablet by mouth 2 times daily as needed for constipation    Class: E-Prescribe    Route: Oral    sulfamethoxazole-trimethoprim (BACTRIM) 400-80 MG tablet  30 tablet 11 12/3/2020    [facility]  [place] [address] [address]    Sig: Take 1 tablet by mouth daily    Class: E-Prescribe    Route: Oral    tacrolimus (GENERIC EQUIVALENT) 1 MG capsule  480 capsule 11 12/3/2020    [facility] [place] [place] [address] [address]    Sig: Take 8 capsules (8 mg) by mouth 2 times daily    Class: E-Prescribe    Notes to Pharmacy: TXP  DT 11/12/2020 (Kidney) TXP Dischg DT 11/19/2020 DX Kidney replaced by transplant Z94.0 TX Center [facility] ([place], [place])    Route: Oral    valGANciclovir (VALCYTE) 450 MG tablet  30 tablet 0 12/3/2020    [facility] - [place], [place] - [address]    Sig: Take 1 tablet (450 mg) by mouth every other day Stop taking on 2/12/2021    Class: E-Prescribe    Route: Oral          O:      General Appearance: in no apparent distress.   Skin: Normal, no rashes or jaundice  Heart: regular rate and rhythm  Lungs: easy respirations, no audible wheezing.  Abdomen: flat, The wound is dry and intact, without hernia. The abdomen is non-tender. The kidney graft is not tender.  There is no ascites.  Extremities: Tremor absent.   Edema: absent. Absent      Transplant Immunosuppression Labs Latest Ref Rng & Units 11/30/2020 11/27/2020 11/25/2020 11/24/2020 11/23/2020   Tacro Level 5.0 - 15.0 ug/L 9.5 8.8 6.6 8.2 4.5(L)   Tacro Level - 1,900 Not Provided 2,000 0700pm 2,000   Creat 0.52 - 1.04 mg/dL 1.63(H) 1.93(H) 2.21(H) 2.22(H) 2.41(H)   BUN 7 - 30 mg/dL 20 22 30 32(H) 34(H)   WBC 4.0 - 11.0 10e9/L 5.3 7.4 9.5 9.2 7.1   Neutrophil % - - 91.9 91.4 87.2   ANEU 1.6 - 8.3 10e9/L - - 8.7(H) 8.4(H) 6.2       Chemistries:   Recent Labs   Lab Test 11/30/20  0652   BUN 20   CR 1.63*   GFRESTIMATED 38*   GLC 86     Lab Results   Component Value Date    A1C 4.9 11/12/2020     Recent Labs   Lab Test 11/12/20  1500   ALBUMIN 3.8   BILITOTAL 0.5   ALKPHOS 142   AST 19   ALT 18     Urine Studies:  Recent Labs   Lab Test 11/16/20  1500   COLOR Light Yellow   APPEARANCE Clear   URINEGLC Negative   URINEBILI Negative   URINEKETONE Negative   SG 1.010   UBLD Moderate*   URINEPH 5.0   PROTEIN 10*   NITRITE Negative   LEUKEST Negative   RBCU 25*   WBCU 1     Recent Labs   Lab Test 11/24/20  0719 11/13/20  0300   UTPG 0.28* 7.43*     Hematology:   Recent Labs   Lab Test 11/30/20  0645  11/27/20  0707 11/25/20  0642   HGB 6.9* 7.1* 7.1*    480* 448   WBC 5.3 7.4 9.5     Coags:   Recent Labs   Lab Test 11/12/20  2217 11/12/20  1500   INR 1.01 0.92     HLA antibodies:   SA1 Hi Risk Gricelda   Date Value Ref Range Status   11/12/2020 B:37  Final     SA1 Mod Risk Gricelda   Date Value Ref Range Status   11/12/2020 B:45  Final     SA2 Hi Risk Gricelda   Date Value Ref Range Status   11/12/2020 No Specificity Defined  Final     SA2 Mod Risk Gricelda   Date Value Ref Range Status   11/12/2020 None  Final       Assessment: Paolo Vincent Trae is doing well s/p DDKT:  Issues we addressed during her visit include:    Plan:    1. Graft function: cr is stable at 1.63  2. Immunosuppression Management: No change continue MMF and prograf .  Complexity of management:Medium.  Contributing factors: anemia  3. Incision: healing well.  Staples removed.    Followup: as directed  Apple Bowen CNP     I have reviewed history, examined patient and discussed plan with the fellow/resident/MERVAT.  I concur with the findings in this note.    Immunosuppressive regimen, management and long term risks discussed in detail. Changes, when applicable made as per orders.                 Total Time: 15 min,   Counselling Time: 10 min.

## 2020-12-08 ENCOUNTER — TELEPHONE (OUTPATIENT)
Dept: TRANSPLANT | Facility: CLINIC | Age: 45
End: 2020-12-08

## 2020-12-08 NOTE — TELEPHONE ENCOUNTER
Estimated Creatinine Clearance: 36.7 mL/min (A) (based on SCr of 1.63 mg/dL (H)). - per protcol, stay on Valcyte 450 mg every other day.

## 2020-12-09 ENCOUNTER — TELEPHONE (OUTPATIENT)
Dept: NEPHROLOGY | Facility: CLINIC | Age: 45
End: 2020-12-09

## 2020-12-09 DIAGNOSIS — E87.8 LOW BICARBONATE LEVEL: Primary | ICD-10-CM

## 2020-12-09 DIAGNOSIS — Z48.298 AFTERCARE FOLLOWING ORGAN TRANSPLANT: ICD-10-CM

## 2020-12-09 DIAGNOSIS — Z94.0 KIDNEY REPLACED BY TRANSPLANT: ICD-10-CM

## 2020-12-09 RX ORDER — SODIUM BICARBONATE 650 MG/1
650 TABLET ORAL 2 TIMES DAILY
Qty: 60 TABLET | Refills: 11 | Status: SHIPPED | OUTPATIENT
Start: 2020-12-09 | End: 2021-01-11

## 2020-12-09 NOTE — TELEPHONE ENCOUNTER
Post Kidney and Pancreas Transplant Team Conference  Date: 12/9/2020  Transplant Coordinator: Yaz Villa     Attendees:  [x]  Dr. Jacob  [x] Tina Viveros LPN     [x]  Dr. Lopez [x] Ashli Tejeda RN [] Alayna Lepe LPN   [x]  Dr. Stephenson [] Yoanna Estes, KULWANT    [x]  Dr. Sands [] Rosa M Winston RN [x] Cedric Barrera, PharmD   [x] Dr. Mccall [x] Brandi Kruger, KULWANT    [] Dr. Marcelino [] Rafat Painter RN    [x] Dr. Wood [] Alexandria Cesar RN    [] Dr. Mcnamara [] Radha Mendoza RN    []  Dr. Saavedra [] Blank Anderson, KULWANT    [] Surgery Fellow [x] Yaz Villa RN    [x] Apple Bowen, DEREJE [] Lesly Car RN        Verbal Plan Read Back:   Start Sodium Bicarb 650 mg    Routed to RN Coordinator   Tina Viveros LPN    Spoke with patient via Patricia . Verbalized understanding of new medication.     Yaz Villa, RN

## 2020-12-10 DIAGNOSIS — Z94.0 KIDNEY TRANSPLANTED: ICD-10-CM

## 2020-12-10 DIAGNOSIS — Z94.0 KIDNEY TRANSPLANT RECIPIENT: ICD-10-CM

## 2020-12-10 RX ORDER — TACROLIMUS 1 MG/1
8 CAPSULE ORAL 2 TIMES DAILY
Qty: 480 CAPSULE | Refills: 11 | Status: SHIPPED | OUTPATIENT
Start: 2020-12-10 | End: 2020-12-14

## 2020-12-10 RX ORDER — MYCOPHENOLATE MOFETIL 250 MG/1
750 CAPSULE ORAL 2 TIMES DAILY
Qty: 180 CAPSULE | Refills: 11 | Status: SHIPPED | OUTPATIENT
Start: 2020-12-10 | End: 2021-10-14

## 2020-12-14 DIAGNOSIS — Z94.0 KIDNEY TRANSPLANT RECIPIENT: Primary | ICD-10-CM

## 2020-12-14 RX ORDER — TACROLIMUS 1 MG/1
7 CAPSULE ORAL 2 TIMES DAILY
Qty: 420 CAPSULE | Refills: 11 | Status: SHIPPED | OUTPATIENT
Start: 2020-12-14 | End: 2020-12-18

## 2020-12-14 NOTE — TELEPHONE ENCOUNTER
ISSUE:   Tacrolimus IR level 12.2 on 12/10, goal 8-10, dose 8 mg BID.    PLAN:   Please call patient and confirm this was an accurate 12-hour trough. Verify Tacrolimus IR dose 8 mg BID. Confirm no new medications or illness. Confirm no missed doses. If accurate trough and accurate dose, decrease Tacrolimus IR dose to 7 mg BID and repeat labs in 4 days (as scheduled on Thursday).

## 2020-12-14 NOTE — TELEPHONE ENCOUNTER
Spoke to patient regarding:  Tacrolimus IR level 12.2 on 12/10, goal 8-10, dose 8 mg BID.  confirmed this was an accurate 12-hour trough. Verified Tacrolimus IR dose 8 mg BID. Confirmed no new medications or illness. Confirmed no missed doses. Patient verbalizes understanding to decrease Tacrolimus IR dose to 7 mg BID and repeat labs in 4 days (as scheduled on Thursday).

## 2020-12-14 NOTE — PROGRESS NOTES
PATHOLOGY HLA CROSSMATCH CONSULTATION: DONOR/RECIPIENT  VIRTUAL CROSSMATCH - Kidney  Consultation Date: 2020  Consultation Requested by: Dr. Mary Anne Carey    Regarding: Compatibility of  donor organ UNOS #[id number] from OPO: MNOP with Paolo Larkin    Findings: Regarding a virtual crossmatch between Paolo Larkin and  donor listed above (match ID [id number]):  The most recent (10/5/20) and 12 additional patient serum/sera  were analyzed.  The patient has no antibodies listed with HLA specificity against the donor organ.      Record Review Indicates: I personally reviewed the most recent serum, the historic peak sera, and all other sera with solid-phase HLA Single Antigen test results:  The patient has no HLA antibodies against the donor organ.     The results of this virtual XM are:   -most recent serum: compatible   -peak #1: compatible  -peak #2: compatible    Disclaimer: Clinical judgement must take into account other factors, such as non-HLA antibodies not detected in the assay. The VXM gives probabilities only.  The probability does not account for the potential for auto-antibodies that may be present in the patient's serum.  These autoantibodies may render the physical crossmatch falsely positive, and would be detected by an autologous crossmatch.  When possible, confirm findings with prospective allogeneic and autologous flow crossmatches before going to transplant as clinically indicated.     Salas Wooten, PhD    Salas Wooten, PhD,[facility]  Medical Director, Immunology/Histocompatibility Laboratory  Pager [phone]

## 2020-12-15 NOTE — PROGRESS NOTES
Transplant Surgery Progress Note    Transplants:  2020 (Kidney);       S: Paolo Larkin is a 45 year old Patricia-speaking female with history of ESKD of unknown etiology on dialysis since , HTN, vasospastic MI , ischemic cardiomyopathy (preserved EF, normal coronaries, and septal wall hypokinesis), HELLP, HBV, and a known RBC antibody. Patient has undergone attempted kidney transplant twice (2019, 2020), and each time the case was aborted due to hypotension and suspected anaphylaxis. A pre-op plan was made with anesthesia and Dr. Palmer (Allergist). She is now s/p  donor kidney transplant 20 with Dr. Marcelino.     No fevers, nausea or vomiting    No urinary symptoms.        Transplant History:    Transplant Type:  DDKT  Donor Type: Donation after Circulatory Death    Transplant Date:  2020 (Kidney)   Ureteral Stent:  No   Crossmatch:  negative   DSA at Tx:  No  Baseline Cr: TBD   DeNovo DSA: No    Acute Rejection Hx:  No    Present Maintenance Immunosuppression:  Tacrolimus and Mycophenolate mofetil    CMV IgG Ab Discordance:  No  EBV IgG Ab Discordance:  No    BK Viremia:  No  EBV Viremia:  No    Transplant Coordinator: Yaz Villa     Transplant Office Phone Number: [phone]     Immunosuppressant Medications     Immunosuppressive Agents Disp Start End     mycophenolate (GENERIC EQUIVALENT) 250 MG capsule    180 capsule 12/10/2020     Sig - Route: Take 3 capsules (750 mg) by mouth 2 times daily - Oral    Class: E-Prescribe    Notes to Pharmacy: TXP DT 2020 (Kidney) TXP Dischg DT 2020 DX Kidney replaced by transplant Z94.0 TX [facility]     tacrolimus (GENERIC EQUIVALENT) 1 MG capsule    420 capsule 2020     Sig - Route: Take 7 capsules (7 mg) by mouth 2 times daily - Oral    Class: E-Prescribe          Possible Immunosuppression-related side effects:   []             headache  []             vivid  dreams  []             irritability  []             cognitive difficuties  []             fine tremor  []             nausea  []             diarrhea  []             neuropathy      []             edema  []             renal calcineurin toxicity  []             hyperkalemia  []             post-transplant diabetes  []             decreased appetite  []             increased appetite  []             other:  []             none    Prescription Medications as of 12/15/2020       Rx Number Disp Refills Start End Last Dispensed Date Next Fill Date Owning Pharmacy    acetaminophen (TYLENOL) 325 MG tablet        [facility] #[address] [place] [address]    Sig: Take 325-650 mg by mouth every 4 hours as needed for mild pain or fever     Class: Historical    Route: Oral    amLODIPine (NORVASC) 5 MG tablet  30 tablet 11 12/3/2020    [facility] #[address] [place] [address] [address] [place]    Sig: Take 1 tablet (5 mg) by mouth daily    Class: E-Prescribe    Route: Oral    aspirin (ASA) 81 MG chewable tablet  30 tablet 11 12/3/2020    [facility] [address] [place] [address]    Sig: Take 1 tablet (81 mg) by mouth daily    Class: E-Prescribe    Route: Oral    atorvastatin (LIPITOR) 10 MG tablet  30 tablet 3 12/3/2020    [facility] #[address] [place] [address] [address] [place]    Sig: Take 1 tablet (10 mg) by mouth daily    Class: E-Prescribe    Route: Oral    magnesium oxide (MAG-OX) 400 MG tablet  30 tablet 3 12/3/2020    [facility] [address] [place] [address]    Sig: Take 1 tablet (400 mg) by mouth daily (with lunch)    Class: E-Prescribe    Route: Oral    mycophenolate (GENERIC EQUIVALENT) 250 MG capsule  180 capsule 11 12/10/2020    [facility] [place] - [address]    Sig: Take 3 capsules (750 mg) by mouth 2 times daily    Class: E-Prescribe    Notes to Pharmacy: PATRICE GIRARD  11/12/2020 (Kidney) TXP Dischg DT 11/19/2020 DX Kidney replaced by transplant Z94.0 TX [facility]    Route: Oral    ondansetron (ZOFRAN-ODT) 4 MG ODT tab  30 tablet 0 11/24/2020    [address]    Sig: Take 1 tablet (4 mg) by mouth every 6 hours as needed for nausea    Class: E-Prescribe    Route: Oral    pantoprazole (PROTONIX) 40 MG EC tablet  30 tablet 1 12/3/2020    [facility] [address] [place] [address] [address] [address]    Sig: Take 1 tablet (40 mg) by mouth every morning (before breakfast)    Class: E-Prescribe    Route: Oral    senna-docusate (SENOKOT-S/PERICOLACE) 8.6-50 MG tablet  60 tablet 0 11/30/2020    [address]    Sig: Take 1 tablet by mouth 2 times daily as needed for constipation    Class: E-Prescribe    Route: Oral    sodium bicarbonate 650 MG tablet  60 tablet 11 12/9/2020    [facility] [address] [place], [place] [address] [address] [address]    Sig: Take 1 tablet (650 mg) by mouth 2 times daily    Class: E-Prescribe    Route: Oral    sulfamethoxazole-trimethoprim (BACTRIM) 400-80 MG tablet  30 tablet 11 12/3/2020    [facility] [address]    Sig: Take 1 tablet by mouth daily    Class: E-Prescribe    Route: Oral    tacrolimus (GENERIC EQUIVALENT) 1 MG capsule  420 capsule 11 12/14/2020    [facility] [place] - [place] - [address]    Sig: Take 7 capsules (7 mg) by mouth 2 times daily    Class: E-Prescribe    Route: Oral    valGANciclovir (VALCYTE) 450 MG tablet  30 tablet 0 12/3/2020    [facility] [address]    Sig: Take 1 tablet (450 mg) by mouth every other day Stop taking on 2/12/2021    Class: E-Prescribe    Route: Oral          O:      General Appearance: in no apparent  distress.   Skin: Normal, no rashes or jaundice  Heart: regular rate and rhythm, normal S1 and S2  Lungs: easy respirations, no audible wheezing.  Abdomen: flat, The wound is dry and intact, without hernia. The abdomen is non-tender. The kidney graft is not tender.  There is no ascites.  Extremities: Tremor absent.   Edema: absent.     Transplant Immunosuppression Labs Latest Ref Rng & Units 11/30/2020 11/27/2020 11/25/2020 11/24/2020 11/23/2020   Tacro Level 5.0 - 15.0 ug/L 9.5 8.8 6.6 8.2 4.5(L)   Tacro Level - 1,900 Not Provided 2,000 0700pm 2,000   Creat 0.52 - 1.04 mg/dL 1.63(H) 1.93(H) 2.21(H) 2.22(H) 2.41(H)   BUN 7 - 30 mg/dL 20 22 30 32(H) 34(H)   WBC 4.0 - 11.0 10e9/L 5.3 7.4 9.5 9.2 7.1   Neutrophil % - - 91.9 91.4 87.2   ANEU 1.6 - 8.3 10e9/L - - 8.7(H) 8.4(H) 6.2       Chemistries:   Recent Labs   Lab Test 11/30/20  0652   BUN 20   CR 1.63*   GFRESTIMATED 38*   GLC 86     Lab Results   Component Value Date    A1C 4.9 11/12/2020     Recent Labs   Lab Test 11/12/20  1500   ALBUMIN 3.8   BILITOTAL 0.5   ALKPHOS 142   AST 19   ALT 18     Urine Studies:  Recent Labs   Lab Test 11/16/20  1500   COLOR Light Yellow   APPEARANCE Clear   URINEGLC Negative   URINEBILI Negative   URINEKETONE Negative   SG 1.010   UBLD Moderate*   URINEPH 5.0   PROTEIN 10*   NITRITE Negative   LEUKEST Negative   RBCU 25*   WBCU 1     Recent Labs   Lab Test 11/24/20  0719 11/13/20  0300   UTPG 0.28* 7.43*     Hematology:   Recent Labs   Lab Test 11/30/20  0652 11/27/20  0707 11/25/20  0642   HGB 6.9* 7.1* 7.1*    480* 448   WBC 5.3 7.4 9.5     Coags:   Recent Labs   Lab Test 11/12/20 2217 11/12/20  1500   INR 1.01 0.92     HLA antibodies:   SA1 Hi Risk Gricelda   Date Value Ref Range Status   11/12/2020 B:37  Final     SA1 Mod Risk Gricelda   Date Value Ref Range Status   11/12/2020 B:45  Final     SA2 Hi Risk Gricelda   Date Value Ref Range Status   11/12/2020 No Specificity Defined  Final     SA2 Mod Risk Gricelda   Date Value Ref Range Status    11/12/2020 None  Final       Assessment: Paolo Nguyenoo is doing fairly well s/p DDKT:  Issues we addressed during her visit include:    Plan:    1. Graft function: stabel  2. Immunosuppression Management: No change MMF and prograf .  Complexity of management:Medium.  Contributing factors: anemia  3. Incision:  Healing well  Followup: as directed    Total Time: 20 min,   Counselling Time: 10 min.

## 2020-12-16 ENCOUNTER — VIRTUAL VISIT (OUTPATIENT)
Dept: NEPHROLOGY | Facility: CLINIC | Age: 45
End: 2020-12-16
Attending: SURGERY
Payer: MEDICARE

## 2020-12-16 DIAGNOSIS — Z94.0 KIDNEY TRANSPLANTED: ICD-10-CM

## 2020-12-16 DIAGNOSIS — Z94.0 KIDNEY REPLACED BY TRANSPLANT: Primary | ICD-10-CM

## 2020-12-16 DIAGNOSIS — E55.9 VITAMIN D DEFICIENCY: ICD-10-CM

## 2020-12-16 DIAGNOSIS — I15.1 HTN, KIDNEY TRANSPLANT RELATED: ICD-10-CM

## 2020-12-16 DIAGNOSIS — Z94.0 HTN, KIDNEY TRANSPLANT RELATED: ICD-10-CM

## 2020-12-16 DIAGNOSIS — Z48.298 AFTERCARE FOLLOWING ORGAN TRANSPLANT: ICD-10-CM

## 2020-12-16 PROCEDURE — 99214 OFFICE O/P EST MOD 30 MIN: CPT | Mod: 95

## 2020-12-16 RX ORDER — MAGNESIUM OXIDE 400 MG/1
400 TABLET ORAL 2 TIMES DAILY
Qty: 30 TABLET | Refills: 3 | COMMUNITY
Start: 2020-12-16 | End: 2020-12-29

## 2020-12-16 NOTE — PROGRESS NOTES
"The patient has been notified of following:      \"This video visit will be conducted via a call between you and your physician/provider. We have found that certain health care needs can be provided without the need for an in-person physical exam.  This service lets us provide the care you need with a video conversation.  If a prescription is necessary we can send it directly to your pharmacy.  If lab work is needed we can place an order for that and you can then stop by our lab to have the test done at a later time.     Video visits are billed at different rates depending on your insurance coverage.  Please reach out to your insurance provider with any questions.     If during the course of the call the physician/provider feels a video visit is not appropriate, you will not be charged for this service.\"     Patient has given verbal consent for Video visit? Yes  How would you like to obtain your AVS? MyChart  If you are dropped from the video visit, the video invite should be resent to:   Will anyone else be joining your video visit? No     Video-Visit Details     Type of service:  Video Visit     Video Start Time: 9:11 AM  Video End Time: 9:44 AM    Originating Location (pt. Location): Home     Distant Location (provider location):  [facility] [place]      Platform used for Video Visit: Shailesh Crespo MD  [facility]      ACUTE TRANSPLANT NEPHROLOGY VISIT    Assessment & Plan   # DDKT: Stable, Cr is trending up, likely related to tacrolimus trough of 13.4 , dose was adjusted . Unlikely pre-renal given stable weight , BP and history of 3-4 L fluids per day. Will follow tac up level and BMP               - Baseline Creatinine: ~ TBD              - Proteinuria: Minimal (0.2-0.5 grams) (7.4g/g prior to transplant, will follow UPCR via FSGS protocol due to unknown native disease)              - Date DSA Last Checked: Nov/2020      Latest DSA: No              - BK Viremia: " No              - Kidney Tx Biopsy: No     # Immunosuppression: Tacrolimus immediate release (goal 8-10) and Mycophenolate mofetil (dose 750 mg every 12 hours)              - Induction with Recent Transplant:  Anti-thymocyte Globulin-Rabbit (Thymoglobulin) 5.8mg/kg  Methylprenisolone (Solumedrol)              - Changes: No, tacrolimus trough of 13.4 , dose was adjusted from 8 to 7 mg BID     # Infection Prophylaxis:   - PJP: Sulfa/TMP (Bactrim), refilled  - CMV: Valcyte 450 mg daily     # Hypertension: Controlled;   Goal BP: < 150/90              - Volume status: appears Euvolemic                              - Changes: No      # Anemia in Chronic Renal Disease: Hgb: decreased but stable      JESSI: Yes, to start on 12/1              - Iron studies: Low iron saturation, but high ferritin - hold on supplementation for now, transfusion and JESSI as below   -1U PRBC on 11/30, darbe 40mcg subcutaneous on 12/1     # Mineral Bone Disorder:   - Secondary renal hyperparathyroidism; PTH level: Minimally elevated ( pg/ml)   On treatment: None  - Vitamin D; level: Low        On supplement: No  - Calcium; level: Normal        On supplement: No  - Phosphorus; level: Normal        On supplement: No     # Electrolytes:   - Potassium; level: Normal        On supplement: No  - Magnesium; level: Low        On supplement: Yes, will increase it to tabs a day and discontinue PPI.  - Bicarbonate; level: Normal        On supplement: yes     # Right Lower Extremity weakness, suspected femoral neuropathy: Developed RLE weakness post-op, felt to be most likely 2/2 femoral nerve injury/stretch during transplant surgery. Had 3 day acute rehab stay post-hospitalization and strength is improving. Able to walk now     # Nausea: resolved    # GERD: currently asymptomatic, will discontinue PPI given low mag on supplements.     # Medical Compliance: Yes     # Transplant History:  Etiology of Kidney Failure: Unknown etiology  Tx: DDKT  Transplant:  11/12/2020 (Kidney)  Donor Type: Donation after Circulatory Death           Donor Class:   Crossmatch at time of Tx: negative  DSA at time of Tx: No  Significant changes in immunosuppression: None  CMV IgG Ab High Risk Discordance (D+/R-): No  EBV IgG Ab High Risk Discordance (D+/R-): No  Significant transplant-related complications: None    Transplant Office Phone Number: [phone]    Assessment and plan was discussed with the patient and she voiced her understanding and agreement.    Return visit:   Per transplant follow up protocol    James Crespo MD     Attestation:  This patient has been seen and evaluated by me, Sergio Jacob MD.  I have reviewed the note and agree with plan of care as documented by the fellow.     Chief Complaint   Ms. Larkin is a 45 year old here for routine follow up.     Interval:  She doing well over all, drinking plenty of fluids, 3 to 4 L  Per day. She states she measures her weight , it was 114.3 lb yesterday which is close her last weight in clinic (115 lb), she states her BP runs 130 systolic , no low BP readings. Her appetite is ok. She dorothy any N/V/D fever or chills.   Interview performed with assistance from Patricia     Recent Hospitalizations:  [x] No [] Yes    New Medical Issues: [x] No [] Yes    Decreased energy: [x] No [] Yes    Chest pain or SOB with exertion:  [x] No [] Yes    Appetite change or weight change: [x] No [] Yes    Nausea, vomiting or diarrhea:  [x] No [] Yes    Fever, sweats or chills: [x] No [] Yes    Leg swelling: [x] No [] Yes        Review of Systems   A comprehensive review of systems was obtained and negative, except as noted in the HPI or PMH.    Problem List   Patient Active Problem List   Diagnosis     Anemia in chronic renal disease     HTN, kidney transplant related     Secondary renal hyperparathyroidism (H)     Coronary artery disease involving native coronary artery of native heart without angina pectoris     Status post  coronary angiogram     NSTEMI (non-ST elevated myocardial infarction) (H)     Vitamin D deficiency     Pneumothorax     Kidney replaced by transplant     Femoral neuropathy, right     Femoral neuropathy of right lower extremity     Aftercare following organ transplant     CKD (chronic kidney disease) stage 4, GFR 15-29 ml/min (H)     Anemia due to stage 5 chronic kidney disease, not on chronic dialysis (H)       Social History   Social History     Tobacco Use     Smoking status: Never Smoker     Smokeless tobacco: Never Used   Substance Use Topics     Alcohol use: No     Drug use: No       Allergies   Allergies   Allergen Reactions     Blood Transfusion Related (Informational Only)      Patient has a history of a clinically significant antibody against RBC antigens.  A delay in compatible RBCs may occur.     Cephalosporins Anaphylaxis     Per [facility] allergist report: positive skin intradermal tests to Cefazolin and Ceftazidime, but NOT to Penicillin G, Piperacillin and Meropenem      Chlorhexidine Anaphylaxis     Several times during anesthesia at initial phase during insertion of I.v. line and after disinfection with Chlorhexidine drop of blood pressure.  In Prick and as well intradermal tests clear positive reactions to Chlorhexidine (particularly in intradermal test to 0.0002% Chlorhexidine papule of 1cm and Erythema of 2.5cm). AVOID in future Chlorhexidine     Heparin Flush        Medications   Current Outpatient Medications   Medication Sig     acetaminophen (TYLENOL) 325 MG tablet Take 325-650 mg by mouth every 4 hours as needed for mild pain or fever      amLODIPine (NORVASC) 5 MG tablet Take 1 tablet (5 mg) by mouth daily     aspirin (ASA) 81 MG chewable tablet Take 1 tablet (81 mg) by mouth daily     atorvastatin (LIPITOR) 10 MG tablet Take 1 tablet (10 mg) by mouth daily     magnesium oxide (MAG-OX) 400 MG tablet Take 1 tablet (400 mg) by mouth daily (with lunch)     mycophenolate (GENERIC EQUIVALENT) 250  MG capsule Take 3 capsules (750 mg) by mouth 2 times daily     ondansetron (ZOFRAN-ODT) 4 MG ODT tab Take 1 tablet (4 mg) by mouth every 6 hours as needed for nausea     pantoprazole (PROTONIX) 40 MG EC tablet Take 1 tablet (40 mg) by mouth every morning (before breakfast)     senna-docusate (SENOKOT-S/PERICOLACE) 8.6-50 MG tablet Take 1 tablet by mouth 2 times daily as needed for constipation     sodium bicarbonate 650 MG tablet Take 1 tablet (650 mg) by mouth 2 times daily     sulfamethoxazole-trimethoprim (BACTRIM) 400-80 MG tablet Take 1 tablet by mouth daily     tacrolimus (GENERIC EQUIVALENT) 1 MG capsule Take 7 capsules (7 mg) by mouth 2 times daily     valGANciclovir (VALCYTE) 450 MG tablet Take 1 tablet (450 mg) by mouth every other day Stop taking on 2/12/2021     No current facility-administered medications for this visit.      There are no discontinued medications.    Physical Exam   Vital Signs: There were no vitals taken for this visit.    GENERAL APPEARANCE: alert and no distress  HENT: no obvious abnormalities on appearance  RESP: breathing appears unremarkable with normal rate, no audible wheezing or cough and no apparent shortness of breath with conversation  MS: extremities normal - no gross deformities noted  SKIN: no apparent rash and normal skin tone  NEURO: speech is clear with no obvious neurological deficits  PSYCH: mentation appears normal and affect normal    Data     Renal Latest Ref Rng & Units 12/14/2020 12/10/2020 12/7/2020   Na 133 - 144 mmol/L - - -   Na (external) 136 - 145 meq/L 142 142 141   K 3.4 - 5.3 mmol/L - - -   K (external) 3.5 - 5.1 meq/L 4.6 4.8 5.1   Cl 94 - 109 mmol/L - - -   Cl (external) 98 - 109 meq/L 110(H) 111(H) 110(H)   CO2 20 - 32 mmol/L - - -   CO2 (external) 20 - 29 meq/L 21 22 20   BUN 7 - 30 mg/dL - - -   BUN (external) 6 - 22 mg/dL 18 17 19   Cr 0.52 - 1.04 mg/dL - - -   Cr (external) 0.60 - 1.10 mg/dL 1.41(H) 1.33(H) 1.41(H)   Glucose 70 - 99 mg/dL - - -    Glucose (external) 70 - 99 mg/dL 85 86 93   Ca  8.5 - 10.1 mg/dL - - -   Ca (external) 8.5 - 10.5 mg/dL 9.8 9.5 9.6   Mg 1.6 - 2.3 mg/dL - - -   Mg (external) 1.6 - 2.6 mg/dL 1.4(L) - 1.3(L)     Bone Health Latest Ref Rng & Units 12/14/2020 11/25/2020 11/24/2020   Phos 2.5 - 4.5 mg/dL - 4.0 3.8   Phos (external) 2.3 - 4.7 mg/dL 4.1 - -   PTHi 18 - 80 pg/mL - - 130(H)   Vit D Def 20 - 75 ug/L - - 16(L)     Heme Latest Ref Rng & Units 12/14/2020 12/10/2020 12/7/2020   WBC 4.0 - 11.0 10e9/L - - -   WBC (external) 4.0 - 11.0 K/uL 3.5(L) 4.0 4.9   Hgb 11.7 - 15.7 g/dL - - -   Hgb (external) 11.5 - 15.8 g/dL 9.3(L) 9.0(L) 8.6(L)   Plt 150 - 450 10e9/L - - -   Plt (external) 140 - 400 K/uL 402(H) 440(H) 439(H)   ABSOLUTE NEUTROPHIL 1.6 - 8.3 10e9/L - - -   ABSOLUTE NEUTROPHILS (EXTERNAL) 1.8 - 8.0 K/uL 2.8 3.4 4.2   ABSOLUTE LYMPHOCYTES 0.8 - 5.3 10e9/L - - -   ABSOLUTE LYMPHOCYTES (EXTERNAL) 0.8 - 4.1 K/uL 0.4(L) 0.2(L) 0.3(L)   ABSOLUTE MONOCYTES 0.0 - 1.3 10e9/L - - -   ABSOLUTE MONOCYTES (EXTERNAL) 0.0 - 1.0 K/uL 0.2 0.2 0.3   ABSOLUTE EOSINOPHILS 0.0 - 0.7 10e9/L - - -   ABSOLUTE EOSINOPHILS (EXTERNAL) 0.0 - 0.7 K/uL 0.1 0.1 0.1   ABSOLUTE BASOPHILS 0.0 - 0.2 10e9/L - - -   ABSOLUTE BASOPHILS (EXTERNAL) 0.0 - 0.2 K/uL 0.0 0.1 0.1   ABS IMMATURE GRANULOCYTES 0 - 0.4 10e9/L - - -   ABSOLUTE NUCLEATED RBC - - - -     Liver Latest Ref Rng & Units 11/12/2020 5/14/2020 1/24/2020   AP 40 - 150 U/L 142 - 125   TBili 0.2 - 1.3 mg/dL 0.5 - 0.3   DBili 0.0 - 0.2 mg/dL - - <0.1   ALT 0 - 50 U/L 18 12 18   AST 0 - 45 U/L 19 - 22   Tot Protein 6.8 - 8.8 g/dL 8.4 - 6.3(L)   Albumin 3.4 - 5.0 g/dL 3.8 - 2.8(L)     Pancreas Latest Ref Rng & Units 11/12/2020 1/24/2020 1/23/2020   A1C 0 - 5.6 % 4.9 Canceled, Test credited Canceled, Test credited     Iron studies Latest Ref Rng & Units 11/24/2020   Iron 35 - 180 ug/dL 21(L)   Iron sat 15 - 46 % 10(L)   Ferritin 12 - 150 ng/mL 1,120(H)     [facility] Txp Virology Latest Ref Rng & Units  11/23/2020 11/14/2020 1/23/2020   BK Spec - Plasma - -   BK Res BKNEG:BK Virus DNA Not Detected copies/mL BK Virus DNA Not Detected - -   BK Log <2.7 Log copies/mL Not Calculated - -   EBV VCA IGG ANTIBODY U/mL - - -   EBV CAPSID ANTIBODY IGG 0.0 - 0.8 AI - - 1.8(H)   Hep B Core NR:Nonreactive - Reactive(AA) -   Hep B Surf - - - -   HIV 1&2 NEG - - -        Recent Labs   Lab Test 11/25/20  0643 11/27/20  0708 11/30/20  0651   DOSTAC 2,000 Not Provided 1,900   TACROL 6.6 8.8 9.5     Recent Labs   Lab Test 11/23/20  0651 11/30/20  0651   DOSMPA 2,000 1,900   MPACID 3.12 2.21   MPAG 191.9* 119.7*       James Crespo MD on 12/16/2020 at 10:23 AM

## 2020-12-16 NOTE — LETTER
"12/16/2020      RE: Paolo Vincent Trae  [address]  [place] [address]       The patient has been notified of following:      \"This video visit will be conducted via a call between you and your physician/provider. We have found that certain health care needs can be provided without the need for an in-person physical exam.  This service lets us provide the care you need with a video conversation.  If a prescription is necessary we can send it directly to your pharmacy.  If lab work is needed we can place an order for that and you can then stop by our lab to have the test done at a later time.     Video visits are billed at different rates depending on your insurance coverage.  Please reach out to your insurance provider with any questions.     If during the course of the call the physician/provider feels a video visit is not appropriate, you will not be charged for this service.\"     Patient has given verbal consent for Video visit? Yes  How would you like to obtain your AVS? MyChart  If you are dropped from the video visit, the video invite should be resent to:   Will anyone else be joining your video visit? No     Video-Visit Details     Type of service:  Video Visit     Video Start Time: 9:11 AM  Video End Time: 9:44 AM    Originating Location (pt. Location): Home     Distant Location (provider location):  [facility] [place]      Platform used for Video Visit: MD Sergio Allison      ACUTE TRANSPLANT NEPHROLOGY VISIT    Assessment & Plan   # DDKT: Stable, Cr is trending up, likely related to tacrolimus trough of 13.4 , dose was adjusted . Unlikely pre-renal given stable weight , BP and history of 3-4 L fluids per day. Will follow tac up level and BMP               - Baseline Creatinine: ~ TBD              - Proteinuria: Minimal (0.2-0.5 grams) (7.4g/g prior to transplant, will follow UPCR via FSGS protocol due to unknown native disease)            "   - Date DSA Last Checked: Nov/2020      Latest DSA: No              - BK Viremia: No              - Kidney Tx Biopsy: No     # Immunosuppression: Tacrolimus immediate release (goal 8-10) and Mycophenolate mofetil (dose 750 mg every 12 hours)              - Induction with Recent Transplant:  Anti-thymocyte Globulin-Rabbit (Thymoglobulin) 5.8mg/kg  Methylprenisolone (Solumedrol)              - Changes: No, tacrolimus trough of 13.4 , dose was adjusted from 8 to 7 mg BID     # Infection Prophylaxis:   - PJP: Sulfa/TMP (Bactrim), refilled  - CMV: Valcyte 450 mg daily     # Hypertension: Controlled;   Goal BP: < 150/90              - Volume status: appears Euvolemic                              - Changes: No      # Anemia in Chronic Renal Disease: Hgb: decreased but stable      JESSI: Yes, to start on 12/1              - Iron studies: Low iron saturation, but high ferritin - hold on supplementation for now, transfusion and JESSI as below   -1U PRBC on 11/30, darbe 40mcg subcutaneous on 12/1     # Mineral Bone Disorder:   - Secondary renal hyperparathyroidism; PTH level: Minimally elevated ( pg/ml)   On treatment: None  - Vitamin D; level: Low        On supplement: No  - Calcium; level: Normal        On supplement: No  - Phosphorus; level: Normal        On supplement: No     # Electrolytes:   - Potassium; level: Normal        On supplement: No  - Magnesium; level: Low        On supplement: Yes, will increase it to tabs a day and discontinue PPI.  - Bicarbonate; level: Normal        On supplement: yes     # Right Lower Extremity weakness, suspected femoral neuropathy: Developed RLE weakness post-op, felt to be most likely 2/2 femoral nerve injury/stretch during transplant surgery. Had 3 day acute rehab stay post-hospitalization and strength is improving. Able to walk now     # Nausea: resolved    # GERD: currently asymptomatic, will discontinue PPI given low mag on supplements.     # Medical Compliance: Yes     #  Transplant History:  Etiology of Kidney Failure: Unknown etiology  Tx: DDKT  Transplant: 11/12/2020 (Kidney)  Donor Type: Donation after Circulatory Death           Donor Class:   Crossmatch at time of Tx: negative  DSA at time of Tx: No  Significant changes in immunosuppression: None  CMV IgG Ab High Risk Discordance (D+/R-): No  EBV IgG Ab High Risk Discordance (D+/R-): No  Significant transplant-related complications: None    Transplant Office Phone Number: [phone]    Assessment and plan was discussed with the patient and she voiced her understanding and agreement.    Return visit:   Per transplant follow up protocol    James Crespo MD     Attestation:  This patient has been seen and evaluated by me, Sergio Jacob MD.  I have reviewed the note and agree with plan of care as documented by the fellow.     Chief Complaint   Ms. Larkin is a 45 year old here for routine follow up.     Interval:  She doing well over all, drinking plenty of fluids, 3 to 4 L  Per day. She states she measures her weight , it was 114.3 lb yesterday which is close her last weight in clinic (115 lb), she states her BP runs 130 systolic , no low BP readings. Her appetite is ok. She dorothy any N/V/D fever or chills.   Interview performed with assistance from Patricia     Recent Hospitalizations:  [x] No [] Yes    New Medical Issues: [x] No [] Yes    Decreased energy: [x] No [] Yes    Chest pain or SOB with exertion:  [x] No [] Yes    Appetite change or weight change: [x] No [] Yes    Nausea, vomiting or diarrhea:  [x] No [] Yes    Fever, sweats or chills: [x] No [] Yes    Leg swelling: [x] No [] Yes        Review of Systems   A comprehensive review of systems was obtained and negative, except as noted in the HPI or PMH.    Problem List   Patient Active Problem List   Diagnosis     Anemia in chronic renal disease     HTN, kidney transplant related     Secondary renal hyperparathyroidism (H)     Coronary artery disease  involving native coronary artery of native heart without angina pectoris     Status post coronary angiogram     NSTEMI (non-ST elevated myocardial infarction) (H)     Vitamin D deficiency     Pneumothorax     Kidney replaced by transplant     Femoral neuropathy, right     Femoral neuropathy of right lower extremity     Aftercare following organ transplant     CKD (chronic kidney disease) stage 4, GFR 15-29 ml/min (H)     Anemia due to stage 5 chronic kidney disease, not on chronic dialysis (H)       Social History   Social History     Tobacco Use     Smoking status: Never Smoker     Smokeless tobacco: Never Used   Substance Use Topics     Alcohol use: No     Drug use: No       Allergies   Allergies   Allergen Reactions     Blood Transfusion Related (Informational Only)      Patient has a history of a clinically significant antibody against RBC antigens.  A delay in compatible RBCs may occur.     Cephalosporins Anaphylaxis     Per [facility] allergist report: positive skin intradermal tests to Cefazolin and Ceftazidime, but NOT to Penicillin G, Piperacillin and Meropenem      Chlorhexidine Anaphylaxis     Several times during anesthesia at initial phase during insertion of I.v. line and after disinfection with Chlorhexidine drop of blood pressure.  In Prick and as well intradermal tests clear positive reactions to Chlorhexidine (particularly in intradermal test to 0.0002% Chlorhexidine papule of 1cm and Erythema of 2.5cm). AVOID in future Chlorhexidine     Heparin Flush        Medications   Current Outpatient Medications   Medication Sig     acetaminophen (TYLENOL) 325 MG tablet Take 325-650 mg by mouth every 4 hours as needed for mild pain or fever      amLODIPine (NORVASC) 5 MG tablet Take 1 tablet (5 mg) by mouth daily     aspirin (ASA) 81 MG chewable tablet Take 1 tablet (81 mg) by mouth daily     atorvastatin (LIPITOR) 10 MG tablet Take 1 tablet (10 mg) by mouth daily     magnesium oxide (MAG-OX) 400 MG tablet Take  1 tablet (400 mg) by mouth daily (with lunch)     mycophenolate (GENERIC EQUIVALENT) 250 MG capsule Take 3 capsules (750 mg) by mouth 2 times daily     ondansetron (ZOFRAN-ODT) 4 MG ODT tab Take 1 tablet (4 mg) by mouth every 6 hours as needed for nausea     pantoprazole (PROTONIX) 40 MG EC tablet Take 1 tablet (40 mg) by mouth every morning (before breakfast)     senna-docusate (SENOKOT-S/PERICOLACE) 8.6-50 MG tablet Take 1 tablet by mouth 2 times daily as needed for constipation     sodium bicarbonate 650 MG tablet Take 1 tablet (650 mg) by mouth 2 times daily     sulfamethoxazole-trimethoprim (BACTRIM) 400-80 MG tablet Take 1 tablet by mouth daily     tacrolimus (GENERIC EQUIVALENT) 1 MG capsule Take 7 capsules (7 mg) by mouth 2 times daily     valGANciclovir (VALCYTE) 450 MG tablet Take 1 tablet (450 mg) by mouth every other day Stop taking on 2/12/2021     No current facility-administered medications for this visit.      There are no discontinued medications.    Physical Exam   Vital Signs: There were no vitals taken for this visit.    GENERAL APPEARANCE: alert and no distress  HENT: no obvious abnormalities on appearance  RESP: breathing appears unremarkable with normal rate, no audible wheezing or cough and no apparent shortness of breath with conversation  MS: extremities normal - no gross deformities noted  SKIN: no apparent rash and normal skin tone  NEURO: speech is clear with no obvious neurological deficits  PSYCH: mentation appears normal and affect normal    Data     Renal Latest Ref Rng & Units 12/14/2020 12/10/2020 12/7/2020   Na 133 - 144 mmol/L - - -   Na (external) 136 - 145 meq/L 142 142 141   K 3.4 - 5.3 mmol/L - - -   K (external) 3.5 - 5.1 meq/L 4.6 4.8 5.1   Cl 94 - 109 mmol/L - - -   Cl (external) 98 - 109 meq/L 110(H) 111(H) 110(H)   CO2 20 - 32 mmol/L - - -   CO2 (external) 20 - 29 meq/L 21 22 20   BUN 7 - 30 mg/dL - - -   BUN (external) 6 - 22 mg/dL 18 17 19   Cr 0.52 - 1.04 mg/dL - - -    Cr (external) 0.60 - 1.10 mg/dL 1.41(H) 1.33(H) 1.41(H)   Glucose 70 - 99 mg/dL - - -   Glucose (external) 70 - 99 mg/dL 85 86 93   Ca  8.5 - 10.1 mg/dL - - -   Ca (external) 8.5 - 10.5 mg/dL 9.8 9.5 9.6   Mg 1.6 - 2.3 mg/dL - - -   Mg (external) 1.6 - 2.6 mg/dL 1.4(L) - 1.3(L)     Bone Health Latest Ref Rng & Units 12/14/2020 11/25/2020 11/24/2020   Phos 2.5 - 4.5 mg/dL - 4.0 3.8   Phos (external) 2.3 - 4.7 mg/dL 4.1 - -   PTHi 18 - 80 pg/mL - - 130(H)   Vit D Def 20 - 75 ug/L - - 16(L)     Heme Latest Ref Rng & Units 12/14/2020 12/10/2020 12/7/2020   WBC 4.0 - 11.0 10e9/L - - -   WBC (external) 4.0 - 11.0 K/uL 3.5(L) 4.0 4.9   Hgb 11.7 - 15.7 g/dL - - -   Hgb (external) 11.5 - 15.8 g/dL 9.3(L) 9.0(L) 8.6(L)   Plt 150 - 450 10e9/L - - -   Plt (external) 140 - 400 K/uL 402(H) 440(H) 439(H)   ABSOLUTE NEUTROPHIL 1.6 - 8.3 10e9/L - - -   ABSOLUTE NEUTROPHILS (EXTERNAL) 1.8 - 8.0 K/uL 2.8 3.4 4.2   ABSOLUTE LYMPHOCYTES 0.8 - 5.3 10e9/L - - -   ABSOLUTE LYMPHOCYTES (EXTERNAL) 0.8 - 4.1 K/uL 0.4(L) 0.2(L) 0.3(L)   ABSOLUTE MONOCYTES 0.0 - 1.3 10e9/L - - -   ABSOLUTE MONOCYTES (EXTERNAL) 0.0 - 1.0 K/uL 0.2 0.2 0.3   ABSOLUTE EOSINOPHILS 0.0 - 0.7 10e9/L - - -   ABSOLUTE EOSINOPHILS (EXTERNAL) 0.0 - 0.7 K/uL 0.1 0.1 0.1   ABSOLUTE BASOPHILS 0.0 - 0.2 10e9/L - - -   ABSOLUTE BASOPHILS (EXTERNAL) 0.0 - 0.2 K/uL 0.0 0.1 0.1   ABS IMMATURE GRANULOCYTES 0 - 0.4 10e9/L - - -   ABSOLUTE NUCLEATED RBC - - - -     Liver Latest Ref Rng & Units 11/12/2020 5/14/2020 1/24/2020   AP 40 - 150 U/L 142 - 125   TBili 0.2 - 1.3 mg/dL 0.5 - 0.3   DBili 0.0 - 0.2 mg/dL - - <0.1   ALT 0 - 50 U/L 18 12 18   AST 0 - 45 U/L 19 - 22   Tot Protein 6.8 - 8.8 g/dL 8.4 - 6.3(L)   Albumin 3.4 - 5.0 g/dL 3.8 - 2.8(L)     Pancreas Latest Ref Rng & Units 11/12/2020 1/24/2020 1/23/2020   A1C 0 - 5.6 % 4.9 Canceled, Test credited Canceled, Test credited     Iron studies Latest Ref Rng & Units 11/24/2020   Iron 35 - 180 ug/dL 21(L)   Iron sat 15 - 46 %  10(L)   Ferritin 12 - 150 ng/mL 1,120(H)     [facility] Txp Virology Latest Ref Rng & Units 11/23/2020 11/14/2020 1/23/2020   BK Spec - Plasma - -   BK Res BKNEG:BK Virus DNA Not Detected copies/mL BK Virus DNA Not Detected - -   BK Log <2.7 Log copies/mL Not Calculated - -   EBV VCA IGG ANTIBODY U/mL - - -   EBV CAPSID ANTIBODY IGG 0.0 - 0.8 AI - - 1.8(H)   Hep B Core NR:Nonreactive - Reactive(AA) -   Hep B Surf - - - -   HIV 1&2 NEG - - -        Recent Labs   Lab Test 11/25/20  0643 11/27/20  0708 11/30/20  0651   DOSTAC 2,000 Not Provided 1,900   TACROL 6.6 8.8 9.5     Recent Labs   Lab Test 11/23/20  0651 11/30/20  0651   DOSMPA 2,000 1,900   MPACID 3.12 2.21   MPAG 191.9* 119.7*       James Crespo MD on 12/16/2020 at 10:23 AM      Sergio Jacob MD

## 2020-12-16 NOTE — LETTER
"12/16/2020       RE: Paolo Larkin  [address]  [address]  [facility] [address]     Dear Colleague,    Thank you for referring your patient, Paolo Larkin, to the [facility] [place] at [facility]. Please see a copy of my visit note below.    The patient has been notified of following:      \"This video visit will be conducted via a call between you and your physician/provider. We have found that certain health care needs can be provided without the need for an in-person physical exam.  This service lets us provide the care you need with a video conversation.  If a prescription is necessary we can send it directly to your pharmacy.  If lab work is needed we can place an order for that and you can then stop by our lab to have the test done at a later time.     Video visits are billed at different rates depending on your insurance coverage.  Please reach out to your insurance provider with any questions.     If during the course of the call the physician/provider feels a video visit is not appropriate, you will not be charged for this service.\"     Patient has given verbal consent for Video visit? Yes  How would you like to obtain your AVS? MyChart  If you are dropped from the video visit, the video invite should be resent to:   Will anyone else be joining your video visit? No     Video-Visit Details     Type of service:  Video Visit     Video Start Time: 9:11 AM  Video End Time: 9:44 AM    Originating Location (pt. Location): Home     Distant Location (provider location):  [facility] [place]      Platform used for Video Visit: MD Sergio Allison      ACUTE TRANSPLANT NEPHROLOGY VISIT    Assessment & Plan   # DDKT: Stable, Cr is trending up, likely related to tacrolimus trough of 13.4 , dose was adjusted . Unlikely pre-renal given stable weight , BP and history of 3-4 L fluids per day. Will follow " tac up level and BMP               - Baseline Creatinine: ~ TBD              - Proteinuria: Minimal (0.2-0.5 grams) (7.4g/g prior to transplant, will follow UPCR via FSGS protocol due to unknown native disease)              - Date DSA Last Checked: Nov/2020      Latest DSA: No              - BK Viremia: No              - Kidney Tx Biopsy: No     # Immunosuppression: Tacrolimus immediate release (goal 8-10) and Mycophenolate mofetil (dose 750 mg every 12 hours)              - Induction with Recent Transplant:  Anti-thymocyte Globulin-Rabbit (Thymoglobulin) 5.8mg/kg  Methylprenisolone (Solumedrol)              - Changes: No, tacrolimus trough of 13.4 , dose was adjusted from 8 to 7 mg BID     # Infection Prophylaxis:   - PJP: Sulfa/TMP (Bactrim), refilled  - CMV: Valcyte 450 mg daily     # Hypertension: Controlled;   Goal BP: < 150/90              - Volume status: appears Euvolemic                              - Changes: No      # Anemia in Chronic Renal Disease: Hgb: decreased but stable      JESSI: Yes, to start on 12/1              - Iron studies: Low iron saturation, but high ferritin - hold on supplementation for now, transfusion and JESSI as below   -1U PRBC on 11/30, darbe 40mcg subcutaneous on 12/1     # Mineral Bone Disorder:   - Secondary renal hyperparathyroidism; PTH level: Minimally elevated ( pg/ml)   On treatment: None  - Vitamin D; level: Low        On supplement: No  - Calcium; level: Normal        On supplement: No  - Phosphorus; level: Normal        On supplement: No     # Electrolytes:   - Potassium; level: Normal        On supplement: No  - Magnesium; level: Low        On supplement: Yes, will increase it to tabs a day and discontinue PPI.  - Bicarbonate; level: Normal        On supplement: yes     # Right Lower Extremity weakness, suspected femoral neuropathy: Developed RLE weakness post-op, felt to be most likely 2/2 femoral nerve injury/stretch during transplant surgery. Had 3 day acute rehab  stay post-hospitalization and strength is improving. Able to walk now     # Nausea: resolved    # GERD: currently asymptomatic, will discontinue PPI given low mag on supplements.     # Medical Compliance: Yes     # Transplant History:  Etiology of Kidney Failure: Unknown etiology  Tx: DDKT  Transplant: 11/12/2020 (Kidney)  Donor Type: Donation after Circulatory Death           Donor Class:   Crossmatch at time of Tx: negative  DSA at time of Tx: No  Significant changes in immunosuppression: None  CMV IgG Ab High Risk Discordance (D+/R-): No  EBV IgG Ab High Risk Discordance (D+/R-): No  Significant transplant-related complications: None    Transplant Office Phone Number: [phone]    Assessment and plan was discussed with the patient and she voiced her understanding and agreement.    Return visit:   Per transplant follow up protocol    James Crespo MD     Attestation:  This patient has been seen and evaluated by me, Sergio Jacob MD.  I have reviewed the note and agree with plan of care as documented by the fellow.     Chief Complaint   Ms. Larkin is a 45 year old here for routine follow up.     Interval:  She doing well over all, drinking plenty of fluids, 3 to 4 L  Per day. She states she measures her weight , it was 114.3 lb yesterday which is close her last weight in clinic (115 lb), she states her BP runs 130 systolic , no low BP readings. Her appetite is ok. She dorothy any N/V/D fever or chills.   Interview performed with assistance from Patricia strong    Recent Hospitalizations:  [x] No [] Yes    New Medical Issues: [x] No [] Yes    Decreased energy: [x] No [] Yes    Chest pain or SOB with exertion:  [x] No [] Yes    Appetite change or weight change: [x] No [] Yes    Nausea, vomiting or diarrhea:  [x] No [] Yes    Fever, sweats or chills: [x] No [] Yes    Leg swelling: [x] No [] Yes        Review of Systems   A comprehensive review of systems was obtained and negative, except as noted in the  HPI or PMH.    Problem List   Patient Active Problem List   Diagnosis     Anemia in chronic renal disease     HTN, kidney transplant related     Secondary renal hyperparathyroidism (H)     Coronary artery disease involving native coronary artery of native heart without angina pectoris     Status post coronary angiogram     NSTEMI (non-ST elevated myocardial infarction) (H)     Vitamin D deficiency     Pneumothorax     Kidney replaced by transplant     Femoral neuropathy, right     Femoral neuropathy of right lower extremity     Aftercare following organ transplant     CKD (chronic kidney disease) stage 4, GFR 15-29 ml/min (H)     Anemia due to stage 5 chronic kidney disease, not on chronic dialysis (H)       Social History   Social History     Tobacco Use     Smoking status: Never Smoker     Smokeless tobacco: Never Used   Substance Use Topics     Alcohol use: No     Drug use: No       Allergies   Allergies   Allergen Reactions     Blood Transfusion Related (Informational Only)      Patient has a history of a clinically significant antibody against RBC antigens.  A delay in compatible RBCs may occur.     Cephalosporins Anaphylaxis     Per [facility] allergist report: positive skin intradermal tests to Cefazolin and Ceftazidime, but NOT to Penicillin G, Piperacillin and Meropenem      Chlorhexidine Anaphylaxis     Several times during anesthesia at initial phase during insertion of I.v. line and after disinfection with Chlorhexidine drop of blood pressure.  In Prick and as well intradermal tests clear positive reactions to Chlorhexidine (particularly in intradermal test to 0.0002% Chlorhexidine papule of 1cm and Erythema of 2.5cm). AVOID in future Chlorhexidine     Heparin Flush        Medications   Current Outpatient Medications   Medication Sig     acetaminophen (TYLENOL) 325 MG tablet Take 325-650 mg by mouth every 4 hours as needed for mild pain or fever      amLODIPine (NORVASC) 5 MG tablet Take 1 tablet (5 mg) by  mouth daily     aspirin (ASA) 81 MG chewable tablet Take 1 tablet (81 mg) by mouth daily     atorvastatin (LIPITOR) 10 MG tablet Take 1 tablet (10 mg) by mouth daily     magnesium oxide (MAG-OX) 400 MG tablet Take 1 tablet (400 mg) by mouth daily (with lunch)     mycophenolate (GENERIC EQUIVALENT) 250 MG capsule Take 3 capsules (750 mg) by mouth 2 times daily     ondansetron (ZOFRAN-ODT) 4 MG ODT tab Take 1 tablet (4 mg) by mouth every 6 hours as needed for nausea     pantoprazole (PROTONIX) 40 MG EC tablet Take 1 tablet (40 mg) by mouth every morning (before breakfast)     senna-docusate (SENOKOT-S/PERICOLACE) 8.6-50 MG tablet Take 1 tablet by mouth 2 times daily as needed for constipation     sodium bicarbonate 650 MG tablet Take 1 tablet (650 mg) by mouth 2 times daily     sulfamethoxazole-trimethoprim (BACTRIM) 400-80 MG tablet Take 1 tablet by mouth daily     tacrolimus (GENERIC EQUIVALENT) 1 MG capsule Take 7 capsules (7 mg) by mouth 2 times daily     valGANciclovir (VALCYTE) 450 MG tablet Take 1 tablet (450 mg) by mouth every other day Stop taking on 2/12/2021     No current facility-administered medications for this visit.      There are no discontinued medications.    Physical Exam   Vital Signs: There were no vitals taken for this visit.    GENERAL APPEARANCE: alert and no distress  HENT: no obvious abnormalities on appearance  RESP: breathing appears unremarkable with normal rate, no audible wheezing or cough and no apparent shortness of breath with conversation  MS: extremities normal - no gross deformities noted  SKIN: no apparent rash and normal skin tone  NEURO: speech is clear with no obvious neurological deficits  PSYCH: mentation appears normal and affect normal    Data     Renal Latest Ref Rng & Units 12/14/2020 12/10/2020 12/7/2020   Na 133 - 144 mmol/L - - -   Na (external) 136 - 145 meq/L 142 142 141   K 3.4 - 5.3 mmol/L - - -   K (external) 3.5 - 5.1 meq/L 4.6 4.8 5.1   Cl 94 - 109 mmol/L - -  -   Cl (external) 98 - 109 meq/L 110(H) 111(H) 110(H)   CO2 20 - 32 mmol/L - - -   CO2 (external) 20 - 29 meq/L 21 22 20   BUN 7 - 30 mg/dL - - -   BUN (external) 6 - 22 mg/dL 18 17 19   Cr 0.52 - 1.04 mg/dL - - -   Cr (external) 0.60 - 1.10 mg/dL 1.41(H) 1.33(H) 1.41(H)   Glucose 70 - 99 mg/dL - - -   Glucose (external) 70 - 99 mg/dL 85 86 93   Ca  8.5 - 10.1 mg/dL - - -   Ca (external) 8.5 - 10.5 mg/dL 9.8 9.5 9.6   Mg 1.6 - 2.3 mg/dL - - -   Mg (external) 1.6 - 2.6 mg/dL 1.4(L) - 1.3(L)     Bone Health Latest Ref Rng & Units 12/14/2020 11/25/2020 11/24/2020   Phos 2.5 - 4.5 mg/dL - 4.0 3.8   Phos (external) 2.3 - 4.7 mg/dL 4.1 - -   PTHi 18 - 80 pg/mL - - 130(H)   Vit D Def 20 - 75 ug/L - - 16(L)     Heme Latest Ref Rng & Units 12/14/2020 12/10/2020 12/7/2020   WBC 4.0 - 11.0 10e9/L - - -   WBC (external) 4.0 - 11.0 K/uL 3.5(L) 4.0 4.9   Hgb 11.7 - 15.7 g/dL - - -   Hgb (external) 11.5 - 15.8 g/dL 9.3(L) 9.0(L) 8.6(L)   Plt 150 - 450 10e9/L - - -   Plt (external) 140 - 400 K/uL 402(H) 440(H) 439(H)   ABSOLUTE NEUTROPHIL 1.6 - 8.3 10e9/L - - -   ABSOLUTE NEUTROPHILS (EXTERNAL) 1.8 - 8.0 K/uL 2.8 3.4 4.2   ABSOLUTE LYMPHOCYTES 0.8 - 5.3 10e9/L - - -   ABSOLUTE LYMPHOCYTES (EXTERNAL) 0.8 - 4.1 K/uL 0.4(L) 0.2(L) 0.3(L)   ABSOLUTE MONOCYTES 0.0 - 1.3 10e9/L - - -   ABSOLUTE MONOCYTES (EXTERNAL) 0.0 - 1.0 K/uL 0.2 0.2 0.3   ABSOLUTE EOSINOPHILS 0.0 - 0.7 10e9/L - - -   ABSOLUTE EOSINOPHILS (EXTERNAL) 0.0 - 0.7 K/uL 0.1 0.1 0.1   ABSOLUTE BASOPHILS 0.0 - 0.2 10e9/L - - -   ABSOLUTE BASOPHILS (EXTERNAL) 0.0 - 0.2 K/uL 0.0 0.1 0.1   ABS IMMATURE GRANULOCYTES 0 - 0.4 10e9/L - - -   ABSOLUTE NUCLEATED RBC - - - -     Liver Latest Ref Rng & Units 11/12/2020 5/14/2020 1/24/2020   AP 40 - 150 U/L 142 - 125   TBili 0.2 - 1.3 mg/dL 0.5 - 0.3   DBili 0.0 - 0.2 mg/dL - - <0.1   ALT 0 - 50 U/L 18 12 18   AST 0 - 45 U/L 19 - 22   Tot Protein 6.8 - 8.8 g/dL 8.4 - 6.3(L)   Albumin 3.4 - 5.0 g/dL 3.8 - 2.8(L)     Pancreas Latest Ref  Rng & Units 11/12/2020 1/24/2020 1/23/2020   A1C 0 - 5.6 % 4.9 Canceled, Test credited Canceled, Test credited     Iron studies Latest Ref Rng & Units 11/24/2020   Iron 35 - 180 ug/dL 21(L)   Iron sat 15 - 46 % 10(L)   Ferritin 12 - 150 ng/mL 1,120(H)     [facility] Txp Virology Latest Ref Rng & Units 11/23/2020 11/14/2020 1/23/2020   BK Spec - Plasma - -   BK Res BKNEG:BK Virus DNA Not Detected copies/mL BK Virus DNA Not Detected - -   BK Log <2.7 Log copies/mL Not Calculated - -   EBV VCA IGG ANTIBODY U/mL - - -   EBV CAPSID ANTIBODY IGG 0.0 - 0.8 AI - - 1.8(H)   Hep B Core NR:Nonreactive - Reactive(AA) -   Hep B Surf - - - -   HIV 1&2 NEG - - -        Recent Labs   Lab Test 11/25/20  0643 11/27/20  0708 11/30/20  0651   DOSTAC 2,000 Not Provided 1,900   TACROL 6.6 8.8 9.5     Recent Labs   Lab Test 11/23/20  0651 11/30/20  0651   DOSMPA 2,000 1,900   MPACID 3.12 2.21   MPAG 191.9* 119.7*       James Crespo MD on 12/16/2020 at 10:23 AM        Again, thank you for allowing me to participate in the care of your patient.      Sincerely,    Early Post Transplant

## 2020-12-16 NOTE — PROGRESS NOTES
"Paolo Larkin is a 45 year old female who is being evaluated via a billable video visit.      The patient has been notified of following:     \"This video visit will be conducted via a call between you and your physician/provider. We have found that certain health care needs can be provided without the need for an in-person physical exam.  This service lets us provide the care you need with a video conversation.  If a prescription is necessary we can send it directly to your pharmacy.  If lab work is needed we can place an order for that and you can then stop by our lab to have the test done at a later time.    Video visits are billed at different rates depending on your insurance coverage.  Please reach out to your insurance provider with any questions.    If during the course of the call the physician/provider feels a video visit is not appropriate, you will not be charged for this service.\"    Patient has given verbal consent for Video visit? Yes  How would you like to obtain your AVS? Mail a copy  If you are dropped from the video visit, the video invite should be resent to: Text to cell phone: [phone]  Will anyone else be joining your video visit? No  {If patient encounters technical issues they should call [phone] :[id number]}      Video-Visit Details    Type of service:  Video Visit    Video Start Time: {video visit start/end time:152948}  Video End Time: {video visit start/end time:152948}    Originating Location (pt. Location): {video visit patient location:765003::\"Home\"}    Distant Location (provider location):  [facility]     Platform used for Video Visit: {Virtual Visit Platforms:040086::\"[employer]\"}    {signature options:958433}        "

## 2020-12-17 ENCOUNTER — TELEPHONE (OUTPATIENT)
Dept: TRANSPLANT | Facility: CLINIC | Age: 45
End: 2020-12-17

## 2020-12-17 NOTE — TELEPHONE ENCOUNTER
Received call from home care RN, Paolo complains of pain right below fistula. Occasionally swollen and numb. No discoloration or coolness. Able to fully move extremity. Using warm compress and tylenol. Fistula was placed but doctor in [place], [place] but patient is unsure the name. Recommended patient reach out to PCP today for assessment/fistula ultrasound.      Drinking 1 gallon of water per day, only weighs 115 lbs. Ok to drink to thirst after she reaches 2L per day. Discussed high creatinine and supra-therapeutic tac levels, creatinine likely to decrease with therapeutic.     Patient will be out of MMF on Sunday. Rx sent to [facility] specialty pharmacy one week ago, patient to call pharmacy today to check on status. Will call back with issues.

## 2020-12-18 ENCOUNTER — TELEPHONE (OUTPATIENT)
Dept: TRANSPLANT | Facility: CLINIC | Age: 45
End: 2020-12-18

## 2020-12-18 DIAGNOSIS — Z94.0 KIDNEY TRANSPLANT RECIPIENT: ICD-10-CM

## 2020-12-18 RX ORDER — TACROLIMUS 1 MG/1
6 CAPSULE ORAL 2 TIMES DAILY
Qty: 420 CAPSULE | Refills: 11
Start: 2020-12-18 | End: 2021-01-05

## 2020-12-18 NOTE — TELEPHONE ENCOUNTER
ISSUE:  Tac 12.7 (goal 8-10) on 12/17. Tac was reduced to 7 mg bid on 12/14    Spoke with patient via Patricia . Patient confirmed 12 hour drug level and current dose of 7 mg bid. No new medications or illnesses. Recommended reduce tacrolimus to 6 mg bid and repeat level on Monday. Patient verbalized understanding.

## 2020-12-23 ENCOUNTER — TELEPHONE (OUTPATIENT)
Dept: TRANSPLANT | Facility: CLINIC | Age: 45
End: 2020-12-23

## 2020-12-23 NOTE — TELEPHONE ENCOUNTER
Post Kidney and Pancreas Transplant Team Conference  Date: 12/23/2020  Transplant Coordinator: Yaz Villa     Attendees:  [x]  Dr. Jacob  [x] Tina Viveros LPN     [x]  Dr. Lopez [x] Ashli Tejeda RN [] Alayna Lepe LPN   [x]  Dr. Stephenson [] Yoanna Estes, RN    []  Dr. Sands [] Rosa M Winston RN [x] Cedric Barrera, PharmD   [] Dr. Mccall [x] Brandi Kruger, KULWANT    [x] Dr. Marcelino [] Rafat Painter RN    [x] Dr. Wood [] Alexandria Cesar RN    [] Dr. Mcnamara [] Radha Mendoza RN    []  Dr. Saavedra [] Blank Anderson, KULWANT    [] Surgery Fellow [x] Yaz Villa RN    [x] Apple Bowen, DEREJE [] Lesly Car RN        Verbal Plan Read Back:   Contact local Nephrologist regarding fistula, OK to see [facility] if local Nephrologist not comfortable with treating pt    Routed to RN Coordinator   Tina Viveros LPN

## 2020-12-29 ENCOUNTER — TELEPHONE (OUTPATIENT)
Dept: TRANSPLANT | Facility: CLINIC | Age: 45
End: 2020-12-29

## 2020-12-29 DIAGNOSIS — Z94.0 KIDNEY TRANSPLANTED: ICD-10-CM

## 2020-12-29 RX ORDER — MAGNESIUM OXIDE 400 MG/1
800 TABLET ORAL DAILY
Qty: 60 TABLET | Refills: 11 | Status: SHIPPED | OUTPATIENT
Start: 2020-12-29 | End: 2021-03-04

## 2020-12-29 NOTE — TELEPHONE ENCOUNTER
Spoke with home care RN, Steff. New magnesium Rx sent to [facility] pharmacy.   Weight 116 lbs, stable. BPs 120s/60s. Patient feels well and understands medications. Potential discharge from home care on 1/11.   Due for DSA, kit sent to patient again.

## 2021-01-04 ENCOUNTER — TELEPHONE (OUTPATIENT)
Dept: TRANSPLANT | Facility: CLINIC | Age: 46
End: 2021-01-04

## 2021-01-04 DIAGNOSIS — Z94.0 KIDNEY TRANSPLANTED: ICD-10-CM

## 2021-01-04 DIAGNOSIS — Z94.0 KIDNEY TRANSPLANT RECIPIENT: ICD-10-CM

## 2021-01-04 DIAGNOSIS — R11.0 NAUSEA: ICD-10-CM

## 2021-01-04 RX ORDER — AMOXICILLIN 250 MG
1 CAPSULE ORAL 2 TIMES DAILY PRN
Qty: 60 TABLET | Refills: 0 | Status: SHIPPED | OUTPATIENT
Start: 2021-01-04 | End: 2022-03-22

## 2021-01-04 RX ORDER — ONDANSETRON 4 MG/1
4 TABLET, ORALLY DISINTEGRATING ORAL EVERY 6 HOURS PRN
Qty: 30 TABLET | Refills: 1 | Status: SHIPPED | OUTPATIENT
Start: 2021-01-04 | End: 2021-04-22

## 2021-01-04 NOTE — TELEPHONE ENCOUNTER
Spoke with Steff, home care RN. Patient is requesting refills of ondansetron and senna. Is using senna occasionally for constipation and ODT zofran before morning medications. C/o nausea over the weekend but no vomiting. Nausea improved but still present. Per Steff, patient able to eat and drink ok. Will check MPA level on Thursday.     Reviewed labs in Care Everywhere, stable.     Last home care visit will be on Monday 1/11. Patient will then go to [facility] for labs.

## 2021-01-04 NOTE — LETTER
PHYSICIAN ORDERS      DATE & TIME ISSUED: 2021 12:46 PM  PATIENT NAME: Paolo Larkin   : 1975     [facility] MR# [if applicable]: [id number]     DIAGNOSIS:  kidney transplant   ICD-10 CODE: Z94.0     Please check mycophenolic acid level on 2021.     Any questions please call: [phone]  Fax results to:   [phone].      Edgardo Sands MD

## 2021-01-04 NOTE — TELEPHONE ENCOUNTER
ISSUE:   Tacrolimus IR level 12.8 on 12/31, goal 8-10, dose 6 mg BID.    PLAN:   Please call patient and confirm this was an accurate 12-hour trough. Verify Tacrolimus IR dose 6 mg BID. Confirm no new medications or illness. Confirm no missed doses. If accurate trough and accurate dose, decrease Tacrolimus IR dose to 5 mg BID and repeat labs in 1 weeks    OUTCOME:   Left VM with Patricia  requesting a return call.

## 2021-01-05 RX ORDER — TACROLIMUS 1 MG/1
5 CAPSULE ORAL 2 TIMES DAILY
Qty: 420 CAPSULE | Refills: 11
Start: 2021-01-05 | End: 2021-01-07

## 2021-01-05 RX ORDER — VALGANCICLOVIR 450 MG/1
900 TABLET, FILM COATED ORAL DAILY
Qty: 60 TABLET | Refills: 1 | Status: SHIPPED | OUTPATIENT
Start: 2021-01-05 | End: 2021-02-10

## 2021-01-05 NOTE — TELEPHONE ENCOUNTER
Spoke with patient via [employer] . Paw confirmed 12 hour trough. Confirmed current dose tacrolimus 6 mg bid. Recommended reduction to 5 mg bid and repeat level in 1 week. Patient verbalized understanding.     CrCl 60, patient to increase Valcyte to 900 mg daily.

## 2021-01-06 ENCOUNTER — TELEPHONE (OUTPATIENT)
Dept: NEPHROLOGY | Facility: CLINIC | Age: 46
End: 2021-01-06

## 2021-01-06 NOTE — TELEPHONE ENCOUNTER
Post Kidney and Pancreas Transplant Team Conference  Date: 1/6/2021  Transplant Coordinator: Yaz Villa     Attendees:  [x]  Dr. Jacob  [x] Tina Viveros LPN     [x]  Dr. Lopez [x] Ashli Tejeda, KULWANT [] Alayna Lepe LPN   [x]  Dr. Stephenson [] Yoanna Estes, RN    [x]  Dr. Sands [] Rosa M Winston RN [] Cedric Barrera, EvaD   [x] Dr. Mccall [x] Brandi Kruger, KULWANT    [] Dr. Marcelino [] Rafat Painter RN    [] Dr. Wood [] Alexandria Cesar RN    [] Dr. Mcnamara [] Radha Mendoza RN    []  Dr. Saavedra [] Blank Anderson, KULWANT    [] Surgery Fellow [x] Yaz Villa RN    [x] Apple Bowen, DEREJE [] Lesly Car RN        Verbal Plan Read Back:   No changes at this time    Routed to RN Coordinator   Tina Viveros LPN

## 2021-01-07 ENCOUNTER — TELEPHONE (OUTPATIENT)
Dept: TRANSPLANT | Facility: CLINIC | Age: 46
End: 2021-01-07

## 2021-01-07 DIAGNOSIS — Z94.0 KIDNEY TRANSPLANT RECIPIENT: ICD-10-CM

## 2021-01-07 RX ORDER — TACROLIMUS 1 MG/1
6 CAPSULE ORAL 2 TIMES DAILY
Qty: 360 CAPSULE | Refills: 11 | Status: SHIPPED | OUTPATIENT
Start: 2021-01-07 | End: 2021-02-10

## 2021-01-07 NOTE — TELEPHONE ENCOUNTER
ISSUE:  Tac on 1/4 within goal range 8-10. Tac dose reduced from 6 mg bit to 5 mg bid on 1/5 for supratherapeutic levels from the week before. Recommended NO dose change, go back to original dose of 6 mg bid.   Spoke to patient via  [employer]  who verbalized understanding.

## 2021-01-11 ENCOUNTER — VIRTUAL VISIT (OUTPATIENT)
Dept: NEPHROLOGY | Facility: CLINIC | Age: 46
End: 2021-01-11
Attending: SURGERY
Payer: MEDICARE

## 2021-01-11 DIAGNOSIS — N18.2 ANEMIA IN STAGE 2 CHRONIC KIDNEY DISEASE: ICD-10-CM

## 2021-01-11 DIAGNOSIS — Z94.0 HTN, KIDNEY TRANSPLANT RELATED: Primary | ICD-10-CM

## 2021-01-11 DIAGNOSIS — E55.9 VITAMIN D DEFICIENCY: ICD-10-CM

## 2021-01-11 DIAGNOSIS — I15.1 HTN, KIDNEY TRANSPLANT RELATED: Primary | ICD-10-CM

## 2021-01-11 DIAGNOSIS — N18.2 CKD (CHRONIC KIDNEY DISEASE) STAGE 2, GFR 60-89 ML/MIN: ICD-10-CM

## 2021-01-11 DIAGNOSIS — Z94.0 KIDNEY REPLACED BY TRANSPLANT: ICD-10-CM

## 2021-01-11 DIAGNOSIS — D63.1 ANEMIA IN STAGE 2 CHRONIC KIDNEY DISEASE: ICD-10-CM

## 2021-01-11 DIAGNOSIS — E87.8 LOW BICARBONATE LEVEL: ICD-10-CM

## 2021-01-11 DIAGNOSIS — Z48.298 AFTERCARE FOLLOWING ORGAN TRANSPLANT: ICD-10-CM

## 2021-01-11 PROCEDURE — 99214 OFFICE O/P EST MOD 30 MIN: CPT | Mod: 95

## 2021-01-11 RX ORDER — SODIUM BICARBONATE 650 MG/1
650 TABLET ORAL DAILY
Qty: 60 TABLET | Refills: 11 | COMMUNITY
Start: 2021-01-11 | End: 2021-12-14

## 2021-01-11 NOTE — PROGRESS NOTES
Paolo is a 45 year old who is being evaluated via a billable video visit.      How would you like to obtain your AVS? Mail a copy     Please send memloom link [phone] also need to call  services first at [phone]    Will anyone else be joining your video visit? No    Video Start Time: 11:33 AM  Video-Visit Details    Type of service:  Video Visit    Video End Time:11:33 AM    Originating Location (pt. Location): Home    Distant Location (provider location):  [facility] [place]     Platform used for Video Visit: [employer]ia      ACUTE TRANSPLANT NEPHROLOGY VISIT    Assessment & Plan   # DDKT: Stable,               - Baseline Creatinine: ~ 1 - 1.2              - Proteinuria: Minimal (0.2-0.5 grams) (7.4g/g prior to transplant, will follow UPCR via FSGS protocol due to unknown native disease)              - Date DSA Last Checked: Nov/2020      Latest DSA: No              - BK Viremia: No              - Kidney Tx Biopsy: No     # Immunosuppression: Tacrolimus immediate release (goal 8-10) and Mycophenolate mofetil (dose 750 mg every 12 hours)              - Induction with Recent Transplant:  Anti-thymocyte Globulin-Rabbit (Thymoglobulin) 5.8mg/kg  Methylprenisolone (Solumedrol)              - Changes: No.      # Infection Prophylaxis:   - PJP: Sulfa/TMP (Bactrim),  - CMV: Valcyte 450 mg daily, to be stopped on 2/12/2021 (D- / R +)     # Hypertension: Controlled;   Goal BP: < 150/90              - Volume status: appears Euvolemic                              - Changes: No      # Anemia in Chronic Renal Disease: Hgb: stable near normal      JESSI: No              - Iron studies: Low iron saturation, but high ferritin    -1U PRBC on 11/30     # Mineral Bone Disorder:   - Secondary renal hyperparathyroidism; PTH level: Minimally elevated ( pg/ml)   On treatment: None  - Vitamin D; level: Low        On supplement: No  - Calcium; level: Normal        On supplement: No  -  Phosphorus; level: Normal        On supplement: No     # Electrolytes:   - Potassium; level: Normal        On supplement: No  - Magnesium; level: Low         On supplement: yes  - Bicarbonate; level: Normal        On supplement: yes     # Right Lower Extremity weakness, suspected femoral neuropathy: resolved      # Nausea: rarely needs zpfran    # GERD: currently asymptomatic.    # Medical Compliance: Yes     # Transplant History:  Etiology of Kidney Failure: Unknown etiology  Tx: DDKT  Transplant: 11/12/2020 (Kidney)  Donor Type: Donation after Circulatory Death           Donor Class:   Crossmatch at time of Tx: negative  DSA at time of Tx: No  Significant changes in immunosuppression: None  CMV IgG Ab High Risk Discordance (D+/R-): No  EBV IgG Ab High Risk Discordance (D+/R-): No  Significant transplant-related complications: None    Transplant Office Phone Number: [phone]    Assessment and plan was discussed with the patient and she voiced her understanding and agreement.    Return visit:   Per transplant follow up protocol    James Crespo MD     Attestation:  This patient has been seen and evaluated by me, Sergio Jacob MD.  I have reviewed the note and agree with plan of care as documented by the fellow.       Chief Complaint   Ms. Larkin is a 45 year old here for kidney transplant and immunosuppression management.     Interval:  She doing well over all, drinking plenty of fluids, > 3 L Per day. She gained some weight , about 4 bounds and most recent weight is (119 lb), she states her BP runs 130 systolic , no low BP readings. Her appetite is ok. She dorothy any N/V/D fever or chills.  Interview performed with assistance from Patricia strong    Recent Hospitalizations:  [x] No [] Yes    New Medical Issues: [x] No [] Yes    Decreased energy: [x] No [] Yes    Chest pain or SOB with exertion:  [x] No [] Yes    Appetite change or weight change: [x] No [] Yes    Nausea, vomiting or diarrhea:  [x]  No [] Yes    Fever, sweats or chills: [x] No [] Yes    Leg swelling: [x] No [] Yes        Review of Systems   A comprehensive review of systems was obtained and negative, except as noted in the HPI or PMH.    Problem List   Patient Active Problem List   Diagnosis     Anemia in chronic renal disease     HTN, kidney transplant related     Secondary renal hyperparathyroidism (H)     Coronary artery disease involving native coronary artery of native heart without angina pectoris     Status post coronary angiogram     NSTEMI (non-ST elevated myocardial infarction) (H)     Vitamin D deficiency     Pneumothorax     Kidney replaced by transplant     Femoral neuropathy, right     Femoral neuropathy of right lower extremity     Aftercare following organ transplant     CKD (chronic kidney disease) stage 4, GFR 15-29 ml/min (H)     Anemia due to stage 5 chronic kidney disease, not on chronic dialysis (H)       Social History   Social History     Tobacco Use     Smoking status: Never Smoker     Smokeless tobacco: Never Used   Substance Use Topics     Alcohol use: No     Drug use: No       Allergies   Allergies   Allergen Reactions     Blood Transfusion Related (Informational Only)      Patient has a history of a clinically significant antibody against RBC antigens.  A delay in compatible RBCs may occur.     Cephalosporins Anaphylaxis     Per [facility] allergist report: positive skin intradermal tests to Cefazolin and Ceftazidime, but NOT to Penicillin G, Piperacillin and Meropenem      Chlorhexidine Anaphylaxis     Several times during anesthesia at initial phase during insertion of I.v. line and after disinfection with Chlorhexidine drop of blood pressure.  In Prick and as well intradermal tests clear positive reactions to Chlorhexidine (particularly in intradermal test to 0.0002% Chlorhexidine papule of 1cm and Erythema of 2.5cm). AVOID in future Chlorhexidine     Heparin Flush        Medications   Current Outpatient Medications    Medication Sig     acetaminophen (TYLENOL) 325 MG tablet Take 325-650 mg by mouth every 4 hours as needed for mild pain or fever      amLODIPine (NORVASC) 5 MG tablet Take 1 tablet (5 mg) by mouth daily     aspirin (ASA) 81 MG chewable tablet Take 1 tablet (81 mg) by mouth daily     atorvastatin (LIPITOR) 10 MG tablet Take 1 tablet (10 mg) by mouth daily     magnesium oxide (MAG-OX) 400 MG tablet Take 2 tablets (800 mg) by mouth daily     mycophenolate (GENERIC EQUIVALENT) 250 MG capsule Take 3 capsules (750 mg) by mouth 2 times daily     ondansetron (ZOFRAN-ODT) 4 MG ODT tab Take 1 tablet (4 mg) by mouth every 6 hours as needed for nausea     senna-docusate (SENOKOT-S/PERICOLACE) 8.6-50 MG tablet Take 1 tablet by mouth 2 times daily as needed for constipation     sodium bicarbonate 650 MG tablet Take 1 tablet (650 mg) by mouth 2 times daily     sulfamethoxazole-trimethoprim (BACTRIM) 400-80 MG tablet Take 1 tablet by mouth daily     tacrolimus (GENERIC EQUIVALENT) 1 MG capsule Take 6 capsules (6 mg) by mouth 2 times daily     valGANciclovir (VALCYTE) 450 MG tablet Take 2 tablets (900 mg) by mouth daily Stop taking on 2/12/2021     No current facility-administered medications for this visit.      There are no discontinued medications.    Physical Exam   Vital Signs: There were no vitals taken for this visit.    GENERAL APPEARANCE: alert and no distress  HENT: no obvious abnormalities on appearance  RESP: breathing appears unremarkable with normal rate, no audible wheezing or cough and no apparent shortness of breath with conversation  MS: extremities normal - no gross deformities noted  SKIN: no apparent rash and normal skin tone  NEURO: speech is clear with no obvious neurological deficits  PSYCH: mentation appears normal and affect normal    Data     Renal Latest Ref Rng & Units 1/7/2021 1/4/2021 12/31/2020   Na 133 - 144 mmol/L - - -   Na (external) 136 - 145 meq/L 139 142 139   K 3.4 - 5.3 mmol/L - - -   K  (external) 3.5 - 5.1 meq/L 4.5 4.7 4.9   Cl 94 - 109 mmol/L - - -   Cl (external) 98 - 109 meq/L 108 112(H) 111(H)   CO2 20 - 32 mmol/L - - -   CO2 (external) 20 - 29 meq/L 22 21 20   BUN 7 - 30 mg/dL - - -   BUN (external) 6 - 22 mg/dL 17 19 22   Cr 0.52 - 1.04 mg/dL - - -   Cr (external) 0.60 - 1.10 mg/dL 1.08 1.02 1.06   Glucose 70 - 99 mg/dL - - -   Glucose (external) 70 - 99 mg/dL 89 82 83   Ca  8.5 - 10.1 mg/dL - - -   Ca (external) 8.5 - 10.5 mg/dL 9.4 9.2 9.4   Mg 1.6 - 2.3 mg/dL - - -   Mg (external) 1.6 - 2.6 mg/dL - 1.5(L) -     Bone Health Latest Ref Rng & Units 1/4/2021 12/28/2020 12/21/2020   Phos 2.5 - 4.5 mg/dL - - -   Phos (external) 2.3 - 4.7 mg/dL 4.6 4.1 4.3   PTHi 18 - 80 pg/mL - - -   Vit D Def 20 - 75 ug/L - - -     Heme Latest Ref Rng & Units 1/7/2021 1/4/2021 12/31/2020   WBC 4.0 - 11.0 10e9/L - - -   WBC (external) 4.0 - 11.0 K/uL 4.7 4.2 3.9(L)   Hgb 11.7 - 15.7 g/dL - - -   Hgb (external) 11.5 - 15.8 g/dL 11.0(L) 10.5(L) 10.5(L)   Plt 150 - 450 10e9/L - - -   Plt (external) 140 - 400 K/UL - 280 290   ABSOLUTE NEUTROPHIL 1.6 - 8.3 10e9/L - - -   ABSOLUTE NEUTROPHILS (EXTERNAL) 1.8 - 8.0 K/uL 4.4 3.3 3.0   ABSOLUTE LYMPHOCYTES 0.8 - 5.3 10e9/L - - -   ABSOLUTE LYMPHOCYTES (EXTERNAL) 0.8 - 4.1 K/uL 0.1(L) 0.4(L) 0.4(L)   ABSOLUTE MONOCYTES 0.0 - 1.3 10e9/L - - -   ABSOLUTE MONOCYTES (EXTERNAL) 0.0 - 1.0 K/uL 0.1 0.4 0.3   ABSOLUTE EOSINOPHILS 0.0 - 0.7 10e9/L - - -   ABSOLUTE EOSINOPHILS (EXTERNAL) 0.0 - 0.7 K/UL - 0.1 0.1   ABSOLUTE BASOPHILS 0.0 - 0.2 10e9/L - - -   ABSOLUTE BASOPHILS (EXTERNAL) 0.0 - 0.2 K/UL - 0.1 0.1   ABS IMMATURE GRANULOCYTES 0 - 0.4 10e9/L - - -   ABSOLUTE NUCLEATED RBC - - - -     Liver Latest Ref Rng & Units 11/12/2020 5/14/2020 1/24/2020   AP 40 - 150 U/L 142 - 125   TBili 0.2 - 1.3 mg/dL 0.5 - 0.3   DBili 0.0 - 0.2 mg/dL - - <0.1   ALT 0 - 50 U/L 18 12 18   AST 0 - 45 U/L 19 - 22   Tot Protein 6.8 - 8.8 g/dL 8.4 - 6.3(L)   Albumin 3.4 - 5.0 g/dL 3.8 - 2.8(L)      Pancreas Latest Ref Rng & Units 11/12/2020 1/24/2020 1/23/2020   A1C 0 - 5.6 % 4.9 Canceled, Test credited Canceled, Test credited     Iron studies Latest Ref Rng & Units 11/24/2020   Iron 35 - 180 ug/dL 21(L)   Iron sat 15 - 46 % 10(L)   Ferritin 12 - 150 ng/mL 1,120(H)     [facility] Txp Virology Latest Ref Rng & Units 11/23/2020 11/14/2020 1/23/2020   BK Spec - Plasma - -   BK Res BKNEG:BK Virus DNA Not Detected copies/mL BK Virus DNA Not Detected - -   BK Log <2.7 Log copies/mL Not Calculated - -   EBV VCA IGG ANTIBODY U/mL - - -   EBV CAPSID ANTIBODY IGG 0.0 - 0.8 AI - - 1.8(H)   Hep B Core NR:Nonreactive - Reactive(AA) -   Hep B Surf - - - -   HIV 1&2 NEG - - -        Recent Labs   Lab Test 11/25/20  0643 11/27/20  0708 11/30/20  0651   DOSTAC 2,000 Not Provided 1,900   TACROL 6.6 8.8 9.5     Recent Labs   Lab Test 11/23/20  0651 11/30/20  0651   DOSMPA 2,000 1,900   MPACID 3.12 2.21   MPAG 191.9* 119.7*     James Crespo MD on 1/11/2021 at 11:19 AM

## 2021-01-11 NOTE — LETTER
1/11/2021      RE: Paolo Vincent Trae  [address]  [place] [address]       Paolo is a 45 year old who is being evaluated via a billable video visit.      How would you like to obtain your AVS? Mail a copy     Please send [employer] link [phone] also need to call  services first at [phone]    Will anyone else be joining your video visit? No    Video Start Time: 11:33 AM  Video-Visit Details    Type of service:  Video Visit    Video End Time:11:33 AM    Originating Location (pt. Location): Home    Distant Location (provider location):  [facility] NEPHROLOGY [facility]IN[facility] [place]     Platform used for Video Visit: [employer]      ACUTE TRANSPLANT NEPHROLOGY VISIT    Assessment & Plan   # DDKT: Stable,               - Baseline Creatinine: ~ 1 - 1.2              - Proteinuria: Minimal (0.2-0.5 grams) (7.4g/g prior to transplant, will follow UPCR via FSGS protocol due to unknown native disease)              - Date DSA Last Checked: Nov/2020      Latest DSA: No              - BK Viremia: No              - Kidney Tx Biopsy: No     # Immunosuppression: Tacrolimus immediate release (goal 8-10) and Mycophenolate mofetil (dose 750 mg every 12 hours)              - Induction with Recent Transplant:  Anti-thymocyte Globulin-Rabbit (Thymoglobulin) 5.8mg/kg  Methylprenisolone (Solumedrol)              - Changes: No.      # Infection Prophylaxis:   - PJP: Sulfa/TMP (Bactrim),  - CMV: Valcyte 450 mg daily, to be stopped on 2/12/2021 (D- / R +)     # Hypertension: Controlled;   Goal BP: < 150/90              - Volume status: appears Euvolemic                              - Changes: No      # Anemia in Chronic Renal Disease: Hgb: stable near normal      JESSI: No              - Iron studies: Low iron saturation, but high ferritin    -1U PRBC on 11/30     # Mineral Bone Disorder:   - Secondary renal hyperparathyroidism; PTH level: Minimally elevated ( pg/ml)   On treatment: None  - Vitamin D; level: Low         On supplement: No  - Calcium; level: Normal        On supplement: No  - Phosphorus; level: Normal        On supplement: No     # Electrolytes:   - Potassium; level: Normal        On supplement: No  - Magnesium; level: Low         On supplement: yes  - Bicarbonate; level: Normal        On supplement: yes     # Right Lower Extremity weakness, suspected femoral neuropathy: resolved      # Nausea: rarely needs zpfran    # GERD: currently asymptomatic.    # Medical Compliance: Yes     # Transplant History:  Etiology of Kidney Failure: Unknown etiology  Tx: DDKT  Transplant: 11/12/2020 (Kidney)  Donor Type: Donation after Circulatory Death           Donor Class:   Crossmatch at time of Tx: negative  DSA at time of Tx: No  Significant changes in immunosuppression: None  CMV IgG Ab High Risk Discordance (D+/R-): No  EBV IgG Ab High Risk Discordance (D+/R-): No  Significant transplant-related complications: None    Transplant Office Phone Number: [phone]    Assessment and plan was discussed with the patient and she voiced her understanding and agreement.    Return visit:   Per transplant follow up protocol    James Crespo MD     Attestation:  This patient has been seen and evaluated by me, Sergio Jacob MD.  I have reviewed the note and agree with plan of care as documented by the fellow.       Chief Complaint   Ms. Larkin is a 45 year old here for kidney transplant and immunosuppression management.     Interval:  She doing well over all, drinking plenty of fluids, > 3 L Per day. She gained some weight , about 4 bounds and most recent weight is (119 lb), she states her BP runs 130 systolic , no low BP readings. Her appetite is ok. She dorothy any N/V/D fever or chills.  Interview performed with assistance from Patricia     Recent Hospitalizations:  [x] No [] Yes    New Medical Issues: [x] No [] Yes    Decreased energy: [x] No [] Yes    Chest pain or SOB with exertion:  [x] No [] Yes    Appetite  change or weight change: [x] No [] Yes    Nausea, vomiting or diarrhea:  [x] No [] Yes    Fever, sweats or chills: [x] No [] Yes    Leg swelling: [x] No [] Yes        Review of Systems   A comprehensive review of systems was obtained and negative, except as noted in the HPI or PMH.    Problem List   Patient Active Problem List   Diagnosis     Anemia in chronic renal disease     HTN, kidney transplant related     Secondary renal hyperparathyroidism (H)     Coronary artery disease involving native coronary artery of native heart without angina pectoris     Status post coronary angiogram     NSTEMI (non-ST elevated myocardial infarction) (H)     Vitamin D deficiency     Pneumothorax     Kidney replaced by transplant     Femoral neuropathy, right     Femoral neuropathy of right lower extremity     Aftercare following organ transplant     CKD (chronic kidney disease) stage 4, GFR 15-29 ml/min (H)     Anemia due to stage 5 chronic kidney disease, not on chronic dialysis (H)       Social History   Social History     Tobacco Use     Smoking status: Never Smoker     Smokeless tobacco: Never Used   Substance Use Topics     Alcohol use: No     Drug use: No       Allergies   Allergies   Allergen Reactions     Blood Transfusion Related (Informational Only)      Patient has a history of a clinically significant antibody against RBC antigens.  A delay in compatible RBCs may occur.     Cephalosporins Anaphylaxis     Per [facility] allergist report: positive skin intradermal tests to Cefazolin and Ceftazidime, but NOT to Penicillin G, Piperacillin and Meropenem      Chlorhexidine Anaphylaxis     Several times during anesthesia at initial phase during insertion of I.v. line and after disinfection with Chlorhexidine drop of blood pressure.  In Prick and as well intradermal tests clear positive reactions to Chlorhexidine (particularly in intradermal test to 0.0002% Chlorhexidine papule of 1cm and Erythema of 2.5cm). AVOID in future  Chlorhexidine     Heparin Flush        Medications   Current Outpatient Medications   Medication Sig     acetaminophen (TYLENOL) 325 MG tablet Take 325-650 mg by mouth every 4 hours as needed for mild pain or fever      amLODIPine (NORVASC) 5 MG tablet Take 1 tablet (5 mg) by mouth daily     aspirin (ASA) 81 MG chewable tablet Take 1 tablet (81 mg) by mouth daily     atorvastatin (LIPITOR) 10 MG tablet Take 1 tablet (10 mg) by mouth daily     magnesium oxide (MAG-OX) 400 MG tablet Take 2 tablets (800 mg) by mouth daily     mycophenolate (GENERIC EQUIVALENT) 250 MG capsule Take 3 capsules (750 mg) by mouth 2 times daily     ondansetron (ZOFRAN-ODT) 4 MG ODT tab Take 1 tablet (4 mg) by mouth every 6 hours as needed for nausea     senna-docusate (SENOKOT-S/PERICOLACE) 8.6-50 MG tablet Take 1 tablet by mouth 2 times daily as needed for constipation     sodium bicarbonate 650 MG tablet Take 1 tablet (650 mg) by mouth 2 times daily     sulfamethoxazole-trimethoprim (BACTRIM) 400-80 MG tablet Take 1 tablet by mouth daily     tacrolimus (GENERIC EQUIVALENT) 1 MG capsule Take 6 capsules (6 mg) by mouth 2 times daily     valGANciclovir (VALCYTE) 450 MG tablet Take 2 tablets (900 mg) by mouth daily Stop taking on 2/12/2021     No current facility-administered medications for this visit.      There are no discontinued medications.    Physical Exam   Vital Signs: There were no vitals taken for this visit.    GENERAL APPEARANCE: alert and no distress  HENT: no obvious abnormalities on appearance  RESP: breathing appears unremarkable with normal rate, no audible wheezing or cough and no apparent shortness of breath with conversation  MS: extremities normal - no gross deformities noted  SKIN: no apparent rash and normal skin tone  NEURO: speech is clear with no obvious neurological deficits  PSYCH: mentation appears normal and affect normal    Data     Renal Latest Ref Rng & Units 1/7/2021 1/4/2021 12/31/2020   Na 133 - 144 mmol/L  - - -   Na (external) 136 - 145 meq/L 139 142 139   K 3.4 - 5.3 mmol/L - - -   K (external) 3.5 - 5.1 meq/L 4.5 4.7 4.9   Cl 94 - 109 mmol/L - - -   Cl (external) 98 - 109 meq/L 108 112(H) 111(H)   CO2 20 - 32 mmol/L - - -   CO2 (external) 20 - 29 meq/L 22 21 20   BUN 7 - 30 mg/dL - - -   BUN (external) 6 - 22 mg/dL 17 19 22   Cr 0.52 - 1.04 mg/dL - - -   Cr (external) 0.60 - 1.10 mg/dL 1.08 1.02 1.06   Glucose 70 - 99 mg/dL - - -   Glucose (external) 70 - 99 mg/dL 89 82 83   Ca  8.5 - 10.1 mg/dL - - -   Ca (external) 8.5 - 10.5 mg/dL 9.4 9.2 9.4   Mg 1.6 - 2.3 mg/dL - - -   Mg (external) 1.6 - 2.6 mg/dL - 1.5(L) -     Bone Health Latest Ref Rng & Units 1/4/2021 12/28/2020 12/21/2020   Phos 2.5 - 4.5 mg/dL - - -   Phos (external) 2.3 - 4.7 mg/dL 4.6 4.1 4.3   PTHi 18 - 80 pg/mL - - -   Vit D Def 20 - 75 ug/L - - -     Heme Latest Ref Rng & Units 1/7/2021 1/4/2021 12/31/2020   WBC 4.0 - 11.0 10e9/L - - -   WBC (external) 4.0 - 11.0 K/uL 4.7 4.2 3.9(L)   Hgb 11.7 - 15.7 g/dL - - -   Hgb (external) 11.5 - 15.8 g/dL 11.0(L) 10.5(L) 10.5(L)   Plt 150 - 450 10e9/L - - -   Plt (external) 140 - 400 K/UL - 280 290   ABSOLUTE NEUTROPHIL 1.6 - 8.3 10e9/L - - -   ABSOLUTE NEUTROPHILS (EXTERNAL) 1.8 - 8.0 K/uL 4.4 3.3 3.0   ABSOLUTE LYMPHOCYTES 0.8 - 5.3 10e9/L - - -   ABSOLUTE LYMPHOCYTES (EXTERNAL) 0.8 - 4.1 K/uL 0.1(L) 0.4(L) 0.4(L)   ABSOLUTE MONOCYTES 0.0 - 1.3 10e9/L - - -   ABSOLUTE MONOCYTES (EXTERNAL) 0.0 - 1.0 K/uL 0.1 0.4 0.3   ABSOLUTE EOSINOPHILS 0.0 - 0.7 10e9/L - - -   ABSOLUTE EOSINOPHILS (EXTERNAL) 0.0 - 0.7 K/UL - 0.1 0.1   ABSOLUTE BASOPHILS 0.0 - 0.2 10e9/L - - -   ABSOLUTE BASOPHILS (EXTERNAL) 0.0 - 0.2 K/UL - 0.1 0.1   ABS IMMATURE GRANULOCYTES 0 - 0.4 10e9/L - - -   ABSOLUTE NUCLEATED RBC - - - -     Liver Latest Ref Rng & Units 11/12/2020 5/14/2020 1/24/2020   AP 40 - 150 U/L 142 - 125   TBili 0.2 - 1.3 mg/dL 0.5 - 0.3   DBili 0.0 - 0.2 mg/dL - - <0.1   ALT 0 - 50 U/L 18 12 18   AST 0 - 45 U/L 19 - 22    Tot Protein 6.8 - 8.8 g/dL 8.4 - 6.3(L)   Albumin 3.4 - 5.0 g/dL 3.8 - 2.8(L)     Pancreas Latest Ref Rng & Units 11/12/2020 1/24/2020 1/23/2020   A1C 0 - 5.6 % 4.9 Canceled, Test credited Canceled, Test credited     Iron studies Latest Ref Rng & Units 11/24/2020   Iron 35 - 180 ug/dL 21(L)   Iron sat 15 - 46 % 10(L)   Ferritin 12 - 150 ng/mL 1,120(H)     [facility] Txp Virology Latest Ref Rng & Units 11/23/2020 11/14/2020 1/23/2020   BK Spec - Plasma - -   BK Res BKNEG:BK Virus DNA Not Detected copies/mL BK Virus DNA Not Detected - -   BK Log <2.7 Log copies/mL Not Calculated - -   EBV VCA IGG ANTIBODY U/mL - - -   EBV CAPSID ANTIBODY IGG 0.0 - 0.8 AI - - 1.8(H)   Hep B Core NR:Nonreactive - Reactive(AA) -   Hep B Surf - - - -   HIV 1&2 NEG - - -        Recent Labs   Lab Test 11/25/20  0643 11/27/20  0708 11/30/20  0651   DOSTAC 2,000 Not Provided 1,900   TACROL 6.6 8.8 9.5     Recent Labs   Lab Test 11/23/20  0651 11/30/20  0651   DOSMPA 2,000 1,900   MPACID 3.12 2.21   MPAG 191.9* 119.7*     James Crespo MD on 1/11/2021 at 11:19 AM    Sergio Jacob MD

## 2021-01-11 NOTE — LETTER
1/11/2021       RE: Paolo Nguyenoo  [address]  [place] [address]     Dear Colleague,    Thank you for referring your patient, Paolo Larkin, to the [facility] [place] at [facility]. Please see a copy of my visit note below.    Paolo is a 45 year old who is being evaluated via a billable video visit.      How would you like to obtain your AVS? Mail a copy     Please send [employer] link [phone] also need to call  services first at [phone]    Will anyone else be joining your video visit? No    Video Start Time: 11:33 AM  Video-Visit Details    Type of service:  Video Visit    Video End Time:11:33 AM    Originating Location (pt. Location): Home    Distant Location (provider location):  [facility] [place]     Platform used for Video Visit: Make[employer]      ACUTE TRANSPLANT NEPHROLOGY VISIT    Assessment & Plan   # DDKT: Stable,               - Baseline Creatinine: ~ 1 - 1.2              - Proteinuria: Minimal (0.2-0.5 grams) (7.4g/g prior to transplant, will follow UPCR via FSGS protocol due to unknown native disease)              - Date DSA Last Checked: Nov/2020      Latest DSA: No              - BK Viremia: No              - Kidney Tx Biopsy: No     # Immunosuppression: Tacrolimus immediate release (goal 8-10) and Mycophenolate mofetil (dose 750 mg every 12 hours)              - Induction with Recent Transplant:  Anti-thymocyte Globulin-Rabbit (Thymoglobulin) 5.8mg/kg  Methylprenisolone (Solumedrol)              - Changes: No.      # Infection Prophylaxis:   - PJP: Sulfa/TMP (Bactrim),  - CMV: Valcyte 450 mg daily, to be stopped on 2/12/2021 (D- / R +)     # Hypertension: Controlled;   Goal BP: < 150/90              - Volume status: appears Euvolemic                              - Changes: No      # Anemia in Chronic Renal Disease: Hgb: stable near normal      JESSI: No              - Iron  studies: Low iron saturation, but high ferritin    -1U PRBC on 11/30     # Mineral Bone Disorder:   - Secondary renal hyperparathyroidism; PTH level: Minimally elevated ( pg/ml)   On treatment: None  - Vitamin D; level: Low        On supplement: No  - Calcium; level: Normal        On supplement: No  - Phosphorus; level: Normal        On supplement: No     # Electrolytes:   - Potassium; level: Normal        On supplement: No  - Magnesium; level: Low         On supplement: yes  - Bicarbonate; level: Normal        On supplement: yes     # Right Lower Extremity weakness, suspected femoral neuropathy: resolved      # Nausea: rarely needs zpfran    # GERD: currently asymptomatic.    # Medical Compliance: Yes     # Transplant History:  Etiology of Kidney Failure: Unknown etiology  Tx: DDKT  Transplant: 11/12/2020 (Kidney)  Donor Type: Donation after Circulatory Death           Donor Class:   Crossmatch at time of Tx: negative  DSA at time of Tx: No  Significant changes in immunosuppression: None  CMV IgG Ab High Risk Discordance (D+/R-): No  EBV IgG Ab High Risk Discordance (D+/R-): No  Significant transplant-related complications: None    Transplant Office Phone Number: [phone]    Assessment and plan was discussed with the patient and she voiced her understanding and agreement.    Return visit:   Per transplant follow up protocol    James Crespo MD     Attestation:  This patient has been seen and evaluated by me, Sergio Jacob MD.  I have reviewed the note and agree with plan of care as documented by the fellow.       Chief Complaint   Ms. Larkin is a 45 year old here for kidney transplant and immunosuppression management.     Interval:  She doing well over all, drinking plenty of fluids, > 3 L Per day. She gained some weight , about 4 bounds and most recent weight is (119 lb), she states her BP runs 130 systolic , no low BP readings. Her appetite is ok. She dorothy any N/V/D fever or chills.   Interview performed with assistance from Patricia     Recent Hospitalizations:  [x] No [] Yes    New Medical Issues: [x] No [] Yes    Decreased energy: [x] No [] Yes    Chest pain or SOB with exertion:  [x] No [] Yes    Appetite change or weight change: [x] No [] Yes    Nausea, vomiting or diarrhea:  [x] No [] Yes    Fever, sweats or chills: [x] No [] Yes    Leg swelling: [x] No [] Yes        Review of Systems   A comprehensive review of systems was obtained and negative, except as noted in the HPI or PMH.    Problem List   Patient Active Problem List   Diagnosis     Anemia in chronic renal disease     HTN, kidney transplant related     Secondary renal hyperparathyroidism (H)     Coronary artery disease involving native coronary artery of native heart without angina pectoris     Status post coronary angiogram     NSTEMI (non-ST elevated myocardial infarction) (H)     Vitamin D deficiency     Pneumothorax     Kidney replaced by transplant     Femoral neuropathy, right     Femoral neuropathy of right lower extremity     Aftercare following organ transplant     CKD (chronic kidney disease) stage 4, GFR 15-29 ml/min (H)     Anemia due to stage 5 chronic kidney disease, not on chronic dialysis (H)       Social History   Social History     Tobacco Use     Smoking status: Never Smoker     Smokeless tobacco: Never Used   Substance Use Topics     Alcohol use: No     Drug use: No       Allergies   Allergies   Allergen Reactions     Blood Transfusion Related (Informational Only)      Patient has a history of a clinically significant antibody against RBC antigens.  A delay in compatible RBCs may occur.     Cephalosporins Anaphylaxis     Per [facility] allergist report: positive skin intradermal tests to Cefazolin and Ceftazidime, but NOT to Penicillin G, Piperacillin and Meropenem      Chlorhexidine Anaphylaxis     Several times during anesthesia at initial phase during insertion of I.v. line and after disinfection with  Chlorhexidine drop of blood pressure.  In Prick and as well intradermal tests clear positive reactions to Chlorhexidine (particularly in intradermal test to 0.0002% Chlorhexidine papule of 1cm and Erythema of 2.5cm). AVOID in future Chlorhexidine     Heparin Flush        Medications   Current Outpatient Medications   Medication Sig     acetaminophen (TYLENOL) 325 MG tablet Take 325-650 mg by mouth every 4 hours as needed for mild pain or fever      amLODIPine (NORVASC) 5 MG tablet Take 1 tablet (5 mg) by mouth daily     aspirin (ASA) 81 MG chewable tablet Take 1 tablet (81 mg) by mouth daily     atorvastatin (LIPITOR) 10 MG tablet Take 1 tablet (10 mg) by mouth daily     magnesium oxide (MAG-OX) 400 MG tablet Take 2 tablets (800 mg) by mouth daily     mycophenolate (GENERIC EQUIVALENT) 250 MG capsule Take 3 capsules (750 mg) by mouth 2 times daily     ondansetron (ZOFRAN-ODT) 4 MG ODT tab Take 1 tablet (4 mg) by mouth every 6 hours as needed for nausea     senna-docusate (SENOKOT-S/PERICOLACE) 8.6-50 MG tablet Take 1 tablet by mouth 2 times daily as needed for constipation     sodium bicarbonate 650 MG tablet Take 1 tablet (650 mg) by mouth 2 times daily     sulfamethoxazole-trimethoprim (BACTRIM) 400-80 MG tablet Take 1 tablet by mouth daily     tacrolimus (GENERIC EQUIVALENT) 1 MG capsule Take 6 capsules (6 mg) by mouth 2 times daily     valGANciclovir (VALCYTE) 450 MG tablet Take 2 tablets (900 mg) by mouth daily Stop taking on 2/12/2021     No current facility-administered medications for this visit.      There are no discontinued medications.    Physical Exam   Vital Signs: There were no vitals taken for this visit.    GENERAL APPEARANCE: alert and no distress  HENT: no obvious abnormalities on appearance  RESP: breathing appears unremarkable with normal rate, no audible wheezing or cough and no apparent shortness of breath with conversation  MS: extremities normal - no gross deformities noted  SKIN: no  apparent rash and normal skin tone  NEURO: speech is clear with no obvious neurological deficits  PSYCH: mentation appears normal and affect normal    Data     Renal Latest Ref Rng & Units 1/7/2021 1/4/2021 12/31/2020   Na 133 - 144 mmol/L - - -   Na (external) 136 - 145 meq/L 139 142 139   K 3.4 - 5.3 mmol/L - - -   K (external) 3.5 - 5.1 meq/L 4.5 4.7 4.9   Cl 94 - 109 mmol/L - - -   Cl (external) 98 - 109 meq/L 108 112(H) 111(H)   CO2 20 - 32 mmol/L - - -   CO2 (external) 20 - 29 meq/L 22 21 20   BUN 7 - 30 mg/dL - - -   BUN (external) 6 - 22 mg/dL 17 19 22   Cr 0.52 - 1.04 mg/dL - - -   Cr (external) 0.60 - 1.10 mg/dL 1.08 1.02 1.06   Glucose 70 - 99 mg/dL - - -   Glucose (external) 70 - 99 mg/dL 89 82 83   Ca  8.5 - 10.1 mg/dL - - -   Ca (external) 8.5 - 10.5 mg/dL 9.4 9.2 9.4   Mg 1.6 - 2.3 mg/dL - - -   Mg (external) 1.6 - 2.6 mg/dL - 1.5(L) -     Bone Health Latest Ref Rng & Units 1/4/2021 12/28/2020 12/21/2020   Phos 2.5 - 4.5 mg/dL - - -   Phos (external) 2.3 - 4.7 mg/dL 4.6 4.1 4.3   PTHi 18 - 80 pg/mL - - -   Vit D Def 20 - 75 ug/L - - -     Heme Latest Ref Rng & Units 1/7/2021 1/4/2021 12/31/2020   WBC 4.0 - 11.0 10e9/L - - -   WBC (external) 4.0 - 11.0 K/uL 4.7 4.2 3.9(L)   Hgb 11.7 - 15.7 g/dL - - -   Hgb (external) 11.5 - 15.8 g/dL 11.0(L) 10.5(L) 10.5(L)   Plt 150 - 450 10e9/L - - -   Plt (external) 140 - 400 K/UL - 280 290   ABSOLUTE NEUTROPHIL 1.6 - 8.3 10e9/L - - -   ABSOLUTE NEUTROPHILS (EXTERNAL) 1.8 - 8.0 K/uL 4.4 3.3 3.0   ABSOLUTE LYMPHOCYTES 0.8 - 5.3 10e9/L - - -   ABSOLUTE LYMPHOCYTES (EXTERNAL) 0.8 - 4.1 K/uL 0.1(L) 0.4(L) 0.4(L)   ABSOLUTE MONOCYTES 0.0 - 1.3 10e9/L - - -   ABSOLUTE MONOCYTES (EXTERNAL) 0.0 - 1.0 K/uL 0.1 0.4 0.3   ABSOLUTE EOSINOPHILS 0.0 - 0.7 10e9/L - - -   ABSOLUTE EOSINOPHILS (EXTERNAL) 0.0 - 0.7 K/UL - 0.1 0.1   ABSOLUTE BASOPHILS 0.0 - 0.2 10e9/L - - -   ABSOLUTE BASOPHILS (EXTERNAL) 0.0 - 0.2 K/UL - 0.1 0.1   ABS IMMATURE GRANULOCYTES 0 - 0.4 10e9/L - - -    ABSOLUTE NUCLEATED RBC - - - -     Liver Latest Ref Rng & Units 11/12/2020 5/14/2020 1/24/2020   AP 40 - 150 U/L 142 - 125   TBili 0.2 - 1.3 mg/dL 0.5 - 0.3   DBili 0.0 - 0.2 mg/dL - - <0.1   ALT 0 - 50 U/L 18 12 18   AST 0 - 45 U/L 19 - 22   Tot Protein 6.8 - 8.8 g/dL 8.4 - 6.3(L)   Albumin 3.4 - 5.0 g/dL 3.8 - 2.8(L)     Pancreas Latest Ref Rng & Units 11/12/2020 1/24/2020 1/23/2020   A1C 0 - 5.6 % 4.9 Canceled, Test credited Canceled, Test credited     Iron studies Latest Ref Rng & Units 11/24/2020   Iron 35 - 180 ug/dL 21(L)   Iron sat 15 - 46 % 10(L)   Ferritin 12 - 150 ng/mL 1,120(H)     [facility] Txp Virology Latest Ref Rng & Units 11/23/2020 11/14/2020 1/23/2020   BK Spec - Plasma - -   BK Res BKNEG:BK Virus DNA Not Detected copies/mL BK Virus DNA Not Detected - -   BK Log <2.7 Log copies/mL Not Calculated - -   EBV VCA IGG ANTIBODY U/mL - - -   EBV CAPSID ANTIBODY IGG 0.0 - 0.8 AI - - 1.8(H)   Hep B Core NR:Nonreactive - Reactive(AA) -   Hep B Surf - - - -   HIV 1&2 NEG - - -        Recent Labs   Lab Test 11/25/20  0643 11/27/20  0708 11/30/20  0651   DOSTAC 2,000 Not Provided 1,900   TACROL 6.6 8.8 9.5     Recent Labs   Lab Test 11/23/20  0651 11/30/20  0651   DOSMPA 2,000 1,900   MPACID 3.12 2.21   MPAG 191.9* 119.7*     James Crespo MD on 1/11/2021 at 11:19 AM          Again, thank you for allowing me to participate in the care of your patient.      Sincerely,    Early Post Transplant

## 2021-01-11 NOTE — LETTER
1/11/2021      RE: Paolo Vincent Trae  [address]  [place] [address]       Paolo is a 45 year old who is being evaluated via a billable video visit.      How would you like to obtain your AVS? Mail a copy     Please send [employer] link [phone] also need to call  services first at [phone]    Will anyone else be joining your video visit? No    Video Start Time: 11:33 AM  Video-Visit Details    Type of service:  Video Visit    Video End Time:11:33 AM    Originating Location (pt. Location): Home    Distant Location (provider location):  [facility] [place]     Platform used for Video Visit: [employer]      ACUTE TRANSPLANT NEPHROLOGY VISIT    Assessment & Plan   # DDKT: Stable,               - Baseline Creatinine: ~ 1 - 1.2              - Proteinuria: Minimal (0.2-0.5 grams) (7.4g/g prior to transplant, will follow UPCR via FSGS protocol due to unknown native disease)              - Date DSA Last Checked: Nov/2020      Latest DSA: No              - BK Viremia: No              - Kidney Tx Biopsy: No     # Immunosuppression: Tacrolimus immediate release (goal 8-10) and Mycophenolate mofetil (dose 750 mg every 12 hours)              - Induction with Recent Transplant:  Anti-thymocyte Globulin-Rabbit (Thymoglobulin) 5.8mg/kg  Methylprenisolone (Solumedrol)              - Changes: No.      # Infection Prophylaxis:   - PJP: Sulfa/TMP (Bactrim),  - CMV: Valcyte 450 mg daily, to be stopped on 2/12/2021 (D- / R +)     # Hypertension: Controlled;   Goal BP: < 150/90              - Volume status: appears Euvolemic                              - Changes: No      # Anemia in Chronic Renal Disease: Hgb: stable near normal      JESSI: No              - Iron studies: Low iron saturation, but high ferritin    -1U PRBC on 11/30     # Mineral Bone Disorder:   - Secondary renal hyperparathyroidism; PTH level: Minimally elevated ( pg/ml)   On treatment: None  - Vitamin D; level: Low         On supplement: No  - Calcium; level: Normal        On supplement: No  - Phosphorus; level: Normal        On supplement: No     # Electrolytes:   - Potassium; level: Normal        On supplement: No  - Magnesium; level: Low         On supplement: yes  - Bicarbonate; level: Normal        On supplement: yes     # Right Lower Extremity weakness, suspected femoral neuropathy: resolved      # Nausea: rarely needs zpfran    # GERD: currently asymptomatic.    # Medical Compliance: Yes     # Transplant History:  Etiology of Kidney Failure: Unknown etiology  Tx: DDKT  Transplant: 11/12/2020 (Kidney)  Donor Type: Donation after Circulatory Death           Donor Class:   Crossmatch at time of Tx: negative  DSA at time of Tx: No  Significant changes in immunosuppression: None  CMV IgG Ab High Risk Discordance (D+/R-): No  EBV IgG Ab High Risk Discordance (D+/R-): No  Significant transplant-related complications: None    Transplant Office Phone Number: [phone]    Assessment and plan was discussed with the patient and she voiced her understanding and agreement.    Return visit:   Per transplant follow up protocol    James Crespo MD     Attestation:  This patient has been seen and evaluated by me, Sergio Jacob MD.  I have reviewed the note and agree with plan of care as documented by the fellow.       Chief Complaint   Ms. Larkin is a 45 year old here for kidney transplant and immunosuppression management.     Interval:  She doing well over all, drinking plenty of fluids, > 3 L Per day. She gained some weight , about 4 bounds and most recent weight is (119 lb), she states her BP runs 130 systolic , no low BP readings. Her appetite is ok. She dorothy any N/V/D fever or chills.  Interview performed with assistance from Patricia     Recent Hospitalizations:  [x] No [] Yes    New Medical Issues: [x] No [] Yes    Decreased energy: [x] No [] Yes    Chest pain or SOB with exertion:  [x] No [] Yes    Appetite  change or weight change: [x] No [] Yes    Nausea, vomiting or diarrhea:  [x] No [] Yes    Fever, sweats or chills: [x] No [] Yes    Leg swelling: [x] No [] Yes        Review of Systems   A comprehensive review of systems was obtained and negative, except as noted in the HPI or PMH.    Problem List   Patient Active Problem List   Diagnosis     Anemia in chronic renal disease     HTN, kidney transplant related     Secondary renal hyperparathyroidism (H)     Coronary artery disease involving native coronary artery of native heart without angina pectoris     Status post coronary angiogram     NSTEMI (non-ST elevated myocardial infarction) (H)     Vitamin D deficiency     Pneumothorax     Kidney replaced by transplant     Femoral neuropathy, right     Femoral neuropathy of right lower extremity     Aftercare following organ transplant     CKD (chronic kidney disease) stage 4, GFR 15-29 ml/min (H)     Anemia due to stage 5 chronic kidney disease, not on chronic dialysis (H)       Social History   Social History     Tobacco Use     Smoking status: Never Smoker     Smokeless tobacco: Never Used   Substance Use Topics     Alcohol use: No     Drug use: No       Allergies   Allergies   Allergen Reactions     Blood Transfusion Related (Informational Only)      Patient has a history of a clinically significant antibody against RBC antigens.  A delay in compatible RBCs may occur.     Cephalosporins Anaphylaxis     Per [facility] allergist report: positive skin intradermal tests to Cefazolin and Ceftazidime, but NOT to Penicillin G, Piperacillin and Meropenem      Chlorhexidine Anaphylaxis     Several times during anesthesia at initial phase during insertion of I.v. line and after disinfection with Chlorhexidine drop of blood pressure.  In Prick and as well intradermal tests clear positive reactions to Chlorhexidine (particularly in intradermal test to 0.0002% Chlorhexidine papule of 1cm and Erythema of 2.5cm). AVOID in future  Chlorhexidine     Heparin Flush        Medications   Current Outpatient Medications   Medication Sig     acetaminophen (TYLENOL) 325 MG tablet Take 325-650 mg by mouth every 4 hours as needed for mild pain or fever      amLODIPine (NORVASC) 5 MG tablet Take 1 tablet (5 mg) by mouth daily     aspirin (ASA) 81 MG chewable tablet Take 1 tablet (81 mg) by mouth daily     atorvastatin (LIPITOR) 10 MG tablet Take 1 tablet (10 mg) by mouth daily     magnesium oxide (MAG-OX) 400 MG tablet Take 2 tablets (800 mg) by mouth daily     mycophenolate (GENERIC EQUIVALENT) 250 MG capsule Take 3 capsules (750 mg) by mouth 2 times daily     ondansetron (ZOFRAN-ODT) 4 MG ODT tab Take 1 tablet (4 mg) by mouth every 6 hours as needed for nausea     senna-docusate (SENOKOT-S/PERICOLACE) 8.6-50 MG tablet Take 1 tablet by mouth 2 times daily as needed for constipation     sodium bicarbonate 650 MG tablet Take 1 tablet (650 mg) by mouth 2 times daily     sulfamethoxazole-trimethoprim (BACTRIM) 400-80 MG tablet Take 1 tablet by mouth daily     tacrolimus (GENERIC EQUIVALENT) 1 MG capsule Take 6 capsules (6 mg) by mouth 2 times daily     valGANciclovir (VALCYTE) 450 MG tablet Take 2 tablets (900 mg) by mouth daily Stop taking on 2/12/2021     No current facility-administered medications for this visit.      There are no discontinued medications.    Physical Exam   Vital Signs: There were no vitals taken for this visit.    GENERAL APPEARANCE: alert and no distress  HENT: no obvious abnormalities on appearance  RESP: breathing appears unremarkable with normal rate, no audible wheezing or cough and no apparent shortness of breath with conversation  MS: extremities normal - no gross deformities noted  SKIN: no apparent rash and normal skin tone  NEURO: speech is clear with no obvious neurological deficits  PSYCH: mentation appears normal and affect normal    Data     Renal Latest Ref Rng & Units 1/7/2021 1/4/2021 12/31/2020   Na 133 - 144 mmol/L  - - -   Na (external) 136 - 145 meq/L 139 142 139   K 3.4 - 5.3 mmol/L - - -   K (external) 3.5 - 5.1 meq/L 4.5 4.7 4.9   Cl 94 - 109 mmol/L - - -   Cl (external) 98 - 109 meq/L 108 112(H) 111(H)   CO2 20 - 32 mmol/L - - -   CO2 (external) 20 - 29 meq/L 22 21 20   BUN 7 - 30 mg/dL - - -   BUN (external) 6 - 22 mg/dL 17 19 22   Cr 0.52 - 1.04 mg/dL - - -   Cr (external) 0.60 - 1.10 mg/dL 1.08 1.02 1.06   Glucose 70 - 99 mg/dL - - -   Glucose (external) 70 - 99 mg/dL 89 82 83   Ca  8.5 - 10.1 mg/dL - - -   Ca (external) 8.5 - 10.5 mg/dL 9.4 9.2 9.4   Mg 1.6 - 2.3 mg/dL - - -   Mg (external) 1.6 - 2.6 mg/dL - 1.5(L) -     Bone Health Latest Ref Rng & Units 1/4/2021 12/28/2020 12/21/2020   Phos 2.5 - 4.5 mg/dL - - -   Phos (external) 2.3 - 4.7 mg/dL 4.6 4.1 4.3   PTHi 18 - 80 pg/mL - - -   Vit D Def 20 - 75 ug/L - - -     Heme Latest Ref Rng & Units 1/7/2021 1/4/2021 12/31/2020   WBC 4.0 - 11.0 10e9/L - - -   WBC (external) 4.0 - 11.0 K/uL 4.7 4.2 3.9(L)   Hgb 11.7 - 15.7 g/dL - - -   Hgb (external) 11.5 - 15.8 g/dL 11.0(L) 10.5(L) 10.5(L)   Plt 150 - 450 10e9/L - - -   Plt (external) 140 - 400 K/UL - 280 290   ABSOLUTE NEUTROPHIL 1.6 - 8.3 10e9/L - - -   ABSOLUTE NEUTROPHILS (EXTERNAL) 1.8 - 8.0 K/uL 4.4 3.3 3.0   ABSOLUTE LYMPHOCYTES 0.8 - 5.3 10e9/L - - -   ABSOLUTE LYMPHOCYTES (EXTERNAL) 0.8 - 4.1 K/uL 0.1(L) 0.4(L) 0.4(L)   ABSOLUTE MONOCYTES 0.0 - 1.3 10e9/L - - -   ABSOLUTE MONOCYTES (EXTERNAL) 0.0 - 1.0 K/uL 0.1 0.4 0.3   ABSOLUTE EOSINOPHILS 0.0 - 0.7 10e9/L - - -   ABSOLUTE EOSINOPHILS (EXTERNAL) 0.0 - 0.7 K/UL - 0.1 0.1   ABSOLUTE BASOPHILS 0.0 - 0.2 10e9/L - - -   ABSOLUTE BASOPHILS (EXTERNAL) 0.0 - 0.2 K/UL - 0.1 0.1   ABS IMMATURE GRANULOCYTES 0 - 0.4 10e9/L - - -   ABSOLUTE NUCLEATED RBC - - - -     Liver Latest Ref Rng & Units 11/12/2020 5/14/2020 1/24/2020   AP 40 - 150 U/L 142 - 125   TBili 0.2 - 1.3 mg/dL 0.5 - 0.3   DBili 0.0 - 0.2 mg/dL - - <0.1   ALT 0 - 50 U/L 18 12 18   AST 0 - 45 U/L 19 - 22    Tot Protein 6.8 - 8.8 g/dL 8.4 - 6.3(L)   Albumin 3.4 - 5.0 g/dL 3.8 - 2.8(L)     Pancreas Latest Ref Rng & Units 11/12/2020 1/24/2020 1/23/2020   A1C 0 - 5.6 % 4.9 Canceled, Test credited Canceled, Test credited     Iron studies Latest Ref Rng & Units 11/24/2020   Iron 35 - 180 ug/dL 21(L)   Iron sat 15 - 46 % 10(L)   Ferritin 12 - 150 ng/mL 1,120(H)     [facility] Txp Virology Latest Ref Rng & Units 11/23/2020 11/14/2020 1/23/2020   BK Spec - Plasma - -   BK Res BKNEG:BK Virus DNA Not Detected copies/mL BK Virus DNA Not Detected - -   BK Log <2.7 Log copies/mL Not Calculated - -   EBV VCA IGG ANTIBODY U/mL - - -   EBV CAPSID ANTIBODY IGG 0.0 - 0.8 AI - - 1.8(H)   Hep B Core NR:Nonreactive - Reactive(AA) -   Hep B Surf - - - -   HIV 1&2 NEG - - -        Recent Labs   Lab Test 11/25/20  0643 11/27/20  0708 11/30/20  0651   DOSTAC 2,000 Not Provided 1,900   TACROL 6.6 8.8 9.5     Recent Labs   Lab Test 11/23/20  0651 11/30/20  0651   DOSMPA 2,000 1,900   MPACID 3.12 2.21   MPAG 191.9* 119.7*     James Crespo MD on 1/11/2021 at 11:19 AM          Early Post Transplant

## 2021-01-12 ENCOUNTER — TELEPHONE (OUTPATIENT)
Dept: TRANSPLANT | Facility: CLINIC | Age: 46
End: 2021-01-12

## 2021-01-12 NOTE — TELEPHONE ENCOUNTER
Provider Call: Home [facility]  Steff # [phone]  Is having issue with refill for MPA 250mg is out [facility] is not refilling  Paw received only half the dose of MPA  and Paw was recently DC'd from Home [facility] (has high Tacrolimus level 49.4 )    Callback needed? Yes    Return Call Needed  Same as documented in contacts section  When to return call?: Same day: Route High Priority

## 2021-01-12 NOTE — LETTER
OUTPATIENT LABORATORY TEST ORDER    Patient: Paolo BUSCH Trae     Transplant Date: 11/12/2020  YOB: 1975    Ordered By: Dr. Dayne Marcelino  MRN: [id number]     Issued Date/Time: 11/25/20  4:47 PM         Expiration Date: 11/12/2021    Diagnosis: Kidney Transplant (ICD-10 Z94.0)    Aftercare following organ transplant (ICD-10 Z48.288)    Long term use of medications (ICD-10 Z79.899)    Lab results to be available on the same day drawn    Patient should release information to the [facility].     Please fax all results to [phone]    Critical Labs to be paged to      Months 2-4 post-transplant (1/13/2021 - 3/13/2021)  Labs 1x weekly (Monday or Tuesday)    CBC with platelets    Basic Metabolic Panel (Sodium, Potassium, Chloride, Creatinine, CO2, Urea Nitrogen, glucose, Calcium)    Tacrolimus/Prograf/ drug level  Labs monthly     Phosphorus, magnesium    BK (Polyoma Virus) PCR Quantitative/Plasma  **At month 3 only (2/12/2021)    Allosure (kit will be mailed to lab by [employer])    Months 4-7 post-transplant (3/14/2021 - 6/14/2021)  Labs every other week    CBC with platelets    Basic Metabolic Panel (Sodium, Potassium, Chloride, Creatinine, CO2, Urea Nitrogen, glucose, Calcium)    Tacrolimus/Prograf/ drug level  Monthly    Phosphorus, magnesium    BK (Polyoma Virus) PCR Quantitative/Plasma    Months 7-12 post-transplant (6/15/2021 - 11/12/2021)  Monthly    CBC with platelets    Basic Metabolic Panel (Sodium, Potassium, Chloride, Creatinine, CO2, Urea Nitrogen, glucose, Calcium)    Tacrolimus/Prograf/ drug level    BK (Polyoma Virus) PCR Quantitative/Plasma      At 2 months post-transplant (Due: 1/12/2021)     Urine protein/creatinine    Allosure (kit will be mailed to lab by [employer])    At 4 months post-transplant  (Due: 3/12/2021)    DSA PRA    PTH    Urine protein/creatinine    Allosure(kit will be mailed to lab by  [employer])    At 6 months post-transplant (Due: 5/12/2021)    LFTs    Hemoglobin A1c    Uric Acid    Lipid panel    Urine protein/creatinine    Allosure (kit to be mailed to [employer])     DSA PRA (mailers provided by patient)     At 9 months post-transplant (Due: 8/12/2021)     Urine protein/creatinine    Allosure( kit will be mailed to lab by [employer])    At 12 months post-transplant (Fasting labs) Due: 11/12/2021     LFTs    Hemoglobin A1c    Uric Acid    Lipid panel    Urine protein/creatinine    DSA PRA( patient     PTH    Vitamin D    Allosure (kit will be mailed to lab by [employer])        If you have any questions please call the Transplant Center at [phone] option 5. All lab results should be faxed to [phone]      .

## 2021-01-12 NOTE — TELEPHONE ENCOUNTER
Spoke with patient via [facility]  who states she is NOT out of MMF, has enough supply to last through Tuesday 1/19. Per home care nurse Steff, [facility] states it is too soon to fill MMF. Call placed to [facility] pharmacy who confirmed medication cannot be refilled until 1/22 but they will work on a missing medication claim to ensure patient receives medications.     Patient discharged from home care yesterday and will now use [facility] for lab. Lab orders faxed. Patient also received DSA  kit, will bring to lab on Monday 1/18.

## 2021-01-18 ENCOUNTER — RESULTS ONLY (OUTPATIENT)
Dept: OTHER | Facility: CLINIC | Age: 46
End: 2021-01-18

## 2021-01-18 DIAGNOSIS — Z48.298 AFTERCARE FOLLOWING ORGAN TRANSPLANT: ICD-10-CM

## 2021-01-18 DIAGNOSIS — Z79.899 ENCOUNTER FOR LONG-TERM CURRENT USE OF MEDICATION: ICD-10-CM

## 2021-01-18 DIAGNOSIS — Z94.0 KIDNEY REPLACED BY TRANSPLANT: ICD-10-CM

## 2021-01-18 PROCEDURE — 86833 HLA CLASS II HIGH DEFIN QUAL: CPT | Performed by: TRANSPLANT SURGERY

## 2021-01-18 PROCEDURE — 86832 HLA CLASS I HIGH DEFIN QUAL: CPT | Performed by: TRANSPLANT SURGERY

## 2021-01-18 PROCEDURE — 86828 HLA CLASS I&II ANTIBODY QUAL: CPT | Mod: XU | Performed by: TRANSPLANT SURGERY

## 2021-01-19 ENCOUNTER — VIRTUAL VISIT (OUTPATIENT)
Dept: PHARMACY | Facility: CLINIC | Age: 46
End: 2021-01-19
Payer: COMMERCIAL

## 2021-01-19 VITALS — SYSTOLIC BLOOD PRESSURE: 127 MMHG | DIASTOLIC BLOOD PRESSURE: 75 MMHG

## 2021-01-19 DIAGNOSIS — Z94.0 KIDNEY TRANSPLANT RECIPIENT: Primary | ICD-10-CM

## 2021-01-19 DIAGNOSIS — E78.5 DYSLIPIDEMIA: ICD-10-CM

## 2021-01-19 DIAGNOSIS — I15.1 HTN, KIDNEY TRANSPLANT RELATED: ICD-10-CM

## 2021-01-19 DIAGNOSIS — Z94.0 HTN, KIDNEY TRANSPLANT RELATED: ICD-10-CM

## 2021-01-19 PROCEDURE — 99207 PR NO CHARGE LOS: CPT | Mod: TEL | Performed by: PHARMACIST

## 2021-01-19 NOTE — PROGRESS NOTES
Medication Therapy Management (MTM) Encounter    ASSESSMENT:                            Medication Adherence/Access: No issues identified. Pt knows her meds very well and has been setting them up herself.     Renal Transplant:  Stable.    Hypertension: BP at goal <130/80    Hyperlipidemia/ CVD prevention: Stable    PLAN:                            No changes, patient should be able to reach out to [facility] and request a 1 month supply. She has enough refills for 1 year.     Follow-up: 2 months    SUBJECTIVE/OBJECTIVE:                          Paolo Larkin is a 45 year old female called for a follow-up visit. She was referred to me from txp team.  was present during today's visit. Today's visit is a follow-up MTM visit from 11/2020     Reason for visit: 2 months post txp. She is wondering why [facility] is filling a 15 day supply of MMF. She gets this mail order. Does she have refills? Yes she does have refills, [facility] mail order should have a month supply.     Allergies/ADRs: Reviewed in chart  Tobacco: She reports that she has never smoked. She has never used smokeless tobacco.  Alcohol: not currently using  Past Medical History: Reviewed in chart     Medication Adherence/Access: Patient uses pill box(es), using alarm clock  Patient takes medications 3 time(s) per day. 8am, 8pm, 12pm,   Per patient, misses medication 0 times per week.   The patient fills medications at [place]: NO, fills medications at [facility] mailorder     Renal Transplant:  Current immunosuppressants include TAC 6mg BID and MMF 750mg BID.  Pt reports no side effects  Transplant date: 11/12/20  CMV prophylaxis: Valcyte 900mg daily.  Treat 3 months post tx. Pt aware of discontinue date.   PCP prophylaxis: Bactrim S S daily  Supplements: Mag Oxide 800mg daily ( 2 hours from MMF), sodium Bicarb 650mg once daily  Tx Coordinator: Alissa Villa,  Using Med Card: Yes  Immunizations: annual flu shot 2020; Bigtihsmtu17:  2011, 2016; Prevnar 13:  2014; TDaP:  2009; Shingrix: none, HBV: last titers showed immunity    Hypertension: Current medications include Amlodipine 5mg at bedtime.  Patient does self-monitor blood pressure. 125-135/75-80.  Patient reports no current medication side effects.  BP Readings from Last 3 Encounters:   12/07/20 136/80   12/01/20 137/85   11/30/20 134/84     Hyperlipidemia/ CVD prevention: Current therapy includes Atorvastatin 10mg daily, Aspirin 81mg daily (denies bleed sx).  Patient reports no significant myalgias or other side effects.  The ASCVD Risk score (Sashadevon KINGSTON Jr., et al., 2013) failed to calculate for the following reasons:    The patient has a prior MI or stroke diagnosis  Recent Labs   Lab Test 11/12/20  1500 01/23/20  2259 06/20/13  0708 06/20/13  0708   CHOL 252* 190   < > 146   HDL 58 48*   < > 41*   * 108*   < > 79   TRIG 188* 166*   < > 132   CHOLHDLRATIO  --   --   --  3.6    < > = values in this interval not displayed.       Today's Vitals: /75   ----------------    I spent 30 minutes with this patient today. I offer these suggestions for consideration by txp team. A copy of the visit note was provided to the patient's referring provider.    The patient declined a summary of these recommendations.     Cedric Barrera, PharmD  [facility] Pharmacist    Phone: [phone]     Telemedicine Visit Details  Type of service:  Telephone visit  Start Time: 1 PM  End Time: 1:27 PM  Originating Location (patient location): Home  Distant Location (provider location):  [facility] MEDICATION THERAPY MANAGEMENT      Medication Therapy Recommendations  No medication therapy recommendations to display

## 2021-01-24 PROBLEM — N18.2 CKD (CHRONIC KIDNEY DISEASE) STAGE 2, GFR 60-89 ML/MIN: Status: ACTIVE | Noted: 2020-11-29

## 2021-01-25 ENCOUNTER — RESULTS ONLY (OUTPATIENT)
Dept: OTHER | Facility: CLINIC | Age: 46
End: 2021-01-25

## 2021-01-25 DIAGNOSIS — Z94.0 KIDNEY REPLACED BY TRANSPLANT: ICD-10-CM

## 2021-01-25 DIAGNOSIS — Z48.298 AFTERCARE FOLLOWING ORGAN TRANSPLANT: ICD-10-CM

## 2021-01-25 PROCEDURE — 86833 HLA CLASS II HIGH DEFIN QUAL: CPT | Performed by: TRANSPLANT SURGERY

## 2021-01-25 PROCEDURE — 86832 HLA CLASS I HIGH DEFIN QUAL: CPT | Performed by: TRANSPLANT SURGERY

## 2021-01-26 DIAGNOSIS — Z94.0 KIDNEY REPLACED BY TRANSPLANT: ICD-10-CM

## 2021-01-26 DIAGNOSIS — Z48.298 AFTERCARE FOLLOWING ORGAN TRANSPLANT: Primary | ICD-10-CM

## 2021-01-26 LAB
DONOR IDENTIFICATION: NORMAL
DSA COMMENTS: NORMAL
DSA PRESENT: NO
DSA TEST METHOD: NORMAL
INTERFERING SUBST TEST METHOD: NORMAL
INTERFERING SUBSTANCE COMMENT: NORMAL
INTERFERING SUBSTANCE RESULT: NORMAL
INTERFERING SUBSTANCE: NORMAL
ORGAN: NORMAL
SA1 CELL: NORMAL
SA1 COMMENTS: NORMAL
SA1 HI RISK ABY: NORMAL
SA1 MOD RISK ABY: NORMAL
SA1 TEST METHOD: NORMAL
SA2 CELL: NORMAL
SA2 COMMENTS: NORMAL
SA2 HI RISK ABY UA: NORMAL
SA2 MOD RISK ABY: NORMAL
SA2 TEST METHOD: NORMAL
UNACCEPTABLE ANTIGEN: NORMAL
UNOS CPRA: 39

## 2021-01-27 LAB
DONOR IDENTIFICATION: NORMAL
DSA COMMENTS: NORMAL
DSA PRESENT: NO
DSA TEST METHOD: NORMAL
ORGAN: NORMAL
SA1 CELL: NORMAL
SA1 COMMENTS: NORMAL
SA1 HI RISK ABY: NORMAL
SA1 MOD RISK ABY: NORMAL
SA1 TEST METHOD: NORMAL
SA2 CELL: NORMAL
SA2 COMMENTS: NORMAL
SA2 HI RISK ABY UA: NORMAL
SA2 MOD RISK ABY: NORMAL
SA2 TEST METHOD: NORMAL
UNACCEPTABLE ANTIGEN: NORMAL
UNOS CPRA: 39

## 2021-02-01 ENCOUNTER — RESULTS ONLY (OUTPATIENT)
Dept: OTHER | Facility: CLINIC | Age: 46
End: 2021-02-01

## 2021-02-01 DIAGNOSIS — Z94.0 KIDNEY REPLACED BY TRANSPLANT: ICD-10-CM

## 2021-02-01 DIAGNOSIS — Z79.899 ENCOUNTER FOR LONG-TERM CURRENT USE OF MEDICATION: ICD-10-CM

## 2021-02-01 DIAGNOSIS — Z48.298 AFTERCARE FOLLOWING ORGAN TRANSPLANT: ICD-10-CM

## 2021-02-01 PROCEDURE — 86832 HLA CLASS I HIGH DEFIN QUAL: CPT | Performed by: TRANSPLANT SURGERY

## 2021-02-01 PROCEDURE — 86833 HLA CLASS II HIGH DEFIN QUAL: CPT | Performed by: TRANSPLANT SURGERY

## 2021-02-02 ENCOUNTER — TELEPHONE (OUTPATIENT)
Dept: TRANSPLANT | Facility: CLINIC | Age: 46
End: 2021-02-02

## 2021-02-02 NOTE — TELEPHONE ENCOUNTER
ISSUE:  Missing labs     Call placed to patient with [facility] . Patient states she had her blood drawn on Monday at [facility] in [place], not available in Care Everywhere. Had blood drawn at 8:15 am, takes medications at 8pm.     Spoke with [facility] lab who states patient has not been in since 1/18 (consistent with Care Everywhere).   Spoke with [facility] who confirmed patient was there 2/1 and 1/25 but labs were not drawn. Orders refaxed to [facility]. Will also mail lab letter to patient's home.   Patient to repeat labs on Monday.

## 2021-02-02 NOTE — LETTER
OUTPATIENT LABORATORY TEST ORDER    Patient: Paolo BUSCH Trae     Transplant Date: 11/12/2020  YOB: 1975    Ordered By: Dr. Dayne Marcelino  MRN: [id number]     Issued Date/Time: 11/25/20  4:47 PM         Expiration Date: 11/12/2021    Diagnosis: Kidney Transplant (ICD-10 Z94.0)    Aftercare following organ transplant (ICD-10 Z48.288)    Long term use of medications (ICD-10 Z79.899)    Lab results to be available on the same day drawn    Patient should release information to the [facility].     Please fax all results to [phone]    Critical Labs to be paged to      Months 2-4 post-transplant (1/13/2021 - 3/13/2021)  Labs 1x weekly (Monday or Tuesday)    CBC with platelets    Basic Metabolic Panel (Sodium, Potassium, Chloride, Creatinine, CO2, Urea Nitrogen, glucose, Calcium)    Tacrolimus/Prograf/ drug level  Labs monthly     Phosphorus, magnesium    BK (Polyoma Virus) PCR Quantitative/Plasma  **At month 3 only (2/12/2021)    Allosure (kit will be mailed to lab by [employer])    Months 4-7 post-transplant (3/14/2021 - 6/14/2021)  Labs every other week    CBC with platelets    Basic Metabolic Panel (Sodium, Potassium, Chloride, Creatinine, CO2, Urea Nitrogen, glucose, Calcium)    Tacrolimus/Prograf/ drug level  Monthly    Phosphorus, magnesium    BK (Polyoma Virus) PCR Quantitative/Plasma    Months 7-12 post-transplant (6/15/2021 - 11/12/2021)  Monthly    CBC with platelets    Basic Metabolic Panel (Sodium, Potassium, Chloride, Creatinine, CO2, Urea Nitrogen, glucose, Calcium)    Tacrolimus/Prograf/ drug level    BK (Polyoma Virus) PCR Quantitative/Plasma      At 2 months post-transplant (Due: 1/12/2021)     Urine protein/creatinine    Allosure (kit will be mailed to lab by [employer])    At 4 months post-transplant  (Due: 3/12/2021)    DSA PRA    PTH    Urine protein/creatinine    Allosure(kit will be mailed to lab by  [employer])    At 6 months post-transplant (Due: 5/12/2021)    LFTs    Hemoglobin A1c    Uric Acid    Lipid panel    Urine protein/creatinine    Allosure (kit to be mailed to [employer])     DSA PRA (mailers provided by patient)     At 9 months post-transplant (Due: 8/12/2021)     Urine protein/creatinine    Allosure( kit will be mailed to lab by [employer])    At 12 months post-transplant (Fasting labs) Due: 11/12/2021     LFTs    Hemoglobin A1c    Uric Acid    Lipid panel    Urine protein/creatinine    DSA PRA( patient     PTH    Vitamin D    Allosure (kit will be mailed to lab by [employer])        If you have any questions please call the Transplant Center at [phone] option 5. All lab results should be faxed to [phone]      .

## 2021-02-08 ENCOUNTER — RESULTS ONLY (OUTPATIENT)
Dept: OTHER | Facility: CLINIC | Age: 46
End: 2021-02-08

## 2021-02-08 DIAGNOSIS — Z79.899 ENCOUNTER FOR LONG-TERM CURRENT USE OF MEDICATION: ICD-10-CM

## 2021-02-08 DIAGNOSIS — Z48.298 AFTERCARE FOLLOWING ORGAN TRANSPLANT: ICD-10-CM

## 2021-02-08 DIAGNOSIS — Z94.0 KIDNEY REPLACED BY TRANSPLANT: ICD-10-CM

## 2021-02-08 PROCEDURE — 86833 HLA CLASS II HIGH DEFIN QUAL: CPT | Performed by: TRANSPLANT SURGERY

## 2021-02-08 PROCEDURE — 86832 HLA CLASS I HIGH DEFIN QUAL: CPT | Performed by: TRANSPLANT SURGERY

## 2021-02-09 ENCOUNTER — TELEPHONE (OUTPATIENT)
Dept: TRANSPLANT | Facility: CLINIC | Age: 46
End: 2021-02-09

## 2021-02-09 DIAGNOSIS — Z94.0 KIDNEY TRANSPLANT RECIPIENT: ICD-10-CM

## 2021-02-09 NOTE — TELEPHONE ENCOUNTER
ISSUE:   Tacrolimus IR level 11.3 on 2/8, goal 8-10, dose 6 mg BID.    PLAN:   Please call patient and confirm this was an accurate 12-hour trough. Verify Tacrolimus IR dose 6 mg BID. Confirm no new medications or illness. Confirm no missed doses. If accurate trough and accurate dose, decrease Tacrolimus IR dose to 5 mg BID and repeat labs in 1 weeks    OUTCOME:   Spoke with patient with [facility] , they confirm accurate trough level and current dose 6 mg BID. Patient confirmed dose change to 5 mg BID and to repeat labs in 1 weeks. Orders sent to preferred pharmacy for dose change and lab for repeat labs. Patient voiced understanding of plan.     3 months post-transplant, CMV IgG+, ok to stop Valcyte now. Patient verbalized understanding.

## 2021-02-10 ENCOUNTER — TELEPHONE (OUTPATIENT)
Dept: TRANSPLANT | Facility: CLINIC | Age: 46
End: 2021-02-10

## 2021-02-10 RX ORDER — TACROLIMUS 1 MG/1
5 CAPSULE ORAL 2 TIMES DAILY
Qty: 300 CAPSULE | Refills: 11 | Status: SHIPPED | OUTPATIENT
Start: 2021-02-10 | End: 2021-02-24

## 2021-02-10 NOTE — TELEPHONE ENCOUNTER
Post Kidney and Pancreas Transplant Team Conference  Date: 2/10/2021  Transplant Coordinator: Yaz Villa     Attendees:  [x]  Dr. Jacob  [x] Tina Viveros LPN     [x]  Dr. Lopez [x] Ashli Tejeda, KULWANT [] Alayna Lepe LPN   [x]  Dr. Stephenson [] Yoanna Estes, KULWANT    [x]  Dr. Sands [] Rosa M Winston RN [x] Cedric Barrera, PharmD   [] Dr. Mccall [x] Brandi Kruger, KULWANT    [] Dr. Marcelino [] Rafat Painter RN    [x] Dr. Wood [] Alexandria Cesar RN    [] Dr. Mcnamara [] Radha Mendoza RN    []  Dr. Saavedra [] Blank Anderson, KULWANT    [] Surgery Fellow [x] Yaz Villa RN    [] Apple Bowen, NP [] Lesly Car RN        Verbal Plan Read Back:   No changes at this time    Routed to RN Coordinator   Tina Viveros LPN

## 2021-02-18 ENCOUNTER — TELEPHONE (OUTPATIENT)
Dept: TRANSPLANT | Facility: CLINIC | Age: 46
End: 2021-02-18

## 2021-02-18 DIAGNOSIS — Z94.0 KIDNEY TRANSPLANTED: ICD-10-CM

## 2021-02-18 NOTE — TELEPHONE ENCOUNTER
Patient went to ED for heart burn, nausea, emesis. Was discharged after symptoms resolved with PPI and GI cocktail, sent home on pantoprazole daily and carafate.   LFTs elevated.     Spoke with patient via  who states she feels better but states the carafate made her feel jittery and her heart was racing so she stopped taking it. Continues on PPI which is helping. No N/VD. LFTs elevated while in ED, currently taking 10 mg atorvastatin. Denies abdominal pain. Denies tylenol or alcohol use. Will hold atorvastatin and repeat LFTs.

## 2021-02-18 NOTE — LETTER
PHYSICIAN ORDERS      DATE & TIME ISSUED: 2021 10:49 AM  PATIENT NAME: Paolo Larkin   : 1975     [facility] MR# [if applicable]: [id number]     DIAGNOSIS:  kidney transplant  ICD-10 CODE: Z94.0     Please check LFTs the week of 2021.     Any questions please call: [phone]  Fax results to:   [phone].      Edgardo Sands MD

## 2021-02-19 RX ORDER — ATORVASTATIN CALCIUM 10 MG/1
10 TABLET, FILM COATED ORAL DAILY
Qty: 30 TABLET | Refills: 3
Start: 2021-02-19 | End: 2021-03-04 | Stop reason: SINTOL

## 2021-02-24 DIAGNOSIS — Z94.0 KIDNEY TRANSPLANT RECIPIENT: Primary | ICD-10-CM

## 2021-02-24 RX ORDER — TACROLIMUS 1 MG/1
4 CAPSULE ORAL 2 TIMES DAILY
Qty: 240 CAPSULE | Refills: 11 | Status: SHIPPED | OUTPATIENT
Start: 2021-02-24 | End: 2021-05-21

## 2021-02-24 NOTE — TELEPHONE ENCOUNTER
Spoke to patient regarding:  Tacrolimus IR level 12.2 on 2/22, goal 8-10, dose 5 mg BID.     confirmed this was an accurate 12-hour trough. Verified Tacrolimus dose 5 mg BID. Confirmed no new medications or illness. Confirmed no missed doses.Pateint agrees to decrease Tacrolimus dose to 4 mg BID and repeat labs in 1 week.

## 2021-02-24 NOTE — TELEPHONE ENCOUNTER
ISSUE:   Tacrolimus IR level 12.2 on 2/22, goal 8-10, dose 5 mg BID.    PLAN:   Please call patient and confirm this was an accurate 12-hour trough. Verify Tacrolimus IR dose 5 mg BID. Confirm no new medications or illness. Confirm no missed doses. If accurate trough and accurate dose, decrease Tacrolimus IR dose to 4 mg BID and repeat labs in 1 weeks

## 2021-03-03 ENCOUNTER — TELEPHONE (OUTPATIENT)
Dept: TRANSPLANT | Facility: CLINIC | Age: 46
End: 2021-03-03

## 2021-03-03 DIAGNOSIS — Z94.0 KIDNEY TRANSPLANTED: ICD-10-CM

## 2021-03-03 NOTE — TELEPHONE ENCOUNTER
Edgardo Sands MD Poucher, Jessica, RN             Please increase mag to 800mg PO BID. Thanks      LFTs improved after stopping statin, discontinued from medication list. Patient to follow up with PCP for statin replacement. Spoke with patient via  who verbalized understanding.     Hepatitis B antibody never drawn, re-faxed to [facility]. Will draw on Monday.

## 2021-03-03 NOTE — LETTER
PHYSICIAN ORDERS      DATE & TIME ISSUED: 2021 12:22 PM  PATIENT NAME: Paolo Larkin   : 1975     [facility] MR# [if applicable]: [id number]     DIAGNOSIS:  kidney transplant, history of hepatitis b  ICD-10 CODE: Z94.0,  B19.10    In addition to regularly scheduled labs, please check hepatitis B DNA Quant, hepatitis C DNA Quant, and CMV DNA Quant.     Any questions please call: [phone]  Fax results to:   [phone].      Emile Lopez

## 2021-03-03 NOTE — TELEPHONE ENCOUNTER
Post Kidney and Pancreas Transplant Team Conference  Date: 3/3/2021  Transplant Coordinator: Yaz Villa     Attendees:  [x]  Dr. Jacob  [x] Tina Viveros LPN     [x]  Dr. Lopez [x] Ashli Tejeda, KULWANT [] Alayna Lepe LPN   [x]  Dr. Stephenson [x] Gladys Huber, KULWANT    [x]  Dr. Sands [] Rosa M Winston RN [x] Cedric Barrera, PharmD   [] Dr. Mccall [x] Brandi Kruger, KULWANT    [] Dr. Marcelino [] Rafat Painter RN    [x] Dr. Wood [] Alexandria Cesar RN    [] Dr. Mcnamara [] Radha Mendoza RN    []  Dr. Saavedra [] Blank Anderson, KULWANT    [] Surgery Fellow [x] Yaz Villa RN    [] Apple Bowen NP [] Lesly Car RN        Verbal Plan Read Back:   Patient to f/u with PCP for statin replacement     Routed to RN Coordinator   Tina Viveros LPN

## 2021-03-04 RX ORDER — MAGNESIUM OXIDE 400 MG/1
800 TABLET ORAL 2 TIMES DAILY
Qty: 120 TABLET | Refills: 11 | Status: SHIPPED | OUTPATIENT
Start: 2021-03-04 | End: 2021-03-10

## 2021-03-08 ENCOUNTER — TELEPHONE (OUTPATIENT)
Dept: TRANSPLANT | Facility: CLINIC | Age: 46
End: 2021-03-08

## 2021-03-08 NOTE — TELEPHONE ENCOUNTER
Provider Call: Transplant Lab/Orders  Route to LPN  Post Transplant Days: 116  When patient is less than 60 days post-transplant, route high priority  Reason for Call: Mailers-Allosure  Liver patients reporting abnormal lab results: Route to RN and Page  Document lab facility information when provider is calling about annual lab orders. Delete facility wildcards when not needed.    Please have allosure kit mailed to    [facility]    [address], [place] [address]    Pt will be there on Friday the 22nd  for allosure lab     Callback needed? Yes    Return Call Needed  Same as documented in contacts section  When to return call?: Greater than one day: Route standard priority

## 2021-03-10 ENCOUNTER — VIRTUAL VISIT (OUTPATIENT)
Dept: NEPHROLOGY | Facility: CLINIC | Age: 46
End: 2021-03-10
Attending: SURGERY
Payer: MEDICARE

## 2021-03-10 DIAGNOSIS — Z48.298 AFTERCARE FOLLOWING ORGAN TRANSPLANT: ICD-10-CM

## 2021-03-10 DIAGNOSIS — D63.1 ANEMIA IN STAGE 2 CHRONIC KIDNEY DISEASE: ICD-10-CM

## 2021-03-10 DIAGNOSIS — N18.2 CKD (CHRONIC KIDNEY DISEASE) STAGE 2, GFR 60-89 ML/MIN: ICD-10-CM

## 2021-03-10 DIAGNOSIS — N18.2 ANEMIA IN STAGE 2 CHRONIC KIDNEY DISEASE: ICD-10-CM

## 2021-03-10 DIAGNOSIS — Z94.0 HTN, KIDNEY TRANSPLANT RELATED: ICD-10-CM

## 2021-03-10 DIAGNOSIS — Z94.0 KIDNEY TRANSPLANTED: ICD-10-CM

## 2021-03-10 DIAGNOSIS — E55.9 VITAMIN D DEFICIENCY: ICD-10-CM

## 2021-03-10 DIAGNOSIS — I15.1 HTN, KIDNEY TRANSPLANT RELATED: ICD-10-CM

## 2021-03-10 DIAGNOSIS — Z94.0 KIDNEY REPLACED BY TRANSPLANT: Primary | ICD-10-CM

## 2021-03-10 PROCEDURE — 99214 OFFICE O/P EST MOD 30 MIN: CPT | Mod: 95

## 2021-03-10 RX ORDER — MAGNESIUM OXIDE 400 MG/1
800 TABLET ORAL 2 TIMES DAILY
Qty: 120 TABLET | Refills: 11 | Status: SHIPPED | OUTPATIENT
Start: 2021-03-10 | End: 2022-03-29

## 2021-03-10 NOTE — PROGRESS NOTES
Paolo is a 45 year old who is being evaluated via a billable telephone visit.      What phone number would you like to be contacted at?  services [phone] Patricia  - Patient phone [phone]  How would you like to obtain your AVS? Mail a copy  Phone call duration: 23 minutes  Call start time: 11:08 AM  Call end time: 11:26 AM    ACUTE TRANSPLANT NEPHROLOGY VISIT    Assessment & Plan   # DDKT: Stable   - Baseline Creatinine: ~ 0.9 - 1.1   - Proteinuria: Minimal (0.2-0.5 grams)   - Date DSA Last Checked: Feb/2021      Latest DSA: No   - BK Viremia: No   - Kidney Tx Biopsy: No    # Immunosuppression: Tacrolimus immediate release (goal 8-10) and Mycophenolate mofetil (dose 750 mg every 12 hours)   - Induction with Recent Transplant:  High Intensity   - Continue with intensive monitoring of immunosuppression for efficacy and toxicity   - Changes: No    # Infection Prophylaxis:   - PJP: Sulfa/TMP (Bactrim)    # Hypertension: Controlled;  Goal BP: < 130/80   - Changes: No    # Mineral Bone Disorder:   - Secondary renal hyperparathyroidism; PTH level: Minimally elevated ( pg/ml)        On treatment: None  - Vitamin D; level: Low        On supplement: Yes  - Calcium; level: Normal        On supplement: No    # Electrolytes:   - Potassium; level: Normal        On supplement: No  - Magnesium; level: Low        On supplement: Yes dose recently increased.  - Bicarbonate; level: Normal        On supplement: Yes     # Medical Compliance: Yes    # Transplant History:  Etiology of Kidney Failure: Unknown etiology (no kidney biopsy)  Tx: DDKT  Transplant: 11/12/2020 (Kidney)  Donor Type: Donation after Circulatory Death  Donor Class:   Crossmatch at time of Tx: negative  DSA at time of Tx: No  Significant changes in immunosuppression: None  CMV IgG Ab High Risk Discordance (D+/R-): No  EBV IgG Ab High Risk Discordance (D+/R-): No  Significant transplant-related complications: None    Transplant Office  Phone Number: [phone]    Assessment and plan was discussed with the patient and she voiced her understanding and agreement.    Return visit: Return in about 2 months (around 5/10/2021) for Follow up.    James Crespo MD     Attestation:  This patient has been seen and evaluated by me, Sergio Jacob MD.  I have reviewed the note and agree with plan of care as documented by the fellow.       Chief Complaint   Ms. Larkin is a 45 year old here for kidney transplant and immunosuppression management.     Interval:  States she is doing well overall. She denies any SOB/N/V/D/F/C or CP. States her BP has been always in 120/80's range. Her current weight is 55 kg. She tolerates he medications well.     Home BP: 120/80's     Problem List   Patient Active Problem List   Diagnosis     Anemia in chronic renal disease     HTN, kidney transplant related     Secondary renal hyperparathyroidism (H)     Coronary artery disease involving native coronary artery of native heart without angina pectoris     Status post coronary angiogram     NSTEMI (non-ST elevated myocardial infarction) (H)     Vitamin D deficiency     Pneumothorax     Kidney replaced by transplant     Femoral neuropathy, right     Femoral neuropathy of right lower extremity     Aftercare following organ transplant     CKD (chronic kidney disease) stage 2, GFR 60-89 ml/min     Anemia due to stage 5 chronic kidney disease, not on chronic dialysis (H)       Allergies   Allergies   Allergen Reactions     Blood Transfusion Related (Informational Only)      Patient has a history of a clinically significant antibody against RBC antigens.  A delay in compatible RBCs may occur.     Cephalosporins Anaphylaxis     Per [facility] allergist report: positive skin intradermal tests to Cefazolin and Ceftazidime, but NOT to Penicillin G, Piperacillin and Meropenem      Chlorhexidine Anaphylaxis     Several times during anesthesia at initial phase during insertion of I.v. line  and after disinfection with Chlorhexidine drop of blood pressure.  In Prick and as well intradermal tests clear positive reactions to Chlorhexidine (particularly in intradermal test to 0.0002% Chlorhexidine papule of 1cm and Erythema of 2.5cm). AVOID in future Chlorhexidine     Heparin Flush        Medications   Current Outpatient Medications   Medication Sig     acetaminophen (TYLENOL) 325 MG tablet Take 325-650 mg by mouth every 4 hours as needed for mild pain or fever      amLODIPine (NORVASC) 5 MG tablet Take 1 tablet (5 mg) by mouth daily     aspirin (ASA) 81 MG chewable tablet Take 1 tablet (81 mg) by mouth daily     magnesium oxide (MAG-OX) 400 MG tablet Take 2 tablets (800 mg) by mouth 2 times daily     mycophenolate (GENERIC EQUIVALENT) 250 MG capsule Take 3 capsules (750 mg) by mouth 2 times daily     ondansetron (ZOFRAN-ODT) 4 MG ODT tab Take 1 tablet (4 mg) by mouth every 6 hours as needed for nausea     senna-docusate (SENOKOT-S/PERICOLACE) 8.6-50 MG tablet Take 1 tablet by mouth 2 times daily as needed for constipation     sodium bicarbonate 650 MG tablet Take 1 tablet (650 mg) by mouth daily     sulfamethoxazole-trimethoprim (BACTRIM) 400-80 MG tablet Take 1 tablet by mouth daily     tacrolimus (GENERIC EQUIVALENT) 1 MG capsule Take 4 capsules (4 mg) by mouth 2 times daily     No current facility-administered medications for this visit.      Medications Discontinued During This Encounter   Medication Reason     magnesium oxide (MAG-OX) 400 MG tablet Reorder       Physical Exam   Vital signs and physical exam were deferred for this telemedicine visit.    Data     Renal Latest Ref Rng & Units 3/8/2021 3/1/2021 2/22/2021   Na 133 - 144 mmol/L - - -   Na (external) 136 - 145 meq/L 142 141 139   K 3.4 - 5.3 mmol/L - - -   K (external) 3.5 - 5.1 meq/L 4.2 4.7 4.7   Cl 94 - 109 mmol/L - - -   Cl (external) 98 - 109 meq/L 108 108 108   CO2 20 - 32 mmol/L - - -   CO2 (external) 20 - 29 meq/L 22 23 22   BUN 7 -  30 mg/dL - - -   BUN (external) 6 - 22 mg/dL 18 20 21   Cr 0.52 - 1.04 mg/dL - - -   Cr (external) 0.60 - 1.10 mg/dL 1.07 1.09 0.96   Glucose 70 - 99 mg/dL - - -   Glucose (external) 70 - 99 mg/dL 83 93 99   Ca  8.5 - 10.1 mg/dL - - -   Ca (external) 8.5 - 10.5 mg/dL 9.6 9.4 9.7   Mg 1.6 - 2.3 mg/dL - - -   Mg (external) 1.6 - 2.6 mg/dL - 1.3(L) -     Bone Health Latest Ref Rng & Units 3/1/2021 2/8/2021 1/11/2021   Phos 2.5 - 4.5 mg/dL - - -   Phos (external) 2.3 - 4.7 mg/dL 4.8(H) 4.2 4.1   PTHi 18 - 80 pg/mL - - -   Vit D Def 20 - 75 ug/L - - -     Heme Latest Ref Rng & Units 3/8/2021 3/1/2021 2/22/2021   WBC 4.0 - 11.0 10e9/L - - -   WBC (external) 4.0 - 11.0 K/uL 3.9(L) 3.8(L) 3.9(L)   Hgb 11.7 - 15.7 g/dL - - -   Hgb (external) 11.5 - 15.8 g/dL 12.5 11.8 12.1   Plt 150 - 450 10e9/L - - -   Plt (external) 140 - 400 K/uL 292 314 264   ABSOLUTE NEUTROPHIL 1.6 - 8.3 10e9/L - - -   ABSOLUTE NEUTROPHILS (EXTERNAL) 1.8 - 8.0 K/uL 3.0 2.7 3.0   ABSOLUTE LYMPHOCYTES 0.8 - 5.3 10e9/L - - -   ABSOLUTE LYMPHOCYTES (EXTERNAL) 0.8 - 4.1 K/uL 0.6(L) 0.5(L) 0.4(L)   ABSOLUTE MONOCYTES 0.0 - 1.3 10e9/L - - -   ABSOLUTE MONOCYTES (EXTERNAL) 0.0 - 1.0 K/uL 0.2 0.5 0.4   ABSOLUTE EOSINOPHILS 0.0 - 0.7 10e9/L - - -   ABSOLUTE EOSINOPHILS (EXTERNAL) 0.0 - 0.7 K/uL 0.0 0.1 0.1   ABSOLUTE BASOPHILS 0.0 - 0.2 10e9/L - - -   ABSOLUTE BASOPHILS (EXTERNAL) 0.0 - 0.2 K/uL 0.0 <0.1 0.1   ABS IMMATURE GRANULOCYTES 0 - 0.4 10e9/L - - -   ABSOLUTE NUCLEATED RBC - - - -     Liver Latest Ref Rng & Units 3/1/2021 11/12/2020 5/14/2020   AP 40 - 150 U/L - 142 -   AP (external) 40 - 150 U/L 129 - -   TBili 0.2 - 1.3 mg/dL - 0.5 -   TBili (external) 0.2 - 1.2 mg/dL 0.4 - -   DBili 0.0 - 0.2 mg/dL - - -   DBili (external) 0.0 - 0.5 mg/dL 0.2 - -   ALT 0 - 50 U/L - 18 12   ALT (external) 0 - 55 U/L 45 - -   AST 0 - 45 U/L - 19 -   AST (external) 5 - 34 U/L 30 - -   Tot Protein 6.8 - 8.8 g/dL - 8.4 -   Tot Protein (external) 6.0 - 8.3 g/dL 7.0 - -    Albumin 3.4 - 5.0 g/dL - 3.8 -   Albumin (external) 3.5 - 5.2 g/dL 4.2 - -     Pancreas Latest Ref Rng & Units 11/12/2020 1/24/2020 1/23/2020   A1C 0 - 5.6 % 4.9 Canceled, Test credited Canceled, Test credited     Iron studies Latest Ref Rng & Units 11/24/2020   Iron 35 - 180 ug/dL 21(L)   Iron sat 15 - 46 % 10(L)   Ferritin 12 - 150 ng/mL 1,120(H)     [facility] Txp Virology Latest Ref Rng & Units 3/1/2021 2/8/2021 1/11/2021   BK Spec - - - -   BK Res BKNEG:BK Virus DNA Not Detected copies/mL - - -   BK Log <2.7 Log copies/mL - - -   BK Quant Result Ext None Detected None Detected None Detected <500   BK Quant Spec Ext - Blood - Plasma   EBV VCA IGG ANTIBODY U/mL - - -   EBV CAPSID ANTIBODY IGG 0.0 - 0.8 AI - - -   Hep B Core NR:Nonreactive - - -   Hep B Surf - - - -   HIV 1&2 NEG - - -        Recent Labs   Lab Test 11/25/20  0643 11/27/20  0708 11/30/20  0651   DOSTAC 2,000 Not Provided 1,900   TACROL 6.6 8.8 9.5     Recent Labs   Lab Test 11/23/20  0651 11/30/20  0651   DOSMPA 2,000 1,900   MPACID 3.12 2.21   MPAG 191.9* 119.7*       James Crespo MD on 3/10/2021 at 12:28 PM

## 2021-03-10 NOTE — LETTER
3/10/2021      RE: Paolo Vincent Trae  [address]  [place] [address]       Paolo is a 45 year old who is being evaluated via a billable telephone visit.      What phone number would you like to be contacted at?  services [phone]  - Patient phone [phone]  How would you like to obtain your AVS? Mail a copy  Phone call duration: 23 minutes  Call start time: 11:08 AM  Call end time: 11:26 AM    ACUTE TRANSPLANT NEPHROLOGY VISIT    Assessment & Plan   # DDKT: Stable   - Baseline Creatinine: ~ 0.9 - 1.1   - Proteinuria: Minimal (0.2-0.5 grams)   - Date DSA Last Checked: Feb/2021      Latest DSA: No   - BK Viremia: No   - Kidney Tx Biopsy: No    # Immunosuppression: Tacrolimus immediate release (goal 8-10) and Mycophenolate mofetil (dose 750 mg every 12 hours)   - Induction with Recent Transplant:  High Intensity   - Continue with intensive monitoring of immunosuppression for efficacy and toxicity   - Changes: No    # Infection Prophylaxis:   - PJP: Sulfa/TMP (Bactrim)    # Hypertension: Controlled;  Goal BP: < 130/80   - Changes: No    # Mineral Bone Disorder:   - Secondary renal hyperparathyroidism; PTH level: Minimally elevated ( pg/ml)        On treatment: None  - Vitamin D; level: Low        On supplement: Yes  - Calcium; level: Normal        On supplement: No    # Electrolytes:   - Potassium; level: Normal        On supplement: No  - Magnesium; level: Low        On supplement: Yes dose recently increased.  - Bicarbonate; level: Normal        On supplement: Yes     # Medical Compliance: Yes    # Transplant History:  Etiology of Kidney Failure: Unknown etiology (no kidney biopsy)  Tx: DDKT  Transplant: 11/12/2020 (Kidney)  Donor Type: Donation after Circulatory Death  Donor Class:   Crossmatch at time of Tx: negative  DSA at time of Tx: No  Significant changes in immunosuppression: None  CMV IgG Ab High Risk Discordance (D+/R-): No  EBV IgG Ab High Risk  Discordance (D+/R-): No  Significant transplant-related complications: None    Transplant Office Phone Number: [phone]    Assessment and plan was discussed with the patient and she voiced her understanding and agreement.    Return visit: Return in about 2 months (around 5/10/2021) for Follow up.    James Crespo MD     Attestation:  This patient has been seen and evaluated by me, Sergio Jacob MD.  I have reviewed the note and agree with plan of care as documented by the fellow.       Chief Complaint   Ms. Larkin is a 45 year old here for kidney transplant and immunosuppression management.     Interval:  States she is doing well overall. She denies any SOB/N/V/D/F/C or CP. States her BP has been always in 120/80's range. Her current weight is 55 kg. She tolerates he medications well.     Home BP: 120/80's     Problem List   Patient Active Problem List   Diagnosis     Anemia in chronic renal disease     HTN, kidney transplant related     Secondary renal hyperparathyroidism (H)     Coronary artery disease involving native coronary artery of native heart without angina pectoris     Status post coronary angiogram     NSTEMI (non-ST elevated myocardial infarction) (H)     Vitamin D deficiency     Pneumothorax     Kidney replaced by transplant     Femoral neuropathy, right     Femoral neuropathy of right lower extremity     Aftercare following organ transplant     CKD (chronic kidney disease) stage 2, GFR 60-89 ml/min     Anemia due to stage 5 chronic kidney disease, not on chronic dialysis (H)       Allergies   Allergies   Allergen Reactions     Blood Transfusion Related (Informational Only)      Patient has a history of a clinically significant antibody against RBC antigens.  A delay in compatible RBCs may occur.     Cephalosporins Anaphylaxis     Per [facility] allergist report: positive skin intradermal tests to Cefazolin and Ceftazidime, but NOT to Penicillin G, Piperacillin and Meropenem       Chlorhexidine Anaphylaxis     Several times during anesthesia at initial phase during insertion of I.v. line and after disinfection with Chlorhexidine drop of blood pressure.  In Prick and as well intradermal tests clear positive reactions to Chlorhexidine (particularly in intradermal test to 0.0002% Chlorhexidine papule of 1cm and Erythema of 2.5cm). AVOID in future Chlorhexidine     Heparin Flush        Medications   Current Outpatient Medications   Medication Sig     acetaminophen (TYLENOL) 325 MG tablet Take 325-650 mg by mouth every 4 hours as needed for mild pain or fever      amLODIPine (NORVASC) 5 MG tablet Take 1 tablet (5 mg) by mouth daily     aspirin (ASA) 81 MG chewable tablet Take 1 tablet (81 mg) by mouth daily     magnesium oxide (MAG-OX) 400 MG tablet Take 2 tablets (800 mg) by mouth 2 times daily     mycophenolate (GENERIC EQUIVALENT) 250 MG capsule Take 3 capsules (750 mg) by mouth 2 times daily     ondansetron (ZOFRAN-ODT) 4 MG ODT tab Take 1 tablet (4 mg) by mouth every 6 hours as needed for nausea     senna-docusate (SENOKOT-S/PERICOLACE) 8.6-50 MG tablet Take 1 tablet by mouth 2 times daily as needed for constipation     sodium bicarbonate 650 MG tablet Take 1 tablet (650 mg) by mouth daily     sulfamethoxazole-trimethoprim (BACTRIM) 400-80 MG tablet Take 1 tablet by mouth daily     tacrolimus (GENERIC EQUIVALENT) 1 MG capsule Take 4 capsules (4 mg) by mouth 2 times daily     No current facility-administered medications for this visit.      Medications Discontinued During This Encounter   Medication Reason     magnesium oxide (MAG-OX) 400 MG tablet Reorder       Physical Exam   Vital signs and physical exam were deferred for this telemedicine visit.    Data     Renal Latest Ref Rng & Units 3/8/2021 3/1/2021 2/22/2021   Na 133 - 144 mmol/L - - -   Na (external) 136 - 145 meq/L 142 141 139   K 3.4 - 5.3 mmol/L - - -   K (external) 3.5 - 5.1 meq/L 4.2 4.7 4.7   Cl 94 - 109 mmol/L - - -   Cl  (external) 98 - 109 meq/L 108 108 108   CO2 20 - 32 mmol/L - - -   CO2 (external) 20 - 29 meq/L 22 23 22   BUN 7 - 30 mg/dL - - -   BUN (external) 6 - 22 mg/dL 18 20 21   Cr 0.52 - 1.04 mg/dL - - -   Cr (external) 0.60 - 1.10 mg/dL 1.07 1.09 0.96   Glucose 70 - 99 mg/dL - - -   Glucose (external) 70 - 99 mg/dL 83 93 99   Ca  8.5 - 10.1 mg/dL - - -   Ca (external) 8.5 - 10.5 mg/dL 9.6 9.4 9.7   Mg 1.6 - 2.3 mg/dL - - -   Mg (external) 1.6 - 2.6 mg/dL - 1.3(L) -     Bone Health Latest Ref Rng & Units 3/1/2021 2/8/2021 1/11/2021   Phos 2.5 - 4.5 mg/dL - - -   Phos (external) 2.3 - 4.7 mg/dL 4.8(H) 4.2 4.1   PTHi 18 - 80 pg/mL - - -   Vit D Def 20 - 75 ug/L - - -     Heme Latest Ref Rng & Units 3/8/2021 3/1/2021 2/22/2021   WBC 4.0 - 11.0 10e9/L - - -   WBC (external) 4.0 - 11.0 K/uL 3.9(L) 3.8(L) 3.9(L)   Hgb 11.7 - 15.7 g/dL - - -   Hgb (external) 11.5 - 15.8 g/dL 12.5 11.8 12.1   Plt 150 - 450 10e9/L - - -   Plt (external) 140 - 400 K/uL 292 314 264   ABSOLUTE NEUTROPHIL 1.6 - 8.3 10e9/L - - -   ABSOLUTE NEUTROPHILS (EXTERNAL) 1.8 - 8.0 K/uL 3.0 2.7 3.0   ABSOLUTE LYMPHOCYTES 0.8 - 5.3 10e9/L - - -   ABSOLUTE LYMPHOCYTES (EXTERNAL) 0.8 - 4.1 K/uL 0.6(L) 0.5(L) 0.4(L)   ABSOLUTE MONOCYTES 0.0 - 1.3 10e9/L - - -   ABSOLUTE MONOCYTES (EXTERNAL) 0.0 - 1.0 K/uL 0.2 0.5 0.4   ABSOLUTE EOSINOPHILS 0.0 - 0.7 10e9/L - - -   ABSOLUTE EOSINOPHILS (EXTERNAL) 0.0 - 0.7 K/uL 0.0 0.1 0.1   ABSOLUTE BASOPHILS 0.0 - 0.2 10e9/L - - -   ABSOLUTE BASOPHILS (EXTERNAL) 0.0 - 0.2 K/uL 0.0 <0.1 0.1   ABS IMMATURE GRANULOCYTES 0 - 0.4 10e9/L - - -   ABSOLUTE NUCLEATED RBC - - - -     Liver Latest Ref Rng & Units 3/1/2021 11/12/2020 5/14/2020   AP 40 - 150 U/L - 142 -   AP (external) 40 - 150 U/L 129 - -   TBili 0.2 - 1.3 mg/dL - 0.5 -   TBili (external) 0.2 - 1.2 mg/dL 0.4 - -   DBili 0.0 - 0.2 mg/dL - - -   DBili (external) 0.0 - 0.5 mg/dL 0.2 - -   ALT 0 - 50 U/L - 18 12   ALT (external) 0 - 55 U/L 45 - -   AST 0 - 45 U/L - 19 -   AST  (external) 5 - 34 U/L 30 - -   Tot Protein 6.8 - 8.8 g/dL - 8.4 -   Tot Protein (external) 6.0 - 8.3 g/dL 7.0 - -   Albumin 3.4 - 5.0 g/dL - 3.8 -   Albumin (external) 3.5 - 5.2 g/dL 4.2 - -     Pancreas Latest Ref Rng & Units 11/12/2020 1/24/2020 1/23/2020   A1C 0 - 5.6 % 4.9 Canceled, Test credited Canceled, Test credited     Iron studies Latest Ref Rng & Units 11/24/2020   Iron 35 - 180 ug/dL 21(L)   Iron sat 15 - 46 % 10(L)   Ferritin 12 - 150 ng/mL 1,120(H)     [facility] Txp Virology Latest Ref Rng & Units 3/1/2021 2/8/2021 1/11/2021   BK Spec - - - -   BK Res BKNEG:BK Virus DNA Not Detected copies/mL - - -   BK Log <2.7 Log copies/mL - - -   BK Quant Result Ext None Detected None Detected None Detected <500   BK Quant Spec Ext - Blood - Plasma   EBV VCA IGG ANTIBODY U/mL - - -   EBV CAPSID ANTIBODY IGG 0.0 - 0.8 AI - - -   Hep B Core NR:Nonreactive - - -   Hep B Surf - - - -   HIV 1&2 NEG - - -        Recent Labs   Lab Test 11/25/20  0643 11/27/20  0708 11/30/20  0651   DOSTAC 2,000 Not Provided 1,900   TACROL 6.6 8.8 9.5     Recent Labs   Lab Test 11/23/20  0651 11/30/20  0651   DOSMPA 2,000 1,900   MPACID 3.12 2.21   MPAG 191.9* 119.7*       James Crespo MD on 3/10/2021 at 12:28 PM      Sergio Jacob MD

## 2021-03-10 NOTE — LETTER
3/10/2021       RE: Paolo Larkin  [address]  [place] [address]     Dear Colleague,    Thank you for referring your patient, Paolo Larkin, to the [facility] NEPHROLOGY CLINIC [place] [facility]. Please see a copy of my visit note below.    Paolo is a 45 year old who is being evaluated via a billable telephone visit.      What phone number would you like to be contacted at?  services [phone]  - Patient phone [phone]  How would you like to obtain your AVS? Mail a copy  Phone call duration: 23 minutes  Call start time: 11:08 AM  Call end time: 11:26 AM    ACUTE TRANSPLANT NEPHROLOGY VISIT    Assessment & Plan   # DDKT: Stable   - Baseline Creatinine: ~ 0.9 - 1.1   - Proteinuria: Minimal (0.2-0.5 grams)   - Date DSA Last Checked: Feb/2021      Latest DSA: No   - BK Viremia: No   - Kidney Tx Biopsy: No    # Immunosuppression: Tacrolimus immediate release (goal 8-10) and Mycophenolate mofetil (dose 750 mg every 12 hours)   - Induction with Recent Transplant:  High Intensity   - Continue with intensive monitoring of immunosuppression for efficacy and toxicity   - Changes: No    # Infection Prophylaxis:   - PJP: Sulfa/TMP (Bactrim)    # Hypertension: Controlled;  Goal BP: < 130/80   - Changes: No    # Mineral Bone Disorder:   - Secondary renal hyperparathyroidism; PTH level: Minimally elevated ( pg/ml)        On treatment: None  - Vitamin D; level: Low        On supplement: Yes  - Calcium; level: Normal        On supplement: No    # Electrolytes:   - Potassium; level: Normal        On supplement: No  - Magnesium; level: Low        On supplement: Yes dose recently increased.  - Bicarbonate; level: Normal        On supplement: Yes     # Medical Compliance: Yes    # Transplant History:  Etiology of Kidney Failure: Unknown etiology (no kidney biopsy)  Tx: DDKT  Transplant: 11/12/2020 (Kidney)  Donor Type:  Donation after Circulatory Death  Donor Class:   Crossmatch at time of Tx: negative  DSA at time of Tx: No  Significant changes in immunosuppression: None  CMV IgG Ab High Risk Discordance (D+/R-): No  EBV IgG Ab High Risk Discordance (D+/R-): No  Significant transplant-related complications: None    Transplant Office Phone Number: [phone]    Assessment and plan was discussed with the patient and she voiced her understanding and agreement.    Return visit: Return in about 2 months (around 5/10/2021) for Follow up.    James Crespo MD     Attestation:  This patient has been seen and evaluated by me, Sergio Jacob MD.  I have reviewed the note and agree with plan of care as documented by the fellow.       Chief Complaint   Ms. Larkin is a 45 year old here for kidney transplant and immunosuppression management.     Interval:  States she is doing well overall. She denies any SOB/N/V/D/F/C or CP. States her BP has been always in 120/80's range. Her current weight is 55 kg. She tolerates he medications well.     Home BP: 120/80's     Problem List   Patient Active Problem List   Diagnosis     Anemia in chronic renal disease     HTN, kidney transplant related     Secondary renal hyperparathyroidism (H)     Coronary artery disease involving native coronary artery of native heart without angina pectoris     Status post coronary angiogram     NSTEMI (non-ST elevated myocardial infarction) (H)     Vitamin D deficiency     Pneumothorax     Kidney replaced by transplant     Femoral neuropathy, right     Femoral neuropathy of right lower extremity     Aftercare following organ transplant     CKD (chronic kidney disease) stage 2, GFR 60-89 ml/min     Anemia due to stage 5 chronic kidney disease, not on chronic dialysis (H)       Allergies   Allergies   Allergen Reactions     Blood Transfusion Related (Informational Only)      Patient has a history of a clinically significant antibody against RBC antigens.  A delay  in compatible RBCs may occur.     Cephalosporins Anaphylaxis     Per [facility] allergist report: positive skin intradermal tests to Cefazolin and Ceftazidime, but NOT to Penicillin G, Piperacillin and Meropenem      Chlorhexidine Anaphylaxis     Several times during anesthesia at initial phase during insertion of I.v. line and after disinfection with Chlorhexidine drop of blood pressure.  In Prick and as well intradermal tests clear positive reactions to Chlorhexidine (particularly in intradermal test to 0.0002% Chlorhexidine papule of 1cm and Erythema of 2.5cm). AVOID in future Chlorhexidine     Heparin Flush        Medications   Current Outpatient Medications   Medication Sig     acetaminophen (TYLENOL) 325 MG tablet Take 325-650 mg by mouth every 4 hours as needed for mild pain or fever      amLODIPine (NORVASC) 5 MG tablet Take 1 tablet (5 mg) by mouth daily     aspirin (ASA) 81 MG chewable tablet Take 1 tablet (81 mg) by mouth daily     magnesium oxide (MAG-OX) 400 MG tablet Take 2 tablets (800 mg) by mouth 2 times daily     mycophenolate (GENERIC EQUIVALENT) 250 MG capsule Take 3 capsules (750 mg) by mouth 2 times daily     ondansetron (ZOFRAN-ODT) 4 MG ODT tab Take 1 tablet (4 mg) by mouth every 6 hours as needed for nausea     senna-docusate (SENOKOT-S/PERICOLACE) 8.6-50 MG tablet Take 1 tablet by mouth 2 times daily as needed for constipation     sodium bicarbonate 650 MG tablet Take 1 tablet (650 mg) by mouth daily     sulfamethoxazole-trimethoprim (BACTRIM) 400-80 MG tablet Take 1 tablet by mouth daily     tacrolimus (GENERIC EQUIVALENT) 1 MG capsule Take 4 capsules (4 mg) by mouth 2 times daily     No current facility-administered medications for this visit.      Medications Discontinued During This Encounter   Medication Reason     magnesium oxide (MAG-OX) 400 MG tablet Reorder       Physical Exam   Vital signs and physical exam were deferred for this telemedicine visit.    Data     Renal Latest Ref Rng &  Units 3/8/2021 3/1/2021 2/22/2021   Na 133 - 144 mmol/L - - -   Na (external) 136 - 145 meq/L 142 141 139   K 3.4 - 5.3 mmol/L - - -   K (external) 3.5 - 5.1 meq/L 4.2 4.7 4.7   Cl 94 - 109 mmol/L - - -   Cl (external) 98 - 109 meq/L 108 108 108   CO2 20 - 32 mmol/L - - -   CO2 (external) 20 - 29 meq/L 22 23 22   BUN 7 - 30 mg/dL - - -   BUN (external) 6 - 22 mg/dL 18 20 21   Cr 0.52 - 1.04 mg/dL - - -   Cr (external) 0.60 - 1.10 mg/dL 1.07 1.09 0.96   Glucose 70 - 99 mg/dL - - -   Glucose (external) 70 - 99 mg/dL 83 93 99   Ca  8.5 - 10.1 mg/dL - - -   Ca (external) 8.5 - 10.5 mg/dL 9.6 9.4 9.7   Mg 1.6 - 2.3 mg/dL - - -   Mg (external) 1.6 - 2.6 mg/dL - 1.3(L) -     Bone Health Latest Ref Rng & Units 3/1/2021 2/8/2021 1/11/2021   Phos 2.5 - 4.5 mg/dL - - -   Phos (external) 2.3 - 4.7 mg/dL 4.8(H) 4.2 4.1   PTHi 18 - 80 pg/mL - - -   Vit D Def 20 - 75 ug/L - - -     Heme Latest Ref Rng & Units 3/8/2021 3/1/2021 2/22/2021   WBC 4.0 - 11.0 10e9/L - - -   WBC (external) 4.0 - 11.0 K/uL 3.9(L) 3.8(L) 3.9(L)   Hgb 11.7 - 15.7 g/dL - - -   Hgb (external) 11.5 - 15.8 g/dL 12.5 11.8 12.1   Plt 150 - 450 10e9/L - - -   Plt (external) 140 - 400 K/uL 292 314 264   ABSOLUTE NEUTROPHIL 1.6 - 8.3 10e9/L - - -   ABSOLUTE NEUTROPHILS (EXTERNAL) 1.8 - 8.0 K/uL 3.0 2.7 3.0   ABSOLUTE LYMPHOCYTES 0.8 - 5.3 10e9/L - - -   ABSOLUTE LYMPHOCYTES (EXTERNAL) 0.8 - 4.1 K/uL 0.6(L) 0.5(L) 0.4(L)   ABSOLUTE MONOCYTES 0.0 - 1.3 10e9/L - - -   ABSOLUTE MONOCYTES (EXTERNAL) 0.0 - 1.0 K/uL 0.2 0.5 0.4   ABSOLUTE EOSINOPHILS 0.0 - 0.7 10e9/L - - -   ABSOLUTE EOSINOPHILS (EXTERNAL) 0.0 - 0.7 K/uL 0.0 0.1 0.1   ABSOLUTE BASOPHILS 0.0 - 0.2 10e9/L - - -   ABSOLUTE BASOPHILS (EXTERNAL) 0.0 - 0.2 K/uL 0.0 <0.1 0.1   ABS IMMATURE GRANULOCYTES 0 - 0.4 10e9/L - - -   ABSOLUTE NUCLEATED RBC - - - -     Liver Latest Ref Rng & Units 3/1/2021 11/12/2020 5/14/2020   AP 40 - 150 U/L - 142 -   AP (external) 40 - 150 U/L 129 - -   TBili 0.2 - 1.3 mg/dL - 0.5 -    TBili (external) 0.2 - 1.2 mg/dL 0.4 - -   DBili 0.0 - 0.2 mg/dL - - -   DBili (external) 0.0 - 0.5 mg/dL 0.2 - -   ALT 0 - 50 U/L - 18 12   ALT (external) 0 - 55 U/L 45 - -   AST 0 - 45 U/L - 19 -   AST (external) 5 - 34 U/L 30 - -   Tot Protein 6.8 - 8.8 g/dL - 8.4 -   Tot Protein (external) 6.0 - 8.3 g/dL 7.0 - -   Albumin 3.4 - 5.0 g/dL - 3.8 -   Albumin (external) 3.5 - 5.2 g/dL 4.2 - -     Pancreas Latest Ref Rng & Units 11/12/2020 1/24/2020 1/23/2020   A1C 0 - 5.6 % 4.9 Canceled, Test credited Canceled, Test credited     Iron studies Latest Ref Rng & Units 11/24/2020   Iron 35 - 180 ug/dL 21(L)   Iron sat 15 - 46 % 10(L)   Ferritin 12 - 150 ng/mL 1,120(H)     [facility] Txp Virology Latest Ref Rng & Units 3/1/2021 2/8/2021 1/11/2021   BK Spec - - - -   BK Res BKNEG:BK Virus DNA Not Detected copies/mL - - -   BK Log <2.7 Log copies/mL - - -   BK Quant Result Ext None Detected None Detected None Detected <500   BK Quant Spec Ext - Blood - Plasma   EBV VCA IGG ANTIBODY U/mL - - -   EBV CAPSID ANTIBODY IGG 0.0 - 0.8 AI - - -   Hep B Core NR:Nonreactive - - -   Hep B Surf - - - -   HIV 1&2 NEG - - -        Recent Labs   Lab Test 11/25/20  0643 11/27/20  0708 11/30/20  0651   DOSTAC 2,000 Not Provided 1,900   TACROL 6.6 8.8 9.5     Recent Labs   Lab Test 11/23/20  0651 11/30/20  0651   DOSMPA 2,000 1,900   MPACID 3.12 2.21   MPAG 191.9* 119.7*       James Crespo MD on 3/10/2021 at 12:28 PM        Again, thank you for allowing me to participate in the care of your patient.      Sincerely,    Early Post Transplant

## 2021-03-12 ENCOUNTER — RESULTS ONLY (OUTPATIENT)
Dept: OTHER | Facility: CLINIC | Age: 46
End: 2021-03-12

## 2021-03-12 ENCOUNTER — APPOINTMENT (OUTPATIENT)
Dept: LAB | Facility: CLINIC | Age: 46
End: 2021-03-12
Attending: TRANSPLANT SURGERY
Payer: MEDICARE

## 2021-03-12 PROCEDURE — 86833 HLA CLASS II HIGH DEFIN QUAL: CPT | Performed by: INTERNAL MEDICINE

## 2021-03-12 PROCEDURE — 86832 HLA CLASS I HIGH DEFIN QUAL: CPT | Performed by: INTERNAL MEDICINE

## 2021-03-16 DIAGNOSIS — Z48.298 AFTERCARE FOLLOWING ORGAN TRANSPLANT: ICD-10-CM

## 2021-03-16 DIAGNOSIS — Z94.0 KIDNEY REPLACED BY TRANSPLANT: Primary | ICD-10-CM

## 2021-03-16 DIAGNOSIS — Z79.899 ENCOUNTER FOR LONG-TERM CURRENT USE OF MEDICATION: ICD-10-CM

## 2021-03-17 DIAGNOSIS — Z94.0 KIDNEY REPLACED BY TRANSPLANT: ICD-10-CM

## 2021-03-17 DIAGNOSIS — Z79.899 ENCOUNTER FOR LONG-TERM CURRENT USE OF MEDICATION: ICD-10-CM

## 2021-03-17 DIAGNOSIS — Z48.298 AFTERCARE FOLLOWING ORGAN TRANSPLANT: ICD-10-CM

## 2021-04-07 ENCOUNTER — TELEPHONE (OUTPATIENT)
Dept: TRANSPLANT | Facility: CLINIC | Age: 46
End: 2021-04-07

## 2021-04-07 NOTE — TELEPHONE ENCOUNTER
Post Kidney and Pancreas Transplant Team Conference  Date: 4/7/2021  Transplant Coordinator: Yaz Villa     Attendees:  [x]  Dr. Jacob  [x] Tina Viveros LPN     [x]  Dr. Lopez [x] Ashli Tejeda, KULWANT [] Alayna Lepe LPN   [x]  Dr. Stephenson [x] Gladys Huber, KULWANT    [x]  Dr. Sands [] Rosa M Winston, KULWANT [x] Cedric Barrera, PharmD   [x] Dr. Mccall [x] Brandi Kruger, KULWANT    [x] Dr. Marcelino [] Rafat Painter RN    [] Dr. Wood [] Alexandria Cesar RN    [] Dr. Mcnamara [] Radha Mendoza RN    []  Dr. Saavedra [] Blank Anderson, KULWANT    [] Surgery Fellow [x] Yaz Villa RN    [] Apple Bowen NP [] Lesly Car RN        Verbal Plan Read Back:   Due for BK    Routed to RN Coordinator   Tina Viveros LPN

## 2021-04-20 LAB
CELL TYPE ALLO: NORMAL
CHANNELSHIFTALLOB1: -23
CHANNELSHIFTALLOT1: -13
COMMENT ALLOB1: NORMAL
CROSSMATCHDATEALLO: NORMAL
DONOR ALLO: NORMAL
DONORCELLDATE ALLO: NORMAL
POS CUT OFF ALLO B: >93
POS CUT OFF ALLO T: >79
RESULT ALLO B1: NORMAL
RESULT ALLO T1: NORMAL
SERUM DATE ALLO B1: NORMAL
SERUM DATE ALLO T1: NORMAL
TESTMETHODALLO: NORMAL
TREATMENT ALLO B1: NORMAL
TREATMENT ALLO T1: NORMAL

## 2021-04-22 DIAGNOSIS — R11.0 NAUSEA: Primary | ICD-10-CM

## 2021-04-22 RX ORDER — ONDANSETRON 4 MG/1
4 TABLET, ORALLY DISINTEGRATING ORAL EVERY 6 HOURS PRN
Qty: 30 TABLET | Refills: 1 | Status: SHIPPED | OUTPATIENT
Start: 2021-04-22

## 2021-04-23 ENCOUNTER — TELEPHONE (OUTPATIENT)
Dept: NEPHROLOGY | Facility: CLINIC | Age: 46
End: 2021-04-23

## 2021-04-23 NOTE — TELEPHONE ENCOUNTER
Call placed to patient via Patricia . Paw states she is having lower back pain, diarrhea and burning with urination. No fevers. Symptoms started 2 days ago but seem to be improving.     Patient directed to go to local urgent care for assessment. Patient verbalized understanding.

## 2021-04-23 NOTE — TELEPHONE ENCOUNTER
Health Call Center    Phone Message    May a detailed message be left on voicemail: yes     Reason for Call: Symptoms or Concerns     If patient has red-flag symptoms, warm transfer to triage line    Current symptom or concern: UTI symptoms    Symptoms have been present for: {Unknown  Has patient previously been seen for this? No    By Dr. Sands:     Date: 4/23/21    Are there any new or worsening symptoms? Yes:     Please call patient at [phone] to advise.      Action Taken: Message routed to:  [facility] CONSTANTINO James    Travel Screening: Not Applicable

## 2021-05-10 ENCOUNTER — ALLIED HEALTH/NURSE VISIT (OUTPATIENT)
Dept: TRANSPLANT | Facility: CLINIC | Age: 46
End: 2021-05-10
Attending: SURGERY
Payer: MEDICARE

## 2021-05-10 ENCOUNTER — TELEPHONE (OUTPATIENT)
Dept: TRANSPLANT | Facility: CLINIC | Age: 46
End: 2021-05-10

## 2021-05-10 ENCOUNTER — VIRTUAL VISIT (OUTPATIENT)
Dept: NEPHROLOGY | Facility: CLINIC | Age: 46
End: 2021-05-10
Attending: SURGERY
Payer: MEDICARE

## 2021-05-10 DIAGNOSIS — N18.2 CKD (CHRONIC KIDNEY DISEASE) STAGE 2, GFR 60-89 ML/MIN: ICD-10-CM

## 2021-05-10 DIAGNOSIS — Z48.298 AFTERCARE FOLLOWING ORGAN TRANSPLANT: Primary | ICD-10-CM

## 2021-05-10 DIAGNOSIS — R39.198 VOIDING DIFFICULTY: ICD-10-CM

## 2021-05-10 DIAGNOSIS — I15.1 HTN, KIDNEY TRANSPLANT RELATED: ICD-10-CM

## 2021-05-10 DIAGNOSIS — E87.8 LOW BICARBONATE LEVEL: ICD-10-CM

## 2021-05-10 DIAGNOSIS — Z94.0 HTN, KIDNEY TRANSPLANT RELATED: ICD-10-CM

## 2021-05-10 DIAGNOSIS — Z94.0 KIDNEY REPLACED BY TRANSPLANT: Primary | ICD-10-CM

## 2021-05-10 DIAGNOSIS — E55.9 VITAMIN D DEFICIENCY: ICD-10-CM

## 2021-05-10 DIAGNOSIS — Z94.0 KIDNEY REPLACED BY TRANSPLANT: ICD-10-CM

## 2021-05-10 DIAGNOSIS — D84.9 IMMUNOSUPPRESSION (H): ICD-10-CM

## 2021-05-10 PROCEDURE — 99214 OFFICE O/P EST MOD 30 MIN: CPT | Mod: 95

## 2021-05-10 NOTE — LETTER
5/10/2021       RE: Paolo Larkin  [address]  [place] [address]     Dear Colleague,    Thank you for referring your patient, Paolo Larkin, to the [facility] NEPHROLOGY CLINIC [place] at [facility]. Please see a copy of my visit note below.    Paolo is a 45 year old who is being evaluated via a billable video visit.      Use  service, [phone]. Patient speaks Patricia.    How would you like to obtain your AVS? MyChart  If the video visit is dropped, the invitation should be resent by: Text to cell phone: [phone]  Will anyone else be joining your video visit? No      Video Start Time: 11: 12 AM  Video-Visit Details    Type of service:  Video Visit    Video End Time:11:44 AM    Originating Location (pt. Location): Home    Distant Location (provider location):  [facility] NEPHROLOGY CLINIC [place]     Platform used for Video Visit: WILDER Lehman MD       ACUTE TRANSPLANT NEPHROLOGY VISIT    Patricia interpretor ID  [id number]    Assisted with entire encounter     Assessment & Plan      # DDKT: Stable              - Baseline Creatinine: ~ 0.9 - 1.1              - Proteinuria: Minimal (0.2-0.5 grams) - Not checked  Post transplantr               - Date DSA Last Checked: March//2021      Latest DSA: No              - BK Viremia: No, May/2021              - Kidney Tx Biopsy: No    Recent symptoms concerning for UTI but currently symptoms have improved. We will however check UA with culture  - patient wants to do the test along with rest of her labs next week   Will check UPCR with next  Lab check after ensuring she has no UTI        # Immunosuppression: Tacrolimus immediate release (goal 8-10) and Mycophenolate mofetil (dose 750 mg every 12 hours)              - Induction with Recent Transplant:  High Intensity              - Continue with intensive monitoring of immunosuppression for efficacy and  toxicity              - Changes:  YES : will change the Tacrolimus dose will be adjusted to target new goal of 6-8.     # Infection Prophylaxis:   - PJP: Sulfa/TMP (Bactrim)     # Hypertension: Controlled;   Goal BP: < 130/80  Home BP: 111 -130.   Weight 57 kg. No leg swelling   Norvasc 5 mg daily                - Changes: No     # Mineral Bone Disorder:   - Secondary renal hyperparathyroidism; PTH level: Minimally elevated ( pg/ml)        On treatment: None  - Vitamin D; level: Low        On supplement: Yes  - Calcium; level: Normal        On supplement: No     # Electrolytes:   - Potassium; level: Normal        On supplement: No  - Magnesium; level: Low        On supplement: Yes  - Bicarbonate; level: Normal        On supplement: Yes      # Medical Compliance: Yes     # Transplant History:  Etiology of Kidney Failure: Unknown etiology (no kidney biopsy)  Tx: DDKT  Transplant: 11/12/2020 (Kidney)  Donor Type: Donation after Circulatory Death  Donor Class:   Crossmatch at time of Tx: negative  DSA at time of Tx: No  Significant changes in immunosuppression: None  CMV IgG Ab High Risk Discordance (D+/R-): No  EBV IgG Ab High Risk Discordance (D+/R-): No  Significant transplant-related complications: None     Transplant Office Phone Number: [phone]     Assessment and plan was discussed with the patient and she voiced her understanding and agreement.    Return visit: Return in about 2 months (around 7/10/2021).    Recommendations were communicated to primary team via note  Patient seen and discussed with Dr Karthik Johnson MD, FACP  Nephrology Fellow   [facility]   Pager [phone]        Alex Garcia MD     This patient has been seen and evaluated by me, Alexi Stephenson MD.  I have reviewed the note and agree with plan of care as documented by the fellow.  Alexi Stephenson MD       Chief Complaint   Ms. Larkin is a 45 year old here for routine follow up, kidney transplant and  immunosuppression management.     History of Present Illness   Patient states she is doing fine . No symptoms currently   3 weeks ago had back pain and difficulty voiding urine . No dysuria , no blood in urine   Currently her symptoms got better  And now she is felling fine  No back pain   Currently no abdominal pain nausea vomiting.  No black stools or rectal bleeding.    No unintentional weight loss.    No skin rash    Patient denies any chest pain or shortness of breath, PND, orthopnea.  No leg swelling.   No wheezing or cough.   No dizziness, lightheadedness.  No focal weakness, altered sensation or confusion  No fever, chills    Home BP: 111 -130.   Weight 57 kg        Problem List   Patient Active Problem List   Diagnosis     Anemia in chronic renal disease     HTN, kidney transplant related     Secondary renal hyperparathyroidism (H)     Coronary artery disease involving native coronary artery of native heart without angina pectoris     Status post coronary angiogram     NSTEMI (non-ST elevated myocardial infarction) (H)     Vitamin D deficiency     Pneumothorax     Kidney replaced by transplant     Femoral neuropathy, right     Femoral neuropathy of right lower extremity     Aftercare following organ transplant     CKD (chronic kidney disease) stage 2, GFR 60-89 ml/min     Anemia due to stage 5 chronic kidney disease, not on chronic dialysis (H)       Allergies   Allergies   Allergen Reactions     Blood Transfusion Related (Informational Only)      Patient has a history of a clinically significant antibody against RBC antigens.  A delay in compatible RBCs may occur.     Cephalosporins Anaphylaxis     Per [facility] allergist report: positive skin intradermal tests to Cefazolin and Ceftazidime, but NOT to Penicillin G, Piperacillin and Meropenem      Chlorhexidine Anaphylaxis     Several times during anesthesia at initial phase during insertion of I.v. line and after disinfection with Chlorhexidine drop of blood  pressure.  In Prick and as well intradermal tests clear positive reactions to Chlorhexidine (particularly in intradermal test to 0.0002% Chlorhexidine papule of 1cm and Erythema of 2.5cm). AVOID in future Chlorhexidine     Heparin Flush        Medications   Current Outpatient Medications   Medication Sig     acetaminophen (TYLENOL) 325 MG tablet Take 325-650 mg by mouth every 4 hours as needed for mild pain or fever      amLODIPine (NORVASC) 5 MG tablet Take 1 tablet (5 mg) by mouth daily     aspirin (ASA) 81 MG chewable tablet Take 1 tablet (81 mg) by mouth daily     magnesium oxide (MAG-OX) 400 MG tablet Take 2 tablets (800 mg) by mouth 2 times daily     mycophenolate (GENERIC EQUIVALENT) 250 MG capsule Take 3 capsules (750 mg) by mouth 2 times daily     ondansetron (ZOFRAN-ODT) 4 MG ODT tab Take 1 tablet (4 mg) by mouth every 6 hours as needed for nausea     senna-docusate (SENOKOT-S/PERICOLACE) 8.6-50 MG tablet Take 1 tablet by mouth 2 times daily as needed for constipation     sodium bicarbonate 650 MG tablet Take 1 tablet (650 mg) by mouth daily     sulfamethoxazole-trimethoprim (BACTRIM) 400-80 MG tablet Take 1 tablet by mouth daily     tacrolimus (GENERIC EQUIVALENT) 1 MG capsule Take 4 capsules (4 mg) by mouth 2 times daily     No current facility-administered medications for this visit.      There are no discontinued medications.    Physical Exam   Vital Signs: There were no vitals taken for this visit.    On video patient awake and alert, conversing normally, no conversational dyspnea and speaking in full sentences . Comprehension is normal        Data     Renal Latest Ref Rng & Units 5/3/2021 4/19/2021 4/6/2021   Na 133 - 144 mmol/L - - -   Na (external) 136 - 145 meq/L 138 138 139   K 3.4 - 5.3 mmol/L - - -   K (external) 3.5 - 5.1 meq/L 4.3 4.4 4.8   Cl 94 - 109 mmol/L - - -   Cl (external) 98 - 109 meq/L 108 106 108   CO2 20 - 32 mmol/L - - -   CO2 (external) 20 - 29 meq/L 21 25 23   BUN 7 - 30 mg/dL  - - -   BUN (external) 6 - 22 mg/dL 19 24(H) 21   Cr 0.52 - 1.04 mg/dL - - -   Cr (external) 0.60 - 1.10 mg/dL 1.08 1.01 1.08   Glucose 70 - 99 mg/dL - - -   Glucose (external) 70 - 99 mg/dL 89 94 95   Ca  8.5 - 10.1 mg/dL - - -   Ca (external) 8.5 - 10.5 mg/dL 9.3 9.3 9.7   Mg 1.6 - 2.3 mg/dL - - -   Mg (external) 1.6 - 2.6 mg/dL 1.7 2.0 -     Bone Health Latest Ref Rng & Units 5/3/2021 4/19/2021 3/12/2021   Phos 2.5 - 4.5 mg/dL - - -   Phos (external) 2.3 - 4.7 mg/dL 3.6 3.3 3.9   PTHi 18 - 80 pg/mL - - -   PTHi (external) 14 - 95 pg/mL - - 94   Vit D Def 20 - 75 ug/L - - -     Heme Latest Ref Rng & Units 5/3/2021 4/19/2021 4/6/2021   WBC 4.0 - 11.0 10e9/L - - -   WBC (external) 4.0 - 11.0 K/uL 5.3 4.3 3.8(L)   Hgb 11.7 - 15.7 g/dL - - -   Hgb (external) 11.5 - 15.8 g/dL 12.1 12.2 11.8   Plt 150 - 450 10e9/L - - -   Plt (external) 140 - 400 K/uL 287 275 273   ABSOLUTE NEUTROPHIL 1.6 - 8.3 10e9/L - - -   ABSOLUTE NEUTROPHILS (EXTERNAL) 1.8 - 8.0 K/uL 3.7 2.9 2.9   ABSOLUTE LYMPHOCYTES 0.8 - 5.3 10e9/L - - -   ABSOLUTE LYMPHOCYTES (EXTERNAL) 0.8 - 4.1 K/uL 0.6(L) 0.8 0.6(L)   ABSOLUTE MONOCYTES 0.0 - 1.3 10e9/L - - -   ABSOLUTE MONOCYTES (EXTERNAL) 0.0 - 1.0 K/uL 0.7 0.3 0.3   ABSOLUTE EOSINOPHILS 0.0 - 0.7 10e9/L - - -   ABSOLUTE EOSINOPHILS (EXTERNAL) 0.0 - 0.7 K/uL 0.2 0.2 0.0   ABSOLUTE BASOPHILS 0.0 - 0.2 10e9/L - - -   ABSOLUTE BASOPHILS (EXTERNAL) 0.0 - 0.2 K/uL 0.0 0.1 -   ABS IMMATURE GRANULOCYTES 0 - 0.4 10e9/L - - -   ABSOLUTE NUCLEATED RBC - - - -     Liver Latest Ref Rng & Units 3/1/2021 11/12/2020 5/14/2020   AP 40 - 150 U/L - 142 -   AP (external) 40 - 150 U/L 129 - -   TBili 0.2 - 1.3 mg/dL - 0.5 -   TBili (external) 0.2 - 1.2 mg/dL 0.4 - -   DBili 0.0 - 0.2 mg/dL - - -   DBili (external) 0.0 - 0.5 mg/dL 0.2 - -   ALT 0 - 50 U/L - 18 12   ALT (external) 0 - 55 U/L 45 - -   AST 0 - 45 U/L - 19 -   AST (external) 5 - 34 U/L 30 - -   Tot Protein 6.8 - 8.8 g/dL - 8.4 -   Tot Protein (external) 6.0 - 8.3  g/dL 7.0 - -   Albumin 3.4 - 5.0 g/dL - 3.8 -   Albumin (external) 3.5 - 5.2 g/dL 4.2 - -     Pancreas Latest Ref Rng & Units 11/12/2020 1/24/2020 1/23/2020   A1C 0 - 5.6 % 4.9 Canceled, Test credited Canceled, Test credited     Iron studies Latest Ref Rng & Units 11/24/2020   Iron 35 - 180 ug/dL 21(L)   Iron sat 15 - 46 % 10(L)   Ferritin 12 - 150 ng/mL 1,120(H)     [facility] Txp Virology Latest Ref Rng & Units 5/3/2021 4/19/2021 3/8/2021   CMV DNA Quant Ext Undetected Iu/mL - - Undetected   LOG IU/ML OF CMVQNT (EXTERNAL) log IU/mL - - Undetected   BK Spec - - - -   BK Res BKNEG:BK Virus DNA Not Detected copies/mL - - -   BK Log <2.7 Log copies/mL - - -   BK Quant Result Ext None Detected IU/mL None Detected None Detected -   BK Quant Spec Ext - Blood - -   EBV VCA IGG ANTIBODY U/mL - - -   EBV CAPSID ANTIBODY IGG 0.0 - 0.8 AI - - -   Hep B Core NR:Nonreactive - - -   Hep B Surf - - - -   HIV 1&2 NEG - - -        Recent Labs   Lab Test 11/25/20  0643 11/27/20  0708 11/30/20  0651   DOSTAC 2,000 Not Provided 1,900   TACROL 6.6 8.8 9.5     Recent Labs   Lab Test 11/23/20  0651 11/30/20  0651   DOSMPA 2,000 1,900   MPACID 3.12 2.21   MPAG 191.9* 119.7*           Again, thank you for allowing me to participate in the care of your patient.      Sincerely,    Early Post Transplant

## 2021-05-10 NOTE — LETTER
5/10/2021      RE: Paolo Vincent Trae  [address]  [place] [place] [address]       Paolo is a 45 year old who is being evaluated via a billable video visit.      Use  service, [phone]. Patient speaks Patricia.    How would you like to obtain your AVS? MyChart  If the video visit is dropped, the invitation should be resent by: Text to cell phone: [phone]  Will anyone else be joining your video visit? No      Video Start Time: 11: 12 AM  Video-Visit Details    Type of service:  Video Visit    Video End Time:11:44 AM    Originating Location (pt. Location): Home    Distant Location (provider location):  [facility] [place]     Platform used for Video Visit: WILDER Lehman MD       ACUTE TRANSPLANT NEPHROLOGY VISIT    Patricia interpretor ID  [id number]    Assisted with entire encounter     Assessment & Plan      # DDKT: Stable              - Baseline Creatinine: ~ 0.9 - 1.1              - Proteinuria: Minimal (0.2-0.5 grams) - Not checked  Post transplantr               - Date DSA Last Checked: March//2021      Latest DSA: No              - BK Viremia: No, May/2021              - Kidney Tx Biopsy: No    Recent symptoms concerning for UTI but currently symptoms have improved. We will however check UA with culture  - patient wants to do the test along with rest of her labs next week   Will check UPCR with next  Lab check after ensuring she has no UTI        # Immunosuppression: Tacrolimus immediate release (goal 8-10) and Mycophenolate mofetil (dose 750 mg every 12 hours)              - Induction with Recent Transplant:  High Intensity              - Continue with intensive monitoring of immunosuppression for efficacy and toxicity              - Changes:  YES : will change the Tacrolimus dose will be adjusted to target new goal of 6-8.     # Infection Prophylaxis:   - PJP: Sulfa/TMP (Bactrim)     # Hypertension: Controlled;   Goal BP: < 130/80  Home  BP: 111 -130.   Weight 57 kg. No leg swelling   Norvasc 5 mg daily                - Changes: No     # Mineral Bone Disorder:   - Secondary renal hyperparathyroidism; PTH level: Minimally elevated ( pg/ml)        On treatment: None  - Vitamin D; level: Low        On supplement: Yes  - Calcium; level: Normal        On supplement: No     # Electrolytes:   - Potassium; level: Normal        On supplement: No  - Magnesium; level: Low        On supplement: Yes  - Bicarbonate; level: Normal        On supplement: Yes      # Medical Compliance: Yes     # Transplant History:  Etiology of Kidney Failure: Unknown etiology (no kidney biopsy)  Tx: DDKT  Transplant: 11/12/2020 (Kidney)  Donor Type: Donation after Circulatory Death  Donor Class:   Crossmatch at time of Tx: negative  DSA at time of Tx: No  Significant changes in immunosuppression: None  CMV IgG Ab High Risk Discordance (D+/R-): No  EBV IgG Ab High Risk Discordance (D+/R-): No  Significant transplant-related complications: None     Transplant Office Phone Number: [phone]     Assessment and plan was discussed with the patient and she voiced her understanding and agreement.    Return visit: Return in about 2 months (around 7/10/2021).    Recommendations were communicated to primary team via note  Patient seen and discussed with Dr aKrthik Johnson MD, FACP  Nephrology Fellow   [facility]   Pager [phone]        Alex Garcia MD     This patient has been seen and evaluated by me, Alexi Stephenson MD.  I have reviewed the note and agree with plan of care as documented by the fellow.  Alexi Stephenson MD       Chief Complaint   Ms. Larkin is a 45 year old here for routine follow up, kidney transplant and immunosuppression management.     History of Present Illness   Patient states she is doing fine . No symptoms currently   3 weeks ago had back pain and difficulty voiding urine . No dysuria , no blood in urine   Currently her symptoms got  better  And now she is felling fine  No back pain   Currently no abdominal pain nausea vomiting.  No black stools or rectal bleeding.    No unintentional weight loss.    No skin rash    Patient denies any chest pain or shortness of breath, PND, orthopnea.  No leg swelling.   No wheezing or cough.   No dizziness, lightheadedness.  No focal weakness, altered sensation or confusion  No fever, chills    Home BP: 111 -130.   Weight 57 kg        Problem List   Patient Active Problem List   Diagnosis     Anemia in chronic renal disease     HTN, kidney transplant related     Secondary renal hyperparathyroidism (H)     Coronary artery disease involving native coronary artery of native heart without angina pectoris     Status post coronary angiogram     NSTEMI (non-ST elevated myocardial infarction) (H)     Vitamin D deficiency     Pneumothorax     Kidney replaced by transplant     Femoral neuropathy, right     Femoral neuropathy of right lower extremity     Aftercare following organ transplant     CKD (chronic kidney disease) stage 2, GFR 60-89 ml/min     Anemia due to stage 5 chronic kidney disease, not on chronic dialysis (H)       Allergies   Allergies   Allergen Reactions     Blood Transfusion Related (Informational Only)      Patient has a history of a clinically significant antibody against RBC antigens.  A delay in compatible RBCs may occur.     Cephalosporins Anaphylaxis     Per [facility] allergist report: positive skin intradermal tests to Cefazolin and Ceftazidime, but NOT to Penicillin G, Piperacillin and Meropenem      Chlorhexidine Anaphylaxis     Several times during anesthesia at initial phase during insertion of I.v. line and after disinfection with Chlorhexidine drop of blood pressure.  In Prick and as well intradermal tests clear positive reactions to Chlorhexidine (particularly in intradermal test to 0.0002% Chlorhexidine papule of 1cm and Erythema of 2.5cm). AVOID in future Chlorhexidine     Heparin Flush         Medications   Current Outpatient Medications   Medication Sig     acetaminophen (TYLENOL) 325 MG tablet Take 325-650 mg by mouth every 4 hours as needed for mild pain or fever      amLODIPine (NORVASC) 5 MG tablet Take 1 tablet (5 mg) by mouth daily     aspirin (ASA) 81 MG chewable tablet Take 1 tablet (81 mg) by mouth daily     magnesium oxide (MAG-OX) 400 MG tablet Take 2 tablets (800 mg) by mouth 2 times daily     mycophenolate (GENERIC EQUIVALENT) 250 MG capsule Take 3 capsules (750 mg) by mouth 2 times daily     ondansetron (ZOFRAN-ODT) 4 MG ODT tab Take 1 tablet (4 mg) by mouth every 6 hours as needed for nausea     senna-docusate (SENOKOT-S/PERICOLACE) 8.6-50 MG tablet Take 1 tablet by mouth 2 times daily as needed for constipation     sodium bicarbonate 650 MG tablet Take 1 tablet (650 mg) by mouth daily     sulfamethoxazole-trimethoprim (BACTRIM) 400-80 MG tablet Take 1 tablet by mouth daily     tacrolimus (GENERIC EQUIVALENT) 1 MG capsule Take 4 capsules (4 mg) by mouth 2 times daily     No current facility-administered medications for this visit.      There are no discontinued medications.    Physical Exam   Vital Signs: There were no vitals taken for this visit.    On video patient awake and alert, conversing normally, no conversational dyspnea and speaking in full sentences . Comprehension is normal        Data     Renal Latest Ref Rng & Units 5/3/2021 4/19/2021 4/6/2021   Na 133 - 144 mmol/L - - -   Na (external) 136 - 145 meq/L 138 138 139   K 3.4 - 5.3 mmol/L - - -   K (external) 3.5 - 5.1 meq/L 4.3 4.4 4.8   Cl 94 - 109 mmol/L - - -   Cl (external) 98 - 109 meq/L 108 106 108   CO2 20 - 32 mmol/L - - -   CO2 (external) 20 - 29 meq/L 21 25 23   BUN 7 - 30 mg/dL - - -   BUN (external) 6 - 22 mg/dL 19 24(H) 21   Cr 0.52 - 1.04 mg/dL - - -   Cr (external) 0.60 - 1.10 mg/dL 1.08 1.01 1.08   Glucose 70 - 99 mg/dL - - -   Glucose (external) 70 - 99 mg/dL 89 94 95   Ca  8.5 - 10.1 mg/dL - - -   Ca  (external) 8.5 - 10.5 mg/dL 9.3 9.3 9.7   Mg 1.6 - 2.3 mg/dL - - -   Mg (external) 1.6 - 2.6 mg/dL 1.7 2.0 -     Bone Health Latest Ref Rng & Units 5/3/2021 4/19/2021 3/12/2021   Phos 2.5 - 4.5 mg/dL - - -   Phos (external) 2.3 - 4.7 mg/dL 3.6 3.3 3.9   PTHi 18 - 80 pg/mL - - -   PTHi (external) 14 - 95 pg/mL - - 94   Vit D Def 20 - 75 ug/L - - -     Heme Latest Ref Rng & Units 5/3/2021 4/19/2021 4/6/2021   WBC 4.0 - 11.0 10e9/L - - -   WBC (external) 4.0 - 11.0 K/uL 5.3 4.3 3.8(L)   Hgb 11.7 - 15.7 g/dL - - -   Hgb (external) 11.5 - 15.8 g/dL 12.1 12.2 11.8   Plt 150 - 450 10e9/L - - -   Plt (external) 140 - 400 K/uL 287 275 273   ABSOLUTE NEUTROPHIL 1.6 - 8.3 10e9/L - - -   ABSOLUTE NEUTROPHILS (EXTERNAL) 1.8 - 8.0 K/uL 3.7 2.9 2.9   ABSOLUTE LYMPHOCYTES 0.8 - 5.3 10e9/L - - -   ABSOLUTE LYMPHOCYTES (EXTERNAL) 0.8 - 4.1 K/uL 0.6(L) 0.8 0.6(L)   ABSOLUTE MONOCYTES 0.0 - 1.3 10e9/L - - -   ABSOLUTE MONOCYTES (EXTERNAL) 0.0 - 1.0 K/uL 0.7 0.3 0.3   ABSOLUTE EOSINOPHILS 0.0 - 0.7 10e9/L - - -   ABSOLUTE EOSINOPHILS (EXTERNAL) 0.0 - 0.7 K/uL 0.2 0.2 0.0   ABSOLUTE BASOPHILS 0.0 - 0.2 10e9/L - - -   ABSOLUTE BASOPHILS (EXTERNAL) 0.0 - 0.2 K/uL 0.0 0.1 -   ABS IMMATURE GRANULOCYTES 0 - 0.4 10e9/L - - -   ABSOLUTE NUCLEATED RBC - - - -     Liver Latest Ref Rng & Units 3/1/2021 11/12/2020 5/14/2020   AP 40 - 150 U/L - 142 -   AP (external) 40 - 150 U/L 129 - -   TBili 0.2 - 1.3 mg/dL - 0.5 -   TBili (external) 0.2 - 1.2 mg/dL 0.4 - -   DBili 0.0 - 0.2 mg/dL - - -   DBili (external) 0.0 - 0.5 mg/dL 0.2 - -   ALT 0 - 50 U/L - 18 12   ALT (external) 0 - 55 U/L 45 - -   AST 0 - 45 U/L - 19 -   AST (external) 5 - 34 U/L 30 - -   Tot Protein 6.8 - 8.8 g/dL - 8.4 -   Tot Protein (external) 6.0 - 8.3 g/dL 7.0 - -   Albumin 3.4 - 5.0 g/dL - 3.8 -   Albumin (external) 3.5 - 5.2 g/dL 4.2 - -     Pancreas Latest Ref Rng & Units 11/12/2020 1/24/2020 1/23/2020   A1C 0 - 5.6 % 4.9 Canceled, Test credited Canceled, Test credited     Iron  studies Latest Ref Rng & Units 11/24/2020   Iron 35 - 180 ug/dL 21(L)   Iron sat 15 - 46 % 10(L)   Ferritin 12 - 150 ng/mL 1,120(H)     [facility] Txp Virology Latest Ref Rng & Units 5/3/2021 4/19/2021 3/8/2021   CMV DNA Quant Ext Undetected Iu/mL - - Undetected   LOG IU/ML OF CMVQNT (EXTERNAL) log IU/mL - - Undetected   BK Spec - - - -   BK Res BKNEG:BK Virus DNA Not Detected copies/mL - - -   BK Log <2.7 Log copies/mL - - -   BK Quant Result Ext None Detected IU/mL None Detected None Detected -   BK Quant Spec Ext - Blood - -   EBV VCA IGG ANTIBODY U/mL - - -   EBV CAPSID ANTIBODY IGG 0.0 - 0.8 AI - - -   Hep B Core NR:Nonreactive - - -   Hep B Surf - - - -   HIV 1&2 NEG - - -        Recent Labs   Lab Test 11/25/20  0643 11/27/20  0708 11/30/20  0651   DOSTAC 2,000 Not Provided 1,900   TACROL 6.6 8.8 9.5     Recent Labs   Lab Test 11/23/20  0651 11/30/20  0651   DOSMPA 2,000 1,900   MPACID 3.12 2.21   MPAG 191.9* 119.7*           Early Post Transplant

## 2021-05-10 NOTE — LETTER
PHYSICIAN ORDERS      DATE & TIME ISSUED: May 10, 2021 12:11 PM  PATIENT NAME: Paolo Larkin   : 1975     [facility] MR# [if applicable]: [id number]     DIAGNOSIS:  kidney transplant   ICD-10 CODE: Z94.0     Please check urinalysis and urine culture    Any questions please call: [phone]  Fax to:   [phone].      Alexi Stephenson MD

## 2021-05-10 NOTE — PROGRESS NOTES
Paolo is a 45 year old who is being evaluated via a billable video visit.      Use  service, [phone]. Patient speaks Patricia.    How would you like to obtain your AVS? MyChart  If the video visit is dropped, the invitation should be resent by: Text to cell phone: [phone]  Will anyone else be joining your video visit? No      Video Start Time: 11: 12 AM  Video-Visit Details    Type of service:  Video Visit    Video End Time:11:44 AM    Originating Location (pt. Location): Home    Distant Location (provider location):  [facility] NEPHROLOGY [facility] [place]     Platform used for Video Visit: WILDER Lehman MD       ACUTE TRANSPLANT NEPHROLOGY VISIT    Patricia interpretor ID  [id number]    Assisted with entire encounter     Assessment & Plan      # DDKT: Stable              - Baseline Creatinine: ~ 0.9 - 1.1              - Proteinuria: Minimal (0.2-0.5 grams) - Not checked  Post transplantr               - Date DSA Last Checked: March//2021      Latest DSA: No              - BK Viremia: No, May/2021              - Kidney Tx Biopsy: No    Recent symptoms concerning for UTI but currently symptoms have improved. We will however check UA with culture  - patient wants to do the test along with rest of her labs next week   Will check UPCR with next  Lab check after ensuring she has no UTI        # Immunosuppression: Tacrolimus immediate release (goal 8-10) and Mycophenolate mofetil (dose 750 mg every 12 hours)              - Induction with Recent Transplant:  High Intensity              - Continue with intensive monitoring of immunosuppression for efficacy and toxicity              - Changes:  YES : will change the Tacrolimus dose will be adjusted to target new goal of 6-8.     # Infection Prophylaxis:   - PJP: Sulfa/TMP (Bactrim)     # Hypertension: Controlled;   Goal BP: < 130/80  Home BP: 111 -130.   Weight 57 kg. No leg swelling   Norvasc 5 mg daily                -  Changes: No     # Mineral Bone Disorder:   - Secondary renal hyperparathyroidism; PTH level: Minimally elevated ( pg/ml)        On treatment: None  - Vitamin D; level: Low        On supplement: Yes  - Calcium; level: Normal        On supplement: No     # Electrolytes:   - Potassium; level: Normal        On supplement: No  - Magnesium; level: Low        On supplement: Yes  - Bicarbonate; level: Normal        On supplement: Yes      # Medical Compliance: Yes     # Transplant History:  Etiology of Kidney Failure: Unknown etiology (no kidney biopsy)  Tx: DDKT  Transplant: 11/12/2020 (Kidney)  Donor Type: Donation after Circulatory Death  Donor Class:   Crossmatch at time of Tx: negative  DSA at time of Tx: No  Significant changes in immunosuppression: None  CMV IgG Ab High Risk Discordance (D+/R-): No  EBV IgG Ab High Risk Discordance (D+/R-): No  Significant transplant-related complications: None     Transplant Office Phone Number: [phone]     Assessment and plan was discussed with the patient and she voiced her understanding and agreement.    Return visit: Return in about 2 months (around 7/10/2021).    Recommendations were communicated to primary team via note  Patient seen and discussed with Dr Karthik Johnson MD, FACP  Nephrology Fellow   [facility]   Pager [phone]        Alex Garcia MD     This patient has been seen and evaluated by me, Alexi Stephenson MD.  I have reviewed the note and agree with plan of care as documented by the fellow.  Alexi Stephenson MD       Chief Complaint   Ms. Larkin is a 45 year old here for routine follow up, kidney transplant and immunosuppression management.     History of Present Illness   Patient states she is doing fine . No symptoms currently   3 weeks ago had back pain and difficulty voiding urine . No dysuria , no blood in urine   Currently her symptoms got better  And now she is felling fine  No back pain   Currently no abdominal pain nausea  vomiting.  No black stools or rectal bleeding.    No unintentional weight loss.    No skin rash    Patient denies any chest pain or shortness of breath, PND, orthopnea.  No leg swelling.   No wheezing or cough.   No dizziness, lightheadedness.  No focal weakness, altered sensation or confusion  No fever, chills    Home BP: 111 -130.   Weight 57 kg        Problem List   Patient Active Problem List   Diagnosis     Anemia in chronic renal disease     HTN, kidney transplant related     Secondary renal hyperparathyroidism (H)     Coronary artery disease involving native coronary artery of native heart without angina pectoris     Status post coronary angiogram     NSTEMI (non-ST elevated myocardial infarction) (H)     Vitamin D deficiency     Pneumothorax     Kidney replaced by transplant     Femoral neuropathy, right     Femoral neuropathy of right lower extremity     Aftercare following organ transplant     CKD (chronic kidney disease) stage 2, GFR 60-89 ml/min     Anemia due to stage 5 chronic kidney disease, not on chronic dialysis (H)       Allergies   Allergies   Allergen Reactions     Blood Transfusion Related (Informational Only)      Patient has a history of a clinically significant antibody against RBC antigens.  A delay in compatible RBCs may occur.     Cephalosporins Anaphylaxis     Per [facility] allergist report: positive skin intradermal tests to Cefazolin and Ceftazidime, but NOT to Penicillin G, Piperacillin and Meropenem      Chlorhexidine Anaphylaxis     Several times during anesthesia at initial phase during insertion of I.v. line and after disinfection with Chlorhexidine drop of blood pressure.  In Prick and as well intradermal tests clear positive reactions to Chlorhexidine (particularly in intradermal test to 0.0002% Chlorhexidine papule of 1cm and Erythema of 2.5cm). AVOID in future Chlorhexidine     Heparin Flush        Medications   Current Outpatient Medications   Medication Sig     acetaminophen  (TYLENOL) 325 MG tablet Take 325-650 mg by mouth every 4 hours as needed for mild pain or fever      amLODIPine (NORVASC) 5 MG tablet Take 1 tablet (5 mg) by mouth daily     aspirin (ASA) 81 MG chewable tablet Take 1 tablet (81 mg) by mouth daily     magnesium oxide (MAG-OX) 400 MG tablet Take 2 tablets (800 mg) by mouth 2 times daily     mycophenolate (GENERIC EQUIVALENT) 250 MG capsule Take 3 capsules (750 mg) by mouth 2 times daily     ondansetron (ZOFRAN-ODT) 4 MG ODT tab Take 1 tablet (4 mg) by mouth every 6 hours as needed for nausea     senna-docusate (SENOKOT-S/PERICOLACE) 8.6-50 MG tablet Take 1 tablet by mouth 2 times daily as needed for constipation     sodium bicarbonate 650 MG tablet Take 1 tablet (650 mg) by mouth daily     sulfamethoxazole-trimethoprim (BACTRIM) 400-80 MG tablet Take 1 tablet by mouth daily     tacrolimus (GENERIC EQUIVALENT) 1 MG capsule Take 4 capsules (4 mg) by mouth 2 times daily     No current facility-administered medications for this visit.      There are no discontinued medications.    Physical Exam   Vital Signs: There were no vitals taken for this visit.    On video patient awake and alert, conversing normally, no conversational dyspnea and speaking in full sentences . Comprehension is normal        Data     Renal Latest Ref Rng & Units 5/3/2021 4/19/2021 4/6/2021   Na 133 - 144 mmol/L - - -   Na (external) 136 - 145 meq/L 138 138 139   K 3.4 - 5.3 mmol/L - - -   K (external) 3.5 - 5.1 meq/L 4.3 4.4 4.8   Cl 94 - 109 mmol/L - - -   Cl (external) 98 - 109 meq/L 108 106 108   CO2 20 - 32 mmol/L - - -   CO2 (external) 20 - 29 meq/L 21 25 23   BUN 7 - 30 mg/dL - - -   BUN (external) 6 - 22 mg/dL 19 24(H) 21   Cr 0.52 - 1.04 mg/dL - - -   Cr (external) 0.60 - 1.10 mg/dL 1.08 1.01 1.08   Glucose 70 - 99 mg/dL - - -   Glucose (external) 70 - 99 mg/dL 89 94 95   Ca  8.5 - 10.1 mg/dL - - -   Ca (external) 8.5 - 10.5 mg/dL 9.3 9.3 9.7   Mg 1.6 - 2.3 mg/dL - - -   Mg (external) 1.6 -  2.6 mg/dL 1.7 2.0 -     Bone Health Latest Ref Rng & Units 5/3/2021 4/19/2021 3/12/2021   Phos 2.5 - 4.5 mg/dL - - -   Phos (external) 2.3 - 4.7 mg/dL 3.6 3.3 3.9   PTHi 18 - 80 pg/mL - - -   PTHi (external) 14 - 95 pg/mL - - 94   Vit D Def 20 - 75 ug/L - - -     Heme Latest Ref Rng & Units 5/3/2021 4/19/2021 4/6/2021   WBC 4.0 - 11.0 10e9/L - - -   WBC (external) 4.0 - 11.0 K/uL 5.3 4.3 3.8(L)   Hgb 11.7 - 15.7 g/dL - - -   Hgb (external) 11.5 - 15.8 g/dL 12.1 12.2 11.8   Plt 150 - 450 10e9/L - - -   Plt (external) 140 - 400 K/uL 287 275 273   ABSOLUTE NEUTROPHIL 1.6 - 8.3 10e9/L - - -   ABSOLUTE NEUTROPHILS (EXTERNAL) 1.8 - 8.0 K/uL 3.7 2.9 2.9   ABSOLUTE LYMPHOCYTES 0.8 - 5.3 10e9/L - - -   ABSOLUTE LYMPHOCYTES (EXTERNAL) 0.8 - 4.1 K/uL 0.6(L) 0.8 0.6(L)   ABSOLUTE MONOCYTES 0.0 - 1.3 10e9/L - - -   ABSOLUTE MONOCYTES (EXTERNAL) 0.0 - 1.0 K/uL 0.7 0.3 0.3   ABSOLUTE EOSINOPHILS 0.0 - 0.7 10e9/L - - -   ABSOLUTE EOSINOPHILS (EXTERNAL) 0.0 - 0.7 K/uL 0.2 0.2 0.0   ABSOLUTE BASOPHILS 0.0 - 0.2 10e9/L - - -   ABSOLUTE BASOPHILS (EXTERNAL) 0.0 - 0.2 K/uL 0.0 0.1 -   ABS IMMATURE GRANULOCYTES 0 - 0.4 10e9/L - - -   ABSOLUTE NUCLEATED RBC - - - -     Liver Latest Ref Rng & Units 3/1/2021 11/12/2020 5/14/2020   AP 40 - 150 U/L - 142 -   AP (external) 40 - 150 U/L 129 - -   TBili 0.2 - 1.3 mg/dL - 0.5 -   TBili (external) 0.2 - 1.2 mg/dL 0.4 - -   DBili 0.0 - 0.2 mg/dL - - -   DBili (external) 0.0 - 0.5 mg/dL 0.2 - -   ALT 0 - 50 U/L - 18 12   ALT (external) 0 - 55 U/L 45 - -   AST 0 - 45 U/L - 19 -   AST (external) 5 - 34 U/L 30 - -   Tot Protein 6.8 - 8.8 g/dL - 8.4 -   Tot Protein (external) 6.0 - 8.3 g/dL 7.0 - -   Albumin 3.4 - 5.0 g/dL - 3.8 -   Albumin (external) 3.5 - 5.2 g/dL 4.2 - -     Pancreas Latest Ref Rng & Units 11/12/2020 1/24/2020 1/23/2020   A1C 0 - 5.6 % 4.9 Canceled, Test credited Canceled, Test credited     Iron studies Latest Ref Rng & Units 11/24/2020   Iron 35 - 180 ug/dL 21(L)   Iron sat 15 - 46  % 10(L)   Ferritin 12 - 150 ng/mL 1,120(H)     [facility] Txp Virology Latest Ref Rng & Units 5/3/2021 4/19/2021 3/8/2021   CMV DNA Quant Ext Undetected Iu/mL - - Undetected   LOG IU/ML OF CMVQNT (EXTERNAL) log IU/mL - - Undetected   BK Spec - - - -   BK Res BKNEG:BK Virus DNA Not Detected copies/mL - - -   BK Log <2.7 Log copies/mL - - -   BK Quant Result Ext None Detected IU/mL None Detected None Detected -   BK Quant Spec Ext - Blood - -   EBV VCA IGG ANTIBODY U/mL - - -   EBV CAPSID ANTIBODY IGG 0.0 - 0.8 AI - - -   Hep B Core NR:Nonreactive - - -   Hep B Surf - - - -   HIV 1&2 NEG - - -        Recent Labs   Lab Test 11/25/20  0643 11/27/20  0708 11/30/20  0651   DOSTAC 2,000 Not Provided 1,900   TACROL 6.6 8.8 9.5     Recent Labs   Lab Test 11/23/20  0651 11/30/20  0651   DOSMPA 2,000 1,900   MPACID 3.12 2.21   MPAG 191.9* 119.7*

## 2021-05-10 NOTE — PROGRESS NOTES
Post Transplant Patient Social Work Assessment -Outpatient    Patient Name: Paolo Vincent Trae  : 1975  Age: 45 year old  MRN: [id number]  Date of transplant: 2020    Patient known to this writer from follow up in the kidney transplant program. Telephone visit completed today to update assessment along with Patricia .      Presenting Information   Living Situation: Pt lives in an apartment with her  Irene and her daughter.   Functional Status: Independent with ADL's, drives self  Cultural/Language/Spiritual Considerations: Patricia-speaking female, originally from [place]  Primary Support Person  Irene  Other support:  Son, aunt and uncle who live in [place], [place]    Health Care Directive  Decision Maker: Patient   Alternate Decision Maker:  per [facility] policy.   Health Care Directive: Declined completing    Mental Health/Coping:   History of Mental Health: Denied  History of Chemical Health: Denied   Current status: Patient denied any current mental health or substance use concerns at this time  Coping: Appropriate, pt stated she is doing well  Services Needed/Recommended: None at this time     Financial   Income: Pt is unemployed. Pt's  is employed full time.  Impact of transplant on income: None identified at this time.  Insurance and medication coverage: Medicare and BCBS through spouse employer   Financial concerns: None identified at this time  Resources needed: None identified at this time      Education provided by SW: Social Work role outpatient setting and post-transplant expectations    Assessment and recommendations and plan:  Pt reported she takes her medications as prescribed and attends her appointments/labs as scheduled. Reviewed post-transplant expectations as well as psychosocial risks of transplant. Patient seemed to process information well via telephone. Pt had no questions or concerns for this writer.       Khadijah Rowell, [facility]  Social  Worker  Kidney/Pancreas/Auto Islet Transplant Programs

## 2021-05-19 DIAGNOSIS — Z94.0 KIDNEY TRANSPLANT RECIPIENT: ICD-10-CM

## 2021-05-19 NOTE — LETTER
PHYSICIAN ORDERS      DATE & TIME ISSUED: May 21, 2021 4:21 PM  PATIENT NAME: Paolo Larkin   : 1975     [facility] MR# [if applicable]: [id number]     DIAGNOSIS:  Kidney transplant   ICD-10 CODE: Z94.0     Please repeat the following level in one week  Tacrolimus level (ensure 12 hours between last dose and blood draw)    Any questions please call: [phone] option [phone]    Please fax results to [phone].    .

## 2021-05-19 NOTE — TELEPHONE ENCOUNTER
ISSUE:  Tacrolimus 15.3, goal 8-10    PLAN:  Call and confirm a 12 hour trough and current tacrolimus dose of 4 mg BID  Any recent illness, diarrhea, or medication changes?  Recommend decreasing dose to 3 mg BID  Repeat tac level within 1 week     LPN TASK:  Call with above instructions  Update rx   Update lab order

## 2021-05-20 NOTE — TELEPHONE ENCOUNTER
Call placed to patient via  service. No answer. Detailed voice message left with instructions listed below

## 2021-05-21 RX ORDER — TACROLIMUS 1 MG/1
3 CAPSULE ORAL 2 TIMES DAILY
Qty: 180 CAPSULE | Refills: 11 | Status: SHIPPED | OUTPATIENT
Start: 2021-05-21 | End: 2021-06-04

## 2021-05-21 NOTE — TELEPHONE ENCOUNTER
Call placed to patient. Patient confirms current dose and accurate trough level. Denies any recent illness, diarrhea or medication changes. Patient v\u to decrease dose and repeat level in one week. ORder sent

## 2021-06-03 ENCOUNTER — TELEPHONE (OUTPATIENT)
Dept: TRANSPLANT | Facility: CLINIC | Age: 46
End: 2021-06-03

## 2021-06-03 DIAGNOSIS — Z94.0 KIDNEY TRANSPLANT RECIPIENT: ICD-10-CM

## 2021-06-03 NOTE — TELEPHONE ENCOUNTER
ISSUE:   Tacrolimus IR level 9.4 on 6/1, goal 6-8, dose 3 mg BID.    PLAN:   Please call patient and confirm this was an accurate 12-hour trough. Verify Tacrolimus IR dose 3 mg BID. Confirm no new medications or illness. Confirm no missed doses. If accurate trough and accurate dose, decrease Tacrolimus IR dose to 2 mg BID and repeat labs in 1 weeks    OUTCOME:   Spoke with patient via Patricia , they confirm accurate trough level and current dose 3 mg BID. Patient confirmed dose change to 2 mg BID and to repeat labs in 1 weeks. Orders sent to preferred pharmacy for dose change and lab for repeat labs. Patient voiced understanding of plan.

## 2021-06-04 RX ORDER — TACROLIMUS 1 MG/1
2 CAPSULE ORAL 2 TIMES DAILY
Qty: 120 CAPSULE | Refills: 11 | Status: SHIPPED | OUTPATIENT
Start: 2021-06-04 | End: 2021-09-20

## 2021-07-07 ENCOUNTER — VIRTUAL VISIT (OUTPATIENT)
Dept: NEPHROLOGY | Facility: CLINIC | Age: 46
End: 2021-07-07
Attending: INTERNAL MEDICINE
Payer: MEDICARE

## 2021-07-07 DIAGNOSIS — Z94.0 KIDNEY REPLACED BY TRANSPLANT: Primary | ICD-10-CM

## 2021-07-07 DIAGNOSIS — Z48.298 AFTERCARE FOLLOWING ORGAN TRANSPLANT: ICD-10-CM

## 2021-07-07 DIAGNOSIS — I15.1 HTN, KIDNEY TRANSPLANT RELATED: ICD-10-CM

## 2021-07-07 DIAGNOSIS — Z94.0 HTN, KIDNEY TRANSPLANT RELATED: ICD-10-CM

## 2021-07-07 DIAGNOSIS — D84.9 IMMUNOSUPPRESSION (H): ICD-10-CM

## 2021-07-07 PROCEDURE — 99443 PR PHYSICIAN TELEPHONE EVALUATION 21-30 MIN: CPT | Mod: 95

## 2021-07-07 NOTE — PROGRESS NOTES
Paolo is a 45 year old who is being evaluated via a billable telephone visit.      What phone number would you like to be contacted at?  SERVICES ; [phone], [facility]  PATIENT PH# [phone]    How would you like to obtain your AVS? Mail a copy  Phone call duration: 26 minutes      CHRONIC TRANSPLANT NEPHROLOGY VISIT    Assessment & Plan   # DDKT: Stable   - Baseline Creatinine:  ~ 0.9-1.1   - Proteinuria: Normal (<0.2 grams)   - Date DSA Last Checked: Mar/2021      Latest DSA: No   - BK Viremia: No   - Kidney Tx Biopsy: No    # Immunosuppression: Tacrolimus immediate release (goal 6-8) and Mycophenolate mofetil (dose 750 mg every 12 hours)   - Continue with intensive monitoring of immunosuppression for efficacy and toxicity.   - Changes: Not at this time    # Infection Prophylaxis:   - PJP: Sulfa/TMP (Bactrim)    # Hypertension: Controlled;  Goal BP: < 130/80   - Changes: Not at this time. Continue amlodipine 5mg daily    # Anemia in Chronic Renal Disease: Hgb: Stable      JESSI: No   - Iron studies: Replete    # Mineral Bone Disorder:   - Secondary renal hyperparathyroidism; PTH level: Minimally elevated ( pg/ml)        On treatment: None  - Vitamin D; level: Low        On supplement: No, recheck vitamin D level  - Calcium; level: Normal        On supplement: No  - Phosphorus; level: Normal        On supplement: No    # Electrolytes:   - Potassium; level: Normal        On supplement: No  - Magnesium; level: Low normal        On supplement: Yes  - Bicarbonate; level: Low normal        On supplement: Yes    # Skin Cancer Risk:    - Discussed sun protection and recommend regular follow up with Dermatology.    # AVF:   -Pt wanted to know if she should have revision or excision of AVF. She notes that it does not hurt, is not very large. I advised her that she should keep this in place unless it grows in size/    -Will examine at 1 year visit to see if this merits referral for revision    # COVID-19  Virus Review: Discussed COVID-19 virus and the potential medical risks.  Reviewed preventative health recommendations, which includes washing hands for 20 seconds, avoid touching your face, and social distancing.  Asked patient to inform the transplant center if they are exposed or diagnosed with this virus.    # COVID Vaccine Completed: No, I recommended that she get it and she states that she will go to the pharmacy and get it.     # Medical Compliance: Yes    # Transplant History:  Etiology of Kidney Failure: Unknown etiology (no kidney biopsy)  Tx: DDKT  Transplant: 11/12/2020 (Kidney)  Significant changes in immunosuppression: None  Significant transplant-related complications: None    Transplant Office Phone Number: [phone]    Assessment and plan was discussed with the patient and she voiced her understanding and agreement.    Return visit: Return in about 3 months (around 10/7/2021) for 1 year post transplant visit. In person     Edgardo Sands MD    Chief Complaint   Ms. Larkin is a 45 year old here for routine follow up, kidney transplant and immunosuppression management.    History of Present Illness    The patient feels well. She denies N/V/D, fever, chills, LE edema, chest pain, SOB, dysuria, frequency. She is concerned regarding the appearance of her AVF.     Home BP: Not checked    Problem List   Patient Active Problem List   Diagnosis     Anemia in chronic renal disease     HTN, kidney transplant related     Secondary renal hyperparathyroidism (H)     Coronary artery disease involving native coronary artery of native heart without angina pectoris     Status post coronary angiogram     NSTEMI (non-ST elevated myocardial infarction) (H)     Vitamin D deficiency     Pneumothorax     Kidney replaced by transplant     Femoral neuropathy, right     Femoral neuropathy of right lower extremity     Aftercare following organ transplant     CKD (chronic kidney disease) stage 2, GFR 60-89 ml/min     Anemia due  to stage 5 chronic kidney disease, not on chronic dialysis (H)       Allergies   Allergies   Allergen Reactions     Blood Transfusion Related (Informational Only)      Patient has a history of a clinically significant antibody against RBC antigens.  A delay in compatible RBCs may occur.     Cephalosporins Anaphylaxis     Per [facility] allergist report: positive skin intradermal tests to Cefazolin and Ceftazidime, but NOT to Penicillin G, Piperacillin and Meropenem      Chlorhexidine Anaphylaxis     Several times during anesthesia at initial phase during insertion of I.v. line and after disinfection with Chlorhexidine drop of blood pressure.  In Prick and as well intradermal tests clear positive reactions to Chlorhexidine (particularly in intradermal test to 0.0002% Chlorhexidine papule of 1cm and Erythema of 2.5cm). AVOID in future Chlorhexidine     Heparin Flush        Medications   Current Outpatient Medications   Medication Sig     acetaminophen (TYLENOL) 325 MG tablet Take 325-650 mg by mouth every 4 hours as needed for mild pain or fever      amLODIPine (NORVASC) 5 MG tablet Take 1 tablet (5 mg) by mouth daily     aspirin (ASA) 81 MG chewable tablet Take 1 tablet (81 mg) by mouth daily     magnesium oxide (MAG-OX) 400 MG tablet Take 2 tablets (800 mg) by mouth 2 times daily     mycophenolate (GENERIC EQUIVALENT) 250 MG capsule Take 3 capsules (750 mg) by mouth 2 times daily     ondansetron (ZOFRAN-ODT) 4 MG ODT tab Take 1 tablet (4 mg) by mouth every 6 hours as needed for nausea     senna-docusate (SENOKOT-S/PERICOLACE) 8.6-50 MG tablet Take 1 tablet by mouth 2 times daily as needed for constipation     sodium bicarbonate 650 MG tablet Take 1 tablet (650 mg) by mouth daily     sulfamethoxazole-trimethoprim (BACTRIM) 400-80 MG tablet Take 1 tablet by mouth daily     tacrolimus (GENERIC EQUIVALENT) 1 MG capsule Take 2 capsules (2 mg) by mouth 2 times daily     No current facility-administered medications for this  visit.      There are no discontinued medications.    Physical Exam   Vital Signs: There were no vitals taken for this visit.    Physical exam was deferred for this telemedicine visit.    Data     Renal Latest Ref Rng & Units 6/14/2021 6/1/2021 5/17/2021   Na 133 - 144 mmol/L - - -   Na (external) 136 - 145 meq/L 140 138 138   K 3.4 - 5.3 mmol/L - - -   K (external) 3.5 - 5.1 meq/L 4.4 4.8 4.3   Cl 94 - 109 mmol/L - - -   Cl (external) 98 - 109 meq/L 108 107 108   CO2 20 - 32 mmol/L - - -   CO2 (external) 20 - 29 meq/L 23 23 22   BUN 7 - 30 mg/dL - - -   BUN (external) 6 - 22 mg/dL 15 16 18   Cr 0.52 - 1.04 mg/dL - - -   Cr (external) 0.60 - 1.10 mg/dL 0.97 0.95 1.03   Glucose 70 - 99 mg/dL - - -   Glucose (external) 70 - 99 mg/dL 92 92 90   Ca  8.5 - 10.1 mg/dL - - -   Ca (external) 8.5 - 10.5 mg/dL 9.3 8.9 9.1   Mg 1.6 - 2.3 mg/dL - - -   Mg (external) 1.6 - 2.6 mg/dL - - -     Bone Health Latest Ref Rng & Units 6/1/2021 5/3/2021 4/19/2021   Phos 2.5 - 4.5 mg/dL - - -   Phos (external) 2.3 - 4.7 mg/dL 3.1 3.6 3.3   PTHi 18 - 80 pg/mL - - -   PTHi (external) 14 - 95 pg/mL - - -   Vit D Def 20 - 75 ug/L - - -     Heme Latest Ref Rng & Units 6/14/2021 6/1/2021 5/17/2021   WBC 4.0 - 11.0 10e9/L - - -   WBC (external) 4.0 - 11.0 K/uL 4.4 4.4 4.7   Hgb 11.7 - 15.7 g/dL - - -   Hgb (external) 11.5 - 15.8 g/dL 12.8 12.7 12.3   Plt 150 - 450 10e9/L - - -   Plt (external) 140 - 400 K/uL 293 293 306   ABSOLUTE NEUTROPHIL 1.6 - 8.3 10e9/L - - -   ABSOLUTE NEUTROPHILS (EXTERNAL) 1.8 - 8.0 K/uL 3.0 3.3 3.5   ABSOLUTE LYMPHOCYTES 0.8 - 5.3 10e9/L - - -   ABSOLUTE LYMPHOCYTES (EXTERNAL) 0.8 - 4.1 K/uL 0.6(L) 0.6(L) 0.7(L)   ABSOLUTE MONOCYTES 0.0 - 1.3 10e9/L - - -   ABSOLUTE MONOCYTES (EXTERNAL) 0.0 - 1.0 K/uL 0.5 0.4 0.3   ABSOLUTE EOSINOPHILS 0.0 - 0.7 10e9/L - - -   ABSOLUTE EOSINOPHILS (EXTERNAL) 0.0 - 0.7 K/uL 0.2 0.1 0.1   ABSOLUTE BASOPHILS 0.0 - 0.2 10e9/L - - -   ABSOLUTE BASOPHILS (EXTERNAL) 0.0 - 0.2 K/uL 0.0 0.0 0.0    ABS IMMATURE GRANULOCYTES 0 - 0.4 10e9/L - - -   ABSOLUTE NUCLEATED RBC - - - -     Liver Latest Ref Rng & Units 5/17/2021 3/1/2021 11/12/2020   AP 40 - 150 U/L - - 142   AP (external) 40 - 150 U/L 97 129 -   TBili 0.2 - 1.3 mg/dL - - 0.5   TBili (external) 0.2 - 1.2 mg/dL 0.3 0.4 -   DBili 0.0 - 0.2 mg/dL - - -   DBili (external) 0.0 - 0.5 mg/dL 0.1 0.2 -   ALT 0 - 50 U/L - - 18   ALT (external) 0 - 55 U/L 22 45 -   AST 0 - 45 U/L - - 19   AST (external) 5 - 34 U/L 21 30 -   Tot Protein 6.8 - 8.8 g/dL - - 8.4   Tot Protein (external) 6.0 - 8.3 g/dL 6.7 7.0 -   Albumin 3.4 - 5.0 g/dL - - 3.8   Albumin (external) 3.5 - 5.2 g/dL 4.0 4.2 -     Pancreas Latest Ref Rng & Units 5/17/2021 11/12/2020 1/24/2020   A1C 0 - 5.6 % - 4.9 Canceled, Test credited   A1C (external) 4.0 - 6.0 % 5.3 - -     Iron studies Latest Ref Rng & Units 11/24/2020   Iron 35 - 180 ug/dL 21(L)   Iron sat 15 - 46 % 10(L)   Ferritin 12 - 150 ng/mL 1,120(H)     [facility] Txp Virology Latest Ref Rng & Units 6/1/2021 5/3/2021 4/19/2021   CMV DNA Quant Ext Undetected Iu/mL - - -   LOG IU/ML OF CMVQNT (EXTERNAL) log IU/mL - - -   BK Spec - - - -   BK Res BKNEG:BK Virus DNA Not Detected copies/mL - - -   BK Log <2.7 Log copies/mL - - -   BK Quant Result Ext None Detected None Detected None Detected None Detected   BK Quant Spec Ext - Blood Blood -   EBV VCA IGG ANTIBODY U/mL - - -   EBV CAPSID ANTIBODY IGG 0.0 - 0.8 AI - - -   Hep B Core NR:Nonreactive - - -   Hep B Surf - - - -   HIV 1&2 NEG - - -        Recent Labs   Lab Test 11/25/20  0643 11/27/20  0708 11/30/20  0651   DOSTAC 2,000 Not Provided 1,900   TACROL 6.6 8.8 9.5     Recent Labs   Lab Test 11/23/20  0651 11/30/20  0651   DOSMPA 2,000 1,900   MPACID 3.12 2.21   MPAG 191.9* 119.7*

## 2021-07-07 NOTE — LETTER
7/7/2021       RE: Paolo Larkin  [address]  [place] [address]     Dear Colleague,    Thank you for referring your patient, Paolo Larkin, to the [facility] [place] at [facility]. Please see a copy of my visit note below.      Paolo is a 45 year old who is being evaluated via a billable telephone visit.      What phone number would you like to be contacted at?  SERVICES ; [phone], [facility]  PATIENT PH# [phone]    How would you like to obtain your AVS? Mail a copy  Phone call duration: 26 minutes      CHRONIC TRANSPLANT NEPHROLOGY VISIT    Assessment & Plan   # DDKT: Stable   - Baseline Creatinine:  ~ 0.9-1.1   - Proteinuria: Normal (<0.2 grams)   - Date DSA Last Checked: Mar/2021      Latest DSA: No   - BK Viremia: No   - Kidney Tx Biopsy: No    # Immunosuppression: Tacrolimus immediate release (goal 6-8) and Mycophenolate mofetil (dose 750 mg every 12 hours)   - Continue with intensive monitoring of immunosuppression for efficacy and toxicity.   - Changes: Not at this time    # Infection Prophylaxis:   - PJP: Sulfa/TMP (Bactrim)    # Hypertension: Controlled;  Goal BP: < 130/80   - Changes: Not at this time. Continue amlodipine 5mg daily    # Anemia in Chronic Renal Disease: Hgb: Stable      JESSI: No   - Iron studies: Replete    # Mineral Bone Disorder:   - Secondary renal hyperparathyroidism; PTH level: Minimally elevated ( pg/ml)        On treatment: None  - Vitamin D; level: Low        On supplement: No, recheck vitamin D level  - Calcium; level: Normal        On supplement: No  - Phosphorus; level: Normal        On supplement: No    # Electrolytes:   - Potassium; level: Normal        On supplement: No  - Magnesium; level: Low normal        On supplement: Yes  - Bicarbonate; level: Low normal        On supplement: Yes    # Skin Cancer Risk:    - Discussed sun protection and recommend regular follow  up with Dermatology.    # AVF:   -Pt wanted to know if she should have revision or excision of AVF. She notes that it does not hurt, is not very large. I advised her that she should keep this in place unless it grows in size/    -Will examine at 1 year visit to see if this merits referral for revision    # COVID-19 Virus Review: Discussed COVID-19 virus and the potential medical risks.  Reviewed preventative health recommendations, which includes washing hands for 20 seconds, avoid touching your face, and social distancing.  Asked patient to inform the transplant center if they are exposed or diagnosed with this virus.    # COVID Vaccine Completed: No, I recommended that she get it and she states that she will go to the pharmacy and get it.     # Medical Compliance: Yes    # Transplant History:  Etiology of Kidney Failure: Unknown etiology (no kidney biopsy)  Tx: DDKT  Transplant: 11/12/2020 (Kidney)  Significant changes in immunosuppression: None  Significant transplant-related complications: None    Transplant Office Phone Number: [phone]    Assessment and plan was discussed with the patient and she voiced her understanding and agreement.    Return visit: Return in about 3 months (around 10/7/2021) for 1 year post transplant visit. In person     Edgardo Sands MD    Chief Complaint   Ms. Larkin is a 45 year old here for routine follow up, kidney transplant and immunosuppression management.    History of Present Illness    The patient feels well. She denies N/V/D, fever, chills, LE edema, chest pain, SOB, dysuria, frequency. She is concerned regarding the appearance of her AVF.     Home BP: Not checked    Problem List   Patient Active Problem List   Diagnosis     Anemia in chronic renal disease     HTN, kidney transplant related     Secondary renal hyperparathyroidism (H)     Coronary artery disease involving native coronary artery of native heart without angina pectoris     Status post coronary angiogram      NSTEMI (non-ST elevated myocardial infarction) (H)     Vitamin D deficiency     Pneumothorax     Kidney replaced by transplant     Femoral neuropathy, right     Femoral neuropathy of right lower extremity     Aftercare following organ transplant     CKD (chronic kidney disease) stage 2, GFR 60-89 ml/min     Anemia due to stage 5 chronic kidney disease, not on chronic dialysis (H)       Allergies   Allergies   Allergen Reactions     Blood Transfusion Related (Informational Only)      Patient has a history of a clinically significant antibody against RBC antigens.  A delay in compatible RBCs may occur.     Cephalosporins Anaphylaxis     Per [facility] allergist report: positive skin intradermal tests to Cefazolin and Ceftazidime, but NOT to Penicillin G, Piperacillin and Meropenem      Chlorhexidine Anaphylaxis     Several times during anesthesia at initial phase during insertion of I.v. line and after disinfection with Chlorhexidine drop of blood pressure.  In Prick and as well intradermal tests clear positive reactions to Chlorhexidine (particularly in intradermal test to 0.0002% Chlorhexidine papule of 1cm and Erythema of 2.5cm). AVOID in future Chlorhexidine     Heparin Flush        Medications   Current Outpatient Medications   Medication Sig     acetaminophen (TYLENOL) 325 MG tablet Take 325-650 mg by mouth every 4 hours as needed for mild pain or fever      amLODIPine (NORVASC) 5 MG tablet Take 1 tablet (5 mg) by mouth daily     aspirin (ASA) 81 MG chewable tablet Take 1 tablet (81 mg) by mouth daily     magnesium oxide (MAG-OX) 400 MG tablet Take 2 tablets (800 mg) by mouth 2 times daily     mycophenolate (GENERIC EQUIVALENT) 250 MG capsule Take 3 capsules (750 mg) by mouth 2 times daily     ondansetron (ZOFRAN-ODT) 4 MG ODT tab Take 1 tablet (4 mg) by mouth every 6 hours as needed for nausea     senna-docusate (SENOKOT-S/PERICOLACE) 8.6-50 MG tablet Take 1 tablet by mouth 2 times daily as needed for constipation      sodium bicarbonate 650 MG tablet Take 1 tablet (650 mg) by mouth daily     sulfamethoxazole-trimethoprim (BACTRIM) 400-80 MG tablet Take 1 tablet by mouth daily     tacrolimus (GENERIC EQUIVALENT) 1 MG capsule Take 2 capsules (2 mg) by mouth 2 times daily     No current facility-administered medications for this visit.      There are no discontinued medications.    Physical Exam   Vital Signs: There were no vitals taken for this visit.    Physical exam was deferred for this telemedicine visit.    Data     Renal Latest Ref Rng & Units 6/14/2021 6/1/2021 5/17/2021   Na 133 - 144 mmol/L - - -   Na (external) 136 - 145 meq/L 140 138 138   K 3.4 - 5.3 mmol/L - - -   K (external) 3.5 - 5.1 meq/L 4.4 4.8 4.3   Cl 94 - 109 mmol/L - - -   Cl (external) 98 - 109 meq/L 108 107 108   CO2 20 - 32 mmol/L - - -   CO2 (external) 20 - 29 meq/L 23 23 22   BUN 7 - 30 mg/dL - - -   BUN (external) 6 - 22 mg/dL 15 16 18   Cr 0.52 - 1.04 mg/dL - - -   Cr (external) 0.60 - 1.10 mg/dL 0.97 0.95 1.03   Glucose 70 - 99 mg/dL - - -   Glucose (external) 70 - 99 mg/dL 92 92 90   Ca  8.5 - 10.1 mg/dL - - -   Ca (external) 8.5 - 10.5 mg/dL 9.3 8.9 9.1   Mg 1.6 - 2.3 mg/dL - - -   Mg (external) 1.6 - 2.6 mg/dL - - -     Bone Health Latest Ref Rng & Units 6/1/2021 5/3/2021 4/19/2021   Phos 2.5 - 4.5 mg/dL - - -   Phos (external) 2.3 - 4.7 mg/dL 3.1 3.6 3.3   PTHi 18 - 80 pg/mL - - -   PTHi (external) 14 - 95 pg/mL - - -   Vit D Def 20 - 75 ug/L - - -     Heme Latest Ref Rng & Units 6/14/2021 6/1/2021 5/17/2021   WBC 4.0 - 11.0 10e9/L - - -   WBC (external) 4.0 - 11.0 K/uL 4.4 4.4 4.7   Hgb 11.7 - 15.7 g/dL - - -   Hgb (external) 11.5 - 15.8 g/dL 12.8 12.7 12.3   Plt 150 - 450 10e9/L - - -   Plt (external) 140 - 400 K/uL 293 293 306   ABSOLUTE NEUTROPHIL 1.6 - 8.3 10e9/L - - -   ABSOLUTE NEUTROPHILS (EXTERNAL) 1.8 - 8.0 K/uL 3.0 3.3 3.5   ABSOLUTE LYMPHOCYTES 0.8 - 5.3 10e9/L - - -   ABSOLUTE LYMPHOCYTES (EXTERNAL) 0.8 - 4.1 K/uL 0.6(L) 0.6(L)  0.7(L)   ABSOLUTE MONOCYTES 0.0 - 1.3 10e9/L - - -   ABSOLUTE MONOCYTES (EXTERNAL) 0.0 - 1.0 K/uL 0.5 0.4 0.3   ABSOLUTE EOSINOPHILS 0.0 - 0.7 10e9/L - - -   ABSOLUTE EOSINOPHILS (EXTERNAL) 0.0 - 0.7 K/uL 0.2 0.1 0.1   ABSOLUTE BASOPHILS 0.0 - 0.2 10e9/L - - -   ABSOLUTE BASOPHILS (EXTERNAL) 0.0 - 0.2 K/uL 0.0 0.0 0.0   ABS IMMATURE GRANULOCYTES 0 - 0.4 10e9/L - - -   ABSOLUTE NUCLEATED RBC - - - -     Liver Latest Ref Rng & Units 5/17/2021 3/1/2021 11/12/2020   AP 40 - 150 U/L - - 142   AP (external) 40 - 150 U/L 97 129 -   TBili 0.2 - 1.3 mg/dL - - 0.5   TBili (external) 0.2 - 1.2 mg/dL 0.3 0.4 -   DBili 0.0 - 0.2 mg/dL - - -   DBili (external) 0.0 - 0.5 mg/dL 0.1 0.2 -   ALT 0 - 50 U/L - - 18   ALT (external) 0 - 55 U/L 22 45 -   AST 0 - 45 U/L - - 19   AST (external) 5 - 34 U/L 21 30 -   Tot Protein 6.8 - 8.8 g/dL - - 8.4   Tot Protein (external) 6.0 - 8.3 g/dL 6.7 7.0 -   Albumin 3.4 - 5.0 g/dL - - 3.8   Albumin (external) 3.5 - 5.2 g/dL 4.0 4.2 -     Pancreas Latest Ref Rng & Units 5/17/2021 11/12/2020 1/24/2020   A1C 0 - 5.6 % - 4.9 Canceled, Test credited   A1C (external) 4.0 - 6.0 % 5.3 - -     Iron studies Latest Ref Rng & Units 11/24/2020   Iron 35 - 180 ug/dL 21(L)   Iron sat 15 - 46 % 10(L)   Ferritin 12 - 150 ng/mL 1,120(H)     [facility] Txp Virology Latest Ref Rng & Units 6/1/2021 5/3/2021 4/19/2021   CMV DNA Quant Ext Undetected Iu/mL - - -   LOG IU/ML OF CMVQNT (EXTERNAL) log IU/mL - - -   BK Spec - - - -   BK Res BKNEG:BK Virus DNA Not Detected copies/mL - - -   BK Log <2.7 Log copies/mL - - -   BK Quant Result Ext None Detected None Detected None Detected None Detected   BK Quant Spec Ext - Blood Blood -   EBV VCA IGG ANTIBODY U/mL - - -   EBV CAPSID ANTIBODY IGG 0.0 - 0.8 AI - - -   Hep B Core NR:Nonreactive - - -   Hep B Surf - - - -   HIV 1&2 NEG - - -        Recent Labs   Lab Test 11/25/20  0643 11/27/20  0708 11/30/20  0651   DOSTAC 2,000 Not Provided 1,900   TACROL 6.6 8.8 9.5     Recent Labs    Lab Test 11/23/20  0651 11/30/20  0651   DOSMPA 2,000 1,900   MPACID 3.12 2.21   MPAG 191.9* 119.7*           Again, thank you for allowing me to participate in the care of your patient.      Sincerely,    [facility]

## 2021-09-20 ENCOUNTER — TELEPHONE (OUTPATIENT)
Dept: TRANSPLANT | Facility: CLINIC | Age: 46
End: 2021-09-20

## 2021-09-20 DIAGNOSIS — Z94.0 KIDNEY TRANSPLANT RECIPIENT: Primary | ICD-10-CM

## 2021-09-20 RX ORDER — TACROLIMUS 1 MG/1
CAPSULE ORAL
Qty: 150 CAPSULE | Refills: 11 | Status: SHIPPED | OUTPATIENT
Start: 2021-09-20 | End: 2021-10-14

## 2021-09-20 NOTE — LETTER
PHYSICIAN ORDERS      DATE & TIME ISSUED: 2021  11:45 AM  PATIENT NAME: Paolo Larkin   : 1975     [facility] MR# [if applicable]: [id number]     DIAGNOSIS:  Kidney transplant   ICD-10 CODE: Z94.0     Please repeat the following level in one week  Tacrolimus level (ensure 12 hours between last dose and blood draw)    Any questions please call: [phone] option 5    Please fax results to [phone].    .

## 2021-09-20 NOTE — TELEPHONE ENCOUNTER
ISSUE:   Tacrolimus IR level 5.6 on 9/13, goal 6-8, dose 2 mg BID.    PLAN:   Please call patient and confirm this was an accurate 12-hour trough. Verify Tacrolimus IR dose 2 mg BID. Confirm no new medications or illness. Confirm no missed doses. If accurate trough and accurate dose, increase Tacrolimus IR dose to 2.5 mg BID and repeat labs in 1 weeks    Yaz Villa RN    OUTCOME:   Spoke with patient, they confirm accurate trough level and current dose 2 mg BID. Patient confirmed dose change to 2.5 mg BID and to repeat labs in 1 weeks. Orders sent to preferred pharmacy for dose change and lab for repeat labs. Patient voiced understanding of plan.

## 2021-10-14 ENCOUNTER — TELEPHONE (OUTPATIENT)
Dept: TRANSPLANT | Facility: CLINIC | Age: 46
End: 2021-10-14

## 2021-10-14 DIAGNOSIS — Z94.0 KIDNEY TRANSPLANTED: ICD-10-CM

## 2021-10-14 DIAGNOSIS — Z94.0 KIDNEY TRANSPLANT RECIPIENT: ICD-10-CM

## 2021-10-14 RX ORDER — MYCOPHENOLATE MOFETIL 250 MG/1
500 CAPSULE ORAL 2 TIMES DAILY
Qty: 120 CAPSULE | Refills: 11 | Status: SHIPPED | OUTPATIENT
Start: 2021-10-14 | End: 2021-10-27

## 2021-10-14 RX ORDER — TACROLIMUS 1 MG/1
2 CAPSULE ORAL 2 TIMES DAILY
Qty: 120 CAPSULE | Refills: 11 | Status: SHIPPED | OUTPATIENT
Start: 2021-10-14 | End: 2021-12-30

## 2021-10-14 NOTE — TELEPHONE ENCOUNTER
Edgardo Sands MD Poucher, Jessica, RN  Elevated serum creatinine and recommend usual assessment for fever, recent illness, pain over kidney allograft, blood pressure and volume status, as well as would just follow for now and repeat labs.       ISSUE:   Tacrolimus IR level 10.6 on 10/12/21, goal 4-6, dose 3mg AM and 2 mg PM.    PLAN:   Please call patient and confirm this was an accurate 12-hour trough. Verify Tacrolimus IR dose 3mgAM and 2mgPM. Confirm no new medications or illness. Confirm no missed doses. If accurate trough and accurate dose, decrease Tacrolimus IR dose to 2 mg BID and repeat labs in 1 weeks    OUTCOME:   Spoke with patient, they confirm accurate trough level and current dose 3mg AM and 2mg PM. Patient confirmed dose change to 2 mg BID and to repeat labs in 1 weeks. Orders sent to preferred pharmacy for dose change and lab for repeat labs. Patient voiced understanding of plan.     She went to the hospital positive for COVID, no appetite but trying to drink fluids. Fever and cough. Two weeks ago this happened. Last night, getting worse and now has pneumonia  October 1st symptoms started.   --------------------------------------------------------------------------------------------------------------  Edgardo Sands MD Brady, Lauren, RN  Decrease MMF to 500mg PO BID. Please call her again early next week. With diarrhea tac level can increase             Spoke with patient and she will lower her MMF to 500mg BID and go to the ER if symptoms persist and she is unable to eat or drink.

## 2021-10-26 ENCOUNTER — TELEPHONE (OUTPATIENT)
Dept: TRANSPLANT | Facility: CLINIC | Age: 46
End: 2021-10-26

## 2021-10-26 DIAGNOSIS — Z94.0 KIDNEY TRANSPLANTED: ICD-10-CM

## 2021-10-27 RX ORDER — MYCOPHENOLATE MOFETIL 250 MG/1
750 CAPSULE ORAL 2 TIMES DAILY
Qty: 180 CAPSULE | Refills: 11 | Status: SHIPPED | OUTPATIENT
Start: 2021-10-27 | End: 2021-12-30

## 2021-10-27 NOTE — TELEPHONE ENCOUNTER
General health check in after positive covid test 2 weeks ago. States she feels much better. No fevers, no shortness of breath, occasional cough, no diarrhea. Paolo was never vaccinated against covid-19.     Eating and drinking much improved. Feels like she has made a good recovery but not yet completely back to baseline.     Confirmed she is still taking  mg bid. Next lab draw scheduled for 11/12.    Reviewed at covid meeting, increase MMF back to 750 mg bid per Dr. Marcelino. Repeat labs. Paw verbalized understanding of plan (via Patricia ).

## 2021-11-01 ENCOUNTER — VIRTUAL VISIT (OUTPATIENT)
Dept: NEPHROLOGY | Facility: CLINIC | Age: 46
End: 2021-11-01
Attending: INTERNAL MEDICINE
Payer: MEDICARE

## 2021-11-01 DIAGNOSIS — Z94.0 HTN, KIDNEY TRANSPLANT RELATED: ICD-10-CM

## 2021-11-01 DIAGNOSIS — Z94.0 KIDNEY REPLACED BY TRANSPLANT: Primary | ICD-10-CM

## 2021-11-01 DIAGNOSIS — D84.9 IMMUNOSUPPRESSION (H): ICD-10-CM

## 2021-11-01 DIAGNOSIS — I15.1 HTN, KIDNEY TRANSPLANT RELATED: ICD-10-CM

## 2021-11-01 DIAGNOSIS — E83.42 HYPOMAGNESEMIA: ICD-10-CM

## 2021-11-01 PROCEDURE — 99214 OFFICE O/P EST MOD 30 MIN: CPT | Mod: 95 | Performed by: INTERNAL MEDICINE

## 2021-11-01 ASSESSMENT — PAIN SCALES - GENERAL: PAINLEVEL: NO PAIN (0)

## 2021-11-01 NOTE — PROGRESS NOTES
TRANSPLANT NEPHROLOGY CHRONIC POST TRANSPLANT VISIT    Assessment & Plan   # DDKT: Trend up thought 2/2 COVID   - Baseline Creatinine:  ~ 0.9-1   - Proteinuria: Normal (<0.2 grams)   - Date DSA Last Checked: Mar/2021, recheck now      Latest DSA: No   - BK Viremia: No   - Kidney Tx Biopsy: No    # Immunosuppression: Tacrolimus immediate release (goal 6-8) and Mycophenolate mofetil (dose 750 mg every 12 hours)   - Continue with intensive monitoring of immunosuppression for efficacy and toxicity.   - Changes: Yes - obtain DSA, decrease tac goal to 4-6 after next labs    # Infection Prophylaxis:   - PJP: Sulfa/TMP (Bactrim)    # Hypertension: Controlled;  Goal BP: < 130/80   - Changes: Not at this time. Continue amlodipine 5mg daily     # Anemia in Chronic Renal Disease: Hgb: Stable      JESSI: No   - Iron studies: Not checked recently    # Mineral Bone Disorder:   - Secondary renal hyperparathyroidism; PTH level: Minimally elevated ( pg/ml)        On treatment: None  - Vitamin D; level: Not checked recently, but was low last check        On supplement: No  - Calcium; level: Normal        On supplement: No  - Phosphorus; level: Normal        On supplement: No   -Repeat PTH, phos    # Electrolytes:   - Potassium; level: High normal        On supplement: No  - Magnesium; level: Normal        On supplement: Yes, mag ox 800mg bid. Repeat Mag  - Bicarbonate; level: Normal        On supplement: Yes, bicarb 650mg daily       # Skin Cancer Risk:    - Discussed sun protection and recommend regular follow up with Dermatology.    # Medical Compliance: Yes    # COVID-19 Virus Review: Discussed COVID-19 virus and the potential medical risks.  Reviewed preventative health recommendations, including wearing a mask where appropriate.  Recommended COVID vaccination should be up to date with either an initial vaccination or booster shot when appropriate.  Asked the patient to inform the transplant center if they are exposed or  diagnosed with this virus.    # COVID Vaccination Up To Date: No. Diagnosed with COVID 10/13/21, now improving after she was given course of dexamethasone and azithromycin. She did not receive monoclonal abs.    -Recommend starting COVID vaccination series starting 11/13/21 (1m after COVID infection)    # Transplant History:  Etiology of Kidney Failure: Unknown etiology (no kidney biopsy)  Tx: DDKT  Transplant: 11/12/2020 (Kidney)  Significant changes in immunosuppression: None  Significant transplant-related complications: None    Transplant Office Phone Number: [phone]    Assessment and plan was discussed with the patient and she voiced her understanding and agreement.    Return visit: Return in about 6 months (around 5/1/2022).    Edgardo Sands MD    Chief Complaint   Ms. Larkin is a 45 year old here for routine follow up, kidney transplant and immunosuppression management.    History of Present Illness    The patient was diagnosed with COVID on 10/13/21. She states that she feels better now. She had not gotten vaccinated for COVID prior. She states that she wants to wait until after the new year to get vaccinated.     The patient denies N/V/D, fever, chills, SOB, chest pain, LE edema.       Home BP: Not checked    Problem List   Patient Active Problem List   Diagnosis     Anemia in chronic renal disease     HTN, kidney transplant related     Secondary renal hyperparathyroidism (H)     Coronary artery disease involving native coronary artery of native heart without angina pectoris     Status post coronary angiogram     NSTEMI (non-ST elevated myocardial infarction) (H)     Vitamin D deficiency     Pneumothorax     Kidney replaced by transplant     Femoral neuropathy, right     Femoral neuropathy of right lower extremity     Aftercare following organ transplant     CKD (chronic kidney disease) stage 2, GFR 60-89 ml/min     Anemia due to stage 5 chronic kidney disease, not on chronic dialysis (H)       Allergies    Allergies   Allergen Reactions     Blood Transfusion Related (Informational Only)      Patient has a history of a clinically significant antibody against RBC antigens.  A delay in compatible RBCs may occur.     Cephalosporins Anaphylaxis     Per [facility] allergist report: positive skin intradermal tests to Cefazolin and Ceftazidime, but NOT to Penicillin G, Piperacillin and Meropenem      Chlorhexidine Anaphylaxis     Several times during anesthesia at initial phase during insertion of I.v. line and after disinfection with Chlorhexidine drop of blood pressure.  In Prick and as well intradermal tests clear positive reactions to Chlorhexidine (particularly in intradermal test to 0.0002% Chlorhexidine papule of 1cm and Erythema of 2.5cm). AVOID in future Chlorhexidine     Heparin Flush        Medications   Current Outpatient Medications   Medication Sig     acetaminophen (TYLENOL) 325 MG tablet Take 325-650 mg by mouth every 4 hours as needed for mild pain or fever      amLODIPine (NORVASC) 5 MG tablet Take 1 tablet (5 mg) by mouth daily     aspirin (ASA) 81 MG chewable tablet Take 1 tablet (81 mg) by mouth daily     magnesium oxide (MAG-OX) 400 MG tablet Take 2 tablets (800 mg) by mouth 2 times daily     mycophenolate (GENERIC EQUIVALENT) 250 MG capsule Take 3 capsules (750 mg) by mouth 2 times daily     ondansetron (ZOFRAN-ODT) 4 MG ODT tab Take 1 tablet (4 mg) by mouth every 6 hours as needed for nausea     senna-docusate (SENOKOT-S/PERICOLACE) 8.6-50 MG tablet Take 1 tablet by mouth 2 times daily as needed for constipation     sodium bicarbonate 650 MG tablet Take 1 tablet (650 mg) by mouth daily     sulfamethoxazole-trimethoprim (BACTRIM) 400-80 MG tablet Take 1 tablet by mouth daily     tacrolimus (GENERIC EQUIVALENT) 1 MG capsule Take 2 capsules (2 mg) by mouth 2 times daily Take 2mg every 12 hours     No current facility-administered medications for this visit.     There are no discontinued  medications.    Physical Exam   Vital Signs: There were no vitals taken for this visit.    GENERAL APPEARANCE: alert and no distress  HENT: no obvious abnormalities on appearance  RESP: breathing appears unremarkable with normal rate, no audible wheezing or cough and no apparent shortness of breath with conversation  MS: extremities normal - no gross deformities noted  SKIN: no apparent rash and normal skin tone  NEURO: speech is clear with no obvious neurological deficits  PSYCH: mentation appears normal and affect normal      Data     Renal Latest Ref Rng & Units 10/12/2021 9/13/2021 8/12/2021   Na 133 - 144 mmol/L - - -   Na (external) 136 - 145 meq/L 137 140 140   K 3.4 - 5.3 mmol/L - - -   K (external) 3.5 - 5.1 meq/L 4.9 4.3 4.2   Cl 94 - 109 mmol/L - - -   Cl (external) 98 - 109 meq/L 105 107 106   CO2 20 - 32 mmol/L - - -   CO2 (external) 20 - 29 meq/L 22 22 23   BUN 7 - 30 mg/dL - - -   BUN (external) 6 - 22 mg/dL 13 15 17   Cr 0.52 - 1.04 mg/dL - - -   Cr (external) 0.60 - 1.10 mg/dL 1.17(H) 1.01 0.92   Glucose 70 - 99 mg/dL - - -   Glucose (external) 70 - 99 mg/dL 101(H) 89 83   Ca  8.5 - 10.1 mg/dL - - -   Ca (external) 8.5 - 10.5 mg/dL 9.1 9.7 9.7   Mg 1.6 - 2.3 mg/dL - - -   Mg (external) 1.6 - 2.6 mg/dL - - -     Bone Health Latest Ref Rng & Units 6/1/2021 5/3/2021 4/19/2021   Phos 2.5 - 4.5 mg/dL - - -   Phos (external) 2.3 - 4.7 mg/dL 3.1 3.6 3.3   PTHi 18 - 80 pg/mL - - -   PTHi (external) 14 - 95 pg/mL - - -   Vit D Def 20 - 75 ug/L - - -     Heme Latest Ref Rng & Units 10/12/2021 9/13/2021 8/12/2021   WBC 4.0 - 11.0 10e9/L - - -   WBC (external) 4.0 - 11.0 K/uL 4.4 4.5 4.8   Hgb 11.7 - 15.7 g/dL - - -   Hgb (external) 11.5 - 15.8 g/dL 12.4 12.9 13.3   Plt 150 - 450 10e9/L - - -   Plt (external) 140 - 400 K/uL 207 301 278   ABSOLUTE NEUTROPHIL 1.6 - 8.3 10e9/L - - -   ABSOLUTE NEUTROPHILS (EXTERNAL) 1.8 - 8.0 K/uL 3.2 3.2 3.3   ABSOLUTE LYMPHOCYTES 0.8 - 5.3 10e9/L - - -   ABSOLUTE LYMPHOCYTES  (EXTERNAL) 0.8 - 4.1 K/uL 0.7(L) 0.9 1.0   ABSOLUTE MONOCYTES 0.0 - 1.3 10e9/L - - -   ABSOLUTE MONOCYTES (EXTERNAL) 0.0 - 1.0 K/uL 0.5 0.4 0.4   ABSOLUTE EOSINOPHILS 0.0 - 0.7 10e9/L - - -   ABSOLUTE EOSINOPHILS (EXTERNAL) 0.0 - 0.7 K/uL 0.0 0.1 0.1   ABSOLUTE BASOPHILS 0.0 - 0.2 10e9/L - - -   ABSOLUTE BASOPHILS (EXTERNAL) 0.0 - 0.2 K/uL 0.0 0.0 0.0   ABS IMMATURE GRANULOCYTES 0 - 0.4 10e9/L - - -   ABSOLUTE NUCLEATED RBC - - - -     Liver Latest Ref Rng & Units 5/17/2021 3/1/2021 11/12/2020   AP 40 - 150 U/L - - 142   AP (external) 40 - 150 U/L 97 129 -   TBili 0.2 - 1.3 mg/dL - - 0.5   TBili (external) 0.2 - 1.2 mg/dL 0.3 0.4 -   DBili 0.0 - 0.2 mg/dL - - -   DBili (external) 0.0 - 0.5 mg/dL 0.1 0.2 -   ALT 0 - 50 U/L - - 18   ALT (external) 0 - 55 U/L 22 45 -   AST 0 - 45 U/L - - 19   AST (external) 5 - 34 U/L 21 30 -   Tot Protein 6.8 - 8.8 g/dL - - 8.4   Tot Protein (external) 6.0 - 8.3 g/dL 6.7 7.0 -   Albumin 3.4 - 5.0 g/dL - - 3.8   Albumin (external) 3.5 - 5.2 g/dL 4.0 4.2 -     Pancreas Latest Ref Rng & Units 5/17/2021 11/12/2020 1/24/2020   A1C 0 - 5.6 % - 4.9 Canceled, Test credited   A1C (external) 4.0 - 6.0 % 5.3 - -     Iron studies Latest Ref Rng & Units 11/24/2020   Iron 35 - 180 ug/dL 21(L)   Iron sat 15 - 46 % 10(L)   Ferritin 12 - 150 ng/mL 1,120(H)     [facility] Txp Virology Latest Ref Rng & Units 10/12/2021 9/13/2021 8/12/2021   CMV DNA Quant Ext Undetected Iu/mL - - -   LOG IU/ML OF CMVQNT (EXTERNAL) log IU/mL - - -   BK Spec - - - -   BK Res BKNEG:BK Virus DNA Not Detected copies/mL - - -   BK Log <2.7 Log copies/mL - - -   BK Quant Log Ext log IU/mL Undetected Undetected Undetected   BK Quant Result Ext Undetected IU/mL Undetected Undetected Undetected   BK Quant Spec Ext - Plasma Plasma Blood   EBV VCA IGG ANTIBODY U/mL - - -   EBV CAPSID ANTIBODY IGG 0.0 - 0.8 AI - - -   Hep B Core NR:Nonreactive - - -   Hep B Surf - - - -   HIV 1&2 NEG - - -        Recent Labs   Lab Test 11/25/20  0670  11/27/20  0708 11/30/20  0651   DOSTAC 2,000 Not Provided 1,900   TACROL 6.6 8.8 9.5     Recent Labs   Lab Test 11/23/20  0651 11/30/20  0651   DOSMPA 2,000 1,900   MPACID 3.12 2.21   MPAG 191.9* 119.7*

## 2021-11-01 NOTE — PROGRESS NOTES
Left voicemail for patient to call back to set up telemedicine visit, will call again before appointment time.  Erinn Miranda CMA on 11/1/2021 at 1:22 PM

## 2021-11-01 NOTE — LETTER
11/1/2021     RE: Paolo Larkin  [address]  [place] [address]     Dear Colleague,    Thank you for referring your patient, Paolo Larkin, to the [facility] [place] [facility]. Please see a copy of my visit note below.    TRANSPLANT NEPHROLOGY CHRONIC POST TRANSPLANT VISIT    Assessment & Plan   # DDKT: Trend up thought 2/2 COVID   - Baseline Creatinine:  ~ 0.9-1   - Proteinuria: Normal (<0.2 grams)   - Date DSA Last Checked: Mar/2021, recheck now      Latest DSA: No   - BK Viremia: No   - Kidney Tx Biopsy: No    # Immunosuppression: Tacrolimus immediate release (goal 6-8) and Mycophenolate mofetil (dose 750 mg every 12 hours)   - Continue with intensive monitoring of immunosuppression for efficacy and toxicity.   - Changes: Yes - obtain DSA, decrease tac goal to 4-6 after next labs    # Infection Prophylaxis:   - PJP: Sulfa/TMP (Bactrim)    # Hypertension: Controlled;  Goal BP: < 130/80   - Changes: Not at this time. Continue amlodipine 5mg daily     # Anemia in Chronic Renal Disease: Hgb: Stable      JESSI: No   - Iron studies: Not checked recently    # Mineral Bone Disorder:   - Secondary renal hyperparathyroidism; PTH level: Minimally elevated ( pg/ml)        On treatment: None  - Vitamin D; level: Not checked recently, but was low last check        On supplement: No  - Calcium; level: Normal        On supplement: No  - Phosphorus; level: Normal        On supplement: No   -Repeat PTH, phos    # Electrolytes:   - Potassium; level: High normal        On supplement: No  - Magnesium; level: Normal        On supplement: Yes, mag ox 800mg bid. Repeat Mag  - Bicarbonate; level: Normal        On supplement: Yes, bicarb 650mg daily     # Skin Cancer Risk:    - Discussed sun protection and recommend regular follow up with Dermatology.    # Medical Compliance: Yes    # COVID-19 Virus Review: Discussed COVID-19 virus and the  potential medical risks.  Reviewed preventative health recommendations, including wearing a mask where appropriate.  Recommended COVID vaccination should be up to date with either an initial vaccination or booster shot when appropriate.  Asked the patient to inform the transplant center if they are exposed or diagnosed with this virus.    # COVID Vaccination Up To Date: No. Diagnosed with COVID 10/13/21, now improving after she was given course of dexamethasone and azithromycin. She did not receive monoclonal abs.    -Recommend starting COVID vaccination series starting 11/13/21 (1m after COVID infection)    # Transplant History:  Etiology of Kidney Failure: Unknown etiology (no kidney biopsy)  Tx: DDKT  Transplant: 11/12/2020 (Kidney)  Significant changes in immunosuppression: None  Significant transplant-related complications: None    Transplant Office Phone Number: [phone]    Assessment and plan was discussed with the patient and she voiced her understanding and agreement.    Return visit: Return in about 6 months (around 5/1/2022).    Edgardo Sands MD    Chief Complaint   Ms. Larkin is a 45 year old here for routine follow up, kidney transplant and immunosuppression management.    History of Present Illness    The patient was diagnosed with COVID on 10/13/21. She states that she feels better now. She had not gotten vaccinated for COVID prior. She states that she wants to wait until after the new year to get vaccinated.     The patient denies N/V/D, fever, chills, SOB, chest pain, LE edema.       Home BP: Not checked    Problem List   Patient Active Problem List   Diagnosis     Anemia in chronic renal disease     HTN, kidney transplant related     Secondary renal hyperparathyroidism (H)     Coronary artery disease involving native coronary artery of native heart without angina pectoris     Status post coronary angiogram     NSTEMI (non-ST elevated myocardial infarction) (H)     Vitamin D deficiency      Pneumothorax     Kidney replaced by transplant     Femoral neuropathy, right     Femoral neuropathy of right lower extremity     Aftercare following organ transplant     CKD (chronic kidney disease) stage 2, GFR 60-89 ml/min     Anemia due to stage 5 chronic kidney disease, not on chronic dialysis (H)       Allergies   Allergies   Allergen Reactions     Blood Transfusion Related (Informational Only)      Patient has a history of a clinically significant antibody against RBC antigens.  A delay in compatible RBCs may occur.     Cephalosporins Anaphylaxis     Per [facility] allergist report: positive skin intradermal tests to Cefazolin and Ceftazidime, but NOT to Penicillin G, Piperacillin and Meropenem      Chlorhexidine Anaphylaxis     Several times during anesthesia at initial phase during insertion of I.v. line and after disinfection with Chlorhexidine drop of blood pressure.  In Prick and as well intradermal tests clear positive reactions to Chlorhexidine (particularly in intradermal test to 0.0002% Chlorhexidine papule of 1cm and Erythema of 2.5cm). AVOID in future Chlorhexidine     Heparin Flush        Medications   Current Outpatient Medications   Medication Sig     acetaminophen (TYLENOL) 325 MG tablet Take 325-650 mg by mouth every 4 hours as needed for mild pain or fever      amLODIPine (NORVASC) 5 MG tablet Take 1 tablet (5 mg) by mouth daily     aspirin (ASA) 81 MG chewable tablet Take 1 tablet (81 mg) by mouth daily     magnesium oxide (MAG-OX) 400 MG tablet Take 2 tablets (800 mg) by mouth 2 times daily     mycophenolate (GENERIC EQUIVALENT) 250 MG capsule Take 3 capsules (750 mg) by mouth 2 times daily     ondansetron (ZOFRAN-ODT) 4 MG ODT tab Take 1 tablet (4 mg) by mouth every 6 hours as needed for nausea     senna-docusate (SENOKOT-S/PERICOLACE) 8.6-50 MG tablet Take 1 tablet by mouth 2 times daily as needed for constipation     sodium bicarbonate 650 MG tablet Take 1 tablet (650 mg) by mouth daily      sulfamethoxazole-trimethoprim (BACTRIM) 400-80 MG tablet Take 1 tablet by mouth daily     tacrolimus (GENERIC EQUIVALENT) 1 MG capsule Take 2 capsules (2 mg) by mouth 2 times daily Take 2mg every 12 hours     No current facility-administered medications for this visit.     There are no discontinued medications.    Physical Exam   Vital Signs: There were no vitals taken for this visit.    GENERAL APPEARANCE: alert and no distress  HENT: no obvious abnormalities on appearance  RESP: breathing appears unremarkable with normal rate, no audible wheezing or cough and no apparent shortness of breath with conversation  MS: extremities normal - no gross deformities noted  SKIN: no apparent rash and normal skin tone  NEURO: speech is clear with no obvious neurological deficits  PSYCH: mentation appears normal and affect normal      Data     Renal Latest Ref Rng & Units 10/12/2021 9/13/2021 8/12/2021   Na 133 - 144 mmol/L - - -   Na (external) 136 - 145 meq/L 137 140 140   K 3.4 - 5.3 mmol/L - - -   K (external) 3.5 - 5.1 meq/L 4.9 4.3 4.2   Cl 94 - 109 mmol/L - - -   Cl (external) 98 - 109 meq/L 105 107 106   CO2 20 - 32 mmol/L - - -   CO2 (external) 20 - 29 meq/L 22 22 23   BUN 7 - 30 mg/dL - - -   BUN (external) 6 - 22 mg/dL 13 15 17   Cr 0.52 - 1.04 mg/dL - - -   Cr (external) 0.60 - 1.10 mg/dL 1.17(H) 1.01 0.92   Glucose 70 - 99 mg/dL - - -   Glucose (external) 70 - 99 mg/dL 101(H) 89 83   Ca  8.5 - 10.1 mg/dL - - -   Ca (external) 8.5 - 10.5 mg/dL 9.1 9.7 9.7   Mg 1.6 - 2.3 mg/dL - - -   Mg (external) 1.6 - 2.6 mg/dL - - -     Bone Health Latest Ref Rng & Units 6/1/2021 5/3/2021 4/19/2021   Phos 2.5 - 4.5 mg/dL - - -   Phos (external) 2.3 - 4.7 mg/dL 3.1 3.6 3.3   PTHi 18 - 80 pg/mL - - -   PTHi (external) 14 - 95 pg/mL - - -   Vit D Def 20 - 75 ug/L - - -     Heme Latest Ref Rng & Units 10/12/2021 9/13/2021 8/12/2021   WBC 4.0 - 11.0 10e9/L - - -   WBC (external) 4.0 - 11.0 K/uL 4.4 4.5 4.8   Hgb 11.7 - 15.7 g/dL - - -    Hgb (external) 11.5 - 15.8 g/dL 12.4 12.9 13.3   Plt 150 - 450 10e9/L - - -   Plt (external) 140 - 400 K/uL 207 301 278   ABSOLUTE NEUTROPHIL 1.6 - 8.3 10e9/L - - -   ABSOLUTE NEUTROPHILS (EXTERNAL) 1.8 - 8.0 K/uL 3.2 3.2 3.3   ABSOLUTE LYMPHOCYTES 0.8 - 5.3 10e9/L - - -   ABSOLUTE LYMPHOCYTES (EXTERNAL) 0.8 - 4.1 K/uL 0.7(L) 0.9 1.0   ABSOLUTE MONOCYTES 0.0 - 1.3 10e9/L - - -   ABSOLUTE MONOCYTES (EXTERNAL) 0.0 - 1.0 K/uL 0.5 0.4 0.4   ABSOLUTE EOSINOPHILS 0.0 - 0.7 10e9/L - - -   ABSOLUTE EOSINOPHILS (EXTERNAL) 0.0 - 0.7 K/uL 0.0 0.1 0.1   ABSOLUTE BASOPHILS 0.0 - 0.2 10e9/L - - -   ABSOLUTE BASOPHILS (EXTERNAL) 0.0 - 0.2 K/uL 0.0 0.0 0.0   ABS IMMATURE GRANULOCYTES 0 - 0.4 10e9/L - - -   ABSOLUTE NUCLEATED RBC - - - -     Liver Latest Ref Rng & Units 5/17/2021 3/1/2021 11/12/2020   AP 40 - 150 U/L - - 142   AP (external) 40 - 150 U/L 97 129 -   TBili 0.2 - 1.3 mg/dL - - 0.5   TBili (external) 0.2 - 1.2 mg/dL 0.3 0.4 -   DBili 0.0 - 0.2 mg/dL - - -   DBili (external) 0.0 - 0.5 mg/dL 0.1 0.2 -   ALT 0 - 50 U/L - - 18   ALT (external) 0 - 55 U/L 22 45 -   AST 0 - 45 U/L - - 19   AST (external) 5 - 34 U/L 21 30 -   Tot Protein 6.8 - 8.8 g/dL - - 8.4   Tot Protein (external) 6.0 - 8.3 g/dL 6.7 7.0 -   Albumin 3.4 - 5.0 g/dL - - 3.8   Albumin (external) 3.5 - 5.2 g/dL 4.0 4.2 -     Pancreas Latest Ref Rng & Units 5/17/2021 11/12/2020 1/24/2020   A1C 0 - 5.6 % - 4.9 Canceled, Test credited   A1C (external) 4.0 - 6.0 % 5.3 - -     Iron studies Latest Ref Rng & Units 11/24/2020   Iron 35 - 180 ug/dL 21(L)   Iron sat 15 - 46 % 10(L)   Ferritin 12 - 150 ng/mL 1,120(H)     [facility] Txp Virology Latest Ref Rng & Units 10/12/2021 9/13/2021 8/12/2021   CMV DNA Quant Ext Undetected Iu/mL - - -   LOG IU/ML OF CMVQNT (EXTERNAL) log IU/mL - - -   BK Spec - - - -   BK Res BKNEG:BK Virus DNA Not Detected copies/mL - - -   BK Log <2.7 Log copies/mL - - -   BK Quant Log Ext log IU/mL Undetected Undetected Undetected   BK Quant Result Ext  Undetected IU/mL Undetected Undetected Undetected   BK Quant Spec Ext - Plasma Plasma Blood   EBV VCA IGG ANTIBODY U/mL - - -   EBV CAPSID ANTIBODY IGG 0.0 - 0.8 AI - - -   Hep B Core NR:Nonreactive - - -   Hep B Surf - - - -   HIV 1&2 NEG - - -        Recent Labs   Lab Test 11/25/20  0643 11/27/20  0708 11/30/20  0651   DOSTAC 2,000 Not Provided 1,900   TACROL 6.6 8.8 9.5     Recent Labs   Lab Test 11/23/20  0651 11/30/20  0651   DOSMPA 2,000 1,900   MPACID 3.12 2.21   MPAG 191.9* 119.7*     Paolo is a 45 year old who is being evaluated via a billable video visit.      How would you like to obtain your AVS? MyChart  If the video visit is dropped, the invitation should be resent by: Send to e-mail at: No e-mail address on record  Will anyone else be joining your video visit? No    Video Start Time: 2:14 PM  Video-Visit Details  Type of service:  Video Visit  Video End Time:2:27 PM  Originating Location (pt. Location): Home  Distant Location (provider location):  [facility] NEPHROLOGY [facility]   Platform used for Video Visit: [employer]    Again, thank you for allowing me to participate in the care of your patient.      Sincerely,    Edgardo Sands MD

## 2021-11-01 NOTE — PROGRESS NOTES
Paolo is a 45 year old who is being evaluated via a billable video visit.      How would you like to obtain your AVS? MyChart  If the video visit is dropped, the invitation should be resent by: Send to e-mail at: No e-mail address on record  Will anyone else be joining your video visit? No    Video Start Time: 2:14 PM  Video-Visit Details    Type of service:  Video Visit    Video End Time:2:27 PM    Originating Location (pt. Location): Home    Distant Location (provider location):  [facility] NEPHROLOGY [facility]     Platform used for Video Visit: [employer]

## 2021-11-05 ENCOUNTER — TELEPHONE (OUTPATIENT)
Dept: TRANSPLANT | Facility: CLINIC | Age: 46
End: 2021-11-05

## 2021-11-05 NOTE — LETTER
PHYSICIAN ORDERS      DATE & TIME ISSUED: 2021 9:28 AM  PATIENT NAME: Paolo Larkin   : 1975     [facility] MR# [if applicable]: [id number]     DIAGNOSIS:  kidney transplant  ICD-10 CODE: Z94.0   Please check the following 1 year post-kidney transplant labs:   -CBC, BMP, Tacrolimus drug level   -magnesium, phosphorus   -LFTs, Hemoglobin A1c, Uric Acid, Lipid panel  -Urine protein/creatinine (random)   -DSA PRA (patient to provide  kits)  -PTH  -Vitamin D  -Allosure (kit will be mailed to lab by [facility])    Any questions please call: [phone].      Edgardo Sands MD

## 2021-11-12 ENCOUNTER — LAB (OUTPATIENT)
Dept: LAB | Facility: CLINIC | Age: 46
End: 2021-11-12
Payer: MEDICARE

## 2021-11-12 DIAGNOSIS — Z94.0 KIDNEY REPLACED BY TRANSPLANT: ICD-10-CM

## 2021-11-12 DIAGNOSIS — Z48.298 AFTERCARE FOLLOWING ORGAN TRANSPLANT: ICD-10-CM

## 2021-11-12 DIAGNOSIS — Z79.899 ENCOUNTER FOR LONG-TERM CURRENT USE OF MEDICATION: ICD-10-CM

## 2021-11-12 PROCEDURE — 86833 HLA CLASS II HIGH DEFIN QUAL: CPT

## 2021-11-12 PROCEDURE — 86832 HLA CLASS I HIGH DEFIN QUAL: CPT

## 2021-11-12 PROCEDURE — 36415 COLL VENOUS BLD VENIPUNCTURE: CPT

## 2021-11-16 LAB
DONOR IDENTIFICATION: NORMAL
DSA COMMENTS: NORMAL
DSA PRESENT: NO
DSA TEST METHOD: NORMAL
ORGAN: NORMAL
SA 1 CELL: NORMAL
SA 1 TEST METHOD: NORMAL
SA 2 CELL: NORMAL
SA 2 TEST METHOD: NORMAL
SA1 HI RISK ABY: NORMAL
SA1 MOD RISK ABY: NORMAL
SA2 HI RISK ABY: NORMAL
SA2 MOD RISK ABY: NORMAL
ZZZSA 1  COMMENTS: NORMAL
ZZZSA 2 COMMENTS: NORMAL

## 2021-11-17 LAB
UNACCEPTABLE ANTIGENS: NORMAL
UNOS CPRA: 39

## 2021-12-13 DIAGNOSIS — Z48.298 AFTERCARE FOLLOWING ORGAN TRANSPLANT: ICD-10-CM

## 2021-12-13 DIAGNOSIS — Z94.0 KIDNEY TRANSPLANTED: Primary | ICD-10-CM

## 2021-12-13 DIAGNOSIS — Z94.0 KIDNEY REPLACED BY TRANSPLANT: ICD-10-CM

## 2021-12-13 DIAGNOSIS — I15.0 RENOVASCULAR HYPERTENSION: ICD-10-CM

## 2021-12-13 DIAGNOSIS — E87.8 LOW BICARBONATE LEVEL: ICD-10-CM

## 2021-12-14 ENCOUNTER — TRANSFERRED RECORDS (OUTPATIENT)
Dept: HEALTH INFORMATION MANAGEMENT | Facility: CLINIC | Age: 46
End: 2021-12-14
Payer: MEDICARE

## 2021-12-14 RX ORDER — SULFAMETHOXAZOLE AND TRIMETHOPRIM 400; 80 MG/1; MG/1
1 TABLET ORAL DAILY
Qty: 30 TABLET | Refills: 3 | Status: SHIPPED | OUTPATIENT
Start: 2021-12-14 | End: 2022-03-01

## 2021-12-14 RX ORDER — SODIUM BICARBONATE 650 MG/1
TABLET ORAL
Qty: 60 TABLET | Refills: 11 | Status: SHIPPED | OUTPATIENT
Start: 2021-12-14 | End: 2022-03-22

## 2021-12-14 RX ORDER — AMLODIPINE BESYLATE 5 MG/1
5 TABLET ORAL DAILY
Qty: 30 TABLET | Refills: 11 | Status: SHIPPED | OUTPATIENT
Start: 2021-12-14

## 2021-12-21 DIAGNOSIS — Z94.0 KIDNEY TRANSPLANTED: ICD-10-CM

## 2021-12-22 RX ORDER — ASPIRIN 81 MG/1
81 TABLET, CHEWABLE ORAL DAILY
Qty: 30 TABLET | Refills: 11 | Status: SHIPPED | OUTPATIENT
Start: 2021-12-22 | End: 2023-01-24

## 2021-12-30 ENCOUNTER — TELEPHONE (OUTPATIENT)
Dept: TRANSPLANT | Facility: CLINIC | Age: 46
End: 2021-12-30
Payer: MEDICARE

## 2021-12-30 DIAGNOSIS — Z94.0 KIDNEY TRANSPLANT RECIPIENT: ICD-10-CM

## 2021-12-30 DIAGNOSIS — Z94.0 KIDNEY TRANSPLANTED: Primary | ICD-10-CM

## 2021-12-30 RX ORDER — TACROLIMUS 1 MG/1
2 CAPSULE ORAL 2 TIMES DAILY
Qty: 120 CAPSULE | Refills: 0 | Status: SHIPPED | OUTPATIENT
Start: 2021-12-30 | End: 2022-01-17

## 2021-12-30 RX ORDER — MYCOPHENOLATE MOFETIL 250 MG/1
750 CAPSULE ORAL 2 TIMES DAILY
Qty: 180 CAPSULE | Refills: 0 | Status: SHIPPED | OUTPATIENT
Start: 2021-12-30 | End: 2022-01-17

## 2021-12-30 NOTE — TELEPHONE ENCOUNTER
Patient Call: Voicemail  Date/Time: 12/30 128pm  Reason for call: Patient's cousin Davina left a message requesting that we call the patient back [phone]

## 2021-12-30 NOTE — LETTER
Paolo Vincent Trae  [address] [address]                December 30, 2021    Dear Paolo,    This letter is regarding your transplant lab work. The most recent laboratory results we have on file for you are from 10/12/2021. Your labs should be completed monthly.   For the best outcome for your transplant and in order for us to refill your prescriptions, we encourage you to have a yearly clinic appointment with a physician and to have current labs. If you are unable to return to our transplant center there are several alternatives. Please call your coordinator to discuss them.  To make an appointment with a physician here at [facility], please call:  The Transplant Office at [phone] or [phone].     The Transplant Staff at [facility]

## 2021-12-30 NOTE — TELEPHONE ENCOUNTER
Patient Call: Medication Refill  Route to LPN  Instruct the patient to first contact their pharmacy. If they have called their pharmacy and require further assistance, route to LPN.    Pharmacy Name: [facility]    Name of Medication: tacrolimus (GENERIC EQUIVALENT) 1 MG capsule   Name of Medication: mycophenolate (GENERIC EQUIVALENT) 250 MG capsule     When will the patient be out of this medication?: Less than 24 hours ([facility] LPN, then page if no answer)

## 2022-01-17 DIAGNOSIS — Z94.0 KIDNEY TRANSPLANT RECIPIENT: ICD-10-CM

## 2022-01-17 DIAGNOSIS — Z94.0 KIDNEY TRANSPLANTED: Primary | ICD-10-CM

## 2022-01-18 RX ORDER — TACROLIMUS 1 MG/1
2 CAPSULE ORAL 2 TIMES DAILY
Qty: 120 CAPSULE | Refills: 11 | Status: SHIPPED | OUTPATIENT
Start: 2022-01-18 | End: 2022-06-20

## 2022-01-18 RX ORDER — MYCOPHENOLATE MOFETIL 250 MG/1
750 CAPSULE ORAL 2 TIMES DAILY
Qty: 180 CAPSULE | Refills: 11 | Status: SHIPPED | OUTPATIENT
Start: 2022-01-18 | End: 2022-12-07

## 2022-02-14 ENCOUNTER — LAB (OUTPATIENT)
Dept: LAB | Facility: CLINIC | Age: 47
End: 2022-02-14
Payer: MEDICARE

## 2022-02-14 DIAGNOSIS — Z48.298 AFTERCARE FOLLOWING ORGAN TRANSPLANT: ICD-10-CM

## 2022-02-14 PROCEDURE — 86832 HLA CLASS I HIGH DEFIN QUAL: CPT

## 2022-02-14 PROCEDURE — 86833 HLA CLASS II HIGH DEFIN QUAL: CPT

## 2022-02-17 DIAGNOSIS — Z48.298 AFTERCARE FOLLOWING ORGAN TRANSPLANT: Primary | ICD-10-CM

## 2022-02-18 LAB
DONOR IDENTIFICATION: NORMAL
DSA COMMENTS: NORMAL
DSA PRESENT: NO
DSA TEST METHOD: NORMAL
ORGAN: NORMAL
SA 1 CELL: NORMAL
SA 1 TEST METHOD: NORMAL
SA 2 CELL: NORMAL
SA 2 TEST METHOD: NORMAL
SA1 HI RISK ABY: NORMAL
SA1 MOD RISK ABY: NORMAL
SA2 HI RISK ABY: NORMAL
SA2 MOD RISK ABY: NORMAL
UNACCEPTABLE ANTIGENS: NORMAL
UNOS CPRA: 39
ZZZSA 1  COMMENTS: NORMAL
ZZZSA 2 COMMENTS: NORMAL

## 2022-02-21 DIAGNOSIS — Z48.298 AFTERCARE FOLLOWING ORGAN TRANSPLANT: ICD-10-CM

## 2022-02-21 DIAGNOSIS — Z94.0 KIDNEY REPLACED BY TRANSPLANT: Primary | ICD-10-CM

## 2022-02-21 DIAGNOSIS — Z79.899 ENCOUNTER FOR LONG-TERM CURRENT USE OF MEDICATION: ICD-10-CM

## 2022-03-01 DIAGNOSIS — Z94.0 KIDNEY TRANSPLANTED: Primary | ICD-10-CM

## 2022-03-01 RX ORDER — SULFAMETHOXAZOLE AND TRIMETHOPRIM 400; 80 MG/1; MG/1
1 TABLET ORAL DAILY
Qty: 30 TABLET | Refills: 0 | Status: SHIPPED | OUTPATIENT
Start: 2022-03-01 | End: 2022-04-25

## 2022-03-14 PROBLEM — U07.1 INFECTION DUE TO 2019 NOVEL CORONAVIRUS: Status: ACTIVE | Noted: 2021-10-13

## 2022-03-19 NOTE — LETTER
September 26, 2017    Carlton Larkin  [address] [address]      Dear Carlton Larkin,    [facility] Transplant scheduling office has been trying to reach you to schedule your waitlist appointments.    Our transplant team has not been able to contact you at: [phone]    Please call  [phone] so we can arrange this important visit.  We look forward to hearing from you.      Thank you,    The Transplant Center  [facility]     - - -

## 2022-03-22 ENCOUNTER — VIRTUAL VISIT (OUTPATIENT)
Dept: NEPHROLOGY | Facility: CLINIC | Age: 47
End: 2022-03-22
Attending: INTERNAL MEDICINE
Payer: MEDICARE

## 2022-03-22 DIAGNOSIS — D84.9 IMMUNOSUPPRESSION (H): ICD-10-CM

## 2022-03-22 PROBLEM — G57.21: Status: RESOLVED | Noted: 2020-11-19 | Resolved: 2022-03-22

## 2022-03-22 PROBLEM — D63.1 ANEMIA DUE TO STAGE 5 CHRONIC KIDNEY DISEASE, NOT ON CHRONIC DIALYSIS (H): Status: RESOLVED | Noted: 2020-11-30 | Resolved: 2022-03-22

## 2022-03-22 PROBLEM — N18.5 ANEMIA DUE TO STAGE 5 CHRONIC KIDNEY DISEASE, NOT ON CHRONIC DIALYSIS (H): Status: RESOLVED | Noted: 2020-11-30 | Resolved: 2022-03-22

## 2022-03-22 PROBLEM — G57.21 FEMORAL NEUROPATHY OF RIGHT LOWER EXTREMITY: Status: RESOLVED | Noted: 2020-11-19 | Resolved: 2022-03-22

## 2022-03-22 PROBLEM — Z98.890 STATUS POST CORONARY ANGIOGRAM: Status: RESOLVED | Noted: 2019-06-18 | Resolved: 2022-03-22

## 2022-03-22 PROBLEM — J93.9 PNEUMOTHORAX: Status: RESOLVED | Noted: 2020-01-31 | Resolved: 2022-03-22

## 2022-03-22 PROBLEM — I25.10 CORONARY ARTERY DISEASE INVOLVING NATIVE CORONARY ARTERY OF NATIVE HEART WITHOUT ANGINA PECTORIS: Status: RESOLVED | Noted: 2019-05-30 | Resolved: 2022-03-22

## 2022-03-22 PROCEDURE — 99443 PR PHYSICIAN TELEPHONE EVALUATION 21-30 MIN: CPT | Mod: 95 | Performed by: INTERNAL MEDICINE

## 2022-03-22 NOTE — PROGRESS NOTES
Paolo is a 46 year old who is being evaluated via a billable telephone visit.      What phone number would you like to be contacted at? [phone]  How would you like to obtain your AVS? Mail a copy  Phone call duration: 23 minutes      TRANSPLANT NEPHROLOGY CHRONIC POST TRANSPLANT VISIT    Assessment & Plan   # DDKT: Stable   - Baseline Creatinine:  ~ 0.9-1   - Proteinuria: Normal (<0.2 grams)   - Date DSA Last Checked: Feb/2022    Latest DSA: No   - BK Viremia: No   - Kidney Tx Biopsy: No    # Immunosuppression: Tacrolimus immediate release (goal 4-6) and Mycophenolate mofetil (dose 750 mg every 12 hours)   - Continue with intensive monitoring of immunosuppression for efficacy and toxicity.   - Changes: Not at this time     # Infection Prophylaxis:   - PJP: Sulfa/TMP (Bactrim)    # Hypertension: Controlled;  Goal BP: < 130/80   - Changes: Not at this time. Continue amlodipine 5mg daily     # Anemia in Chronic Renal Disease: Hgb: Stable      JESSI: No   - Iron studies: Not checked recently    # Mineral Bone Disorder:   - Secondary renal hyperparathyroidism; PTH level: Minimally elevated ( pg/ml)        On treatment: None  - Vitamin D; level: Not checked recently, but was low last check        On supplement: No  - Calcium; level: Normal        On supplement: No  - Phosphorus; level: Normal        On supplement: No     # Electrolytes:   - Potassium; level: High normal        On supplement: No  - Magnesium; level: Normal        On supplement: Yes, mag ox 800mg bid. Repeat Mag  - Bicarbonate; level: Normal        On supplement: No    # HLD:   -elevated LDL to 176 in 12/2021. ASCVD risk 81mg daily. Recommend considering statin.      # Cough at night:   -possible due to reflux, difficult to assess over the phone. Going to be assessed by PCP on 4/4     # Skin Cancer Risk:    - Discussed sun protection and recommend regular follow up with Dermatology.    # Medical Compliance: Yes    # COVID-19 Virus Review: Discussed  COVID-19 virus and the potential medical risks.  Reviewed preventative health recommendations, including wearing a mask where appropriate.  Recommended COVID vaccination should be up to date with either an initial vaccination or booster shot when appropriate.  Asked the patient to inform the transplant center if they are exposed or diagnosed with this virus.    # COVID Vaccination Up To Date: No, spoke to patient about getting it ASAP and that she would need 3 injections    # Transplant History:  Etiology of Kidney Failure: Unknown etiology (no kidney biopsy)  Tx: DDKT  Transplant: 11/12/2020 (Kidney)  Significant changes in immunosuppression: None  Significant transplant-related complications: None    Transplant Office Phone Number: [phone]    Assessment and plan was discussed with the patient and she voiced her understanding and agreement.    Return visit: Return in about 6 months (around 9/22/2022) for in person visit.    Edgardo Sands MD    Chief Complaint   Ms. Larkin is a 46 year old here for routine follow up, kidney transplant and immunosuppression management.    History of Present Illness   Paolo Larkin feels well. She denies N/V/D, fever, chills, chest pain, LE edema, unintentional weight loss, night sweats, dysuria, hematuria.     She does have SOB when she coughs at night since she got COVID. She states that she can talk to her PCP about this.     Home BP: 120/80    Problem List   Patient Active Problem List   Diagnosis     Anemia in chronic renal disease     HTN, kidney transplant related     Secondary renal hyperparathyroidism (H)     Coronary artery disease involving native coronary artery of native heart without angina pectoris     Status post coronary angiogram     NSTEMI (non-ST elevated myocardial infarction) (H)     Vitamin D deficiency     Pneumothorax     Kidney replaced by transplant     Femoral neuropathy, right     Femoral neuropathy of right lower extremity     Aftercare following organ  transplant     CKD (chronic kidney disease) stage 2, GFR 60-89 ml/min     Anemia due to stage 5 chronic kidney disease, not on chronic dialysis (H)     Infection due to 2019 novel coronavirus       Allergies   Allergies   Allergen Reactions     Blood Transfusion Related (Informational Only)      Patient has a history of a clinically significant antibody against RBC antigens.  A delay in compatible RBCs may occur.     Cephalosporins Anaphylaxis     Per [facility] allergist report: positive skin intradermal tests to Cefazolin and Ceftazidime, but NOT to Penicillin G, Piperacillin and Meropenem      Chlorhexidine Anaphylaxis     Several times during anesthesia at initial phase during insertion of I.v. line and after disinfection with Chlorhexidine drop of blood pressure.  In Prick and as well intradermal tests clear positive reactions to Chlorhexidine (particularly in intradermal test to 0.0002% Chlorhexidine papule of 1cm and Erythema of 2.5cm). AVOID in future Chlorhexidine     Heparin Flush        Medications   Current Outpatient Medications   Medication Sig     amLODIPine (NORVASC) 5 MG tablet Take 1 tablet (5 mg) by mouth daily     aspirin (ASA) 81 MG chewable tablet Take 1 tablet (81 mg) by mouth daily     magnesium oxide (MAG-OX) 400 MG tablet Take 2 tablets (800 mg) by mouth 2 times daily     mycophenolate (GENERIC EQUIVALENT) 250 MG capsule Take 3 capsules (750 mg) by mouth 2 times daily     ondansetron (ZOFRAN-ODT) 4 MG ODT tab Take 1 tablet (4 mg) by mouth every 6 hours as needed for nausea     sulfamethoxazole-trimethoprim (BACTRIM) 400-80 MG tablet Take 1 tablet by mouth daily     tacrolimus (GENERIC EQUIVALENT) 1 MG capsule Take 2 capsules (2 mg) by mouth 2 times daily     No current facility-administered medications for this visit.     Medications Discontinued During This Encounter   Medication Reason     sodium bicarbonate 650 MG tablet      senna-docusate (SENOKOT-S/PERICOLACE) 8.6-50 MG tablet       acetaminophen (TYLENOL) 325 MG tablet        Physical Exam   Vital Signs: There were no vitals taken for this visit.    Physical exam was deferred for this telemedicine visit.    Data     Renal Latest Ref Rng & Units 10/12/2021 9/13/2021 8/12/2021   Na 133 - 144 mmol/L - - -   Na (external) 136 - 145 meq/L 137 140 140   K 3.4 - 5.3 mmol/L - - -   K (external) 3.5 - 5.1 meq/L 4.9 4.3 4.2   Cl 98 - 109 meq/L 105 107 106   CO2 20 - 32 mmol/L - - -   CO2 (external) 20 - 29 meq/L 22 22 23   BUN 7 - 30 mg/dL - - -   BUN (external) 6 - 22 mg/dL 13 15 17   Cr 0.52 - 1.04 mg/dL - - -   Cr (external) 0.60 - 1.10 mg/dL 1.17(H) 1.01 0.92   Glucose 70 - 99 mg/dL - - -   Glucose (external) 70 - 99 mg/dL 101(H) 89 83   Ca  8.5 - 10.1 mg/dL - - -   Ca (external) 8.5 - 10.5 mg/dL 9.1 9.7 9.7   Mg 1.6 - 2.3 mg/dL - - -   Mg (external) 1.6 - 2.6 mg/dL - - -     Bone Health Latest Ref Rng & Units 6/1/2021 5/3/2021 4/19/2021   Phos 2.5 - 4.5 mg/dL - - -   Phos (external) 2.3 - 4.7 mg/dL 3.1 3.6 3.3   PTHi 18 - 80 pg/mL - - -   PTHi (external) 14 - 95 pg/mL - - -   Vit D Def 20 - 75 ug/L - - -     Heme Latest Ref Rng & Units 10/12/2021 9/13/2021 8/12/2021   WBC 4.0 - 11.0 10e9/L - - -   WBC (external) 4.0 - 11.0 K/uL 4.4 4.5 4.8   Hgb 11.7 - 15.7 g/dL - - -   Hgb (external) 11.5 - 15.8 g/dL 12.4 12.9 13.3   Plt 150 - 450 10e9/L - - -   Plt (external) 140 - 400 K/uL 207 301 278   ABSOLUTE NEUTROPHIL 1.6 - 8.3 10e9/L - - -   ABSOLUTE NEUTROPHILS (EXTERNAL) 1.8 - 8.0 K/uL 3.2 3.2 3.3   ABSOLUTE LYMPHOCYTES 0.8 - 5.3 10e9/L - - -   ABSOLUTE LYMPHOCYTES (EXTERNAL) 0.8 - 4.1 K/uL 0.7(L) 0.9 1.0   ABSOLUTE MONOCYTES 0.0 - 1.3 10e9/L - - -   ABSOLUTE MONOCYTES (EXTERNAL) 0.0 - 1.0 K/uL 0.5 0.4 0.4   ABSOLUTE EOSINOPHILS 0.0 - 0.7 10e9/L - - -   ABSOLUTE EOSINOPHILS (EXTERNAL) 0.0 - 0.7 K/uL 0.0 0.1 0.1   ABSOLUTE BASOPHILS 0.0 - 0.2 10e9/L - - -   ABSOLUTE BASOPHILS (EXTERNAL) 0.0 - 0.2 K/uL 0.0 0.0 0.0   ABS IMMATURE GRANULOCYTES 0 - 0.4 10e9/L  - - -   ABSOLUTE NUCLEATED RBC - - - -     Liver Latest Ref Rng & Units 5/17/2021 3/1/2021 11/12/2020   AP 40 - 150 U/L - - 142   AP (external) 40 - 150 U/L 97 129 -   TBili 0.2 - 1.3 mg/dL - - 0.5   TBili (external) 0.2 - 1.2 mg/dL 0.3 0.4 -   DBili 0.0 - 0.2 mg/dL - - -   DBili (external) 0.0 - 0.5 mg/dL 0.1 0.2 -   ALT 0 - 50 U/L - - 18   ALT (external) 0 - 55 U/L 22 45 -   AST 0 - 45 U/L - - 19   AST (external) 5 - 34 U/L 21 30 -   Tot Protein 6.8 - 8.8 g/dL - - 8.4   Tot Protein (external) 6.0 - 8.3 g/dL 6.7 7.0 -   Albumin 3.4 - 5.0 g/dL - - 3.8   Albumin (external) 3.5 - 5.2 g/dL 4.0 4.2 -     Pancreas Latest Ref Rng & Units 5/17/2021 11/12/2020 1/24/2020   A1C 0 - 5.6 % - 4.9 Canceled, Test credited   A1C (external) 4.0 - 6.0 % 5.3 - -     Iron studies Latest Ref Rng & Units 11/24/2020   Iron 35 - 180 ug/dL 21(L)   Iron sat 15 - 46 % 10(L)   Ferritin 12 - 150 ng/mL 1,120(H)     [facility] Txp Virology Latest Ref Rng & Units 10/12/2021 9/13/2021 8/12/2021   CMV DNA Quant Ext Undetected Iu/mL - - -   LOG IU/ML OF CMVQNT (EXTERNAL) log IU/mL - - -   BK Spec - - - -   BK Res BKNEG:BK Virus DNA Not Detected copies/mL - - -   BK Log <2.7 Log copies/mL - - -   BK Quant Log Ext log IU/mL Undetected Undetected Undetected   BK Quant Result Ext Undetected IU/mL Undetected Undetected Undetected   BK Quant Spec Ext - Plasma Plasma Blood   EBV VCA IGG ANTIBODY U/mL - - -   EBV CAPSID ANTIBODY IGG 0.0 - 0.8 AI - - -   Hep B Core NR:Nonreactive - - -   Hep B Surf - - - -   HIV 1&2 NEG - - -        Recent Labs   Lab Test 11/25/20  0643 11/27/20  0708 11/30/20  0651   DOSTAC 2,000 Not Provided 1,900   TACROL 6.6 8.8 9.5     Recent Labs   Lab Test 11/23/20  0651 11/30/20  0651   DOSMPA 2,000 1,900   MPACID 3.12 2.21   MPAG 191.9* 119.7*

## 2022-03-22 NOTE — LETTER
3/22/2022       RE: Paolo Larkin  [address]  [place] [address]     Dear Colleague,    Thank you for referring your patient, Paolo Larkin, to the [facility] [place] at [facility]. Please see a copy of my visit note below.    Paolo is a 46 year old who is being evaluated via a billable telephone visit.      What phone number would you like to be contacted at? [phone]  How would you like to obtain your AVS? Mail a copy  Phone call duration: 23 minutes      TRANSPLANT NEPHROLOGY CHRONIC POST TRANSPLANT VISIT    Assessment & Plan   # DDKT: Stable   - Baseline Creatinine:  ~ 0.9-1   - Proteinuria: Normal (<0.2 grams)   - Date DSA Last Checked: Feb/2022    Latest DSA: No   - BK Viremia: No   - Kidney Tx Biopsy: No    # Immunosuppression: Tacrolimus immediate release (goal 4-6) and Mycophenolate mofetil (dose 750 mg every 12 hours)   - Continue with intensive monitoring of immunosuppression for efficacy and toxicity.   - Changes: Not at this time     # Infection Prophylaxis:   - PJP: Sulfa/TMP (Bactrim)    # Hypertension: Controlled;  Goal BP: < 130/80   - Changes: Not at this time. Continue amlodipine 5mg daily     # Anemia in Chronic Renal Disease: Hgb: Stable      JESSI: No   - Iron studies: Not checked recently    # Mineral Bone Disorder:   - Secondary renal hyperparathyroidism; PTH level: Minimally elevated ( pg/ml)        On treatment: None  - Vitamin D; level: Not checked recently, but was low last check        On supplement: No  - Calcium; level: Normal        On supplement: No  - Phosphorus; level: Normal        On supplement: No     # Electrolytes:   - Potassium; level: High normal        On supplement: No  - Magnesium; level: Normal        On supplement: Yes, mag ox 800mg bid. Repeat Mag  - Bicarbonate; level: Normal        On supplement: No    # HLD:   -elevated LDL to 176 in 12/2021. ASCVD risk 81mg daily.  Recommend considering statin.      # Cough at night:   -possible due to reflux, difficult to assess over the phone. Going to be assessed by PCP on 4/4     # Skin Cancer Risk:    - Discussed sun protection and recommend regular follow up with Dermatology.    # Medical Compliance: Yes    # COVID-19 Virus Review: Discussed COVID-19 virus and the potential medical risks.  Reviewed preventative health recommendations, including wearing a mask where appropriate.  Recommended COVID vaccination should be up to date with either an initial vaccination or booster shot when appropriate.  Asked the patient to inform the transplant center if they are exposed or diagnosed with this virus.    # COVID Vaccination Up To Date: No, spoke to patient about getting it ASAP and that she would need 3 injections    # Transplant History:  Etiology of Kidney Failure: Unknown etiology (no kidney biopsy)  Tx: DDKT  Transplant: 11/12/2020 (Kidney)  Significant changes in immunosuppression: None  Significant transplant-related complications: None    Transplant Office Phone Number: [phone]    Assessment and plan was discussed with the patient and she voiced her understanding and agreement.    Return visit: Return in about 6 months (around 9/22/2022) for in person visit.    Edgardo Sands MD    Chief Complaint   Ms. Larkin is a 46 year old here for routine follow up, kidney transplant and immunosuppression management.    History of Present Illness   Paolo Larkin feels well. She denies N/V/D, fever, chills, chest pain, LE edema, unintentional weight loss, night sweats, dysuria, hematuria.     She does have SOB when she coughs at night since she got COVID. She states that she can talk to her PCP about this.     Home BP: 120/80    Problem List   Patient Active Problem List   Diagnosis     Anemia in chronic renal disease     HTN, kidney transplant related     Secondary renal hyperparathyroidism (H)     Coronary artery disease involving native coronary  artery of native heart without angina pectoris     Status post coronary angiogram     NSTEMI (non-ST elevated myocardial infarction) (H)     Vitamin D deficiency     Pneumothorax     Kidney replaced by transplant     Femoral neuropathy, right     Femoral neuropathy of right lower extremity     Aftercare following organ transplant     CKD (chronic kidney disease) stage 2, GFR 60-89 ml/min     Anemia due to stage 5 chronic kidney disease, not on chronic dialysis (H)     Infection due to 2019 novel coronavirus       Allergies   Allergies   Allergen Reactions     Blood Transfusion Related (Informational Only)      Patient has a history of a clinically significant antibody against RBC antigens.  A delay in compatible RBCs may occur.     Cephalosporins Anaphylaxis     Per [facility] allergist report: positive skin intradermal tests to Cefazolin and Ceftazidime, but NOT to Penicillin G, Piperacillin and Meropenem      Chlorhexidine Anaphylaxis     Several times during anesthesia at initial phase during insertion of I.v. line and after disinfection with Chlorhexidine drop of blood pressure.  In Prick and as well intradermal tests clear positive reactions to Chlorhexidine (particularly in intradermal test to 0.0002% Chlorhexidine papule of 1cm and Erythema of 2.5cm). AVOID in future Chlorhexidine     Heparin Flush        Medications   Current Outpatient Medications   Medication Sig     amLODIPine (NORVASC) 5 MG tablet Take 1 tablet (5 mg) by mouth daily     aspirin (ASA) 81 MG chewable tablet Take 1 tablet (81 mg) by mouth daily     magnesium oxide (MAG-OX) 400 MG tablet Take 2 tablets (800 mg) by mouth 2 times daily     mycophenolate (GENERIC EQUIVALENT) 250 MG capsule Take 3 capsules (750 mg) by mouth 2 times daily     ondansetron (ZOFRAN-ODT) 4 MG ODT tab Take 1 tablet (4 mg) by mouth every 6 hours as needed for nausea     sulfamethoxazole-trimethoprim (BACTRIM) 400-80 MG tablet Take 1 tablet by mouth daily     tacrolimus  (GENERIC EQUIVALENT) 1 MG capsule Take 2 capsules (2 mg) by mouth 2 times daily     No current facility-administered medications for this visit.     Medications Discontinued During This Encounter   Medication Reason     sodium bicarbonate 650 MG tablet      senna-docusate (SENOKOT-S/PERICOLACE) 8.6-50 MG tablet      acetaminophen (TYLENOL) 325 MG tablet        Physical Exam   Vital Signs: There were no vitals taken for this visit.    Physical exam was deferred for this telemedicine visit.    Data     Renal Latest Ref Rng & Units 10/12/2021 9/13/2021 8/12/2021   Na 133 - 144 mmol/L - - -   Na (external) 136 - 145 meq/L 137 140 140   K 3.4 - 5.3 mmol/L - - -   K (external) 3.5 - 5.1 meq/L 4.9 4.3 4.2   Cl 98 - 109 meq/L 105 107 106   CO2 20 - 32 mmol/L - - -   CO2 (external) 20 - 29 meq/L 22 22 23   BUN 7 - 30 mg/dL - - -   BUN (external) 6 - 22 mg/dL 13 15 17   Cr 0.52 - 1.04 mg/dL - - -   Cr (external) 0.60 - 1.10 mg/dL 1.17(H) 1.01 0.92   Glucose 70 - 99 mg/dL - - -   Glucose (external) 70 - 99 mg/dL 101(H) 89 83   Ca  8.5 - 10.1 mg/dL - - -   Ca (external) 8.5 - 10.5 mg/dL 9.1 9.7 9.7   Mg 1.6 - 2.3 mg/dL - - -   Mg (external) 1.6 - 2.6 mg/dL - - -     Bone Health Latest Ref Rng & Units 6/1/2021 5/3/2021 4/19/2021   Phos 2.5 - 4.5 mg/dL - - -   Phos (external) 2.3 - 4.7 mg/dL 3.1 3.6 3.3   PTHi 18 - 80 pg/mL - - -   PTHi (external) 14 - 95 pg/mL - - -   Vit D Def 20 - 75 ug/L - - -     Heme Latest Ref Rng & Units 10/12/2021 9/13/2021 8/12/2021   WBC 4.0 - 11.0 10e9/L - - -   WBC (external) 4.0 - 11.0 K/uL 4.4 4.5 4.8   Hgb 11.7 - 15.7 g/dL - - -   Hgb (external) 11.5 - 15.8 g/dL 12.4 12.9 13.3   Plt 150 - 450 10e9/L - - -   Plt (external) 140 - 400 K/uL 207 301 278   ABSOLUTE NEUTROPHIL 1.6 - 8.3 10e9/L - - -   ABSOLUTE NEUTROPHILS (EXTERNAL) 1.8 - 8.0 K/uL 3.2 3.2 3.3   ABSOLUTE LYMPHOCYTES 0.8 - 5.3 10e9/L - - -   ABSOLUTE LYMPHOCYTES (EXTERNAL) 0.8 - 4.1 K/uL 0.7(L) 0.9 1.0   ABSOLUTE MONOCYTES 0.0 - 1.3 10e9/L  - - -   ABSOLUTE MONOCYTES (EXTERNAL) 0.0 - 1.0 K/uL 0.5 0.4 0.4   ABSOLUTE EOSINOPHILS 0.0 - 0.7 10e9/L - - -   ABSOLUTE EOSINOPHILS (EXTERNAL) 0.0 - 0.7 K/uL 0.0 0.1 0.1   ABSOLUTE BASOPHILS 0.0 - 0.2 10e9/L - - -   ABSOLUTE BASOPHILS (EXTERNAL) 0.0 - 0.2 K/uL 0.0 0.0 0.0   ABS IMMATURE GRANULOCYTES 0 - 0.4 10e9/L - - -   ABSOLUTE NUCLEATED RBC - - - -     Liver Latest Ref Rng & Units 5/17/2021 3/1/2021 11/12/2020   AP 40 - 150 U/L - - 142   AP (external) 40 - 150 U/L 97 129 -   TBili 0.2 - 1.3 mg/dL - - 0.5   TBili (external) 0.2 - 1.2 mg/dL 0.3 0.4 -   DBili 0.0 - 0.2 mg/dL - - -   DBili (external) 0.0 - 0.5 mg/dL 0.1 0.2 -   ALT 0 - 50 U/L - - 18   ALT (external) 0 - 55 U/L 22 45 -   AST 0 - 45 U/L - - 19   AST (external) 5 - 34 U/L 21 30 -   Tot Protein 6.8 - 8.8 g/dL - - 8.4   Tot Protein (external) 6.0 - 8.3 g/dL 6.7 7.0 -   Albumin 3.4 - 5.0 g/dL - - 3.8   Albumin (external) 3.5 - 5.2 g/dL 4.0 4.2 -     Pancreas Latest Ref Rng & Units 5/17/2021 11/12/2020 1/24/2020   A1C 0 - 5.6 % - 4.9 Canceled, Test credited   A1C (external) 4.0 - 6.0 % 5.3 - -     Iron studies Latest Ref Rng & Units 11/24/2020   Iron 35 - 180 ug/dL 21(L)   Iron sat 15 - 46 % 10(L)   Ferritin 12 - 150 ng/mL 1,120(H)     [facility] Txp Virology Latest Ref Rng & Units 10/12/2021 9/13/2021 8/12/2021   CMV DNA Quant Ext Undetected Iu/mL - - -   LOG IU/ML OF CMVQNT (EXTERNAL) log IU/mL - - -   BK Spec - - - -   BK Res BKNEG:BK Virus DNA Not Detected copies/mL - - -   BK Log <2.7 Log copies/mL - - -   BK Quant Log Ext log IU/mL Undetected Undetected Undetected   BK Quant Result Ext Undetected IU/mL Undetected Undetected Undetected   BK Quant Spec Ext - Plasma Plasma Blood   EBV VCA IGG ANTIBODY U/mL - - -   EBV CAPSID ANTIBODY IGG 0.0 - 0.8 AI - - -   Hep B Core NR:Nonreactive - - -   Hep B Surf - - - -   HIV 1&2 NEG - - -        Recent Labs   Lab Test 11/25/20  0643 11/27/20  0708 11/30/20  0651   DOSTAC 2,000 Not Provided 1,900   TACROL 6.6 8.8 9.5      Recent Labs   Lab Test 11/23/20  0651 11/30/20 0651   DOSMPA 2,000 1,900   MPACID 3.12 2.21   MPAG 191.9* 119.7*

## 2022-03-29 DIAGNOSIS — Z94.0 KIDNEY TRANSPLANTED: ICD-10-CM

## 2022-03-29 RX ORDER — MAGNESIUM OXIDE 400 MG/1
TABLET ORAL
Qty: 120 TABLET | Refills: 11 | Status: SHIPPED | OUTPATIENT
Start: 2022-03-29 | End: 2023-04-05

## 2022-04-05 ENCOUNTER — TRANSFERRED RECORDS (OUTPATIENT)
Dept: HEALTH INFORMATION MANAGEMENT | Facility: CLINIC | Age: 47
End: 2022-04-05
Payer: MEDICARE

## 2022-04-13 ENCOUNTER — LAB (OUTPATIENT)
Dept: LAB | Facility: CLINIC | Age: 47
End: 2022-04-13
Payer: MEDICARE

## 2022-04-13 DIAGNOSIS — Z48.298 AFTERCARE FOLLOWING ORGAN TRANSPLANT: ICD-10-CM

## 2022-04-13 DIAGNOSIS — Z79.899 ENCOUNTER FOR LONG-TERM CURRENT USE OF MEDICATION: ICD-10-CM

## 2022-04-13 DIAGNOSIS — Z94.0 KIDNEY REPLACED BY TRANSPLANT: ICD-10-CM

## 2022-04-13 PROCEDURE — 86832 HLA CLASS I HIGH DEFIN QUAL: CPT

## 2022-04-13 PROCEDURE — 86833 HLA CLASS II HIGH DEFIN QUAL: CPT

## 2022-04-25 DIAGNOSIS — Z94.0 KIDNEY TRANSPLANTED: Primary | ICD-10-CM

## 2022-04-25 RX ORDER — SULFAMETHOXAZOLE AND TRIMETHOPRIM 400; 80 MG/1; MG/1
1 TABLET ORAL DAILY
Qty: 30 TABLET | Refills: 11 | Status: SHIPPED | OUTPATIENT
Start: 2022-04-25 | End: 2022-12-07

## 2022-05-16 DIAGNOSIS — R11.0 NAUSEA: Primary | ICD-10-CM

## 2022-05-16 RX ORDER — ONDANSETRON 4 MG/1
4 TABLET, ORALLY DISINTEGRATING ORAL EVERY 6 HOURS PRN
Qty: 30 TABLET | Refills: 1 | OUTPATIENT
Start: 2022-05-16

## 2022-06-20 DIAGNOSIS — Z94.0 KIDNEY TRANSPLANT RECIPIENT: ICD-10-CM

## 2022-06-20 DIAGNOSIS — Z94.0 KIDNEY TRANSPLANTED: Primary | ICD-10-CM

## 2022-06-20 RX ORDER — TACROLIMUS 1 MG/1
1 CAPSULE ORAL 2 TIMES DAILY
Qty: 60 CAPSULE | Refills: 11 | Status: SHIPPED | OUTPATIENT
Start: 2022-06-20 | End: 2022-12-07

## 2022-06-20 RX ORDER — TACROLIMUS 0.5 MG/1
0.5 CAPSULE ORAL 2 TIMES DAILY
Qty: 60 CAPSULE | Refills: 11 | Status: SHIPPED | OUTPATIENT
Start: 2022-06-20 | End: 2022-12-07

## 2022-06-20 NOTE — TELEPHONE ENCOUNTER
ISSUE:   Tacrolimus IR level 8 on 6/13, goal 4-6, dose 2 mg BID.    PLAN:   Please call patient and confirm this was an accurate 12-hour trough. Verify Tacrolimus IR dose 2 mg BID. Confirm no new medications or illness. Confirm no missed doses. If accurate trough and accurate dose, decrease Tacrolimus IR dose to 1.5 mg BID and repeat labs in 1 weeks    Yaz Villa RN    OUTCOME:   Spoke with patient, they confirm accurate trough level and current dose 2 mg BID. Patient confirmed dose change to 1.5 mg BID and to repeat labs in 1 weeks. Orders sent to preferred pharmacy for dose change and lab for repeat labs. Patient voiced understanding of plan.

## 2022-06-20 NOTE — LETTER
PHYSICIAN ORDERS      DATE & TIME ISSUED: 2022 12:43 PM  PATIENT NAME: Paolo Larkin   : 1975     [facility] MR# [if applicable]: [id number]     DIAGNOSIS:  Kidney Transplant  ICD-10 CODE: Z94.0     Please repeat the following labs in 1-2 weeks:  Tacrolimus drug level  CBC  BMP    Any questions please call: [phone]  Please fax lab results to [phone].      Edgardo Sands MD

## 2022-11-16 NOTE — TELEPHONE ENCOUNTER
Patient Call: General  Route to LPN    Reason for call: Pt went to  ER last night wants to update her Coordinator     Call back needed? Yes    Return Call Needed  Same as documented in contacts section  When to return call?: Greater than one day: Route standard priority   [de-identified] : bilateral impacted wax cleaned with curette [Normal] : mucosa is normal [Midline] : trachea located in midline position

## 2022-12-06 ENCOUNTER — TELEPHONE (OUTPATIENT)
Dept: TRANSPLANT | Facility: CLINIC | Age: 47
End: 2022-12-06

## 2022-12-06 DIAGNOSIS — Z48.298 AFTERCARE FOLLOWING ORGAN TRANSPLANT: Primary | ICD-10-CM

## 2022-12-06 DIAGNOSIS — Z94.0 KIDNEY TRANSPLANT RECIPIENT: ICD-10-CM

## 2022-12-06 DIAGNOSIS — Z94.0 KIDNEY TRANSPLANTED: ICD-10-CM

## 2022-12-06 NOTE — TELEPHONE ENCOUNTER
Voicemail    Date/Time: 12/6/2022 @ 1:32PM    Reason for call: Davina with Dr. Baldwin [facility] called in regards of patient had mentioned that SOT team had not been following up with patient on various things such as necessary appointments, lab work, etc. Call back number to Davina [phone]

## 2022-12-07 RX ORDER — TACROLIMUS 1 MG/1
1 CAPSULE ORAL 2 TIMES DAILY
Qty: 180 CAPSULE | Refills: 3 | Status: SHIPPED | OUTPATIENT
Start: 2022-12-07 | End: 2022-12-13

## 2022-12-07 RX ORDER — MYCOPHENOLATE MOFETIL 250 MG/1
750 CAPSULE ORAL 2 TIMES DAILY
Qty: 540 CAPSULE | Refills: 3 | Status: SHIPPED | OUTPATIENT
Start: 2022-12-07 | End: 2022-12-13

## 2022-12-07 RX ORDER — TACROLIMUS 0.5 MG/1
0.5 CAPSULE ORAL 2 TIMES DAILY
Qty: 180 CAPSULE | Refills: 3 | Status: SHIPPED | OUTPATIENT
Start: 2022-12-07 | End: 2022-12-13

## 2022-12-07 RX ORDER — SULFAMETHOXAZOLE AND TRIMETHOPRIM 400; 80 MG/1; MG/1
1 TABLET ORAL DAILY
Qty: 90 TABLET | Refills: 3 | Status: SHIPPED | OUTPATIENT
Start: 2022-12-07 | End: 2023-04-10

## 2022-12-07 NOTE — TELEPHONE ENCOUNTER
Spoke with Paolo, reviewed that we are still following her kidney function and immunosuppression now. Feels well, would like to travel to [facility] this summer.    Ok for labs once every other week per protocol. Due for follow up with SOT, orders placed.

## 2022-12-13 DIAGNOSIS — Z94.0 KIDNEY TRANSPLANTED: Primary | ICD-10-CM

## 2022-12-13 DIAGNOSIS — Z94.0 KIDNEY TRANSPLANT RECIPIENT: ICD-10-CM

## 2022-12-13 RX ORDER — MYCOPHENOLATE MOFETIL 250 MG/1
750 CAPSULE ORAL 2 TIMES DAILY
Qty: 540 CAPSULE | Refills: 3 | Status: SHIPPED | OUTPATIENT
Start: 2022-12-13 | End: 2023-06-08

## 2022-12-13 RX ORDER — TACROLIMUS 0.5 MG/1
0.5 CAPSULE ORAL 2 TIMES DAILY
Qty: 180 CAPSULE | Refills: 3 | Status: SHIPPED | OUTPATIENT
Start: 2022-12-13 | End: 2023-03-21

## 2022-12-13 RX ORDER — TACROLIMUS 1 MG/1
1 CAPSULE ORAL 2 TIMES DAILY
Qty: 180 CAPSULE | Refills: 3 | Status: SHIPPED | OUTPATIENT
Start: 2022-12-13 | End: 2023-03-21

## 2023-01-24 DIAGNOSIS — Z94.0 KIDNEY TRANSPLANTED: Primary | ICD-10-CM

## 2023-01-24 RX ORDER — ASPIRIN 81 MG/1
81 TABLET, CHEWABLE ORAL DAILY
Qty: 90 TABLET | Refills: 0 | Status: SHIPPED | OUTPATIENT
Start: 2023-01-24

## 2023-01-30 ENCOUNTER — TELEPHONE (OUTPATIENT)
Dept: TRANSPLANT | Facility: CLINIC | Age: 48
End: 2023-01-30
Payer: MEDICARE

## 2023-01-30 NOTE — TELEPHONE ENCOUNTER
Patient Call: General  Route to LPN    Reason for call: called in regards of needing authorization from Dr. Sands for patient's health care coverage. Patient would like the doctor to call their health care coverage. Call back number [phone].     Call back needed? Yes    Return Call Needed  Same as documented in contacts section  When to return call?: Same day: Route High Priority

## 2023-01-31 NOTE — TELEPHONE ENCOUNTER
RNCC called phone number listed, no answer. VM not set up and unable to leave message.     Called Paolo via Patricia , says she received a letter from her insurance needing approval for a medication but wasn't sure which mediation it was. Phone number listed is for her niece. Paolo says she is currently in school and RNCC will try her later this afternoon.

## 2023-02-01 ENCOUNTER — TELEPHONE (OUTPATIENT)
Dept: TRANSPLANT | Facility: CLINIC | Age: 48
End: 2023-02-01
Payer: MEDICARE

## 2023-02-01 NOTE — TELEPHONE ENCOUNTER
Called Paolo's niece x3 to help answer insurance/medication questions. No answer, unable to leave VM as system is not set up. Asked Paolo to have her niece call if there are outstanding issues.

## 2023-02-01 NOTE — TELEPHONE ENCOUNTER
Spoke with Paw, needs PA for both MMF and tacrolimus and needs to fill these through [facility].

## 2023-02-01 NOTE — TELEPHONE ENCOUNTER
Prior Authorization Not Needed per Insurance    Medication: Tacrolimus/ mycophenolate (pa not needed)  Insurance Company:    Expected CoPay:      [facility]  Pharmacy Notified:  yes  Patient Notified:  no    [facility] was informed no pa is needed and medications were already sent out to patient with no issues

## 2023-02-01 NOTE — TELEPHONE ENCOUNTER
Prior Authorization Specialty Medication Request    Medication/Dose:   mycophenolate (GENERIC EQUIVALENT) 250 MG capsule Take 3 capsules (750 mg) by mouth 2 times daily     tacrolimus (GENERIC EQUIVALENT) 0.5 MG capsule Take 1 capsule (0.5 mg) by mouth 2 times daily Total dose = 1.5 mg twice per day   tacrolimus (GENERIC EQUIVALENT) 1 MG capsule Take 1 capsule (1 mg) by mouth 2 times daily Total dose  = 1.5 mg twice per day     ICD code (if different than what is on RX):  Z94.0  Previously Tried and Failed:      Important Lab Values:   Rationale:     Insurance Name: Medicare  Insurance ID: [id number]  Insurance Phone Number:     Pharmacy Information (if different than what is on RX)  Name:  [facility]  Phone:  [phone]

## 2023-03-20 ENCOUNTER — TELEPHONE (OUTPATIENT)
Dept: TRANSPLANT | Facility: CLINIC | Age: 48
End: 2023-03-20
Payer: MEDICARE

## 2023-03-20 DIAGNOSIS — Z94.0 KIDNEY TRANSPLANT RECIPIENT: ICD-10-CM

## 2023-03-20 DIAGNOSIS — Z94.0 KIDNEY TRANSPLANTED: ICD-10-CM

## 2023-03-20 NOTE — LETTER
PHYSICIAN ORDERS      DATE & TIME ISSUED: 2023 11:41 AM  PATIENT NAME: Paolo Larkin   : 1975     [facility] MR# [if applicable]: [id number]     DIAGNOSIS:  kidney transplant  ICD-10 CODE: Z94.0     Please check the following in 1 week:   -CBC  -BMP  -Tacrolimus drug level     Any questions please call: [phone]  Fax results to:   [phone].      Edgardo Sands MD

## 2023-03-20 NOTE — TELEPHONE ENCOUNTER
ISSUE:   Tacrolimus IR level 7.5 on 3/15, goal 4-6, dose 1.5 mg BID.    PLAN:   Please call patient and confirm this was an accurate 12-hour trough. Verify Tacrolimus IR dose 1.5 mg BID. Confirm no new medications or illness. Confirm no missed doses. If accurate trough and accurate dose, decrease Tacrolimus IR dose to 1 mg BID and repeat labs in 1 weeks    OUTCOME:   Spoke with patient via [facility] , they confirm accurate trough level and current dose 1.5 mg BID. Patient confirmed dose change to 1 mg BID and to repeat labs in 1 weeks. Orders sent to preferred pharmacy for dose change and lab for repeat labs. Patient voiced understanding of plan.

## 2023-03-21 RX ORDER — TACROLIMUS 1 MG/1
1 CAPSULE ORAL 2 TIMES DAILY
Qty: 180 CAPSULE | Refills: 3 | Status: SHIPPED | OUTPATIENT
Start: 2023-03-21 | End: 2023-06-08

## 2023-03-21 RX ORDER — TACROLIMUS 0.5 MG/1
CAPSULE ORAL
Qty: 180 CAPSULE | Refills: 3 | Status: SHIPPED | OUTPATIENT
Start: 2023-03-21 | End: 2023-06-08

## 2023-04-05 DIAGNOSIS — Z94.0 KIDNEY TRANSPLANTED: ICD-10-CM

## 2023-04-05 RX ORDER — MAGNESIUM OXIDE 400 MG/1
800 TABLET ORAL 2 TIMES DAILY
Qty: 120 TABLET | Refills: 0 | Status: SHIPPED | OUTPATIENT
Start: 2023-04-05

## 2023-04-05 NOTE — TELEPHONE ENCOUNTER
Call placed to patient. No answer. Voice message left instructing patient to request refill from her PCP.

## 2023-04-10 DIAGNOSIS — Z94.0 KIDNEY TRANSPLANTED: Primary | ICD-10-CM

## 2023-04-10 RX ORDER — SULFAMETHOXAZOLE AND TRIMETHOPRIM 400; 80 MG/1; MG/1
1 TABLET ORAL DAILY
Qty: 90 TABLET | Refills: 3 | Status: SHIPPED | OUTPATIENT
Start: 2023-04-10 | End: 2024-01-18

## 2023-05-03 DIAGNOSIS — Z94.0 KIDNEY TRANSPLANTED: Primary | ICD-10-CM

## 2023-05-03 RX ORDER — MAGNESIUM OXIDE 400 MG/1
800 TABLET ORAL 2 TIMES DAILY
Qty: 120 TABLET | Refills: 0 | OUTPATIENT
Start: 2023-05-03

## 2023-06-08 ENCOUNTER — TELEPHONE (OUTPATIENT)
Dept: TRANSPLANT | Facility: CLINIC | Age: 48
End: 2023-06-08
Payer: MEDICARE

## 2023-06-08 ENCOUNTER — TELEPHONE (OUTPATIENT)
Dept: NEPHROLOGY | Facility: CLINIC | Age: 48
End: 2023-06-08
Payer: MEDICARE

## 2023-06-08 DIAGNOSIS — Z94.0 KIDNEY TRANSPLANT RECIPIENT: ICD-10-CM

## 2023-06-08 DIAGNOSIS — Z94.0 KIDNEY TRANSPLANTED: ICD-10-CM

## 2023-06-08 RX ORDER — TACROLIMUS 1 MG/1
1 CAPSULE ORAL 2 TIMES DAILY
Qty: 180 CAPSULE | Refills: 3 | Status: SHIPPED | OUTPATIENT
Start: 2023-06-08 | End: 2024-01-15

## 2023-06-08 RX ORDER — MYCOPHENOLATE MOFETIL 250 MG/1
750 CAPSULE ORAL 2 TIMES DAILY
Qty: 540 CAPSULE | Refills: 3 | Status: SHIPPED | OUTPATIENT
Start: 2023-06-08 | End: 2023-12-19

## 2023-06-08 NOTE — TELEPHONE ENCOUNTER
Patient Call: General  Route to LPN    Reason for call:  services says pt was on the line with  A nurse in Our office and the line disconnected. Please call pt back with an .     Call back needed? Yes    Return Call Needed  Same as documented in contacts section  When to return call?: Now: Teams/Chat RN first, if no answer Page

## 2023-06-08 NOTE — TELEPHONE ENCOUNTER
Called pt to collect responses for PAM13 refer to assessments and questionnaires.    Aurelio Boone

## 2023-06-08 NOTE — TELEPHONE ENCOUNTER
Spoke with Paolo, visiting [place] in July and August and is requesting a 90 day supply of immunosuppression. [facility]

## 2023-06-26 ENCOUNTER — TELEPHONE (OUTPATIENT)
Dept: TRANSPLANT | Facility: CLINIC | Age: 48
End: 2023-06-26
Payer: MEDICARE

## 2023-06-26 NOTE — TELEPHONE ENCOUNTER
Prior Authorization Specialty Medication Request    Medication/Dose:   mycophenolate (GENERIC EQUIVALENT) 250 MG capsule Take 3 capsules (750 mg) by mouth 2 times daily     tacrolimus (GENERIC EQUIVALENT) 1 MG capsule Take 1 capsule (1 mg) by mouth 2 times daily     **Patient is trying to get a 2 month supply due to traveling out of the country.**    ICD code (if different than what is on RX):  Z94.0  Previously Tried and Failed:      Important Lab Values:   Rationale:     Insurance Name: Medicare  Insurance ID: [id number]  Insurance Phone Number:     Pharmacy Information (if different than what is on RX)  Name:    Phone:

## 2023-06-26 NOTE — TELEPHONE ENCOUNTER
Prior Authorization Not Needed per Insurance    Medication: MYCOPHENOLATE MOFETIL (GENERIC EQUIV) 250 MG PO CAPS  Insurance Company:    Expected CoPay:      [facility] [place]  Pharmacy Notified: Yes  Patient Notified: No      PA not needed due to patient had medicare at time of transplant .   Immunos should be billed to medicare part B no pa is needed.     Medicare does not approve overrode costs on medications patient will have to pay out of pocket on this   Patient is leaving July 10- September 2nd  She is aware she has to pay out of pocket. She will pay the cost out of pocket on that for the one month she is out of country   She did not [facility] and wanted to stick where she is at at this time.

## 2023-06-26 NOTE — TELEPHONE ENCOUNTER
"Called \" Going to [facility] \"  For 2 months July Aug 2023   Only has medications for one month - Pharmacy requesting to have a prior auth for these medications  For 2 months     Reviewed will attempt to place a prior auth for month of medication   The insurance may deny the medications for 2 months  - so may need to pay out of pocket for immunosuppression - to avoid rejection   Paw agreed     Task   Please place PA for medications for 2 month    "

## 2023-11-07 DIAGNOSIS — Z79.899 ENCOUNTER FOR LONG-TERM CURRENT USE OF MEDICATION: ICD-10-CM

## 2023-11-07 DIAGNOSIS — Z94.0 KIDNEY REPLACED BY TRANSPLANT: Primary | ICD-10-CM

## 2023-11-07 DIAGNOSIS — Z98.890 OTHER SPECIFIED POSTPROCEDURAL STATES: ICD-10-CM

## 2023-11-07 DIAGNOSIS — Z48.298 AFTERCARE FOLLOWING ORGAN TRANSPLANT: ICD-10-CM

## 2023-12-19 DIAGNOSIS — Z94.0 KIDNEY TRANSPLANTED: Primary | ICD-10-CM

## 2023-12-19 DIAGNOSIS — Z94.0 KIDNEY TRANSPLANT RECIPIENT: ICD-10-CM

## 2023-12-19 RX ORDER — MYCOPHENOLATE MOFETIL 250 MG/1
750 CAPSULE ORAL 2 TIMES DAILY
Qty: 540 CAPSULE | Refills: 0 | Status: SHIPPED | OUTPATIENT
Start: 2023-12-19 | End: 2024-01-03

## 2023-12-19 NOTE — LETTER
OUTPATIENT LABORATORY TEST ORDER     Patient Name: Paolo Vincent Trae   YOB: 1975     [facility] MR# [if applicable]: [id number]   Date & Time: 12/19/2023  10:42 AM  Expiration Date: 1 year after date issued      Diagnoses: Kidney Transplant (ICD-10 Z94.0)   Long term use of medications (ICD-10 Z79.899)             Contact with or exposure to viral disease (Z20.828)            Aftercare following organ transplant (Z48.298)   Other specified postprocedural states (Z98.890)     We ask your assistance in obtaining the following laboratory tests, which are part of our routine surveillance program for Solid Organ Transplant patients.     Please fax each result to [phone], same day as resulted/available    Critical lab results page [phone]  Monday - Friday 8 am to 5 pm  Evening/Weekend/Holiday communicate Critical labs results [phone]    Every other month   CBC with platelets  Basic Metabolic Panel (Sodium, Potassium, Chloride, CO2, Creatinine, Urea Nitrogen, glucose, Calcium)  Tacrolimus drug level - 12-hour trough, please document time of last dose      Every 6 Months   Urine for protein/creatinine    At 3 years post-transplant (Due: NOW)  PRA/DSA level (mailers provided by the patient)    If you have any questions, please call [facility]- [phone] or [phone], Fax- [phone].      Edgardo Sands MD

## 2023-12-19 NOTE — TELEPHONE ENCOUNTER
Provider Call: General  Route to LPN    Reason for call: Pt has new insurance- unable to use [facility]    Pt's Mycophenolate neeeds to be called into [facility]- [phone] fax- [phone]- Needs sent out soon He did not know which location     Call back needed? No

## 2024-01-02 ENCOUNTER — TELEPHONE (OUTPATIENT)
Dept: TRANSPLANT | Facility: CLINIC | Age: 49
End: 2024-01-02
Payer: MEDICARE

## 2024-01-02 NOTE — TELEPHONE ENCOUNTER
Prior Authorization Specialty Medication Request    Medication/Dose: Mycophenolate 250 mg   Diagnosis and ICD code (if different than what is on RX):  Kidney transplant   New/renewal/insurance change PA/secondary ins. PA:  Previously Tried and Failed:      Important Lab Values:   Rationale:     Insurance   Primary:   Insurance ID:      Secondary (if applicable):  Insurance ID:      Pharmacy Information (if different than what is on RX)  Name:  [facility]  Phone:   [phone]   Fax:

## 2024-01-02 NOTE — TELEPHONE ENCOUNTER
Patient Call: General  Route to LPN    Reason for call: Michael from [facility] calling to give an update that patient's insurance company will be faxing over a Prior Auth form for patient's medication Mycophenolate 250 mg capsule any questions feel free to call Michael.    Call back needed? No

## 2024-01-02 NOTE — TELEPHONE ENCOUNTER
Prior Authorization Not Needed per Insurance    Medication: MYCOPHENOLATE MOFETIL (GENERIC EQUIV) 250 MG PO CAPS  Insurance Company:    [facility] the Rx: [facility] - [address] [place] [address]  Pharmacy Notified: yes  Patient Notified: no

## 2024-01-03 DIAGNOSIS — Z94.0 KIDNEY TRANSPLANTED: Primary | ICD-10-CM

## 2024-01-03 DIAGNOSIS — Z94.0 KIDNEY TRANSPLANTED: ICD-10-CM

## 2024-01-03 DIAGNOSIS — Z94.0 KIDNEY TRANSPLANT RECIPIENT: ICD-10-CM

## 2024-01-03 DIAGNOSIS — Z94.0 KIDNEY TRANSPLANT RECIPIENT: Primary | ICD-10-CM

## 2024-01-03 RX ORDER — MYCOPHENOLATE MOFETIL 250 MG/1
750 CAPSULE ORAL 2 TIMES DAILY
Qty: 540 CAPSULE | Refills: 0 | Status: SHIPPED | OUTPATIENT
Start: 2024-01-03 | End: 2024-01-03

## 2024-01-03 RX ORDER — MYCOPHENOLATE MOFETIL 250 MG/1
750 CAPSULE ORAL 2 TIMES DAILY
Qty: 180 CAPSULE | Refills: 0 | Status: SHIPPED | OUTPATIENT
Start: 2024-01-03 | End: 2024-01-12

## 2024-01-12 DIAGNOSIS — Z94.0 KIDNEY TRANSPLANT RECIPIENT: ICD-10-CM

## 2024-01-12 DIAGNOSIS — Z94.0 KIDNEY TRANSPLANTED: Primary | ICD-10-CM

## 2024-01-12 RX ORDER — MYCOPHENOLATE MOFETIL 250 MG/1
750 CAPSULE ORAL 2 TIMES DAILY
Qty: 180 CAPSULE | Refills: 0 | Status: SHIPPED | OUTPATIENT
Start: 2024-01-12 | End: 2024-01-15

## 2024-01-12 NOTE — TELEPHONE ENCOUNTER
Per daughter Lyubov # [phone],  But daughter asked if we could use Patricia  services and call her mom-Paolo Boone is out of MPA 250mg  could pickup at [facility] in [place].   Has been out of  four days now.      [place], [place] - [address] Phone: [phone]   Fax: [phone]

## 2024-01-15 ENCOUNTER — TELEPHONE (OUTPATIENT)
Dept: TRANSPLANT | Facility: CLINIC | Age: 49
End: 2024-01-15
Payer: MEDICARE

## 2024-01-15 DIAGNOSIS — Z94.0 KIDNEY TRANSPLANT RECIPIENT: ICD-10-CM

## 2024-01-15 DIAGNOSIS — Z94.0 KIDNEY TRANSPLANTED: Primary | ICD-10-CM

## 2024-01-15 DIAGNOSIS — Z94.0 KIDNEY TRANSPLANTED: ICD-10-CM

## 2024-01-15 RX ORDER — MYCOPHENOLATE MOFETIL 250 MG/1
750 CAPSULE ORAL 2 TIMES DAILY
Qty: 42 CAPSULE | Refills: 0 | Status: SHIPPED | OUTPATIENT
Start: 2024-01-15 | End: 2024-01-18

## 2024-01-15 RX ORDER — MYCOPHENOLATE MOFETIL 250 MG/1
750 CAPSULE ORAL 2 TIMES DAILY
Qty: 180 CAPSULE | Refills: 3 | Status: SHIPPED | OUTPATIENT
Start: 2024-01-15 | End: 2024-01-15

## 2024-01-15 RX ORDER — TACROLIMUS 1 MG/1
1 CAPSULE ORAL 2 TIMES DAILY
Qty: 180 CAPSULE | Refills: 3 | Status: SHIPPED | OUTPATIENT
Start: 2024-01-15 | End: 2024-01-18

## 2024-01-15 NOTE — TELEPHONE ENCOUNTER
Out of MMF for 2 days, emergency 7 day supply sent to [facility] in [place], [place]. Paw will call back if she cannot pick this up today.     Will [facility] for [facility]ill.

## 2024-01-15 NOTE — TELEPHONE ENCOUNTER
Please call Paolo with Patricia .  Paolo would like to transfer all medications before next refill's are due to [place], [place] - [address]   Phone: [phone]  Fax: [phone]

## 2024-01-18 RX ORDER — MYCOPHENOLATE MOFETIL 250 MG/1
750 CAPSULE ORAL 2 TIMES DAILY
Qty: 180 CAPSULE | Refills: 11 | Status: SHIPPED | OUTPATIENT
Start: 2024-01-18 | End: 2024-01-23

## 2024-01-18 RX ORDER — TACROLIMUS 1 MG/1
1 CAPSULE ORAL 2 TIMES DAILY
Qty: 60 CAPSULE | Refills: 11 | Status: SHIPPED | OUTPATIENT
Start: 2024-01-18 | End: 2024-01-23

## 2024-01-18 RX ORDER — SULFAMETHOXAZOLE AND TRIMETHOPRIM 400; 80 MG/1; MG/1
1 TABLET ORAL DAILY
Qty: 30 TABLET | Refills: 11 | Status: SHIPPED | OUTPATIENT
Start: 2024-01-18 | End: 2024-02-13

## 2024-01-18 ASSESSMENT — ENCOUNTER SYMPTOMS: NEW SYMPTOMS OF CORONARY ARTERY DISEASE: 0

## 2024-01-18 NOTE — TELEPHONE ENCOUNTER
Spoke with Paolo via Patricia , she confirmed she received 1 month supply of her mycophenolate. Needs the remainder of the prescription sent to     Reviewed lab scheduled with Paolo. Orders updated and faxed to [facility].     Due for follow up, order placed.

## 2024-01-23 ENCOUNTER — TELEPHONE (OUTPATIENT)
Dept: TRANSPLANT | Facility: CLINIC | Age: 49
End: 2024-01-23
Payer: MEDICARE

## 2024-01-23 DIAGNOSIS — Z94.0 KIDNEY TRANSPLANT RECIPIENT: ICD-10-CM

## 2024-01-23 DIAGNOSIS — Z94.0 KIDNEY TRANSPLANTED: Primary | ICD-10-CM

## 2024-01-23 RX ORDER — TACROLIMUS 1 MG/1
1 CAPSULE ORAL 2 TIMES DAILY
Qty: 14 CAPSULE | Refills: 0 | Status: SHIPPED | OUTPATIENT
Start: 2024-01-23 | End: 2024-01-25

## 2024-01-23 RX ORDER — MYCOPHENOLATE MOFETIL 250 MG/1
750 CAPSULE ORAL 2 TIMES DAILY
Qty: 42 CAPSULE | Refills: 0 | Status: CANCELLED | OUTPATIENT
Start: 2024-01-23

## 2024-01-23 RX ORDER — MYCOPHENOLATE MOFETIL 250 MG/1
750 CAPSULE ORAL 2 TIMES DAILY
Qty: 42 CAPSULE | Refills: 0 | Status: SHIPPED | OUTPATIENT
Start: 2024-01-23 | End: 2024-01-25

## 2024-01-23 NOTE — TELEPHONE ENCOUNTER
Spoke to patients daughter in law who states that patient needs a 1 week supply of Tacrolimus and Mycophenolate to [place] [facility].  After they fill Rx for 1 week supply.  Patient would like future refills to go to [facility] pharmacy.

## 2024-01-23 NOTE — TELEPHONE ENCOUNTER
Per daughter Lyubov # [phone] mom Paw will be out of  mg will need a short supply(one Week) sent to [facility], [place], [place] - [address] Phone: [phone]   Fax: [phone]             and then 30 day to [facility].   [facility] [place] [place] - [address] [place] [address] Phone: [phone]   Fax: [phone]         They had a insurance change.    Tacrolimus 1.0mg can be refilled at [facility], [address] [place] [address] PH# [phone]    Please call Daughter Lyubov # [phone] to let her know when refills are sent out.

## 2024-01-24 ENCOUNTER — TELEPHONE (OUTPATIENT)
Dept: TRANSPLANT | Facility: CLINIC | Age: 49
End: 2024-01-24
Payer: MEDICARE

## 2024-01-24 NOTE — TELEPHONE ENCOUNTER
Spoke to patients sister in law and confirmed that she will  1 weeks worth of Tac and MPA at [facility].  After she picks up, informed patient/sister in law that we can send future refills to [facility].

## 2024-01-24 NOTE — TELEPHONE ENCOUNTER
Patient Call: General  Route to LPN    Reason for call: patient called about a refill request and [facility] is needing documents in order fill an other. More details with call back.     Call back needed? Yes    Return Call Needed  Same as documented in contacts section  When to return call?: Same day: Route High Priority

## 2024-01-25 DIAGNOSIS — Z94.0 KIDNEY TRANSPLANTED: Primary | ICD-10-CM

## 2024-01-25 DIAGNOSIS — Z94.0 KIDNEY TRANSPLANT RECIPIENT: ICD-10-CM

## 2024-01-25 RX ORDER — MYCOPHENOLATE MOFETIL 250 MG/1
750 CAPSULE ORAL 2 TIMES DAILY
Qty: 180 CAPSULE | Refills: 11 | Status: SHIPPED | OUTPATIENT
Start: 2024-01-25 | End: 2024-01-26

## 2024-01-25 RX ORDER — TACROLIMUS 1 MG/1
1 CAPSULE ORAL 2 TIMES DAILY
Qty: 60 CAPSULE | Refills: 11 | Status: SHIPPED | OUTPATIENT
Start: 2024-01-25 | End: 2024-01-26

## 2024-01-25 NOTE — TELEPHONE ENCOUNTER
General  Route to LPN    Reason for call: Patient is needing a 30 day supply instead of 7 of mycrophenolate and tacrolimus. The microphenolate goes to the [facility] pharmacy and Tacrolimus goes to the [facility] pharmacy. She just picked up the 7 day supply so not completely out of medication right now     Call back needed? Yes    Return Call Needed  Same as documented in contacts section  When to return call?: Greater than one day: Route standard priority

## 2024-01-26 DIAGNOSIS — Z94.0 KIDNEY TRANSPLANT RECIPIENT: ICD-10-CM

## 2024-01-26 DIAGNOSIS — Z94.0 KIDNEY TRANSPLANTED: Primary | ICD-10-CM

## 2024-01-26 RX ORDER — TACROLIMUS 1 MG/1
1 CAPSULE ORAL 2 TIMES DAILY
Qty: 60 CAPSULE | Refills: 11 | Status: SHIPPED | OUTPATIENT
Start: 2024-01-26 | End: 2024-01-30

## 2024-01-26 RX ORDER — MYCOPHENOLATE MOFETIL 250 MG/1
750 CAPSULE ORAL 2 TIMES DAILY
Qty: 180 CAPSULE | Refills: 11 | Status: SHIPPED | OUTPATIENT
Start: 2024-01-26

## 2024-01-26 NOTE — TELEPHONE ENCOUNTER
Patient's r tacrolimus  1 mg and mycophenolate 250 mg  was sent to [facility] pharmacy yesterday,  but only her tacrolimus is covered there. her insurance company gave her [facility] she's asking that  both her tacrolimus and Mycophenolate be sent over to [facility] phone provided is [phone] ASAP. Patient is asking for a follow call when orders have been sent so she can call and them scheduled for delivery.

## 2024-01-29 ENCOUNTER — TELEPHONE (OUTPATIENT)
Dept: TRANSPLANT | Facility: CLINIC | Age: 49
End: 2024-01-29
Payer: MEDICARE

## 2024-01-29 DIAGNOSIS — Z94.0 KIDNEY TRANSPLANT RECIPIENT: ICD-10-CM

## 2024-01-29 DIAGNOSIS — Z94.0 KIDNEY TRANSPLANTED: ICD-10-CM

## 2024-01-29 NOTE — TELEPHONE ENCOUNTER
ISSUE:   Tacrolimus IR level 3.3 on 1/26, goal 4-6, dose 1 mg BID.    PLAN:   Call Patientand confirm this was an accurate 12-hour trough.   Verify Tacrolimus IR dose 1 mg BID.   Confirm no new medications or illness.   Confirm no missed doses.   If accurate trough and accurate dose, increase Tacrolimus IR dose to 1.5 mg BID     Is this more than a 50% increase or decrease in current IS dose: No  If YES, justification: N/A    Repeat labs in 1 weeks.  *If > 50% change in immunosuppression dose, repeat labs in 1 week.     OUTCOME:   Spoke with Patient via Patricia  they confirm accurate trough level and current dose 1 mg BID.   Paw does not currently have 0.5 mg caps.   Patientconfirmed dose change to 1 mg in the AM, 2 mg in PM.  Patientagreed to repeat labs in 1 weeks.   Orders sent to preferred pharmacy for dose change and lab for repeat labs.   Patientvoiced understanding of plan.

## 2024-01-29 NOTE — LETTER
PHYSICIAN ORDERS      DATE & TIME ISSUED: 2024 10:31 AM  PATIENT NAME: Paolo Larkin   : 1975     [facility] MR# [if applicable]: [id number]     DIAGNOSIS:  kidney transplant  ICD-10 CODE: Z94.0     Please check the following in 1-2 weeks:  -BMP  -tacrolimus drug level       Any questions please call: [phone]    Please fax each result same day as resulted/available    Critical lab results page [phone]  Fax results to:   [phone].      Alexi Stephenson MD

## 2024-01-30 RX ORDER — TACROLIMUS 1 MG/1
CAPSULE ORAL
Qty: 90 CAPSULE | Refills: 11 | Status: SHIPPED | OUTPATIENT
Start: 2024-01-30

## 2024-02-13 ENCOUNTER — TELEPHONE (OUTPATIENT)
Dept: TRANSPLANT | Facility: CLINIC | Age: 49
End: 2024-02-13
Payer: MEDICARE

## 2024-02-13 DIAGNOSIS — Z94.0 KIDNEY TRANSPLANTED: Primary | ICD-10-CM

## 2024-02-13 RX ORDER — SULFAMETHOXAZOLE AND TRIMETHOPRIM 400; 80 MG/1; MG/1
1 TABLET ORAL DAILY
Qty: 30 TABLET | Refills: 11 | Status: SHIPPED | OUTPATIENT
Start: 2024-02-13

## 2024-02-13 NOTE — TELEPHONE ENCOUNTER
Needs sulfamethoxazole-trimethoprim (BACTRIM) 400-80 MG tablet      [place], [place] - [address] Phone: [phone]   Fax: [phone]            Paolo Trae is out of her Bactrim right now.

## 2024-02-13 NOTE — TELEPHONE ENCOUNTER
Please call Daughter Lyubov., wanted to discuss her mom's medications and care plan.   [phone].

## 2024-02-27 NOTE — TELEPHONE ENCOUNTER
Returned call,  patient and daughter-in-law no longer have any concerns or questions.  They did receive IS refill    TX labs in one month      Sayra Flores RN   Transplant Coordinator  [phone]

## 2024-04-01 ENCOUNTER — TELEPHONE (OUTPATIENT)
Dept: TRANSPLANT | Facility: CLINIC | Age: 49
End: 2024-04-01
Payer: MEDICARE

## 2024-04-01 DIAGNOSIS — Z94.0 KIDNEY TRANSPLANT RECIPIENT: ICD-10-CM

## 2024-04-01 DIAGNOSIS — Z94.0 KIDNEY TRANSPLANTED: Primary | ICD-10-CM

## 2024-04-01 NOTE — TELEPHONE ENCOUNTER
ISSUE:     level 3 on 02/28/24, goal 5, dose 1 mg in the AM and 2 mg in the PM.    LPN TASK/ PLAN:   Call Patient and confirm this was an accurate 12-hour trough.   Verify  current dose of 1 mg in the AM and 2 mg in the PM .   Confirm no new medications or illness.   Confirm no missed doses.     If accurate trough and accurate dose, increase   dose to 2.5 mg BID  *Recommended dose rounded from calculated dose 5 mg  daily.      Repeat labs in 1 weeks.   For any dose change <50%, recheck labs per guideline or within 1 month.  For any dose change of more than 50%, recheck drug level based on timing to reach steady state:   Immediate release tacrolimus: 2-3 days  Extended-release tacrolimus: 7 days  Cyclosporine: 2 days  Sirolimus: 12 days  Everolimus: 8 days        Please update RX as well as place orders for repeat 12 hour tac trough level, BMP and CBC    Sayra Flores RN   Transplant Coordinator  [phone]

## 2024-04-01 NOTE — TELEPHONE ENCOUNTER
Call placed to patient via  services. Patient confirms current dose and more that 13 hour trough level. Denies any recent illness, diarrhea or medication changes. Patient v\u to continue current dose and repeat level in one week. Order sent

## 2024-04-01 NOTE — LETTER
PHYSICIAN ORDERS      DATE & TIME ISSUED: 2024 2:23 PM  PATIENT NAME: Paolo Lakrin   : 1975     [facility] MR# [if applicable]: [id number]     DIAGNOSIS:  Kidney transplant   ICD-10 CODE: Z94.0      Please repeat the following labs in one week  Tacrolimus level (ensure 12 hours between last dose and blood draw)  BMP  CBC with platelets and differential      Any questions please call: [phone] option 5    Please fax each result same day as resulted/available    Critical lab results page [phone]    Please fax results to [phone].    .  Misty Koo MD

## 2024-04-02 RX ORDER — TACROLIMUS 1 MG/1
2 CAPSULE ORAL EVERY MORNING
Qty: 60 CAPSULE | Refills: 11 | OUTPATIENT
Start: 2024-04-02 | End: 2024-08-08

## 2024-04-02 RX ORDER — TACROLIMUS 0.5 MG/1
0.5 CAPSULE ORAL 2 TIMES DAILY
Qty: 60 CAPSULE | Refills: 11 | OUTPATIENT
Start: 2024-04-02 | End: 2024-08-08

## 2024-04-02 NOTE — TELEPHONE ENCOUNTER
Call returned to patient v\unit(s) Patricia . Patient v\u to increase tacrolimus to 2.5 mg bid and repeat labs in one week. Rx sent

## 2024-05-10 NOTE — NURSING NOTE
Chief Complaint   Patient presents with     Allergy Consult     Paw is here today for possible medication reaction from her kidney transplant process - possible anethesia      Leia Campbell, RILEYN       No/Not applicable

## 2024-05-28 ENCOUNTER — TELEPHONE (OUTPATIENT)
Dept: TRANSPLANT | Facility: CLINIC | Age: 49
End: 2024-05-28
Payer: MEDICARE

## 2024-05-28 NOTE — LETTER
Paolo Larkin  [address] [address]                May 31, 2024        Dear Paolo Larkin,     We hope this letter finds you well. We at the [facility] have been trying to reach you via phone, however we have been unsuccessful. Please contact us at your earliest convenience at [phone] x [phone]. Thank you.           Sincerely,     Your Transplant Team

## 2024-05-28 NOTE — TELEPHONE ENCOUNTER
Tacrolimus level 3.6 on 5/23.  Goal 4-6.   Current dose 1 mg in AM, 2 mg in PM    LPN TASK:  Dose changed to 2 mg in AM, 2 mg in PM   Recheck level in 1 week    Please update RX as well as place repeat tac level orders    Sayra Flores RN   Transplant Coordinator  [phone]

## 2024-05-31 NOTE — TELEPHONE ENCOUNTER
Tried to reach out to emergency contact and left message.  Sent patient trying to reach you letter via mail.

## 2024-08-05 ENCOUNTER — TELEPHONE (OUTPATIENT)
Dept: TRANSPLANT | Facility: CLINIC | Age: 49
End: 2024-08-05
Payer: MEDICARE

## 2024-08-05 DIAGNOSIS — Z94.0 KIDNEY TRANSPLANTED: ICD-10-CM

## 2024-08-05 RX ORDER — SULFAMETHOXAZOLE AND TRIMETHOPRIM 400; 80 MG/1; MG/1
1 TABLET ORAL DAILY
Qty: 30 TABLET | Refills: 11 | Status: CANCELLED | OUTPATIENT
Start: 2024-08-05

## 2024-08-05 NOTE — TELEPHONE ENCOUNTER
General  Route to LPN    Reason for call: Pt needs a refill on her sulfemethoxazole     Call back needed? Yes    Return Call Needed  Same as documented in contacts section  When to return call?: Greater than one day: Route standard priority

## 2024-08-05 NOTE — TELEPHONE ENCOUNTER
Called Paw and did not get option for voicemail. Called preferred pharmacy and Bactrim is already filled. Pharmacy had address on file so added this to chart. Did not refill Bactrim because it is already available for patient at preferred pharmacy.

## 2024-08-27 ENCOUNTER — TELEPHONE (OUTPATIENT)
Dept: TRANSPLANT | Facility: CLINIC | Age: 49
End: 2024-08-27
Payer: MEDICARE

## 2024-08-27 ENCOUNTER — APPOINTMENT (OUTPATIENT)
Dept: INTERPRETER SERVICES | Facility: CLINIC | Age: 49
End: 2024-08-27
Payer: MEDICARE

## 2024-08-27 NOTE — TELEPHONE ENCOUNTER
ISSUE:   Tacrolimus IR level 2.2 on 8/26/2024, goal 4-6, dose 1 mg in the AM and 2 mg in the PM .    LPN TASK/PLAN:   Call Patient and confirm this was an accurate 12-hour trough.   Verify Tacrolimus IR dose 1 mg in the AM and 2 mg in the PM .   Confirm no new medications or or missed doses.   Confirm no new illness / infection / diarrhea.   If accurate trough and accurate dose, increase Tacrolimus IR dose to 2.5 mg BID     Is this more than a 50% increase or decrease in current IS dose: No  If YES, justification: na    Repeat labs in 2 weeks.  *If > 50% change in immunosuppression dose, repeat labs in 1 week.     Please update Tacrolimus RX as well as place Bmp, CBC, and tac lab orders. Thank you    Sayra Flores RN   Transplant Coordinator  [phone]

## 2024-08-27 NOTE — LETTER
PHYSICIAN ORDERS      DATE & TIME ISSUED: 2024 11:53 AM  PATIENT NAME: Paolo Larkin   : 1975     [facility] MR# [if applicable]: [id number]     DIAGNOSIS:  Kidney transplant   ICD-10 CODE: Z94.0      Please complete the following labs in two weeks:    Tacrolimus level (ensure 12 hours between last dose and blood draw)  CBC with platelets and differential  BMP    Any questions please call: [phone] option 5    Please fax each result same day as resulted/available    Critical lab results page [phone]    Please fax results to [phone].    .

## 2024-08-29 NOTE — TELEPHONE ENCOUNTER
Call placed to patient via  services. Patient confirms current dose and accurate trough level. Patient denies any recent illness, infection and medication changes. Patient v\u to increase tacrolimus dose to 2.5 mg bid and repeat level in 2 weeks. Order/rx sent.

## 2024-10-18 DIAGNOSIS — Z48.298 AFTERCARE FOLLOWING ORGAN TRANSPLANT: ICD-10-CM

## 2024-10-18 DIAGNOSIS — Z94.0 KIDNEY REPLACED BY TRANSPLANT: Primary | ICD-10-CM

## 2024-10-18 DIAGNOSIS — Z98.890 OTHER SPECIFIED POSTPROCEDURAL STATES: ICD-10-CM

## 2024-10-18 DIAGNOSIS — Z79.899 ENCOUNTER FOR LONG-TERM CURRENT USE OF MEDICATION: ICD-10-CM

## 2024-11-04 ENCOUNTER — TELEPHONE (OUTPATIENT)
Dept: TRANSPLANT | Facility: CLINIC | Age: 49
End: 2024-11-04
Payer: MEDICARE

## 2024-11-04 DIAGNOSIS — Z94.0 KIDNEY TRANSPLANT RECIPIENT: ICD-10-CM

## 2024-11-04 DIAGNOSIS — Z94.0 KIDNEY TRANSPLANTED: ICD-10-CM

## 2024-11-04 NOTE — LETTER
OUTPATIENT LABORATORY TEST ORDER     Patient Name: Paolo Vincent Trae   YOB: 1975     [facility] MR# [if applicable]: [id number]   Date & Time: 11/7/2024  Expiration Date: 1 year after date issued      Diagnoses: Kidney Transplant (ICD-10 Z94.0)   Long term use of medications (ICD-10 Z79.899)             Contact with or exposure to viral disease (Z20.828)            Aftercare following organ transplant (Z48.298)   Other specified postprocedural states (Z98.890)     We ask your assistance in obtaining the following laboratory tests, which are part of our routine surveillance program for Solid Organ Transplant patients.     Please fax each result to [phone], same day as resulted/available    Critical lab results page [phone]  Monday - Friday 8 am to 5 pm  Evening/Weekend/Holiday communicate Critical labs results [phone]    Please obtain the following in one week:  12 hour tacrolimus trough level    Every other month   CBC with platelets  Basic Metabolic Panel (Sodium, Potassium, Chloride, CO2, Creatinine, Urea Nitrogen, glucose, Calcium)  Tacrolimus drug level - 12-hour trough, please document time of last dose      Every 6 Months   Urine for protein/creatinine    At 4 & 4 1/2 years post-transplant (Due: 11/2024 & 5/2025)  PRA/DSA level (mailers provided by the patient)    If you have any questions, please call [facility]- [phone] or [phone], Fax- [phone].    .

## 2024-11-04 NOTE — TELEPHONE ENCOUNTER
ISSUE:   Tacrolimus IR level 2.9 on 11/1/2024, goal 4-6, dose 2.5 mg BID.    PLAN:   Call Patient and confirm this was an accurate 12-hour trough.   Verify Tacrolimus IR dose 2.5 mg BID.   Confirm no new medications or or missed doses.   Confirm no new illness / infection / diarrhea.   If accurate trough and accurate dose, increase Tacrolimus IR dose to 3.5 mg BID     Is this more than a 50% increase or decrease in current IS dose: No  If YES, justification: na    Repeat labs in 1 weeks.  *If > 50% change in immunosuppression dose, repeat labs in 1 week.     OUTCOME:   Spoke with Patient, they confirm accurate  10 HOUR trough level and current dose 2.5 mg BID.   Patient confirmed dose change to 3 mg BID.  Patient agreed to repeat labs in 1 weeks.   Orders sent to preferred pharmacy for dose change and lab for repeat labs.   Patient voiced understanding of plan.     11[place]18-1/2018 [place] be out of [place].  She states she has enough IS and will have labs completed upon return to the [place].    Sayra Flores RN   Transplant Coordinator  [phone]

## 2024-11-07 RX ORDER — TACROLIMUS 1 MG/1
3 CAPSULE ORAL 2 TIMES DAILY
Qty: 540 CAPSULE | Refills: 3 | Status: SHIPPED | OUTPATIENT
Start: 2024-11-07

## 2025-01-27 ENCOUNTER — TELEPHONE (OUTPATIENT)
Dept: TRANSPLANT | Facility: CLINIC | Age: 50
End: 2025-01-27
Payer: MEDICARE

## 2025-01-27 DIAGNOSIS — Z94.0 KIDNEY TRANSPLANTED: ICD-10-CM

## 2025-01-27 RX ORDER — SULFAMETHOXAZOLE AND TRIMETHOPRIM 400; 80 MG/1; MG/1
1 TABLET ORAL DAILY
Qty: 30 TABLET | Refills: 11 | Status: SHIPPED | OUTPATIENT
Start: 2025-01-27

## 2025-01-27 RX ORDER — SULFAMETHOXAZOLE AND TRIMETHOPRIM 400; 80 MG/1; MG/1
1 TABLET ORAL DAILY
Qty: 30 TABLET | Refills: 11 | Status: SHIPPED | OUTPATIENT
Start: 2025-01-27 | End: 2025-01-27

## 2025-01-27 NOTE — TELEPHONE ENCOUNTER
RNCC spoke with Paolo via Patricia  (ID [id number]) -     Paolo went to [facility] - when she returned she did not have refills for magnesium nor for her bactrim.  She last took bactrim around Jan 10th. She will start this. Refill sent to her local [facility].  RNCC asked that she not fill her magnesium, orders to be sent to check magnesium with next standing lab draw (on  / around Friday 1/30).

## 2025-01-27 NOTE — TELEPHONE ENCOUNTER
Patient Call: General  Route to LPN    Reason for call: Patient and Lyubov called to touch base and ask questions about medication.     Call back needed? Yes    Return Call Needed  Same as documented in contacts section  When to return call?: Same day: Route High Priority

## 2025-01-27 NOTE — LETTER
OUTPATIENT LABORATORY TEST ORDER     Patient Name: Paolo Vincent Trae   YOB: 1975     [facility] MR# [if applicable]: [id number]   Date & Time: 1/27/2025, 1:29 PM   Expiration Date: 1 year after date issued      Diagnoses: Kidney Transplant (ICD-10 Z94.0)   Long term use of medications (ICD-10 Z79.899)             Contact with or exposure to viral disease (Z20.828)            Aftercare following organ transplant (Z48.298)   Other specified postprocedural states (Z98.890)     We ask your assistance in obtaining the following laboratory tests, which are part of our routine surveillance program for Solid Organ Transplant patients.     Please fax each result to [phone], same day as resulted/available    Critical lab results page [phone]  Monday - Friday 8 am to 5 pm  Evening/Weekend/Holiday communicate Critical labs results [phone]    In addition to standing labs, please obtain, one-time (due upon receipt of orders):  Magnesium level  T cell subset    Every other month   CBC with platelets  Basic Metabolic Panel (Sodium, Potassium, Chloride, CO2, Creatinine, Urea Nitrogen, glucose, Calcium)  Tacrolimus drug level - 12-hour trough, please document time of last dose      Every 6 Months   Urine for protein/creatinine    At 4 & 4 1/2 years post-transplant (Due: 11/2024 & 5/2025)  PRA/DSA level (mailers provided by the patient)    If you have any questions, please call [facility]- [phone] or [phone], Fax- [phone].      Alexi Stephenson MD

## 2025-02-03 ENCOUNTER — APPOINTMENT (OUTPATIENT)
Dept: INTERPRETER SERVICES | Facility: CLINIC | Age: 50
End: 2025-02-03
Payer: MEDICARE

## 2025-02-03 ENCOUNTER — TELEPHONE (OUTPATIENT)
Dept: TRANSPLANT | Facility: CLINIC | Age: 50
End: 2025-02-03
Payer: MEDICARE

## 2025-02-03 DIAGNOSIS — Z94.0 KIDNEY TRANSPLANT RECIPIENT: ICD-10-CM

## 2025-02-03 DIAGNOSIS — Z94.0 KIDNEY TRANSPLANTED: ICD-10-CM

## 2025-02-03 NOTE — LETTER
PHYSICIAN ORDERS      DATE & TIME ISSUED: 2025 3:29 PM  PATIENT NAME: Paolo Larkin   : 1975     [facility] MR# [if applicable]: [id number]     DIAGNOSIS:  Kidney transplant   ICD-10 CODE: Z94.0      Please repeat the following level in one week  Tacrolimus level (ensure 12 hours between last dose and blood draw)    Any questions please call: [phone] option 5    Please fax each result same day as resulted/available    Critical lab results page [phone] option 5    Please fax results to [phone].

## 2025-02-03 NOTE — TELEPHONE ENCOUNTER
ISSUE:   Tacrolimus IR level 9.2 on 01/31/25, goal 5, dose 3 mg BID.    PLAN:   Call Patient and confirm this was an accurate 12-hour trough.   Verify Tacrolimus IR dose 3 mg BID.   Confirm no new medications or illness.   Confirm no missed doses.     If accurate trough and accurate dose, decrease Tacrolimus IR dose to 2 mg BID  *Recommended dose rounded from calculated dose 1.63 mg  BID.      Repeat labs in 1 weeks.   For any dose change <50%, recheck labs per guideline or within 1 month.  For any dose change of more than 50%, recheck drug level based on timing to reach steady state:   Immediate release tacrolimus: 2-3 days  Extended-release tacrolimus: 7 days  Cyclosporine: 2 days  Sirolimus: 12 days  Everolimus: 8 days      OUTCOME:   Called patient x2 and both emergency contacts listed. Patient's phone is not in service. Both numbers listed report that they did not know the patient.

## 2025-02-06 RX ORDER — TACROLIMUS 1 MG/1
2 CAPSULE ORAL 2 TIMES DAILY
Qty: 360 CAPSULE | Refills: 3 | OUTPATIENT
Start: 2025-02-06

## 2025-02-06 NOTE — TELEPHONE ENCOUNTER
Call placed to patient via [employer]. Patient confirm current dose and accurate trough level. Patient denies any recent illness, diarrhea or medication changes. Patient v\u to decrease tacrolimus dose to 2 mg bid and repeat level in one week. Order/rx sent

## 2025-02-13 ENCOUNTER — TEAM CONFERENCE (OUTPATIENT)
Dept: TRANSPLANT | Facility: CLINIC | Age: 50
End: 2025-02-13
Payer: MEDICARE

## 2025-02-13 DIAGNOSIS — Z94.0 KIDNEY TRANSPLANT RECIPIENT: ICD-10-CM

## 2025-02-13 DIAGNOSIS — Z94.0 KIDNEY TRANSPLANTED: ICD-10-CM

## 2025-02-13 RX ORDER — TACROLIMUS 1 MG/1
2 CAPSULE ORAL 2 TIMES DAILY
Qty: 360 CAPSULE | Refills: 3 | Status: SHIPPED | OUTPATIENT
Start: 2025-02-13

## 2025-02-18 DIAGNOSIS — Z94.0 KIDNEY TRANSPLANT RECIPIENT: ICD-10-CM

## 2025-02-18 DIAGNOSIS — Z94.0 KIDNEY TRANSPLANTED: ICD-10-CM

## 2025-02-18 RX ORDER — MYCOPHENOLATE MOFETIL 250 MG/1
750 CAPSULE ORAL 2 TIMES DAILY
Qty: 180 CAPSULE | Refills: 11 | Status: SHIPPED | OUTPATIENT
Start: 2025-02-18

## 2025-03-19 ENCOUNTER — TELEPHONE (OUTPATIENT)
Dept: TRANSPLANT | Facility: CLINIC | Age: 50
End: 2025-03-19
Payer: MEDICARE

## 2025-03-19 NOTE — TELEPHONE ENCOUNTER
Medication Refill  Route to [facility]    Pharmacy Name: [facility]  Pharmacy Location: [address] [place] [address] St  Name of Medication: Sulfamethoxazole- TMP   Quantity / Dose: Qty 30    No dose on the fax

## (undated) DEVICE — SU SILK 4-0 TIE 12X30" A303H

## (undated) DEVICE — CANNULA VESSEL DLP  30001

## (undated) DEVICE — LINEN TOWEL PACK X30 5481

## (undated) DEVICE — SUCTION MANIFOLD DORNOCH ULTRA CART UL-CL500

## (undated) DEVICE — SU SILK 0 TIE 6X30" A306H

## (undated) DEVICE — DRAPE CV SPLIT II 147X106" 9158

## (undated) DEVICE — SU SILK 2-0 TIE 12X30" A305H

## (undated) DEVICE — INSERT FOGARTY 33MM TRACTION HYDRAJAW HYDRA33

## (undated) DEVICE — TUBING IRRIG CYSTO/BLADDER SET 81" LF 2C4040

## (undated) DEVICE — LIGHT HANDLE X1 31140133

## (undated) DEVICE — STPL SKIN 35W ROTATING HEAD PRW35

## (undated) DEVICE — DRAPE ISOLATION BAG 1003

## (undated) DEVICE — DEVICE CATH STABILIZATION STATLOCK FOLEY 3-WAY FOL0105

## (undated) DEVICE — CLIP HORIZON SM RED WIDE SLOT 001201

## (undated) DEVICE — SU PROLENE 6-0 RB-2DA 30" 8711H

## (undated) DEVICE — SPONGE LAP 18X18" X8435

## (undated) DEVICE — TUBE FEEDING NEOCONNECT POLY ENFIT 8FR 24" PFTM8.0P-NC

## (undated) DEVICE — SU PDS II 4-0 SH 27" Z315H

## (undated) DEVICE — Device

## (undated) DEVICE — SU SILK 1 TIE 6X30" A307H

## (undated) DEVICE — LABEL MEDICATION SYSTEM 3303-P

## (undated) DEVICE — SU SILK 3-0 TIE 12X30" A304H

## (undated) DEVICE — LINEN GOWN XLG 5407

## (undated) DEVICE — FILTER BLOOD ULTIPOR  SQ40S

## (undated) DEVICE — GOWN IMPERVIOUS BREATHABLE SMART LG 89015

## (undated) DEVICE — CATH FOLEY 3WAY 16FR 30ML SIL 73016SI

## (undated) DEVICE — BASIN EMESIS STERILE  SSK9005A

## (undated) DEVICE — DRAPE TIBURON CARDIOVASCULAR PERI-GROIN LF 9154

## (undated) DEVICE — PACK HEART LEFT CUSTOM

## (undated) DEVICE — INTRO SHEATH AVANTI 4FRX23CM 504604T

## (undated) DEVICE — CATH FOLEY 3WAY 18FR 30ML LATEX 0167SI18

## (undated) DEVICE — SU SILK 3-0 SH CR 8X18" C013D

## (undated) DEVICE — SU PDS II 3-0 SH 27" Z316H

## (undated) DEVICE — SOL NACL 0.9% INJ 1000ML BAG 2B1324X

## (undated) DEVICE — CLIP HORIZON LG ORANGE 004200

## (undated) DEVICE — SOL NACL 0.9% IRRIG 1000ML BOTTLE 2F7124

## (undated) DEVICE — STPL ENDO RELOAD TA 30X2.5MM 010911L

## (undated) DEVICE — SU PROLENE 6-0 C-1DA 30" 8706H

## (undated) DEVICE — GLOVE PROTEXIS BLUE W/NEU-THERA 6.5  2D73EB65

## (undated) DEVICE — SYR BULB IRRIG 50ML LATEX FREE 0035280

## (undated) DEVICE — BASIN SET SINGLE STERILE 13752-624

## (undated) DEVICE — SUTURE BOOTS 051003PBX

## (undated) DEVICE — NDL COUNTER 20CT 31142493

## (undated) DEVICE — SPONGE RAY-TEC 4X8" 7318

## (undated) DEVICE — DRAPE SLUSH/WARMER 66X44" ORS-320

## (undated) DEVICE — WIPES FOLEY CARE SURESTEP PROVON DFC100

## (undated) DEVICE — SU PDS II 4-0 RB-1 27" Z304H

## (undated) DEVICE — DRAPE MAYO STAND 23X54 8337

## (undated) DEVICE — PITCHER STERILE 1000ML  SSK9004A

## (undated) DEVICE — SU PDS II 5-0 RB-1 27" Z303H

## (undated) DEVICE — TUBING SUCTION 10'X3/16" N510

## (undated) DEVICE — COVER LIGHT HANDLE  HILL-ROM C LT-C02

## (undated) DEVICE — ESU HANDPIECE ABC BEND-A-BEAM 6" 134006

## (undated) DEVICE — DRAPE SHEET REV FOLD 3/4 9349

## (undated) DEVICE — VESSEL LOOPS BLUE SUPERMAXI 011022PBX

## (undated) DEVICE — MANIFOLD KIT ANGIO AUTOMATED 014613

## (undated) DEVICE — CATH FOLEY 3WAY 18FR 30ML SIL 73018SI

## (undated) DEVICE — SU ETHIBOND 0 CT-2 30" X412H

## (undated) DEVICE — CATH PLUG W/CAP 000076

## (undated) DEVICE — NDL COUNTER 40CT  31142311

## (undated) DEVICE — SPONGE KITTNER 30-101

## (undated) DEVICE — SU VICRYL 3-0 SH 27" J316H

## (undated) DEVICE — PREP SKIN SCRUB TRAY 4461A

## (undated) DEVICE — PREP CHLORAPREP 26ML TINTED ORANGE  260815

## (undated) DEVICE — LINEN TOWEL PACK X6 WHITE 5487

## (undated) DEVICE — CATH ANGIO INFINITI JL4 4FRX100CM 538420

## (undated) DEVICE — BLADE CLIPPER SGL USE 9680

## (undated) DEVICE — SU PDS 6-0 BV-1DA 30" Z117H

## (undated) DEVICE — SU PROLENE 5-0 RB-1DA 36"  8556H

## (undated) DEVICE — SU PDS II 6-0 RB-2DA 30" Z149H

## (undated) DEVICE — SURGICEL ABSORBABLE HEMOSTAT SNOW 4"X4" 2083

## (undated) DEVICE — DRAPE IOBAN INCISE 23X17" 6650EZ

## (undated) DEVICE — BOWL 32OZ STERILE 01232

## (undated) DEVICE — CUP AND LID 2PK 2OZ STERILE  SSK9006A

## (undated) DEVICE — SYR 30ML LL W/O NDL 302832

## (undated) DEVICE — CATH ANGIO INFINITI 3DRC 4FRX100CM 538476

## (undated) DEVICE — SU PDS II 0 TP-1 60" Z991G

## (undated) DEVICE — KIT HAND CONTROL ACIST 014644 AR-P54

## (undated) DEVICE — DRSG MEDIPORE 3 1/2X13 3/4" 3573

## (undated) DEVICE — SU SILK 2-0 SH 30" K833H

## (undated) DEVICE — TUBING SUCTION MEDI-VAC SOFT 3/16"X20' N520A

## (undated) DEVICE — STRAP UNIVERSAL POSITIONING 2-PIECE 4X47X76" 91-287

## (undated) DEVICE — STPL ENDO TA30 2.5MM MULTIFIRE 010901

## (undated) DEVICE — ESU PENCIL W/COATED BLADE E2450H

## (undated) DEVICE — SU VICRYL 3-0 SH 27" UND J416H

## (undated) DEVICE — ESU ELEC BLADE 6" COATED E1450-6

## (undated) DEVICE — SU PROLENE 7-0 BV-1DA 30" 8703H

## (undated) DEVICE — PUNCH AORTIC 4MM SINGLE USE 1001-602

## (undated) DEVICE — SU SILK 0 SH 30" K834H

## (undated) DEVICE — PEN MARKING W/RULER DYNJSM04

## (undated) DEVICE — DRAPE SLEEVE 599

## (undated) DEVICE — SU PROLENE 4-0 RB-1DA 18" 8757H

## (undated) DEVICE — SYR 10ML LL W/O NDL 302995

## (undated) DEVICE — VESSEL LOOPS YELLOW MAXI 31145694

## (undated) DEVICE — STPL LINEAR 30X2.5MM VASC TX30V

## (undated) DEVICE — SU PROLENE 6-0 RB-2DA 18" 8714H

## (undated) DEVICE — DECANTER BAG 2002S

## (undated) DEVICE — SUCTION DRY CHEST DRAIN OASIS 3600-100

## (undated) RX ORDER — CARDIOPLEG/ORGAN PRESERV NO.1 9-198-2-1
BOTTLE PERFUSION
Status: DISPENSED
Start: 2020-01-24

## (undated) RX ORDER — FENTANYL CITRATE-0.9 % NACL/PF 10 MCG/ML
PLASTIC BAG, INJECTION (ML) INTRAVENOUS
Status: DISPENSED
Start: 2020-11-12

## (undated) RX ORDER — FENTANYL CITRATE 50 UG/ML
INJECTION, SOLUTION INTRAMUSCULAR; INTRAVENOUS
Status: DISPENSED
Start: 2020-11-12

## (undated) RX ORDER — HEPARIN SODIUM 1000 [USP'U]/ML
INJECTION, SOLUTION INTRAVENOUS; SUBCUTANEOUS
Status: DISPENSED
Start: 2019-04-23

## (undated) RX ORDER — NITROGLYCERIN 5 MG/ML
VIAL (ML) INTRAVENOUS
Status: DISPENSED
Start: 2019-06-18

## (undated) RX ORDER — DEXAMETHASONE SODIUM PHOSPHATE 4 MG/ML
INJECTION, SOLUTION INTRA-ARTICULAR; INTRALESIONAL; INTRAMUSCULAR; INTRAVENOUS; SOFT TISSUE
Status: DISPENSED
Start: 2020-11-12

## (undated) RX ORDER — FENTANYL CITRATE 50 UG/ML
INJECTION, SOLUTION INTRAMUSCULAR; INTRAVENOUS
Status: DISPENSED
Start: 2020-01-24

## (undated) RX ORDER — CARDIOPLEG/ORGAN PRESERV NO.1 9-198-2-1
BOTTLE PERFUSION
Status: DISPENSED
Start: 2019-04-23

## (undated) RX ORDER — GLYCOPYRROLATE 0.2 MG/ML
INJECTION, SOLUTION INTRAMUSCULAR; INTRAVENOUS
Status: DISPENSED
Start: 2020-11-12

## (undated) RX ORDER — SODIUM CHLORIDE 450 MG/100ML
INJECTION, SOLUTION INTRAVENOUS
Status: DISPENSED
Start: 2020-11-12

## (undated) RX ORDER — METOPROLOL TARTRATE 1 MG/ML
INJECTION, SOLUTION INTRAVENOUS
Status: DISPENSED
Start: 2018-11-08

## (undated) RX ORDER — ONDANSETRON 2 MG/ML
INJECTION INTRAMUSCULAR; INTRAVENOUS
Status: DISPENSED
Start: 2019-04-23

## (undated) RX ORDER — SODIUM CHLORIDE 9 MG/ML
INJECTION, SOLUTION INTRAVENOUS
Status: DISPENSED
Start: 2020-01-24

## (undated) RX ORDER — SODIUM CHLORIDE 9 MG/ML
INJECTION, SOLUTION INTRAVENOUS
Status: DISPENSED
Start: 2019-06-18

## (undated) RX ORDER — FENTANYL CITRATE 50 UG/ML
INJECTION, SOLUTION INTRAMUSCULAR; INTRAVENOUS
Status: DISPENSED
Start: 2019-04-23

## (undated) RX ORDER — HYDROMORPHONE HYDROCHLORIDE 1 MG/ML
INJECTION, SOLUTION INTRAMUSCULAR; INTRAVENOUS; SUBCUTANEOUS
Status: DISPENSED
Start: 2020-11-12

## (undated) RX ORDER — EPHEDRINE SULFATE 50 MG/ML
INJECTION, SOLUTION INTRAMUSCULAR; INTRAVENOUS; SUBCUTANEOUS
Status: DISPENSED
Start: 2020-11-12

## (undated) RX ORDER — PROPOFOL 10 MG/ML
INJECTION, EMULSION INTRAVENOUS
Status: DISPENSED
Start: 2020-11-12

## (undated) RX ORDER — ASPIRIN 81 MG/1
TABLET, CHEWABLE ORAL
Status: DISPENSED
Start: 2019-06-18

## (undated) RX ORDER — DOBUTAMINE HYDROCHLORIDE 200 MG/100ML
INJECTION INTRAVENOUS
Status: DISPENSED
Start: 2018-11-08

## (undated) RX ORDER — CEFAZOLIN SODIUM 1 G/3ML
INJECTION, POWDER, FOR SOLUTION INTRAMUSCULAR; INTRAVENOUS
Status: DISPENSED
Start: 2019-04-23

## (undated) RX ORDER — ASPIRIN 325 MG
TABLET ORAL
Status: DISPENSED
Start: 2020-11-12

## (undated) RX ORDER — PAPAVERINE HYDROCHLORIDE 30 MG/ML
INJECTION INTRAMUSCULAR; INTRAVENOUS
Status: DISPENSED
Start: 2019-04-23

## (undated) RX ORDER — ACETAMINOPHEN 325 MG/1
TABLET ORAL
Status: DISPENSED
Start: 2020-11-12

## (undated) RX ORDER — FUROSEMIDE 10 MG/ML
INJECTION INTRAMUSCULAR; INTRAVENOUS
Status: DISPENSED
Start: 2020-01-24

## (undated) RX ORDER — DEXAMETHASONE SODIUM PHOSPHATE 4 MG/ML
INJECTION, SOLUTION INTRA-ARTICULAR; INTRALESIONAL; INTRAMUSCULAR; INTRAVENOUS; SOFT TISSUE
Status: DISPENSED
Start: 2019-04-23

## (undated) RX ORDER — LIDOCAINE HYDROCHLORIDE 20 MG/ML
INJECTION, SOLUTION EPIDURAL; INFILTRATION; INTRACAUDAL; PERINEURAL
Status: DISPENSED
Start: 2020-01-24

## (undated) RX ORDER — ASPIRIN 325 MG
TABLET ORAL
Status: DISPENSED
Start: 2019-06-18

## (undated) RX ORDER — PAPAVERINE HYDROCHLORIDE 30 MG/ML
INJECTION INTRAMUSCULAR; INTRAVENOUS
Status: DISPENSED
Start: 2020-11-12

## (undated) RX ORDER — LIDOCAINE HYDROCHLORIDE 20 MG/ML
INJECTION, SOLUTION EPIDURAL; INFILTRATION; INTRACAUDAL; PERINEURAL
Status: DISPENSED
Start: 2019-04-23

## (undated) RX ORDER — ONDANSETRON 2 MG/ML
INJECTION INTRAMUSCULAR; INTRAVENOUS
Status: DISPENSED
Start: 2019-06-18

## (undated) RX ORDER — SODIUM CHLORIDE 9 MG/ML
INJECTION, SOLUTION INTRAVENOUS
Status: DISPENSED
Start: 2020-11-12

## (undated) RX ORDER — PAPAVERINE HYDROCHLORIDE 30 MG/ML
INJECTION INTRAMUSCULAR; INTRAVENOUS
Status: DISPENSED
Start: 2020-01-24

## (undated) RX ORDER — VERAPAMIL HYDROCHLORIDE 2.5 MG/ML
INJECTION, SOLUTION INTRAVENOUS
Status: DISPENSED
Start: 2020-01-24

## (undated) RX ORDER — FENTANYL CITRATE 50 UG/ML
INJECTION, SOLUTION INTRAMUSCULAR; INTRAVENOUS
Status: DISPENSED
Start: 2019-06-18

## (undated) RX ORDER — MANNITOL 20 G/100ML
INJECTION, SOLUTION INTRAVENOUS
Status: DISPENSED
Start: 2019-04-23

## (undated) RX ORDER — PROPOFOL 10 MG/ML
INJECTION, EMULSION INTRAVENOUS
Status: DISPENSED
Start: 2020-01-24

## (undated) RX ORDER — LIDOCAINE HYDROCHLORIDE 20 MG/ML
INJECTION, SOLUTION EPIDURAL; INFILTRATION; INTRACAUDAL; PERINEURAL
Status: DISPENSED
Start: 2020-11-12

## (undated) RX ORDER — PROPOFOL 10 MG/ML
INJECTION, EMULSION INTRAVENOUS
Status: DISPENSED
Start: 2019-04-23

## (undated) RX ORDER — HEPARIN SODIUM 1000 [USP'U]/ML
INJECTION, SOLUTION INTRAVENOUS; SUBCUTANEOUS
Status: DISPENSED
Start: 2020-11-12

## (undated) RX ORDER — DIPHENHYDRAMINE HYDROCHLORIDE 50 MG/ML
INJECTION INTRAMUSCULAR; INTRAVENOUS
Status: DISPENSED
Start: 2019-04-23

## (undated) RX ORDER — FENTANYL CITRATE 50 UG/ML
INJECTION, SOLUTION INTRAMUSCULAR; INTRAVENOUS
Status: DISPENSED
Start: 2019-10-08

## (undated) RX ORDER — ONDANSETRON 2 MG/ML
INJECTION INTRAMUSCULAR; INTRAVENOUS
Status: DISPENSED
Start: 2020-11-12